# Patient Record
Sex: MALE | Race: WHITE | NOT HISPANIC OR LATINO | URBAN - METROPOLITAN AREA
[De-identification: names, ages, dates, MRNs, and addresses within clinical notes are randomized per-mention and may not be internally consistent; named-entity substitution may affect disease eponyms.]

---

## 2017-05-04 ENCOUNTER — OUTPATIENT (OUTPATIENT)
Dept: OUTPATIENT SERVICES | Facility: HOSPITAL | Age: 72
LOS: 1 days | Discharge: HOME | End: 2017-05-04

## 2017-06-28 DIAGNOSIS — K75.9 INFLAMMATORY LIVER DISEASE, UNSPECIFIED: ICD-10-CM

## 2017-06-28 DIAGNOSIS — E03.9 HYPOTHYROIDISM, UNSPECIFIED: ICD-10-CM

## 2017-06-28 DIAGNOSIS — D64.9 ANEMIA, UNSPECIFIED: ICD-10-CM

## 2017-06-28 DIAGNOSIS — E78.5 HYPERLIPIDEMIA, UNSPECIFIED: ICD-10-CM

## 2017-06-28 DIAGNOSIS — I10 ESSENTIAL (PRIMARY) HYPERTENSION: ICD-10-CM

## 2017-06-28 DIAGNOSIS — E11.9 TYPE 2 DIABETES MELLITUS WITHOUT COMPLICATIONS: ICD-10-CM

## 2017-12-01 ENCOUNTER — OUTPATIENT (OUTPATIENT)
Dept: OUTPATIENT SERVICES | Facility: HOSPITAL | Age: 72
LOS: 1 days | Discharge: HOME | End: 2017-12-01

## 2017-12-01 DIAGNOSIS — J44.1 CHRONIC OBSTRUCTIVE PULMONARY DISEASE WITH (ACUTE) EXACERBATION: ICD-10-CM

## 2017-12-01 DIAGNOSIS — I10 ESSENTIAL (PRIMARY) HYPERTENSION: ICD-10-CM

## 2017-12-01 DIAGNOSIS — E78.5 HYPERLIPIDEMIA, UNSPECIFIED: ICD-10-CM

## 2017-12-01 DIAGNOSIS — J18.9 PNEUMONIA, UNSPECIFIED ORGANISM: ICD-10-CM

## 2017-12-01 DIAGNOSIS — E11.9 TYPE 2 DIABETES MELLITUS WITHOUT COMPLICATIONS: ICD-10-CM

## 2017-12-01 DIAGNOSIS — J44.9 CHRONIC OBSTRUCTIVE PULMONARY DISEASE, UNSPECIFIED: ICD-10-CM

## 2017-12-01 DIAGNOSIS — N40.0 BENIGN PROSTATIC HYPERPLASIA WITHOUT LOWER URINARY TRACT SYMPTOMS: ICD-10-CM

## 2017-12-01 DIAGNOSIS — K52.9 NONINFECTIVE GASTROENTERITIS AND COLITIS, UNSPECIFIED: ICD-10-CM

## 2017-12-01 DIAGNOSIS — E55.9 VITAMIN D DEFICIENCY, UNSPECIFIED: ICD-10-CM

## 2017-12-01 DIAGNOSIS — J20.9 ACUTE BRONCHITIS, UNSPECIFIED: ICD-10-CM

## 2017-12-01 DIAGNOSIS — G03.9 MENINGITIS, UNSPECIFIED: ICD-10-CM

## 2017-12-01 DIAGNOSIS — D64.9 ANEMIA, UNSPECIFIED: ICD-10-CM

## 2017-12-01 DIAGNOSIS — K75.9 INFLAMMATORY LIVER DISEASE, UNSPECIFIED: ICD-10-CM

## 2018-01-06 ENCOUNTER — INPATIENT (INPATIENT)
Facility: HOSPITAL | Age: 73
LOS: 4 days | Discharge: HOME | End: 2018-01-11
Attending: INTERNAL MEDICINE | Admitting: INTERNAL MEDICINE

## 2018-01-06 DIAGNOSIS — J18.9 PNEUMONIA, UNSPECIFIED ORGANISM: ICD-10-CM

## 2018-01-06 DIAGNOSIS — K52.9 NONINFECTIVE GASTROENTERITIS AND COLITIS, UNSPECIFIED: ICD-10-CM

## 2018-01-06 DIAGNOSIS — J20.9 ACUTE BRONCHITIS, UNSPECIFIED: ICD-10-CM

## 2018-01-06 DIAGNOSIS — J44.9 CHRONIC OBSTRUCTIVE PULMONARY DISEASE, UNSPECIFIED: ICD-10-CM

## 2018-01-06 DIAGNOSIS — J44.1 CHRONIC OBSTRUCTIVE PULMONARY DISEASE WITH (ACUTE) EXACERBATION: ICD-10-CM

## 2018-01-06 DIAGNOSIS — E11.9 TYPE 2 DIABETES MELLITUS WITHOUT COMPLICATIONS: ICD-10-CM

## 2018-01-16 DIAGNOSIS — Z79.4 LONG TERM (CURRENT) USE OF INSULIN: ICD-10-CM

## 2018-01-16 DIAGNOSIS — A02.1 SALMONELLA SEPSIS: ICD-10-CM

## 2018-01-16 DIAGNOSIS — Z87.891 PERSONAL HISTORY OF NICOTINE DEPENDENCE: ICD-10-CM

## 2018-01-16 DIAGNOSIS — J44.1 CHRONIC OBSTRUCTIVE PULMONARY DISEASE WITH (ACUTE) EXACERBATION: ICD-10-CM

## 2018-01-16 DIAGNOSIS — J96.00 ACUTE RESPIRATORY FAILURE, UNSPECIFIED WHETHER WITH HYPOXIA OR HYPERCAPNIA: ICD-10-CM

## 2018-01-16 DIAGNOSIS — J18.9 PNEUMONIA, UNSPECIFIED ORGANISM: ICD-10-CM

## 2018-01-16 DIAGNOSIS — E11.9 TYPE 2 DIABETES MELLITUS WITHOUT COMPLICATIONS: ICD-10-CM

## 2018-01-16 DIAGNOSIS — Z85.46 PERSONAL HISTORY OF MALIGNANT NEOPLASM OF PROSTATE: ICD-10-CM

## 2018-02-21 ENCOUNTER — OUTPATIENT (OUTPATIENT)
Dept: OUTPATIENT SERVICES | Facility: HOSPITAL | Age: 73
LOS: 1 days | Discharge: HOME | End: 2018-02-21

## 2018-02-21 DIAGNOSIS — J45.901 UNSPECIFIED ASTHMA WITH (ACUTE) EXACERBATION: ICD-10-CM

## 2018-03-12 ENCOUNTER — EMERGENCY (EMERGENCY)
Facility: HOSPITAL | Age: 73
LOS: 0 days | Discharge: HOME | End: 2018-03-13
Attending: EMERGENCY MEDICINE | Admitting: INTERNAL MEDICINE

## 2018-03-12 VITALS
TEMPERATURE: 100 F | DIASTOLIC BLOOD PRESSURE: 68 MMHG | SYSTOLIC BLOOD PRESSURE: 117 MMHG | OXYGEN SATURATION: 94 % | RESPIRATION RATE: 20 BRPM | HEART RATE: 121 BPM

## 2018-03-12 DIAGNOSIS — R00.0 TACHYCARDIA, UNSPECIFIED: ICD-10-CM

## 2018-03-12 DIAGNOSIS — E11.9 TYPE 2 DIABETES MELLITUS WITHOUT COMPLICATIONS: ICD-10-CM

## 2018-03-12 DIAGNOSIS — R19.7 DIARRHEA, UNSPECIFIED: ICD-10-CM

## 2018-03-12 DIAGNOSIS — R10.13 EPIGASTRIC PAIN: ICD-10-CM

## 2018-03-12 DIAGNOSIS — R11.10 VOMITING, UNSPECIFIED: ICD-10-CM

## 2018-03-12 DIAGNOSIS — J45.909 UNSPECIFIED ASTHMA, UNCOMPLICATED: ICD-10-CM

## 2018-03-12 LAB
ALBUMIN SERPL ELPH-MCNC: 2.6 G/DL — LOW (ref 3–5.5)
ALP SERPL-CCNC: 57 U/L — SIGNIFICANT CHANGE UP (ref 30–115)
ALT FLD-CCNC: 16 U/L — SIGNIFICANT CHANGE UP (ref 0–41)
ANION GAP SERPL CALC-SCNC: 9 MMOL/L — SIGNIFICANT CHANGE UP (ref 7–14)
AST SERPL-CCNC: 20 U/L — SIGNIFICANT CHANGE UP (ref 0–41)
BASOPHILS # BLD AUTO: 0.05 K/UL — SIGNIFICANT CHANGE UP (ref 0–0.2)
BASOPHILS NFR BLD AUTO: 0.4 % — SIGNIFICANT CHANGE UP (ref 0–1)
BILIRUB SERPL-MCNC: 1.4 MG/DL — HIGH (ref 0.2–1.2)
BUN SERPL-MCNC: 20 MG/DL — SIGNIFICANT CHANGE UP (ref 10–20)
CALCIUM SERPL-MCNC: 8.5 MG/DL — SIGNIFICANT CHANGE UP (ref 8.5–10.1)
CHLORIDE SERPL-SCNC: 105 MMOL/L — SIGNIFICANT CHANGE UP (ref 98–110)
CO2 SERPL-SCNC: 24 MMOL/L — SIGNIFICANT CHANGE UP (ref 17–32)
CREAT SERPL-MCNC: 1 MG/DL — SIGNIFICANT CHANGE UP (ref 0.7–1.5)
EOSINOPHIL # BLD AUTO: 0.23 K/UL — SIGNIFICANT CHANGE UP (ref 0–0.7)
EOSINOPHIL NFR BLD AUTO: 1.7 % — SIGNIFICANT CHANGE UP (ref 0–8)
GLUCOSE SERPL-MCNC: 170 MG/DL — HIGH (ref 70–110)
HCT VFR BLD CALC: 38.7 % — LOW (ref 42–52)
HGB BLD-MCNC: 12.5 G/DL — LOW (ref 14–18)
IMM GRANULOCYTES NFR BLD AUTO: 1.3 % — HIGH (ref 0.1–0.3)
LACTATE SERPL-SCNC: 1.3 MMOL/L — SIGNIFICANT CHANGE UP (ref 0.5–2.2)
LIDOCAIN IGE QN: 16 U/L — SIGNIFICANT CHANGE UP (ref 7–60)
LYMPHOCYTES # BLD AUTO: 1.77 K/UL — SIGNIFICANT CHANGE UP (ref 1.2–3.4)
LYMPHOCYTES # BLD AUTO: 13.2 % — LOW (ref 20.5–51.1)
MAGNESIUM SERPL-MCNC: 1.8 MG/DL — SIGNIFICANT CHANGE UP (ref 1.8–2.4)
MCHC RBC-ENTMCNC: 27.2 PG — SIGNIFICANT CHANGE UP (ref 27–31)
MCHC RBC-ENTMCNC: 32.3 G/DL — SIGNIFICANT CHANGE UP (ref 32–37)
MCV RBC AUTO: 84.1 FL — SIGNIFICANT CHANGE UP (ref 80–94)
MONOCYTES # BLD AUTO: 0.88 K/UL — HIGH (ref 0.1–0.6)
MONOCYTES NFR BLD AUTO: 6.6 % — SIGNIFICANT CHANGE UP (ref 1.7–9.3)
NEUTROPHILS # BLD AUTO: 10.26 K/UL — HIGH (ref 1.4–6.5)
NEUTROPHILS NFR BLD AUTO: 76.8 % — HIGH (ref 42.2–75.2)
NRBC # BLD: 0 /100 WBCS — SIGNIFICANT CHANGE UP (ref 0–0)
PLATELET # BLD AUTO: 462 K/UL — HIGH (ref 130–400)
POTASSIUM SERPL-MCNC: 4.8 MMOL/L — SIGNIFICANT CHANGE UP (ref 3.5–5)
POTASSIUM SERPL-SCNC: 4.8 MMOL/L — SIGNIFICANT CHANGE UP (ref 3.5–5)
PROT SERPL-MCNC: 5.7 G/DL — LOW (ref 6–8)
RBC # BLD: 4.6 M/UL — LOW (ref 4.7–6.1)
RBC # FLD: 14.2 % — SIGNIFICANT CHANGE UP (ref 11.5–14.5)
SODIUM SERPL-SCNC: 138 MMOL/L — SIGNIFICANT CHANGE UP (ref 135–146)
WBC # BLD: 13.36 K/UL — HIGH (ref 4.8–10.8)
WBC # FLD AUTO: 13.36 K/UL — HIGH (ref 4.8–10.8)

## 2018-03-12 RX ORDER — SODIUM CHLORIDE 9 MG/ML
1000 INJECTION INTRAMUSCULAR; INTRAVENOUS; SUBCUTANEOUS ONCE
Qty: 0 | Refills: 0 | Status: COMPLETED | OUTPATIENT
Start: 2018-03-12 | End: 2018-03-12

## 2018-03-12 RX ORDER — ONDANSETRON 8 MG/1
8 TABLET, FILM COATED ORAL ONCE
Qty: 0 | Refills: 0 | Status: COMPLETED | OUTPATIENT
Start: 2018-03-12 | End: 2018-03-12

## 2018-03-12 RX ADMIN — SODIUM CHLORIDE 2000 MILLILITER(S): 9 INJECTION INTRAMUSCULAR; INTRAVENOUS; SUBCUTANEOUS at 23:24

## 2018-03-12 RX ADMIN — ONDANSETRON 8 MILLIGRAM(S): 8 TABLET, FILM COATED ORAL at 23:24

## 2018-03-12 RX ADMIN — Medication 20 MILLIGRAM(S): at 23:24

## 2018-03-13 VITALS
DIASTOLIC BLOOD PRESSURE: 61 MMHG | TEMPERATURE: 99 F | SYSTOLIC BLOOD PRESSURE: 131 MMHG | RESPIRATION RATE: 18 BRPM | OXYGEN SATURATION: 99 % | HEART RATE: 99 BPM

## 2018-03-13 RX ORDER — FAMOTIDINE 10 MG/ML
20 INJECTION INTRAVENOUS ONCE
Qty: 0 | Refills: 0 | Status: COMPLETED | OUTPATIENT
Start: 2018-03-13 | End: 2018-03-13

## 2018-03-13 RX ADMIN — Medication 30 MILLILITER(S): at 01:04

## 2018-03-13 RX ADMIN — FAMOTIDINE 20 MILLIGRAM(S): 10 INJECTION INTRAVENOUS at 01:04

## 2018-03-13 NOTE — ED PROVIDER NOTE - NS ED ROS FT
Constitutional: no fever, chills, no recent weight loss, decreased appetite today due to nausea  Cardiac: No chest pain, SOB or edema.  Respiratory: No cough or respiratory distress  GI: see HPI  : No dysuria, frequency, urgency or hematuria  MS: no pain to back or extremities, no loss of ROM, no weakness  Neuro: No headache, feels weak.  Skin: No skin rash.  Endocrine: see hPI  Except as documented in the HPI, all other systems are negative.

## 2018-03-13 NOTE — ED PROVIDER NOTE - PROGRESS NOTE DETAILS
Labs unremarkable, patient no longer nauseous, did have another episode of diarrhea in ED, now has burning to mid abdomen. Will give pepcid, maalox and reassess Patient no longer has epigastric pain, is tolerating PO well. Likely viral gastro, discussed BRAT diet, PO fluids and return precautions.

## 2018-03-13 NOTE — ED PROVIDER NOTE - MEDICAL DECISION MAKING DETAILS
Patient here for vomiting, diarrhea, non bloody, over last 24 hours, no sick contacts, travel. Has soft NT, ND abdomen, is tachy to 120. Will check labs, hydrate, give fluids, bentyl, zofran and reassess.

## 2018-03-13 NOTE — ED PROVIDER NOTE - PHYSICAL EXAMINATION
VITAL SIGNS: I have reviewed nursing notes and confirm.  CONSTITUTIONAL: Well-developed; well-nourished; in no acute distress.  SKIN: Skin exam is warm and dry, no acute rash, good turgor  HEAD: Normocephalic; atraumatic.  EYES: PERRL, EOM intact; conjunctiva and sclera clear.  ENT: No nasal discharge; airway clear  NECK: Supple; non tender.  CARD: tachy, reg rhythm.  RESP: No wheezes, rales or rhonchi.  ABD: Normal bowel sounds; soft; non-distended; non-tender  EXT: Normal ROM.   NEURO: Alert, oriented. Grossly unremarkable. No focal deficits.  PSYCH: Cooperative, appropriate.

## 2018-03-13 NOTE — ED ADULT NURSE REASSESSMENT NOTE - NS ED NURSE REASSESS COMMENT FT1
Talked with dr about family concerns and how family wants to be admitted. Stated will talk to family.

## 2018-03-13 NOTE — ED PROVIDER NOTE - OBJECTIVE STATEMENT
73 yo M with history of DM II, well controlled asthma, previous prostate ca sp TURP not requiring chemo or radiation, here for assessment of vomiting and diarrhea since 1am. Patient states he was in [place], ate dinner, felt well, went to bed and woke up with multiple episode of vomiting, non bloody, non bilious. Had multiple other episodes of vomiting until early this afternoon, when he developed cramping lower abdominal pain and non bloody diarrhea.     In ED he reports bloating, gas and diarrhea preceded by cramping pain.     No recent travel or sick contacts. No one else who ate this food is ill

## 2018-03-27 ENCOUNTER — OUTPATIENT (OUTPATIENT)
Dept: OUTPATIENT SERVICES | Facility: HOSPITAL | Age: 73
LOS: 1 days | Discharge: HOME | End: 2018-03-27

## 2018-03-27 DIAGNOSIS — B44.9 ASPERGILLOSIS, UNSPECIFIED: ICD-10-CM

## 2018-04-09 ENCOUNTER — OUTPATIENT (OUTPATIENT)
Dept: OUTPATIENT SERVICES | Facility: HOSPITAL | Age: 73
LOS: 1 days | Discharge: HOME | End: 2018-04-09

## 2018-04-09 DIAGNOSIS — R05 COUGH: ICD-10-CM

## 2018-04-13 ENCOUNTER — OUTPATIENT (OUTPATIENT)
Dept: OUTPATIENT SERVICES | Facility: HOSPITAL | Age: 73
LOS: 1 days | Discharge: HOME | End: 2018-04-13

## 2018-04-13 DIAGNOSIS — E11.9 TYPE 2 DIABETES MELLITUS WITHOUT COMPLICATIONS: ICD-10-CM

## 2018-04-13 DIAGNOSIS — I10 ESSENTIAL (PRIMARY) HYPERTENSION: ICD-10-CM

## 2018-04-13 DIAGNOSIS — E78.5 HYPERLIPIDEMIA, UNSPECIFIED: ICD-10-CM

## 2018-04-13 DIAGNOSIS — D64.9 ANEMIA, UNSPECIFIED: ICD-10-CM

## 2018-04-13 DIAGNOSIS — K75.9 INFLAMMATORY LIVER DISEASE, UNSPECIFIED: ICD-10-CM

## 2018-04-13 DIAGNOSIS — E03.9 HYPOTHYROIDISM, UNSPECIFIED: ICD-10-CM

## 2018-04-22 ENCOUNTER — INPATIENT (INPATIENT)
Facility: HOSPITAL | Age: 73
LOS: 3 days | Discharge: HOME HEALTH | End: 2018-04-26
Attending: INTERNAL MEDICINE | Admitting: INTERNAL MEDICINE

## 2018-04-22 VITALS
SYSTOLIC BLOOD PRESSURE: 134 MMHG | DIASTOLIC BLOOD PRESSURE: 66 MMHG | OXYGEN SATURATION: 95 % | RESPIRATION RATE: 20 BRPM | HEART RATE: 129 BPM

## 2018-04-22 LAB
ALBUMIN SERPL ELPH-MCNC: 3.2 G/DL — LOW (ref 3.5–5.2)
ALP SERPL-CCNC: 73 U/L — SIGNIFICANT CHANGE UP (ref 30–115)
ALT FLD-CCNC: 25 U/L — SIGNIFICANT CHANGE UP (ref 0–41)
ANION GAP SERPL CALC-SCNC: 16 MMOL/L — HIGH (ref 7–14)
AST SERPL-CCNC: 25 U/L — SIGNIFICANT CHANGE UP (ref 0–41)
BASOPHILS # BLD AUTO: 0.03 K/UL — SIGNIFICANT CHANGE UP (ref 0–0.2)
BASOPHILS NFR BLD AUTO: 0.2 % — SIGNIFICANT CHANGE UP (ref 0–1)
BILIRUB SERPL-MCNC: 0.7 MG/DL — SIGNIFICANT CHANGE UP (ref 0.2–1.2)
BUN SERPL-MCNC: 13 MG/DL — SIGNIFICANT CHANGE UP (ref 10–20)
CALCIUM SERPL-MCNC: 8.3 MG/DL — LOW (ref 8.5–10.1)
CHLORIDE SERPL-SCNC: 101 MMOL/L — SIGNIFICANT CHANGE UP (ref 98–110)
CO2 SERPL-SCNC: 22 MMOL/L — SIGNIFICANT CHANGE UP (ref 17–32)
CREAT SERPL-MCNC: 0.9 MG/DL — SIGNIFICANT CHANGE UP (ref 0.7–1.5)
EOSINOPHIL # BLD AUTO: 0.04 K/UL — SIGNIFICANT CHANGE UP (ref 0–0.7)
EOSINOPHIL NFR BLD AUTO: 0.3 % — SIGNIFICANT CHANGE UP (ref 0–8)
GLUCOSE SERPL-MCNC: 131 MG/DL — HIGH (ref 70–99)
HCT VFR BLD CALC: 43.8 % — SIGNIFICANT CHANGE UP (ref 42–52)
HGB BLD-MCNC: 14.2 G/DL — SIGNIFICANT CHANGE UP (ref 14–18)
IMM GRANULOCYTES NFR BLD AUTO: 1.5 % — HIGH (ref 0.1–0.3)
LACTATE BLDV-MCNC: 3 MMOL/L — HIGH (ref 0.5–1.6)
LYMPHOCYTES # BLD AUTO: 14.1 % — LOW (ref 20.5–51.1)
LYMPHOCYTES # BLD AUTO: 2.13 K/UL — SIGNIFICANT CHANGE UP (ref 1.2–3.4)
MCHC RBC-ENTMCNC: 27.6 PG — SIGNIFICANT CHANGE UP (ref 27–31)
MCHC RBC-ENTMCNC: 32.4 G/DL — SIGNIFICANT CHANGE UP (ref 32–37)
MCV RBC AUTO: 85.2 FL — SIGNIFICANT CHANGE UP (ref 80–94)
MONOCYTES # BLD AUTO: 1.01 K/UL — HIGH (ref 0.1–0.6)
MONOCYTES NFR BLD AUTO: 6.7 % — SIGNIFICANT CHANGE UP (ref 1.7–9.3)
NEUTROPHILS # BLD AUTO: 11.64 K/UL — HIGH (ref 1.4–6.5)
NEUTROPHILS NFR BLD AUTO: 77.2 % — HIGH (ref 42.2–75.2)
NRBC # BLD: 0 /100 WBCS — SIGNIFICANT CHANGE UP (ref 0–0)
NT-PROBNP SERPL-SCNC: 185 PG/ML — SIGNIFICANT CHANGE UP (ref 0–300)
PLATELET # BLD AUTO: 223 K/UL — SIGNIFICANT CHANGE UP (ref 130–400)
POTASSIUM SERPL-MCNC: 4.7 MMOL/L — SIGNIFICANT CHANGE UP (ref 3.5–5)
POTASSIUM SERPL-SCNC: 4.7 MMOL/L — SIGNIFICANT CHANGE UP (ref 3.5–5)
PROT SERPL-MCNC: 6.1 G/DL — SIGNIFICANT CHANGE UP (ref 6–8)
RBC # BLD: 5.14 M/UL — SIGNIFICANT CHANGE UP (ref 4.7–6.1)
RBC # FLD: 15.1 % — HIGH (ref 11.5–14.5)
SODIUM SERPL-SCNC: 139 MMOL/L — SIGNIFICANT CHANGE UP (ref 135–146)
TROPONIN T SERPL-MCNC: <0.01 NG/ML — SIGNIFICANT CHANGE UP
WBC # BLD: 15.07 K/UL — HIGH (ref 4.8–10.8)
WBC # FLD AUTO: 15.07 K/UL — HIGH (ref 4.8–10.8)

## 2018-04-22 RX ORDER — INSULIN LISPRO 100/ML
10 VIAL (ML) SUBCUTANEOUS
Qty: 0 | Refills: 0 | Status: DISCONTINUED | OUTPATIENT
Start: 2018-04-22 | End: 2018-04-23

## 2018-04-22 RX ORDER — IPRATROPIUM/ALBUTEROL SULFATE 18-103MCG
3 AEROSOL WITH ADAPTER (GRAM) INHALATION
Qty: 0 | Refills: 0 | Status: COMPLETED | OUTPATIENT
Start: 2018-04-22 | End: 2018-04-22

## 2018-04-22 RX ORDER — DEXTROSE 50 % IN WATER 50 %
25 SYRINGE (ML) INTRAVENOUS ONCE
Qty: 0 | Refills: 0 | Status: DISCONTINUED | OUTPATIENT
Start: 2018-04-22 | End: 2018-04-23

## 2018-04-22 RX ORDER — DEXTROSE 50 % IN WATER 50 %
1 SYRINGE (ML) INTRAVENOUS ONCE
Qty: 0 | Refills: 0 | Status: DISCONTINUED | OUTPATIENT
Start: 2018-04-22 | End: 2018-04-23

## 2018-04-22 RX ORDER — CEFTRIAXONE 500 MG/1
1 INJECTION, POWDER, FOR SOLUTION INTRAMUSCULAR; INTRAVENOUS ONCE
Qty: 0 | Refills: 0 | Status: COMPLETED | OUTPATIENT
Start: 2018-04-22 | End: 2018-04-22

## 2018-04-22 RX ORDER — ACETAMINOPHEN 500 MG
650 TABLET ORAL ONCE
Qty: 0 | Refills: 0 | Status: COMPLETED | OUTPATIENT
Start: 2018-04-22 | End: 2018-04-22

## 2018-04-22 RX ORDER — DEXTROSE 50 % IN WATER 50 %
12.5 SYRINGE (ML) INTRAVENOUS ONCE
Qty: 0 | Refills: 0 | Status: DISCONTINUED | OUTPATIENT
Start: 2018-04-22 | End: 2018-04-23

## 2018-04-22 RX ORDER — SODIUM CHLORIDE 9 MG/ML
1000 INJECTION, SOLUTION INTRAVENOUS
Qty: 0 | Refills: 0 | Status: DISCONTINUED | OUTPATIENT
Start: 2018-04-22 | End: 2018-04-23

## 2018-04-22 RX ORDER — MAGNESIUM SULFATE 500 MG/ML
2 VIAL (ML) INJECTION ONCE
Qty: 0 | Refills: 0 | Status: COMPLETED | OUTPATIENT
Start: 2018-04-22 | End: 2018-04-22

## 2018-04-22 RX ORDER — GLUCAGON INJECTION, SOLUTION 0.5 MG/.1ML
1 INJECTION, SOLUTION SUBCUTANEOUS ONCE
Qty: 0 | Refills: 0 | Status: DISCONTINUED | OUTPATIENT
Start: 2018-04-22 | End: 2018-04-23

## 2018-04-22 RX ORDER — INSULIN GLARGINE 100 [IU]/ML
30 INJECTION, SOLUTION SUBCUTANEOUS AT BEDTIME
Qty: 0 | Refills: 0 | Status: DISCONTINUED | OUTPATIENT
Start: 2018-04-22 | End: 2018-04-23

## 2018-04-22 RX ORDER — INSULIN LISPRO 100/ML
10 VIAL (ML) SUBCUTANEOUS ONCE
Qty: 0 | Refills: 0 | Status: COMPLETED | OUTPATIENT
Start: 2018-04-22 | End: 2018-04-22

## 2018-04-22 RX ORDER — AZITHROMYCIN 500 MG/1
500 TABLET, FILM COATED ORAL ONCE
Qty: 0 | Refills: 0 | Status: COMPLETED | OUTPATIENT
Start: 2018-04-22 | End: 2018-04-22

## 2018-04-22 RX ORDER — SODIUM CHLORIDE 9 MG/ML
1000 INJECTION INTRAMUSCULAR; INTRAVENOUS; SUBCUTANEOUS ONCE
Qty: 0 | Refills: 0 | Status: COMPLETED | OUTPATIENT
Start: 2018-04-22 | End: 2018-04-22

## 2018-04-22 RX ORDER — DEXAMETHASONE 0.5 MG/5ML
10 ELIXIR ORAL ONCE
Qty: 0 | Refills: 0 | Status: COMPLETED | OUTPATIENT
Start: 2018-04-22 | End: 2018-04-22

## 2018-04-22 RX ADMIN — SODIUM CHLORIDE 1000 MILLILITER(S): 9 INJECTION INTRAMUSCULAR; INTRAVENOUS; SUBCUTANEOUS at 19:37

## 2018-04-22 RX ADMIN — Medication 10 UNIT(S): at 21:07

## 2018-04-22 RX ADMIN — AZITHROMYCIN 500 MILLIGRAM(S): 500 TABLET, FILM COATED ORAL at 18:10

## 2018-04-22 RX ADMIN — Medication 50 GRAM(S): at 15:47

## 2018-04-22 RX ADMIN — CEFTRIAXONE 100 GRAM(S): 500 INJECTION, POWDER, FOR SOLUTION INTRAMUSCULAR; INTRAVENOUS at 16:42

## 2018-04-22 RX ADMIN — Medication 3 MILLILITER(S): at 14:25

## 2018-04-22 RX ADMIN — Medication 10 UNIT(S): at 22:46

## 2018-04-22 RX ADMIN — Medication 10 MILLIGRAM(S): at 15:47

## 2018-04-22 RX ADMIN — INSULIN GLARGINE 30 UNIT(S): 100 INJECTION, SOLUTION SUBCUTANEOUS at 21:26

## 2018-04-22 RX ADMIN — Medication 3 MILLILITER(S): at 15:47

## 2018-04-22 RX ADMIN — Medication 3 MILLILITER(S): at 15:30

## 2018-04-22 RX ADMIN — Medication 650 MILLIGRAM(S): at 16:22

## 2018-04-22 NOTE — ED PROVIDER NOTE - OBJECTIVE STATEMENT
weeks of cough, weakness, progressive, unsure about fever, productive cough, using inhaler x3 per day instead of twice not improving, no leg swelling. chest pain only with coughing

## 2018-04-22 NOTE — ED PROVIDER NOTE - RESPIRATORY, MLM
prolonged expiratory phase, wheezing and rhonchi bilaterally speaking full sentences, no accessory muscle use

## 2018-04-22 NOTE — ED PROVIDER NOTE - MEDICAL DECISION MAKING DETAILS
cough/wheezing suspect asthma exacebration vs pneumonia. plan is to give nebs, steroids, mag, cxr and labs to evaluate for pneumonia, given tachycardia and borderline 02 will consider admission.

## 2018-04-22 NOTE — ED ADULT NURSE NOTE - OBJECTIVE STATEMENT
pt c/o worsening SOB and chills x 1 week, yellow/green productive cough x 1 month; as per wife pt had bronchitis last month. pt has history of asthma and has been using nebulizer 2-3 daily as of recent.

## 2018-04-23 ENCOUNTER — TRANSCRIPTION ENCOUNTER (OUTPATIENT)
Age: 73
End: 2018-04-23

## 2018-04-23 DIAGNOSIS — Z90.79 ACQUIRED ABSENCE OF OTHER GENITAL ORGAN(S): Chronic | ICD-10-CM

## 2018-04-23 LAB
ANION GAP SERPL CALC-SCNC: 21 MMOL/L — HIGH (ref 7–14)
BASOPHILS # BLD AUTO: 0.01 K/UL — SIGNIFICANT CHANGE UP (ref 0–0.2)
BASOPHILS NFR BLD AUTO: 0.1 % — SIGNIFICANT CHANGE UP (ref 0–1)
BUN SERPL-MCNC: 24 MG/DL — HIGH (ref 10–20)
CALCIUM SERPL-MCNC: 9.1 MG/DL — SIGNIFICANT CHANGE UP (ref 8.5–10.1)
CHLORIDE SERPL-SCNC: 98 MMOL/L — SIGNIFICANT CHANGE UP (ref 98–110)
CO2 SERPL-SCNC: 17 MMOL/L — SIGNIFICANT CHANGE UP (ref 17–32)
CREAT SERPL-MCNC: 0.9 MG/DL — SIGNIFICANT CHANGE UP (ref 0.7–1.5)
EOSINOPHIL # BLD AUTO: 0 K/UL — SIGNIFICANT CHANGE UP (ref 0–0.7)
EOSINOPHIL NFR BLD AUTO: 0 % — SIGNIFICANT CHANGE UP (ref 0–8)
ESTIMATED AVERAGE GLUCOSE: 237 MG/DL — HIGH (ref 68–114)
FLU A RESULT: NEGATIVE — SIGNIFICANT CHANGE UP
FLU A RESULT: NEGATIVE — SIGNIFICANT CHANGE UP
FLUAV AG NPH QL: NEGATIVE — SIGNIFICANT CHANGE UP
FLUBV AG NPH QL: NEGATIVE — SIGNIFICANT CHANGE UP
GLUCOSE SERPL-MCNC: 57 MG/DL — LOW (ref 70–99)
HBA1C BLD-MCNC: 9.9 % — HIGH (ref 4–5.6)
HCT VFR BLD CALC: 41 % — LOW (ref 42–52)
HGB BLD-MCNC: 13.5 G/DL — LOW (ref 14–18)
IMM GRANULOCYTES NFR BLD AUTO: 0.9 % — HIGH (ref 0.1–0.3)
LYMPHOCYTES # BLD AUTO: 1.37 K/UL — SIGNIFICANT CHANGE UP (ref 1.2–3.4)
LYMPHOCYTES # BLD AUTO: 8.6 % — LOW (ref 20.5–51.1)
MCHC RBC-ENTMCNC: 27.6 PG — SIGNIFICANT CHANGE UP (ref 27–31)
MCHC RBC-ENTMCNC: 32.9 G/DL — SIGNIFICANT CHANGE UP (ref 32–37)
MCV RBC AUTO: 83.7 FL — SIGNIFICANT CHANGE UP (ref 80–94)
MONOCYTES # BLD AUTO: 0.6 K/UL — SIGNIFICANT CHANGE UP (ref 0.1–0.6)
MONOCYTES NFR BLD AUTO: 3.8 % — SIGNIFICANT CHANGE UP (ref 1.7–9.3)
NEUTROPHILS # BLD AUTO: 13.72 K/UL — HIGH (ref 1.4–6.5)
NEUTROPHILS NFR BLD AUTO: 86.6 % — HIGH (ref 42.2–75.2)
PLATELET # BLD AUTO: 234 K/UL — SIGNIFICANT CHANGE UP (ref 130–400)
POTASSIUM SERPL-MCNC: 4.5 MMOL/L — SIGNIFICANT CHANGE UP (ref 3.5–5)
POTASSIUM SERPL-SCNC: 4.5 MMOL/L — SIGNIFICANT CHANGE UP (ref 3.5–5)
RBC # BLD: 4.9 M/UL — SIGNIFICANT CHANGE UP (ref 4.7–6.1)
RBC # FLD: 14.9 % — HIGH (ref 11.5–14.5)
SODIUM SERPL-SCNC: 136 MMOL/L — SIGNIFICANT CHANGE UP (ref 135–146)
WBC # BLD: 15.85 K/UL — HIGH (ref 4.8–10.8)
WBC # FLD AUTO: 15.85 K/UL — HIGH (ref 4.8–10.8)

## 2018-04-23 RX ORDER — AZITHROMYCIN 500 MG/1
500 TABLET, FILM COATED ORAL EVERY 24 HOURS
Qty: 0 | Refills: 0 | Status: DISCONTINUED | OUTPATIENT
Start: 2018-04-23 | End: 2018-04-24

## 2018-04-23 RX ORDER — INSULIN LISPRO 100/ML
20 VIAL (ML) SUBCUTANEOUS ONCE
Qty: 0 | Refills: 0 | Status: COMPLETED | OUTPATIENT
Start: 2018-04-23 | End: 2018-04-23

## 2018-04-23 RX ORDER — INSULIN LISPRO 100/ML
10 VIAL (ML) SUBCUTANEOUS ONCE
Qty: 0 | Refills: 0 | Status: COMPLETED | OUTPATIENT
Start: 2018-04-23 | End: 2018-04-23

## 2018-04-23 RX ORDER — BUDESONIDE AND FORMOTEROL FUMARATE DIHYDRATE 160; 4.5 UG/1; UG/1
2 AEROSOL RESPIRATORY (INHALATION)
Qty: 0 | Refills: 0 | Status: DISCONTINUED | OUTPATIENT
Start: 2018-04-23 | End: 2018-04-26

## 2018-04-23 RX ORDER — LANOLIN ALCOHOL/MO/W.PET/CERES
3 CREAM (GRAM) TOPICAL AT BEDTIME
Qty: 0 | Refills: 0 | Status: DISCONTINUED | OUTPATIENT
Start: 2018-04-23 | End: 2018-04-26

## 2018-04-23 RX ORDER — SODIUM CHLORIDE 9 MG/ML
1000 INJECTION, SOLUTION INTRAVENOUS
Qty: 0 | Refills: 0 | Status: DISCONTINUED | OUTPATIENT
Start: 2018-04-23 | End: 2018-04-26

## 2018-04-23 RX ORDER — IPRATROPIUM/ALBUTEROL SULFATE 18-103MCG
3 AEROSOL WITH ADAPTER (GRAM) INHALATION EVERY 4 HOURS
Qty: 0 | Refills: 0 | Status: DISCONTINUED | OUTPATIENT
Start: 2018-04-23 | End: 2018-04-26

## 2018-04-23 RX ORDER — TIOTROPIUM BROMIDE 18 UG/1
1 CAPSULE ORAL; RESPIRATORY (INHALATION) DAILY
Qty: 0 | Refills: 0 | Status: DISCONTINUED | OUTPATIENT
Start: 2018-04-23 | End: 2018-04-23

## 2018-04-23 RX ORDER — MONTELUKAST 4 MG/1
10 TABLET, CHEWABLE ORAL AT BEDTIME
Qty: 0 | Refills: 0 | Status: DISCONTINUED | OUTPATIENT
Start: 2018-04-23 | End: 2018-04-26

## 2018-04-23 RX ORDER — INSULIN GLARGINE 100 [IU]/ML
30 INJECTION, SOLUTION SUBCUTANEOUS ONCE
Qty: 0 | Refills: 0 | Status: COMPLETED | OUTPATIENT
Start: 2018-04-23 | End: 2018-04-23

## 2018-04-23 RX ORDER — GLUCAGON INJECTION, SOLUTION 0.5 MG/.1ML
1 INJECTION, SOLUTION SUBCUTANEOUS ONCE
Qty: 0 | Refills: 0 | Status: DISCONTINUED | OUTPATIENT
Start: 2018-04-23 | End: 2018-04-26

## 2018-04-23 RX ORDER — DEXTROSE 50 % IN WATER 50 %
25 SYRINGE (ML) INTRAVENOUS ONCE
Qty: 0 | Refills: 0 | Status: DISCONTINUED | OUTPATIENT
Start: 2018-04-23 | End: 2018-04-26

## 2018-04-23 RX ORDER — DOCUSATE SODIUM 100 MG
100 CAPSULE ORAL THREE TIMES A DAY
Qty: 0 | Refills: 0 | Status: DISCONTINUED | OUTPATIENT
Start: 2018-04-23 | End: 2018-04-26

## 2018-04-23 RX ORDER — ENOXAPARIN SODIUM 100 MG/ML
40 INJECTION SUBCUTANEOUS DAILY
Qty: 0 | Refills: 0 | Status: DISCONTINUED | OUTPATIENT
Start: 2018-04-23 | End: 2018-04-26

## 2018-04-23 RX ORDER — INSULIN GLARGINE 100 [IU]/ML
60 INJECTION, SOLUTION SUBCUTANEOUS AT BEDTIME
Qty: 0 | Refills: 0 | Status: DISCONTINUED | OUTPATIENT
Start: 2018-04-24 | End: 2018-04-26

## 2018-04-23 RX ORDER — INSULIN LISPRO 100/ML
30 VIAL (ML) SUBCUTANEOUS
Qty: 0 | Refills: 0 | Status: DISCONTINUED | OUTPATIENT
Start: 2018-04-23 | End: 2018-04-26

## 2018-04-23 RX ORDER — INSULIN GLARGINE 100 [IU]/ML
30 INJECTION, SOLUTION SUBCUTANEOUS AT BEDTIME
Qty: 0 | Refills: 0 | Status: COMPLETED | OUTPATIENT
Start: 2018-04-23 | End: 2018-04-23

## 2018-04-23 RX ORDER — PANTOPRAZOLE SODIUM 20 MG/1
40 TABLET, DELAYED RELEASE ORAL
Qty: 0 | Refills: 0 | Status: DISCONTINUED | OUTPATIENT
Start: 2018-04-23 | End: 2018-04-26

## 2018-04-23 RX ORDER — SENNA PLUS 8.6 MG/1
1 TABLET ORAL AT BEDTIME
Qty: 0 | Refills: 0 | Status: DISCONTINUED | OUTPATIENT
Start: 2018-04-23 | End: 2018-04-26

## 2018-04-23 RX ORDER — INSULIN LISPRO 100/ML
20 VIAL (ML) SUBCUTANEOUS
Qty: 0 | Refills: 0 | Status: DISCONTINUED | OUTPATIENT
Start: 2018-04-23 | End: 2018-04-23

## 2018-04-23 RX ORDER — GABAPENTIN 400 MG/1
100 CAPSULE ORAL
Qty: 0 | Refills: 0 | Status: DISCONTINUED | OUTPATIENT
Start: 2018-04-23 | End: 2018-04-26

## 2018-04-23 RX ORDER — CEFTRIAXONE 500 MG/1
1 INJECTION, POWDER, FOR SOLUTION INTRAMUSCULAR; INTRAVENOUS EVERY 24 HOURS
Qty: 0 | Refills: 0 | Status: DISCONTINUED | OUTPATIENT
Start: 2018-04-23 | End: 2018-04-26

## 2018-04-23 RX ORDER — INSULIN GLARGINE 100 [IU]/ML
60 INJECTION, SOLUTION SUBCUTANEOUS AT BEDTIME
Qty: 0 | Refills: 0 | Status: DISCONTINUED | OUTPATIENT
Start: 2018-04-23 | End: 2018-04-23

## 2018-04-23 RX ORDER — DEXTROSE 50 % IN WATER 50 %
1 SYRINGE (ML) INTRAVENOUS ONCE
Qty: 0 | Refills: 0 | Status: DISCONTINUED | OUTPATIENT
Start: 2018-04-23 | End: 2018-04-26

## 2018-04-23 RX ORDER — INSULIN GLARGINE 100 [IU]/ML
30 INJECTION, SOLUTION SUBCUTANEOUS AT BEDTIME
Qty: 0 | Refills: 0 | Status: DISCONTINUED | OUTPATIENT
Start: 2018-04-23 | End: 2018-04-23

## 2018-04-23 RX ADMIN — Medication 20 UNIT(S): at 02:39

## 2018-04-23 RX ADMIN — MONTELUKAST 10 MILLIGRAM(S): 4 TABLET, CHEWABLE ORAL at 23:23

## 2018-04-23 RX ADMIN — Medication 20 UNIT(S): at 00:13

## 2018-04-23 RX ADMIN — INSULIN GLARGINE 30 UNIT(S): 100 INJECTION, SOLUTION SUBCUTANEOUS at 18:15

## 2018-04-23 RX ADMIN — Medication 60 MILLIGRAM(S): at 21:55

## 2018-04-23 RX ADMIN — TIOTROPIUM BROMIDE 1 CAPSULE(S): 18 CAPSULE ORAL; RESPIRATORY (INHALATION) at 08:32

## 2018-04-23 RX ADMIN — INSULIN GLARGINE 30 UNIT(S): 100 INJECTION, SOLUTION SUBCUTANEOUS at 21:53

## 2018-04-23 RX ADMIN — PANTOPRAZOLE SODIUM 40 MILLIGRAM(S): 20 TABLET, DELAYED RELEASE ORAL at 12:22

## 2018-04-23 RX ADMIN — Medication 3 MILLIGRAM(S): at 23:19

## 2018-04-23 RX ADMIN — Medication 30 UNIT(S): at 17:43

## 2018-04-23 RX ADMIN — Medication 125 MILLIGRAM(S): at 05:10

## 2018-04-23 RX ADMIN — Medication 10 UNIT(S): at 01:20

## 2018-04-23 RX ADMIN — BUDESONIDE AND FORMOTEROL FUMARATE DIHYDRATE 2 PUFF(S): 160; 4.5 AEROSOL RESPIRATORY (INHALATION) at 21:54

## 2018-04-23 RX ADMIN — CEFTRIAXONE 100 GRAM(S): 500 INJECTION, POWDER, FOR SOLUTION INTRAMUSCULAR; INTRAVENOUS at 05:09

## 2018-04-23 RX ADMIN — Medication 100 MILLIGRAM(S): at 02:48

## 2018-04-23 RX ADMIN — BUDESONIDE AND FORMOTEROL FUMARATE DIHYDRATE 2 PUFF(S): 160; 4.5 AEROSOL RESPIRATORY (INHALATION) at 07:57

## 2018-04-23 RX ADMIN — Medication 60 MILLIGRAM(S): at 17:24

## 2018-04-23 RX ADMIN — Medication 20 UNIT(S): at 12:20

## 2018-04-23 RX ADMIN — Medication 100 MILLIGRAM(S): at 21:55

## 2018-04-23 RX ADMIN — AZITHROMYCIN 255 MILLIGRAM(S): 500 TABLET, FILM COATED ORAL at 06:28

## 2018-04-23 RX ADMIN — SENNA PLUS 1 TABLET(S): 8.6 TABLET ORAL at 21:56

## 2018-04-23 RX ADMIN — GABAPENTIN 100 MILLIGRAM(S): 400 CAPSULE ORAL at 05:10

## 2018-04-23 RX ADMIN — Medication 3 MILLILITER(S): at 03:38

## 2018-04-23 RX ADMIN — BUDESONIDE AND FORMOTEROL FUMARATE DIHYDRATE 2 PUFF(S): 160; 4.5 AEROSOL RESPIRATORY (INHALATION) at 03:36

## 2018-04-23 RX ADMIN — ENOXAPARIN SODIUM 40 MILLIGRAM(S): 100 INJECTION SUBCUTANEOUS at 12:22

## 2018-04-23 RX ADMIN — Medication 3 MILLILITER(S): at 19:34

## 2018-04-23 RX ADMIN — Medication 3 MILLILITER(S): at 15:44

## 2018-04-23 RX ADMIN — GABAPENTIN 100 MILLIGRAM(S): 400 CAPSULE ORAL at 17:27

## 2018-04-23 RX ADMIN — Medication 3 MILLILITER(S): at 12:49

## 2018-04-23 RX ADMIN — Medication 20 UNIT(S): at 15:26

## 2018-04-23 RX ADMIN — Medication 3 MILLILITER(S): at 08:34

## 2018-04-23 NOTE — PROVIDER CONTACT NOTE (OTHER) - SITUATION
Md. Cao x [phone] made aware of pts fs -[phone]. AS per md . administer [id number]  units of lispro. Md. Cao states he is not concerned of continuous high FS as the patients is on IV steroids.

## 2018-04-23 NOTE — DISCHARGE NOTE ADULT - MEDICATION SUMMARY - MEDICATIONS TO TAKE
I will START or STAY ON the medications listed below when I get home from the hospital:    predniSONE 20 mg oral tablet  -- 3 tab(s) by mouth once a day for initial 3 days  -- Indication: For Asthma Exacerbation    predniSONE 50 mg oral tablet  -- 1 tab(s) by mouth once a day   -- It is very important that you take or use this exactly as directed.  Do not skip doses or discontinue unless directed by your doctor.  Obtain medical advice before taking any non-prescription drugs as some may affect the action of this medication.  Take with food or milk.    -- Indication: For Asthma Exacerbation    predniSONE 20 mg oral tablet  -- 2 tab(s) by mouth once a day   -- It is very important that you take or use this exactly as directed.  Do not skip doses or discontinue unless directed by your doctor.  Obtain medical advice before taking any non-prescription drugs as some may affect the action of this medication.  Take with food or milk.    -- Indication: For Asthma Exacerbation    predniSONE 10 mg oral tablet  -- 1 tab(s) by mouth once a day   -- It is very important that you take or use this exactly as directed.  Do not skip doses or discontinue unless directed by your doctor.  Obtain medical advice before taking any non-prescription drugs as some may affect the action of this medication.  Take with food or milk.    -- Indication: For Asthma Exacerbation    predniSONE 20 mg oral tablet  -- 1 tab(s) by mouth once a day   -- It is very important that you take or use this exactly as directed.  Do not skip doses or discontinue unless directed by your doctor.  Obtain medical advice before taking any non-prescription drugs as some may affect the action of this medication.  Take with food or milk.    -- Indication: For Asthma Exacerbation    gabapentin 100 mg oral capsule  -- 1 cap(s) by mouth 2 times a day  -- Indication: For Diabetic Neuropathy    HumaLOG 100 units/mL injectable solution  -- 20  injectable 2 times a day  -- Indication: For Diabetes    Lantus 100 units/mL subcutaneous solution  -- 30  subcutaneous once a day (at bedtime)  -- Indication: For Diabetes    Advair Diskus 500 mcg-50 mcg inhalation powder  -- 1 puff(s) inhaled 2 times a day  -- Indication: For Asthma Exacerbation    ProAir HFA 90 mcg/inh inhalation aerosol  -- 2 puff(s) inhaled 4 times a day, As Needed  -- Indication: For Asthma Exacerbation    ipratropium-albuterol 0.5 mg-2.5 mg/3 mLinhalation solution  -- 3 milliliter(s) inhaled every 4 hours  -- Indication: For COPD (chronic obstructive pulmonary disease)    Breo Ellipta 200 mcg-25 mcg/inh inhalation powder  -- 1 puff(s) inhaled once a day   -- Check with your doctor before becoming pregnant.  Expires___________________  For inhalation only.  It is very important that you take or use this exactly as directed.  Do not skip doses or discontinue unless directed by your doctor.  Obtain medical advice before taking any non-prescription drugs as some may affect the action of this medication.  Rinse mouth thoroughly after use.    -- Indication: For Asthma Exacerbation    guaiFENesin 100 mg/5 mL oral liquid  -- 10 milliliter(s) by mouth every 6 hours, As needed, Cough  -- Indication: For Asthma Exacerbation    Singulair 10 mg oral tablet  -- 1 tab(s) by mouth once a day  -- Indication: For Asthma Exacerbation    melatonin 3 mg oral tablet  -- 1 tab(s) by mouth once a day (at bedtime)  -- Indication: For Insomnia

## 2018-04-23 NOTE — PROVIDER CONTACT NOTE (OTHER) - ASSESSMENT
Pt. received nebulizer treatment and awaiting order for cough medicine. RN will continue to monitor and assess.

## 2018-04-23 NOTE — DISCHARGE NOTE ADULT - PATIENT PORTAL LINK FT
You can access the [facility] Patient Portal, offered by [facility], by registering with the following website: [email or web address]

## 2018-04-23 NOTE — H&P ADULT - ATTENDING COMMENTS
Patient was evaluated and examined by bedside, c/o cough, dyspnea, chest tightness, no fever, tolerating diet well.    All labs, radiology studies, VS was reviewed  I agree with medical plan outlined by Medical resident as stated above.  GENERAL: NAD, AAOX3, patient is laying comfortably in bed  HEENT: AT, NC, PERRLA, SUPPLE, NO JVD, NO CB  LUNG: decreased breath sounds  B/L with severe wheezing b/l  CVS: normal S1, S2, RRR, NO M/G/R  ABDOMEN: soft, bowel sounds present, normoactive in all 4 quadrants, non-tender, non-distended  EXT: no E/C/C, positive PP b/l extremities  NEURO: no acute focal neurological deficits  SKIN: no rash, no ecchymosis    - COPD exacerbation with severe bronchospasm- bronchitis, po abx, IV Steroids, Nebs, Cough suppr. tx. Oxygen supplementation  -Steroid ind. hyperglycemia with h/o DM -increased dose of insulin to optimize glycemic control  -GI and DVT proph.

## 2018-04-23 NOTE — H&P ADULT - ASSESSMENT
71 y/o M with PMHX COPD, asthma, DMII, prostate cancer s/p TURP 1999, presenting with increased coughing and wheezing x1 week, around the time when the weather got cold.    1. SOB, Coughing, Wheezing likely 2/2 Asthma Exacerbation  -daily peak flow measure (usual is 350), no significant opacity appreciated on CXR but pt c/o fevers and had leukocytosis so will cover for CAP  -check flu swab, isolation as needed  -pulm c/s: dr Oates, c/w symbicort and spiriva in hospital, nebs q4+PRN, start solumedrol 60mg q12  -of note, pt does not state he uses spiriva at home, saying he only uses advair and proair, please confirm with pharmacy in am  -repeat LA in am, could be 2/2 nebs, will repeat cbc in am   -check sputum culture and gram stain  2. DMII: c/w home insulin regimen, tight glycemic control in light of steroids  -low carb consistent diet, check A1c  3. DVT PPx: lovenox    PLEASE CONFIRM MEDS FROM PHARMACY:  [facility] [employer] [facility]. 71 y/o M with PMHX COPD, asthma, DMII, prostate cancer s/p TURP 1999, presenting with increased coughing and wheezing x1 week, around the time when the weather got cold.    1. SOB, Coughing, Wheezing likely 2/2 Asthma Exacerbation  -daily peak flow measure (usual is 350), no significant opacity appreciated on CXR but pt c/o fevers and had leukocytosis so will cover for CAP  -check flu swab, isolation as needed  -pulm c/s: dr Oates, c/w symbicort and spiriva in hospital, nebs q4+PRN, start solumedrol 60mg q12  -of note, pt does not state he uses spiriva at home, saying he only uses advair and proair, please confirm with pharmacy in am  -repeat LA in am, could be 2/2 nebs, will repeat cbc in am   -check sputum culture and gram stain  2. DMII: c/w home insulin regimen, tight glycemic control in light of steroids  -low carb consistent diet, check A1c  3. DVT PPx: lovenox  4. Numbness and Tingling of LLE likely 2/2 diabetic neuropathy: start trail of gabapentin    PLEASE CONFIRM MEDS FROM PHARMACY:  [employer] [facility].

## 2018-04-23 NOTE — CONSULT NOTE ADULT - SUBJECTIVE AND OBJECTIVE BOX
DEL ALVARADO  MRN-[id number]    HISTORY OF PRESENT ILLNESS:    71 y/o M with PMHX Asthma, DMII, prostate cancer s/p TURP 1999, presenting with increased coughing and wheezing x1 week, around the time when the weather got cold. Pt states this feels like his usual asthma exacerbation, associated with subjective fevers, and chills, increased phelgm production, sometimes light yellowish in color. Pt has been suing his nebs 3x/day at home, but states that it has not been helping him, that within half an hour of getting the nebs treatement, he feels the wheezing again. Denies any breathing problems prior to 1 week, denies any sick contacts or foreign travel. Pt has difficult to contraol asthma  Igg ab previoul;y elevated for aspergillus than normal, normal Ige level- suspected ABPA    PMH/PSH:  PAST MEDICAL & SURGICAL HISTORY:  COPD (chronic obstructive pulmonary disease)  Asthma  Prostate cancer  Diabetes  S/P TURP    ALLERGIES:  Allergies    No Known Allergies    Intolerances      SOCIAL HABITS:  negative x 3    FAMILY HISTORY:   n/c      REVIEW OF SYSTEM:  Elements of review of systems are negative or non-applicable except as noted above in HPI section.       HOME MEDICATIONS:  Advair Diskus 500 mcg-50 mcg inhalation powder  HumaLOG 100 units/mL injectable solution  Lantus 100 units/mL subcutaneous solution  ProAir HFA 90 mcg/inh inhalation aerosol  Singulair 10 mg oral tablet    MEDICATIONS:  MEDICATIONS  (STANDING):  ALBUTerol/ipratropium for Nebulization 3 milliLiter(s) Nebulizer every 4 hours  azithromycin  IVPB 500 milliGRAM(s) IV Intermittent every 24 hours  benzonatate 100 milliGRAM(s) Oral three times a day  buDESOnide 160 MICROgram(s)/formoterol 4.5 MICROgram(s) Inhaler 2 Puff(s) Inhalation two times a day  cefTRIAXone   IVPB 1 Gram(s) IV Intermittent every 24 hours  dextrose 5%. 1000 milliLiter(s) (50 mL/Hr) IV Continuous <Continuous>  dextrose 50% Injectable 25 Gram(s) IV Push once  docusate sodium 100 milliGRAM(s) Oral three times a day  enoxaparin Injectable 40 milliGRAM(s) SubCutaneous daily  gabapentin 100 milliGRAM(s) Oral two times a day  guaiFENesin/dextromethorphan  Syrup 10 milliLiter(s) Oral four times a day  insulin lispro Injectable (HumaLOG) 30 Unit(s) SubCutaneous three times a day before meals  methylPREDNISolone sodium succinate Injectable 60 milliGRAM(s) IV Push every 8 hours  montelukast 10 milliGRAM(s) Oral at bedtime  pantoprazole    Tablet 40 milliGRAM(s) Oral before breakfast  senna 1 Tablet(s) Oral at bedtime    MEDICATIONS  (PRN):  dextrose Gel 1 Dose(s) Oral once PRN Blood Glucose LESS THAN 70 milliGRAM(s)/deciliter  glucagon  Injectable 1 milliGRAM(s) IntraMuscular once PRN Glucose LESS THAN 70 milligrams/deciliter        VITALS:   Vital Signs Last 24 Hrs  T(C): 36.4 (23 Apr 2018 21:26), Max: 36.4 (23 Apr 2018 21:26)  T(F): 97.5 (23 Apr 2018 21:26), Max: 97.5 (23 Apr 2018 21:26)  HR: 105 (23 Apr 2018 21:26) (95 - 105)  BP: 136/68 (23 Apr 2018 21:26) (131/74 - 141/64)  BP(mean): --  RR: 18 (23 Apr 2018 21:26) (18 - 20)  SpO2: 91% (23 Apr 2018 13:00) (91% - 91%)        PHYSICAL EXAM:    GENERAL: NAD, well-developed  HEAD:  Atraumatic, Normocephalic  NECK: Supple, No JVD  CHEST/LUNG:  wheeze b/l  HEART: Regular rate and rhythm; No murmurs, rubs, or gallops  ABDOMEN: Soft, Nontender, Nondistended; Bowel sounds present  EXTREMITIES:  Good peripheral Pulses, No clubbing, cyanosis, or edema      LABS:                        13.5   15.85 )-----------( 234      ( 23 Apr 2018 08:14 )             41.0     04-23    136  |  98  |  24<H>  ----------------------------<  57<L>  4.5   |  17  |  0.9    Ca    9.1      23 Apr 2018 08:14    TPro  6.1  /  Alb  3.2<L>  /  TBili  0.7  /  DBili  x   /  AST  25  /  ALT  25  /  AlkPhos  73  04-22    LIVER FUNCTIONS - ( 22 Apr 2018 15:17 )  Alb: 3.2 g/dL / Pro: 6.1 g/dL / ALK PHOS: 73 U/L / ALT: 25 U/L / AST: 25 U/L / GGT: x           CARDIAC MARKERS ( 22 Apr 2018 15:17 )  x     / <0.01 ng/mL / x     / x     / x                    ABG & VENT SETTINGS (when applicable)        DIAGNOSTIC STUDIES:    < from: CT Chest No Cont (04.09.18 @ 08:10) >  IMPRESSION:    Bilateral lower lobe predominant tubular bronchiectasis with tree-in-bud   opacities, compatible with nonspecific distal airway disease.    No honeycombing or traction bronchiectasis.         Mucoid debris, right mainstem bronchus    < end of copied text >  < from: Xray Chest 1 View- PORTABLE-Urgent (04.22.18 @ 16:04) >  Impression:      No radiographic evidence of acute cardiopulmonary disease.    Unchanged bibasilar discoid atelectasis    < end of copied text >

## 2018-04-23 NOTE — DISCHARGE NOTE ADULT - CARE PROVIDER_API CALL
Annemarie Oates), Internal Medicine  [address] [place] [address]  Phone: [phone]  Fax: [phone]

## 2018-04-23 NOTE — PROVIDER CONTACT NOTE (OTHER) - BACKGROUND
Pt. at bedside with high glucose but asymptomatic however is complaining of tightness in chest due to asthma and cough. Md. Cao at bedside to assess pt.

## 2018-04-23 NOTE — H&P ADULT - NSHPPHYSICALEXAM_GEN_ALL_CORE
General: elderly gentleman reclining flat in bed with one pillow  Cardiac: RRR, S1S2  Lungs: some congestion in the upper airways, no crackles appreciated  Abd: NTND, +BS  LE: no swelling  Neuro: AAOx3, no focal deficits

## 2018-04-23 NOTE — H&P ADULT - NSHPREVIEWOFSYSTEMS_GEN_ALL_CORE
ROS: subjective fevers, chills  Cardiac: denies chest pain  Resp: increased coughing, wheezing x1 week, not resolved by nebs tx  Abd: no nausea, vomiting, melena, hematemesis  LE: no swelling  Neuro: tingling and numbness in his L foot going on "for a long time"

## 2018-04-23 NOTE — CONSULT NOTE ADULT - ASSESSMENT
Arf improved  Acute asthma exacerbation- suspected ABPA  Atelectasis    cont solumedrol 60 mg iv q 12hours  nebs q hour prn  cont symbicort in house (resume advair and spiriva upon d/c)  obtain aspergillus IGg and IGM  obtain serum IgE  cbc with differntial  no evidence of pna- d/c abx  dvt px  d/w house staff

## 2018-04-23 NOTE — H&P ADULT - HISTORY OF PRESENT ILLNESS
73 y/o M with PMHX COPD, asthma, DMII, prostate cancer s/p TURP 1999, presenting with increased coughing and wheezing x1 week, around the time when the weather got cold. Pt states this feels like his usual asthma exacerbation, associated with subjective fevers, and chills, increased phelgm production, sometimes light yellowish in color. Pt has been suing his nebs 3x/day at home, but states that it has not been helping him, that within half an hour of getting the nebs treatement, he feels the wheezing again. Denies any breathing problems prior to 1 week, denies any sick contacts or foreign travel. Pt had recent admission in Jan 2018 for asthma exacerbation. Up to date on vaccinations.

## 2018-04-23 NOTE — DISCHARGE NOTE ADULT - NSTOBACCOHOTLINE_GEN_A_NCS
[facility] Smokers Quitline ([phone]) [facility] Smokers Quitline ([phone])

## 2018-04-23 NOTE — DISCHARGE NOTE ADULT - HOSPITAL COURSE
Patient came to us for an Asthma Exacerbation. He is going home on his home meds, steroid taper and added Breo to his medications  The episode has resolved and he is stable enough to go home

## 2018-04-23 NOTE — H&P ADULT - NSHPLABSRESULTS_GEN_ALL_CORE
CARDIAC MARKERS ( 22 Apr 2018 15:17 )  x     / <0.01 ng/mL / x     / x     / x                              14.2   15.07 )-----------( 223      ( 22 Apr 2018 15:17 )             43.8     04-22  139  |  101  |  13  ----------------------------<  131<H>  4.7   |  22  |  0.9  Ca    8.3<L>      22 Apr 2018 15:17  TPro  6.1  /  Alb  3.2<L>  /  TBili  0.7  /  DBili  x   /  AST  25  /  ALT  25  /  AlkPhos  73  04-22    CAPILLARY BLOOD GLUCOSE  479 (23 Apr 2018 00:06)  487 (22 Apr 2018 20:34)    LIVER FUNCTIONS - ( 22 Apr 2018 15:17 )  Alb: 3.2 g/dL / Pro: 6.1 g/dL / ALK PHOS: 73 U/L / ALT: 25 U/L / AST: 25 U/L / GGT: x

## 2018-04-23 NOTE — DISCHARGE NOTE ADULT - CARE PLAN
Principal Discharge DX:	Asthma  Goal:	resolution of exacerbation  Assessment and plan of treatment:	Symptomatic treatment and follow up with Dr. Oates in 1 week

## 2018-04-24 LAB
ALBUMIN SERPL ELPH-MCNC: 3.1 G/DL — LOW (ref 3.5–5.2)
ALP SERPL-CCNC: 70 U/L — SIGNIFICANT CHANGE UP (ref 30–115)
ALT FLD-CCNC: 21 U/L — SIGNIFICANT CHANGE UP (ref 0–41)
ANION GAP SERPL CALC-SCNC: 14 MMOL/L — SIGNIFICANT CHANGE UP (ref 7–14)
AST SERPL-CCNC: 11 U/L — SIGNIFICANT CHANGE UP (ref 0–41)
BASOPHILS # BLD AUTO: 0.03 K/UL — SIGNIFICANT CHANGE UP (ref 0–0.2)
BASOPHILS NFR BLD AUTO: 0.1 % — SIGNIFICANT CHANGE UP (ref 0–1)
BILIRUB SERPL-MCNC: 0.2 MG/DL — SIGNIFICANT CHANGE UP (ref 0.2–1.2)
BUN SERPL-MCNC: 26 MG/DL — HIGH (ref 10–20)
CALCIUM SERPL-MCNC: 8.4 MG/DL — LOW (ref 8.5–10.1)
CHLORIDE SERPL-SCNC: 98 MMOL/L — SIGNIFICANT CHANGE UP (ref 98–110)
CO2 SERPL-SCNC: 21 MMOL/L — SIGNIFICANT CHANGE UP (ref 17–32)
CREAT SERPL-MCNC: 0.9 MG/DL — SIGNIFICANT CHANGE UP (ref 0.7–1.5)
EOSINOPHIL # BLD AUTO: 0 K/UL — SIGNIFICANT CHANGE UP (ref 0–0.7)
EOSINOPHIL NFR BLD AUTO: 0 % — SIGNIFICANT CHANGE UP (ref 0–8)
GLUCOSE SERPL-MCNC: 296 MG/DL — HIGH (ref 70–99)
GRAM STN FLD: SIGNIFICANT CHANGE UP
HCT VFR BLD CALC: 36.5 % — LOW (ref 42–52)
HGB BLD-MCNC: 12 G/DL — LOW (ref 14–18)
IGE SERPL-ACNC: 49 IU/ML — SIGNIFICANT CHANGE UP (ref 0–100)
IMM GRANULOCYTES NFR BLD AUTO: 1.3 % — HIGH (ref 0.1–0.3)
LYMPHOCYTES # BLD AUTO: 0.93 K/UL — LOW (ref 1.2–3.4)
LYMPHOCYTES # BLD AUTO: 4.2 % — LOW (ref 20.5–51.1)
MAGNESIUM SERPL-MCNC: 1.9 MG/DL — SIGNIFICANT CHANGE UP (ref 1.8–2.4)
MCHC RBC-ENTMCNC: 27.8 PG — SIGNIFICANT CHANGE UP (ref 27–31)
MCHC RBC-ENTMCNC: 32.9 G/DL — SIGNIFICANT CHANGE UP (ref 32–37)
MCV RBC AUTO: 84.7 FL — SIGNIFICANT CHANGE UP (ref 80–94)
MONOCYTES # BLD AUTO: 0.47 K/UL — SIGNIFICANT CHANGE UP (ref 0.1–0.6)
MONOCYTES NFR BLD AUTO: 2.1 % — SIGNIFICANT CHANGE UP (ref 1.7–9.3)
NEUTROPHILS # BLD AUTO: 20.43 K/UL — HIGH (ref 1.4–6.5)
NEUTROPHILS NFR BLD AUTO: 92.3 % — HIGH (ref 42.2–75.2)
PLATELET # BLD AUTO: 287 K/UL — SIGNIFICANT CHANGE UP (ref 130–400)
POTASSIUM SERPL-MCNC: 4.8 MMOL/L — SIGNIFICANT CHANGE UP (ref 3.5–5)
POTASSIUM SERPL-SCNC: 4.8 MMOL/L — SIGNIFICANT CHANGE UP (ref 3.5–5)
PROT SERPL-MCNC: 5.5 G/DL — LOW (ref 6–8)
RBC # BLD: 4.31 M/UL — LOW (ref 4.7–6.1)
RBC # FLD: 14.7 % — HIGH (ref 11.5–14.5)
SODIUM SERPL-SCNC: 133 MMOL/L — LOW (ref 135–146)
SPECIMEN SOURCE: SIGNIFICANT CHANGE UP
WBC # BLD: 22.14 K/UL — HIGH (ref 4.8–10.8)
WBC # FLD AUTO: 22.14 K/UL — HIGH (ref 4.8–10.8)

## 2018-04-24 RX ORDER — BENZOCAINE AND MENTHOL 5; 1 G/100ML; G/100ML
1 LIQUID ORAL
Qty: 0 | Refills: 0 | Status: DISCONTINUED | OUTPATIENT
Start: 2018-04-24 | End: 2018-04-26

## 2018-04-24 RX ORDER — SODIUM CHLORIDE 9 MG/ML
1 INJECTION INTRAMUSCULAR; INTRAVENOUS; SUBCUTANEOUS ONCE
Qty: 0 | Refills: 0 | Status: DISCONTINUED | OUTPATIENT
Start: 2018-04-24 | End: 2018-04-26

## 2018-04-24 RX ORDER — INSULIN LISPRO 100/ML
15 VIAL (ML) SUBCUTANEOUS ONCE
Qty: 0 | Refills: 0 | Status: COMPLETED | OUTPATIENT
Start: 2018-04-24 | End: 2018-04-24

## 2018-04-24 RX ADMIN — Medication 3 MILLILITER(S): at 08:19

## 2018-04-24 RX ADMIN — Medication 100 MILLIGRAM(S): at 21:33

## 2018-04-24 RX ADMIN — GABAPENTIN 100 MILLIGRAM(S): 400 CAPSULE ORAL at 05:12

## 2018-04-24 RX ADMIN — BENZOCAINE AND MENTHOL 1 LOZENGE: 5; 1 LIQUID ORAL at 21:34

## 2018-04-24 RX ADMIN — CEFTRIAXONE 100 GRAM(S): 500 INJECTION, POWDER, FOR SOLUTION INTRAMUSCULAR; INTRAVENOUS at 05:14

## 2018-04-24 RX ADMIN — Medication 100 MILLIGRAM(S): at 05:13

## 2018-04-24 RX ADMIN — Medication 3 MILLILITER(S): at 15:40

## 2018-04-24 RX ADMIN — Medication 3 MILLILITER(S): at 13:11

## 2018-04-24 RX ADMIN — Medication 3 MILLILITER(S): at 20:09

## 2018-04-24 RX ADMIN — INSULIN GLARGINE 60 UNIT(S): 100 INJECTION, SOLUTION SUBCUTANEOUS at 21:33

## 2018-04-24 RX ADMIN — Medication 20 UNIT(S): at 18:33

## 2018-04-24 RX ADMIN — BUDESONIDE AND FORMOTEROL FUMARATE DIHYDRATE 2 PUFF(S): 160; 4.5 AEROSOL RESPIRATORY (INHALATION) at 21:32

## 2018-04-24 RX ADMIN — SENNA PLUS 1 TABLET(S): 8.6 TABLET ORAL at 21:33

## 2018-04-24 RX ADMIN — Medication 3 MILLIGRAM(S): at 21:33

## 2018-04-24 RX ADMIN — AZITHROMYCIN 255 MILLIGRAM(S): 500 TABLET, FILM COATED ORAL at 06:25

## 2018-04-24 RX ADMIN — Medication 60 MILLIGRAM(S): at 05:13

## 2018-04-24 RX ADMIN — PANTOPRAZOLE SODIUM 40 MILLIGRAM(S): 20 TABLET, DELAYED RELEASE ORAL at 05:12

## 2018-04-24 RX ADMIN — MONTELUKAST 10 MILLIGRAM(S): 4 TABLET, CHEWABLE ORAL at 21:33

## 2018-04-24 RX ADMIN — Medication 30 UNIT(S): at 12:20

## 2018-04-24 RX ADMIN — Medication 15 UNIT(S): at 21:23

## 2018-04-24 RX ADMIN — Medication 100 MILLIGRAM(S): at 14:42

## 2018-04-24 RX ADMIN — BUDESONIDE AND FORMOTEROL FUMARATE DIHYDRATE 2 PUFF(S): 160; 4.5 AEROSOL RESPIRATORY (INHALATION) at 09:32

## 2018-04-24 RX ADMIN — Medication 30 UNIT(S): at 07:58

## 2018-04-24 RX ADMIN — ENOXAPARIN SODIUM 40 MILLIGRAM(S): 100 INJECTION SUBCUTANEOUS at 12:21

## 2018-04-24 RX ADMIN — GABAPENTIN 100 MILLIGRAM(S): 400 CAPSULE ORAL at 18:31

## 2018-04-24 RX ADMIN — Medication 60 MILLIGRAM(S): at 18:32

## 2018-04-24 NOTE — PROGRESS NOTE ADULT - ASSESSMENT
Arf improved  Acute asthma exacerbation- suspected ABPA  Atelectasis    cont solumedrol 60 mg iv q 12hours  nebs q hour prn  cont symbicort and spiriva in house (resume advair and spiriva upon d/c)  aspergillus IGg and IGM p  serum IgE  on abx- no evidence of pna  dvt px

## 2018-04-24 NOTE — PROGRESS NOTE ADULT - SUBJECTIVE AND OBJECTIVE BOX
Patient was seen and examined. Spoke with RN. Chart reviewed.    No events overnight.  Vital Signs Last 24 Hrs  T(F): 96.6 (24 Apr 2018 04:52), Max: 97.5 (23 Apr 2018 21:26)  HR: 94 (24 Apr 2018 04:52) (94 - 105)  BP: 121/66 (24 Apr 2018 04:52) (121/66 - 141/64)  SpO2: 91% (23 Apr 2018 13:00) (91% - 91%)  MEDICATIONS  (STANDING):  ALBUTerol/ipratropium for Nebulization 3 milliLiter(s) Nebulizer every 4 hours  benzonatate 100 milliGRAM(s) Oral three times a day  buDESOnide 160 MICROgram(s)/formoterol 4.5 MICROgram(s) Inhaler 2 Puff(s) Inhalation two times a day  cefTRIAXone   IVPB 1 Gram(s) IV Intermittent every 24 hours  dextrose 5%. 1000 milliLiter(s) (50 mL/Hr) IV Continuous <Continuous>  dextrose 50% Injectable 25 Gram(s) IV Push once  docusate sodium 100 milliGRAM(s) Oral three times a day  enoxaparin Injectable 40 milliGRAM(s) SubCutaneous daily  gabapentin 100 milliGRAM(s) Oral two times a day  guaiFENesin/dextromethorphan  Syrup 10 milliLiter(s) Oral four times a day  insulin glargine Injectable (LANTUS) 60 Unit(s) SubCutaneous at bedtime  insulin lispro Injectable (HumaLOG) 30 Unit(s) SubCutaneous three times a day before meals  levoFLOXacin IVPB      melatonin 3 milliGRAM(s) Oral at bedtime  methylPREDNISolone sodium succinate Injectable 60 milliGRAM(s) IV Push every 12 hours  montelukast 10 milliGRAM(s) Oral at bedtime  pantoprazole    Tablet 40 milliGRAM(s) Oral before breakfast  senna 1 Tablet(s) Oral at bedtime    MEDICATIONS  (PRN):  dextrose Gel 1 Dose(s) Oral once PRN Blood Glucose LESS THAN 70 milliGRAM(s)/deciliter  glucagon  Injectable 1 milliGRAM(s) IntraMuscular once PRN Glucose LESS THAN 70 milligrams/deciliter    Labs:                        12.0   22.14 )-----------( 287      ( 24 Apr 2018 07:05 )             36.5                         13.5   15.85 )-----------( 234      ( 23 Apr 2018 08:14 )             41.0     24 Apr 2018 07:05    133    |  98     |  26     ----------------------------<  296    4.8     |  21     |  0.9    23 Apr 2018 08:14    136    |  98     |  24     ----------------------------<  57     4.5     |  17     |  0.9      Ca    8.4        24 Apr 2018 07:05  Ca    9.1        23 Apr 2018 08:14  Mg     1.9       24 Apr 2018 07:05    TPro  5.5    /  Alb  3.1    /  TBili  0.2    /  DBili  x      /  AST  11     /  ALT  21     /  AlkPhos  70     24 Apr 2018 07:05  TPro  6.1    /  Alb  3.2    /  TBili  0.7    /  DBili  x      /  AST  25     /  ALT  25     /  AlkPhos  73     22 Apr 2018 15:17          Culture - Sputum (collected 23 Apr 2018 10:49)  Source: .Sputum Sputum  Gram Stain (24 Apr 2018 07:34):    Numerous polymorphonuclear leukocytes per low power field    Few Squamous epithelial cells per low power field    Rare Gram Positive Cocci in Pairs and Chains per oil power field    Rare Gram Positive Cocci in Clusters per oil power field    Culture - Blood (collected 23 Apr 2018 07:11)  Source: .Blood Blood  Preliminary Report (24 Apr 2018 12:02):    No growth to date.    Culture - Blood (collected 22 Apr 2018 15:17)  Source: .Blood Blood  Preliminary Report (23 Apr 2018 23:01):    No growth to date.        Radiology:    General: comfortable, NAD  Neurology: A&Ox3, nonfocal  Head:  Normocephalic, atraumatic  ENT:  Mucosa moist, no ulcerations  Neck:  Supple, no JVD,   Skin: no breakdowns (as per RN)  Resp:decreased breath sounds  CV: RRR, S1S2,   GI: Soft, NT, bowel sounds  MS: No edema, + peripheral pulses, FROM all 4 extremity

## 2018-04-24 NOTE — PROGRESS NOTE ADULT - SUBJECTIVE AND OBJECTIVE BOX
SUBJECTIVE:    Patient is a 72y old Male who presents with a chief complaint of asthma exacerbation (23 Apr 2018 16:16)    Currently admitted to medicine with the primary diagnosis of Pneumonia     Today is hospital day 2d. This morning he is resting comfortably in bed and reports no new issues or overnight events.     PAST MEDICAL & SURGICAL HISTORY  COPD (chronic obstructive pulmonary disease)  Asthma  Prostate cancer  Diabetes  S/P TURP    SOCIAL HISTORY:  Negative for smoking/alcohol/drug use.     ALLERGIES:  No Known Allergies    MEDICATIONS:  STANDING MEDICATIONS  ALBUTerol/ipratropium for Nebulization 3 milliLiter(s) Nebulizer every 4 hours  benzonatate 100 milliGRAM(s) Oral three times a day  buDESOnide 160 MICROgram(s)/formoterol 4.5 MICROgram(s) Inhaler 2 Puff(s) Inhalation two times a day  cefTRIAXone   IVPB 1 Gram(s) IV Intermittent every 24 hours  dextrose 5%. 1000 milliLiter(s) IV Continuous <Continuous>  dextrose 50% Injectable 25 Gram(s) IV Push once  docusate sodium 100 milliGRAM(s) Oral three times a day  enoxaparin Injectable 40 milliGRAM(s) SubCutaneous daily  gabapentin 100 milliGRAM(s) Oral two times a day  guaiFENesin/dextromethorphan  Syrup 10 milliLiter(s) Oral four times a day  insulin glargine Injectable (LANTUS) 60 Unit(s) SubCutaneous at bedtime  insulin lispro Injectable (HumaLOG) 30 Unit(s) SubCutaneous three times a day before meals  levoFLOXacin IVPB      melatonin 3 milliGRAM(s) Oral at bedtime  methylPREDNISolone sodium succinate Injectable 60 milliGRAM(s) IV Push every 12 hours  montelukast 10 milliGRAM(s) Oral at bedtime  pantoprazole    Tablet 40 milliGRAM(s) Oral before breakfast  senna 1 Tablet(s) Oral at bedtime    PRN MEDICATIONS  dextrose Gel 1 Dose(s) Oral once PRN  glucagon  Injectable 1 milliGRAM(s) IntraMuscular once PRN    VITALS:   T(F): 96.6  HR: 94  BP: 121/66  RR: 18  SpO2: 91%    LABS:                        12.0   22.14 )-----------( 287      ( 24 Apr 2018 07:05 )             36.5     04-24    133<L>  |  98  |  26<H>  ----------------------------<  296<H>  4.8   |  21  |  0.9    Ca    8.4<L>      24 Apr 2018 07:05  Mg     1.9     04-24    TPro  5.5<L>  /  Alb  3.1<L>  /  TBili  0.2  /  DBili  x   /  AST  11  /  ALT  21  /  AlkPhos  70  04-24              Culture - Sputum (collected 23 Apr 2018 10:49)  Source: .Sputum Sputum  Gram Stain (24 Apr 2018 07:34):    Numerous polymorphonuclear leukocytes per low power field    Few Squamous epithelial cells per low power field    Rare Gram Positive Cocci in Pairs and Chains per oil power field    Rare Gram Positive Cocci in Clusters per oil power field    Culture - Blood (collected 22 Apr 2018 15:17)  Source: .Blood Blood  Preliminary Report (23 Apr 2018 23:01):    No growth to date.      CARDIAC MARKERS ( 22 Apr 2018 15:17 )  x     / <0.01 ng/mL / x     / x     / x          RADIOLOGY:    Xray Chest 1 View- PORTABLE-Urgent (04.22.18 @ 16:04) >  No radiographic evidence of acute cardiopulmonary disease.    Unchanged bibasilar discoid atelectasis.      CT Chest No Cont (04.09.18 @ 08:10) >  Bilateral lower lobe predominant tubular bronchiectasis with tree-in-bud   opacities, compatible with nonspecific distal airway disease.  No honeycombing or traction bronchiectasis. Mucoid debris, right mainstem bronchus (series 5/102). No significant interval change, right-sided tracheocele.      PHYSICAL EXAM:  GEN: No acute distress  LUNGS: Clear to auscultation bilaterally   HEART: S1/S2 present. RRR.   ABD: Soft, non-tender, non-distended. Bowel sounds present  EXT: NC/NC/NE/2+PP/CARTER  NEURO: AAOX3 SUBJECTIVE:    Patient is a 72y old Male who presents with a chief complaint of asthma exacerbation (23 Apr 2018 16:16)    Currently admitted to medicine with the primary diagnosis of Pneumonia     Today is hospital day 2d. This morning he is resting comfortably in bed and reports no new issues or overnight events.     PAST MEDICAL & SURGICAL HISTORY  COPD (chronic obstructive pulmonary disease)  Asthma  Prostate cancer  Diabetes  S/P TURP    SOCIAL HISTORY:  Negative for smoking/alcohol/drug use.     ALLERGIES:  No Known Allergies    MEDICATIONS:  STANDING MEDICATIONS  ALBUTerol/ipratropium for Nebulization 3 milliLiter(s) Nebulizer every 4 hours  benzonatate 100 milliGRAM(s) Oral three times a day  buDESOnide 160 MICROgram(s)/formoterol 4.5 MICROgram(s) Inhaler 2 Puff(s) Inhalation two times a day  cefTRIAXone   IVPB 1 Gram(s) IV Intermittent every 24 hours  dextrose 5%. 1000 milliLiter(s) IV Continuous <Continuous>  dextrose 50% Injectable 25 Gram(s) IV Push once  docusate sodium 100 milliGRAM(s) Oral three times a day  enoxaparin Injectable 40 milliGRAM(s) SubCutaneous daily  gabapentin 100 milliGRAM(s) Oral two times a day  guaiFENesin/dextromethorphan  Syrup 10 milliLiter(s) Oral four times a day  insulin glargine Injectable (LANTUS) 60 Unit(s) SubCutaneous at bedtime  insulin lispro Injectable (HumaLOG) 30 Unit(s) SubCutaneous three times a day before meals  levoFLOXacin IVPB      melatonin 3 milliGRAM(s) Oral at bedtime  methylPREDNISolone sodium succinate Injectable 60 milliGRAM(s) IV Push every 12 hours  montelukast 10 milliGRAM(s) Oral at bedtime  pantoprazole    Tablet 40 milliGRAM(s) Oral before breakfast  senna 1 Tablet(s) Oral at bedtime    PRN MEDICATIONS  dextrose Gel 1 Dose(s) Oral once PRN  glucagon  Injectable 1 milliGRAM(s) IntraMuscular once PRN    VITALS:   T(F): 96.6  HR: 94  BP: 121/66  RR: 18  SpO2: 91%    LABS:                        12.0   22.14 )-----------( 287      ( 24 Apr 2018 07:05 )             36.5     04-24    133<L>  |  98  |  26<H>  ----------------------------<  296<H>  4.8   |  21  |  0.9    Ca    8.4<L>      24 Apr 2018 07:05  Mg     1.9     04-24    TPro  5.5<L>  /  Alb  3.1<L>  /  TBili  0.2  /  DBili  x   /  AST  11  /  ALT  21  /  AlkPhos  70  04-24              Culture - Sputum (collected 23 Apr 2018 10:49)  Source: .Sputum Sputum  Gram Stain (24 Apr 2018 07:34):    Numerous polymorphonuclear leukocytes per low power field    Few Squamous epithelial cells per low power field    Rare Gram Positive Cocci in Pairs and Chains per oil power field    Rare Gram Positive Cocci in Clusters per oil power field    Culture - Blood (collected 22 Apr 2018 15:17)  Source: .Blood Blood  Preliminary Report (23 Apr 2018 23:01):    No growth to date.      CARDIAC MARKERS ( 22 Apr 2018 15:17 )  x     / <0.01 ng/mL / x     / x     / x          RADIOLOGY:    Xray Chest 1 View- PORTABLE-Urgent (04.22.18 @ 16:04) >  No radiographic evidence of acute cardiopulmonary disease.    Unchanged bibasilar discoid atelectasis.      CT Chest No Cont (04.09.18 @ 08:10) >  Bilateral lower lobe predominant tubular bronchiectasis with tree-in-bud   opacities, compatible with nonspecific distal airway disease.  No honeycombing or traction bronchiectasis. Mucoid debris, right mainstem bronchus (series 5/102). No significant interval change, right-sided tracheocele.      PHYSICAL EXAM:  GEN: No acute distress  LUNGS: wheeze present bilaterally  HEART: S1/S2 present. RRR.   ABD: Soft, non-tender, non-distended. Bowel sounds present, wears pampers  EXT: NC/NC/NE/2+PP/CARTER  NEURO: AAOX3

## 2018-04-24 NOTE — PROGRESS NOTE ADULT - SUBJECTIVE AND OBJECTIVE BOX
DEL ALVARADO  72y, Male  Allergy: No Known Allergies    PMH/PSH:  PAST MEDICAL & SURGICAL HISTORY:  COPD (chronic obstructive pulmonary disease)  Asthma  Prostate cancer  Diabetes  S/P TURP          LAST 24-Hr EVENTS:  cough, sob wheeze  ambulating well    VITALS:  T(F): 97 (04-24-18 @ 13:01), Max: 97.5 (04-23-18 @ 21:26)  HR: 108 (04-24-18 @ 13:01)  BP: 113/63 (04-24-18 @ 13:01) (113/63 - 136/68)  RR: 18 (04-24-18 @ 13:01)  SpO2: --    PHYSICAL EXAM:    GENERAL: NAD, well-developed  NECK: Supple, No JVD  CHEST/LUNG: wheeze  HEART: Regular rate and rhythm; No murmurs.  ABDOMEN: Soft, Nontender, Nondistended; Bowel sounds present  EXTREMITIES:  No clubbing, edema absent        TESTS & MEASUREMENTS:                          12.0   22.14 )-----------( 287      ( 24 Apr 2018 07:05 )             36.5       04-24    133<L>  |  98  |  26<H>  ----------------------------<  296<H>  4.8   |  21  |  0.9    Ca    8.4<L>      24 Apr 2018 07:05  Mg     1.9     04-24    TPro  5.5<L>  /  Alb  3.1<L>  /  TBili  0.2  /  DBili  x   /  AST  11  /  ALT  21  /  AlkPhos  70  04-24    LIVER FUNCTIONS - ( 24 Apr 2018 07:05 )  Alb: 3.1 g/dL / Pro: 5.5 g/dL / ALK PHOS: 70 U/L / ALT: 21 U/L / AST: 11 U/L / GGT: x                 Culture - Sputum (collected 04-23-18 @ 10:49)  Source: .Sputum Sputum  Gram Stain (04-24-18 @ 07:34):    Numerous polymorphonuclear leukocytes per low power field    Few Squamous epithelial cells per low power field    Rare Gram Positive Cocci in Pairs and Chains per oil power field    Rare Gram Positive Cocci in Clusters per oil power field  Preliminary Report (04-24-18 @ 21:10):    Normal Respiratory Kimberley present    Culture - Blood (collected 04-23-18 @ 07:11)  Source: .Blood Blood  Preliminary Report (04-24-18 @ 12:02):    No growth to date.    Culture - Blood (collected 04-22-18 @ 15:17)  Source: .Blood Blood  Preliminary Report (04-23-18 @ 23:01):    No growth to date.          ABG & VENT SETTINGS: (when applicable)        RADIOLOGY & ADDITIONAL TESTS:        MEDICATIONS:  MEDICATIONS  (STANDING):  ALBUTerol/ipratropium for Nebulization 3 milliLiter(s) Nebulizer every 4 hours  benzocaine 15 mG/menthol 3.6 mG Lozenge 1 Lozenge Oral two times a day  benzonatate 100 milliGRAM(s) Oral three times a day  buDESOnide 160 MICROgram(s)/formoterol 4.5 MICROgram(s) Inhaler 2 Puff(s) Inhalation two times a day  cefTRIAXone   IVPB 1 Gram(s) IV Intermittent every 24 hours  dextrose 5%. 1000 milliLiter(s) (50 mL/Hr) IV Continuous <Continuous>  dextrose 50% Injectable 25 Gram(s) IV Push once  docusate sodium 100 milliGRAM(s) Oral three times a day  enoxaparin Injectable 40 milliGRAM(s) SubCutaneous daily  gabapentin 100 milliGRAM(s) Oral two times a day  insulin glargine Injectable (LANTUS) 60 Unit(s) SubCutaneous at bedtime  insulin lispro Injectable (HumaLOG) 30 Unit(s) SubCutaneous three times a day before meals  levoFLOXacin IVPB      melatonin 3 milliGRAM(s) Oral at bedtime  methylPREDNISolone sodium succinate Injectable 60 milliGRAM(s) IV Push every 12 hours  montelukast 10 milliGRAM(s) Oral at bedtime  pantoprazole    Tablet 40 milliGRAM(s) Oral before breakfast  senna 1 Tablet(s) Oral at bedtime  sodium chloride 3%  Inhalation 1 milliLiter(s) Inhalation once    MEDICATIONS  (PRN):  dextrose Gel 1 Dose(s) Oral once PRN Blood Glucose LESS THAN 70 milliGRAM(s)/deciliter  glucagon  Injectable 1 milliGRAM(s) IntraMuscular once PRN Glucose LESS THAN 70 milligrams/deciliter  guaiFENesin    Syrup 200 milliGRAM(s) Oral every 6 hours PRN Cough

## 2018-04-24 NOTE — PROGRESS NOTE ADULT - ASSESSMENT
72 year old with pertinent medical history of asthma presents with increased coughing and shortness of breath for the last one week.  He states this feels like his usual asthma exacerbation, associated with subjective fevers, and chills, increased phlegm production (light yellowish in color) He has been using his nebs 3x/day at home, but states that it has not been helping him  He denies any breathing problems prior to 1 week and denies any sick contacts or foreign    1. Asthma Exacerbation  daily peak flow measure (usual is 350)  no significant opacity appreciated on CXR  flu swab is negative and blood culture is negative till date  On symbicort and spiriva and nebs q4+PRN,  On solumedrol 60mg q12  On levofloxacin 750 mg daily  A.fumigatus antibodies results are pending  sputum culture and gram stain positive for gram positive cocci in clusters    2. DMII  on tight and increased glycemic control in light of steroids  -low carb consistent diet    3. Numbness and Tingling of LLE likely   On gabapentin, likely diabetic neuropathy    4. Urinary Incontinence  Wears Pampers  Chronic, has it for 10 years after prostate surgery    72 year old with pertinent medical history of asthma presents with increased coughing and shortness of breath for the last one week.  He states this feels like his usual asthma exacerbation, associated with subjective fevers, and chills, increased phlegm production (light yellowish in color) He has been using his nebs 3x/day at home, but states that it has not been helping him  He denies any breathing problems prior to 1 week and denies any sick contacts or foreign    1. Asthma Exacerbation  daily peak flow measure (usual is 350)  no significant opacity appreciated on CXR  flu swab is negative and blood culture is negative till date  On symbicort and spiriva and nebs q4+PRN,  On solumedrol 60mg q12  On levofloxacin 750 mg daily  A.fumigatus antibodies results are pending  sputum culture and gram stain positive for gram positive cocci in clusters    2. DMII  on tight and increased glycemic control in light of steroids  -low carb consistent diet    3. Numbness and Tingling of LLE likely   On gabapentin, likely diabetic neuropathy    4. Urinary Incontinence  Wears Pampers  Chronic, has it for 10 years after prostate surgery

## 2018-04-24 NOTE — PROGRESS NOTE ADULT - ASSESSMENT
72 year old with pertinent medical history of asthma presents with increased coughing and shortness of breath for the last one week.  He states this feels like his usual asthma exacerbation, associated with subjective fevers, and chills, increased phlegm production (light yellowish in color) He has been using his nebs 3x/day at home, but states that it has not been helping him  He denies any breathing problems prior to 1 week and denies any sick contacts or foreign    1. Asthma Exacerbation  daily peak flow measure (usual is 350)  no significant opacity appreciated on CXR  flu swab is negative and blood culture is negative till date  On symbicort and spiriva and nebs q4+PRN,  On solumedrol 60mg q12  On levofloxacin 750 mg daily  A.fumigatus antibodies results are pending  sputum culture and gram stain positive for gram positive cocci in clusters    2. DMII  on tight and increased glycemic control in light of steroids  -low carb consistent diet    3. Numbness and Tingling of LLE likely   On gabapentin, likely diabetic neuropathy 72 year old with pertinent medical history of asthma presents with increased coughing and shortness of breath for the last one week.  He states this feels like his usual asthma exacerbation, associated with subjective fevers, and chills, increased phlegm production (light yellowish in color) He has been using his nebs 3x/day at home, but states that it has not been helping him  He denies any breathing problems prior to 1 week and denies any sick contacts or foreign    1. Asthma Exacerbation  daily peak flow measure (usual is 350)  no significant opacity appreciated on CXR  flu swab is negative and blood culture is negative till date  On symbicort and spiriva and nebs q4+PRN,  On solumedrol 60mg q12  On levofloxacin 750 mg daily  A.fumigatus antibodies results are pending  sputum culture and gram stain positive for gram positive cocci in clusters    2. DMII  on tight and increased glycemic control in light of steroids  -low carb consistent diet    3. Numbness and Tingling of LLE likely   On gabapentin, likely diabetic neuropathy    4. Urinary Incontinence  Wears Pampers  Chronic, has it for 10 years after prostate surgery

## 2018-04-25 LAB
ALBUMIN SERPL ELPH-MCNC: 3.4 G/DL — LOW (ref 3.5–5.2)
ALP SERPL-CCNC: 67 U/L — SIGNIFICANT CHANGE UP (ref 30–115)
ALT FLD-CCNC: 25 U/L — SIGNIFICANT CHANGE UP (ref 0–41)
ANION GAP SERPL CALC-SCNC: 19 MMOL/L — HIGH (ref 7–14)
AST SERPL-CCNC: 12 U/L — SIGNIFICANT CHANGE UP (ref 0–41)
BASOPHILS # BLD AUTO: 0.03 K/UL — SIGNIFICANT CHANGE UP (ref 0–0.2)
BASOPHILS NFR BLD AUTO: 0.2 % — SIGNIFICANT CHANGE UP (ref 0–1)
BILIRUB SERPL-MCNC: 0.3 MG/DL — SIGNIFICANT CHANGE UP (ref 0.2–1.2)
BUN SERPL-MCNC: 27 MG/DL — HIGH (ref 10–20)
CALCIUM SERPL-MCNC: 8.6 MG/DL — SIGNIFICANT CHANGE UP (ref 8.5–10.1)
CHLORIDE SERPL-SCNC: 94 MMOL/L — LOW (ref 98–110)
CO2 SERPL-SCNC: 20 MMOL/L — SIGNIFICANT CHANGE UP (ref 17–32)
CREAT SERPL-MCNC: 0.9 MG/DL — SIGNIFICANT CHANGE UP (ref 0.7–1.5)
CULTURE RESULTS: SIGNIFICANT CHANGE UP
EOSINOPHIL # BLD AUTO: 0 K/UL — SIGNIFICANT CHANGE UP (ref 0–0.7)
EOSINOPHIL NFR BLD AUTO: 0 % — SIGNIFICANT CHANGE UP (ref 0–8)
GLUCOSE SERPL-MCNC: 320 MG/DL — HIGH (ref 70–99)
HCT VFR BLD CALC: 39.1 % — LOW (ref 42–52)
HGB BLD-MCNC: 12.7 G/DL — LOW (ref 14–18)
IMM GRANULOCYTES NFR BLD AUTO: 1 % — HIGH (ref 0.1–0.3)
LYMPHOCYTES # BLD AUTO: 0.77 K/UL — LOW (ref 1.2–3.4)
LYMPHOCYTES # BLD AUTO: 3.9 % — LOW (ref 20.5–51.1)
MCHC RBC-ENTMCNC: 27.6 PG — SIGNIFICANT CHANGE UP (ref 27–31)
MCHC RBC-ENTMCNC: 32.5 G/DL — SIGNIFICANT CHANGE UP (ref 32–37)
MCV RBC AUTO: 85 FL — SIGNIFICANT CHANGE UP (ref 80–94)
MONOCYTES # BLD AUTO: 0.36 K/UL — SIGNIFICANT CHANGE UP (ref 0.1–0.6)
MONOCYTES NFR BLD AUTO: 1.8 % — SIGNIFICANT CHANGE UP (ref 1.7–9.3)
NEUTROPHILS # BLD AUTO: 18.41 K/UL — HIGH (ref 1.4–6.5)
NEUTROPHILS NFR BLD AUTO: 93.1 % — HIGH (ref 42.2–75.2)
PLATELET # BLD AUTO: 288 K/UL — SIGNIFICANT CHANGE UP (ref 130–400)
POTASSIUM SERPL-MCNC: 4.5 MMOL/L — SIGNIFICANT CHANGE UP (ref 3.5–5)
POTASSIUM SERPL-SCNC: 4.5 MMOL/L — SIGNIFICANT CHANGE UP (ref 3.5–5)
PROT SERPL-MCNC: 6 G/DL — SIGNIFICANT CHANGE UP (ref 6–8)
RBC # BLD: 4.6 M/UL — LOW (ref 4.7–6.1)
RBC # FLD: 14.7 % — HIGH (ref 11.5–14.5)
SODIUM SERPL-SCNC: 133 MMOL/L — LOW (ref 135–146)
SPECIMEN SOURCE: SIGNIFICANT CHANGE UP
WBC # BLD: 19.76 K/UL — HIGH (ref 4.8–10.8)
WBC # FLD AUTO: 19.76 K/UL — HIGH (ref 4.8–10.8)

## 2018-04-25 RX ADMIN — GABAPENTIN 100 MILLIGRAM(S): 400 CAPSULE ORAL at 05:28

## 2018-04-25 RX ADMIN — Medication 60 MILLIGRAM(S): at 05:28

## 2018-04-25 RX ADMIN — GABAPENTIN 100 MILLIGRAM(S): 400 CAPSULE ORAL at 17:02

## 2018-04-25 RX ADMIN — Medication 100 MILLIGRAM(S): at 05:27

## 2018-04-25 RX ADMIN — Medication 100 MILLIGRAM(S): at 21:53

## 2018-04-25 RX ADMIN — PANTOPRAZOLE SODIUM 40 MILLIGRAM(S): 20 TABLET, DELAYED RELEASE ORAL at 06:26

## 2018-04-25 RX ADMIN — Medication 3 MILLIGRAM(S): at 21:53

## 2018-04-25 RX ADMIN — BUDESONIDE AND FORMOTEROL FUMARATE DIHYDRATE 2 PUFF(S): 160; 4.5 AEROSOL RESPIRATORY (INHALATION) at 10:38

## 2018-04-25 RX ADMIN — CEFTRIAXONE 100 GRAM(S): 500 INJECTION, POWDER, FOR SOLUTION INTRAMUSCULAR; INTRAVENOUS at 05:26

## 2018-04-25 RX ADMIN — INSULIN GLARGINE 60 UNIT(S): 100 INJECTION, SOLUTION SUBCUTANEOUS at 21:54

## 2018-04-25 RX ADMIN — Medication 200 MILLIGRAM(S): at 05:29

## 2018-04-25 RX ADMIN — Medication 30 UNIT(S): at 11:07

## 2018-04-25 RX ADMIN — Medication 3 MILLILITER(S): at 07:58

## 2018-04-25 RX ADMIN — BENZOCAINE AND MENTHOL 1 LOZENGE: 5; 1 LIQUID ORAL at 17:03

## 2018-04-25 RX ADMIN — BUDESONIDE AND FORMOTEROL FUMARATE DIHYDRATE 2 PUFF(S): 160; 4.5 AEROSOL RESPIRATORY (INHALATION) at 19:55

## 2018-04-25 RX ADMIN — Medication 3 MILLILITER(S): at 20:29

## 2018-04-25 RX ADMIN — Medication 60 MILLIGRAM(S): at 17:02

## 2018-04-25 RX ADMIN — MONTELUKAST 10 MILLIGRAM(S): 4 TABLET, CHEWABLE ORAL at 21:53

## 2018-04-25 RX ADMIN — ENOXAPARIN SODIUM 40 MILLIGRAM(S): 100 INJECTION SUBCUTANEOUS at 12:33

## 2018-04-25 RX ADMIN — SENNA PLUS 1 TABLET(S): 8.6 TABLET ORAL at 21:53

## 2018-04-25 RX ADMIN — Medication 3 MILLILITER(S): at 12:28

## 2018-04-25 RX ADMIN — BENZOCAINE AND MENTHOL 1 LOZENGE: 5; 1 LIQUID ORAL at 05:31

## 2018-04-25 RX ADMIN — Medication 3 MILLILITER(S): at 15:39

## 2018-04-25 RX ADMIN — Medication 30 UNIT(S): at 16:34

## 2018-04-25 RX ADMIN — Medication 100 MILLIGRAM(S): at 14:09

## 2018-04-25 NOTE — PROGRESS NOTE ADULT - SUBJECTIVE AND OBJECTIVE BOX
SUBJECTIVE:    Patient is a 72y old Male who presents with a chief complaint of asthma exacerbation (23 Apr 2018 16:16)    Currently admitted to medicine with the primary diagnosis of Pneumonia     Today is hospital day 3d. This morning he is resting comfortably in bed and reports no new issues or overnight events.     PAST MEDICAL & SURGICAL HISTORY  COPD (chronic obstructive pulmonary disease)  Asthma  Prostate cancer  Diabetes  S/P TURP    SOCIAL HISTORY:  Negative for smoking/alcohol/drug use.     ALLERGIES:  No Known Allergies    MEDICATIONS:  STANDING MEDICATIONS  ALBUTerol/ipratropium for Nebulization 3 milliLiter(s) Nebulizer every 4 hours  benzocaine 15 mG/menthol 3.6 mG Lozenge 1 Lozenge Oral two times a day  benzonatate 100 milliGRAM(s) Oral three times a day  buDESOnide 160 MICROgram(s)/formoterol 4.5 MICROgram(s) Inhaler 2 Puff(s) Inhalation two times a day  cefTRIAXone   IVPB 1 Gram(s) IV Intermittent every 24 hours  dextrose 5%. 1000 milliLiter(s) IV Continuous <Continuous>  dextrose 50% Injectable 25 Gram(s) IV Push once  docusate sodium 100 milliGRAM(s) Oral three times a day  enoxaparin Injectable 40 milliGRAM(s) SubCutaneous daily  gabapentin 100 milliGRAM(s) Oral two times a day  insulin glargine Injectable (LANTUS) 60 Unit(s) SubCutaneous at bedtime  insulin lispro Injectable (HumaLOG) 30 Unit(s) SubCutaneous three times a day before meals  levoFLOXacin IVPB 750 milliGRAM(s) IV Intermittent every 24 hours  levoFLOXacin IVPB      melatonin 3 milliGRAM(s) Oral at bedtime  methylPREDNISolone sodium succinate Injectable 60 milliGRAM(s) IV Push every 12 hours  montelukast 10 milliGRAM(s) Oral at bedtime  pantoprazole    Tablet 40 milliGRAM(s) Oral before breakfast  senna 1 Tablet(s) Oral at bedtime  sodium chloride 3%  Inhalation 1 milliLiter(s) Inhalation once    PRN MEDICATIONS  dextrose Gel 1 Dose(s) Oral once PRN  glucagon  Injectable 1 milliGRAM(s) IntraMuscular once PRN  guaiFENesin    Syrup 200 milliGRAM(s) Oral every 6 hours PRN    VITALS:   T(F): 96.8  HR: 101  BP: 112/64  RR: 18  SpO2: 97%    LABS:                        12.7   19.76 )-----------( 288      ( 25 Apr 2018 09:37 )             39.1     04-25    133<L>  |  94<L>  |  27<H>  ----------------------------<  320<H>  4.5   |  20  |  0.9    Ca    8.6      25 Apr 2018 09:37  Mg     1.9     04-24    TPro  6.0  /  Alb  3.4<L>  /  TBili  0.3  /  DBili  x   /  AST  12  /  ALT  25  /  AlkPhos  67  04-25              Culture - Sputum (collected 23 Apr 2018 10:49)  Source: .Sputum Sputum  Gram Stain (24 Apr 2018 07:34):    Numerous polymorphonuclear leukocytes per low power field    Few Squamous epithelial cells per low power field    Rare Gram Positive Cocci in Pairs and Chains per oil power field    Rare Gram Positive Cocci in Clusters per oil power field  Preliminary Report (24 Apr 2018 21:10):    Normal Respiratory Kimberley present    Culture - Blood (collected 23 Apr 2018 07:11)  Source: .Blood Blood  Preliminary Report (24 Apr 2018 12:02):    No growth to date.    Culture - Blood (collected 22 Apr 2018 15:17)  Source: .Blood Blood  Preliminary Report (23 Apr 2018 23:01):    No growth to date.          RADIOLOGY:    PHYSICAL EXAM:  GEN: No acute distress, walking around the floor  LUNGS: wheeze present bilaterally   HEART: S1/S2 present. RRR.   ABD: Soft, non-tender, non-distended. Bowel sounds present  EXT: No apparent edema, cyanosis or clubbing  NEURO: AAOX3

## 2018-04-25 NOTE — PROGRESS NOTE ADULT - ASSESSMENT
72 year old with pertinent medical history of asthma presents with increased coughing and shortness of breath for the last one week.  He states this feels like his usual asthma exacerbation, associated with subjective fevers, and chills, increased phlegm production (light yellowish in color) He has been using his nebs 3x/day at home, but states that it has not been helping him  He denies any breathing problems prior to 1 week and denies any sick contacts or foreign    1. Asthma Exacerbation  daily peak flow measure (usual is 350)  no significant opacity appreciated on CXR  flu swab is negative and blood culture is negative till date  On symbicort and spiriva and nebs q4+PRN,  On solumedrol 60mg q12  On levofloxacin 750 mg daily  A.fumigatus antibodies results are pending  sputum culture and gram stain positive for gram positive cocci in clusters    2. DMII  on tight and increased glycemic control in light of steroids  -low carb consistent diet    3. Numbness and Tingling of LLE likely   On gabapentin, likely diabetic neuropathy    4. Urinary Incontinence  Wears Pampers  Chronic, has it for 10 years after prostate surgery

## 2018-04-25 NOTE — PROGRESS NOTE ADULT - SUBJECTIVE AND OBJECTIVE BOX
Patient was seen and examined. Spoke with RN. Chart reviewed.    No events overnight.  Vital Signs Last 24 Hrs  T(F): 96.8 (25 Apr 2018 04:42), Max: 97 (24 Apr 2018 13:01)  HR: 101 (25 Apr 2018 04:42) (101 - 112)  BP: 112/64 (25 Apr 2018 04:42) (112/64 - 113/73)  SpO2: 97% (24 Apr 2018 20:45) (97% - 97%)  MEDICATIONS  (STANDING):  ALBUTerol/ipratropium for Nebulization 3 milliLiter(s) Nebulizer every 4 hours  benzocaine 15 mG/menthol 3.6 mG Lozenge 1 Lozenge Oral two times a day  benzonatate 100 milliGRAM(s) Oral three times a day  buDESOnide 160 MICROgram(s)/formoterol 4.5 MICROgram(s) Inhaler 2 Puff(s) Inhalation two times a day  cefTRIAXone   IVPB 1 Gram(s) IV Intermittent every 24 hours  dextrose 5%. 1000 milliLiter(s) (50 mL/Hr) IV Continuous <Continuous>  dextrose 50% Injectable 25 Gram(s) IV Push once  docusate sodium 100 milliGRAM(s) Oral three times a day  enoxaparin Injectable 40 milliGRAM(s) SubCutaneous daily  gabapentin 100 milliGRAM(s) Oral two times a day  insulin glargine Injectable (LANTUS) 60 Unit(s) SubCutaneous at bedtime  insulin lispro Injectable (HumaLOG) 30 Unit(s) SubCutaneous three times a day before meals  levoFLOXacin IVPB 750 milliGRAM(s) IV Intermittent every 24 hours  levoFLOXacin IVPB      melatonin 3 milliGRAM(s) Oral at bedtime  methylPREDNISolone sodium succinate Injectable 60 milliGRAM(s) IV Push every 12 hours  montelukast 10 milliGRAM(s) Oral at bedtime  pantoprazole    Tablet 40 milliGRAM(s) Oral before breakfast  senna 1 Tablet(s) Oral at bedtime  sodium chloride 3%  Inhalation 1 milliLiter(s) Inhalation once    MEDICATIONS  (PRN):  dextrose Gel 1 Dose(s) Oral once PRN Blood Glucose LESS THAN 70 milliGRAM(s)/deciliter  glucagon  Injectable 1 milliGRAM(s) IntraMuscular once PRN Glucose LESS THAN 70 milligrams/deciliter  guaiFENesin    Syrup 200 milliGRAM(s) Oral every 6 hours PRN Cough    Labs:                        12.7   19.76 )-----------( 288      ( 25 Apr 2018 09:37 )             39.1                         12.0   22.14 )-----------( 287      ( 24 Apr 2018 07:05 )             36.5     25 Apr 2018 09:37    133    |  94     |  27     ----------------------------<  320    4.5     |  20     |  0.9    24 Apr 2018 07:05    133    |  98     |  26     ----------------------------<  296    4.8     |  21     |  0.9      Ca    8.6        25 Apr 2018 09:37  Ca    8.4        24 Apr 2018 07:05  Mg     1.9       24 Apr 2018 07:05    TPro  6.0    /  Alb  3.4    /  TBili  0.3    /  DBili  x      /  AST  12     /  ALT  25     /  AlkPhos  67     25 Apr 2018 09:37  TPro  5.5    /  Alb  3.1    /  TBili  0.2    /  DBili  x      /  AST  11     /  ALT  21     /  AlkPhos  70     24 Apr 2018 07:05          Culture - Sputum (collected 23 Apr 2018 10:49)  Source: .Sputum Sputum  Gram Stain (24 Apr 2018 07:34):    Numerous polymorphonuclear leukocytes per low power field    Few Squamous epithelial cells per low power field    Rare Gram Positive Cocci in Pairs and Chains per oil power field    Rare Gram Positive Cocci in Clusters per oil power field  Preliminary Report (24 Apr 2018 21:10):    Normal Respiratory Kimberley present    Culture - Blood (collected 23 Apr 2018 07:11)  Source: .Blood Blood  Preliminary Report (24 Apr 2018 12:02):    No growth to date.    Culture - Blood (collected 22 Apr 2018 15:17)  Source: .Blood Blood  Preliminary Report (23 Apr 2018 23:01):    No growth to date.        Radiology:    General: comfortable, NAD  Neurology: A&Ox3, nonfocal  Head:  Normocephalic, atraumatic  ENT:  Mucosa moist, no ulcerations  Neck:  Supple, no JVD,   Skin: no breakdowns (as per RN)  Resp: decreased breath sounds  CV: RRR, S1S2,   GI: Soft, NT, bowel sounds  MS: No edema, + peripheral pulses, FROM all 4 extremity

## 2018-04-25 NOTE — PROGRESS NOTE ADULT - ASSESSMENT
72 year old with pertinent medical history of asthma presents with increased coughing and shortness of breath for the last one week.  He states this feels like his usual asthma exacerbation, associated with subjective fevers, and chills, increased phlegm production (light yellowish in color) He has been using his nebs 3x/day at home, but states that it has not been helping him  He denies any breathing problems prior to 1 week and denies any sick contacts or foreign    1. Asthma Exacerbation  patient improving clinically  no significant opacity appreciated on CXR  flu swab is negative and blood culture and sputum culture are normal   On symbicort and spiriva and nebs q4+PRN and on solumedrol 60mg q12 for asthma  On levofloxacin 750 mg daily  A.fumigatus antibodies results are still pending    2. DMII  on tight and increased glycemic control in light of steroids  on low carb consistent diet  has numbness and Tingling of LLE, on gabapentin for it    4. Urinary Incontinence  Wears Pampers  Chronic, has it for 10 years after prostate surgery

## 2018-04-26 VITALS
HEART RATE: 94 BPM | RESPIRATION RATE: 20 BRPM | TEMPERATURE: 98 F | SYSTOLIC BLOOD PRESSURE: 148 MMHG | DIASTOLIC BLOOD PRESSURE: 70 MMHG

## 2018-04-26 LAB
ALBUMIN SERPL ELPH-MCNC: 3.3 G/DL — LOW (ref 3.5–5.2)
ALP SERPL-CCNC: 66 U/L — SIGNIFICANT CHANGE UP (ref 30–115)
ALT FLD-CCNC: 24 U/L — SIGNIFICANT CHANGE UP (ref 0–41)
ANION GAP SERPL CALC-SCNC: 15 MMOL/L — HIGH (ref 7–14)
AST SERPL-CCNC: 23 U/L — SIGNIFICANT CHANGE UP (ref 0–41)
BASOPHILS # BLD AUTO: 0.02 K/UL — SIGNIFICANT CHANGE UP (ref 0–0.2)
BASOPHILS NFR BLD AUTO: 0.1 % — SIGNIFICANT CHANGE UP (ref 0–1)
BILIRUB SERPL-MCNC: 0.4 MG/DL — SIGNIFICANT CHANGE UP (ref 0.2–1.2)
BUN SERPL-MCNC: 24 MG/DL — HIGH (ref 10–20)
CALCIUM SERPL-MCNC: 9 MG/DL — SIGNIFICANT CHANGE UP (ref 8.5–10.1)
CHLORIDE SERPL-SCNC: 99 MMOL/L — SIGNIFICANT CHANGE UP (ref 98–110)
CO2 SERPL-SCNC: 24 MMOL/L — SIGNIFICANT CHANGE UP (ref 17–32)
CREAT SERPL-MCNC: 0.9 MG/DL — SIGNIFICANT CHANGE UP (ref 0.7–1.5)
EOSINOPHIL # BLD AUTO: 0 K/UL — SIGNIFICANT CHANGE UP (ref 0–0.7)
EOSINOPHIL NFR BLD AUTO: 0 % — SIGNIFICANT CHANGE UP (ref 0–8)
GLUCOSE SERPL-MCNC: 157 MG/DL — HIGH (ref 70–99)
HCT VFR BLD CALC: 41.2 % — LOW (ref 42–52)
HGB BLD-MCNC: 13.2 G/DL — LOW (ref 14–18)
IMM GRANULOCYTES NFR BLD AUTO: 1.8 % — HIGH (ref 0.1–0.3)
LYMPHOCYTES # BLD AUTO: 1.05 K/UL — LOW (ref 1.2–3.4)
LYMPHOCYTES # BLD AUTO: 7 % — LOW (ref 20.5–51.1)
MAGNESIUM SERPL-MCNC: 1.8 MG/DL — SIGNIFICANT CHANGE UP (ref 1.8–2.4)
MCHC RBC-ENTMCNC: 27.4 PG — SIGNIFICANT CHANGE UP (ref 27–31)
MCHC RBC-ENTMCNC: 32 G/DL — SIGNIFICANT CHANGE UP (ref 32–37)
MCV RBC AUTO: 85.7 FL — SIGNIFICANT CHANGE UP (ref 80–94)
MONOCYTES # BLD AUTO: 0.48 K/UL — SIGNIFICANT CHANGE UP (ref 0.1–0.6)
MONOCYTES NFR BLD AUTO: 3.2 % — SIGNIFICANT CHANGE UP (ref 1.7–9.3)
NEUTROPHILS # BLD AUTO: 13.08 K/UL — HIGH (ref 1.4–6.5)
NEUTROPHILS NFR BLD AUTO: 87.9 % — HIGH (ref 42.2–75.2)
PLATELET # BLD AUTO: 284 K/UL — SIGNIFICANT CHANGE UP (ref 130–400)
POTASSIUM SERPL-MCNC: 4.7 MMOL/L — SIGNIFICANT CHANGE UP (ref 3.5–5)
POTASSIUM SERPL-SCNC: 4.7 MMOL/L — SIGNIFICANT CHANGE UP (ref 3.5–5)
PROT SERPL-MCNC: 6.1 G/DL — SIGNIFICANT CHANGE UP (ref 6–8)
RBC # BLD: 4.81 M/UL — SIGNIFICANT CHANGE UP (ref 4.7–6.1)
RBC # FLD: 14.7 % — HIGH (ref 11.5–14.5)
SODIUM SERPL-SCNC: 138 MMOL/L — SIGNIFICANT CHANGE UP (ref 135–146)
WBC # BLD: 14.9 K/UL — HIGH (ref 4.8–10.8)
WBC # FLD AUTO: 14.9 K/UL — HIGH (ref 4.8–10.8)

## 2018-04-26 RX ORDER — GABAPENTIN 400 MG/1
1 CAPSULE ORAL
Qty: 60 | Refills: 0 | OUTPATIENT
Start: 2018-04-26 | End: 2018-05-25

## 2018-04-26 RX ORDER — LANOLIN ALCOHOL/MO/W.PET/CERES
1 CREAM (GRAM) TOPICAL
Qty: 30 | Refills: 0 | OUTPATIENT
Start: 2018-04-26 | End: 2018-05-25

## 2018-04-26 RX ORDER — IPRATROPIUM/ALBUTEROL SULFATE 18-103MCG
3 AEROSOL WITH ADAPTER (GRAM) INHALATION
Qty: 0 | Refills: 0 | COMMUNITY
Start: 2018-04-26

## 2018-04-26 RX ORDER — FLUTICASONE FUROATE AND VILANTEROL TRIFENATATE 100; 25 UG/1; UG/1
1 POWDER RESPIRATORY (INHALATION)
Qty: 2 | Refills: 2
Start: 2018-04-26 | End: 2018-07-24

## 2018-04-26 RX ADMIN — Medication 100 MILLIGRAM(S): at 05:21

## 2018-04-26 RX ADMIN — CEFTRIAXONE 100 GRAM(S): 500 INJECTION, POWDER, FOR SOLUTION INTRAMUSCULAR; INTRAVENOUS at 05:21

## 2018-04-26 RX ADMIN — Medication 3 MILLILITER(S): at 08:44

## 2018-04-26 RX ADMIN — GABAPENTIN 100 MILLIGRAM(S): 400 CAPSULE ORAL at 05:21

## 2018-04-26 RX ADMIN — Medication 60 MILLIGRAM(S): at 05:22

## 2018-04-26 RX ADMIN — BENZOCAINE AND MENTHOL 1 LOZENGE: 5; 1 LIQUID ORAL at 05:31

## 2018-04-26 RX ADMIN — Medication 30 UNIT(S): at 12:05

## 2018-04-26 RX ADMIN — ENOXAPARIN SODIUM 40 MILLIGRAM(S): 100 INJECTION SUBCUTANEOUS at 12:06

## 2018-04-26 RX ADMIN — BUDESONIDE AND FORMOTEROL FUMARATE DIHYDRATE 2 PUFF(S): 160; 4.5 AEROSOL RESPIRATORY (INHALATION) at 11:54

## 2018-04-26 RX ADMIN — Medication 3 MILLILITER(S): at 12:12

## 2018-04-26 RX ADMIN — Medication 100 MILLIGRAM(S): at 11:54

## 2018-04-26 RX ADMIN — PANTOPRAZOLE SODIUM 40 MILLIGRAM(S): 20 TABLET, DELAYED RELEASE ORAL at 06:30

## 2018-04-26 NOTE — PROGRESS NOTE ADULT - SUBJECTIVE AND OBJECTIVE BOX
DEL ALVARADO  72y, Male  Allergy: No Known Allergies    PMH/PSH:  PAST MEDICAL & SURGICAL HISTORY:  COPD (chronic obstructive pulmonary disease)  Asthma  Prostate cancer  Diabetes  S/P TURP    ALLERGIES:  Allergies    No Known Allergies    Intolerances          LAST 24-Hr EVENTS:  feels better overall, cough sob improved      VITALS:  T(F): 97.7 (04-26-18 @ 12:34), Max: 97.7 (04-26-18 @ 12:34)  HR: 94 (04-26-18 @ 12:34)  BP: 148/70 (04-26-18 @ 12:34) (146/75 - 148/70)  RR: 20 (04-26-18 @ 12:34)  SpO2: 98% (04-26-18 @ 07:43)    PHYSICAL EXAM:    GENERAL: NAD, well-developed  NECK: Supple, No JVD  CHEST/LUNG: Cwheeze  HEART: Regular rate and rhythm; No murmurs.  ABDOMEN: Soft, Nontender, Nondistended; Bowel sounds present  EXTREMITIES:  No clubbing, edema absent        TESTS & MEASUREMENTS:                          13.2   14.90 )-----------( 284      ( 26 Apr 2018 08:45 )             41.2       04-26    138  |  99  |  24<H>  ----------------------------<  157<H>  4.7   |  24  |  0.9    Ca    9.0      26 Apr 2018 08:45  Mg     1.8     04-26    TPro  6.1  /  Alb  3.3<L>  /  TBili  0.4  /  DBili  x   /  AST  23  /  ALT  24  /  AlkPhos  66  04-26    LIVER FUNCTIONS - ( 26 Apr 2018 08:45 )  Alb: 3.3 g/dL / Pro: 6.1 g/dL / ALK PHOS: 66 U/L / ALT: 24 U/L / AST: 23 U/L / GGT: x                 Culture - Sputum (collected 04-23-18 @ 10:49)  Source: .Sputum Sputum  Gram Stain (04-24-18 @ 07:34):    Numerous polymorphonuclear leukocytes per low power field    Few Squamous epithelial cells per low power field    Rare Gram Positive Cocci in Pairs and Chains per oil power field    Rare Gram Positive Cocci in Clusters per oil power field  Final Report (04-25-18 @ 19:12):    Normal Respiratory Kimberley present    Culture - Blood (collected 04-23-18 @ 07:11)  Source: .Blood Blood  Preliminary Report (04-24-18 @ 12:02):    No growth to date.    Culture - Blood (collected 04-22-18 @ 15:17)  Source: .Blood Blood  Preliminary Report (04-23-18 @ 23:01):    No growth to date.          ABG & VENT SETTINGS: (when applicable)        RADIOLOGY & ADDITIONAL TESTS:        MEDICATIONS:  MEDICATIONS  (STANDING):  ALBUTerol/ipratropium for Nebulization 3 milliLiter(s) Nebulizer every 4 hours  benzocaine 15 mG/menthol 3.6 mG Lozenge 1 Lozenge Oral two times a day  benzonatate 100 milliGRAM(s) Oral three times a day  buDESOnide 160 MICROgram(s)/formoterol 4.5 MICROgram(s) Inhaler 2 Puff(s) Inhalation two times a day  cefTRIAXone   IVPB 1 Gram(s) IV Intermittent every 24 hours  dextrose 5%. 1000 milliLiter(s) (50 mL/Hr) IV Continuous <Continuous>  dextrose 50% Injectable 25 Gram(s) IV Push once  docusate sodium 100 milliGRAM(s) Oral three times a day  enoxaparin Injectable 40 milliGRAM(s) SubCutaneous daily  gabapentin 100 milliGRAM(s) Oral two times a day  insulin glargine Injectable (LANTUS) 60 Unit(s) SubCutaneous at bedtime  insulin lispro Injectable (HumaLOG) 30 Unit(s) SubCutaneous three times a day before meals  levoFLOXacin IVPB 750 milliGRAM(s) IV Intermittent every 24 hours  levoFLOXacin IVPB      melatonin 3 milliGRAM(s) Oral at bedtime  montelukast 10 milliGRAM(s) Oral at bedtime  pantoprazole    Tablet 40 milliGRAM(s) Oral before breakfast  senna 1 Tablet(s) Oral at bedtime  sodium chloride 3%  Inhalation 1 milliLiter(s) Inhalation once    MEDICATIONS  (PRN):  dextrose Gel 1 Dose(s) Oral once PRN Blood Glucose LESS THAN 70 milliGRAM(s)/deciliter  glucagon  Injectable 1 milliGRAM(s) IntraMuscular once PRN Glucose LESS THAN 70 milligrams/deciliter  guaiFENesin    Syrup 200 milliGRAM(s) Oral every 6 hours PRN Cough

## 2018-04-26 NOTE — PROGRESS NOTE ADULT - ASSESSMENT
Arf improved  Acute asthma exacerbation-  Atelectasis    d/c iv steroids  po prednisone 60 mg with taper  on d/c d/c advair, start breo 200 mcg 1 puff daily plus spiriva  nebs q hour prn  aspergillus IGg and IGM p  serum IgE normal  dvt px'  follow up as out pt 1 week

## 2018-04-26 NOTE — PROGRESS NOTE ADULT - ASSESSMENT
1. Asthma Exacerbation  daily peak flow measure (usual is 350)  no significant opacity appreciated on CXR  flu swab is negative and blood culture is negative till date  On symbicort and spiriva and nebs q4+PRN,  On solumedrol 60mg q12  On levofloxacin 750 mg daily  A.fumigatus antibodies results are pending  sputum culture and gram stain positive for gram positive cocci in clusters  pulm eval  2. DMII  on tight and increased glycemic control in light of steroids  -low carb consistent diet    3. Numbness and Tingling of LLE likely   On gabapentin, likely diabetic neuropathy    4. Urinary Incontinence  Wears Pampers  Chronic, has it for 10 years after prostate surgery

## 2018-04-26 NOTE — PROGRESS NOTE ADULT - SUBJECTIVE AND OBJECTIVE BOX
Patient was seen and examined. Spoke with RN. Chart reviewed.    No events overnight.  Vital Signs Last 24 Hrs  T(F): 96.6 (26 Apr 2018 04:50), Max: 97.9 (25 Apr 2018 22:17)  HR: 85 (26 Apr 2018 04:50) (85 - 114)  BP: 146/75 (26 Apr 2018 04:50) (116/68 - 153/78)  SpO2: 98% (26 Apr 2018 07:43) (95% - 98%)  MEDICATIONS  (STANDING):  ALBUTerol/ipratropium for Nebulization 3 milliLiter(s) Nebulizer every 4 hours  benzocaine 15 mG/menthol 3.6 mG Lozenge 1 Lozenge Oral two times a day  benzonatate 100 milliGRAM(s) Oral three times a day  buDESOnide 160 MICROgram(s)/formoterol 4.5 MICROgram(s) Inhaler 2 Puff(s) Inhalation two times a day  cefTRIAXone   IVPB 1 Gram(s) IV Intermittent every 24 hours  dextrose 5%. 1000 milliLiter(s) (50 mL/Hr) IV Continuous <Continuous>  dextrose 50% Injectable 25 Gram(s) IV Push once  docusate sodium 100 milliGRAM(s) Oral three times a day  enoxaparin Injectable 40 milliGRAM(s) SubCutaneous daily  gabapentin 100 milliGRAM(s) Oral two times a day  insulin glargine Injectable (LANTUS) 60 Unit(s) SubCutaneous at bedtime  insulin lispro Injectable (HumaLOG) 30 Unit(s) SubCutaneous three times a day before meals  levoFLOXacin IVPB 750 milliGRAM(s) IV Intermittent every 24 hours  levoFLOXacin IVPB      melatonin 3 milliGRAM(s) Oral at bedtime  methylPREDNISolone sodium succinate Injectable 60 milliGRAM(s) IV Push every 12 hours  montelukast 10 milliGRAM(s) Oral at bedtime  pantoprazole    Tablet 40 milliGRAM(s) Oral before breakfast  senna 1 Tablet(s) Oral at bedtime  sodium chloride 3%  Inhalation 1 milliLiter(s) Inhalation once    MEDICATIONS  (PRN):  dextrose Gel 1 Dose(s) Oral once PRN Blood Glucose LESS THAN 70 milliGRAM(s)/deciliter  glucagon  Injectable 1 milliGRAM(s) IntraMuscular once PRN Glucose LESS THAN 70 milligrams/deciliter  guaiFENesin    Syrup 200 milliGRAM(s) Oral every 6 hours PRN Cough    Labs:                        13.2   14.90 )-----------( 284      ( 26 Apr 2018 08:45 )             41.2                         12.7   19.76 )-----------( 288      ( 25 Apr 2018 09:37 )             39.1     26 Apr 2018 08:45    138    |  99     |  24     ----------------------------<  157    4.7     |  24     |  0.9    25 Apr 2018 09:37    133    |  94     |  27     ----------------------------<  320    4.5     |  20     |  0.9      Ca    9.0        26 Apr 2018 08:45  Ca    8.6        25 Apr 2018 09:37  Mg     1.8       26 Apr 2018 08:45    TPro  6.1    /  Alb  3.3    /  TBili  0.4    /  DBili  x      /  AST  23     /  ALT  24     /  AlkPhos  66     26 Apr 2018 08:45  TPro  6.0    /  Alb  3.4    /  TBili  0.3    /  DBili  x      /  AST  12     /  ALT  25     /  AlkPhos  67     25 Apr 2018 09:37            Radiology:    General: comfortable, NAD  Neurology: A&Ox3, nonfocal  Head:  Normocephalic, atraumatic  ENT:  Mucosa moist, no ulcerations  Neck:  Supple, no JVD,   Skin: no breakdowns (as per RN)  Resp: CTA B/L  CV: RRR, S1S2, harsh br   GI: Soft, NT, bowel sounds  MS: No edema, + peripheral pulses, FROM all 4 extremity

## 2018-04-27 LAB
CULTURE RESULTS: SIGNIFICANT CHANGE UP
SPECIMEN SOURCE: SIGNIFICANT CHANGE UP

## 2018-04-28 LAB
A FLAVUS AB FLD QL: NEGATIVE — SIGNIFICANT CHANGE UP
A NIGER AB FLD QL: NEGATIVE — SIGNIFICANT CHANGE UP
A NIGER AB FLD QL: NEGATIVE — SIGNIFICANT CHANGE UP
CULTURE RESULTS: SIGNIFICANT CHANGE UP
SPECIMEN SOURCE: SIGNIFICANT CHANGE UP

## 2018-05-01 DIAGNOSIS — E11.40 TYPE 2 DIABETES MELLITUS WITH DIABETIC NEUROPATHY, UNSPECIFIED: ICD-10-CM

## 2018-05-01 DIAGNOSIS — J45.901 UNSPECIFIED ASTHMA WITH (ACUTE) EXACERBATION: ICD-10-CM

## 2018-05-01 DIAGNOSIS — Z79.4 LONG TERM (CURRENT) USE OF INSULIN: ICD-10-CM

## 2018-05-01 DIAGNOSIS — R32 UNSPECIFIED URINARY INCONTINENCE: ICD-10-CM

## 2018-05-01 DIAGNOSIS — J44.9 CHRONIC OBSTRUCTIVE PULMONARY DISEASE, UNSPECIFIED: ICD-10-CM

## 2018-05-01 DIAGNOSIS — D72.829 ELEVATED WHITE BLOOD CELL COUNT, UNSPECIFIED: ICD-10-CM

## 2018-05-01 DIAGNOSIS — J96.21 ACUTE AND CHRONIC RESPIRATORY FAILURE WITH HYPOXIA: ICD-10-CM

## 2018-05-01 DIAGNOSIS — Z85.46 PERSONAL HISTORY OF MALIGNANT NEOPLASM OF PROSTATE: ICD-10-CM

## 2018-11-15 ENCOUNTER — OUTPATIENT (OUTPATIENT)
Dept: OUTPATIENT SERVICES | Facility: HOSPITAL | Age: 73
LOS: 1 days | Discharge: HOME | End: 2018-11-15

## 2018-11-15 DIAGNOSIS — D64.9 ANEMIA, UNSPECIFIED: ICD-10-CM

## 2018-11-15 DIAGNOSIS — Z90.79 ACQUIRED ABSENCE OF OTHER GENITAL ORGAN(S): Chronic | ICD-10-CM

## 2018-11-15 DIAGNOSIS — N39.0 URINARY TRACT INFECTION, SITE NOT SPECIFIED: ICD-10-CM

## 2018-11-15 DIAGNOSIS — E11.9 TYPE 2 DIABETES MELLITUS WITHOUT COMPLICATIONS: ICD-10-CM

## 2018-11-15 DIAGNOSIS — E55.9 VITAMIN D DEFICIENCY, UNSPECIFIED: ICD-10-CM

## 2018-11-15 DIAGNOSIS — E03.9 HYPOTHYROIDISM, UNSPECIFIED: ICD-10-CM

## 2018-11-15 DIAGNOSIS — K75.9 INFLAMMATORY LIVER DISEASE, UNSPECIFIED: ICD-10-CM

## 2018-11-15 DIAGNOSIS — E53.8 DEFICIENCY OF OTHER SPECIFIED B GROUP VITAMINS: ICD-10-CM

## 2018-11-15 DIAGNOSIS — N40.0 BENIGN PROSTATIC HYPERPLASIA WITHOUT LOWER URINARY TRACT SYMPTOMS: ICD-10-CM

## 2019-01-09 ENCOUNTER — OUTPATIENT (OUTPATIENT)
Dept: OUTPATIENT SERVICES | Facility: HOSPITAL | Age: 74
LOS: 1 days | Discharge: HOME | End: 2019-01-09

## 2019-01-09 DIAGNOSIS — Z90.79 ACQUIRED ABSENCE OF OTHER GENITAL ORGAN(S): Chronic | ICD-10-CM

## 2019-01-09 DIAGNOSIS — E78.5 HYPERLIPIDEMIA, UNSPECIFIED: ICD-10-CM

## 2019-01-09 DIAGNOSIS — I10 ESSENTIAL (PRIMARY) HYPERTENSION: ICD-10-CM

## 2019-01-09 DIAGNOSIS — E11.9 TYPE 2 DIABETES MELLITUS WITHOUT COMPLICATIONS: ICD-10-CM

## 2019-01-09 DIAGNOSIS — D64.9 ANEMIA, UNSPECIFIED: ICD-10-CM

## 2019-02-14 ENCOUNTER — OUTPATIENT (OUTPATIENT)
Dept: OUTPATIENT SERVICES | Facility: HOSPITAL | Age: 74
LOS: 1 days | Discharge: HOME | End: 2019-02-14

## 2019-02-14 DIAGNOSIS — Z90.79 ACQUIRED ABSENCE OF OTHER GENITAL ORGAN(S): Chronic | ICD-10-CM

## 2019-02-14 DIAGNOSIS — Z00.00 ENCOUNTER FOR GENERAL ADULT MEDICAL EXAMINATION WITHOUT ABNORMAL FINDINGS: ICD-10-CM

## 2019-03-15 ENCOUNTER — INPATIENT (INPATIENT)
Facility: HOSPITAL | Age: 74
LOS: 2 days | Discharge: HOME | End: 2019-03-18
Attending: INTERNAL MEDICINE | Admitting: INTERNAL MEDICINE

## 2019-03-15 VITALS
TEMPERATURE: 101 F | HEART RATE: 130 BPM | DIASTOLIC BLOOD PRESSURE: 63 MMHG | SYSTOLIC BLOOD PRESSURE: 138 MMHG | OXYGEN SATURATION: 94 % | RESPIRATION RATE: 18 BRPM

## 2019-03-15 DIAGNOSIS — Z90.79 ACQUIRED ABSENCE OF OTHER GENITAL ORGAN(S): Chronic | ICD-10-CM

## 2019-03-15 LAB
ALBUMIN SERPL ELPH-MCNC: 3.4 G/DL — LOW (ref 3.5–5.2)
ALP SERPL-CCNC: 67 U/L — SIGNIFICANT CHANGE UP (ref 30–115)
ALT FLD-CCNC: 27 U/L — SIGNIFICANT CHANGE UP (ref 0–41)
ANION GAP SERPL CALC-SCNC: 15 MMOL/L — HIGH (ref 7–14)
APTT BLD: 33 SEC — SIGNIFICANT CHANGE UP (ref 27–39.2)
AST SERPL-CCNC: 19 U/L — SIGNIFICANT CHANGE UP (ref 0–41)
BASE EXCESS BLDV CALC-SCNC: 2.9 MMOL/L — HIGH (ref -2–2)
BASOPHILS # BLD AUTO: 0.06 K/UL — SIGNIFICANT CHANGE UP (ref 0–0.2)
BASOPHILS NFR BLD AUTO: 0.6 % — SIGNIFICANT CHANGE UP (ref 0–1)
BILIRUB DIRECT SERPL-MCNC: 0.2 MG/DL — SIGNIFICANT CHANGE UP (ref 0–0.2)
BILIRUB INDIRECT FLD-MCNC: 0.6 MG/DL — SIGNIFICANT CHANGE UP (ref 0.2–1.2)
BILIRUB SERPL-MCNC: 0.8 MG/DL — SIGNIFICANT CHANGE UP (ref 0.2–1.2)
BUN SERPL-MCNC: 15 MG/DL — SIGNIFICANT CHANGE UP (ref 10–20)
CA-I SERPL-SCNC: 1.13 MMOL/L — SIGNIFICANT CHANGE UP (ref 1.12–1.3)
CALCIUM SERPL-MCNC: 8.8 MG/DL — SIGNIFICANT CHANGE UP (ref 8.5–10.1)
CHLORIDE SERPL-SCNC: 102 MMOL/L — SIGNIFICANT CHANGE UP (ref 98–110)
CO2 SERPL-SCNC: 20 MMOL/L — SIGNIFICANT CHANGE UP (ref 17–32)
CREAT SERPL-MCNC: 1 MG/DL — SIGNIFICANT CHANGE UP (ref 0.7–1.5)
EOSINOPHIL # BLD AUTO: 0.28 K/UL — SIGNIFICANT CHANGE UP (ref 0–0.7)
EOSINOPHIL NFR BLD AUTO: 2.7 % — SIGNIFICANT CHANGE UP (ref 0–8)
FLU A RESULT: NEGATIVE — SIGNIFICANT CHANGE UP
FLU A RESULT: NEGATIVE — SIGNIFICANT CHANGE UP
FLUAV AG NPH QL: NEGATIVE — SIGNIFICANT CHANGE UP
FLUBV AG NPH QL: NEGATIVE — SIGNIFICANT CHANGE UP
GAS PNL BLDV: 135 MMOL/L — LOW (ref 136–145)
GAS PNL BLDV: SIGNIFICANT CHANGE UP
GLUCOSE BLDC GLUCOMTR-MCNC: 124 MG/DL — HIGH (ref 70–99)
GLUCOSE BLDC GLUCOMTR-MCNC: 50 MG/DL — LOW (ref 70–99)
GLUCOSE SERPL-MCNC: 204 MG/DL — HIGH (ref 70–99)
HCO3 BLDV-SCNC: 26 MMOL/L — SIGNIFICANT CHANGE UP (ref 22–29)
HCT VFR BLD CALC: 42.1 % — SIGNIFICANT CHANGE UP (ref 42–52)
HCT VFR BLDA CALC: 49.7 % — HIGH (ref 34–44)
HGB BLD CALC-MCNC: 16.2 G/DL — SIGNIFICANT CHANGE UP (ref 14–18)
HGB BLD-MCNC: 13.5 G/DL — LOW (ref 14–18)
IMM GRANULOCYTES NFR BLD AUTO: 1.6 % — HIGH (ref 0.1–0.3)
INR BLD: 1.01 RATIO — SIGNIFICANT CHANGE UP (ref 0.65–1.3)
LACTATE BLDV-MCNC: 1.7 MMOL/L — HIGH (ref 0.5–1.6)
LACTATE SERPL-SCNC: 1.5 MMOL/L — SIGNIFICANT CHANGE UP (ref 0.5–2.2)
LDH SERPL L TO P-CCNC: 252 U/L — HIGH (ref 50–242)
LYMPHOCYTES # BLD AUTO: 1.91 K/UL — SIGNIFICANT CHANGE UP (ref 1.2–3.4)
LYMPHOCYTES # BLD AUTO: 18.5 % — LOW (ref 20.5–51.1)
MCHC RBC-ENTMCNC: 27.8 PG — SIGNIFICANT CHANGE UP (ref 27–31)
MCHC RBC-ENTMCNC: 32.1 G/DL — SIGNIFICANT CHANGE UP (ref 32–37)
MCV RBC AUTO: 86.6 FL — SIGNIFICANT CHANGE UP (ref 80–94)
MONOCYTES # BLD AUTO: 0.93 K/UL — HIGH (ref 0.1–0.6)
MONOCYTES NFR BLD AUTO: 9 % — SIGNIFICANT CHANGE UP (ref 1.7–9.3)
NEUTROPHILS # BLD AUTO: 6.99 K/UL — HIGH (ref 1.4–6.5)
NEUTROPHILS NFR BLD AUTO: 67.6 % — SIGNIFICANT CHANGE UP (ref 42.2–75.2)
NRBC # BLD: 0 /100 WBCS — SIGNIFICANT CHANGE UP (ref 0–0)
NT-PROBNP SERPL-SCNC: 168 PG/ML — SIGNIFICANT CHANGE UP (ref 0–300)
PCO2 BLDV: 36 MMHG — LOW (ref 41–51)
PH BLDV: 7.48 — HIGH (ref 7.26–7.43)
PLATELET # BLD AUTO: 247 K/UL — SIGNIFICANT CHANGE UP (ref 130–400)
PO2 BLDV: 49 MMHG — HIGH (ref 20–40)
POTASSIUM BLDV-SCNC: 3.7 MMOL/L — SIGNIFICANT CHANGE UP (ref 3.3–5.6)
POTASSIUM SERPL-MCNC: 4.2 MMOL/L — SIGNIFICANT CHANGE UP (ref 3.5–5)
POTASSIUM SERPL-SCNC: 4.2 MMOL/L — SIGNIFICANT CHANGE UP (ref 3.5–5)
PROT SERPL-MCNC: 6.3 G/DL — SIGNIFICANT CHANGE UP (ref 6–8)
PROTHROM AB SERPL-ACNC: 11.6 SEC — SIGNIFICANT CHANGE UP (ref 9.95–12.87)
RBC # BLD: 4.86 M/UL — SIGNIFICANT CHANGE UP (ref 4.7–6.1)
RBC # FLD: 13 % — SIGNIFICANT CHANGE UP (ref 11.5–14.5)
RSV RESULT: NEGATIVE — SIGNIFICANT CHANGE UP
RSV RNA RESP QL NAA+PROBE: NEGATIVE — SIGNIFICANT CHANGE UP
SAO2 % BLDV: 87 % — SIGNIFICANT CHANGE UP
SODIUM SERPL-SCNC: 137 MMOL/L — SIGNIFICANT CHANGE UP (ref 135–146)
TROPONIN T SERPL-MCNC: 0.01 NG/ML — SIGNIFICANT CHANGE UP
WBC # BLD: 10.34 K/UL — SIGNIFICANT CHANGE UP (ref 4.8–10.8)
WBC # FLD AUTO: 10.34 K/UL — SIGNIFICANT CHANGE UP (ref 4.8–10.8)

## 2019-03-15 RX ORDER — INSULIN GLARGINE 100 [IU]/ML
30 INJECTION, SOLUTION SUBCUTANEOUS AT BEDTIME
Qty: 0 | Refills: 0 | Status: DISCONTINUED | OUTPATIENT
Start: 2019-03-15 | End: 2019-03-18

## 2019-03-15 RX ORDER — ENOXAPARIN SODIUM 100 MG/ML
40 INJECTION SUBCUTANEOUS EVERY 24 HOURS
Qty: 0 | Refills: 0 | Status: DISCONTINUED | OUTPATIENT
Start: 2019-03-15 | End: 2019-03-18

## 2019-03-15 RX ORDER — AZITHROMYCIN 500 MG/1
500 TABLET, FILM COATED ORAL ONCE
Qty: 0 | Refills: 0 | Status: COMPLETED | OUTPATIENT
Start: 2019-03-15 | End: 2019-03-15

## 2019-03-15 RX ORDER — MONTELUKAST 4 MG/1
10 TABLET, CHEWABLE ORAL DAILY
Qty: 0 | Refills: 0 | Status: DISCONTINUED | OUTPATIENT
Start: 2019-03-15 | End: 2019-03-18

## 2019-03-15 RX ORDER — GLUCAGON INJECTION, SOLUTION 0.5 MG/.1ML
1 INJECTION, SOLUTION SUBCUTANEOUS ONCE
Qty: 0 | Refills: 0 | Status: DISCONTINUED | OUTPATIENT
Start: 2019-03-15 | End: 2019-03-18

## 2019-03-15 RX ORDER — CEFTRIAXONE 500 MG/1
1 INJECTION, POWDER, FOR SOLUTION INTRAMUSCULAR; INTRAVENOUS ONCE
Qty: 0 | Refills: 0 | Status: COMPLETED | OUTPATIENT
Start: 2019-03-15 | End: 2019-03-15

## 2019-03-15 RX ORDER — SODIUM CHLORIDE 9 MG/ML
1000 INJECTION, SOLUTION INTRAVENOUS
Qty: 0 | Refills: 0 | Status: DISCONTINUED | OUTPATIENT
Start: 2019-03-15 | End: 2019-03-18

## 2019-03-15 RX ORDER — ACETAMINOPHEN 500 MG
975 TABLET ORAL ONCE
Qty: 0 | Refills: 0 | Status: COMPLETED | OUTPATIENT
Start: 2019-03-15 | End: 2019-03-15

## 2019-03-15 RX ORDER — INSULIN LISPRO 100/ML
20 VIAL (ML) SUBCUTANEOUS
Qty: 0 | Refills: 0 | Status: DISCONTINUED | OUTPATIENT
Start: 2019-03-15 | End: 2019-03-18

## 2019-03-15 RX ORDER — IPRATROPIUM BROMIDE 0.2 MG/ML
500 SOLUTION, NON-ORAL INHALATION ONCE
Qty: 0 | Refills: 0 | Status: COMPLETED | OUTPATIENT
Start: 2019-03-15 | End: 2019-03-15

## 2019-03-15 RX ORDER — CEFTRIAXONE 500 MG/1
1 INJECTION, POWDER, FOR SOLUTION INTRAMUSCULAR; INTRAVENOUS EVERY 24 HOURS
Qty: 0 | Refills: 0 | Status: DISCONTINUED | OUTPATIENT
Start: 2019-03-15 | End: 2019-03-18

## 2019-03-15 RX ORDER — SODIUM CHLORIDE 9 MG/ML
1000 INJECTION, SOLUTION INTRAVENOUS ONCE
Qty: 0 | Refills: 0 | Status: COMPLETED | OUTPATIENT
Start: 2019-03-15 | End: 2019-03-15

## 2019-03-15 RX ORDER — DEXTROSE 50 % IN WATER 50 %
25 SYRINGE (ML) INTRAVENOUS ONCE
Qty: 0 | Refills: 0 | Status: DISCONTINUED | OUTPATIENT
Start: 2019-03-15 | End: 2019-03-18

## 2019-03-15 RX ORDER — MAGNESIUM SULFATE 500 MG/ML
2 VIAL (ML) INJECTION ONCE
Qty: 0 | Refills: 0 | Status: COMPLETED | OUTPATIENT
Start: 2019-03-15 | End: 2019-03-15

## 2019-03-15 RX ORDER — DEXTROSE 50 % IN WATER 50 %
12.5 SYRINGE (ML) INTRAVENOUS ONCE
Qty: 0 | Refills: 0 | Status: DISCONTINUED | OUTPATIENT
Start: 2019-03-15 | End: 2019-03-18

## 2019-03-15 RX ORDER — IPRATROPIUM/ALBUTEROL SULFATE 18-103MCG
3 AEROSOL WITH ADAPTER (GRAM) INHALATION EVERY 6 HOURS
Qty: 0 | Refills: 0 | Status: DISCONTINUED | OUTPATIENT
Start: 2019-03-15 | End: 2019-03-18

## 2019-03-15 RX ORDER — INSULIN LISPRO 100/ML
VIAL (ML) SUBCUTANEOUS
Qty: 0 | Refills: 0 | Status: DISCONTINUED | OUTPATIENT
Start: 2019-03-15 | End: 2019-03-18

## 2019-03-15 RX ORDER — ALBUTEROL 90 UG/1
2.5 AEROSOL, METERED ORAL ONCE
Qty: 0 | Refills: 0 | Status: COMPLETED | OUTPATIENT
Start: 2019-03-15 | End: 2019-03-15

## 2019-03-15 RX ORDER — INSULIN LISPRO 100/ML
15 VIAL (ML) SUBCUTANEOUS ONCE
Qty: 0 | Refills: 0 | Status: COMPLETED | OUTPATIENT
Start: 2019-03-15 | End: 2019-03-15

## 2019-03-15 RX ORDER — CHLORHEXIDINE GLUCONATE 213 G/1000ML
1 SOLUTION TOPICAL EVERY 12 HOURS
Qty: 0 | Refills: 0 | Status: DISCONTINUED | OUTPATIENT
Start: 2019-03-15 | End: 2019-03-18

## 2019-03-15 RX ORDER — DEXTROSE 50 % IN WATER 50 %
15 SYRINGE (ML) INTRAVENOUS ONCE
Qty: 0 | Refills: 0 | Status: DISCONTINUED | OUTPATIENT
Start: 2019-03-15 | End: 2019-03-18

## 2019-03-15 RX ORDER — FLUTICASONE PROPIONATE AND SALMETEROL 50; 250 UG/1; UG/1
1 POWDER ORAL; RESPIRATORY (INHALATION)
Qty: 0 | Refills: 0 | COMMUNITY

## 2019-03-15 RX ORDER — BUDESONIDE AND FORMOTEROL FUMARATE DIHYDRATE 160; 4.5 UG/1; UG/1
2 AEROSOL RESPIRATORY (INHALATION)
Qty: 0 | Refills: 0 | Status: DISCONTINUED | OUTPATIENT
Start: 2019-03-15 | End: 2019-03-18

## 2019-03-15 RX ADMIN — Medication 500 MICROGRAM(S): at 19:23

## 2019-03-15 RX ADMIN — Medication 500 MICROGRAM(S): at 18:40

## 2019-03-15 RX ADMIN — CEFTRIAXONE 100 GRAM(S): 500 INJECTION, POWDER, FOR SOLUTION INTRAMUSCULAR; INTRAVENOUS at 18:45

## 2019-03-15 RX ADMIN — Medication 125 MILLIGRAM(S): at 18:40

## 2019-03-15 RX ADMIN — ALBUTEROL 2.5 MILLIGRAM(S): 90 AEROSOL, METERED ORAL at 19:23

## 2019-03-15 RX ADMIN — SODIUM CHLORIDE 50 MILLILITER(S): 9 INJECTION, SOLUTION INTRAVENOUS at 23:15

## 2019-03-15 RX ADMIN — AZITHROMYCIN 255 MILLIGRAM(S): 500 TABLET, FILM COATED ORAL at 19:15

## 2019-03-15 RX ADMIN — Medication 50 GRAM(S): at 18:40

## 2019-03-15 RX ADMIN — SODIUM CHLORIDE 500 MILLILITER(S): 9 INJECTION, SOLUTION INTRAVENOUS at 18:22

## 2019-03-15 RX ADMIN — Medication 975 MILLIGRAM(S): at 18:22

## 2019-03-15 RX ADMIN — Medication 975 MILLIGRAM(S): at 18:58

## 2019-03-15 NOTE — H&P ADULT - NSHPPHYSICALEXAM_GEN_ALL_CORE
T(C): 37.9 (03-15-19 @ 19:05), Max: 38.5 (03-15-19 @ 17:13)  HR: 108 (03-15-19 @ 19:05) (108 - 130)  BP: 128/81 (03-15-19 @ 19:05) (128/81 - 141/72)  RR: 18 (03-15-19 @ 19:05) (18 - 20)  SpO2: 97% (03-15-19 @ 19:05) (94% - 97%)    PHYSICAL EXAM:  GENERAL: Diaphoretic, weak   HEAD:  Atraumatic, Normocephalic  NECK: Supple, No JVD  CHEST/LUNG: Clear to auscultation bilaterally; bilateral wheeze   HEART: Regular rate and rhythm; No murmurs, rubs, or gallops  ABDOMEN: Soft, Nontender, Nondistended; Bowel sounds present  EXTREMITIES:  2+ Peripheral Pulses, No clubbing, cyanosis, or edema  PSYCH: AAOx3  NEUROLOGY: non-focal

## 2019-03-15 NOTE — ED ADULT NURSE NOTE - OBJECTIVE STATEMENT
Pt presents to ER as reliable historian with wife at bedside complaining of increased shortness of breath and subjective fever starting 2 weeks ago worse today. Pt states he feels warm, weak, has productive intermittent cough with white and green sputum. pt denies any chest pain or generalized pain. pt able to speak in full sentences, pt states he has trouble sleeping, no edema noted. pt denies using oxygen at home.

## 2019-03-15 NOTE — ED PROVIDER NOTE - PROGRESS NOTE DETAILS
patient is seen and evaluated by me, sepsis protocol activated, will hydrate gently due to the possibility of CHF, and also is not hypotensive. Discussed with Dr. Pope and Dr. Martin, recommendations given by Dr. Oates were communicated with MAR in detail and patient is admitted in stable condition.

## 2019-03-15 NOTE — ED ADULT NURSE NOTE - ED STAT RN HANDOFF DETAILS
endorsed to 3b rn, Pt alert and orientedx3, afebrile at bedside. pt tolerated iv abx, in no respiratory distress, o2 sat 95-96% on room air, Safety maintained and hourly rounding performed. Will continue with plan of care. endorsed to 3b rn kessiya, Pt alert and orientedx3, afebrile at bedside. pt tolerated iv abx, in no respiratory distress, o2 sat 95-96% on room air, Safety maintained and hourly rounding performed. Will continue with plan of care.

## 2019-03-15 NOTE — ED PROVIDER NOTE - OBJECTIVE STATEMENT
patient states that two weeks ago started having cough/congestion with clear sputum, followed by yellow sputum, went to PMD, was given abx and prednisone, he completed the medications, but his symptoms continued, started having sob and fever, which continued and got worse, so decided to come to ED. patient denies travel, denies abdominal pain, no n/v/chest pain, no rash. Symptoms are worse by coughing and walking, and better at rest.

## 2019-03-15 NOTE — ED PROVIDER NOTE - CLINICAL SUMMARY MEDICAL DECISION MAKING FREE TEXT BOX
patient remained stable in ED, improved well, labs/EKG/CXR findings noted, and discussed with patient and his family in ED. Abx given, discussed with MAR, patient is admitted to medicine in stable condition.

## 2019-03-15 NOTE — ED PROVIDER NOTE - CARE PLAN
Principal Discharge DX:	Fever  Secondary Diagnosis:	COPD exacerbation  Secondary Diagnosis:	Dyspnea  Secondary Diagnosis:	Pneumonia

## 2019-03-15 NOTE — H&P ADULT - HISTORY OF PRESENT ILLNESS
74 y/o M with PMHX COPD, asthma, DMII, prostate cancer s/p TURP 1999 comes to ED for the fever and SOB for one week. The fever is associated with the wheezing and productive cough. Patient brings out greenish white sputum. He went to see his pulmonologist that gave her prednisone and amoxicillin but the symptoms did not improve so he decided to come to the hospital.    Pt denies chest pain, leg swelling, nausea, vomiting, abdominal pain, diarrhea, abdominal pain or calf tenderness. In the ED patients  /63  RR 18 Pulse ox 94 on room air. Pt received 1 ltr of LR, Ceftriaxone, azithromycin, albuterol and ipratropium bromide nebulizers and methyl prednisone. 72 y/o M with PMHX COPD, asthma, DMII, prostate cancer s/p TURP 1999 comes to ED for the fever and SOB for one week. The fever is associated with the wheezing and productive cough. Patient brings out greenish white sputum. He went to see his pulmonologist who gave him prednisone and amoxicillin but the symptoms did not improve so he decided to come to the hospital.    Pt denies chest pain, leg swelling, nausea, vomiting, abdominal pain, diarrhea, abdominal pain or calf tenderness. In the ED patients  /63  RR 18 Pulse ox 94 on room air. Pt received 1 ltr of LR, Ceftriaxone, azithromycin, albuterol and ipratropium bromide nebulizers and methyl prednisone.

## 2019-03-15 NOTE — H&P ADULT - ASSESSMENT
72 yo M with PMH of DM, Asthma, COPD comes to ED for the cough, SOB and subjective fever.    #SIRS likely URI   - Fever and Tachycardia and SOB  - CXR normal   - FLu negative   - Give Ceftriaxone    # Tamara 74 yo M with PMH of DM, Asthma, COPD comes to ED for the cough, SOB and subjective fever.    #SIRS likely URI   - Fever and Tachycardia and SOB  - CXR normal   - Follow up Bcx Urine CX   - FLu negative   - Give Ceftriaxone and Levaquin as per Dr. Oates     # COPD exacerbation   - IV steroids  - Symbicort and Duonebs   - CT chest non contrast to rule out underlying pathology  - Flu swab was negative   - Pulmonary consult     # DM2  - Insulin basal bolus

## 2019-03-15 NOTE — H&P ADULT - NSHPLABSRESULTS_GEN_ALL_CORE
13.5   10.34 )-----------( 247      ( 15 Mar 2019 18:10 )             42.1       03-15    137  |  102  |  15  ----------------------------<  204<H>  4.2   |  20  |  1.0    Ca    8.8      15 Mar 2019 18:10    TPro  6.3  /  Alb  3.4<L>  /  TBili  0.8  /  DBili  0.2  /  AST  19  /  ALT  27  /  AlkPhos  67  03-15        X ray looks clear

## 2019-03-15 NOTE — H&P ADULT - NSICDXPASTMEDICALHX_GEN_ALL_CORE_FT
PAST MEDICAL HISTORY:  Asthma     COPD (chronic obstructive pulmonary disease)     Diabetes     Prostate cancer

## 2019-03-16 LAB
ALBUMIN SERPL ELPH-MCNC: 2.9 G/DL — LOW (ref 3.5–5.2)
ALP SERPL-CCNC: 62 U/L — SIGNIFICANT CHANGE UP (ref 30–115)
ALT FLD-CCNC: 23 U/L — SIGNIFICANT CHANGE UP (ref 0–41)
ANION GAP SERPL CALC-SCNC: 12 MMOL/L — SIGNIFICANT CHANGE UP (ref 7–14)
AST SERPL-CCNC: 16 U/L — SIGNIFICANT CHANGE UP (ref 0–41)
BASOPHILS # BLD AUTO: 0.02 K/UL — SIGNIFICANT CHANGE UP (ref 0–0.2)
BASOPHILS NFR BLD AUTO: 0.2 % — SIGNIFICANT CHANGE UP (ref 0–1)
BILIRUB SERPL-MCNC: 0.3 MG/DL — SIGNIFICANT CHANGE UP (ref 0.2–1.2)
BUN SERPL-MCNC: 15 MG/DL — SIGNIFICANT CHANGE UP (ref 10–20)
CALCIUM SERPL-MCNC: 8.2 MG/DL — LOW (ref 8.5–10.1)
CHLORIDE SERPL-SCNC: 103 MMOL/L — SIGNIFICANT CHANGE UP (ref 98–110)
CO2 SERPL-SCNC: 22 MMOL/L — SIGNIFICANT CHANGE UP (ref 17–32)
CREAT SERPL-MCNC: 0.9 MG/DL — SIGNIFICANT CHANGE UP (ref 0.7–1.5)
EOSINOPHIL # BLD AUTO: 0.03 K/UL — SIGNIFICANT CHANGE UP (ref 0–0.7)
EOSINOPHIL NFR BLD AUTO: 0.3 % — SIGNIFICANT CHANGE UP (ref 0–8)
ESTIMATED AVERAGE GLUCOSE: 197 MG/DL — HIGH (ref 68–114)
GLUCOSE BLDC GLUCOMTR-MCNC: 149 MG/DL — HIGH (ref 70–99)
GLUCOSE BLDC GLUCOMTR-MCNC: 237 MG/DL — HIGH (ref 70–99)
GLUCOSE BLDC GLUCOMTR-MCNC: 237 MG/DL — HIGH (ref 70–99)
GLUCOSE BLDC GLUCOMTR-MCNC: 253 MG/DL — HIGH (ref 70–99)
GLUCOSE BLDC GLUCOMTR-MCNC: 282 MG/DL — HIGH (ref 70–99)
GLUCOSE BLDC GLUCOMTR-MCNC: 305 MG/DL — HIGH (ref 70–99)
GLUCOSE SERPL-MCNC: 309 MG/DL — HIGH (ref 70–99)
HBA1C BLD-MCNC: 8.5 % — HIGH (ref 4–5.6)
HCT VFR BLD CALC: 38.6 % — LOW (ref 42–52)
HGB BLD-MCNC: 12.3 G/DL — LOW (ref 14–18)
IMM GRANULOCYTES NFR BLD AUTO: 1.7 % — HIGH (ref 0.1–0.3)
LYMPHOCYTES # BLD AUTO: 0.79 K/UL — LOW (ref 1.2–3.4)
LYMPHOCYTES # BLD AUTO: 8.6 % — LOW (ref 20.5–51.1)
MCHC RBC-ENTMCNC: 27.8 PG — SIGNIFICANT CHANGE UP (ref 27–31)
MCHC RBC-ENTMCNC: 31.9 G/DL — LOW (ref 32–37)
MCV RBC AUTO: 87.1 FL — SIGNIFICANT CHANGE UP (ref 80–94)
MONOCYTES # BLD AUTO: 0.15 K/UL — SIGNIFICANT CHANGE UP (ref 0.1–0.6)
MONOCYTES NFR BLD AUTO: 1.6 % — LOW (ref 1.7–9.3)
NEUTROPHILS # BLD AUTO: 8.07 K/UL — HIGH (ref 1.4–6.5)
NEUTROPHILS NFR BLD AUTO: 87.6 % — HIGH (ref 42.2–75.2)
NRBC # BLD: 0 /100 WBCS — SIGNIFICANT CHANGE UP (ref 0–0)
PLATELET # BLD AUTO: 214 K/UL — SIGNIFICANT CHANGE UP (ref 130–400)
POTASSIUM SERPL-MCNC: 4.6 MMOL/L — SIGNIFICANT CHANGE UP (ref 3.5–5)
POTASSIUM SERPL-SCNC: 4.6 MMOL/L — SIGNIFICANT CHANGE UP (ref 3.5–5)
PROT SERPL-MCNC: 5.5 G/DL — LOW (ref 6–8)
RBC # BLD: 4.43 M/UL — LOW (ref 4.7–6.1)
RBC # FLD: 12.8 % — SIGNIFICANT CHANGE UP (ref 11.5–14.5)
SODIUM SERPL-SCNC: 137 MMOL/L — SIGNIFICANT CHANGE UP (ref 135–146)
WBC # BLD: 9.22 K/UL — SIGNIFICANT CHANGE UP (ref 4.8–10.8)
WBC # FLD AUTO: 9.22 K/UL — SIGNIFICANT CHANGE UP (ref 4.8–10.8)

## 2019-03-16 RX ORDER — PANTOPRAZOLE SODIUM 20 MG/1
40 TABLET, DELAYED RELEASE ORAL
Qty: 0 | Refills: 0 | Status: DISCONTINUED | OUTPATIENT
Start: 2019-03-16 | End: 2019-03-18

## 2019-03-16 RX ORDER — INSULIN LISPRO 100/ML
4 VIAL (ML) SUBCUTANEOUS ONCE
Qty: 0 | Refills: 0 | Status: COMPLETED | OUTPATIENT
Start: 2019-03-16 | End: 2019-03-16

## 2019-03-16 RX ORDER — INSULIN GLARGINE 100 [IU]/ML
15 INJECTION, SOLUTION SUBCUTANEOUS ONCE
Qty: 0 | Refills: 0 | Status: COMPLETED | OUTPATIENT
Start: 2019-03-16 | End: 2019-03-16

## 2019-03-16 RX ADMIN — Medication 3 MILLILITER(S): at 14:18

## 2019-03-16 RX ADMIN — MONTELUKAST 10 MILLIGRAM(S): 4 TABLET, CHEWABLE ORAL at 12:06

## 2019-03-16 RX ADMIN — Medication 3 MILLILITER(S): at 19:36

## 2019-03-16 RX ADMIN — CHLORHEXIDINE GLUCONATE 1 APPLICATION(S): 213 SOLUTION TOPICAL at 17:06

## 2019-03-16 RX ADMIN — Medication 20 UNIT(S): at 12:07

## 2019-03-16 RX ADMIN — INSULIN GLARGINE 15 UNIT(S): 100 INJECTION, SOLUTION SUBCUTANEOUS at 01:07

## 2019-03-16 RX ADMIN — Medication 125 MILLIGRAM(S): at 05:06

## 2019-03-16 RX ADMIN — ENOXAPARIN SODIUM 40 MILLIGRAM(S): 100 INJECTION SUBCUTANEOUS at 05:06

## 2019-03-16 RX ADMIN — Medication 4 UNIT(S): at 05:38

## 2019-03-16 RX ADMIN — INSULIN GLARGINE 30 UNIT(S): 100 INJECTION, SOLUTION SUBCUTANEOUS at 21:25

## 2019-03-16 RX ADMIN — BUDESONIDE AND FORMOTEROL FUMARATE DIHYDRATE 2 PUFF(S): 160; 4.5 AEROSOL RESPIRATORY (INHALATION) at 21:26

## 2019-03-16 RX ADMIN — Medication 3 MILLILITER(S): at 08:47

## 2019-03-16 RX ADMIN — CEFTRIAXONE 100 GRAM(S): 500 INJECTION, POWDER, FOR SOLUTION INTRAMUSCULAR; INTRAVENOUS at 05:05

## 2019-03-16 RX ADMIN — BUDESONIDE AND FORMOTEROL FUMARATE DIHYDRATE 2 PUFF(S): 160; 4.5 AEROSOL RESPIRATORY (INHALATION) at 08:48

## 2019-03-16 RX ADMIN — Medication 20 UNIT(S): at 08:28

## 2019-03-16 RX ADMIN — Medication 20 UNIT(S): at 17:06

## 2019-03-16 RX ADMIN — Medication 2: at 08:28

## 2019-03-16 RX ADMIN — Medication 2: at 12:06

## 2019-03-16 RX ADMIN — SODIUM CHLORIDE 50 MILLILITER(S): 9 INJECTION, SOLUTION INTRAVENOUS at 17:07

## 2019-03-16 NOTE — PROGRESS NOTE ADULT - SUBJECTIVE AND OBJECTIVE BOX
SUBJECTIVE:    Patient is a 73y old Male who presents with a chief complaint of fever and SOB (16 Mar 2019 12:15)    Currently admitted to medicine with the primary diagnosis of Fever     Today is hospital day 1d. This morning he is resting comfortably in bed and reports no new issues or overnight events.     PAST MEDICAL & SURGICAL HISTORY  COPD (chronic obstructive pulmonary disease)  Asthma  Prostate cancer  Diabetes  S/P TURP    SOCIAL HISTORY:  Negative for smoking/alcohol/drug use.     ALLERGIES:  No Known Allergies    MEDICATIONS:  STANDING MEDICATIONS  ALBUTerol/ipratropium for Nebulization 3 milliLiter(s) Nebulizer every 6 hours  buDESOnide 160 MICROgram(s)/formoterol 4.5 MICROgram(s) Inhaler 2 Puff(s) Inhalation two times a day  cefTRIAXone   IVPB 1 Gram(s) IV Intermittent every 24 hours  chlorhexidine 4% Liquid 1 Application(s) Topical every 12 hours  dextrose 5%. 1000 milliLiter(s) IV Continuous <Continuous>  dextrose 50% Injectable 12.5 Gram(s) IV Push once  dextrose 50% Injectable 25 Gram(s) IV Push once  dextrose 50% Injectable 25 Gram(s) IV Push once  enoxaparin Injectable 40 milliGRAM(s) SubCutaneous every 24 hours  insulin glargine Injectable (LANTUS) 30 Unit(s) SubCutaneous at bedtime  insulin lispro (HumaLOG) corrective regimen sliding scale   SubCutaneous three times a day before meals  insulin lispro Injectable (HumaLOG) 20 Unit(s) SubCutaneous three times a day before meals  lactated ringers. 1000 milliLiter(s) IV Continuous <Continuous>  levoFLOXacin IVPB 750 milliGRAM(s) IV Intermittent every 24 hours  montelukast 10 milliGRAM(s) Oral daily    PRN MEDICATIONS  dextrose 40% Gel 15 Gram(s) Oral once PRN  glucagon  Injectable 1 milliGRAM(s) IntraMuscular once PRN    VITALS:   T(F): 98  HR: 91  BP: 160/80  RR: 18  SpO2: 96%    LABS:                        12.3   9.22  )-----------( 214      ( 16 Mar 2019 00:41 )             38.6     03-16    137  |  103  |  15  ----------------------------<  309<H>  4.6   |  22  |  0.9    Ca    8.2<L>      16 Mar 2019 00:41    TPro  5.5<L>  /  Alb  2.9<L>  /  TBili  0.3  /  DBili  x   /  AST  16  /  ALT  23  /  AlkPhos  62  03-16    PT/INR - ( 15 Mar 2019 18:10 )   PT: 11.60 sec;   INR: 1.01 ratio         PTT - ( 15 Mar 2019 18:10 )  PTT:33.0 sec      Lactate, Blood: 1.5 mmol/L (03-15-19 @ 18:10)  Troponin T, Serum: 0.01 ng/mL (03-15-19 @ 18:10)      CARDIAC MARKERS ( 15 Mar 2019 18:10 )  x     / 0.01 ng/mL / x     / x     / x          RADIOLOGY:    PHYSICAL EXAM:  GEN: No acute distress  LUNGS: Clear to auscultation bilaterally   HEART: Regular  ABD: Soft, non-tender, non-distended.  EXT: NC/NC/NE/2+PP/CARTER/Skin Intact.   NEURO: AAOX3    Intravenous access:   NG tube:   Beltran Catheter:

## 2019-03-16 NOTE — CONSULT NOTE ADULT - ASSESSMENT
dyspnea  bronchiectasis/asthma exacerbation unresponsive to outpatient management  SIRS/URI/LRTI    agree with present management  bronchodilators  empiric antibiotics, monitor temperature  please resume/continue methylprednisolone 60mg q 12 hours  monitor peak flow  out of bed  gi/dvt prophylaxis  to obtain ct chest possible occult pneumonia, last done april 2018 at [facility]      thank you for the courtesy of this consultation, will follow

## 2019-03-16 NOTE — CONSULT NOTE ADULT - SUBJECTIVE AND OBJECTIVE BOX
DEL ALVARADO  MRN-[id number]      HISTORY OF PRESENT ILLNESS:  Patient is a 73y old  Male who presents with a chief complaint of fever and SOB (15 Mar 2019 20:31)that have been going on for about a week and was treated as an outpatient with amoxicillin and prednisone without improvement; no sick contacts; compliant with medications; feels better since admission        PMH/PSH:  PAST MEDICAL & SURGICAL HISTORY:  COPD (chronic obstructive pulmonary disease)  Asthma  Prostate cancer  Diabetes  S/P TURP    ALLERGIES:  Allergies    No Known Allergies    Intolerances      SOCIAL HABITS:non smoker  FAMILY HISTORY:   FAMILY HISTORY:  No pertinent family history in first degree relatives      REVIEW OF SYSTEM:  Elements of review of systems are negative or non-applicable except as noted above in HPI section.       HOME MEDICATIONS:  HumaLOG 100 units/mL injectable solution  Lantus 100 units/mL subcutaneous solution  ProAir HFA 90 mcg/inh inhalation aerosol  Singulair 10 mg oral tablet  Symbicort 160 mcg-4.5 mcg/inh inhalation aerosol    MEDICATIONS:  MEDICATIONS  (STANDING):  ALBUTerol/ipratropium for Nebulization 3 milliLiter(s) Nebulizer every 6 hours  buDESOnide 160 MICROgram(s)/formoterol 4.5 MICROgram(s) Inhaler 2 Puff(s) Inhalation two times a day  cefTRIAXone   IVPB 1 Gram(s) IV Intermittent every 24 hours  chlorhexidine 4% Liquid 1 Application(s) Topical every 12 hours  dextrose 5%. 1000 milliLiter(s) (50 mL/Hr) IV Continuous <Continuous>  dextrose 50% Injectable 12.5 Gram(s) IV Push once  dextrose 50% Injectable 25 Gram(s) IV Push once  dextrose 50% Injectable 25 Gram(s) IV Push once  enoxaparin Injectable 40 milliGRAM(s) SubCutaneous every 24 hours  insulin glargine Injectable (LANTUS) 30 Unit(s) SubCutaneous at bedtime  insulin lispro (HumaLOG) corrective regimen sliding scale   SubCutaneous three times a day before meals  insulin lispro Injectable (HumaLOG) 20 Unit(s) SubCutaneous three times a day before meals  lactated ringers. 1000 milliLiter(s) (50 mL/Hr) IV Continuous <Continuous>  levoFLOXacin IVPB 750 milliGRAM(s) IV Intermittent every 24 hours  montelukast 10 milliGRAM(s) Oral daily    MEDICATIONS  (PRN):  dextrose 40% Gel 15 Gram(s) Oral once PRN Blood Glucose LESS THAN 70 milliGRAM(s)/deciliter  glucagon  Injectable 1 milliGRAM(s) IntraMuscular once PRN Glucose LESS THAN 70 milligrams/deciliter        VITALS:   Vital Signs Last 24 Hrs  T(C): 36.7 (16 Mar 2019 05:14), Max: 38.5 (15 Mar 2019 17:13)  T(F): 98 (16 Mar 2019 05:14), Max: 101.3 (15 Mar 2019 17:13)  HR: 91 (16 Mar 2019 08:01) (86 - 130)  BP: 160/80 (16 Mar 2019 05:14) (111/59 - 160/80)  BP(mean): --  RR: 18 (16 Mar 2019 05:14) (18 - 20)  SpO2: 96% (16 Mar 2019 08:01) (94% - 97%)room air  177.8  76.2  24.1      PHYSICAL EXAM:    GENERAL: NAD, well-developed  HEAD:  Atraumatic, Normocephalic  NECK: Supple, No JVD  CHEST/LUNG: Clear to auscultation bilaterally; No wheeze  HEART: Regular rate and rhythm; No murmurs, rubs, or gallops  ABDOMEN: Soft, Nontender, Nondistended; Bowel sounds present  EXTREMITIES:  2+ Peripheral Pulses, No clubbing, cyanosis, or edema        LABS:                        12.3   9.22  )-----------( 214      ( 16 Mar 2019 00:41 )             38.6     03-16    137  |  103  |  15  ----------------------------<  309<H>  4.6   |  22  |  0.9    Ca    8.2<L>      16 Mar 2019 00:41    TPro  5.5<L>  /  Alb  2.9<L>  /  TBili  0.3  /  DBili  x   /  AST  16  /  ALT  23  /  AlkPhos  62  03-16    LIVER FUNCTIONS - ( 16 Mar 2019 00:41 )  Alb: 2.9 g/dL / Pro: 5.5 g/dL / ALK PHOS: 62 U/L / ALT: 23 U/L / AST: 16 U/L / GGT: x           CARDIAC MARKERS ( 15 Mar 2019 18:10 )  x     / 0.01 ng/mL / x     / x     / x          PT/INR - ( 15 Mar 2019 18:10 )   PT: 11.60 sec;   INR: 1.01 ratio         PTT - ( 15 Mar 2019 18:10 )  PTT:33.0 sec          ABG & VENT SETTINGS (when applicable)    n/a    DIAGNOSTIC STUDIES:  < from: Xray Chest 1 View-PORTABLE IMMEDIATE (03.15.19 @ 18:24) >    Impression:      1. Subsegmental atelectasis at the left base.  2. Otherwise, no radiographic evidence of acute cardiopulmonary disease.        < end of copied text >

## 2019-03-17 LAB
ALBUMIN SERPL ELPH-MCNC: 3 G/DL — LOW (ref 3.5–5.2)
ALP SERPL-CCNC: 52 U/L — SIGNIFICANT CHANGE UP (ref 30–115)
ALT FLD-CCNC: 25 U/L — SIGNIFICANT CHANGE UP (ref 0–41)
ANION GAP SERPL CALC-SCNC: 12 MMOL/L — SIGNIFICANT CHANGE UP (ref 7–14)
AST SERPL-CCNC: 16 U/L — SIGNIFICANT CHANGE UP (ref 0–41)
BASOPHILS # BLD AUTO: 0.02 K/UL — SIGNIFICANT CHANGE UP (ref 0–0.2)
BASOPHILS NFR BLD AUTO: 0.1 % — SIGNIFICANT CHANGE UP (ref 0–1)
BILIRUB SERPL-MCNC: 0.2 MG/DL — SIGNIFICANT CHANGE UP (ref 0.2–1.2)
BUN SERPL-MCNC: 22 MG/DL — HIGH (ref 10–20)
CALCIUM SERPL-MCNC: 8.5 MG/DL — SIGNIFICANT CHANGE UP (ref 8.5–10.1)
CHLORIDE SERPL-SCNC: 102 MMOL/L — SIGNIFICANT CHANGE UP (ref 98–110)
CO2 SERPL-SCNC: 24 MMOL/L — SIGNIFICANT CHANGE UP (ref 17–32)
CREAT SERPL-MCNC: 0.9 MG/DL — SIGNIFICANT CHANGE UP (ref 0.7–1.5)
EOSINOPHIL # BLD AUTO: 0 K/UL — SIGNIFICANT CHANGE UP (ref 0–0.7)
EOSINOPHIL NFR BLD AUTO: 0 % — SIGNIFICANT CHANGE UP (ref 0–8)
GLUCOSE BLDC GLUCOMTR-MCNC: 102 MG/DL — HIGH (ref 70–99)
GLUCOSE BLDC GLUCOMTR-MCNC: 139 MG/DL — HIGH (ref 70–99)
GLUCOSE BLDC GLUCOMTR-MCNC: 278 MG/DL — HIGH (ref 70–99)
GLUCOSE BLDC GLUCOMTR-MCNC: 311 MG/DL — HIGH (ref 70–99)
GLUCOSE BLDC GLUCOMTR-MCNC: 56 MG/DL — LOW (ref 70–99)
GLUCOSE SERPL-MCNC: 271 MG/DL — HIGH (ref 70–99)
HCT VFR BLD CALC: 34.6 % — LOW (ref 42–52)
HGB BLD-MCNC: 11.2 G/DL — LOW (ref 14–18)
IMM GRANULOCYTES NFR BLD AUTO: 1 % — HIGH (ref 0.1–0.3)
LYMPHOCYTES # BLD AUTO: 1.54 K/UL — SIGNIFICANT CHANGE UP (ref 1.2–3.4)
LYMPHOCYTES # BLD AUTO: 10.1 % — LOW (ref 20.5–51.1)
MAGNESIUM SERPL-MCNC: 1.9 MG/DL — SIGNIFICANT CHANGE UP (ref 1.8–2.4)
MCHC RBC-ENTMCNC: 27.6 PG — SIGNIFICANT CHANGE UP (ref 27–31)
MCHC RBC-ENTMCNC: 32.4 G/DL — SIGNIFICANT CHANGE UP (ref 32–37)
MCV RBC AUTO: 85.2 FL — SIGNIFICANT CHANGE UP (ref 80–94)
MONOCYTES # BLD AUTO: 1.01 K/UL — HIGH (ref 0.1–0.6)
MONOCYTES NFR BLD AUTO: 6.6 % — SIGNIFICANT CHANGE UP (ref 1.7–9.3)
NEUTROPHILS # BLD AUTO: 12.53 K/UL — HIGH (ref 1.4–6.5)
NEUTROPHILS NFR BLD AUTO: 82.2 % — HIGH (ref 42.2–75.2)
NRBC # BLD: 0 /100 WBCS — SIGNIFICANT CHANGE UP (ref 0–0)
PLATELET # BLD AUTO: 280 K/UL — SIGNIFICANT CHANGE UP (ref 130–400)
POTASSIUM SERPL-MCNC: 4.3 MMOL/L — SIGNIFICANT CHANGE UP (ref 3.5–5)
POTASSIUM SERPL-SCNC: 4.3 MMOL/L — SIGNIFICANT CHANGE UP (ref 3.5–5)
PROT SERPL-MCNC: 5.4 G/DL — LOW (ref 6–8)
RBC # BLD: 4.06 M/UL — LOW (ref 4.7–6.1)
RBC # FLD: 12.6 % — SIGNIFICANT CHANGE UP (ref 11.5–14.5)
SODIUM SERPL-SCNC: 138 MMOL/L — SIGNIFICANT CHANGE UP (ref 135–146)
WBC # BLD: 15.25 K/UL — HIGH (ref 4.8–10.8)
WBC # FLD AUTO: 15.25 K/UL — HIGH (ref 4.8–10.8)

## 2019-03-17 RX ADMIN — BUDESONIDE AND FORMOTEROL FUMARATE DIHYDRATE 2 PUFF(S): 160; 4.5 AEROSOL RESPIRATORY (INHALATION) at 21:30

## 2019-03-17 RX ADMIN — SODIUM CHLORIDE 50 MILLILITER(S): 9 INJECTION, SOLUTION INTRAVENOUS at 17:17

## 2019-03-17 RX ADMIN — CHLORHEXIDINE GLUCONATE 1 APPLICATION(S): 213 SOLUTION TOPICAL at 17:08

## 2019-03-17 RX ADMIN — Medication 20 UNIT(S): at 17:07

## 2019-03-17 RX ADMIN — Medication 3: at 08:23

## 2019-03-17 RX ADMIN — Medication 0: at 17:08

## 2019-03-17 RX ADMIN — BUDESONIDE AND FORMOTEROL FUMARATE DIHYDRATE 2 PUFF(S): 160; 4.5 AEROSOL RESPIRATORY (INHALATION) at 09:26

## 2019-03-17 RX ADMIN — MONTELUKAST 10 MILLIGRAM(S): 4 TABLET, CHEWABLE ORAL at 11:46

## 2019-03-17 RX ADMIN — Medication 4: at 12:51

## 2019-03-17 RX ADMIN — ENOXAPARIN SODIUM 40 MILLIGRAM(S): 100 INJECTION SUBCUTANEOUS at 05:09

## 2019-03-17 RX ADMIN — PANTOPRAZOLE SODIUM 40 MILLIGRAM(S): 20 TABLET, DELAYED RELEASE ORAL at 05:09

## 2019-03-17 RX ADMIN — Medication 20 UNIT(S): at 12:50

## 2019-03-17 RX ADMIN — CEFTRIAXONE 100 GRAM(S): 500 INJECTION, POWDER, FOR SOLUTION INTRAMUSCULAR; INTRAVENOUS at 05:08

## 2019-03-17 RX ADMIN — Medication 60 MILLIGRAM(S): at 05:09

## 2019-03-17 RX ADMIN — Medication 20 UNIT(S): at 08:23

## 2019-03-17 RX ADMIN — SODIUM CHLORIDE 50 MILLILITER(S): 9 INJECTION, SOLUTION INTRAVENOUS at 11:46

## 2019-03-17 RX ADMIN — Medication 3 MILLILITER(S): at 20:52

## 2019-03-17 RX ADMIN — Medication 60 MILLIGRAM(S): at 17:11

## 2019-03-17 NOTE — PROGRESS NOTE ADULT - SUBJECTIVE AND OBJECTIVE BOX
Patient was seen and examined. Spoke with RN. Chart reviewed.  No events overnight.  Vital Signs Last 24 Hrs  T(F): 96.7 (17 Mar 2019 05:00), Max: 97.2 (16 Mar 2019 19:49)  HR: 98 (17 Mar 2019 05:00) (91 - 99)  BP: 131/69 (17 Mar 2019 05:00) (116/59 - 151/70)  SpO2: --  MEDICATIONS  (STANDING):  ALBUTerol/ipratropium for Nebulization 3 milliLiter(s) Nebulizer every 6 hours  buDESOnide 160 MICROgram(s)/formoterol 4.5 MICROgram(s) Inhaler 2 Puff(s) Inhalation two times a day  cefTRIAXone   IVPB 1 Gram(s) IV Intermittent every 24 hours  chlorhexidine 4% Liquid 1 Application(s) Topical every 12 hours  dextrose 5%. 1000 milliLiter(s) (50 mL/Hr) IV Continuous <Continuous>  dextrose 50% Injectable 12.5 Gram(s) IV Push once  dextrose 50% Injectable 25 Gram(s) IV Push once  dextrose 50% Injectable 25 Gram(s) IV Push once  enoxaparin Injectable 40 milliGRAM(s) SubCutaneous every 24 hours  insulin glargine Injectable (LANTUS) 30 Unit(s) SubCutaneous at bedtime  insulin lispro (HumaLOG) corrective regimen sliding scale   SubCutaneous three times a day before meals  insulin lispro Injectable (HumaLOG) 20 Unit(s) SubCutaneous three times a day before meals  lactated ringers. 1000 milliLiter(s) (50 mL/Hr) IV Continuous <Continuous>  levoFLOXacin IVPB 750 milliGRAM(s) IV Intermittent every 24 hours  methylPREDNISolone sodium succinate Injectable 60 milliGRAM(s) IV Push every 12 hours  montelukast 10 milliGRAM(s) Oral daily  pantoprazole    Tablet 40 milliGRAM(s) Oral before breakfast    MEDICATIONS  (PRN):  dextrose 40% Gel 15 Gram(s) Oral once PRN Blood Glucose LESS THAN 70 milliGRAM(s)/deciliter  glucagon  Injectable 1 milliGRAM(s) IntraMuscular once PRN Glucose LESS THAN 70 milligrams/deciliter    Labs:                        11.2   15.25 )-----------( 280      ( 17 Mar 2019 05:35 )             34.6                         12.3   9.22  )-----------( 214      ( 16 Mar 2019 00:41 )             38.6     17 Mar 2019 05:35    138    |  102    |  22     ----------------------------<  271    4.3     |  24     |  0.9    16 Mar 2019 00:41    137    |  103    |  15     ----------------------------<  309    4.6     |  22     |  0.9      Ca    8.5        17 Mar 2019 05:35  Ca    8.2        16 Mar 2019 00:41  Mg     1.9       17 Mar 2019 05:35    TPro  5.4    /  Alb  3.0    /  TBili  0.2    /  DBili  x      /  AST  16     /  ALT  25     /  AlkPhos  52     17 Mar 2019 05:35  TPro  5.5    /  Alb  2.9    /  TBili  0.3    /  DBili  x      /  AST  16     /  ALT  23     /  AlkPhos  62     16 Mar 2019 00:41    PT/INR - ( 15 Mar 2019 18:10 )   PT: 11.60 sec;   INR: 1.01 ratio         PTT - ( 15 Mar 2019 18:10 )  PTT:33.0 sec      Culture - Blood (collected 15 Mar 2019 18:00)  Source: .Blood Blood-Venous  Preliminary Report (17 Mar 2019 01:01):    No growth to date.    Culture - Blood (collected 15 Mar 2019 18:00)  Source: .Blood Blood-Venous  Preliminary Report (17 Mar 2019 01:01):    No growth to date.      General: comfortable, NAD  Neurology: A&Ox3, nonfocal  Head:  Normocephalic, atraumatic  ENT:  Mucosa moist, no ulcerations  Neck:  Supple, no JVD,   Skin: no breakdowns (as per RN)  Resp: CTA B/L  CV: RRR, S1S2,   GI: Soft, NT, bowel sounds  MS: No edema, + peripheral pulses, FROM all 4 extremity      A/P:    74 yo M with PMH of DM, Asthma, COPD comes to ED for the cough, SOB and subjective fever.    # SIRS  - Likely viral PNA vs bronchitis  - CXR negative, CT chest shows mild infiltrates at LLL  - Flu A and B negative; RVP negative  - Pending BCx and UCx  - Continue Rocephin 1g Q24hrs and Levaquin 750mg Q24hrs for a total of 5 days    # COPD exacerbation   - Continue solumedrol 60mg IV 12hrs  - Symbicort and Duonebs   - Pulmonary recs appreciated  -CT chest as per pulm    OOB to chair    DC when cleared by pulmonary  DVT prophylaxis  Decubitus prevention- all measures as per RN protocol  Please call or text me with any questions or updates

## 2019-03-17 NOTE — PROGRESS NOTE ADULT - SUBJECTIVE AND OBJECTIVE BOX
DEL ALVARADO  73y, Male  Allergy: No Known Allergies      LAST 24-Hr EVENTS:  no complaints  VITALS:  T(F): 96.7 (03-17-19 @ 05:00), Max: 97.2 (03-16-19 @ 19:49)  HR: 103 (03-17-19 @ 09:34)  BP: 131/69 (03-17-19 @ 05:00) (116/59 - 151/70)  RR: 18 (03-17-19 @ 05:00)  SpO2: 97% (03-17-19 @ 09:34)room air    PHYSICAL EXAM:    GENERAL: NAD, well-developed  HEAD:  Atraumatic, Normocephalic  NECK: Supple, No JVD  CHEST/LUNG: Clear to auscultation bilaterally; No wheeze  HEART: Regular rate and rhythm; No murmurs, rubs, or gallops  ABDOMEN: Soft, Nontender, Nondistended; Bowel sounds present  EXTREMITIES:  2+ Peripheral Pulses, No clubbing, cyanosis, or edema        TESTS & MEASUREMENTS:                          11.2   15.25 )-----------( 280      ( 17 Mar 2019 05:35 )             34.6     PT/INR - ( 15 Mar 2019 18:10 )   PT: 11.60 sec;   INR: 1.01 ratio         PTT - ( 15 Mar 2019 18:10 )  PTT:33.0 sec  03-17    138  |  102  |  22<H>  ----------------------------<  271<H>  4.3   |  24  |  0.9    Ca    8.5      17 Mar 2019 05:35  Mg     1.9     03-17    TPro  5.4<L>  /  Alb  3.0<L>  /  TBili  0.2  /  DBili  x   /  AST  16  /  ALT  25  /  AlkPhos  52  03-17    LIVER FUNCTIONS - ( 17 Mar 2019 05:35 )  Alb: 3.0 g/dL / Pro: 5.4 g/dL / ALK PHOS: 52 U/L / ALT: 25 U/L / AST: 16 U/L / GGT: x           CARDIAC MARKERS ( 15 Mar 2019 18:10 )  x     / 0.01 ng/mL / x     / x     / x            Culture - Blood (collected 03-15-19 @ 18:00)  Source: .Blood Blood-Venous  Preliminary Report (03-17-19 @ 01:01):    No growth to date.    Culture - Blood (collected 03-15-19 @ 18:00)  Source: .Blood Blood-Venous  Preliminary Report (03-17-19 @ 01:01):    No growth to date.          ABG & VENT SETTINGS: (when applicable)  n/a      RADIOLOGY & ADDITIONAL TESTS:  < from: CT Chest No Cont (03.16.19 @ 13:26) >  IMPRESSION:    Stable bilateral lower lobe bronchiectasis with peribronchial thickening.    Small amount of lower lung field atelectasis.    Essentially stable subcentimeter pulmonary nodules measuring up to 5 mm.   Follow-up noncontrast chest CT is recommended in 12 months.                        MARIAM OSWALD M.D., ATTENDING RADIOLOGIST  This document has been electronically signed. Mar 16 2019 11:10PM          < end of copied text >    MEDICATIONS:  MEDICATIONS  (STANDING):  ALBUTerol/ipratropium for Nebulization 3 milliLiter(s) Nebulizer every 6 hours  buDESOnide 160 MICROgram(s)/formoterol 4.5 MICROgram(s) Inhaler 2 Puff(s) Inhalation two times a day  cefTRIAXone   IVPB 1 Gram(s) IV Intermittent every 24 hours  chlorhexidine 4% Liquid 1 Application(s) Topical every 12 hours  dextrose 5%. 1000 milliLiter(s) (50 mL/Hr) IV Continuous <Continuous>  dextrose 50% Injectable 12.5 Gram(s) IV Push once  dextrose 50% Injectable 25 Gram(s) IV Push once  dextrose 50% Injectable 25 Gram(s) IV Push once  enoxaparin Injectable 40 milliGRAM(s) SubCutaneous every 24 hours  insulin glargine Injectable (LANTUS) 30 Unit(s) SubCutaneous at bedtime  insulin lispro (HumaLOG) corrective regimen sliding scale   SubCutaneous three times a day before meals  insulin lispro Injectable (HumaLOG) 20 Unit(s) SubCutaneous three times a day before meals  lactated ringers. 1000 milliLiter(s) (50 mL/Hr) IV Continuous <Continuous>  levoFLOXacin IVPB 750 milliGRAM(s) IV Intermittent every 24 hours  methylPREDNISolone sodium succinate Injectable 60 milliGRAM(s) IV Push every 12 hours  montelukast 10 milliGRAM(s) Oral daily  pantoprazole    Tablet 40 milliGRAM(s) Oral before breakfast    MEDICATIONS  (PRN):  dextrose 40% Gel 15 Gram(s) Oral once PRN Blood Glucose LESS THAN 70 milliGRAM(s)/deciliter  glucagon  Injectable 1 milliGRAM(s) IntraMuscular once PRN Glucose LESS THAN 70 milligrams/deciliter

## 2019-03-18 ENCOUNTER — TRANSCRIPTION ENCOUNTER (OUTPATIENT)
Age: 74
End: 2019-03-18

## 2019-03-18 VITALS
HEART RATE: 104 BPM | DIASTOLIC BLOOD PRESSURE: 62 MMHG | TEMPERATURE: 97 F | RESPIRATION RATE: 18 BRPM | SYSTOLIC BLOOD PRESSURE: 121 MMHG

## 2019-03-18 LAB
GLUCOSE BLDC GLUCOMTR-MCNC: 289 MG/DL — HIGH (ref 70–99)
GLUCOSE BLDC GLUCOMTR-MCNC: 300 MG/DL — HIGH (ref 70–99)
GLUCOSE BLDC GLUCOMTR-MCNC: 396 MG/DL — HIGH (ref 70–99)

## 2019-03-18 RX ORDER — INSULIN LISPRO 100/ML
5 VIAL (ML) SUBCUTANEOUS ONCE
Qty: 0 | Refills: 0 | Status: COMPLETED | OUTPATIENT
Start: 2019-03-18 | End: 2019-03-18

## 2019-03-18 RX ORDER — INSULIN LISPRO 100/ML
20 VIAL (ML) SUBCUTANEOUS
Qty: 1 | Refills: 0
Start: 2019-03-18 | End: 2019-04-16

## 2019-03-18 RX ORDER — CEFUROXIME AXETIL 250 MG
1 TABLET ORAL
Qty: 14 | Refills: 0 | OUTPATIENT
Start: 2019-03-18 | End: 2019-03-24

## 2019-03-18 RX ORDER — INSULIN LISPRO 100/ML
20 VIAL (ML) SUBCUTANEOUS
Qty: 0 | Refills: 0 | COMMUNITY

## 2019-03-18 RX ADMIN — Medication 5: at 12:22

## 2019-03-18 RX ADMIN — Medication 3 MILLILITER(S): at 08:28

## 2019-03-18 RX ADMIN — SODIUM CHLORIDE 50 MILLILITER(S): 9 INJECTION, SOLUTION INTRAVENOUS at 08:13

## 2019-03-18 RX ADMIN — Medication 20 UNIT(S): at 12:22

## 2019-03-18 RX ADMIN — CEFTRIAXONE 100 GRAM(S): 500 INJECTION, POWDER, FOR SOLUTION INTRAMUSCULAR; INTRAVENOUS at 05:08

## 2019-03-18 RX ADMIN — BUDESONIDE AND FORMOTEROL FUMARATE DIHYDRATE 2 PUFF(S): 160; 4.5 AEROSOL RESPIRATORY (INHALATION) at 07:43

## 2019-03-18 RX ADMIN — ENOXAPARIN SODIUM 40 MILLIGRAM(S): 100 INJECTION SUBCUTANEOUS at 05:07

## 2019-03-18 RX ADMIN — Medication 20 UNIT(S): at 08:13

## 2019-03-18 RX ADMIN — Medication 3 MILLILITER(S): at 13:17

## 2019-03-18 RX ADMIN — MONTELUKAST 10 MILLIGRAM(S): 4 TABLET, CHEWABLE ORAL at 12:22

## 2019-03-18 RX ADMIN — PANTOPRAZOLE SODIUM 40 MILLIGRAM(S): 20 TABLET, DELAYED RELEASE ORAL at 05:06

## 2019-03-18 RX ADMIN — Medication 60 MILLIGRAM(S): at 05:07

## 2019-03-18 RX ADMIN — Medication 3: at 08:12

## 2019-03-18 NOTE — PROGRESS NOTE ADULT - SUBJECTIVE AND OBJECTIVE BOX
Patient was seen and examined. Spoke with RN. Chart reviewed.  dc home, appt with dr vasquez wednesday,    No events overnight.  Vital Signs Last 24 Hrs  T(F): 96.5 (18 Mar 2019 05:00), Max: 98.7 (17 Mar 2019 15:12)  HR: 99 (18 Mar 2019 05:00) (71 - 99)  BP: 158/73 (18 Mar 2019 05:00) (137/67 - 158/73)  SpO2: 97% (18 Mar 2019 07:45) (97% - 97%)  MEDICATIONS  (STANDING):  ALBUTerol/ipratropium for Nebulization 3 milliLiter(s) Nebulizer every 6 hours  buDESOnide 160 MICROgram(s)/formoterol 4.5 MICROgram(s) Inhaler 2 Puff(s) Inhalation two times a day  cefTRIAXone   IVPB 1 Gram(s) IV Intermittent every 24 hours  chlorhexidine 4% Liquid 1 Application(s) Topical every 12 hours  dextrose 5%. 1000 milliLiter(s) (50 mL/Hr) IV Continuous <Continuous>  dextrose 50% Injectable 12.5 Gram(s) IV Push once  dextrose 50% Injectable 25 Gram(s) IV Push once  dextrose 50% Injectable 25 Gram(s) IV Push once  enoxaparin Injectable 40 milliGRAM(s) SubCutaneous every 24 hours  insulin glargine Injectable (LANTUS) 30 Unit(s) SubCutaneous at bedtime  insulin lispro (HumaLOG) corrective regimen sliding scale   SubCutaneous three times a day before meals  insulin lispro Injectable (HumaLOG) 5 Unit(s) SubCutaneous once  insulin lispro Injectable (HumaLOG) 20 Unit(s) SubCutaneous three times a day before meals  levoFLOXacin IVPB 750 milliGRAM(s) IV Intermittent every 24 hours  montelukast 10 milliGRAM(s) Oral daily  pantoprazole    Tablet 40 milliGRAM(s) Oral before breakfast    MEDICATIONS  (PRN):  dextrose 40% Gel 15 Gram(s) Oral once PRN Blood Glucose LESS THAN 70 milliGRAM(s)/deciliter  glucagon  Injectable 1 milliGRAM(s) IntraMuscular once PRN Glucose LESS THAN 70 milligrams/deciliter    Labs:                        11.2   15.25 )-----------( 280      ( 17 Mar 2019 05:35 )             34.6     17 Mar 2019 05:35    138    |  102    |  22     ----------------------------<  271    4.3     |  24     |  0.9      Ca    8.5        17 Mar 2019 05:35  Mg     1.9       17 Mar 2019 05:35    TPro  5.4    /  Alb  3.0    /  TBili  0.2    /  DBili  x      /  AST  16     /  ALT  25     /  AlkPhos  52     17 Mar 2019 05:35          Culture - Blood (collected 15 Mar 2019 18:00)  Source: .Blood Blood-Venous  Preliminary Report (17 Mar 2019 01:01):    No growth to date.    Culture - Blood (collected 15 Mar 2019 18:00)  Source: .Blood Blood-Venous  Preliminary Report (17 Mar 2019 01:01):    No growth to date.        Radiology:    General: comfortable, NAD  Neurology: A&Ox3, nonfocal  Head:  Normocephalic, atraumatic  ENT:  Mucosa moist, no ulcerations  Neck:  Supple, no JVD,   Skin: no breakdowns (as per RN)  Resp: CTA B/L  CV: RRR, S1S2,   GI: Soft, NT, bowel sounds  MS: No edema, + peripheral pulses, FROM all 4 extremity

## 2019-03-18 NOTE — DISCHARGE NOTE PROVIDER - HOSPITAL COURSE
72 y/o M PMHX COPD, asthma, DMII, prostate cancer s/p TURP 1999 comes to ED for the fever and SOB for one week. The fever is associated with the wheezing and productive cough. Patient brings out greenish white sputum. He went to see his pulmonologist who gave him prednisone and amoxicillin but the symptoms did not improve so he decided to come to the hospital.    Pt denies chest pain, leg swelling, nausea, vomiting, abdominal pain, diarrhea, abdominal pain or calf tenderness. In the ED patients  /63  RR 18 Pulse ox 94 on room air. Pt received 1 ltr of LR, Ceftriaxone, azithromycin, albuterol and ipratropium bromide nebulizers and methyl prednisone.         # SIRS    - Likely viral PNA vs bronchitis    - CXR negative    - CT chest- Stable b/l lower lobe bronchiectasis with peribronchial thickening. Small amount of lower lung field atelectasis. Essentially stable subcentimeter pulmonary nodules measuring up to 5 mm. Follow-up noncontrast chest CT is recommended in 12 months.    - Flu A and B; RVP negative    - BCx NGTD    - Pulm- clear from pulm viewpt for d/c. cefuroxime 500 mg q12h for 7 days. s/p Rocephin 1g Q24hrs and Levaquin 750mg Q24hrs        # COPD exacerbation     - prednisone 40 qd for 5 days. s/p solumedrol 60mg IV 12hrs    - c/w home kirill, proair, symbicort, montelukast    - pt informed to cont stop smoking    - Pulmonary recs appreciated        #Pulm nodules 5 mm    CT in 12 mo        # DM2    - Insulin basal bolus    -d/c with hospital regimen- home lispro 20 bid, tid in hospital. outpt f/u within 1 week to adjust insulin as needed    - Monitor FS closely as patient is receiving steroid        Pt stable and will be d/c home

## 2019-03-18 NOTE — DISCHARGE NOTE NURSING/CASE MANAGEMENT/SOCIAL WORK - NSDCDPATPORTLINK_GEN_ALL_CORE
You can access the [facility] Patient Portal, offered by [facility], by registering with the following website: [email or web address]

## 2019-03-18 NOTE — PROGRESS NOTE ADULT - SUBJECTIVE AND OBJECTIVE BOX
THIS IS A DRAFT    Patient seen and examined earlier today.    Case discussed with primary team.    Complete documentation to follow.    For any question, please contact me:  Dr. Annemarie Oates  Office: [phone] DEL ALVARADO  73y, Male  Allergy: No Known Allergies      LAST 24-Hr EVENTS:  cough sob improved  feels much better    VITALS:  T(F): 96.7 (03-18-19 @ 14:41), Max: 96.7 (03-18-19 @ 14:41)  HR: 104 (03-18-19 @ 14:41)  BP: 121/62 (03-18-19 @ 14:41) (121/62 - 158/73)  RR: 18 (03-18-19 @ 14:41)  SpO2: 97% (03-18-19 @ 07:45)    PHYSICAL EXAM:    GENERAL: NAD  NECK: Supple, No JVD  CHEST/LUNG: CTA b/l  HEART: Regular rate and rhythm  ABDOMEN: Soft, Nontender, Nondistended  EXTREMITIES:  No clubbing, edema absent        TESTS & MEASUREMENTS:                          11.2   15.25 )-----------( 280      ( 17 Mar 2019 05:35 )             34.6       03-17    138  |  102  |  22<H>  ----------------------------<  271<H>  4.3   |  24  |  0.9    Ca    8.5      17 Mar 2019 05:35  Mg     1.9     03-17    TPro  5.4<L>  /  Alb  3.0<L>  /  TBili  0.2  /  DBili  x   /  AST  16  /  ALT  25  /  AlkPhos  52  03-17    LIVER FUNCTIONS - ( 17 Mar 2019 05:35 )  Alb: 3.0 g/dL / Pro: 5.4 g/dL / ALK PHOS: 52 U/L / ALT: 25 U/L / AST: 16 U/L / GGT: x                 Culture - Blood (collected 03-15-19 @ 18:00)  Source: .Blood Blood-Venous  Preliminary Report (03-17-19 @ 01:01):    No growth to date.    Culture - Blood (collected 03-15-19 @ 18:00)  Source: .Blood Blood-Venous  Preliminary Report (03-17-19 @ 01:01):    No growth to date.            RADIOLOGY & ADDITIONAL TESTS:  < from: CT Chest No Cont (03.16.19 @ 13:26) >  IMPRESSION:    Stable bilateral lower lobe bronchiectasis with peribronchial thickening.    Small amount of lower lung field atelectasis.    Essentially stable subcentimeter pulmonary nodules measuring up to 5 mm.   Follow-up noncontrast chest CT is recommended in 12 months.    < end of copied text >        MEDICATIONS:  MEDICATIONS  (STANDING):  ALBUTerol/ipratropium for Nebulization 3 milliLiter(s) Nebulizer every 6 hours  buDESOnide 160 MICROgram(s)/formoterol 4.5 MICROgram(s) Inhaler 2 Puff(s) Inhalation two times a day  cefTRIAXone   IVPB 1 Gram(s) IV Intermittent every 24 hours  chlorhexidine 4% Liquid 1 Application(s) Topical every 12 hours  dextrose 5%. 1000 milliLiter(s) (50 mL/Hr) IV Continuous <Continuous>  dextrose 50% Injectable 12.5 Gram(s) IV Push once  dextrose 50% Injectable 25 Gram(s) IV Push once  dextrose 50% Injectable 25 Gram(s) IV Push once  enoxaparin Injectable 40 milliGRAM(s) SubCutaneous every 24 hours  insulin glargine Injectable (LANTUS) 30 Unit(s) SubCutaneous at bedtime  insulin lispro (HumaLOG) corrective regimen sliding scale   SubCutaneous three times a day before meals  insulin lispro Injectable (HumaLOG) 20 Unit(s) SubCutaneous three times a day before meals  levoFLOXacin IVPB 750 milliGRAM(s) IV Intermittent every 24 hours  montelukast 10 milliGRAM(s) Oral daily  pantoprazole    Tablet 40 milliGRAM(s) Oral before breakfast    MEDICATIONS  (PRN):  dextrose 40% Gel 15 Gram(s) Oral once PRN Blood Glucose LESS THAN 70 milliGRAM(s)/deciliter  glucagon  Injectable 1 milliGRAM(s) IntraMuscular once PRN Glucose LESS THAN 70 milligrams/deciliter

## 2019-03-18 NOTE — DISCHARGE NOTE PROVIDER - CARE PROVIDER_API CALL
Annemarie Oates)  Internal Medicine  [address] [place] [address]  Phone: [phone]  Fax: [phone]  Follow Up Time:

## 2019-03-18 NOTE — DISCHARGE NOTE PROVIDER - NSDCCPCAREPLAN_GEN_ALL_CORE_FT
PRINCIPAL DISCHARGE DIAGNOSIS  Diagnosis: COPD with acute exacerbation  Assessment and Plan of Treatment: You had COPD exacerbation from    Continue with COPD medications including breo, symbicort, proair, montelukast, and prednisone for 5 days.  Take antibiotic cefuroxime for 7 days.  Follow up with primary care doctor and pulmonologist Dr. Oates within 1 week.  Return to emergency room if worsening symptoms.      SECONDARY DISCHARGE DIAGNOSES  Diagnosis: Diabetes  Assessment and Plan of Treatment: Since you are on steroids (prednisone), your blood sugar will be high. Please monitor your blood surgar. Take lispro 20 U 3x/day before meal.  Follow up with primary care doctor within 1 week to adjust insulin.    Diagnosis: Pulmonary nodule  Assessment and Plan of Treatment: CT scan showed pulmonary nodules up to 5 mm.   Repeat CT chest in 12 months.  Follow up with pulmonologist.

## 2019-03-18 NOTE — PROGRESS NOTE ADULT - ASSESSMENT
DEL ALVARADO 73y Male  MRN#: [id number]   CODE STATUS: Full code      SUBJECTIVE  Patient is a 73y old Male who presents with a chief complaint of fever and SOB  Currently admitted to medicine with the primary diagnosis of SIRS 2/2 upper respiratory infection  Today is hospital day 1d, and this morning he is resting comfortably and reports no overnight events.       OBJECTIVE  PAST MEDICAL & SURGICAL HISTORY  COPD (chronic obstructive pulmonary disease)  Asthma  Prostate cancer  Diabetes  S/P TURP    ALLERGIES:  No Known Allergies      HOME MEDICATIONS:  HOME MEDICATIONS:  HumaLOG 100 units/mL injectable solution: 20  injectable 2 times a day (15 Mar 2019 20:49)  Lantus 100 units/mL subcutaneous solution: 30  subcutaneous once a day (at bedtime) (15 Mar 2019 20:49)  ProAir HFA 90 mcg/inh inhalation aerosol: 2 puff(s) inhaled 4 times a day, As Needed (15 Mar 2019 20:49)  Singulair 10 mg oral tablet: 1 tab(s) orally once a day (15 Mar 2019 20:49)  Symbicort 160 mcg-4.5 mcg/inh inhalation aerosol: 2 puff(s) inhaled 2 times a day (15 Mar 2019 20:49)      MEDICATIONS:  STANDING MEDICATIONS  ALBUTerol/ipratropium for Nebulization 3 milliLiter(s) Nebulizer every 6 hours  buDESOnide 160 MICROgram(s)/formoterol 4.5 MICROgram(s) Inhaler 2 Puff(s) Inhalation two times a day  cefTRIAXone   IVPB 1 Gram(s) IV Intermittent every 24 hours  chlorhexidine 4% Liquid 1 Application(s) Topical every 12 hours  dextrose 5%. 1000 milliLiter(s) IV Continuous <Continuous>  dextrose 50% Injectable 12.5 Gram(s) IV Push once  dextrose 50% Injectable 25 Gram(s) IV Push once  dextrose 50% Injectable 25 Gram(s) IV Push once  enoxaparin Injectable 40 milliGRAM(s) SubCutaneous every 24 hours  insulin glargine Injectable (LANTUS) 30 Unit(s) SubCutaneous at bedtime  insulin lispro (HumaLOG) corrective regimen sliding scale   SubCutaneous three times a day before meals  insulin lispro Injectable (HumaLOG) 20 Unit(s) SubCutaneous three times a day before meals  lactated ringers. 1000 milliLiter(s) IV Continuous <Continuous>  levoFLOXacin IVPB 750 milliGRAM(s) IV Intermittent every 24 hours  montelukast 10 milliGRAM(s) Oral daily    PRN MEDICATIONS  dextrose 40% Gel 15 Gram(s) Oral once PRN  glucagon  Injectable 1 milliGRAM(s) IntraMuscular once PRN      VITAL SIGNS: Last 24 Hours  T(C): 36.2 (16 Mar 2019 19:49), Max: 36.9 (15 Mar 2019 21:10)  T(F): 97.2 (16 Mar 2019 19:49), Max: 98.4 (15 Mar 2019 21:10)  HR: 91 (16 Mar 2019 19:49) (86 - 99)  BP: 116/59 (16 Mar 2019 19:49) (111/59 - 160/80)  BP(mean): --  RR: 18 (16 Mar 2019 19:49) (18 - 18)  SpO2: 96% (16 Mar 2019 08:01) (95% - 96%)    LABS:                        12.3   9.22  )-----------( 214      ( 16 Mar 2019 00:41 )             38.6     03-16    137  |  103  |  15  ----------------------------<  309<H>  4.6   |  22  |  0.9    Ca    8.2<L>      16 Mar 2019 00:41    TPro  5.5<L>  /  Alb  2.9<L>  /  TBili  0.3  /  DBili  x   /  AST  16  /  ALT  23  /  AlkPhos  62  03-16    PT/INR - ( 15 Mar 2019 18:10 )   PT: 11.60 sec;   INR: 1.01 ratio         PTT - ( 15 Mar 2019 18:10 )  PTT:33.0 sec    CARDIAC MARKERS ( 15 Mar 2019 18:10 )  x     / 0.01 ng/mL / x     / x     / x          RADIOLOGY:      PHYSICAL EXAM:    GENERAL: NAD, well-developed, AAOx3  HEENT:  Atraumatic, Normocephalic. EOMI, PERRLA, conjunctiva and sclera clear, No JVD  PULMONARY: Clear to auscultation bilaterally; No wheeze  CARDIOVASCULAR: Regular rate and rhythm; No murmurs, rubs, or gallops  GASTROINTESTINAL: Soft, Nontender, Nondistended; Bowel sounds present  MUSCULOSKELETAL:  2+ Peripheral Pulses, No clubbing, cyanosis, or edema  NEUROLOGY: non-focal  SKIN: No rashes or lesions      ADMISSION SUMMARY  Patient is a 73y old Male who presents with a chief complaint of fever and SOB  Currently admitted to medicine with the primary diagnosis of SIRS 2/2 upper respiratory infection      ASSESSMENT & PLAN  74 yo M with PMH of DM, Asthma, COPD comes to ED for the cough, SOB and subjective fever.    # SIRS  - Likely viral PNA vs bronchitis  - CXR negative, CT chest shows mild infiltrates at LLL  - Flu A and B negative; RVP negative  - Pending BCx and UCx  - Continue Rocephin 1g Q24hrs and Levaquin 750mg Q24hrs for a total of 5 days    # COPD exacerbation   - Continue solumedrol 60mg IV 12hrs  - Symbicort and Duonebs   - Pulmonary recs appreicated    # DM2  - Insulin basal bolus  - Monitor FS closely as patient is receiving steroid      DVT ppx: Lovenox  GI ppx: PPI  Diet: Regular diet  Activity: out of bed to chair  Lines: Peripheral IVs  Code status: Full code  Dispo: acute
dyspnea  bronchiectasis/asthma exacerbation unresponsive to outpatient management  SIRS/URI/LRTI  plan----------------------------------------------------  continue  present management  bronchodilators  empiric antibiotics, monitor temperature  please resume/continue methylprednisolone 60mg q 12 hours  monitor peak flow  out of bed  gi/dvt prophylaxis  to obtain ct chest possible occult pneumonia, last done april 2018 at [facility]    pulmonary input appreciated
dyspnea, improved  exacerbation of asthma/bronchiectasis, improved  SIRS/URI/LRTI, improved        can discharge home today f has outpatient follow up with dr vasquez on 3/21  bronchodilators  prednisone 40mg daily for 5 days  cefuroxime axetil 500mg q 12 hours for 7 days  increased wbc likely due to steroids
dyspnea, improved  exacerbation of asthma/bronchiectasis, improved  SIRS/URI/LRTI, improved        can discharge home today from pulmonary viewpoint, has outpatient follow up with dr avsquez on 3/21  bronchodilators  prednisone 40mg daily for 5 days  cefuroxime axetil 500mg q 12 hours for 7 days  increased wbc likely due to steroids
Asthma exacerbation  Bronchiectasis  Mutliple pulmonary nodules        prednisone taper to 10 mg daily  resume home inhalers on discharge  cefuroxime 500mg q 12 hours for 7 days total  f/u repeat ct chest in 6 months  d/w family at bedside

## 2019-03-18 NOTE — CHART NOTE - NSCHARTNOTEFT_GEN_A_CORE
<<<RESIDENT DISCHARGE NOTE>>>     DEL ALVARADO  [id number]    VITAL SIGNS:  T(F): 96.5 (03-18-19 @ 05:00), Max: 98.7 (03-17-19 @ 15:12)  HR: 99 (03-18-19 @ 05:00)  BP: 158/73 (03-18-19 @ 05:00)  SpO2: 97% (03-18-19 @ 07:45)      PHYSICAL EXAMINATION:  GENERAL: NAD, well-developed  HEAD:  Atraumatic, Normocephalic  NECK: Supple, No JVD  CHEST/LUNG: Clear to auscultation bilaterally; No wheeze  HEART: Regular rate and rhythm; No murmurs, rubs, or gallops  ABDOMEN: Soft, Nontender, Nondistended; Bowel sounds present  EXTREMITIES:  2+ Peripheral Pulses, No clubbing, cyanosis, or edema    TEST RESULTS:                        11.2   15.25 )-----------( 280      ( 17 Mar 2019 05:35 )             34.6       03-17    138  |  102  |  22<H>  ----------------------------<  271<H>  4.3   |  24  |  0.9    Ca    8.5      17 Mar 2019 05:35  Mg     1.9     03-17    TPro  5.4<L>  /  Alb  3.0<L>  /  TBili  0.2  /  DBili  x   /  AST  16  /  ALT  25  /  AlkPhos  52  03-17      FINAL DISCHARGE INTERVIEW:  Resident(s) Present: (Name:__Anne-Marie Kaur___________), RN Present: (Name:  __Sanjana_________)    DISCHARGE MEDICATION RECONCILIATION  reviewed with Attending (Name:___Dr. Martin________) clear for d/c by pulm and Dr. Martin    DISPOSITION:   [x  ] Home,    [  ] Home with Visiting Nursing Services,   [    ]  SNF/ NH,    [   ] Acute Rehab (4A),   [   ] Other (Specify:_________)

## 2019-03-21 LAB
CULTURE RESULTS: SIGNIFICANT CHANGE UP
CULTURE RESULTS: SIGNIFICANT CHANGE UP
SPECIMEN SOURCE: SIGNIFICANT CHANGE UP
SPECIMEN SOURCE: SIGNIFICANT CHANGE UP

## 2019-04-01 DIAGNOSIS — R06.02 SHORTNESS OF BREATH: ICD-10-CM

## 2019-04-01 DIAGNOSIS — J06.9 ACUTE UPPER RESPIRATORY INFECTION, UNSPECIFIED: ICD-10-CM

## 2019-04-01 DIAGNOSIS — J45.901 UNSPECIFIED ASTHMA WITH (ACUTE) EXACERBATION: ICD-10-CM

## 2019-04-01 DIAGNOSIS — Z87.891 PERSONAL HISTORY OF NICOTINE DEPENDENCE: ICD-10-CM

## 2019-04-01 DIAGNOSIS — R91.1 SOLITARY PULMONARY NODULE: ICD-10-CM

## 2019-04-01 DIAGNOSIS — Z90.79 ACQUIRED ABSENCE OF OTHER GENITAL ORGAN(S): ICD-10-CM

## 2019-04-01 DIAGNOSIS — E11.9 TYPE 2 DIABETES MELLITUS WITHOUT COMPLICATIONS: ICD-10-CM

## 2019-04-01 DIAGNOSIS — J44.1 CHRONIC OBSTRUCTIVE PULMONARY DISEASE WITH (ACUTE) EXACERBATION: ICD-10-CM

## 2019-04-01 DIAGNOSIS — J44.0 CHRONIC OBSTRUCTIVE PULMONARY DISEASE WITH (ACUTE) LOWER RESPIRATORY INFECTION: ICD-10-CM

## 2019-04-01 DIAGNOSIS — Z85.46 PERSONAL HISTORY OF MALIGNANT NEOPLASM OF PROSTATE: ICD-10-CM

## 2019-04-01 DIAGNOSIS — R65.10 SYSTEMIC INFLAMMATORY RESPONSE SYNDROME (SIRS) OF NON-INFECTIOUS ORIGIN WITHOUT ACUTE ORGAN DYSFUNCTION: ICD-10-CM

## 2019-04-18 NOTE — ASU PATIENT PROFILE, ADULT - TEACHING/LEARNING RELIGIOUS CONSIDERATIONS
Fall Prevention   WHAT YOU NEED TO KNOW:   Fall prevention includes ways to make your home and other areas safer  It also includes ways you can move more carefully to prevent a fall  Health conditions that cause changes in your blood pressure, vision, or muscle strength and coordination may increase your risk for falls  Medicines may also increase your risk for falls if they make you dizzy, weak, or sleepy  DISCHARGE INSTRUCTIONS:   Call 911 or have someone else call if:   · You have fallen and are unconscious  · You have fallen and cannot move part of your body  Contact your healthcare provider if:   · You have fallen and have pain or a headache  · You have questions or concerns about your condition or care  Fall prevention tips:   · Stand or sit up slowly  This may help you keep your balance and prevent falls  · Use assistive devices as directed  Your healthcare provider may suggest that you use a cane or walker to help you keep your balance  You may need to have grab bars put in your bathroom near the toilet or in the shower  · Wear shoes that fit well and have soles that   Wear shoes both inside and outside  Use slippers with good   Do not wear shoes with high heels  · Wear a personal alarm  This is a device that allows you to call 911 if you fall and need help  Ask your healthcare provider for more information  · Stay active  Exercise can help strengthen your muscles and improve your balance  Your healthcare provider may recommend water aerobics or walking  He or she may also recommend physical therapy to improve your coordination  Never start an exercise program without talking to your healthcare provider first      · Manage your medical conditions  Keep all appointments with your healthcare providers  Visit your eye doctor as directed  Home safety tips:   · Add items to prevent falls in the bathroom    Put nonslip strips on your bath or shower floor to prevent you from slipping  Use a bath mat if you do not have carpet in the bathroom  This will prevent you from falling when you step out of the bath or shower  Use a shower seat so you do not need to stand while you shower  Sit on the toilet or a chair in your bathroom to dry yourself and put on clothing  This will prevent you from losing your balance from drying or dressing yourself while you are standing  · Keep paths clear  Remove books, shoes, and other objects from walkways and stairs  Place cords for telephones and lamps out of the way so that you do not need to walk over them  Tape them down if you cannot move them  Remove small rugs  If you cannot remove a rug, secure it with double-sided tape  This will prevent you from tripping  · Install bright lights in your home  Use night lights to help light paths to the bathroom or kitchen  Always turn on the light before you start walking  · Keep items you use often on shelves within reach  Do not use a step stool to help you reach an item  · Paint or place reflective tape on the edges of your stairs  This will help you see the stairs better  Follow up with your healthcare provider as directed:  Write down your questions so you remember to ask them during your visits  © 2017 [employer] Information is for End User's use only and may not be sold, redistributed or otherwise used for commercial purposes  All illustrations and images included in CareNotes® are the copyrighted property of [employer]  or [address]  The above information is an  only  It is not intended as medical advice for individual conditions or treatments  Talk to your doctor, nurse or pharmacist before following any medical regimen to see if it is safe and effective for you  none

## 2019-04-19 ENCOUNTER — OUTPATIENT (OUTPATIENT)
Dept: OUTPATIENT SERVICES | Facility: HOSPITAL | Age: 74
LOS: 1 days | Discharge: HOME | End: 2019-04-19

## 2019-04-19 VITALS — DIASTOLIC BLOOD PRESSURE: 68 MMHG | RESPIRATION RATE: 16 BRPM | SYSTOLIC BLOOD PRESSURE: 138 MMHG

## 2019-04-19 VITALS
HEIGHT: 70 IN | SYSTOLIC BLOOD PRESSURE: 151 MMHG | DIASTOLIC BLOOD PRESSURE: 74 MMHG | OXYGEN SATURATION: 98 % | HEART RATE: 97 BPM | WEIGHT: 160.06 LBS | RESPIRATION RATE: 16 BRPM | TEMPERATURE: 97 F

## 2019-04-19 DIAGNOSIS — Z90.79 ACQUIRED ABSENCE OF OTHER GENITAL ORGAN(S): Chronic | ICD-10-CM

## 2019-04-19 DIAGNOSIS — Z98.890 OTHER SPECIFIED POSTPROCEDURAL STATES: Chronic | ICD-10-CM

## 2019-04-19 LAB — GLUCOSE BLDC GLUCOMTR-MCNC: 213 MG/DL — HIGH (ref 70–99)

## 2019-04-19 RX ORDER — ALBUTEROL 90 UG/1
2 AEROSOL, METERED ORAL
Qty: 0 | Refills: 0 | COMMUNITY

## 2019-04-19 RX ORDER — BUDESONIDE AND FORMOTEROL FUMARATE DIHYDRATE 160; 4.5 UG/1; UG/1
2 AEROSOL RESPIRATORY (INHALATION)
Qty: 0 | Refills: 0 | COMMUNITY

## 2019-04-19 RX ORDER — MONTELUKAST 4 MG/1
1 TABLET, CHEWABLE ORAL
Qty: 0 | Refills: 0 | COMMUNITY

## 2019-04-25 DIAGNOSIS — E11.9 TYPE 2 DIABETES MELLITUS WITHOUT COMPLICATIONS: ICD-10-CM

## 2019-04-25 DIAGNOSIS — H25.89 OTHER AGE-RELATED CATARACT: ICD-10-CM

## 2019-04-25 DIAGNOSIS — Z85.46 PERSONAL HISTORY OF MALIGNANT NEOPLASM OF PROSTATE: ICD-10-CM

## 2019-04-25 DIAGNOSIS — J44.9 CHRONIC OBSTRUCTIVE PULMONARY DISEASE, UNSPECIFIED: ICD-10-CM

## 2019-04-30 ENCOUNTER — OUTPATIENT (OUTPATIENT)
Dept: OUTPATIENT SERVICES | Facility: HOSPITAL | Age: 74
LOS: 1 days | Discharge: HOME | End: 2019-04-30

## 2019-04-30 DIAGNOSIS — D64.9 ANEMIA, UNSPECIFIED: ICD-10-CM

## 2019-04-30 DIAGNOSIS — N39.0 URINARY TRACT INFECTION, SITE NOT SPECIFIED: ICD-10-CM

## 2019-04-30 DIAGNOSIS — I10 ESSENTIAL (PRIMARY) HYPERTENSION: ICD-10-CM

## 2019-04-30 DIAGNOSIS — E03.9 HYPOTHYROIDISM, UNSPECIFIED: ICD-10-CM

## 2019-04-30 DIAGNOSIS — Z98.890 OTHER SPECIFIED POSTPROCEDURAL STATES: Chronic | ICD-10-CM

## 2019-04-30 DIAGNOSIS — Z90.79 ACQUIRED ABSENCE OF OTHER GENITAL ORGAN(S): Chronic | ICD-10-CM

## 2019-04-30 DIAGNOSIS — M41.9 SCOLIOSIS, UNSPECIFIED: ICD-10-CM

## 2019-04-30 DIAGNOSIS — E11.9 TYPE 2 DIABETES MELLITUS WITHOUT COMPLICATIONS: ICD-10-CM

## 2019-04-30 DIAGNOSIS — E78.5 HYPERLIPIDEMIA, UNSPECIFIED: ICD-10-CM

## 2019-10-03 NOTE — ED ADULT NURSE NOTE - DOES PATIENT HAVE ADVANCE DIRECTIVE
Patient Education   Personalized Prevention Plan  You are due for the preventive services outlined below.  Your care team is available to assist you in scheduling these services.  If you have already completed any of these items, please share that information with your care team to update in your medical record.  Health Maintenance Due   Topic Date Due     Zoster (Shingles) Vaccine (2 of 3) 09/18/2007     Osteoporosis Screening  08/22/2014     PHQ-2  01/01/2019     Flu Vaccine (1) 09/01/2019     FALL RISK ASSESSMENT  10/01/2019     Annual Wellness Visit  10/01/2019        
No

## 2019-11-15 ENCOUNTER — OUTPATIENT (OUTPATIENT)
Dept: OUTPATIENT SERVICES | Facility: HOSPITAL | Age: 74
LOS: 1 days | Discharge: HOME | End: 2019-11-15

## 2019-11-15 DIAGNOSIS — Z98.890 OTHER SPECIFIED POSTPROCEDURAL STATES: Chronic | ICD-10-CM

## 2019-11-15 DIAGNOSIS — Z90.79 ACQUIRED ABSENCE OF OTHER GENITAL ORGAN(S): Chronic | ICD-10-CM

## 2019-11-15 DIAGNOSIS — E78.5 HYPERLIPIDEMIA, UNSPECIFIED: ICD-10-CM

## 2019-11-15 DIAGNOSIS — E11.9 TYPE 2 DIABETES MELLITUS WITHOUT COMPLICATIONS: ICD-10-CM

## 2019-11-22 ENCOUNTER — OUTPATIENT (OUTPATIENT)
Dept: OUTPATIENT SERVICES | Facility: HOSPITAL | Age: 74
LOS: 1 days | Discharge: HOME | End: 2019-11-22

## 2019-11-22 VITALS — HEART RATE: 60 BPM | RESPIRATION RATE: 18 BRPM | DIASTOLIC BLOOD PRESSURE: 70 MMHG | SYSTOLIC BLOOD PRESSURE: 120 MMHG

## 2019-11-22 VITALS — HEIGHT: 67 IN | TEMPERATURE: 97 F | WEIGHT: 160.06 LBS | OXYGEN SATURATION: 97 %

## 2019-11-22 DIAGNOSIS — Z90.79 ACQUIRED ABSENCE OF OTHER GENITAL ORGAN(S): Chronic | ICD-10-CM

## 2019-11-22 DIAGNOSIS — Z98.890 OTHER SPECIFIED POSTPROCEDURAL STATES: Chronic | ICD-10-CM

## 2019-11-22 LAB — GLUCOSE BLDC GLUCOMTR-MCNC: 216 MG/DL — HIGH (ref 70–99)

## 2019-11-28 DIAGNOSIS — H26.9 UNSPECIFIED CATARACT: ICD-10-CM

## 2019-11-28 DIAGNOSIS — Z85.46 PERSONAL HISTORY OF MALIGNANT NEOPLASM OF PROSTATE: ICD-10-CM

## 2019-11-28 DIAGNOSIS — H25.11 AGE-RELATED NUCLEAR CATARACT, RIGHT EYE: ICD-10-CM

## 2019-11-28 DIAGNOSIS — Z90.79 ACQUIRED ABSENCE OF OTHER GENITAL ORGAN(S): ICD-10-CM

## 2019-11-28 DIAGNOSIS — Z79.4 LONG TERM (CURRENT) USE OF INSULIN: ICD-10-CM

## 2019-11-28 DIAGNOSIS — N40.0 BENIGN PROSTATIC HYPERPLASIA WITHOUT LOWER URINARY TRACT SYMPTOMS: ICD-10-CM

## 2019-11-28 DIAGNOSIS — E11.36 TYPE 2 DIABETES MELLITUS WITH DIABETIC CATARACT: ICD-10-CM

## 2019-11-28 DIAGNOSIS — K21.9 GASTRO-ESOPHAGEAL REFLUX DISEASE WITHOUT ESOPHAGITIS: ICD-10-CM

## 2019-11-28 DIAGNOSIS — J45.909 UNSPECIFIED ASTHMA, UNCOMPLICATED: ICD-10-CM

## 2019-12-24 ENCOUNTER — OUTPATIENT (OUTPATIENT)
Dept: OUTPATIENT SERVICES | Facility: HOSPITAL | Age: 74
LOS: 1 days | Discharge: HOME | End: 2019-12-24

## 2019-12-24 DIAGNOSIS — Z90.79 ACQUIRED ABSENCE OF OTHER GENITAL ORGAN(S): Chronic | ICD-10-CM

## 2019-12-24 DIAGNOSIS — Z98.890 OTHER SPECIFIED POSTPROCEDURAL STATES: Chronic | ICD-10-CM

## 2019-12-24 DIAGNOSIS — E11.9 TYPE 2 DIABETES MELLITUS WITHOUT COMPLICATIONS: ICD-10-CM

## 2020-01-01 ENCOUNTER — OUTPATIENT (OUTPATIENT)
Dept: OUTPATIENT SERVICES | Facility: HOSPITAL | Age: 75
LOS: 1 days | End: 2020-01-01

## 2020-01-01 DIAGNOSIS — Z90.79 ACQUIRED ABSENCE OF OTHER GENITAL ORGAN(S): Chronic | ICD-10-CM

## 2020-01-01 DIAGNOSIS — Z98.890 OTHER SPECIFIED POSTPROCEDURAL STATES: Chronic | ICD-10-CM

## 2020-01-12 ENCOUNTER — INPATIENT (INPATIENT)
Facility: HOSPITAL | Age: 75
LOS: 0 days | Discharge: HOME | End: 2020-01-13
Attending: INTERNAL MEDICINE | Admitting: INTERNAL MEDICINE
Payer: MEDICARE

## 2020-01-12 VITALS
WEIGHT: 169.98 LBS | HEART RATE: 121 BPM | SYSTOLIC BLOOD PRESSURE: 137 MMHG | DIASTOLIC BLOOD PRESSURE: 62 MMHG | OXYGEN SATURATION: 95 % | TEMPERATURE: 101 F | RESPIRATION RATE: 20 BRPM

## 2020-01-12 DIAGNOSIS — Z90.79 ACQUIRED ABSENCE OF OTHER GENITAL ORGAN(S): Chronic | ICD-10-CM

## 2020-01-12 DIAGNOSIS — Z98.890 OTHER SPECIFIED POSTPROCEDURAL STATES: Chronic | ICD-10-CM

## 2020-01-12 PROCEDURE — 99285 EMERGENCY DEPT VISIT HI MDM: CPT

## 2020-01-12 PROCEDURE — 93010 ELECTROCARDIOGRAM REPORT: CPT

## 2020-01-12 RX ORDER — MAGNESIUM SULFATE 500 MG/ML
2 VIAL (ML) INJECTION ONCE
Refills: 0 | Status: COMPLETED | OUTPATIENT
Start: 2020-01-12 | End: 2020-01-12

## 2020-01-12 RX ORDER — AZITHROMYCIN 500 MG/1
500 TABLET, FILM COATED ORAL ONCE
Refills: 0 | Status: COMPLETED | OUTPATIENT
Start: 2020-01-12 | End: 2020-01-12

## 2020-01-12 RX ORDER — ACETAMINOPHEN 500 MG
650 TABLET ORAL ONCE
Refills: 0 | Status: DISCONTINUED | OUTPATIENT
Start: 2020-01-12 | End: 2020-01-13

## 2020-01-12 RX ORDER — IPRATROPIUM/ALBUTEROL SULFATE 18-103MCG
3 AEROSOL WITH ADAPTER (GRAM) INHALATION
Refills: 0 | Status: COMPLETED | OUTPATIENT
Start: 2020-01-12 | End: 2020-01-13

## 2020-01-12 RX ORDER — CEFTRIAXONE 500 MG/1
1000 INJECTION, POWDER, FOR SOLUTION INTRAMUSCULAR; INTRAVENOUS ONCE
Refills: 0 | Status: COMPLETED | OUTPATIENT
Start: 2020-01-12 | End: 2020-01-12

## 2020-01-12 RX ORDER — SODIUM CHLORIDE 9 MG/ML
1000 INJECTION INTRAMUSCULAR; INTRAVENOUS; SUBCUTANEOUS ONCE
Refills: 0 | Status: COMPLETED | OUTPATIENT
Start: 2020-01-12 | End: 2020-01-12

## 2020-01-12 RX ORDER — SODIUM CHLORIDE 9 MG/ML
1000 INJECTION, SOLUTION INTRAVENOUS ONCE
Refills: 0 | Status: COMPLETED | OUTPATIENT
Start: 2020-01-12 | End: 2020-01-12

## 2020-01-12 NOTE — ED ADULT TRIAGE NOTE - CHIEF COMPLAINT QUOTE
2 weeks of cough, fever, body aches, chest congestion. 2 weeks of cough, fever, body aches, chest congestion. Tylenol given at triage

## 2020-01-13 ENCOUNTER — TRANSCRIPTION ENCOUNTER (OUTPATIENT)
Age: 75
End: 2020-01-13

## 2020-01-13 VITALS
HEART RATE: 96 BPM | SYSTOLIC BLOOD PRESSURE: 134 MMHG | TEMPERATURE: 98 F | RESPIRATION RATE: 20 BRPM | DIASTOLIC BLOOD PRESSURE: 71 MMHG

## 2020-01-13 LAB
ALBUMIN SERPL ELPH-MCNC: 3.8 G/DL — SIGNIFICANT CHANGE UP (ref 3.5–5.2)
ALP SERPL-CCNC: 50 U/L — SIGNIFICANT CHANGE UP (ref 30–115)
ALT FLD-CCNC: 18 U/L — SIGNIFICANT CHANGE UP (ref 0–41)
ANION GAP SERPL CALC-SCNC: 14 MMOL/L — SIGNIFICANT CHANGE UP (ref 7–14)
APPEARANCE UR: CLEAR — SIGNIFICANT CHANGE UP
APTT BLD: 32.5 SEC — SIGNIFICANT CHANGE UP (ref 27–39.2)
AST SERPL-CCNC: 21 U/L — SIGNIFICANT CHANGE UP (ref 0–41)
BASE EXCESS BLDV CALC-SCNC: -2.4 MMOL/L — LOW (ref -2–2)
BASOPHILS # BLD AUTO: 0.03 K/UL — SIGNIFICANT CHANGE UP (ref 0–0.2)
BASOPHILS NFR BLD AUTO: 0.4 % — SIGNIFICANT CHANGE UP (ref 0–1)
BILIRUB DIRECT SERPL-MCNC: 0.2 MG/DL — SIGNIFICANT CHANGE UP (ref 0–0.2)
BILIRUB INDIRECT FLD-MCNC: 0.5 MG/DL — SIGNIFICANT CHANGE UP (ref 0.2–1.2)
BILIRUB SERPL-MCNC: 0.7 MG/DL — SIGNIFICANT CHANGE UP (ref 0.2–1.2)
BILIRUB UR-MCNC: NEGATIVE — SIGNIFICANT CHANGE UP
BUN SERPL-MCNC: 12 MG/DL — SIGNIFICANT CHANGE UP (ref 10–20)
CA-I SERPL-SCNC: 1.07 MMOL/L — LOW (ref 1.12–1.3)
CALCIUM SERPL-MCNC: 8.5 MG/DL — SIGNIFICANT CHANGE UP (ref 8.5–10.1)
CHLORIDE SERPL-SCNC: 104 MMOL/L — SIGNIFICANT CHANGE UP (ref 98–110)
CO2 SERPL-SCNC: 20 MMOL/L — SIGNIFICANT CHANGE UP (ref 17–32)
COLOR SPEC: YELLOW — SIGNIFICANT CHANGE UP
CREAT SERPL-MCNC: 1.1 MG/DL — SIGNIFICANT CHANGE UP (ref 0.7–1.5)
DIFF PNL FLD: NEGATIVE — SIGNIFICANT CHANGE UP
EOSINOPHIL # BLD AUTO: 0.01 K/UL — SIGNIFICANT CHANGE UP (ref 0–0.7)
EOSINOPHIL NFR BLD AUTO: 0.1 % — SIGNIFICANT CHANGE UP (ref 0–8)
ESTIMATED AVERAGE GLUCOSE: 235 MG/DL — HIGH (ref 68–114)
FLU A RESULT: NEGATIVE — SIGNIFICANT CHANGE UP
FLU A RESULT: NEGATIVE — SIGNIFICANT CHANGE UP
FLUAV AG NPH QL: NEGATIVE — SIGNIFICANT CHANGE UP
FLUBV AG NPH QL: POSITIVE
GAS PNL BLDV: 137 MMOL/L — SIGNIFICANT CHANGE UP (ref 136–145)
GAS PNL BLDV: SIGNIFICANT CHANGE UP
GLUCOSE BLDC GLUCOMTR-MCNC: 235 MG/DL — HIGH (ref 70–99)
GLUCOSE BLDC GLUCOMTR-MCNC: 308 MG/DL — HIGH (ref 70–99)
GLUCOSE BLDC GLUCOMTR-MCNC: 402 MG/DL — HIGH (ref 70–99)
GLUCOSE SERPL-MCNC: 151 MG/DL — HIGH (ref 70–99)
GLUCOSE UR QL: ABNORMAL
HBA1C BLD-MCNC: 9.8 % — HIGH (ref 4–5.6)
HCO3 BLDV-SCNC: 22 MMOL/L — SIGNIFICANT CHANGE UP (ref 22–29)
HCT VFR BLD CALC: 43.4 % — SIGNIFICANT CHANGE UP (ref 42–52)
HCT VFR BLDA CALC: 30.3 % — LOW (ref 34–44)
HGB BLD CALC-MCNC: 9.9 G/DL — LOW (ref 14–18)
HGB BLD-MCNC: 13.9 G/DL — LOW (ref 14–18)
IMM GRANULOCYTES NFR BLD AUTO: 0.8 % — HIGH (ref 0.1–0.3)
INR BLD: 1 RATIO — SIGNIFICANT CHANGE UP (ref 0.65–1.3)
KETONES UR-MCNC: ABNORMAL
LACTATE BLDV-MCNC: 1.3 MMOL/L — SIGNIFICANT CHANGE UP (ref 0.5–1.6)
LACTATE SERPL-SCNC: 1.1 MMOL/L — SIGNIFICANT CHANGE UP (ref 0.7–2)
LACTATE SERPL-SCNC: 2.1 MMOL/L — HIGH (ref 0.7–2)
LEUKOCYTE ESTERASE UR-ACNC: NEGATIVE — SIGNIFICANT CHANGE UP
LYMPHOCYTES # BLD AUTO: 1.25 K/UL — SIGNIFICANT CHANGE UP (ref 1.2–3.4)
LYMPHOCYTES # BLD AUTO: 14.7 % — LOW (ref 20.5–51.1)
MCHC RBC-ENTMCNC: 27.2 PG — SIGNIFICANT CHANGE UP (ref 27–31)
MCHC RBC-ENTMCNC: 32 G/DL — SIGNIFICANT CHANGE UP (ref 32–37)
MCV RBC AUTO: 84.9 FL — SIGNIFICANT CHANGE UP (ref 80–94)
MONOCYTES # BLD AUTO: 0.97 K/UL — HIGH (ref 0.1–0.6)
MONOCYTES NFR BLD AUTO: 11.4 % — HIGH (ref 1.7–9.3)
NEUTROPHILS # BLD AUTO: 6.2 K/UL — SIGNIFICANT CHANGE UP (ref 1.4–6.5)
NEUTROPHILS NFR BLD AUTO: 72.6 % — SIGNIFICANT CHANGE UP (ref 42.2–75.2)
NITRITE UR-MCNC: NEGATIVE — SIGNIFICANT CHANGE UP
NRBC # BLD: 0 /100 WBCS — SIGNIFICANT CHANGE UP (ref 0–0)
NT-PROBNP SERPL-SCNC: 189 PG/ML — SIGNIFICANT CHANGE UP (ref 0–300)
PCO2 BLDV: 36 MMHG — LOW (ref 41–51)
PH BLDV: 7.39 — SIGNIFICANT CHANGE UP (ref 7.26–7.43)
PH UR: 5.5 — SIGNIFICANT CHANGE UP (ref 5–8)
PLATELET # BLD AUTO: 225 K/UL — SIGNIFICANT CHANGE UP (ref 130–400)
PO2 BLDV: 48 MMHG — HIGH (ref 20–40)
POTASSIUM BLDV-SCNC: 3.7 MMOL/L — SIGNIFICANT CHANGE UP (ref 3.3–5.6)
POTASSIUM SERPL-MCNC: 4.4 MMOL/L — SIGNIFICANT CHANGE UP (ref 3.5–5)
POTASSIUM SERPL-SCNC: 4.4 MMOL/L — SIGNIFICANT CHANGE UP (ref 3.5–5)
PROT SERPL-MCNC: 6.3 G/DL — SIGNIFICANT CHANGE UP (ref 6–8)
PROT UR-MCNC: SIGNIFICANT CHANGE UP
PROTHROM AB SERPL-ACNC: 11.5 SEC — SIGNIFICANT CHANGE UP (ref 9.95–12.87)
RBC # BLD: 5.11 M/UL — SIGNIFICANT CHANGE UP (ref 4.7–6.1)
RBC # FLD: 15.6 % — HIGH (ref 11.5–14.5)
RSV RESULT: NEGATIVE — SIGNIFICANT CHANGE UP
RSV RNA RESP QL NAA+PROBE: NEGATIVE — SIGNIFICANT CHANGE UP
SAO2 % BLDV: 84 % — SIGNIFICANT CHANGE UP
SODIUM SERPL-SCNC: 138 MMOL/L — SIGNIFICANT CHANGE UP (ref 135–146)
SP GR SPEC: 1.03 — HIGH (ref 1.01–1.02)
TROPONIN T SERPL-MCNC: 0.01 NG/ML — SIGNIFICANT CHANGE UP
TROPONIN T SERPL-MCNC: 0.01 NG/ML — SIGNIFICANT CHANGE UP
TROPONIN T SERPL-MCNC: 0.04 NG/ML — CRITICAL HIGH
UROBILINOGEN FLD QL: SIGNIFICANT CHANGE UP
WBC # BLD: 8.53 K/UL — SIGNIFICANT CHANGE UP (ref 4.8–10.8)
WBC # FLD AUTO: 8.53 K/UL — SIGNIFICANT CHANGE UP (ref 4.8–10.8)

## 2020-01-13 PROCEDURE — 71045 X-RAY EXAM CHEST 1 VIEW: CPT | Mod: 26

## 2020-01-13 RX ORDER — INSULIN LISPRO 100/ML
VIAL (ML) SUBCUTANEOUS
Refills: 0 | Status: DISCONTINUED | OUTPATIENT
Start: 2020-01-13 | End: 2020-01-13

## 2020-01-13 RX ORDER — PANTOPRAZOLE SODIUM 20 MG/1
40 TABLET, DELAYED RELEASE ORAL
Refills: 0 | Status: DISCONTINUED | OUTPATIENT
Start: 2020-01-13 | End: 2020-01-13

## 2020-01-13 RX ORDER — CHLORHEXIDINE GLUCONATE 213 G/1000ML
1 SOLUTION TOPICAL
Refills: 0 | Status: DISCONTINUED | OUTPATIENT
Start: 2020-01-13 | End: 2020-01-13

## 2020-01-13 RX ORDER — BUDESONIDE AND FORMOTEROL FUMARATE DIHYDRATE 160; 4.5 UG/1; UG/1
2 AEROSOL RESPIRATORY (INHALATION)
Refills: 0 | Status: DISCONTINUED | OUTPATIENT
Start: 2020-01-13 | End: 2020-01-13

## 2020-01-13 RX ORDER — IPRATROPIUM/ALBUTEROL SULFATE 18-103MCG
3 AEROSOL WITH ADAPTER (GRAM) INHALATION EVERY 6 HOURS
Refills: 0 | Status: DISCONTINUED | OUTPATIENT
Start: 2020-01-13 | End: 2020-01-13

## 2020-01-13 RX ORDER — DEXTROSE 50 % IN WATER 50 %
25 SYRINGE (ML) INTRAVENOUS ONCE
Refills: 0 | Status: DISCONTINUED | OUTPATIENT
Start: 2020-01-13 | End: 2020-01-13

## 2020-01-13 RX ORDER — INSULIN GLARGINE 100 [IU]/ML
30 INJECTION, SOLUTION SUBCUTANEOUS AT BEDTIME
Refills: 0 | Status: DISCONTINUED | OUTPATIENT
Start: 2020-01-13 | End: 2020-01-13

## 2020-01-13 RX ORDER — DEXTROSE 50 % IN WATER 50 %
12.5 SYRINGE (ML) INTRAVENOUS ONCE
Refills: 0 | Status: DISCONTINUED | OUTPATIENT
Start: 2020-01-13 | End: 2020-01-13

## 2020-01-13 RX ORDER — INSULIN LISPRO 100/ML
5 VIAL (ML) SUBCUTANEOUS
Refills: 0 | Status: DISCONTINUED | OUTPATIENT
Start: 2020-01-13 | End: 2020-01-13

## 2020-01-13 RX ORDER — INSULIN LISPRO 100/ML
20 VIAL (ML) SUBCUTANEOUS
Refills: 0 | Status: DISCONTINUED | OUTPATIENT
Start: 2020-01-13 | End: 2020-01-13

## 2020-01-13 RX ORDER — ENOXAPARIN SODIUM 100 MG/ML
40 INJECTION SUBCUTANEOUS DAILY
Refills: 0 | Status: DISCONTINUED | OUTPATIENT
Start: 2020-01-13 | End: 2020-01-13

## 2020-01-13 RX ORDER — SODIUM CHLORIDE 9 MG/ML
1000 INJECTION, SOLUTION INTRAVENOUS
Refills: 0 | Status: DISCONTINUED | OUTPATIENT
Start: 2020-01-13 | End: 2020-01-13

## 2020-01-13 RX ORDER — GLUCAGON INJECTION, SOLUTION 0.5 MG/.1ML
1 INJECTION, SOLUTION SUBCUTANEOUS ONCE
Refills: 0 | Status: DISCONTINUED | OUTPATIENT
Start: 2020-01-13 | End: 2020-01-13

## 2020-01-13 RX ORDER — DEXTROSE 50 % IN WATER 50 %
15 SYRINGE (ML) INTRAVENOUS ONCE
Refills: 0 | Status: DISCONTINUED | OUTPATIENT
Start: 2020-01-13 | End: 2020-01-13

## 2020-01-13 RX ORDER — HUMAN INSULIN 100 [IU]/ML
15 INJECTION, SUSPENSION SUBCUTANEOUS AT BEDTIME
Refills: 0 | Status: DISCONTINUED | OUTPATIENT
Start: 2020-01-13 | End: 2020-01-13

## 2020-01-13 RX ORDER — ACETAMINOPHEN 500 MG
650 TABLET ORAL EVERY 6 HOURS
Refills: 0 | Status: DISCONTINUED | OUTPATIENT
Start: 2020-01-13 | End: 2020-01-13

## 2020-01-13 RX ORDER — MONTELUKAST 4 MG/1
10 TABLET, CHEWABLE ORAL DAILY
Refills: 0 | Status: DISCONTINUED | OUTPATIENT
Start: 2020-01-13 | End: 2020-01-13

## 2020-01-13 RX ORDER — INSULIN LISPRO 100/ML
10 VIAL (ML) SUBCUTANEOUS ONCE
Refills: 0 | Status: COMPLETED | OUTPATIENT
Start: 2020-01-13 | End: 2020-01-13

## 2020-01-13 RX ADMIN — Medication 125 MILLIGRAM(S): at 00:23

## 2020-01-13 RX ADMIN — SODIUM CHLORIDE 1000 MILLILITER(S): 9 INJECTION INTRAMUSCULAR; INTRAVENOUS; SUBCUTANEOUS at 23:26

## 2020-01-13 RX ADMIN — BUDESONIDE AND FORMOTEROL FUMARATE DIHYDRATE 2 PUFF(S): 160; 4.5 AEROSOL RESPIRATORY (INHALATION) at 07:29

## 2020-01-13 RX ADMIN — Medication 5 UNIT(S): at 12:37

## 2020-01-13 RX ADMIN — Medication 40 MILLIGRAM(S): at 13:31

## 2020-01-13 RX ADMIN — Medication 6: at 12:36

## 2020-01-13 RX ADMIN — Medication 3 MILLILITER(S): at 00:23

## 2020-01-13 RX ADMIN — SODIUM CHLORIDE 1000 MILLILITER(S): 9 INJECTION, SOLUTION INTRAVENOUS at 00:26

## 2020-01-13 RX ADMIN — Medication 10 UNIT(S): at 13:31

## 2020-01-13 RX ADMIN — SODIUM CHLORIDE 50 MILLILITER(S): 9 INJECTION, SOLUTION INTRAVENOUS at 04:33

## 2020-01-13 RX ADMIN — Medication 4: at 09:11

## 2020-01-13 RX ADMIN — PANTOPRAZOLE SODIUM 40 MILLIGRAM(S): 20 TABLET, DELAYED RELEASE ORAL at 07:28

## 2020-01-13 RX ADMIN — Medication 40 MILLIGRAM(S): at 05:59

## 2020-01-13 RX ADMIN — MONTELUKAST 10 MILLIGRAM(S): 4 TABLET, CHEWABLE ORAL at 12:20

## 2020-01-13 RX ADMIN — Medication 50 GRAM(S): at 00:27

## 2020-01-13 RX ADMIN — ENOXAPARIN SODIUM 40 MILLIGRAM(S): 100 INJECTION SUBCUTANEOUS at 12:21

## 2020-01-13 RX ADMIN — Medication 75 MILLIGRAM(S): at 04:34

## 2020-01-13 RX ADMIN — CEFTRIAXONE 100 MILLIGRAM(S): 500 INJECTION, POWDER, FOR SOLUTION INTRAMUSCULAR; INTRAVENOUS at 00:23

## 2020-01-13 RX ADMIN — Medication 5 UNIT(S): at 09:12

## 2020-01-13 RX ADMIN — AZITHROMYCIN 255 MILLIGRAM(S): 500 TABLET, FILM COATED ORAL at 01:16

## 2020-01-13 NOTE — H&P ADULT - ATTENDING COMMENTS
74 years old male known to have COPD, asthma, DMII on insulin, prostate cancer s/p TURP 1999, constipation presented to the ED for shortness of breath- found to be flu B positive    Pt seen and examined in ER with RN- pt feeling much better    good O2 sat on RA    afebrile    spoke with RN    tamiflu ofr total 5 days    IV hydration- change to PO hydration    PO prednisone 40mg for 5 days    OOB    if remains afebrile and O2 sat over 92,  DC home later today

## 2020-01-13 NOTE — ED PROVIDER NOTE - OBJECTIVE STATEMENT
Patient is BIB family c/o has not been feeling well for one week, had some nasal congestion and cough, but today suddenly started having fever, chills, sob, wheezing, generalized weakness, all the symptoms continued and got worse, so was brought to ED for evaluation. Denies n/v/abd pain, no rash, no swelling; no travel. X-ray and EKG WNL, patient declined analgesics, PMD or clinic follow up recommended for reassessment. Patient is aware of signs/symptoms to return to the emergency department.

## 2020-01-13 NOTE — H&P ADULT - ASSESSMENT
74 years old male known to have COPD, asthma, DMII on insulin, prostate cancer s/p TURP 1999, constipation presented to the ED for shortness of breath, cough x 1 day.    In the ED, RVP was positive for Flu B, Pt was initially tachy to 121 sinus tachy, s/p 2 L ivf, s/p rocephin and azithro in the ED. Started Tamiflu. Tmax 100.8, 74 years old male known to have COPD, asthma, DMII on insulin, prostate cancer s/p TURP 1999, constipation presented to the ED for shortness of breath, cough x 1 day.    In the ED, RVP was positive for Flu B, Pt was initially tachy to 121 sinus tachy, s/p 2 L ivf, s/p rocephin and azithro in the ED. Started Tamiflu. Tmax 100.8      Assessment and Plan:    # Acute hypoxic resp failure likely secondary to Flu B  # h/o COPD not on home O2 exacerbation  # Asthma  SIRS positive on admission >> Tachy 121, Resp rate 20/min, Tmax 38.2  s/p rocephin and azithro in ED  s/p 2 L ivf  Cxr does not show focal consolidation (fu official read)  Pt takes prednisone 10 mg daily at home        Plan:  Contact isolation  Tamiflu x 5 days  wean off O2 in the morning  cw ivf   Tylenol prn for fever  guaifenesin for cough  fu cxr official read  cw levofloxacin  fu sputum and blood Cx / lactate  would start solumedrol 40 bid for 12 hours, switch to taper on discharge  duoneb q6 prn  symbicort        # Troponin elevation  admit to tele  fu repeat trops, if stable can dc tele in 24 hours  could be secondary to demand ischemia      # DM type 2  FS AC + HS  Carb consistent diet  Insulin protocol  fu HbA1c      DVT ppx: Lovenox  GI ppx: PPI  diet: carb consistent  Activity as tolerated

## 2020-01-13 NOTE — ED PROVIDER NOTE - CLINICAL SUMMARY MEDICAL DECISION MAKING FREE TEXT BOX
Patient remained hemodynamically stable in ED, labs/EKG/CXR findings noted, medications given, is admitted to Medicine in stable condition for further evaluation and care.

## 2020-01-13 NOTE — H&P ADULT - NSHPSOCIALHISTORY_GEN_ALL_CORE
Lives with wife.  ex smoker, quit 40 years ago, could not provide further details.  Denies consuming Etoh or illicit drugs

## 2020-01-13 NOTE — ED ADULT NURSE NOTE - OBJECTIVE STATEMENT
Pt is c/o chest pains after coughing for 2weeks and SOB. Pt has HX Asthma, was put on medication for his cough, but now, he feels weakness at his joint, difficulties breathing ,fever and coughing.  Pains with cough that radiates to his back.

## 2020-01-13 NOTE — DISCHARGE NOTE NURSING/CASE MANAGEMENT/SOCIAL WORK - PATIENT PORTAL LINK FT
You can access the FollowMyHealth Patient Portal offered by [facility] by registering at the following website: [email or web address]. By joining [employer]’s FollowMyHealth portal, you will also be able to view your health information using other applications (apps) compatible with our system.

## 2020-01-13 NOTE — PROGRESS NOTE ADULT - SUBJECTIVE AND OBJECTIVE BOX
Patient was seen and examined in ER- feeling better. Spoke with RN. Chart reviewed.  No new events overnight; afebrile and normotensive since midnight  Vital Signs Last 24 Hrs  T(F): 98.4 (2020 08:17), Max: 100.8 (2020 22:33)  HR: 102 (2020 08:17) (97 - 121)  BP: 134/64 (2020 08:17) (123/64 - 137/62)  SpO2: 97% (2020 08:17) (95% - 98%)  MEDICATIONS  (STANDING):  budesonide 160 MICROgram(s)/formoterol 4.5 MICROgram(s) Inhaler 2 Puff(s) Inhalation two times a day  chlorhexidine 4% Liquid 1 Application(s) Topical <User Schedule>  dextrose 5%. 1000 milliLiter(s) (50 mL/Hr) IV Continuous <Continuous>  dextrose 50% Injectable 12.5 Gram(s) IV Push once  dextrose 50% Injectable 25 Gram(s) IV Push once  dextrose 50% Injectable 25 Gram(s) IV Push once  enoxaparin Injectable 40 milliGRAM(s) SubCutaneous daily  guaiFENesin   Syrup  (Sugar-Free) 200 milliGRAM(s) Oral every 4 hours  insulin lispro (HumaLOG) corrective regimen sliding scale   SubCutaneous three times a day before meals  insulin lispro Injectable (HumaLOG) 5 Unit(s) SubCutaneous three times a day before meals  insulin NPH human recombinant 15 Unit(s) SubCutaneous at bedtime  lactated ringers. 1000 milliLiter(s) (50 mL/Hr) IV Continuous <Continuous>  levoFLOXacin IVPB      methylPREDNISolone sodium succinate Injectable 40 milliGRAM(s) IV Push two times a day  montelukast 10 milliGRAM(s) Oral daily  oseltamivir 75 milliGRAM(s) Oral two times a day  pantoprazole    Tablet 40 milliGRAM(s) Oral before breakfast    MEDICATIONS  (PRN):  acetaminophen   Tablet .. 650 milliGRAM(s) Oral once PRN Temp greater or equal to 38C (100.4F)  acetaminophen   Tablet .. 650 milliGRAM(s) Oral every 6 hours PRN Temp greater or equal to 38C (100.4F)  albuterol/ipratropium for Nebulization 3 milliLiter(s) Nebulizer every 6 hours PRN Shortness of Breath and/or Wheezing  dextrose 40% Gel 15 Gram(s) Oral once PRN Blood Glucose LESS THAN 70 milliGRAM(s)/deciliter  glucagon  Injectable 1 milliGRAM(s) IntraMuscular once PRN Glucose LESS THAN 70 milligrams/deciliter    Labs:                        13.9   8.53  )-----------( 225      ( 2020 00:02 )             43.4     2020 00:02    138    |  104    |  12     ----------------------------<  151    4.4     |  20     |  1.1      Ca    8.5        2020 00:02    TPro  6.3    /  Alb  3.8    /  TBili  0.7    /  DBili  0.2    /  AST  21     /  ALT  18     /  AlkPhos  50     2020 00:02    PT/INR - ( 2020 00:02 )   PT: 11.50 sec;   INR: 1.00 ratio         PTT - ( 2020 00:02 )  PTT:32.5 sec  Urinalysis Basic - ( 2020 05:10 )    Color: Yellow / Appearance: Clear / S.028 / pH: x  Gluc: x / Ketone: Large  / Bili: Negative / Urobili: <2 mg/dL   Blood: x / Protein: Trace / Nitrite: Negative   Leuk Esterase: Negative / RBC: x / WBC x   Sq Epi: x / Non Sq Epi: x / Bacteria: x        General: comfortable, NAD  Neurology:  nonfocal  Head:  Normocephalic, atraumatic  ENT:  Mucosa moist, no ulcerations  Neck:  Supple, no JVD,   Resp: CTA B/L  CV: RRR, S1S2,   GI: Soft, NT, bowel sounds  MS: No edema, + peripheral pulses,      A/P:  74 years old male known to have COPD, asthma, DMII on insulin, prostate cancer s/p TURP , constipation presented to the ED for shortness of breath- found to be flu B positive    Pt seen and examined in ER with RN- pt feeling much better    good O2 sat on RA    afebrile    spoke with RN    tamiflu for total 5 days    IV hydration- change to PO hydration    PO prednisone 40mg for 5 days    OOB    if remains afebrile and O2 sat over 92,  DC home later today  DVT prophylaxis  Decubitus prevention- all measures as per RN protocol  Please call or text me with any questions or updates

## 2020-01-13 NOTE — CONSULT NOTE ADULT - ASSESSMENT
Asthma exacerbation  Influenza  Allergic rhinitis  Bronchiectasis  Pulmonary nodule        cont prednisoen 40 mg daily for 5 days then 20 mg for 5 days then 10 mg daily and continue  resume home inhalers on discharge (symbicort and incruse)  d/c iv abx  albuterol/atrovent nebs q 4 hours prn  tamiflu x 5 days  dvt/gi px  ct chest as outpt  d/c planning with out pt follow up within 1 week

## 2020-01-13 NOTE — ED PROVIDER NOTE - CARE PLAN
Principal Discharge DX:	Fever  Secondary Diagnosis:	Influenza  Secondary Diagnosis:	Asthma exacerbation  Secondary Diagnosis:	Dehydration  Secondary Diagnosis:	Troponin level elevated

## 2020-01-13 NOTE — ED PROVIDER NOTE - ADMIT DISPOSITION PRESENT ON ADMISSION SEPSIS
[facility] EMERGENCY DEPT 
[address] [id number] 
[phone] Work/School Note Date: 1/22/2017 To Whom It May concern: 
 
Yi Daniel was seen and treated today in the emergency room by the following provider(s): 
Attending Provider: Nina Carmona MD.   
 
Yi Daniel may return to work on 1/24/2017. Sincerely, Nina Carmona MD 
 
 
 No

## 2020-01-13 NOTE — DISCHARGE NOTE PROVIDER - HOSPITAL COURSE
Mr. Gallo is a 74 years old male known to have COPD, asthma, DMII on insulin, prostate cancer s/p TURP 1999, constipation presented to the ED for shortness of breath, cough x 1 day.        As per pt's wife, he has difficulty breathing at baseline, even at rest, gets short of breath just by walking 1 block, not supplemental O2 dependant. Uses 2 pillows at night to sleep, Wife states he wakes up at night multiple times due to SOB. But since yesterday morning shortness of breath got worsened, even at rest. Pt used his inhalers and nebulizers but it did not help. Cough is productive with greenish yellow phlegm. Also endorsed fever but did not check temperature at home and chest tightness due to frequent bouts of cough.     Pt and wife denies any sick contact, recent travel, abdominal pain, nausea, vomiting, change in appetite or weight.    Pt uses cane to ambulate.        In the ED, RVP was positive for Flu B, Pt was initially tachy to 121 sinus tachy, s/p 2 L ivf, s/p rocephin and azithro in the ED. Started Tamiflu. Tmax 100.8, Mr. Gallo is a 74 years old male known to have COPD, asthma, DMII on insulin, prostate cancer s/p TURP 1999, constipation presented to the ED for shortness of breath, cough x 1 day.        As per pt's wife, he has difficulty breathing at baseline, even at rest, gets short of breath just by walking 1 block, not supplemental O2 dependant. Uses 2 pillows at night to sleep, Wife states he wakes up at night multiple times due to SOB. But since yesterday morning shortness of breath got worsened, even at rest. Pt used his inhalers and nebulizers but it did not help. Cough is productive with greenish yellow phlegm. Also endorsed fever but did not check temperature at home and chest tightness due to frequent bouts of cough.     Pt and wife denies any sick contact, recent travel, abdominal pain, nausea, vomiting, change in appetite or weight.    In the ED, RVP was positive for Flu B, Pt was initially tachy to 121 sinus tachy,. Started Tamiflu.     Patient is staurating well on room air, no dyspnea on exertion, he was seen by pulmonary Dr. Oates and patient is stable for discharge. He is to continue tamiflu for 5 days, prednisone for 10 days. Mr. Gallo is a 74 years old male known to have COPD, asthma, DMII on insulin, prostate cancer s/p TURP 1999, constipation presented to the ED for shortness of breath, cough x 1 day.        As per pt's wife, he has difficulty breathing at baseline, even at rest, gets short of breath just by walking 1 block, not supplemental O2 dependant. Uses 2 pillows at night to sleep, Wife states he wakes up at night multiple times due to SOB. But since yesterday morning shortness of breath got worsened, even at rest. Pt used his inhalers and nebulizers but it did not help. Cough is productive with greenish yellow phlegm. Also endorsed fever but did not check temperature at home and chest tightness due to frequent bouts of cough.     Pt and wife denies any sick contact, recent travel, abdominal pain, nausea, vomiting, change in appetite or weight.    In the ED, RVP was positive for Flu B, Pt was initially tachy to 121 sinus tachy,. Started Tamiflu.     Patient is staurating well on room air, no dyspnea on exertion, he was seen by pulmonary Dr. Oates and patient is stable for discharge. He is to continue tamiflu for 5 days, prednisone for 10 days, and follow with Dr. Oates in 1 week.

## 2020-01-13 NOTE — H&P ADULT - HISTORY OF PRESENT ILLNESS
74 years old male known to have COPD, asthma, DMII, prostate cancer s/p TURP 1999 presented to the ED for 74 years old male known to have COPD, asthma, DMII on insulin, prostate cancer s/p TURP 1999, constipation presented to the ED for shortness of breath, cough x 1 day.    As per pt's wife, he has difficulty breathing at baseline, even at rest, gets short of breath just by walking 1 block, not supplemental O2 dependant. Uses 2 pillows at night to sleep, Wife states he wakes up at night multiple times due to SOB. But since yesterday morning shortness of breath got worsened, even at rest. Pt used his inhalers and nebulizers but it did not help. Cough is productive with greenish yellow phlegm. Also endorsed fever but did not check temperature at home and chest tightness due to frequent bouts of cough.   Pt and wife denies any sick contact, recent travel, abdominal pain, nausea, vomiting, change in appetite or weight.  Pt uses cane to ambulate.    In the ED, RVP was positive for Flu B, Pt was initially tachy to 121 sinus tachy, s/p 2 L ivf, s/p rocephin and azithro in the ED. Started Tamiflu. Tmax 100.8,

## 2020-01-13 NOTE — CONSULT NOTE ADULT - SUBJECTIVE AND OBJECTIVE BOX
DEL ALVARADO  MRN-[id number]    HISTORY OF PRESENT ILLNESS:    74 years old male known to have asthma, DMII on insulin, prostate cancer s/p TURP , constipation presented to the ED for shortness of breath, cough wheezing    PAtient is short of breath just by walking 1 block, not supplemental O2 dependant. Uses 2 pillows at night to sleep, Wife states he wakes up at night multiple times due to SOB. But since yesterday morning shortness of breath got worsened, even at rest. Pt used his inhalers and nebulizers but it did not help. Cough is productive with greenish yellow phlegm. Also endorsed fever but did not check temperature at home and chest tightness due to frequent bouts of cough.   Pt and wife denies any sick contact, recent travel, abdominal pain, nausea, vomiting, change in appetite or weight.  Pt uses cane to ambulate.    In the ED, RVP was positive for Flu B, Pt was initially tachy to 121 sinus tachy, s/p 2 L ivf, s/p rocephin and azithro in the ED. Started Tamiflu. Tmax 100.8, (2020 02:16)      PMH/PSH:  PAST MEDICAL & SURGICAL HISTORY:  COPD (chronic obstructive pulmonary disease)  Asthma  Prostate cancer  Diabetes  History of surgery: back surgery  H/O radical prostatectomy  S/P TURP    ALLERGIES:  Allergies    No Known Allergies    Intolerances      SOCIAL HABITS:  negative x 3    FAMILY HISTORY:   n/c      REVIEW OF SYSTEM:  Elements of review of systems are negative or non-applicable except as noted above in HPI section.       HOME MEDICATIONS:  albuterol 90 mcg/inh inhalation aerosol  Incruse Ellipta 62.5 mcg/inh inhalation powder  Lantus 100 units/mL subcutaneous solution  montelukast 10 mg oral tablet  Mucinex  predniSONE 10 mg oral tablet    MEDICATIONS:  MEDICATIONS  (STANDING):  budesonide 160 MICROgram(s)/formoterol 4.5 MICROgram(s) Inhaler 2 Puff(s) Inhalation two times a day  chlorhexidine 4% Liquid 1 Application(s) Topical <User Schedule>  dextrose 5%. 1000 milliLiter(s) (50 mL/Hr) IV Continuous <Continuous>  dextrose 50% Injectable 12.5 Gram(s) IV Push once  dextrose 50% Injectable 25 Gram(s) IV Push once  dextrose 50% Injectable 25 Gram(s) IV Push once  enoxaparin Injectable 40 milliGRAM(s) SubCutaneous daily  insulin glargine Injectable (LANTUS) 30 Unit(s) SubCutaneous at bedtime  insulin lispro (HumaLOG) corrective regimen sliding scale   SubCutaneous three times a day before meals  insulin lispro Injectable (HumaLOG) 20 Unit(s) SubCutaneous three times a day before meals  levoFLOXacin IVPB      montelukast 10 milliGRAM(s) Oral daily  oseltamivir 75 milliGRAM(s) Oral two times a day  pantoprazole    Tablet 40 milliGRAM(s) Oral before breakfast  predniSONE   Tablet 40 milliGRAM(s) Oral daily    MEDICATIONS  (PRN):  acetaminophen   Tablet .. 650 milliGRAM(s) Oral once PRN Temp greater or equal to 38C (100.4F)  acetaminophen   Tablet .. 650 milliGRAM(s) Oral every 6 hours PRN Temp greater or equal to 38C (100.4F)  albuterol/ipratropium for Nebulization 3 milliLiter(s) Nebulizer every 6 hours PRN Shortness of Breath and/or Wheezing  dextrose 40% Gel 15 Gram(s) Oral once PRN Blood Glucose LESS THAN 70 milliGRAM(s)/deciliter  glucagon  Injectable 1 milliGRAM(s) IntraMuscular once PRN Glucose LESS THAN 70 milligrams/deciliter        VITALS:   Vital Signs Last 24 Hrs  T(C): 37.1 (2020 13:02), Max: 38.2 (2020 22:33)  T(F): 98.8 (2020 13:02), Max: 100.8 (2020 22:33)  HR: 106 (2020 13:02) (97 - 121)  BP: 133/73 (2020 13:02) (123/64 - 137/62)  BP(mean): --  RR: 20 (2020 13:02) (19 - 20)  SpO2: 95% (2020 13:02) (95% - 98%)    77.1      PHYSICAL EXAM:    GENERAL: NAD  HEAD:  Atraumatic, Normocephalic  NECK: Supple, No JVD  CHEST/LUNG: diffuse wheeze  HEART: Regular rate and rhythm; No murmurs  ABDOMEN: Soft, Nontender, Nondistended  EXTREMITIES:  Good peripheral Pulses, No clubbing, cyanosis, or edema      LABS:                        13.9   8.53  )-----------( 225      ( 2020 00:02 )             43.4     -13    138  |  104  |  12  ----------------------------<  151<H>  4.4   |  20  |  1.1    Ca    8.5      2020 00:02    TPro  6.3  /  Alb  3.8  /  TBili  0.7  /  DBili  0.2  /  AST  21  /  ALT  18  /  AlkPhos  50  -13    LIVER FUNCTIONS - ( 2020 00:02 )  Alb: 3.8 g/dL / Pro: 6.3 g/dL / ALK PHOS: 50 U/L / ALT: 18 U/L / AST: 21 U/L / GGT: x           CARDIAC MARKERS ( 2020 11:10 )  x     / 0.01 ng/mL / x     / x     / x      CARDIAC MARKERS ( 2020 05:22 )  x     / 0.01 ng/mL / x     / x     / x      CARDIAC MARKERS ( 2020 00:02 )  x     / 0.04 ng/mL / x     / x     / x          PT/INR - ( 2020 00:02 )   PT: 11.50 sec;   INR: 1.00 ratio         PTT - ( 2020 00:02 )  PTT:32.5 sec    Urinalysis Basic - ( 2020 05:10 )    Color: Yellow / Appearance: Clear / S.028 / pH: x  Gluc: x / Ketone: Large  / Bili: Negative / Urobili: <2 mg/dL   Blood: x / Protein: Trace / Nitrite: Negative   Leuk Esterase: Negative / RBC: x / WBC x   Sq Epi: x / Non Sq Epi: x / Bacteria: x          ABG & VENT SETTINGS (when applicable)        DIAGNOSTIC STUDIES:

## 2020-01-13 NOTE — DISCHARGE NOTE PROVIDER - NSDCCPCAREPLAN_GEN_ALL_CORE_FT
PRINCIPAL DISCHARGE DIAGNOSIS  Diagnosis: Influenza B  Assessment and Plan of Treatment: Your cough and shortness of breath are due to Influenza B infection which is a viral infection that needs to be treated with Tamiflu 1 tab two times a day for 5 days. Please follow with Dr. Oates after finishing the course of anitvirals.      SECONDARY DISCHARGE DIAGNOSES  Diagnosis: Asthma exacerbation  Assessment and Plan of Treatment: Your shortness of breath is worse secondary to influenxa infection. Please continue your home inhalers. Please continue prednisone as prescribed.

## 2020-01-13 NOTE — DISCHARGE NOTE PROVIDER - NSDCMRMEDTOKEN_GEN_ALL_CORE_FT
albuterol 90 mcg/inh inhalation aerosol: 2 puff(s) inhaled 4 times a day  Breo Ellipta 200 mcg-25 mcg/inh inhalation powder: 1 puff(s) inhaled once a day   Incruse Ellipta 62.5 mcg/inh inhalation powder: 1 inhaler(s) inhaled once a day  insulin lispro (concentrated) 200 units/mL subcutaneous solution: 20 unit(s) subcutaneous 3 times a day (before meals)   Lantus 100 units/mL subcutaneous solution: 30  subcutaneous once a day (at bedtime)  montelukast 10 mg oral tablet: 1 tab(s) orally once a day  Mucinex: 1200 cap(s) orally once a day  predniSONE 10 mg oral tablet: 1 tab(s) orally once a day albuterol 90 mcg/inh inhalation aerosol: 2 puff(s) inhaled 4 times a day  Breo Ellipta 200 mcg-25 mcg/inh inhalation powder: 1 puff(s) inhaled once a day   Incruse Ellipta 62.5 mcg/inh inhalation powder: 1 inhaler(s) inhaled once a day  insulin lispro (concentrated) 200 units/mL subcutaneous solution: 20 unit(s) subcutaneous 3 times a day (before meals)   Lantus 100 units/mL subcutaneous solution: 30  subcutaneous once a day (at bedtime)  montelukast 10 mg oral tablet: 1 tab(s) orally once a day  Mucinex: 1200 cap(s) orally once a day  oseltamivir 75 mg oral capsule: 1 cap(s) orally 2 times a day  predniSONE 10 mg oral tablet: 1 tab(s) orally once a day

## 2020-01-13 NOTE — DISCHARGE NOTE PROVIDER - CARE PROVIDER_API CALL
Annemarie Oates)  Internal Medicine  [address] [place] [address]  Phone: [phone]  Fax: [phone]  Follow Up Time: 1 week

## 2020-01-14 LAB
CULTURE RESULTS: SIGNIFICANT CHANGE UP
SPECIMEN SOURCE: SIGNIFICANT CHANGE UP

## 2020-01-16 DIAGNOSIS — J45.901 UNSPECIFIED ASTHMA WITH (ACUTE) EXACERBATION: ICD-10-CM

## 2020-01-16 DIAGNOSIS — J10.1 INFLUENZA DUE TO OTHER IDENTIFIED INFLUENZA VIRUS WITH OTHER RESPIRATORY MANIFESTATIONS: ICD-10-CM

## 2020-01-16 DIAGNOSIS — J96.01 ACUTE RESPIRATORY FAILURE WITH HYPOXIA: ICD-10-CM

## 2020-01-16 DIAGNOSIS — Z85.46 PERSONAL HISTORY OF MALIGNANT NEOPLASM OF PROSTATE: ICD-10-CM

## 2020-01-16 DIAGNOSIS — J47.9 BRONCHIECTASIS, UNCOMPLICATED: ICD-10-CM

## 2020-01-16 DIAGNOSIS — R91.1 SOLITARY PULMONARY NODULE: ICD-10-CM

## 2020-01-16 DIAGNOSIS — Z79.4 LONG TERM (CURRENT) USE OF INSULIN: ICD-10-CM

## 2020-01-16 DIAGNOSIS — E11.9 TYPE 2 DIABETES MELLITUS WITHOUT COMPLICATIONS: ICD-10-CM

## 2020-01-16 DIAGNOSIS — E86.0 DEHYDRATION: ICD-10-CM

## 2020-01-16 DIAGNOSIS — J30.9 ALLERGIC RHINITIS, UNSPECIFIED: ICD-10-CM

## 2020-01-18 LAB
CULTURE RESULTS: SIGNIFICANT CHANGE UP
SPECIMEN SOURCE: SIGNIFICANT CHANGE UP

## 2020-01-21 ENCOUNTER — INPATIENT (INPATIENT)
Facility: HOSPITAL | Age: 75
LOS: 5 days | Discharge: HOME | End: 2020-01-27
Attending: INTERNAL MEDICINE | Admitting: INTERNAL MEDICINE
Payer: MEDICARE

## 2020-01-21 VITALS
DIASTOLIC BLOOD PRESSURE: 81 MMHG | OXYGEN SATURATION: 100 % | RESPIRATION RATE: 20 BRPM | HEART RATE: 105 BPM | TEMPERATURE: 98 F | SYSTOLIC BLOOD PRESSURE: 141 MMHG

## 2020-01-21 DIAGNOSIS — E87.5 HYPERKALEMIA: ICD-10-CM

## 2020-01-21 DIAGNOSIS — J44.9 CHRONIC OBSTRUCTIVE PULMONARY DISEASE, UNSPECIFIED: ICD-10-CM

## 2020-01-21 DIAGNOSIS — A41.89 OTHER SPECIFIED SEPSIS: ICD-10-CM

## 2020-01-21 DIAGNOSIS — Z90.79 ACQUIRED ABSENCE OF OTHER GENITAL ORGAN(S): Chronic | ICD-10-CM

## 2020-01-21 DIAGNOSIS — Z90.79 ACQUIRED ABSENCE OF OTHER GENITAL ORGAN(S): ICD-10-CM

## 2020-01-21 DIAGNOSIS — E11.9 TYPE 2 DIABETES MELLITUS WITHOUT COMPLICATIONS: ICD-10-CM

## 2020-01-21 DIAGNOSIS — Z85.46 PERSONAL HISTORY OF MALIGNANT NEOPLASM OF PROSTATE: ICD-10-CM

## 2020-01-21 DIAGNOSIS — Z87.891 PERSONAL HISTORY OF NICOTINE DEPENDENCE: ICD-10-CM

## 2020-01-21 DIAGNOSIS — J45.901 UNSPECIFIED ASTHMA WITH (ACUTE) EXACERBATION: ICD-10-CM

## 2020-01-21 DIAGNOSIS — K59.00 CONSTIPATION, UNSPECIFIED: ICD-10-CM

## 2020-01-21 DIAGNOSIS — J10.1 INFLUENZA DUE TO OTHER IDENTIFIED INFLUENZA VIRUS WITH OTHER RESPIRATORY MANIFESTATIONS: ICD-10-CM

## 2020-01-21 DIAGNOSIS — J47.1 BRONCHIECTASIS WITH (ACUTE) EXACERBATION: ICD-10-CM

## 2020-01-21 DIAGNOSIS — R91.1 SOLITARY PULMONARY NODULE: ICD-10-CM

## 2020-01-21 DIAGNOSIS — Z98.890 OTHER SPECIFIED POSTPROCEDURAL STATES: Chronic | ICD-10-CM

## 2020-01-21 DIAGNOSIS — Z79.4 LONG TERM (CURRENT) USE OF INSULIN: ICD-10-CM

## 2020-01-21 LAB
ALBUMIN SERPL ELPH-MCNC: 3.6 G/DL — SIGNIFICANT CHANGE UP (ref 3.5–5.2)
ALBUMIN SERPL ELPH-MCNC: 3.7 G/DL — SIGNIFICANT CHANGE UP (ref 3.5–5.2)
ALP SERPL-CCNC: 55 U/L — SIGNIFICANT CHANGE UP (ref 30–115)
ALP SERPL-CCNC: 56 U/L — SIGNIFICANT CHANGE UP (ref 30–115)
ALT FLD-CCNC: 15 U/L — SIGNIFICANT CHANGE UP (ref 0–41)
ALT FLD-CCNC: 17 U/L — SIGNIFICANT CHANGE UP (ref 0–41)
ANION GAP SERPL CALC-SCNC: 15 MMOL/L — HIGH (ref 7–14)
ANION GAP SERPL CALC-SCNC: 19 MMOL/L — HIGH (ref 7–14)
ANION GAP SERPL CALC-SCNC: 21 MMOL/L — HIGH (ref 7–14)
APTT BLD: 24.9 SEC — LOW (ref 27–39.2)
AST SERPL-CCNC: 14 U/L — SIGNIFICANT CHANGE UP (ref 0–41)
AST SERPL-CCNC: 24 U/L — SIGNIFICANT CHANGE UP (ref 0–41)
B-OH-BUTYR SERPL-SCNC: <0.2 MMOL/L — SIGNIFICANT CHANGE UP
BASE EXCESS BLDV CALC-SCNC: 0.7 MMOL/L — SIGNIFICANT CHANGE UP (ref -2–2)
BASOPHILS # BLD AUTO: 0 K/UL — SIGNIFICANT CHANGE UP (ref 0–0.2)
BASOPHILS NFR BLD AUTO: 0 % — SIGNIFICANT CHANGE UP (ref 0–1)
BILIRUB SERPL-MCNC: 0.5 MG/DL — SIGNIFICANT CHANGE UP (ref 0.2–1.2)
BILIRUB SERPL-MCNC: 1.1 MG/DL — SIGNIFICANT CHANGE UP (ref 0.2–1.2)
BUN SERPL-MCNC: 15 MG/DL — SIGNIFICANT CHANGE UP (ref 10–20)
BUN SERPL-MCNC: 18 MG/DL — SIGNIFICANT CHANGE UP (ref 10–20)
BUN SERPL-MCNC: 19 MG/DL — SIGNIFICANT CHANGE UP (ref 10–20)
CA-I SERPL-SCNC: 1.17 MMOL/L — SIGNIFICANT CHANGE UP (ref 1.12–1.3)
CALCIUM SERPL-MCNC: 8.8 MG/DL — SIGNIFICANT CHANGE UP (ref 8.5–10.1)
CALCIUM SERPL-MCNC: 9 MG/DL — SIGNIFICANT CHANGE UP (ref 8.5–10.1)
CALCIUM SERPL-MCNC: 9.3 MG/DL — SIGNIFICANT CHANGE UP (ref 8.5–10.1)
CHLORIDE SERPL-SCNC: 101 MMOL/L — SIGNIFICANT CHANGE UP (ref 98–110)
CHLORIDE SERPL-SCNC: 103 MMOL/L — SIGNIFICANT CHANGE UP (ref 98–110)
CHLORIDE SERPL-SCNC: 95 MMOL/L — LOW (ref 98–110)
CO2 SERPL-SCNC: 15 MMOL/L — LOW (ref 17–32)
CO2 SERPL-SCNC: 16 MMOL/L — LOW (ref 17–32)
CO2 SERPL-SCNC: 19 MMOL/L — SIGNIFICANT CHANGE UP (ref 17–32)
CREAT SERPL-MCNC: 0.9 MG/DL — SIGNIFICANT CHANGE UP (ref 0.7–1.5)
CREAT SERPL-MCNC: 1.1 MG/DL — SIGNIFICANT CHANGE UP (ref 0.7–1.5)
CREAT SERPL-MCNC: 1.2 MG/DL — SIGNIFICANT CHANGE UP (ref 0.7–1.5)
EOSINOPHIL # BLD AUTO: 0.24 K/UL — SIGNIFICANT CHANGE UP (ref 0–0.7)
EOSINOPHIL NFR BLD AUTO: 1.7 % — SIGNIFICANT CHANGE UP (ref 0–8)
FLU A RESULT: NEGATIVE — SIGNIFICANT CHANGE UP
FLU A RESULT: NEGATIVE — SIGNIFICANT CHANGE UP
FLUAV AG NPH QL: NEGATIVE — SIGNIFICANT CHANGE UP
FLUBV AG NPH QL: POSITIVE
GAS PNL BLDV: 136 MMOL/L — SIGNIFICANT CHANGE UP (ref 136–145)
GAS PNL BLDV: SIGNIFICANT CHANGE UP
GAS PNL BLDV: SIGNIFICANT CHANGE UP
GIANT PLATELETS BLD QL SMEAR: PRESENT — SIGNIFICANT CHANGE UP
GLUCOSE BLDC GLUCOMTR-MCNC: 281 MG/DL — HIGH (ref 70–99)
GLUCOSE BLDC GLUCOMTR-MCNC: 319 MG/DL — HIGH (ref 70–99)
GLUCOSE BLDC GLUCOMTR-MCNC: 404 MG/DL — HIGH (ref 70–99)
GLUCOSE BLDC GLUCOMTR-MCNC: 419 MG/DL — HIGH (ref 70–99)
GLUCOSE BLDC GLUCOMTR-MCNC: 424 MG/DL — HIGH (ref 70–99)
GLUCOSE BLDC GLUCOMTR-MCNC: 434 MG/DL — HIGH (ref 70–99)
GLUCOSE BLDC GLUCOMTR-MCNC: 441 MG/DL — HIGH (ref 70–99)
GLUCOSE BLDC GLUCOMTR-MCNC: 510 MG/DL — CRITICAL HIGH (ref 70–99)
GLUCOSE BLDC GLUCOMTR-MCNC: 515 MG/DL — CRITICAL HIGH (ref 70–99)
GLUCOSE BLDC GLUCOMTR-MCNC: 525 MG/DL — CRITICAL HIGH (ref 70–99)
GLUCOSE BLDC GLUCOMTR-MCNC: 555 MG/DL — CRITICAL HIGH (ref 70–99)
GLUCOSE SERPL-MCNC: 320 MG/DL — HIGH (ref 70–99)
GLUCOSE SERPL-MCNC: 377 MG/DL — HIGH (ref 70–99)
GLUCOSE SERPL-MCNC: 646 MG/DL — CRITICAL HIGH (ref 70–99)
HCO3 BLDV-SCNC: 25 MMOL/L — SIGNIFICANT CHANGE UP (ref 22–29)
HCT VFR BLD CALC: 47.8 % — SIGNIFICANT CHANGE UP (ref 42–52)
HCT VFR BLDA CALC: 48.2 % — HIGH (ref 34–44)
HGB BLD CALC-MCNC: 15.7 G/DL — SIGNIFICANT CHANGE UP (ref 14–18)
HGB BLD-MCNC: 15.8 G/DL — SIGNIFICANT CHANGE UP (ref 14–18)
INR BLD: 0.94 RATIO — SIGNIFICANT CHANGE UP (ref 0.65–1.3)
LACTATE BLDV-MCNC: 2.8 MMOL/L — HIGH (ref 0.5–1.6)
LACTATE SERPL-SCNC: 6.1 MMOL/L — CRITICAL HIGH (ref 0.7–2)
LACTATE SERPL-SCNC: 8.3 MMOL/L — CRITICAL HIGH (ref 0.7–2)
LYMPHOCYTES # BLD AUTO: 27 % — SIGNIFICANT CHANGE UP (ref 20.5–51.1)
LYMPHOCYTES # BLD AUTO: 3.75 K/UL — HIGH (ref 1.2–3.4)
MAGNESIUM SERPL-MCNC: 1.8 MG/DL — SIGNIFICANT CHANGE UP (ref 1.8–2.4)
MANUAL SMEAR VERIFICATION: SIGNIFICANT CHANGE UP
MCHC RBC-ENTMCNC: 27.7 PG — SIGNIFICANT CHANGE UP (ref 27–31)
MCHC RBC-ENTMCNC: 33.1 G/DL — SIGNIFICANT CHANGE UP (ref 32–37)
MCV RBC AUTO: 83.9 FL — SIGNIFICANT CHANGE UP (ref 80–94)
MONOCYTES # BLD AUTO: 1.08 K/UL — HIGH (ref 0.1–0.6)
MONOCYTES NFR BLD AUTO: 7.8 % — SIGNIFICANT CHANGE UP (ref 1.7–9.3)
NEUTROPHILS # BLD AUTO: 6.89 K/UL — HIGH (ref 1.4–6.5)
NEUTROPHILS NFR BLD AUTO: 49.6 % — SIGNIFICANT CHANGE UP (ref 42.2–75.2)
NT-PROBNP SERPL-SCNC: 203 PG/ML — SIGNIFICANT CHANGE UP (ref 0–300)
PCO2 BLDV: 36 MMHG — LOW (ref 41–51)
PH BLDV: 7.44 — HIGH (ref 7.26–7.43)
PLAT MORPH BLD: NORMAL — SIGNIFICANT CHANGE UP
PLATELET # BLD AUTO: 309 K/UL — SIGNIFICANT CHANGE UP (ref 130–400)
PO2 BLDV: 49 MMHG — HIGH (ref 20–40)
POTASSIUM BLDV-SCNC: 4.6 MMOL/L — SIGNIFICANT CHANGE UP (ref 3.3–5.6)
POTASSIUM SERPL-MCNC: 4.9 MMOL/L — SIGNIFICANT CHANGE UP (ref 3.5–5)
POTASSIUM SERPL-MCNC: 5.5 MMOL/L — HIGH (ref 3.5–5)
POTASSIUM SERPL-MCNC: 5.6 MMOL/L — HIGH (ref 3.5–5)
POTASSIUM SERPL-SCNC: 4.9 MMOL/L — SIGNIFICANT CHANGE UP (ref 3.5–5)
POTASSIUM SERPL-SCNC: 5.5 MMOL/L — HIGH (ref 3.5–5)
POTASSIUM SERPL-SCNC: 5.6 MMOL/L — HIGH (ref 3.5–5)
PROMYELOCYTES # FLD: 3.5 % — HIGH (ref 0–0)
PROT SERPL-MCNC: 6.3 G/DL — SIGNIFICANT CHANGE UP (ref 6–8)
PROT SERPL-MCNC: 6.6 G/DL — SIGNIFICANT CHANGE UP (ref 6–8)
PROTHROM AB SERPL-ACNC: 10.8 SEC — SIGNIFICANT CHANGE UP (ref 9.95–12.87)
RBC # BLD: 5.7 M/UL — SIGNIFICANT CHANGE UP (ref 4.7–6.1)
RBC # FLD: 15 % — HIGH (ref 11.5–14.5)
RBC BLD AUTO: NORMAL — SIGNIFICANT CHANGE UP
RSV RESULT: NEGATIVE — SIGNIFICANT CHANGE UP
RSV RNA RESP QL NAA+PROBE: NEGATIVE — SIGNIFICANT CHANGE UP
SAO2 % BLDV: 86 % — SIGNIFICANT CHANGE UP
SMUDGE CELLS # BLD: PRESENT — SIGNIFICANT CHANGE UP
SODIUM SERPL-SCNC: 130 MMOL/L — LOW (ref 135–146)
SODIUM SERPL-SCNC: 135 MMOL/L — SIGNIFICANT CHANGE UP (ref 135–146)
SODIUM SERPL-SCNC: 139 MMOL/L — SIGNIFICANT CHANGE UP (ref 135–146)
TOXIC GRANULES BLD QL SMEAR: PRESENT — SIGNIFICANT CHANGE UP
TROPONIN T SERPL-MCNC: 0.01 NG/ML — SIGNIFICANT CHANGE UP
VARIANT LYMPHS # BLD: 10.4 % — HIGH (ref 0–5)
WBC # BLD: 13.9 K/UL — HIGH (ref 4.8–10.8)
WBC # FLD AUTO: 13.9 K/UL — HIGH (ref 4.8–10.8)

## 2020-01-21 PROCEDURE — 99285 EMERGENCY DEPT VISIT HI MDM: CPT

## 2020-01-21 PROCEDURE — 93010 ELECTROCARDIOGRAM REPORT: CPT

## 2020-01-21 PROCEDURE — 71045 X-RAY EXAM CHEST 1 VIEW: CPT | Mod: 26

## 2020-01-21 RX ORDER — IPRATROPIUM/ALBUTEROL SULFATE 18-103MCG
3 AEROSOL WITH ADAPTER (GRAM) INHALATION ONCE
Refills: 0 | Status: COMPLETED | OUTPATIENT
Start: 2020-01-21 | End: 2020-01-21

## 2020-01-21 RX ORDER — INSULIN LISPRO 100/ML
5 VIAL (ML) SUBCUTANEOUS
Refills: 0 | Status: DISCONTINUED | OUTPATIENT
Start: 2020-01-21 | End: 2020-01-21

## 2020-01-21 RX ORDER — PANTOPRAZOLE SODIUM 20 MG/1
40 TABLET, DELAYED RELEASE ORAL
Refills: 0 | Status: DISCONTINUED | OUTPATIENT
Start: 2020-01-21 | End: 2020-01-27

## 2020-01-21 RX ORDER — INSULIN LISPRO 100/ML
6 VIAL (ML) SUBCUTANEOUS ONCE
Refills: 0 | Status: COMPLETED | OUTPATIENT
Start: 2020-01-21 | End: 2020-01-21

## 2020-01-21 RX ORDER — IPRATROPIUM/ALBUTEROL SULFATE 18-103MCG
3 AEROSOL WITH ADAPTER (GRAM) INHALATION EVERY 4 HOURS
Refills: 0 | Status: DISCONTINUED | OUTPATIENT
Start: 2020-01-21 | End: 2020-01-27

## 2020-01-21 RX ORDER — DEXTROSE 50 % IN WATER 50 %
25 SYRINGE (ML) INTRAVENOUS ONCE
Refills: 0 | Status: DISCONTINUED | OUTPATIENT
Start: 2020-01-21 | End: 2020-01-27

## 2020-01-21 RX ORDER — ALBUTEROL 90 UG/1
2 AEROSOL, METERED ORAL EVERY 6 HOURS
Refills: 0 | Status: DISCONTINUED | OUTPATIENT
Start: 2020-01-21 | End: 2020-01-21

## 2020-01-21 RX ORDER — SODIUM CHLORIDE 9 MG/ML
1000 INJECTION INTRAMUSCULAR; INTRAVENOUS; SUBCUTANEOUS
Refills: 0 | Status: DISCONTINUED | OUTPATIENT
Start: 2020-01-21 | End: 2020-01-24

## 2020-01-21 RX ORDER — MAGNESIUM SULFATE 500 MG/ML
2 VIAL (ML) INJECTION ONCE
Refills: 0 | Status: COMPLETED | OUTPATIENT
Start: 2020-01-21 | End: 2020-01-21

## 2020-01-21 RX ORDER — INSULIN LISPRO 100/ML
10 VIAL (ML) SUBCUTANEOUS
Refills: 0 | Status: DISCONTINUED | OUTPATIENT
Start: 2020-01-21 | End: 2020-01-22

## 2020-01-21 RX ORDER — INSULIN GLARGINE 100 [IU]/ML
30 INJECTION, SOLUTION SUBCUTANEOUS AT BEDTIME
Refills: 0 | Status: DISCONTINUED | OUTPATIENT
Start: 2020-01-21 | End: 2020-01-22

## 2020-01-21 RX ORDER — DEXTROSE 50 % IN WATER 50 %
15 SYRINGE (ML) INTRAVENOUS ONCE
Refills: 0 | Status: DISCONTINUED | OUTPATIENT
Start: 2020-01-21 | End: 2020-01-27

## 2020-01-21 RX ORDER — SODIUM CHLORIDE 9 MG/ML
1000 INJECTION, SOLUTION INTRAVENOUS
Refills: 0 | Status: DISCONTINUED | OUTPATIENT
Start: 2020-01-21 | End: 2020-01-27

## 2020-01-21 RX ORDER — ALBUTEROL 90 UG/1
2 AEROSOL, METERED ORAL EVERY 6 HOURS
Refills: 0 | Status: DISCONTINUED | OUTPATIENT
Start: 2020-01-21 | End: 2020-01-22

## 2020-01-21 RX ORDER — SODIUM CHLORIDE 9 MG/ML
1000 INJECTION INTRAMUSCULAR; INTRAVENOUS; SUBCUTANEOUS ONCE
Refills: 0 | Status: COMPLETED | OUTPATIENT
Start: 2020-01-21 | End: 2020-01-21

## 2020-01-21 RX ORDER — INSULIN LISPRO 100/ML
VIAL (ML) SUBCUTANEOUS
Refills: 0 | Status: DISCONTINUED | OUTPATIENT
Start: 2020-01-21 | End: 2020-01-27

## 2020-01-21 RX ORDER — MONTELUKAST 4 MG/1
10 TABLET, CHEWABLE ORAL DAILY
Refills: 0 | Status: DISCONTINUED | OUTPATIENT
Start: 2020-01-21 | End: 2020-01-27

## 2020-01-21 RX ORDER — BUDESONIDE AND FORMOTEROL FUMARATE DIHYDRATE 160; 4.5 UG/1; UG/1
2 AEROSOL RESPIRATORY (INHALATION)
Refills: 0 | Status: DISCONTINUED | OUTPATIENT
Start: 2020-01-21 | End: 2020-01-24

## 2020-01-21 RX ORDER — ENOXAPARIN SODIUM 100 MG/ML
40 INJECTION SUBCUTANEOUS AT BEDTIME
Refills: 0 | Status: DISCONTINUED | OUTPATIENT
Start: 2020-01-21 | End: 2020-01-27

## 2020-01-21 RX ORDER — DEXTROSE 50 % IN WATER 50 %
12.5 SYRINGE (ML) INTRAVENOUS ONCE
Refills: 0 | Status: DISCONTINUED | OUTPATIENT
Start: 2020-01-21 | End: 2020-01-27

## 2020-01-21 RX ORDER — INSULIN GLARGINE 100 [IU]/ML
10 INJECTION, SOLUTION SUBCUTANEOUS ONCE
Refills: 0 | Status: COMPLETED | OUTPATIENT
Start: 2020-01-21 | End: 2020-01-21

## 2020-01-21 RX ORDER — INSULIN GLARGINE 100 [IU]/ML
15 INJECTION, SOLUTION SUBCUTANEOUS AT BEDTIME
Refills: 0 | Status: DISCONTINUED | OUTPATIENT
Start: 2020-01-21 | End: 2020-01-21

## 2020-01-21 RX ORDER — INSULIN HUMAN 100 [IU]/ML
10 INJECTION, SOLUTION SUBCUTANEOUS ONCE
Refills: 0 | Status: COMPLETED | OUTPATIENT
Start: 2020-01-21 | End: 2020-01-21

## 2020-01-21 RX ORDER — GLUCAGON INJECTION, SOLUTION 0.5 MG/.1ML
1 INJECTION, SOLUTION SUBCUTANEOUS ONCE
Refills: 0 | Status: DISCONTINUED | OUTPATIENT
Start: 2020-01-21 | End: 2020-01-27

## 2020-01-21 RX ORDER — INSULIN LISPRO 100/ML
10 VIAL (ML) SUBCUTANEOUS ONCE
Refills: 0 | Status: COMPLETED | OUTPATIENT
Start: 2020-01-21 | End: 2020-01-21

## 2020-01-21 RX ADMIN — BUDESONIDE AND FORMOTEROL FUMARATE DIHYDRATE 2 PUFF(S): 160; 4.5 AEROSOL RESPIRATORY (INHALATION) at 22:15

## 2020-01-21 RX ADMIN — Medication 5 UNIT(S): at 17:30

## 2020-01-21 RX ADMIN — INSULIN HUMAN 10 UNIT(S): 100 INJECTION, SOLUTION SUBCUTANEOUS at 20:03

## 2020-01-21 RX ADMIN — ENOXAPARIN SODIUM 40 MILLIGRAM(S): 100 INJECTION SUBCUTANEOUS at 22:17

## 2020-01-21 RX ADMIN — SODIUM CHLORIDE 50 MILLILITER(S): 9 INJECTION INTRAMUSCULAR; INTRAVENOUS; SUBCUTANEOUS at 18:33

## 2020-01-21 RX ADMIN — Medication 10 UNIT(S): at 16:40

## 2020-01-21 RX ADMIN — Medication 3 MILLILITER(S): at 11:00

## 2020-01-21 RX ADMIN — INSULIN GLARGINE 10 UNIT(S): 100 INJECTION, SOLUTION SUBCUTANEOUS at 17:32

## 2020-01-21 RX ADMIN — Medication 40 MILLIGRAM(S): at 22:17

## 2020-01-21 RX ADMIN — INSULIN GLARGINE 30 UNIT(S): 100 INJECTION, SOLUTION SUBCUTANEOUS at 22:17

## 2020-01-21 RX ADMIN — Medication 125 MILLIGRAM(S): at 10:59

## 2020-01-21 RX ADMIN — SODIUM CHLORIDE 50 MILLILITER(S): 9 INJECTION INTRAMUSCULAR; INTRAVENOUS; SUBCUTANEOUS at 16:03

## 2020-01-21 RX ADMIN — Medication 10 UNIT(S): at 22:17

## 2020-01-21 RX ADMIN — Medication 6: at 17:34

## 2020-01-21 RX ADMIN — ALBUTEROL 2 PUFF(S): 90 AEROSOL, METERED ORAL at 22:15

## 2020-01-21 RX ADMIN — Medication 50 GRAM(S): at 10:59

## 2020-01-21 RX ADMIN — Medication 6 UNIT(S): at 15:17

## 2020-01-21 RX ADMIN — Medication 10 UNIT(S): at 17:33

## 2020-01-21 NOTE — ED PROVIDER NOTE - CLINICAL SUMMARY MEDICAL DECISION MAKING FREE TEXT BOX
73yo M history of COPD DM2 prostate CA sp TURP presenting with shortness of breath for the past week.  Per pt and EMR, recent admit. Since discharge, pt has felt progressively more shortness of breath, +COYLE. No fevers/chills, non productive cough. No home O2. No chest pain, LE pain/swelling. labs ekg imaging reviewed. pt feeling minimally improved. lungs with improved air movement, diffuse wheezing. will admit for copd exac

## 2020-01-21 NOTE — ED PROVIDER NOTE - OBJECTIVE STATEMENT
73 y/o male with PMH COPD, asthma, DMII on insulin, prostate cancer s/p TURP 1999 who presents to ED for SOB.     Pulm: Dr. Annemarie Oates 73 y/o male with PMH COPD, asthma, DMII on insulin, prostate cancer s/p TURP 1999 who presents to ED for SOB. Pt states he was recently admitted to [facility] for increased SOB, since d/c home one week ago has had increased SOB and wheeze. PT used albuterol at home with no relief. No fever or chills. No n/v, diarrhea, abdominal pain.     Pulm: Dr. Annemarie Oates 75 y/o male with PMH COPD, asthma, DMII on insulin, prostate cancer s/p TURP 1999 who presents to ED for SOB. Pt states he was recently admitted to [facility] for increased SOB, since d/c home one week ago has had increased SOB and wheeze. PT used albuterol at home with no relief. No fever or chills. No n/v, diarrhea, abdominal pain. sx gradual onset moderate no other exac/relieving sx    Pulm: Dr. Annemarie Oates 73 y/o male with PMH COPD, asthma, DMII on insulin, prostate cancer s/p TURP 1999 who presents to ED for SOB. Pt states he was recently admitted to [facility] for increased SOB, since d/c home one week ago has had increased SOB and wheeze. PT used albuterol at home with no relief. No fever or chills. No n/v, diarrhea, abdominal pain. Sx gradual onset moderate no other exac/relieving sx    Pulm: Dr. Annemarie Oates

## 2020-01-21 NOTE — H&P ADULT - ATTENDING COMMENTS
Patient seen and evaluated, agree with the resident as above except as stated in my note from 1/22/20

## 2020-01-21 NOTE — H&P ADULT - NSHPLABSRESULTS_GEN_ALL_CORE
Lab Results:  CBC  CBC Full  -  ( 21 Jan 2020 10:33 )  WBC Count : 13.90 K/uL  RBC Count : 5.70 M/uL  Hemoglobin : 15.8 g/dL  Hematocrit : 47.8 %  Platelet Count - Automated : 309 K/uL  Mean Cell Volume : 83.9 fL  Mean Cell Hemoglobin : 27.7 pg  Mean Cell Hemoglobin Concentration : 33.1 g/dL  Auto Neutrophil # : 6.89 K/uL  Auto Lymphocyte # : 3.75 K/uL  Auto Monocyte # : 1.08 K/uL  Auto Eosinophil # : 0.24 K/uL  Auto Basophil # : 0.00 K/uL  Auto Neutrophil % : 49.6 %  Auto Lymphocyte % : 27.0 %  Auto Monocyte % : 7.8 %  Auto Eosinophil % : 1.7 %  Auto Basophil % : 0.0 %    .		Differential:	[] Automated		[] Manual  Chemistry                        15.8   13.90 )-----------( 309      ( 21 Jan 2020 10:33 )             47.8     01-21    135  |  101  |  15  ----------------------------<  377<H>  5.5<H>   |  19  |  0.9    Ca    9.0      21 Jan 2020 10:33  Mg     1.8     01-21    TPro  6.6  /  Alb  3.6  /  TBili  1.1  /  DBili  x   /  AST  24  /  ALT  17  /  AlkPhos  56  01-21    LIVER FUNCTIONS - ( 21 Jan 2020 10:33 )  Alb: 3.6 g/dL / Pro: 6.6 g/dL / ALK PHOS: 56 U/L / ALT: 17 U/L / AST: 24 U/L / GGT: x           PT/INR - ( 21 Jan 2020 10:33 )   PT: 10.80 sec;   INR: 0.94 ratio         PTT - ( 21 Jan 2020 10:33 )  PTT:24.9 sec        CARDIAC MARKERS ( 21 Jan 2020 10:33 )  x     / 0.01 ng/mL / x     / x     / x          RADIOLOGY RESULTS: < from: Xray Chest 1 View-PORTABLE IMMEDIATE (01.21.20 @ 10:42) Low lung volumes. No focal consolidation. Lab Results:  CBC  CBC Full  -  ( 21 Jan 2020 10:33 )  WBC Count : 13.90 K/uL  RBC Count : 5.70 M/uL  Hemoglobin : 15.8 g/dL  Hematocrit : 47.8 %  Platelet Count - Automated : 309 K/uL  Mean Cell Volume : 83.9 fL  Mean Cell Hemoglobin : 27.7 pg  Mean Cell Hemoglobin Concentration : 33.1 g/dL  Auto Neutrophil # : 6.89 K/uL  Auto Lymphocyte # : 3.75 K/uL  Auto Monocyte # : 1.08 K/uL  Auto Eosinophil # : 0.24 K/uL  Auto Basophil # : 0.00 K/uL  Auto Neutrophil % : 49.6 %  Auto Lymphocyte % : 27.0 %  Auto Monocyte % : 7.8 %  Auto Eosinophil % : 1.7 %  Auto Basophil % : 0.0 %    .		Differential:	[] Automated		[] Manual  Chemistry                        15.8   13.90 )-----------( 309      ( 21 Jan 2020 10:33 )             47.8     01-21    135  |  101  |  15  ----------------------------<  377<H>  5.5<H>   |  19  |  0.9    Ca    9.0      21 Jan 2020 10:33  Mg     1.8     01-21    TPro  6.6  /  Alb  3.6  /  TBili  1.1  /  DBili  x   /  AST  24  /  ALT  17  /  AlkPhos  56  01-21    LIVER FUNCTIONS - ( 21 Jan 2020 10:33 )  Alb: 3.6 g/dL / Pro: 6.6 g/dL / ALK PHOS: 56 U/L / ALT: 17 U/L / AST: 24 U/L / GGT: x           PT/INR - ( 21 Jan 2020 10:33 )   PT: 10.80 sec;   INR: 0.94 ratio         PTT - ( 21 Jan 2020 10:33 )  PTT:24.9 sec      CARDIAC MARKERS ( 21 Jan 2020 10:33 )  x     / 0.01 ng/mL / x     / x     / x          RADIOLOGY RESULTS: < from: Xray Chest 1 View-PORTABLE IMMEDIATE (01.21.20 @ 10:42) Low lung volumes. No focal consolidation.

## 2020-01-21 NOTE — ED ADULT NURSE REASSESSMENT NOTE - NS ED NURSE REASSESS COMMENT FT1
md. x [phone] made aware about pts fs/vs. awaiting additional orders. rn will continue to monitor. md. x [phone] made aware about pts fs/vs. pt in no acute distress and remains alert and oriented. awaiting additional orders. rn will continue to monitor.

## 2020-01-21 NOTE — ED PROVIDER NOTE - ATTENDING CONTRIBUTION TO CARE
73yo M history of COPD DM2 prostate CA sp TURP presenting with shortness of breath for the past week.  Per pt and EMR, recent admit. Since discharge, pt has felt progressively more shortness of breath, +COYLE. No fevers/chills, non productive cough. No home O2. No chest pain, LE pain/swelling.   Constitutional:   Non toxic.   Eyes: PERRLA. Extraocular movements intact, no entrapment. Conjunctiva normal.   ENT: No nasal discharge. Moist mucus membranes.  Neck: Supple, non tender, full range of motion.  CV: regular tachycardic no murmurs, rubs, or gallops. +S1S2.   Pulm:  diffuse wheezing bilaterally.   Abd: soft NT ND +BS.   Ext: Warm and well perfused x4, moving all extremities, no edema.   Psy: Cooperative, appropriate.   Skin: Warm, dry, no rash  Neuro: CN2-12 grossly intact no sensory or motor deficits throughout, no drift

## 2020-01-21 NOTE — ED ADULT NURSE REASSESSMENT NOTE - NS ED NURSE REASSESS COMMENT FT1
md. x [phone] made aware  of pts fs. additional units of insulin being ordered as per md. rn will cotninue to monitor.

## 2020-01-21 NOTE — ED ADULT NURSE NOTE - NSIMPLEMENTINTERV_GEN_ALL_ED
Implemented All Fall with Harm Risk Interventions:  [place] to call system. Call bell, personal items and telephone within reach. Instruct patient to call for assistance. Room bathroom lighting operational. Non-slip footwear when patient is off stretcher. Physically safe environment: no spills, clutter or unnecessary equipment. Stretcher in lowest position, wheels locked, appropriate side rails in place. Provide visual cue, wrist band, yellow gown, etc. Monitor gait and stability. Monitor for mental status changes and reorient to person, place, and time. Review medications for side effects contributing to fall risk. Reinforce activity limits and safety measures with patient and family. Provide visual clues: red socks.

## 2020-01-21 NOTE — ED PROVIDER NOTE - PHYSICAL EXAMINATION
CONSTITUTIONAL: Well-developed; well-nourished; in no acute distress.   SKIN: warm, dry  HEAD: Normocephalic; atraumatic.  EYES: no conjunctival injection. PERRL.   ENT: No nasal discharge; airway clear.  NECK: Supple; non tender.  CARD: S1, S2 normal; no murmurs, gallops, or rubs. Regular rate and rhythm.   RESP: Moderate diffuse wheeze.   ABD: soft ntnd  EXT: Normal ROM.  No clubbing, cyanosis or edema.   LYMPH: No acute cervical adenopathy.  NEURO: Alert, oriented, grossly unremarkable  PSYCH: Cooperative, appropriate.

## 2020-01-21 NOTE — H&P ADULT - NSHPPHYSICALEXAM_GEN_ALL_CORE
GENERAL: NAD, well-groomed, well-developed  HEAD:  pallor male resting in bed, NAD  EYES: EOMI, PERRLA, conjunctiva and sclera clear  NERVOUS SYSTEM:  Alert & Oriented X3  CHEST/LUNG: diffuse biphasic wheezes   HEART: tachycardia, S1, S2, no murmurs, rubs, or gallops  ABDOMEN: Soft, obese, nontender, nondistended; bowel sounds present  EXTREMITIES:  2+ Peripheral Pulses, No clubbing, cyanosis, or edema  LYMPH: No lymphadenopathy noted  SKIN: No rashes or lesions GENERAL: NAD, pallor male resting in bed, NAD  EYES: EOMI, PERRLA, conjunctiva and sclera clear  NERVOUS SYSTEM:  Alert & Oriented X3  CHEST/LUNG: diffuse biphasic wheezes, no accessory muscle use, speaks in full sentences   HEART: tachycardia, S1, S2, no murmurs, rubs, or gallops  ABDOMEN: Soft, obese, nontender, nondistended; bowel sounds present  EXTREMITIES:  2+ Peripheral Pulses, No clubbing, cyanosis, or edema  LYMPH: No lymphadenopathy noted  SKIN: No rashes or lesions

## 2020-01-21 NOTE — ED ADULT NURSE NOTE - OBJECTIVE STATEMENT
pt BIBA for sob/cough /weakness since diagnosis of flu last week. pt recently admitted for flu for a few days but as per wife pt has been weak/sob since then. pt has pmh of asthma /DM but denies any home 02. denies any fever/chills at home. denies any n/v/d.

## 2020-01-21 NOTE — H&P ADULT - ASSESSMENT
74 years old male, PMH COPD, asthma, DMII on insulin, prostate cancer s/p TURP 1999, constipation presented to the       In the ED, RVP was positive for Flu B, Pt was initially tachy to 121 sinus tachy, s/p 2 L ivf, s/p rocephin and azithro in the ED. Started Tamiflu. Tmax 100.8    # Acute hypoxic resp failure likely secondary to Flu B  # h/o COPD not on home O2 exacerbation  # Asthma  SIRS positive on admission >> Tachy 121, Resp rate 20/min, Tmax 38.2  s/p rocephin and azithro in ED  s/p 2 L ivf  Cxr does not show focal consolidation (fu official read)  Pt takes prednisone 10 mg daily at home        Plan:  Contact isolation  Tamiflu x 5 days  wean off O2 in the morning  cw ivf   Tylenol prn for fever  guaifenesin for cough  fu cxr official read  cw levofloxacin  fu sputum and blood Cx / lactate  would start solumedrol 40 bid for 12 hours, switch to taper on discharge  duoneb q6 prn  symbicort        # Troponin elevation  admit to tele  fu repeat trops, if stable can dc tele in 24 hours  could be secondary to demand ischemia      # DM type 2  FS AC + HS  Carb consistent diet  Insulin protocol  fu HbA1c      DVT ppx: Lovenox  GI ppx: PPI  diet: carb consistent  Activity as tolerated 74 years old male, PMH COPD not supplemental O2 dependent, asthma, DMII on insulin, prostate cancer s/p TURP 1999, recent flu B s.p tamiflu and prednisone, presents to ED with worsening shortness of breath, feeling "lousy and weak," and unimproved productive cough x3 days, found to be afebrile, sinus tachycardia, no radiographic signs of pneumonia, diffuse wheezes, clinically stable, admitted for further management.     #COPD not on home O2   # asthma vs COPD exacerbation likely secondary to flu B   - SIRS positive on admission (, WBC >12K (although could be reactive from steroids), RR 20/min)   - s/p MgSO4, solumedrol and duonebs in the ED   - 1/21 CXR does not show focal consolidation   - start IVF, 50c/hr   - f/u lactate 1600  - f/u sputum and blood Cx   - would start solumedrol 40 bid for 12 hours, switch to taper on discharge  -duonebs q6 prn  - symbicort   - pulm consult     #hyperkalemia, sample hemolyzed  - f/u 1600 BMP    # DM type 2  -FS AC + HS  -Carb consistent diet  -no wt in chart, 15/5/5/5 per last admission       DVT ppx: Lovenox  GI ppx: PPI  diet: carb consistent  Activity as tolerated  Code: Full  Dispo: acute, pulm c/s 74 years old male, PMH COPD not supplemental O2 dependent, asthma, DMII on insulin, prostate cancer s/p TURP 1999, recent flu B s.p tamiflu and prednisone, presents to ED with worsening shortness of breath, feeling "lousy and weak," and unimproved productive cough x3 days, found to be afebrile, sinus tachycardia, no radiographic signs of pneumonia, diffuse wheezes, clinically stable, admitted for further management.     #sepsis on admission, unknown source   #COPD not on home O2   # asthma vs COPD exacerbation likely secondary to flu B  -unlikey COPD exacerbation, will hold off on abx as pt does not meet all 3 cardinal symptoms- increased dyspnea, but no change in sputum volume or purulence , also pt is not requiring invasive ventilation  - sepsis on admission (, lactate 2.8, WBC >12K (although could be reactive from steroids))  - s/p MgSO4, solumedrol and duonebs in the ED   - 1/21 CXR does not show focal consolidation   - start IVF, 50c/hr   - f/u lactate 1600  - f/u sputum and blood cx   - would start solumedrol 40 q8   -duonebs q6, duonebs q4 PRN   -symbicort 2 puffs BID   - peak expiratory flow BID   - pulm consult     #hyperkalemia, sample hemolyzed  - f/u 1600 BMP    # DM type 2  -FS AC + HS  -Carb consistent diet  -no wt in chart, 15/5/5/5 per last admission       DVT ppx: Lovenox  GI ppx: PPI  diet: carb consistent  Activity as tolerated  Code: Full  Dispo: acute, pulm c/s

## 2020-01-21 NOTE — ED ADULT TRIAGE NOTE - CHIEF COMPLAINT QUOTE
Patient BIBA for shortness of breath, asthma exacerbation, given 2 nebulizers by EMS with improvement

## 2020-01-21 NOTE — ED ADULT NURSE REASSESSMENT NOTE - NS ED NURSE REASSESS COMMENT FT1
RN expressing concern to md about md. ordering 10 units iv push regular .spoke to md [phone]. as per md. hold insulin iv push until he comes see pt at bedside. rn will pass along in report and continue to monitor. RN expressing concern to md wang x [phone] about md. order of iv push insulin.  pt already received multiple doses of lispro/lantu. md. states he will come to bedside prior to medication pt for assessment. rn will pass along in report and continue to monitor.

## 2020-01-21 NOTE — ED PROVIDER NOTE - NS ED ROS FT
Review of Systems:  •	CONSTITUTIONAL - No fever, No diaphoresis, No weight change  •	SKIN - No rash  •	HEMATOLOGIC - No abnormal bleeding or bruising  •	EYES - No eye pain, No blurred vision  •	ENT - No change in hearing, No sore throat, No neck pain, No rhinorrhea, No ear pain  •	RESPIRATORY - + shortness of breath, + cough  •	CARDIAC -No chest pain, No palpitations  •	GI - No abdominal pain, No nausea, No vomiting, No diarrhea, No constipation, No bright red blood per rectum or melena. No flank pain  •                 - No dysuria, frequency, hematuria.   •	ENDO - No polydypsia, No polyuria, No heat/cold intolerance  •	MUSCULOSKELETAL - No joint paint, No swelling, No back pain  •	NEUROLOGIC - No numbness, No focal weakness, No headache, No dizziness  All other systems negative, unless specified in HPI

## 2020-01-21 NOTE — ED ADULT NURSE REASSESSMENT NOTE - NS ED NURSE REASSESS COMMENT FT1
md [phone] contacted several time regarding pts fs. pt and family getting very frustrated that the amount of insulin being ordered. rn continuously taking sugars and contacting doctor. as per md. the pt can eat dinner. pt given all insulin orderd. pt alert and in no acute distress. rn will continue to monitor.

## 2020-01-21 NOTE — H&P ADULT - HISTORY OF PRESENT ILLNESS
74 years old male, PMH COPD, asthma, DMII on insulin, prostate cancer s/p TURP 1999, constipation presented to the ED 74 years old male, PMH COPD not supplemental O2 dependent, asthma, DMII on insulin, prostate cancer s/p TURP 1999, presents to ED with worsening shortness of breath, feeling "lousy and weak," and unimproved productive cough x3 days.  Pt was recently admitted and discharged on 1/13 for acute hypoxic resp failure likely secondary to Flu B/asthma exacerbation. Pt and wife at bedside reports completion of 5 days of Tamiflu, prednisone taper (completed as of last Thursday), and follow up with Dr. Oates in pulm clinic. Pt reports worsening of shortness of breath that began this past Saturday (1/18), unchanged cough with yellow-green sputum, as well as generalized weakness and decreased PO intake.  Pt denies URI symptoms, sick contacts, diarrhea, dysuria, nausea/vomiting/fevers/chills, abd pain, myalgia.  Pt has been using his inhalers and nebulizers without relief. Pt reports shortness of breath after only a few steps,  sleeps with 2 pillows, ambulates with a cane.         In the ED, RVP was positive for Flu B, Pt was initially tachy to 121 sinus tachy, s/p 2 L ivf, s/p rocephin and azithro in the ED. Started Tamiflu. Tmax 100.8, (13 Jan 2020 02:16) 74 years old male, PMH COPD not supplemental O2 dependent, asthma, DMII on insulin, prostate cancer s/p TURP 1999, presents to ED with worsening shortness of breath, feeling "lousy and weak," and unimproved productive cough x3 days.  Pt was recently admitted and discharged on 1/13 for acute hypoxic resp failure likely secondary to Flu B/asthma exacerbation. Pt and wife at bedside reports completion of 5 days of Tamiflu, prednisone taper (completed as of last Thursday), and had followed up with Dr. Oates in pulm clinic. Pt reports worsening of shortness of breath that began this past Saturday (1/18), unchanged cough with yellow-green sputum, as well as generalized weakness and decreased PO intake.  Pt denies URI symptoms, sick contacts, diarrhea, dysuria, nausea/vomiting/fevers/chills, abd pain, myalgia.  Pt has been using his inhalers and nebulizers without relief. Pt reports shortness of breath after only a few steps,  sleeps with 2 pillows, ambulates with a cane.     In the ED, Temp 99, P105 141/81 RR 20, 100%RA, s/p duoneb, mgSO4, and 125 solumedrol

## 2020-01-22 LAB
ALBUMIN SERPL ELPH-MCNC: 3.5 G/DL — SIGNIFICANT CHANGE UP (ref 3.5–5.2)
ALP SERPL-CCNC: 52 U/L — SIGNIFICANT CHANGE UP (ref 30–115)
ALT FLD-CCNC: 16 U/L — SIGNIFICANT CHANGE UP (ref 0–41)
ANION GAP SERPL CALC-SCNC: 15 MMOL/L — HIGH (ref 7–14)
ANION GAP SERPL CALC-SCNC: 17 MMOL/L — HIGH (ref 7–14)
AST SERPL-CCNC: 12 U/L — SIGNIFICANT CHANGE UP (ref 0–41)
B-OH-BUTYR SERPL-SCNC: <0.2 MMOL/L — SIGNIFICANT CHANGE UP
BASE EXCESS BLDA CALC-SCNC: -2.5 MMOL/L — LOW (ref -2–2)
BASOPHILS # BLD AUTO: 0.07 K/UL — SIGNIFICANT CHANGE UP (ref 0–0.2)
BASOPHILS NFR BLD AUTO: 0.3 % — SIGNIFICANT CHANGE UP (ref 0–1)
BILIRUB SERPL-MCNC: 0.6 MG/DL — SIGNIFICANT CHANGE UP (ref 0.2–1.2)
BUN SERPL-MCNC: 19 MG/DL — SIGNIFICANT CHANGE UP (ref 10–20)
BUN SERPL-MCNC: 19 MG/DL — SIGNIFICANT CHANGE UP (ref 10–20)
CALCIUM SERPL-MCNC: 8.7 MG/DL — SIGNIFICANT CHANGE UP (ref 8.5–10.1)
CALCIUM SERPL-MCNC: 8.9 MG/DL — SIGNIFICANT CHANGE UP (ref 8.5–10.1)
CHLORIDE SERPL-SCNC: 104 MMOL/L — SIGNIFICANT CHANGE UP (ref 98–110)
CHLORIDE SERPL-SCNC: 105 MMOL/L — SIGNIFICANT CHANGE UP (ref 98–110)
CO2 SERPL-SCNC: 16 MMOL/L — LOW (ref 17–32)
CO2 SERPL-SCNC: 19 MMOL/L — SIGNIFICANT CHANGE UP (ref 17–32)
CREAT SERPL-MCNC: 0.9 MG/DL — SIGNIFICANT CHANGE UP (ref 0.7–1.5)
CREAT SERPL-MCNC: 1.1 MG/DL — SIGNIFICANT CHANGE UP (ref 0.7–1.5)
EOSINOPHIL # BLD AUTO: 0.01 K/UL — SIGNIFICANT CHANGE UP (ref 0–0.7)
EOSINOPHIL NFR BLD AUTO: 0 % — SIGNIFICANT CHANGE UP (ref 0–8)
GLUCOSE BLDC GLUCOMTR-MCNC: 186 MG/DL — HIGH (ref 70–99)
GLUCOSE BLDC GLUCOMTR-MCNC: 215 MG/DL — HIGH (ref 70–99)
GLUCOSE BLDC GLUCOMTR-MCNC: 221 MG/DL — HIGH (ref 70–99)
GLUCOSE BLDC GLUCOMTR-MCNC: 236 MG/DL — HIGH (ref 70–99)
GLUCOSE BLDC GLUCOMTR-MCNC: 247 MG/DL — HIGH (ref 70–99)
GLUCOSE BLDC GLUCOMTR-MCNC: 263 MG/DL — HIGH (ref 70–99)
GLUCOSE SERPL-MCNC: 207 MG/DL — HIGH (ref 70–99)
GLUCOSE SERPL-MCNC: 235 MG/DL — HIGH (ref 70–99)
HCO3 BLDA-SCNC: 21 MMOL/L — LOW (ref 23–27)
HCT VFR BLD CALC: 39.7 % — LOW (ref 42–52)
HGB BLD-MCNC: 13 G/DL — LOW (ref 14–18)
IMM GRANULOCYTES NFR BLD AUTO: 3.7 % — HIGH (ref 0.1–0.3)
LACTATE SERPL-SCNC: 5.4 MMOL/L — CRITICAL HIGH (ref 0.7–2)
LYMPHOCYTES # BLD AUTO: 1.81 K/UL — SIGNIFICANT CHANGE UP (ref 1.2–3.4)
LYMPHOCYTES # BLD AUTO: 8.1 % — LOW (ref 20.5–51.1)
MAGNESIUM SERPL-MCNC: 2.1 MG/DL — SIGNIFICANT CHANGE UP (ref 1.8–2.4)
MCHC RBC-ENTMCNC: 26.9 PG — LOW (ref 27–31)
MCHC RBC-ENTMCNC: 32.7 G/DL — SIGNIFICANT CHANGE UP (ref 32–37)
MCV RBC AUTO: 82 FL — SIGNIFICANT CHANGE UP (ref 80–94)
MONOCYTES # BLD AUTO: 0.6 K/UL — SIGNIFICANT CHANGE UP (ref 0.1–0.6)
MONOCYTES NFR BLD AUTO: 2.7 % — SIGNIFICANT CHANGE UP (ref 1.7–9.3)
NEUTROPHILS # BLD AUTO: 18.95 K/UL — HIGH (ref 1.4–6.5)
NEUTROPHILS NFR BLD AUTO: 85.2 % — HIGH (ref 42.2–75.2)
NRBC # BLD: 0 /100 WBCS — SIGNIFICANT CHANGE UP (ref 0–0)
PCO2 BLDA: 31 MMHG — LOW (ref 38–42)
PH BLDA: 7.44 — HIGH (ref 7.38–7.42)
PLATELET # BLD AUTO: 379 K/UL — SIGNIFICANT CHANGE UP (ref 130–400)
PO2 BLDA: 77 MMHG — LOW (ref 78–95)
POTASSIUM SERPL-MCNC: 4.4 MMOL/L — SIGNIFICANT CHANGE UP (ref 3.5–5)
POTASSIUM SERPL-MCNC: 4.5 MMOL/L — SIGNIFICANT CHANGE UP (ref 3.5–5)
POTASSIUM SERPL-SCNC: 4.4 MMOL/L — SIGNIFICANT CHANGE UP (ref 3.5–5)
POTASSIUM SERPL-SCNC: 4.5 MMOL/L — SIGNIFICANT CHANGE UP (ref 3.5–5)
PROT SERPL-MCNC: 6.1 G/DL — SIGNIFICANT CHANGE UP (ref 6–8)
RBC # BLD: 4.84 M/UL — SIGNIFICANT CHANGE UP (ref 4.7–6.1)
RBC # FLD: 14.6 % — HIGH (ref 11.5–14.5)
SAO2 % BLDA: 97 % — SIGNIFICANT CHANGE UP (ref 94–98)
SODIUM SERPL-SCNC: 135 MMOL/L — SIGNIFICANT CHANGE UP (ref 135–146)
SODIUM SERPL-SCNC: 141 MMOL/L — SIGNIFICANT CHANGE UP (ref 135–146)
WBC # BLD: 22.27 K/UL — HIGH (ref 4.8–10.8)
WBC # FLD AUTO: 22.27 K/UL — HIGH (ref 4.8–10.8)

## 2020-01-22 PROCEDURE — 71045 X-RAY EXAM CHEST 1 VIEW: CPT | Mod: 26

## 2020-01-22 PROCEDURE — 70360 X-RAY EXAM OF NECK: CPT | Mod: 26

## 2020-01-22 RX ORDER — INSULIN GLARGINE 100 [IU]/ML
36 INJECTION, SOLUTION SUBCUTANEOUS AT BEDTIME
Refills: 0 | Status: DISCONTINUED | OUTPATIENT
Start: 2020-01-22 | End: 2020-01-27

## 2020-01-22 RX ORDER — INSULIN LISPRO 100/ML
12 VIAL (ML) SUBCUTANEOUS
Refills: 0 | Status: DISCONTINUED | OUTPATIENT
Start: 2020-01-22 | End: 2020-01-27

## 2020-01-22 RX ORDER — IPRATROPIUM/ALBUTEROL SULFATE 18-103MCG
3 AEROSOL WITH ADAPTER (GRAM) INHALATION EVERY 4 HOURS
Refills: 0 | Status: DISCONTINUED | OUTPATIENT
Start: 2020-01-22 | End: 2020-01-27

## 2020-01-22 RX ORDER — MORPHINE SULFATE 50 MG/1
2 CAPSULE, EXTENDED RELEASE ORAL ONCE
Refills: 0 | Status: DISCONTINUED | OUTPATIENT
Start: 2020-01-22 | End: 2020-01-22

## 2020-01-22 RX ORDER — IPRATROPIUM/ALBUTEROL SULFATE 18-103MCG
3 AEROSOL WITH ADAPTER (GRAM) INHALATION EVERY 6 HOURS
Refills: 0 | Status: DISCONTINUED | OUTPATIENT
Start: 2020-01-22 | End: 2020-01-22

## 2020-01-22 RX ORDER — MAGNESIUM SULFATE 500 MG/ML
2 VIAL (ML) INJECTION ONCE
Refills: 0 | Status: COMPLETED | OUTPATIENT
Start: 2020-01-22 | End: 2020-01-22

## 2020-01-22 RX ADMIN — Medication 2: at 12:16

## 2020-01-22 RX ADMIN — Medication 2: at 08:31

## 2020-01-22 RX ADMIN — PANTOPRAZOLE SODIUM 40 MILLIGRAM(S): 20 TABLET, DELAYED RELEASE ORAL at 08:32

## 2020-01-22 RX ADMIN — SODIUM CHLORIDE 75 MILLILITER(S): 9 INJECTION INTRAMUSCULAR; INTRAVENOUS; SUBCUTANEOUS at 17:26

## 2020-01-22 RX ADMIN — Medication 80 MILLIGRAM(S): at 22:42

## 2020-01-22 RX ADMIN — SODIUM CHLORIDE 2000 MILLILITER(S): 9 INJECTION INTRAMUSCULAR; INTRAVENOUS; SUBCUTANEOUS at 00:05

## 2020-01-22 RX ADMIN — ENOXAPARIN SODIUM 40 MILLIGRAM(S): 100 INJECTION SUBCUTANEOUS at 22:42

## 2020-01-22 RX ADMIN — Medication 40 MILLIGRAM(S): at 06:03

## 2020-01-22 RX ADMIN — Medication 10 UNIT(S): at 08:30

## 2020-01-22 RX ADMIN — Medication 12 UNIT(S): at 17:27

## 2020-01-22 RX ADMIN — Medication 60 MILLIGRAM(S): at 11:03

## 2020-01-22 RX ADMIN — SODIUM CHLORIDE 75 MILLILITER(S): 9 INJECTION INTRAMUSCULAR; INTRAVENOUS; SUBCUTANEOUS at 01:10

## 2020-01-22 RX ADMIN — Medication 3 MILLILITER(S): at 08:34

## 2020-01-22 RX ADMIN — Medication 3: at 17:27

## 2020-01-22 RX ADMIN — Medication 3 MILLILITER(S): at 10:50

## 2020-01-22 RX ADMIN — SODIUM CHLORIDE 75 MILLILITER(S): 9 INJECTION INTRAMUSCULAR; INTRAVENOUS; SUBCUTANEOUS at 08:33

## 2020-01-22 RX ADMIN — BUDESONIDE AND FORMOTEROL FUMARATE DIHYDRATE 2 PUFF(S): 160; 4.5 AEROSOL RESPIRATORY (INHALATION) at 22:44

## 2020-01-22 RX ADMIN — Medication 10 UNIT(S): at 12:16

## 2020-01-22 RX ADMIN — Medication 3 MILLILITER(S): at 20:00

## 2020-01-22 RX ADMIN — Medication 50 GRAM(S): at 11:02

## 2020-01-22 RX ADMIN — Medication 3 MILLILITER(S): at 17:26

## 2020-01-22 RX ADMIN — Medication 3 MILLILITER(S): at 23:56

## 2020-01-22 RX ADMIN — ALBUTEROL 2 PUFF(S): 90 AEROSOL, METERED ORAL at 08:33

## 2020-01-22 RX ADMIN — MONTELUKAST 10 MILLIGRAM(S): 4 TABLET, CHEWABLE ORAL at 12:18

## 2020-01-22 RX ADMIN — BUDESONIDE AND FORMOTEROL FUMARATE DIHYDRATE 2 PUFF(S): 160; 4.5 AEROSOL RESPIRATORY (INHALATION) at 08:34

## 2020-01-22 RX ADMIN — INSULIN GLARGINE 36 UNIT(S): 100 INJECTION, SOLUTION SUBCUTANEOUS at 22:42

## 2020-01-22 RX ADMIN — Medication 600 MILLIGRAM(S): at 17:27

## 2020-01-22 RX ADMIN — MORPHINE SULFATE 2 MILLIGRAM(S): 50 CAPSULE, EXTENDED RELEASE ORAL at 11:02

## 2020-01-22 NOTE — CONSULT NOTE ADULT - ASSESSMENT
Acute respiratory failure  Asthma exacerbation  Allergic rhinitis  Bronchiectasis  Pulmonary nodule        increase solumedrol 80 mg iv q 8 hours  albuterol/atrovent nebs q 4 hours around the clock  increase symbicort 160 mg 2 puffs bid  repeat cxr pa/lateral  singulair 10 mg daily  bipap 10/5 prn  dvt/gi px  cc time spent 40 min  if any any decline in respiratory status , upgrade patient to the icu

## 2020-01-22 NOTE — PROGRESS NOTE ADULT - SUBJECTIVE AND OBJECTIVE BOX
Patient was seen and examined. Spoke with RN. Chart reviewed.  No events overnight.  Vital Signs Last 24 Hrs  T(F): 98.1 (22 Jan 2020 10:45), Max: 98.2 (21 Jan 2020 15:45)  HR: 111 (22 Jan 2020 10:45) (90 - 116)  BP: 152/97 (22 Jan 2020 10:45) (120/58 - 152/97)  SpO2: 99% (22 Jan 2020 13:54) (96% - 100%)  MEDICATIONS  (STANDING):  albuterol/ipratropium for Nebulization 3 milliLiter(s) Nebulizer every 4 hours  budesonide  80 MICROgram(s)/formoterol 4.5 MICROgram(s) Inhaler 2 Puff(s) Inhalation two times a day  dextrose 5%. 1000 milliLiter(s) (50 mL/Hr) IV Continuous <Continuous>  dextrose 50% Injectable 12.5 Gram(s) IV Push once  dextrose 50% Injectable 25 Gram(s) IV Push once  dextrose 50% Injectable 25 Gram(s) IV Push once  enoxaparin Injectable 40 milliGRAM(s) SubCutaneous at bedtime  guaiFENesin  milliGRAM(s) Oral every 12 hours  insulin glargine Injectable (LANTUS) 36 Unit(s) SubCutaneous at bedtime  insulin lispro (HumaLOG) corrective regimen sliding scale   SubCutaneous three times a day before meals  insulin lispro Injectable (HumaLOG) 12 Unit(s) SubCutaneous three times a day before meals  methylPREDNISolone sodium succinate Injectable 80 milliGRAM(s) IV Push every 8 hours  montelukast 10 milliGRAM(s) Oral daily  pantoprazole    Tablet 40 milliGRAM(s) Oral before breakfast  sodium chloride 0.9%. 1000 milliLiter(s) (75 mL/Hr) IV Continuous <Continuous>    MEDICATIONS  (PRN):  albuterol/ipratropium for Nebulization 3 milliLiter(s) Nebulizer every 4 hours PRN Shortness of Breath and/or Wheezing  dextrose 40% Gel 15 Gram(s) Oral once PRN Blood Glucose LESS THAN 70 milliGRAM(s)/deciliter  glucagon  Injectable 1 milliGRAM(s) IntraMuscular once PRN Glucose LESS THAN 70 milligrams/deciliter    Labs:                        13.0   22.27 )-----------( 379      ( 22 Jan 2020 05:50 )             39.7                         15.8   13.90 )-----------( 309      ( 21 Jan 2020 10:33 )             47.8     22 Jan 2020 05:50    135    |  104    |  19     ----------------------------<  235    4.4     |  16     |  0.9    22 Jan 2020 00:28    141    |  105    |  19     ----------------------------<  207    4.5     |  19     |  1.1      Ca    8.7        22 Jan 2020 05:50  Ca    8.9        22 Jan 2020 00:28  Mg     2.1       22 Jan 2020 05:50  Mg     1.8       21 Jan 2020 10:33    TPro  6.1    /  Alb  3.5    /  TBili  0.6    /  DBili  x      /  AST  12     /  ALT  16     /  AlkPhos  52     22 Jan 2020 00:28  TPro  6.3    /  Alb  3.7    /  TBili  0.5    /  DBili  x      /  AST  14     /  ALT  15     /  AlkPhos  55     21 Jan 2020 21:22    PT/INR - ( 21 Jan 2020 10:33 )   PT: 10.80 sec;   INR: 0.94 ratio         PTT - ( 21 Jan 2020 10:33 )  PTT:24.9 sec      General: comfortable, ill appearing   Neurology: A&Ox3, nonfocal  Head:  Normocephalic, atraumatic  ENT:  Mucosa moist, no ulcerations  Neck:  Supple, no JVD,   Skin: no breakdowns (as per RN)  Resp:  wheezes   CV: RRR, S1S2,   GI: Soft, NT, bowel sounds  MS: No edema, + peripheral pulses, FROM all 4 extremity      A/P:  74 years old male, PMH COPD not supplemental O2 dependent, asthma, DMII on insulin, prostate cancer s/p TURP 1999, recent flu B s.p tamiflu and prednisone, presents to ED with worsening shortness of breath, feeling "lousy and weak," and unimproved productive cough x3 days, found to be afebrile, sinus tachycardia, no radiographic signs of pneumonia, diffuse wheezes, clinically stable, admitted for further management.     solumedrol 80 mg iv q 8 hours  albuterol and atrovent nebs q 4 hours around the clock for 24 hours then prn  bipap prn   repeat lactic acid   closely monitor resp status   ICU eval if any worsening   pulm f/u   obtain resp viral panel   DVT prophylaxis  Decubitus prevention- all measures as per RN protocol  Please call or text me with any questions or updates Patient was seen and examined. Spoke with RN. Chart reviewed.  No events overnight.  Vital Signs Last 24 Hrs  T(F): 98.1 (22 Jan 2020 10:45), Max: 98.2 (21 Jan 2020 15:45)  HR: 111 (22 Jan 2020 10:45) (90 - 116)  BP: 152/97 (22 Jan 2020 10:45) (120/58 - 152/97)  SpO2: 99% (22 Jan 2020 13:54) (96% - 100%)  MEDICATIONS  (STANDING):  albuterol/ipratropium for Nebulization 3 milliLiter(s) Nebulizer every 4 hours  budesonide  80 MICROgram(s)/formoterol 4.5 MICROgram(s) Inhaler 2 Puff(s) Inhalation two times a day  dextrose 5%. 1000 milliLiter(s) (50 mL/Hr) IV Continuous <Continuous>  dextrose 50% Injectable 12.5 Gram(s) IV Push once  dextrose 50% Injectable 25 Gram(s) IV Push once  dextrose 50% Injectable 25 Gram(s) IV Push once  enoxaparin Injectable 40 milliGRAM(s) SubCutaneous at bedtime  guaiFENesin  milliGRAM(s) Oral every 12 hours  insulin glargine Injectable (LANTUS) 36 Unit(s) SubCutaneous at bedtime  insulin lispro (HumaLOG) corrective regimen sliding scale   SubCutaneous three times a day before meals  insulin lispro Injectable (HumaLOG) 12 Unit(s) SubCutaneous three times a day before meals  methylPREDNISolone sodium succinate Injectable 80 milliGRAM(s) IV Push every 8 hours  montelukast 10 milliGRAM(s) Oral daily  pantoprazole    Tablet 40 milliGRAM(s) Oral before breakfast  sodium chloride 0.9%. 1000 milliLiter(s) (75 mL/Hr) IV Continuous <Continuous>    MEDICATIONS  (PRN):  albuterol/ipratropium for Nebulization 3 milliLiter(s) Nebulizer every 4 hours PRN Shortness of Breath and/or Wheezing  dextrose 40% Gel 15 Gram(s) Oral once PRN Blood Glucose LESS THAN 70 milliGRAM(s)/deciliter  glucagon  Injectable 1 milliGRAM(s) IntraMuscular once PRN Glucose LESS THAN 70 milligrams/deciliter    Labs:                        13.0   22.27 )-----------( 379      ( 22 Jan 2020 05:50 )             39.7                         15.8   13.90 )-----------( 309      ( 21 Jan 2020 10:33 )             47.8     22 Jan 2020 05:50    135    |  104    |  19     ----------------------------<  235    4.4     |  16     |  0.9    22 Jan 2020 00:28    141    |  105    |  19     ----------------------------<  207    4.5     |  19     |  1.1      Ca    8.7        22 Jan 2020 05:50  Ca    8.9        22 Jan 2020 00:28  Mg     2.1       22 Jan 2020 05:50  Mg     1.8       21 Jan 2020 10:33    TPro  6.1    /  Alb  3.5    /  TBili  0.6    /  DBili  x      /  AST  12     /  ALT  16     /  AlkPhos  52     22 Jan 2020 00:28  TPro  6.3    /  Alb  3.7    /  TBili  0.5    /  DBili  x      /  AST  14     /  ALT  15     /  AlkPhos  55     21 Jan 2020 21:22    PT/INR - ( 21 Jan 2020 10:33 )   PT: 10.80 sec;   INR: 0.94 ratio         PTT - ( 21 Jan 2020 10:33 )  PTT:24.9 sec      General: comfortable, ill appearing   Neurology: A&Ox3, nonfocal  Head:  Normocephalic, atraumatic  ENT:  Mucosa moist, no ulcerations  Neck:  Supple, no JVD,   Skin: no breakdowns (as per RN)  Resp:  wheezes   CV: RRR, S1S2,   GI: Soft, NT, bowel sounds  MS: No edema, + peripheral pulses, FROM all 4 extremity      A/P:  74 years old male, PMH COPD not supplemental O2 dependent, asthma, DMII on insulin, prostate cancer s/p TURP 1999, recent flu B s.p tamiflu and prednisone, presents to ED with worsening shortness of breath, feeling "lousy and weak," and unimproved productive cough x3 days, found to be afebrile, sinus tachycardia, no radiographic signs of pneumonia, diffuse wheezes, clinically stable, admitted for further management.     solumedrol 80 mg iv q 8 hours  albuterol and atrovent nebs q 4 hours around the clock for 24 hours then prn  bipap prn   repeat lactic acid   closely monitor resp status   ICU eval if any worsening   pulm f/u   obtain resp viral panel   glycemic control   DVT prophylaxis  Decubitus prevention- all measures as per RN protocol  Please call or text me with any questions or updates

## 2020-01-22 NOTE — CONSULT NOTE ADULT - SUBJECTIVE AND OBJECTIVE BOX
THIS IS A DRAFT    Patient seen and examined earlier today.    Case discussed with house staff assigned.    Complete documentation to follow.    For any question, please contact me:  Dr. Annemarie Oates  Office: [phone]      increase solumedrol 80 mg iv q 8 hours  albuterol and atrovent nebs q 4 hours around the clock for 24 hours then prn  bipap 10/5 prn, if needed then please continue  if respiratory status decompensates upgrade to icu DEL ALVARADO  [id number]    HISTORY OF PRESENT ILLNESS:    74 years old male, PMH of Asthma, DMII on insulin, prostate cancer s/p TURP 1999, presents to ED with worsening shortness of breath, feeling "lousy and weak," and unimproved productive cough x3 days.  Pt was recently admitted and discharged on 1/13 for acute hypoxic resp failure likely secondary to Flu B/asthma exacerbation. Pt and wife at bedside reports completion of 5 days of Tamiflu, prednisone taper (completed as of last Thursday), Pt reports worsening of shortness of breath that began this past Saturday (1/18), unchanged cough with yellow-green sputum, as well as generalized weakness and decreased PO intake.  Pt denies URI symptoms, sick contacts, diarrhea, dysuria, nausea/vomiting/fevers/chills, abd pain, myalgia.  Pt has been using his inhalers and nebulizers without relief. Pt reports shortness of breath after only a few steps,  sleeps with 2 pillows, ambulates with a cane. Patient was instructed to continue prednisone 10 mg daily after completion of tapering dose but stopped on his own.    In the ED, Temp 99, P105 141/81 RR 20, 100%RA, s/p duoneb, mgSO4, and 125 solumedrol (21 Jan 2020 13:36)      PMH/PSH:  PAST MEDICAL & SURGICAL HISTORY:  Asthma  Prostate cancer  Diabetes  History of surgery: back surgery  H/O radical prostatectomy  S/P TURP    ALLERGIES:  Allergies    No Known Allergies    Intolerances      SOCIAL HABITS:  negative x3    FAMILY HISTORY:   n/c      REVIEW OF SYSTEM:  Elements of review of systems are negative or non-applicable except as noted above in HPI section.       HOME MEDICATIONS:  albuterol 90 mcg/inh inhalation aerosol  Incruse Ellipta 62.5 mcg/inh inhalation powder  Lantus 100 units/mL subcutaneous solution  montelukast 10 mg oral tablet  symbicort    MEDICATIONS:  MEDICATIONS  (STANDING):  albuterol/ipratropium for Nebulization 3 milliLiter(s) Nebulizer every 4 hours  budesonide  80 MICROgram(s)/formoterol 4.5 MICROgram(s) Inhaler 2 Puff(s) Inhalation two times a day  dextrose 5%. 1000 milliLiter(s) (50 mL/Hr) IV Continuous <Continuous>  dextrose 50% Injectable 12.5 Gram(s) IV Push once  dextrose 50% Injectable 25 Gram(s) IV Push once  dextrose 50% Injectable 25 Gram(s) IV Push once  enoxaparin Injectable 40 milliGRAM(s) SubCutaneous at bedtime  guaiFENesin  milliGRAM(s) Oral every 12 hours  insulin glargine Injectable (LANTUS) 36 Unit(s) SubCutaneous at bedtime  insulin lispro (HumaLOG) corrective regimen sliding scale   SubCutaneous three times a day before meals  insulin lispro Injectable (HumaLOG) 12 Unit(s) SubCutaneous three times a day before meals  methylPREDNISolone sodium succinate Injectable 80 milliGRAM(s) IV Push every 8 hours  montelukast 10 milliGRAM(s) Oral daily  pantoprazole    Tablet 40 milliGRAM(s) Oral before breakfast  sodium chloride 0.9%. 1000 milliLiter(s) (75 mL/Hr) IV Continuous <Continuous>    MEDICATIONS  (PRN):  albuterol/ipratropium for Nebulization 3 milliLiter(s) Nebulizer every 4 hours PRN Shortness of Breath and/or Wheezing  dextrose 40% Gel 15 Gram(s) Oral once PRN Blood Glucose LESS THAN 70 milliGRAM(s)/deciliter  glucagon  Injectable 1 milliGRAM(s) IntraMuscular once PRN Glucose LESS THAN 70 milligrams/deciliter        VITALS:   Vital Signs Last 24 Hrs  T(C): 36.6 (22 Jan 2020 20:00), Max: 36.7 (21 Jan 2020 23:59)  T(F): 97.9 (22 Jan 2020 20:00), Max: 98.1 (21 Jan 2020 23:59)  HR: 98 (22 Jan 2020 20:00) (90 - 111)  BP: 142/76 (22 Jan 2020 20:00) (120/58 - 152/97)  BP(mean): --  RR: 20 (22 Jan 2020 20:00) (18 - 24)  SpO2: 100% (22 Jan 2020 20:00) (97% - 100%)        PHYSICAL EXAM:    GENERAL: NAD  HEAD:  Atraumatic, Normocephalic  NECK: Supple, No JVD  CHEST/LUNG: diffuse wheeze   HEART: Regular rate and rhythm; No murmurs  ABDOMEN: Soft, Nontender, Nondistended  EXTREMITIES:  Good peripheral Pulses, No clubbing, cyanosis, or edema      LABS:                        13.0   22.27 )-----------( 379      ( 22 Jan 2020 05:50 )             39.7     01-22    135  |  104  |  19  ----------------------------<  235<H>  4.4   |  16<L>  |  0.9    Ca    8.7      22 Jan 2020 05:50  Mg     2.1     01-22    TPro  6.1  /  Alb  3.5  /  TBili  0.6  /  DBili  x   /  AST  12  /  ALT  16  /  AlkPhos  52  01-22    LIVER FUNCTIONS - ( 22 Jan 2020 00:28 )  Alb: 3.5 g/dL / Pro: 6.1 g/dL / ALK PHOS: 52 U/L / ALT: 16 U/L / AST: 12 U/L / GGT: x           CARDIAC MARKERS ( 21 Jan 2020 10:33 )  x     / 0.01 ng/mL / x     / x     / x          PT/INR - ( 21 Jan 2020 10:33 )   PT: 10.80 sec;   INR: 0.94 ratio         PTT - ( 21 Jan 2020 10:33 )  PTT:24.9 sec          ABG & VENT SETTINGS (when applicable)  ABG - ( 22 Jan 2020 18:24 )  pH, Arterial: 7.44  pH, Blood: x     /  pCO2: 31    /  pO2: 77    / HCO3: 21    / Base Excess: -2.5  /  SaO2: 97                    DIAGNOSTIC STUDIES:      < from: Xray Chest 1 View-PORTABLE IMMEDIATE (01.22.20 @ 11:54) >    Left greater than right atelectasis in opacification.    < end of copied text >    < from: Xray Chest 1 View-PORTABLE IMMEDIATE (01.21.20 @ 10:42) >  IMPRESSION:     Low lung volumes. No focal consolidation.    < end of copied text >

## 2020-01-22 NOTE — PROGRESS NOTE ADULT - SUBJECTIVE AND OBJECTIVE BOX
DEL ALVARADO 74y Male  MRN#: [id number]     SUBJECTIVE  Patient is a 74y old Male who presents with a chief complaint of worsening shortness of breath, feeling "lousy and weak" (22 Jan 2020 11:25)  Currently admitted to medicine with the primary diagnosis of COPD (chronic obstructive pulmonary disease)  Today is hospital day 1d, and this morning he reports no overnight events.     OBJECTIVE  PAST MEDICAL & SURGICAL HISTORY  COPD (chronic obstructive pulmonary disease)  Asthma  Prostate cancer  Diabetes  History of surgery: back surgery  H/O radical prostatectomy  S/P TURP    ALLERGIES:  No Known Allergies    MEDICATIONS:  STANDING MEDICATIONS  albuterol/ipratropium for Nebulization 3 milliLiter(s) Nebulizer every 4 hours  budesonide  80 MICROgram(s)/formoterol 4.5 MICROgram(s) Inhaler 2 Puff(s) Inhalation two times a day  dextrose 5%. 1000 milliLiter(s) IV Continuous <Continuous>  dextrose 50% Injectable 12.5 Gram(s) IV Push once  dextrose 50% Injectable 25 Gram(s) IV Push once  dextrose 50% Injectable 25 Gram(s) IV Push once  enoxaparin Injectable 40 milliGRAM(s) SubCutaneous at bedtime  guaiFENesin  milliGRAM(s) Oral every 12 hours  insulin glargine Injectable (LANTUS) 30 Unit(s) SubCutaneous at bedtime  insulin lispro (HumaLOG) corrective regimen sliding scale   SubCutaneous three times a day before meals  insulin lispro Injectable (HumaLOG) 10 Unit(s) SubCutaneous Before meals and at bedtime  methylPREDNISolone sodium succinate Injectable 80 milliGRAM(s) IV Push every 8 hours  montelukast 10 milliGRAM(s) Oral daily  pantoprazole    Tablet 40 milliGRAM(s) Oral before breakfast  sodium chloride 0.9%. 1000 milliLiter(s) IV Continuous <Continuous>    PRN MEDICATIONS  albuterol/ipratropium for Nebulization 3 milliLiter(s) Nebulizer every 4 hours PRN  dextrose 40% Gel 15 Gram(s) Oral once PRN  glucagon  Injectable 1 milliGRAM(s) IntraMuscular once PRN      VITAL SIGNS: Last 24 Hours  T(C): 36.7 (22 Jan 2020 10:45), Max: 36.8 (21 Jan 2020 15:45)  T(F): 98.1 (22 Jan 2020 10:45), Max: 98.2 (21 Jan 2020 15:45)  HR: 111 (22 Jan 2020 10:45) (90 - 116)  BP: 152/97 (22 Jan 2020 10:45) (120/58 - 152/97)  BP(mean): --  RR: 24 (22 Jan 2020 10:45) (18 - 24)  SpO2: 99% (22 Jan 2020 10:57) (96% - 100%)    LABS:                        13.0   22.27 )-----------( 379      ( 22 Jan 2020 05:50 )             39.7     01-22    135  |  104  |  19  ----------------------------<  235<H>  4.4   |  16<L>  |  0.9    Ca    8.7      22 Jan 2020 05:50  Mg     2.1     01-22    TPro  6.1  /  Alb  3.5  /  TBili  0.6  /  DBili  x   /  AST  12  /  ALT  16  /  AlkPhos  52  01-22    PT/INR - ( 21 Jan 2020 10:33 )   PT: 10.80 sec;   INR: 0.94 ratio         PTT - ( 21 Jan 2020 10:33 )  PTT:24.9 sec      Lactate, Blood: 5.4 mmol/L <HH> (01-22-20 @ 00:28)  Lactate, Blood: 8.3 mmol/L <HH> (01-21-20 @ 21:22)  Lactate, Blood: 6.1 mmol/L <HH> (01-21-20 @ 16:23)      CARDIAC MARKERS ( 21 Jan 2020 10:33 )  x     / 0.01 ng/mL / x     / x     / x          RADIOLOGY:    < from: Xray Chest 1 View-PORTABLE IMMEDIATE (01.21.20 @ 10:42) >    IMPRESSION:     Low lung volumes. No focal consolidation.      < end of copied text >      PHYSICAL EXAM:    GENERAL: NAD, well-developed, AAOx3  HEENT:  Atraumatic, Normocephalic. EOMI, PERRLA, conjunctiva and sclera clear, No JVD  PULMONARY: Clear to auscultation bilaterally; No wheeze  CARDIOVASCULAR: Regular rate and rhythm; No murmurs, rubs, or gallops  GASTROINTESTINAL: Soft, Nontender, Nondistended; Bowel sounds present  MUSCULOSKELETAL:  2+ Peripheral Pulses, No clubbing, cyanosis, or edema  NEUROLOGY: non-focal  SKIN: No rashes or lesions      ADMISSION SUMMARY  Patient is a 74y old Male who presents with a chief complaint of worsening shortness of breath, feeling "lousy and weak"   Currently admitted to medicine with the primary diagnosis of COPD (chronic obstructive pulmonary disease)    ASSESSMENT & PLAN  74 y/oM PMH Asthma, DMII on insulin, prostate cancer s/p TURP 1999, recent flu B s.p tamiflu and prednisone admitted with Asthma exacerbation.    # Asthma exacerbation 2/2 Influenza B  - Septic on admission , lactate 8.3, WBC >12K  - 1/21 CXR does not show focal consolidation   - Patient was tachypneic this am and severely wheezing. He received 60mg solumedrol STAT and duonebs X4, MgSO4 with no improvement in breathing. He was started on BiPAP but patient did not tolerate it well due to severe bouts of coughing, moved to critical care in ED  -Patient improved with morphine, duonebs, steroids. VBG showed pH of 7.39, lactate 3.8.   -Pulmonology following.   -Increased duonebs to q4h RTCand solumedrol 80mg q8h. BiPAP 10/5 4hrs on and 4hrs off as needed  -Check peak expiratory flow BID  -Aspergillus, IgE and IgG antibodies ordered  -Will monitor closely. If decompensates further, can be upgraded to ICU as per     # DM type 2  -FS AC + HS  -Carb consistent diet  -On insulin glargine and lispro    DVT ppx: Lovenox  GI ppx: PPI  diet: carb consistent  Activity as tolerated  Code: Full  Dispo: acute

## 2020-01-22 NOTE — ED ADULT NURSE REASSESSMENT NOTE - NS ED NURSE REASSESS COMMENT FT1
At 1005 patient noted to have a harsh sounding barking cough and difficulty breathing. MD made aware and arrived to assesses patient. 1 Duoneb, 60 mg solumedrol, and 2 grams of magnesium ordered and given, patient also started on BiPAP.   Patient unable to tolerate Bipap to frequent barking cough. After no improvement in symptoms patient upgrades to critical care for further intervention. respiratory called to switch BiPap to High flow Nasal and Report given to RN

## 2020-01-23 LAB
ALBUMIN SERPL ELPH-MCNC: 3.7 G/DL — SIGNIFICANT CHANGE UP (ref 3.5–5.2)
ALP SERPL-CCNC: 51 U/L — SIGNIFICANT CHANGE UP (ref 30–115)
ALT FLD-CCNC: 15 U/L — SIGNIFICANT CHANGE UP (ref 0–41)
ANION GAP SERPL CALC-SCNC: 15 MMOL/L — HIGH (ref 7–14)
APPEARANCE UR: CLEAR — SIGNIFICANT CHANGE UP
AST SERPL-CCNC: 14 U/L — SIGNIFICANT CHANGE UP (ref 0–41)
BILIRUB SERPL-MCNC: 0.6 MG/DL — SIGNIFICANT CHANGE UP (ref 0.2–1.2)
BILIRUB UR-MCNC: NEGATIVE — SIGNIFICANT CHANGE UP
BUN SERPL-MCNC: 24 MG/DL — HIGH (ref 10–20)
CALCIUM SERPL-MCNC: 8.8 MG/DL — SIGNIFICANT CHANGE UP (ref 8.5–10.1)
CHLORIDE SERPL-SCNC: 105 MMOL/L — SIGNIFICANT CHANGE UP (ref 98–110)
CO2 SERPL-SCNC: 17 MMOL/L — SIGNIFICANT CHANGE UP (ref 17–32)
COLOR SPEC: SIGNIFICANT CHANGE UP
CREAT SERPL-MCNC: 1 MG/DL — SIGNIFICANT CHANGE UP (ref 0.7–1.5)
DIFF PNL FLD: NEGATIVE — SIGNIFICANT CHANGE UP
GLUCOSE BLDC GLUCOMTR-MCNC: 250 MG/DL — HIGH (ref 70–99)
GLUCOSE BLDC GLUCOMTR-MCNC: 252 MG/DL — HIGH (ref 70–99)
GLUCOSE BLDC GLUCOMTR-MCNC: 257 MG/DL — HIGH (ref 70–99)
GLUCOSE BLDC GLUCOMTR-MCNC: 375 MG/DL — HIGH (ref 70–99)
GLUCOSE SERPL-MCNC: 262 MG/DL — HIGH (ref 70–99)
GLUCOSE UR QL: ABNORMAL
HCT VFR BLD CALC: 39 % — LOW (ref 42–52)
HGB BLD-MCNC: 13 G/DL — LOW (ref 14–18)
KETONES UR-MCNC: NEGATIVE — SIGNIFICANT CHANGE UP
LACTATE SERPL-SCNC: 3.6 MMOL/L — HIGH (ref 0.7–2)
LEUKOCYTE ESTERASE UR-ACNC: NEGATIVE — SIGNIFICANT CHANGE UP
MAGNESIUM SERPL-MCNC: 2.1 MG/DL — SIGNIFICANT CHANGE UP (ref 1.8–2.4)
MCHC RBC-ENTMCNC: 27.9 PG — SIGNIFICANT CHANGE UP (ref 27–31)
MCHC RBC-ENTMCNC: 33.3 G/DL — SIGNIFICANT CHANGE UP (ref 32–37)
MCV RBC AUTO: 83.7 FL — SIGNIFICANT CHANGE UP (ref 80–94)
NITRITE UR-MCNC: NEGATIVE — SIGNIFICANT CHANGE UP
NRBC # BLD: 0 /100 WBCS — SIGNIFICANT CHANGE UP (ref 0–0)
PH UR: 5.5 — SIGNIFICANT CHANGE UP (ref 5–8)
PLATELET # BLD AUTO: 390 K/UL — SIGNIFICANT CHANGE UP (ref 130–400)
POTASSIUM SERPL-MCNC: 4.6 MMOL/L — SIGNIFICANT CHANGE UP (ref 3.5–5)
POTASSIUM SERPL-SCNC: 4.6 MMOL/L — SIGNIFICANT CHANGE UP (ref 3.5–5)
PROT SERPL-MCNC: 6 G/DL — SIGNIFICANT CHANGE UP (ref 6–8)
PROT UR-MCNC: NEGATIVE — SIGNIFICANT CHANGE UP
RBC # BLD: 4.66 M/UL — LOW (ref 4.7–6.1)
RBC # FLD: 15.1 % — HIGH (ref 11.5–14.5)
SODIUM SERPL-SCNC: 137 MMOL/L — SIGNIFICANT CHANGE UP (ref 135–146)
SP GR SPEC: 1.02 — SIGNIFICANT CHANGE UP (ref 1.01–1.02)
UROBILINOGEN FLD QL: SIGNIFICANT CHANGE UP
WBC # BLD: 26.78 K/UL — HIGH (ref 4.8–10.8)
WBC # FLD AUTO: 26.78 K/UL — HIGH (ref 4.8–10.8)

## 2020-01-23 RX ADMIN — Medication 80 MILLIGRAM(S): at 05:22

## 2020-01-23 RX ADMIN — Medication 600 MILLIGRAM(S): at 18:07

## 2020-01-23 RX ADMIN — Medication 3 MILLILITER(S): at 05:10

## 2020-01-23 RX ADMIN — PANTOPRAZOLE SODIUM 40 MILLIGRAM(S): 20 TABLET, DELAYED RELEASE ORAL at 07:01

## 2020-01-23 RX ADMIN — Medication 80 MILLIGRAM(S): at 14:03

## 2020-01-23 RX ADMIN — Medication 3 MILLILITER(S): at 08:32

## 2020-01-23 RX ADMIN — INSULIN GLARGINE 36 UNIT(S): 100 INJECTION, SOLUTION SUBCUTANEOUS at 21:42

## 2020-01-23 RX ADMIN — MONTELUKAST 10 MILLIGRAM(S): 4 TABLET, CHEWABLE ORAL at 11:22

## 2020-01-23 RX ADMIN — BUDESONIDE AND FORMOTEROL FUMARATE DIHYDRATE 2 PUFF(S): 160; 4.5 AEROSOL RESPIRATORY (INHALATION) at 08:43

## 2020-01-23 RX ADMIN — ENOXAPARIN SODIUM 40 MILLIGRAM(S): 100 INJECTION SUBCUTANEOUS at 21:27

## 2020-01-23 RX ADMIN — SODIUM CHLORIDE 75 MILLILITER(S): 9 INJECTION INTRAMUSCULAR; INTRAVENOUS; SUBCUTANEOUS at 02:15

## 2020-01-23 RX ADMIN — Medication 12 UNIT(S): at 11:21

## 2020-01-23 RX ADMIN — Medication 3 MILLILITER(S): at 19:53

## 2020-01-23 RX ADMIN — Medication 80 MILLIGRAM(S): at 21:27

## 2020-01-23 RX ADMIN — BUDESONIDE AND FORMOTEROL FUMARATE DIHYDRATE 2 PUFF(S): 160; 4.5 AEROSOL RESPIRATORY (INHALATION) at 20:00

## 2020-01-23 RX ADMIN — Medication 600 MILLIGRAM(S): at 05:21

## 2020-01-23 RX ADMIN — Medication 3: at 16:47

## 2020-01-23 RX ADMIN — Medication 12 UNIT(S): at 16:47

## 2020-01-23 RX ADMIN — Medication 12 UNIT(S): at 06:37

## 2020-01-23 RX ADMIN — Medication 3 MILLILITER(S): at 23:33

## 2020-01-23 RX ADMIN — Medication 3 MILLILITER(S): at 22:21

## 2020-01-23 RX ADMIN — Medication 3: at 06:38

## 2020-01-23 RX ADMIN — Medication 5: at 11:21

## 2020-01-23 NOTE — PROGRESS NOTE ADULT - SUBJECTIVE AND OBJECTIVE BOX
LENGTH OF HOSPITAL STAY: 2d    CHIEF COMPLAINT:   Patient is a 74y old  Male who presents with a chief complaint of worsening shortness of breath, feeling "lousy and weak" (22 Jan 2020 13:55)      Overnight events:    No acute events overnight    ALLERGIES:  No Known Allergies    MEDICATIONS:  STANDING MEDICATIONS  albuterol/ipratropium for Nebulization 3 milliLiter(s) Nebulizer every 4 hours  budesonide  80 MICROgram(s)/formoterol 4.5 MICROgram(s) Inhaler 2 Puff(s) Inhalation two times a day  dextrose 5%. 1000 milliLiter(s) IV Continuous <Continuous>  dextrose 50% Injectable 12.5 Gram(s) IV Push once  dextrose 50% Injectable 25 Gram(s) IV Push once  dextrose 50% Injectable 25 Gram(s) IV Push once  enoxaparin Injectable 40 milliGRAM(s) SubCutaneous at bedtime  guaiFENesin  milliGRAM(s) Oral every 12 hours  insulin glargine Injectable (LANTUS) 36 Unit(s) SubCutaneous at bedtime  insulin lispro (HumaLOG) corrective regimen sliding scale   SubCutaneous three times a day before meals  insulin lispro Injectable (HumaLOG) 12 Unit(s) SubCutaneous three times a day before meals  methylPREDNISolone sodium succinate Injectable 80 milliGRAM(s) IV Push every 8 hours  montelukast 10 milliGRAM(s) Oral daily  pantoprazole    Tablet 40 milliGRAM(s) Oral before breakfast  sodium chloride 0.9%. 1000 milliLiter(s) IV Continuous <Continuous>    PRN MEDICATIONS  albuterol/ipratropium for Nebulization 3 milliLiter(s) Nebulizer every 4 hours PRN  dextrose 40% Gel 15 Gram(s) Oral once PRN  glucagon  Injectable 1 milliGRAM(s) IntraMuscular once PRN    VITALS:   T(F): 97.7  HR: 94  BP: 135/65  RR: 18  SpO2: 97%    LABS:                        13.0   26.78 )-----------( 390      ( 23 Jan 2020 05:25 )             39.0     01-23    137  |  105  |  24<H>  ----------------------------<  262<H>  4.6   |  17  |  1.0    Ca    8.8      23 Jan 2020 05:25  Mg     2.1     01-23    TPro  6.0  /  Alb  3.7  /  TBili  0.6  /  DBili  x   /  AST  14  /  ALT  15  /  AlkPhos  51  01-23    PT/INR - ( 21 Jan 2020 10:33 )   PT: 10.80 sec;   INR: 0.94 ratio         PTT - ( 21 Jan 2020 10:33 )  PTT:24.9 sec    ABG - ( 22 Jan 2020 18:24 )  pH, Arterial: 7.44  pH, Blood: x     /  pCO2: 31    /  pO2: 77    / HCO3: 21    / Base Excess: -2.5  /  SaO2: 97                Lactate, Blood: 3.6 mmol/L <H> (01-23-20 @ 05:25)      Culture - Blood (collected 21 Jan 2020 16:23)  Source: .Blood None  Preliminary Report (23 Jan 2020 01:01):    No growth to date.      CARDIAC MARKERS ( 21 Jan 2020 10:33 )  x     / 0.01 ng/mL / x     / x     / x            Cultures:    Culture - Blood (collected 21 Jan 2020 16:23)  Source: .Blood None  Preliminary Report (23 Jan 2020 01:01):    No growth to date.        RADIOLOGY:    PHYSICAL EXAM:  GEN: No acute distress  HEENT:   LUNGS: Clear to auscultation bilaterally   HEART: S1/S2 present. RRR.   ABD: Soft, non-tender, non-distended. Bowel sounds present  EXT:  NEURO: AAOX3

## 2020-01-23 NOTE — PROGRESS NOTE ADULT - ASSESSMENT
74 y/oM PMH Asthma, DMII on insulin, prostate cancer s/p TURP 1999, recent flu B s.p tamiflu and prednisone admitted with Asthma exacerbation.    # Asthma exacerbation 2/2 Influenza B  - Septic on admission , lactate 8.3, WBC >12K  - 1/21 CXR does not show focal consolidation   - Patient was tachypneic this am and severely wheezing. He received 60mg solumedrol STAT and duonebs X4, MgSO4 with no improvement in breathing. He was started on BiPAP but patient did not tolerate it well due to severe bouts of coughing, moved to critical care in ED  -Patient improved with morphine, duonebs, steroids. VBG showed pH of 7.39, lactate 3.8.   -Increased duonebs to q4h RTCand solumedrol 80mg q8h. BiPAP 10/5 4hrs on and 4hrs off as needed  -c/w methylPREDNISolone sodium succinate Injectable 80 milliGRAM(s) IV Push every 8 hours  -c/w montelukast 10 milliGRAM(s) Oral daily  -Check peak expiratory flow BID  -Aspergillus, As galactomannan, IgE and IgG, IgG4 antibodies ordered  -Will monitor closely. If decompensates further, can be upgraded to ICU as per     # DM type 2  -FS AC + HS  -Carb consistent diet  -On insulin glargine and lispro    DVT ppx: Lovenox  GI ppx: PPI  diet: carb consistent  Activity as tolerated  Code: Full  Dispo: acute

## 2020-01-23 NOTE — PROGRESS NOTE ADULT - SUBJECTIVE AND OBJECTIVE BOX
DEL ALVARADO  74y, Male  Allergy: No Known Allergies      LAST 24-Hr EVENTS:  feeling overall better  used bipap at night for a few hours    VITALS:  T(F): 97.8 (01-23-20 @ 08:02), Max: 97.9 (01-22-20 @ 20:00)  HR: 104 (01-23-20 @ 08:02)  BP: 149/72 (01-23-20 @ 08:02) (135/65 - 149/72)  RR: 20 (01-23-20 @ 08:02)  SpO2: 97% (01-22-20 @ 22:30)    PHYSICAL EXAM:    GENERAL: NAD  NECK: Supple, No JVD  CHEST/LUNG: wheeze  HEART: Regular rate and rhythm  ABDOMEN: Soft, Nontender, Nondistended  EXTREMITIES:  No clubbing, edema absent        TESTS & MEASUREMENTS:    IN: 750 mL / OUT: 0 mL / NET: 750 mL    IN: 675 mL / OUT: 0 mL / NET: 675 mL                            13.0   26.78 )-----------( 390      ( 23 Jan 2020 05:25 )             39.0       01-23    137  |  105  |  24<H>  ----------------------------<  262<H>  4.6   |  17  |  1.0    Ca    8.8      23 Jan 2020 05:25  Mg     2.1     01-23    TPro  6.0  /  Alb  3.7  /  TBili  0.6  /  DBili  x   /  AST  14  /  ALT  15  /  AlkPhos  51  01-23    LIVER FUNCTIONS - ( 23 Jan 2020 05:25 )  Alb: 3.7 g/dL / Pro: 6.0 g/dL / ALK PHOS: 51 U/L / ALT: 15 U/L / AST: 14 U/L / GGT: x                 Culture - Blood (collected 01-21-20 @ 16:23)  Source: .Blood None  Preliminary Report (01-23-20 @ 01:01):    No growth to date.          ABG & VENT SETTINGS: (when applicable)  ABG - ( 22 Jan 2020 18:24 )  pH, Arterial: 7.44  pH, Blood: x     /  pCO2: 31    /  pO2: 77    / HCO3: 21    / Base Excess: -2.5  /  SaO2: 97          MEDICATIONS:  MEDICATIONS  (STANDING):  albuterol/ipratropium for Nebulization 3 milliLiter(s) Nebulizer every 4 hours  budesonide  80 MICROgram(s)/formoterol 4.5 MICROgram(s) Inhaler 2 Puff(s) Inhalation two times a day  dextrose 5%. 1000 milliLiter(s) (50 mL/Hr) IV Continuous <Continuous>  dextrose 50% Injectable 12.5 Gram(s) IV Push once  dextrose 50% Injectable 25 Gram(s) IV Push once  dextrose 50% Injectable 25 Gram(s) IV Push once  enoxaparin Injectable 40 milliGRAM(s) SubCutaneous at bedtime  guaiFENesin  milliGRAM(s) Oral every 12 hours  insulin glargine Injectable (LANTUS) 36 Unit(s) SubCutaneous at bedtime  insulin lispro (HumaLOG) corrective regimen sliding scale   SubCutaneous three times a day before meals  insulin lispro Injectable (HumaLOG) 12 Unit(s) SubCutaneous three times a day before meals  methylPREDNISolone sodium succinate Injectable 80 milliGRAM(s) IV Push every 8 hours  montelukast 10 milliGRAM(s) Oral daily  pantoprazole    Tablet 40 milliGRAM(s) Oral before breakfast  sodium chloride 0.9%. 1000 milliLiter(s) (75 mL/Hr) IV Continuous <Continuous>    MEDICATIONS  (PRN):  albuterol/ipratropium for Nebulization 3 milliLiter(s) Nebulizer every 4 hours PRN Shortness of Breath and/or Wheezing  dextrose 40% Gel 15 Gram(s) Oral once PRN Blood Glucose LESS THAN 70 milliGRAM(s)/deciliter  glucagon  Injectable 1 milliGRAM(s) IntraMuscular once PRN Glucose LESS THAN 70 milligrams/deciliter

## 2020-01-23 NOTE — PROGRESS NOTE ADULT - ASSESSMENT
Acute respiratory failure- hyposic  Asthma exacerbation  Allergic rhinitis  Bronchiectasis  Pulmonary nodule        cont solumedrol 80 mg iv q 8 hours  albuterol/atrovent nebs q 4 hours prn  increase symbicort 160 mg 2 puffs bid  repeat cxr pa/lateral  singulair 10 mg daily  bipap 10/5 prn  dvt/gi px

## 2020-01-23 NOTE — PROGRESS NOTE ADULT - SUBJECTIVE AND OBJECTIVE BOX
Patient was seen and examined. Spoke with RN. Chart reviewed.  No events overnight.  Vital Signs Last 24 Hrs  T(F): 97.8 (23 Jan 2020 08:02), Max: 97.9 (22 Jan 2020 20:00)  HR: 104 (23 Jan 2020 08:02) (94 - 105)  BP: 149/72 (23 Jan 2020 08:02) (135/65 - 149/72)  SpO2: 97% (22 Jan 2020 22:30) (97% - 100%)  MEDICATIONS  (STANDING):  albuterol/ipratropium for Nebulization 3 milliLiter(s) Nebulizer every 4 hours  budesonide  80 MICROgram(s)/formoterol 4.5 MICROgram(s) Inhaler 2 Puff(s) Inhalation two times a day  dextrose 5%. 1000 milliLiter(s) (50 mL/Hr) IV Continuous <Continuous>  dextrose 50% Injectable 12.5 Gram(s) IV Push once  dextrose 50% Injectable 25 Gram(s) IV Push once  dextrose 50% Injectable 25 Gram(s) IV Push once  enoxaparin Injectable 40 milliGRAM(s) SubCutaneous at bedtime  guaiFENesin  milliGRAM(s) Oral every 12 hours  insulin glargine Injectable (LANTUS) 36 Unit(s) SubCutaneous at bedtime  insulin lispro (HumaLOG) corrective regimen sliding scale   SubCutaneous three times a day before meals  insulin lispro Injectable (HumaLOG) 12 Unit(s) SubCutaneous three times a day before meals  methylPREDNISolone sodium succinate Injectable 80 milliGRAM(s) IV Push every 8 hours  montelukast 10 milliGRAM(s) Oral daily  pantoprazole    Tablet 40 milliGRAM(s) Oral before breakfast  sodium chloride 0.9%. 1000 milliLiter(s) (75 mL/Hr) IV Continuous <Continuous>    MEDICATIONS  (PRN):  albuterol/ipratropium for Nebulization 3 milliLiter(s) Nebulizer every 4 hours PRN Shortness of Breath and/or Wheezing  dextrose 40% Gel 15 Gram(s) Oral once PRN Blood Glucose LESS THAN 70 milliGRAM(s)/deciliter  glucagon  Injectable 1 milliGRAM(s) IntraMuscular once PRN Glucose LESS THAN 70 milligrams/deciliter    Labs:                        13.0   26.78 )-----------( 390      ( 23 Jan 2020 05:25 )             39.0                         13.0   22.27 )-----------( 379      ( 22 Jan 2020 05:50 )             39.7     23 Jan 2020 05:25    137    |  105    |  24     ----------------------------<  262    4.6     |  17     |  1.0    22 Jan 2020 05:50    135    |  104    |  19     ----------------------------<  235    4.4     |  16     |  0.9      Ca    8.8        23 Jan 2020 05:25  Ca    8.7        22 Jan 2020 05:50  Mg     2.1       23 Jan 2020 05:25  Mg     2.1       22 Jan 2020 05:50    TPro  6.0    /  Alb  3.7    /  TBili  0.6    /  DBili  x      /  AST  14     /  ALT  15     /  AlkPhos  51     23 Jan 2020 05:25  TPro  6.1    /  Alb  3.5    /  TBili  0.6    /  DBili  x      /  AST  12     /  ALT  16     /  AlkPhos  52     22 Jan 2020 00:28          Culture - Blood (collected 21 Jan 2020 16:23)  Source: .Blood None  Preliminary Report (23 Jan 2020 01:01):    No growth to date.      General: comfortable, ill appearing   Neurology: A&Ox3, nonfocal  Head:  Normocephalic, atraumatic  ENT:  Mucosa moist, no ulcerations  Neck:  Supple, no JVD,   Skin: no breakdowns (as per RN)  Resp:  wheezes   CV: RRR, S1S2,   GI: Soft, NT, bowel sounds  MS: No edema, + peripheral pulses, FROM all 4 extremity      A/P:  74 years old male, PMH COPD not supplemental O2 dependent, asthma, DMII on insulin, prostate cancer s/p TURP 1999, recent flu B s.p tamiflu and prednisone, presents to ED with worsening shortness of breath, feeling "lousy and weak," and unimproved productive cough x3 days, found to be afebrile, sinus tachycardia, no radiographic signs of pneumonia, diffuse wheezes, clinically stable, admitted for further management.     reports improvement   solumedrol 80 mg iv q 8 hours  albuterol and atrovent nebs prn  bipap prn   closely monitor resp status   ICU eval if any worsening   pulm f/u   glycemic control   DVT prophylaxis  Decubitus prevention- all measures as per RN protocol  Please call or text me with any questions or updates

## 2020-01-24 LAB
ALBUMIN SERPL ELPH-MCNC: 3.5 G/DL — SIGNIFICANT CHANGE UP (ref 3.5–5.2)
ALP SERPL-CCNC: 52 U/L — SIGNIFICANT CHANGE UP (ref 30–115)
ALT FLD-CCNC: 16 U/L — SIGNIFICANT CHANGE UP (ref 0–41)
ANION GAP SERPL CALC-SCNC: 14 MMOL/L — SIGNIFICANT CHANGE UP (ref 7–14)
AST SERPL-CCNC: 12 U/L — SIGNIFICANT CHANGE UP (ref 0–41)
BASOPHILS # BLD AUTO: 0.04 K/UL — SIGNIFICANT CHANGE UP (ref 0–0.2)
BASOPHILS NFR BLD AUTO: 0.2 % — SIGNIFICANT CHANGE UP (ref 0–1)
BILIRUB SERPL-MCNC: 0.5 MG/DL — SIGNIFICANT CHANGE UP (ref 0.2–1.2)
BUN SERPL-MCNC: 20 MG/DL — SIGNIFICANT CHANGE UP (ref 10–20)
CALCIUM SERPL-MCNC: 8.8 MG/DL — SIGNIFICANT CHANGE UP (ref 8.5–10.1)
CHLORIDE SERPL-SCNC: 105 MMOL/L — SIGNIFICANT CHANGE UP (ref 98–110)
CO2 SERPL-SCNC: 18 MMOL/L — SIGNIFICANT CHANGE UP (ref 17–32)
CREAT SERPL-MCNC: 0.9 MG/DL — SIGNIFICANT CHANGE UP (ref 0.7–1.5)
EOSINOPHIL # BLD AUTO: 0 K/UL — SIGNIFICANT CHANGE UP (ref 0–0.7)
EOSINOPHIL NFR BLD AUTO: 0 % — SIGNIFICANT CHANGE UP (ref 0–8)
GALACTOMANNAN AG SERPL-ACNC: <0.5 INDEX — SIGNIFICANT CHANGE UP
GLUCOSE BLDC GLUCOMTR-MCNC: 137 MG/DL — HIGH (ref 70–99)
GLUCOSE BLDC GLUCOMTR-MCNC: 264 MG/DL — HIGH (ref 70–99)
GLUCOSE BLDC GLUCOMTR-MCNC: 302 MG/DL — HIGH (ref 70–99)
GLUCOSE BLDC GLUCOMTR-MCNC: 74 MG/DL — SIGNIFICANT CHANGE UP (ref 70–99)
GLUCOSE SERPL-MCNC: 313 MG/DL — HIGH (ref 70–99)
HCT VFR BLD CALC: 37.3 % — LOW (ref 42–52)
HGB BLD-MCNC: 12.3 G/DL — LOW (ref 14–18)
IGE SERPL-ACNC: 60 KU/L — SIGNIFICANT CHANGE UP
IGG SERPL-MCNC: 943 MG/DL — SIGNIFICANT CHANGE UP (ref 700–1600)
IGG1 SER-MCNC: 528 MG/DL — SIGNIFICANT CHANGE UP (ref 248–810)
IGG2 SER-MCNC: 205 MG/DL — SIGNIFICANT CHANGE UP (ref 130–555)
IGG3 SER-MCNC: 14 MG/DL — LOW (ref 15–102)
IGG4 SER-MCNC: 63.8 MG/DL — SIGNIFICANT CHANGE UP (ref 2.4–121)
IGG4 SER-MCNC: 68 MG/DL — SIGNIFICANT CHANGE UP (ref 2–96)
IMM GRANULOCYTES NFR BLD AUTO: 3.3 % — HIGH (ref 0.1–0.3)
LYMPHOCYTES # BLD AUTO: 0.92 K/UL — LOW (ref 1.2–3.4)
LYMPHOCYTES # BLD AUTO: 4.1 % — LOW (ref 20.5–51.1)
MAGNESIUM SERPL-MCNC: 1.9 MG/DL — SIGNIFICANT CHANGE UP (ref 1.8–2.4)
MCHC RBC-ENTMCNC: 27.6 PG — SIGNIFICANT CHANGE UP (ref 27–31)
MCHC RBC-ENTMCNC: 33 G/DL — SIGNIFICANT CHANGE UP (ref 32–37)
MCV RBC AUTO: 83.8 FL — SIGNIFICANT CHANGE UP (ref 80–94)
MONOCYTES # BLD AUTO: 0.66 K/UL — HIGH (ref 0.1–0.6)
MONOCYTES NFR BLD AUTO: 2.9 % — SIGNIFICANT CHANGE UP (ref 1.7–9.3)
NEUTROPHILS # BLD AUTO: 20.09 K/UL — HIGH (ref 1.4–6.5)
NEUTROPHILS NFR BLD AUTO: 89.5 % — HIGH (ref 42.2–75.2)
NRBC # BLD: 0 /100 WBCS — SIGNIFICANT CHANGE UP (ref 0–0)
PHOSPHATE SERPL-MCNC: 3.8 MG/DL — SIGNIFICANT CHANGE UP (ref 2.1–4.9)
PLATELET # BLD AUTO: 356 K/UL — SIGNIFICANT CHANGE UP (ref 130–400)
POTASSIUM SERPL-MCNC: 4.7 MMOL/L — SIGNIFICANT CHANGE UP (ref 3.5–5)
POTASSIUM SERPL-SCNC: 4.7 MMOL/L — SIGNIFICANT CHANGE UP (ref 3.5–5)
PROT SERPL-MCNC: 5.8 G/DL — LOW (ref 6–8)
RBC # BLD: 4.45 M/UL — LOW (ref 4.7–6.1)
RBC # FLD: 15.1 % — HIGH (ref 11.5–14.5)
SODIUM SERPL-SCNC: 137 MMOL/L — SIGNIFICANT CHANGE UP (ref 135–146)
WBC # BLD: 22.46 K/UL — HIGH (ref 4.8–10.8)
WBC # FLD AUTO: 22.46 K/UL — HIGH (ref 4.8–10.8)

## 2020-01-24 PROCEDURE — 71046 X-RAY EXAM CHEST 2 VIEWS: CPT | Mod: 26

## 2020-01-24 RX ORDER — BUDESONIDE AND FORMOTEROL FUMARATE DIHYDRATE 160; 4.5 UG/1; UG/1
2 AEROSOL RESPIRATORY (INHALATION)
Refills: 0 | Status: DISCONTINUED | OUTPATIENT
Start: 2020-01-24 | End: 2020-01-27

## 2020-01-24 RX ADMIN — INSULIN GLARGINE 36 UNIT(S): 100 INJECTION, SOLUTION SUBCUTANEOUS at 21:32

## 2020-01-24 RX ADMIN — Medication 3 MILLILITER(S): at 20:00

## 2020-01-24 RX ADMIN — Medication 12 UNIT(S): at 11:58

## 2020-01-24 RX ADMIN — Medication 3 MILLILITER(S): at 12:45

## 2020-01-24 RX ADMIN — Medication 3: at 06:04

## 2020-01-24 RX ADMIN — Medication 80 MILLIGRAM(S): at 05:45

## 2020-01-24 RX ADMIN — BUDESONIDE AND FORMOTEROL FUMARATE DIHYDRATE 2 PUFF(S): 160; 4.5 AEROSOL RESPIRATORY (INHALATION) at 21:37

## 2020-01-24 RX ADMIN — ENOXAPARIN SODIUM 40 MILLIGRAM(S): 100 INJECTION SUBCUTANEOUS at 21:32

## 2020-01-24 RX ADMIN — Medication 600 MILLIGRAM(S): at 05:44

## 2020-01-24 RX ADMIN — Medication 60 MILLIGRAM(S): at 17:34

## 2020-01-24 RX ADMIN — SODIUM CHLORIDE 75 MILLILITER(S): 9 INJECTION INTRAMUSCULAR; INTRAVENOUS; SUBCUTANEOUS at 05:49

## 2020-01-24 RX ADMIN — MONTELUKAST 10 MILLIGRAM(S): 4 TABLET, CHEWABLE ORAL at 11:58

## 2020-01-24 RX ADMIN — Medication 600 MILLIGRAM(S): at 17:34

## 2020-01-24 RX ADMIN — Medication 4: at 11:57

## 2020-01-24 RX ADMIN — Medication 80 MILLIGRAM(S): at 15:16

## 2020-01-24 RX ADMIN — Medication 3 MILLILITER(S): at 15:45

## 2020-01-24 RX ADMIN — Medication 3 MILLILITER(S): at 07:44

## 2020-01-24 RX ADMIN — Medication 12 UNIT(S): at 06:05

## 2020-01-24 RX ADMIN — PANTOPRAZOLE SODIUM 40 MILLIGRAM(S): 20 TABLET, DELAYED RELEASE ORAL at 06:04

## 2020-01-24 RX ADMIN — Medication 12 UNIT(S): at 16:36

## 2020-01-24 NOTE — PROGRESS NOTE ADULT - ASSESSMENT
acute hypoxic respiratory failure  asthma exacerbation  bronchiectasis with exacerbation      try to monitor off bipap  low flow oxygen, monitor SpO2  frequent nebulized bronchodilators  decrease solumedrol to 60mg q 12 hours  monitoring off antibiotics, elevated wbc likely due to steroids  discontinue iv fluids  gi/dvt prophylaxis  monitor peak flow  prognosis guarded

## 2020-01-24 NOTE — PROGRESS NOTE ADULT - SUBJECTIVE AND OBJECTIVE BOX
Patient was seen and examined. Spoke with RN. Chart reviewed.  No events overnight.  Vital Signs Last 24 Hrs  T(F): 98 (2020 08:21), Max: 98 (2020 08:21)  HR: 109 (2020 08:21) (105 - 109)  BP: 165/76 (2020 08:21) (154/68 - 165/76)  SpO2: --  MEDICATIONS  (STANDING):  albuterol/ipratropium for Nebulization 3 milliLiter(s) Nebulizer every 4 hours  budesonide 160 MICROgram(s)/formoterol 4.5 MICROgram(s) Inhaler 2 Puff(s) Inhalation two times a day  dextrose 5%. 1000 milliLiter(s) (50 mL/Hr) IV Continuous <Continuous>  dextrose 50% Injectable 12.5 Gram(s) IV Push once  dextrose 50% Injectable 25 Gram(s) IV Push once  dextrose 50% Injectable 25 Gram(s) IV Push once  enoxaparin Injectable 40 milliGRAM(s) SubCutaneous at bedtime  guaiFENesin  milliGRAM(s) Oral every 12 hours  insulin glargine Injectable (LANTUS) 36 Unit(s) SubCutaneous at bedtime  insulin lispro (HumaLOG) corrective regimen sliding scale   SubCutaneous three times a day before meals  insulin lispro Injectable (HumaLOG) 12 Unit(s) SubCutaneous three times a day before meals  methylPREDNISolone sodium succinate Injectable 80 milliGRAM(s) IV Push every 8 hours  montelukast 10 milliGRAM(s) Oral daily  pantoprazole    Tablet 40 milliGRAM(s) Oral before breakfast  sodium chloride 0.9%. 1000 milliLiter(s) (75 mL/Hr) IV Continuous <Continuous>    MEDICATIONS  (PRN):  albuterol/ipratropium for Nebulization 3 milliLiter(s) Nebulizer every 4 hours PRN Shortness of Breath and/or Wheezing  dextrose 40% Gel 15 Gram(s) Oral once PRN Blood Glucose LESS THAN 70 milliGRAM(s)/deciliter  glucagon  Injectable 1 milliGRAM(s) IntraMuscular once PRN Glucose LESS THAN 70 milligrams/deciliter    Labs:                        12.3   22.46 )-----------( 356      ( 2020 05:25 )             37.3                         13.0   26.78 )-----------( 390      ( 2020 05:25 )             39.0     2020 05:25    137    |  105    |  20     ----------------------------<  313    4.7     |  18     |  0.9    2020 05:25    137    |  105    |  24     ----------------------------<  262    4.6     |  17     |  1.0      Ca    8.8        2020 05:25  Ca    8.8        2020 05:25  Phos  3.8       2020 05:25  Mg     1.9       2020 05:25  Mg     2.1       2020 05:25    TPro  5.8    /  Alb  3.5    /  TBili  0.5    /  DBili  x      /  AST  12     /  ALT  16     /  AlkPhos  52     2020 05:25  TPro  6.0    /  Alb  3.7    /  TBili  0.6    /  DBili  x      /  AST  14     /  ALT  15     /  AlkPhos  51     2020 05:25      Urinalysis Basic - ( 2020 23:40 )    Color: Light Yellow / Appearance: Clear / S.020 / pH: x  Gluc: x / Ketone: Negative  / Bili: Negative / Urobili: <2 mg/dL   Blood: x / Protein: Negative / Nitrite: Negative   Leuk Esterase: Negative / RBC: x / WBC x   Sq Epi: x / Non Sq Epi: x / Bacteria: x        Culture - Blood (collected 2020 16:23)  Source: .Blood None  Preliminary Report (2020 01:01):    No growth to date.      General: comfortable, ill appearing   Neurology: A&Ox3, nonfocal  Head:  Normocephalic, atraumatic  ENT:  Mucosa moist, no ulcerations  Neck:  Supple, no JVD,   Skin: no breakdowns (as per RN)  Resp:  wheezes   CV: RRR, S1S2,   GI: Soft, NT, bowel sounds  MS: No edema, + peripheral pulses, FROM all 4 extremity      A/P:  74 years old male, PMH COPD not supplemental O2 dependent, asthma, DMII on insulin, prostate cancer s/p TURP , recent flu B s.p tamiflu and prednisone, presents to ED with worsening shortness of breath, feeling "lousy and weak," and unimproved productive cough x3 days, found to be afebrile, sinus tachycardia, no radiographic signs of pneumonia, diffuse wheezes, clinically stable, admitted for further management.     solumedrol 80 mg iv q 8 hours  albuterol and atrovent nebs prn  bipap prn   supp O2 prn   closely monitor resp status   ICU eval if any worsening   pulm f/u   d/c planning when cleared by pulm, hopefully 24 -48 hours   glycemic control   DVT prophylaxis  Decubitus prevention- all measures as per RN protocol  Please call or text me with any questions or updates

## 2020-01-24 NOTE — PROGRESS NOTE ADULT - ASSESSMENT
74 y/oM PMH Asthma, DMII on insulin, prostate cancer s/p TURP 1999, recent flu B s.p tamiflu and prednisone admitted with Asthma exacerbation.    # Asthma exacerbation 2/2 Influenza B  - Septic on admission , lactate 8.3, WBC >12K  - 1/21 CXR does not show focal consolidation - will get an other Xray pa/lateral  -Patient improved with morphine, duonebs, symbocort and steroids. VBG showed pH of 7.39, lactate 3.8.   -Increased duonebs to q4h RTCand solumedrol 80mg q8h. BiPAP 10/5 4hrs on and 4hrs off as needed  -c/w methylPREDNISolone sodium succinate Injectable 80 milliGRAM(s) IV Push every 8 hours  - increase symbicort 160 mg 2 puffs bid  -c/w montelukast 10 milliGRAM(s) Oral daily  -Check peak expiratory flow BID  -Aspergillus, As galactomannan, IgE and IgG, IgG4 antibodies ordered  -Will monitor closely. If decompensates further, can be upgraded to ICU as per     # DM type 2  -FS AC + HS  -Carb consistent diet  -On insulin glargine and lispro    Pending: Aspergillus, As galactomannan, IgE and IgG, IgG4 antibodies, Blood cultures, urine cultures, Xray    DVT ppx: Lovenox  GI ppx: PPI  diet: carb consistent  Activity as tolerated  Code: Full  Dispo: acute

## 2020-01-24 NOTE — PROGRESS NOTE ADULT - SUBJECTIVE AND OBJECTIVE BOX
DEL ALVARADO  74y, Male  Allergy: No Known Allergies      LAST 24-Hr EVENTS:  feels better  VITALS:  T(F): 98 (20 @ 08:21), Max: 98 (20 @ 08:21)  HR: 109 (20 @ 08:21)  BP: 165/76 (20 @ 08:21) (154/68 - 165/76)  RR: 20 (20 @ 08:21)  SpO2: --    PHYSICAL EXAM:    GENERAL: NAD, well-developed  HEAD:  Atraumatic, Normocephalic  NECK: Supple, No JVD  CHEST/LUNG: Rhonchi and wheeze bilaterally, fair airflow  HEART: Regular rate and rhythm; No murmurs, rubs, or gallops  ABDOMEN: Soft, Nontender, Nondistended; Bowel sounds present  EXTREMITIES:  2+ Peripheral Pulses, No clubbing, cyanosis; mild dependent edema        TESTS & MEASUREMENTS:    IN: 750 mL / OUT: 0 mL / NET: 750 mL    IN: 1650 mL / OUT: 0 mL / NET: 1650 mL                            12.3   22.46 )-----------( 356      ( 2020 05:25 )             37.3           137  |  105  |  20  ----------------------------<  313<H>  4.7   |  18  |  0.9    Ca    8.8      2020 05:25  Phos  3.8       Mg     1.9         TPro  5.8<L>  /  Alb  3.5  /  TBili  0.5  /  DBili  x   /  AST  12  /  ALT  16  /  AlkPhos  52      LIVER FUNCTIONS - ( 2020 05:25 )  Alb: 3.5 g/dL / Pro: 5.8 g/dL / ALK PHOS: 52 U/L / ALT: 16 U/L / AST: 12 U/L / GGT: x                 Culture - Blood (collected 20 @ 16:23)  Source: .Blood None  Preliminary Report (20 @ 01:01):    No growth to date.      Urinalysis Basic - ( 2020 23:40 )    Color: Light Yellow / Appearance: Clear / S.020 / pH: x  Gluc: x / Ketone: Negative  / Bili: Negative / Urobili: <2 mg/dL   Blood: x / Protein: Negative / Nitrite: Negative   Leuk Esterase: Negative / RBC: x / WBC x   Sq Epi: x / Non Sq Epi: x / Bacteria: x        ABG & VENT SETTINGS: (when applicable)  ABG - ( 2020 18:24 )  pH, Arterial: 7.44  pH, Blood: x     /  pCO2: 31    /  pO2: 77    / HCO3: 21    / Base Excess: -2.5  /  SaO2: 97                    RADIOLOGY & ADDITIONAL TESTS:  < from: Xray Chest 2 Views PA/Lat (20 @ 12:00) >  Impression:      No focal consolidation, pleural effusion or pneumothorax. No significant change since prior exam    < end of copied text >    MEDICATIONS:  MEDICATIONS  (STANDING):  albuterol/ipratropium for Nebulization 3 milliLiter(s) Nebulizer every 4 hours  budesonide 160 MICROgram(s)/formoterol 4.5 MICROgram(s) Inhaler 2 Puff(s) Inhalation two times a day  dextrose 5%. 1000 milliLiter(s) (50 mL/Hr) IV Continuous <Continuous>  dextrose 50% Injectable 12.5 Gram(s) IV Push once  dextrose 50% Injectable 25 Gram(s) IV Push once  dextrose 50% Injectable 25 Gram(s) IV Push once  enoxaparin Injectable 40 milliGRAM(s) SubCutaneous at bedtime  guaiFENesin  milliGRAM(s) Oral every 12 hours  insulin glargine Injectable (LANTUS) 36 Unit(s) SubCutaneous at bedtime  insulin lispro (HumaLOG) corrective regimen sliding scale   SubCutaneous three times a day before meals  insulin lispro Injectable (HumaLOG) 12 Unit(s) SubCutaneous three times a day before meals  methylPREDNISolone sodium succinate Injectable 80 milliGRAM(s) IV Push every 8 hours  montelukast 10 milliGRAM(s) Oral daily  pantoprazole    Tablet 40 milliGRAM(s) Oral before breakfast  sodium chloride 0.9%. 1000 milliLiter(s) (75 mL/Hr) IV Continuous <Continuous>    MEDICATIONS  (PRN):  albuterol/ipratropium for Nebulization 3 milliLiter(s) Nebulizer every 4 hours PRN Shortness of Breath and/or Wheezing  dextrose 40% Gel 15 Gram(s) Oral once PRN Blood Glucose LESS THAN 70 milliGRAM(s)/deciliter  glucagon  Injectable 1 milliGRAM(s) IntraMuscular once PRN Glucose LESS THAN 70 milligrams/deciliter

## 2020-01-24 NOTE — PROGRESS NOTE ADULT - SUBJECTIVE AND OBJECTIVE BOX
LENGTH OF HOSPITAL STAY: 3d    CHIEF COMPLAINT:   Patient is a 74y old  Male who presents with a chief complaint of worsening shortness of breath, feeling "lousy and weak" (2020 11:06)      Overnight events:    No acute events overnight    ALLERGIES:  No Known Allergies    MEDICATIONS:  STANDING MEDICATIONS  albuterol/ipratropium for Nebulization 3 milliLiter(s) Nebulizer every 4 hours  budesonide 160 MICROgram(s)/formoterol 4.5 MICROgram(s) Inhaler 2 Puff(s) Inhalation two times a day  dextrose 5%. 1000 milliLiter(s) IV Continuous <Continuous>  dextrose 50% Injectable 12.5 Gram(s) IV Push once  dextrose 50% Injectable 25 Gram(s) IV Push once  dextrose 50% Injectable 25 Gram(s) IV Push once  enoxaparin Injectable 40 milliGRAM(s) SubCutaneous at bedtime  guaiFENesin  milliGRAM(s) Oral every 12 hours  insulin glargine Injectable (LANTUS) 36 Unit(s) SubCutaneous at bedtime  insulin lispro (HumaLOG) corrective regimen sliding scale   SubCutaneous three times a day before meals  insulin lispro Injectable (HumaLOG) 12 Unit(s) SubCutaneous three times a day before meals  methylPREDNISolone sodium succinate Injectable 80 milliGRAM(s) IV Push every 8 hours  montelukast 10 milliGRAM(s) Oral daily  pantoprazole    Tablet 40 milliGRAM(s) Oral before breakfast  sodium chloride 0.9%. 1000 milliLiter(s) IV Continuous <Continuous>    PRN MEDICATIONS  albuterol/ipratropium for Nebulization 3 milliLiter(s) Nebulizer every 4 hours PRN  dextrose 40% Gel 15 Gram(s) Oral once PRN  glucagon  Injectable 1 milliGRAM(s) IntraMuscular once PRN    VITALS:   T(F): 98  HR: 109  BP: 165/76  RR: 20  SpO2: --    LABS:                        12.3   22.46 )-----------( 356      ( 2020 05:25 )             37.3     01-24    137  |  105  |  20  ----------------------------<  313<H>  4.7   |  18  |  0.9    Ca    8.8      2020 05:25  Phos  3.8     01-24  Mg     1.9     01-24    TPro  5.8<L>  /  Alb  3.5  /  TBili  0.5  /  DBili  x   /  AST  12  /  ALT  16  /  AlkPhos  52        Urinalysis Basic - ( 2020 23:40 )    Color: Light Yellow / Appearance: Clear / S.020 / pH: x  Gluc: x / Ketone: Negative  / Bili: Negative / Urobili: <2 mg/dL   Blood: x / Protein: Negative / Nitrite: Negative   Leuk Esterase: Negative / RBC: x / WBC x   Sq Epi: x / Non Sq Epi: x / Bacteria: x      ABG - ( 2020 18:24 )  pH, Arterial: 7.44  pH, Blood: x     /  pCO2: 31    /  pO2: 77    / HCO3: 21    / Base Excess: -2.5  /  SaO2: 97                    Culture - Blood (collected 2020 16:23)  Source: .Blood None  Preliminary Report (2020 01:01):    No growth to date.            Cultures:    Culture - Blood (collected 2020 16:23)  Source: .Blood None  Preliminary Report (2020 01:01):    No growth to date.        RADIOLOGY:    PHYSICAL EXAM:  GEN: dyspnic on ambulation  HEENT: KIRILL  LUNGS: expiratory wheezing on R side  HEART: S1/S2 present. RRR.   ABD: Soft, non-tender, non-distended. Bowel sounds present  EXT: non edematous, non erythematous  NEURO: AAOX3

## 2020-01-25 LAB
ALBUMIN SERPL ELPH-MCNC: 3.4 G/DL — LOW (ref 3.5–5.2)
ALP SERPL-CCNC: 50 U/L — SIGNIFICANT CHANGE UP (ref 30–115)
ALT FLD-CCNC: 17 U/L — SIGNIFICANT CHANGE UP (ref 0–41)
ANION GAP SERPL CALC-SCNC: 15 MMOL/L — HIGH (ref 7–14)
AST SERPL-CCNC: 14 U/L — SIGNIFICANT CHANGE UP (ref 0–41)
BASOPHILS # BLD AUTO: 0.04 K/UL — SIGNIFICANT CHANGE UP (ref 0–0.2)
BASOPHILS NFR BLD AUTO: 0.2 % — SIGNIFICANT CHANGE UP (ref 0–1)
BILIRUB SERPL-MCNC: 0.8 MG/DL — SIGNIFICANT CHANGE UP (ref 0.2–1.2)
BUN SERPL-MCNC: 22 MG/DL — HIGH (ref 10–20)
CALCIUM SERPL-MCNC: 8.9 MG/DL — SIGNIFICANT CHANGE UP (ref 8.5–10.1)
CHLORIDE SERPL-SCNC: 103 MMOL/L — SIGNIFICANT CHANGE UP (ref 98–110)
CO2 SERPL-SCNC: 22 MMOL/L — SIGNIFICANT CHANGE UP (ref 17–32)
CREAT SERPL-MCNC: 0.9 MG/DL — SIGNIFICANT CHANGE UP (ref 0.7–1.5)
EOSINOPHIL # BLD AUTO: 0 K/UL — SIGNIFICANT CHANGE UP (ref 0–0.7)
EOSINOPHIL NFR BLD AUTO: 0 % — SIGNIFICANT CHANGE UP (ref 0–8)
GLUCOSE BLDC GLUCOMTR-MCNC: 119 MG/DL — HIGH (ref 70–99)
GLUCOSE BLDC GLUCOMTR-MCNC: 218 MG/DL — HIGH (ref 70–99)
GLUCOSE BLDC GLUCOMTR-MCNC: 232 MG/DL — HIGH (ref 70–99)
GLUCOSE SERPL-MCNC: 138 MG/DL — HIGH (ref 70–99)
HCT VFR BLD CALC: 38.8 % — LOW (ref 42–52)
HGB BLD-MCNC: 12.7 G/DL — LOW (ref 14–18)
IMM GRANULOCYTES NFR BLD AUTO: 2 % — HIGH (ref 0.1–0.3)
LYMPHOCYTES # BLD AUTO: 1.76 K/UL — SIGNIFICANT CHANGE UP (ref 1.2–3.4)
LYMPHOCYTES # BLD AUTO: 9.6 % — LOW (ref 20.5–51.1)
MAGNESIUM SERPL-MCNC: 2 MG/DL — SIGNIFICANT CHANGE UP (ref 1.8–2.4)
MCHC RBC-ENTMCNC: 27.5 PG — SIGNIFICANT CHANGE UP (ref 27–31)
MCHC RBC-ENTMCNC: 32.7 G/DL — SIGNIFICANT CHANGE UP (ref 32–37)
MCV RBC AUTO: 84.2 FL — SIGNIFICANT CHANGE UP (ref 80–94)
MONOCYTES # BLD AUTO: 0.94 K/UL — HIGH (ref 0.1–0.6)
MONOCYTES NFR BLD AUTO: 5.1 % — SIGNIFICANT CHANGE UP (ref 1.7–9.3)
NEUTROPHILS # BLD AUTO: 15.18 K/UL — HIGH (ref 1.4–6.5)
NEUTROPHILS NFR BLD AUTO: 83.1 % — HIGH (ref 42.2–75.2)
NRBC # BLD: 0 /100 WBCS — SIGNIFICANT CHANGE UP (ref 0–0)
PHOSPHATE SERPL-MCNC: 3.7 MG/DL — SIGNIFICANT CHANGE UP (ref 2.1–4.9)
PLATELET # BLD AUTO: 319 K/UL — SIGNIFICANT CHANGE UP (ref 130–400)
POTASSIUM SERPL-MCNC: 4.6 MMOL/L — SIGNIFICANT CHANGE UP (ref 3.5–5)
POTASSIUM SERPL-SCNC: 4.6 MMOL/L — SIGNIFICANT CHANGE UP (ref 3.5–5)
PROT SERPL-MCNC: 5.7 G/DL — LOW (ref 6–8)
RBC # BLD: 4.61 M/UL — LOW (ref 4.7–6.1)
RBC # FLD: 15.3 % — HIGH (ref 11.5–14.5)
SODIUM SERPL-SCNC: 140 MMOL/L — SIGNIFICANT CHANGE UP (ref 135–146)
WBC # BLD: 18.28 K/UL — HIGH (ref 4.8–10.8)
WBC # FLD AUTO: 18.28 K/UL — HIGH (ref 4.8–10.8)

## 2020-01-25 RX ORDER — POLYETHYLENE GLYCOL 3350 17 G/17G
17 POWDER, FOR SOLUTION ORAL DAILY
Refills: 0 | Status: DISCONTINUED | OUTPATIENT
Start: 2020-01-25 | End: 2020-01-27

## 2020-01-25 RX ADMIN — Medication 5: at 13:05

## 2020-01-25 RX ADMIN — Medication 600 MILLIGRAM(S): at 05:08

## 2020-01-25 RX ADMIN — Medication 3 MILLILITER(S): at 09:45

## 2020-01-25 RX ADMIN — ENOXAPARIN SODIUM 40 MILLIGRAM(S): 100 INJECTION SUBCUTANEOUS at 21:16

## 2020-01-25 RX ADMIN — MONTELUKAST 10 MILLIGRAM(S): 4 TABLET, CHEWABLE ORAL at 13:06

## 2020-01-25 RX ADMIN — Medication 2: at 18:28

## 2020-01-25 RX ADMIN — PANTOPRAZOLE SODIUM 40 MILLIGRAM(S): 20 TABLET, DELAYED RELEASE ORAL at 05:08

## 2020-01-25 RX ADMIN — Medication 12 UNIT(S): at 13:04

## 2020-01-25 RX ADMIN — Medication 3 MILLILITER(S): at 21:46

## 2020-01-25 RX ADMIN — Medication 600 MILLIGRAM(S): at 18:25

## 2020-01-25 RX ADMIN — Medication 12 UNIT(S): at 18:28

## 2020-01-25 RX ADMIN — Medication 3 MILLILITER(S): at 21:47

## 2020-01-25 RX ADMIN — BUDESONIDE AND FORMOTEROL FUMARATE DIHYDRATE 2 PUFF(S): 160; 4.5 AEROSOL RESPIRATORY (INHALATION) at 20:30

## 2020-01-25 RX ADMIN — INSULIN GLARGINE 36 UNIT(S): 100 INJECTION, SOLUTION SUBCUTANEOUS at 21:26

## 2020-01-25 RX ADMIN — Medication 12 UNIT(S): at 07:55

## 2020-01-25 RX ADMIN — Medication 3 MILLILITER(S): at 11:40

## 2020-01-25 RX ADMIN — Medication 60 MILLIGRAM(S): at 05:08

## 2020-01-25 RX ADMIN — POLYETHYLENE GLYCOL 3350 17 GRAM(S): 17 POWDER, FOR SOLUTION ORAL at 21:44

## 2020-01-25 RX ADMIN — Medication 60 MILLIGRAM(S): at 18:25

## 2020-01-25 RX ADMIN — Medication 3 MILLILITER(S): at 05:19

## 2020-01-25 NOTE — PROGRESS NOTE ADULT - ASSESSMENT
74 y/oM PMH Asthma, DMII on insulin, prostate cancer s/p TURP 1999, recent flu B s.p tamiflu and prednisone admitted with Asthma exacerbation.    # Asthma exacerbation 2/2 Influenza B  - Septic on admission , lactate 8.3, WBC >12K  - 1/21 CXR does not show focal consolidation - will get an other Xray pa/lateral  -Patient improved with morphine, duonebs, symbocort and steroids. VBG showed pH of 7.39, lactate 3.8.   -Increased duonebs to q4h RTCand solumedrol 80mg q8h. BiPAP 10/5 4hrs on and 4hrs off as needed  -c/w methylPREDNISolone sodium succinate Injectable 60 milliGRAM(s) IV Push every 12 hours  - increase symbicort 160 mg 2 puffs bid  -c/w montelukast 10 milliGRAM(s) Oral daily  -Check peak expiratory flow BID  -Aspergillus negative, As galactomannan levels WNL, IgE and IgG, IgG4 antibodies are WNL  -Will monitor closely. If decompensates further, can be upgraded to ICU as per   - Blood cultures are negative, Urine cultures are negative    # DM type 2  -FS AC + HS  -Carb consistent diet  -On insulin glargine and lispro    DVT ppx: Lovenox  GI ppx: PPI  diet: carb consistent  Activity as tolerated  Code: Full  Dispo: acute

## 2020-01-25 NOTE — PROGRESS NOTE ADULT - SUBJECTIVE AND OBJECTIVE BOX
Patient was seen and examined. Spoke with RN. Chart reviewed.  No events overnight.  Vital Signs Last 24 Hrs  T(F): 97.4 (2020 08:02), Max: 97.4 (2020 08:02)  HR: 95 (2020 08:02) (91 - 95)  BP: 129/66 (2020 08:02) (129/66 - 154/74)  SpO2: --  MEDICATIONS  (STANDING):  albuterol/ipratropium for Nebulization 3 milliLiter(s) Nebulizer every 4 hours  budesonide 160 MICROgram(s)/formoterol 4.5 MICROgram(s) Inhaler 2 Puff(s) Inhalation two times a day  dextrose 5%. 1000 milliLiter(s) (50 mL/Hr) IV Continuous <Continuous>  dextrose 50% Injectable 12.5 Gram(s) IV Push once  dextrose 50% Injectable 25 Gram(s) IV Push once  dextrose 50% Injectable 25 Gram(s) IV Push once  enoxaparin Injectable 40 milliGRAM(s) SubCutaneous at bedtime  guaiFENesin  milliGRAM(s) Oral every 12 hours  insulin glargine Injectable (LANTUS) 36 Unit(s) SubCutaneous at bedtime  insulin lispro (HumaLOG) corrective regimen sliding scale   SubCutaneous three times a day before meals  insulin lispro Injectable (HumaLOG) 12 Unit(s) SubCutaneous three times a day before meals  methylPREDNISolone sodium succinate Injectable 60 milliGRAM(s) IV Push two times a day  montelukast 10 milliGRAM(s) Oral daily  pantoprazole    Tablet 40 milliGRAM(s) Oral before breakfast    MEDICATIONS  (PRN):  albuterol/ipratropium for Nebulization 3 milliLiter(s) Nebulizer every 4 hours PRN Shortness of Breath and/or Wheezing  dextrose 40% Gel 15 Gram(s) Oral once PRN Blood Glucose LESS THAN 70 milliGRAM(s)/deciliter  glucagon  Injectable 1 milliGRAM(s) IntraMuscular once PRN Glucose LESS THAN 70 milligrams/deciliter    Labs:                        12.7   18.28 )-----------( 319      ( 2020 06:40 )             38.8                         12.3   22.46 )-----------( 356      ( 2020 05:25 )             37.3     2020 05:25    137    |  105    |  20     ----------------------------<  313    4.7     |  18     |  0.9      Ca    8.8        2020 05:25  Phos  3.8       2020 05:25  Mg     1.9       2020 05:25    TPro  5.8    /  Alb  3.5    /  TBili  0.5    /  DBili  x      /  AST  12     /  ALT  16     /  AlkPhos  52     2020 05:25      Urinalysis Basic - ( 2020 23:40 )    Color: Light Yellow / Appearance: Clear / S.020 / pH: x  Gluc: x / Ketone: Negative  / Bili: Negative / Urobili: <2 mg/dL   Blood: x / Protein: Negative / Nitrite: Negative   Leuk Esterase: Negative / RBC: x / WBC x   Sq Epi: x / Non Sq Epi: x / Bacteria: x        Culture - Urine (collected 2020 23:40)  Source: .Urine Clean Catch (Midstream)  Preliminary Report (2020 05:59):    10,000 - 49,000 CFU/mL Gram Negative Rods      General: comfortable, ill appearing   Neurology: A&Ox3, nonfocal  Head:  Normocephalic, atraumatic  ENT:  Mucosa moist, no ulcerations  Neck:  Supple, no JVD,   Skin: no breakdowns (as per RN)  Resp:  Rhonchi and wheeze bilaterally  CV: RRR, S1S2,   GI: Soft, NT, bowel sounds  MS: No edema, + peripheral pulses, FROM all 4 extremity      A/P:  74 years old male, PMH COPD not supplemental O2 dependent, asthma, DMII on insulin, prostate cancer s/p TURP , recent flu B s.p tamiflu and prednisone, presents to ED with worsening shortness of breath, feeling "lousy and weak," and unimproved productive cough x3 days, found to be afebrile, sinus tachycardia, no radiographic signs of pneumonia, diffuse wheezes, clinically stable, admitted for further management.     solumedrol 60 mg iv q 12 hours  bronchodilators nebs prn  supp O2 prn   closely monitor resp status   pulm f/u   glycemic control   wean off BiPAP if tolerates   d/c planning when cleared by pulm, hopefully 24  hours   DVT prophylaxis  Decubitus prevention- all measures as per RN protocol  Please call or text me with any questions or updates

## 2020-01-25 NOTE — PROGRESS NOTE ADULT - SUBJECTIVE AND OBJECTIVE BOX
DEL ALVARADO  74y, Male  Allergy: No Known Allergies      LAST 24-Hr EVENTS:  breathing better, still with cough  VITALS:  T(F): 97.4 (20 @ 08:02), Max: 97.4 (20 @ 08:02)  HR: 95 (20 @ 08:02)  BP: 129/66 (20 @ 08:02) (129/66 - 154/74)  RR: 20 (20 @ 08:02)  SpO2: --    PHYSICAL EXAM:    GENERAL: NAD, well-developed  HEAD:  Atraumatic, Normocephalic  NECK: Supple, No JVD  CHEST/LUNG: Occasional wheeze, improved airflow, positive rhonchi  HEART: Regular rate and rhythm; No murmurs, rubs, or gallops  ABDOMEN: Soft, Nontender, Nondistended; Bowel sounds present  EXTREMITIES:  2+ Peripheral Pulses, No clubbing, cyanosis, or edema        TESTS & MEASUREMENTS:    IN: 1650 mL / OUT: 0 mL / NET: 1650 mL                            12.7   18.28 )-----------( 319      ( 2020 06:40 )             38.8           140  |  103  |  22<H>  ----------------------------<  138<H>  4.6   |  22  |  0.9    Ca    8.9      2020 06:40  Phos  3.7       Mg     2.0         TPro  5.7<L>  /  Alb  3.4<L>  /  TBili  0.8  /  DBili  x   /  AST  14  /  ALT  17  /  AlkPhos  50      LIVER FUNCTIONS - ( 2020 06:40 )  Alb: 3.4 g/dL / Pro: 5.7 g/dL / ALK PHOS: 50 U/L / ALT: 17 U/L / AST: 14 U/L / GGT: x                 Culture - Urine (collected 20 @ 23:40)  Source: .Urine Clean Catch (Midstream)  Preliminary Report (20 @ 05:59):    10,000 - 49,000 CFU/mL Gram Negative Rods    Culture - Blood (collected 20 @ 16:23)  Source: .Blood None  Preliminary Report (20 @ 01:01):    No growth to date.      Urinalysis Basic - ( 2020 23:40 )    Color: Light Yellow / Appearance: Clear / S.020 / pH: x  Gluc: x / Ketone: Negative  / Bili: Negative / Urobili: <2 mg/dL   Blood: x / Protein: Negative / Nitrite: Negative   Leuk Esterase: Negative / RBC: x / WBC x   Sq Epi: x / Non Sq Epi: x / Bacteria: x        ABG & VENT SETTINGS: (when applicable)    n/a    RADIOLOGY & ADDITIONAL TESTS:  < from: Xray Chest 2 Views PA/Lat (20 @ 12:00) >    < from: Xray Chest 2 Views PA/Lat (20 @ 12:00) >  Impression:      No focal consolidation, pleural effusion or pneumothorax. No significant change since prior exam    < end of copied text >    < end of copied text >    MEDICATIONS:  MEDICATIONS  (STANDING):  albuterol/ipratropium for Nebulization 3 milliLiter(s) Nebulizer every 4 hours  budesonide 160 MICROgram(s)/formoterol 4.5 MICROgram(s) Inhaler 2 Puff(s) Inhalation two times a day  dextrose 5%. 1000 milliLiter(s) (50 mL/Hr) IV Continuous <Continuous>  dextrose 50% Injectable 12.5 Gram(s) IV Push once  dextrose 50% Injectable 25 Gram(s) IV Push once  dextrose 50% Injectable 25 Gram(s) IV Push once  enoxaparin Injectable 40 milliGRAM(s) SubCutaneous at bedtime  guaiFENesin  milliGRAM(s) Oral every 12 hours  insulin glargine Injectable (LANTUS) 36 Unit(s) SubCutaneous at bedtime  insulin lispro (HumaLOG) corrective regimen sliding scale   SubCutaneous three times a day before meals  insulin lispro Injectable (HumaLOG) 12 Unit(s) SubCutaneous three times a day before meals  methylPREDNISolone sodium succinate Injectable 60 milliGRAM(s) IV Push two times a day  montelukast 10 milliGRAM(s) Oral daily  pantoprazole    Tablet 40 milliGRAM(s) Oral before breakfast    MEDICATIONS  (PRN):  albuterol/ipratropium for Nebulization 3 milliLiter(s) Nebulizer every 4 hours PRN Shortness of Breath and/or Wheezing  dextrose 40% Gel 15 Gram(s) Oral once PRN Blood Glucose LESS THAN 70 milliGRAM(s)/deciliter  glucagon  Injectable 1 milliGRAM(s) IntraMuscular once PRN Glucose LESS THAN 70 milligrams/deciliter

## 2020-01-25 NOTE — PROGRESS NOTE ADULT - ASSESSMENT
acute hypoxic respiratory failure, improved  asthma exacerbation, improving  bronchiectasis with exacerbation      encouraged patient not to use bipap  monitor SpO2, check on room air resting and post ambulation  continue bronchodilators  continue solumedrol 60mg q 12 hours for today  remains off antibiotic, wbc improved today  incentive spirometry  out of bed/ambulate

## 2020-01-25 NOTE — PROGRESS NOTE ADULT - SUBJECTIVE AND OBJECTIVE BOX
LENGTH OF HOSPITAL STAY: 4d    CHIEF COMPLAINT:   Patient is a 74y old  Male who presents with a chief complaint of worsening shortness of breath, feeling "lousy and weak" (2020 10:57)      Overnight events:    No acute events overnight    ALLERGIES:  No Known Allergies    MEDICATIONS:  STANDING MEDICATIONS  albuterol/ipratropium for Nebulization 3 milliLiter(s) Nebulizer every 4 hours  budesonide 160 MICROgram(s)/formoterol 4.5 MICROgram(s) Inhaler 2 Puff(s) Inhalation two times a day  dextrose 5%. 1000 milliLiter(s) IV Continuous <Continuous>  dextrose 50% Injectable 12.5 Gram(s) IV Push once  dextrose 50% Injectable 25 Gram(s) IV Push once  dextrose 50% Injectable 25 Gram(s) IV Push once  enoxaparin Injectable 40 milliGRAM(s) SubCutaneous at bedtime  guaiFENesin  milliGRAM(s) Oral every 12 hours  insulin glargine Injectable (LANTUS) 36 Unit(s) SubCutaneous at bedtime  insulin lispro (HumaLOG) corrective regimen sliding scale   SubCutaneous three times a day before meals  insulin lispro Injectable (HumaLOG) 12 Unit(s) SubCutaneous three times a day before meals  methylPREDNISolone sodium succinate Injectable 60 milliGRAM(s) IV Push two times a day  montelukast 10 milliGRAM(s) Oral daily  pantoprazole    Tablet 40 milliGRAM(s) Oral before breakfast    PRN MEDICATIONS  albuterol/ipratropium for Nebulization 3 milliLiter(s) Nebulizer every 4 hours PRN  dextrose 40% Gel 15 Gram(s) Oral once PRN  glucagon  Injectable 1 milliGRAM(s) IntraMuscular once PRN    VITALS:   T(F): 97.4  HR: 95  BP: 129/66  RR: 20  SpO2: --    LABS:                        12.7   18.28 )-----------( 319      ( 2020 06:40 )             38.8     01-    140  |  103  |  22<H>  ----------------------------<  138<H>  4.6   |  22  |  0.9    Ca    8.9      2020 06:40  Phos  3.7     01-25  Mg     2.0     -25    TPro  5.7<L>  /  Alb  3.4<L>  /  TBili  0.8  /  DBili  x   /  AST  14  /  ALT  17  /  AlkPhos  50  01-25      Urinalysis Basic - ( 2020 23:40 )    Color: Light Yellow / Appearance: Clear / S.020 / pH: x  Gluc: x / Ketone: Negative  / Bili: Negative / Urobili: <2 mg/dL   Blood: x / Protein: Negative / Nitrite: Negative   Leuk Esterase: Negative / RBC: x / WBC x   Sq Epi: x / Non Sq Epi: x / Bacteria: x            Culture - Urine (collected 2020 23:40)  Source: .Urine Clean Catch (Midstream)  Preliminary Report (2020 05:59):    10,000 - 49,000 CFU/mL Gram Negative Rods            Cultures:    Culture - Urine (collected 2020 23:40)  Source: .Urine Clean Catch (Midstream)  Preliminary Report (2020 05:59):    10,000 - 49,000 CFU/mL Gram Negative Rods        RADIOLOGY:    PHYSICAL EXAM:  GEN: dyspnic on ambulation  HEENT: KIRILL  LUNGS: expiratory wheezing on R side  HEART: S1/S2 present. RRR.   ABD: Soft, non-tender, non-distended. Bowel sounds present  EXT: non edematous, non erythematous  NEURO: AAOX3

## 2020-01-26 LAB
-  AMIKACIN: SIGNIFICANT CHANGE UP
-  AMPICILLIN/SULBACTAM: SIGNIFICANT CHANGE UP
-  AMPICILLIN: SIGNIFICANT CHANGE UP
-  AZTREONAM: SIGNIFICANT CHANGE UP
-  CEFAZOLIN: SIGNIFICANT CHANGE UP
-  CEFEPIME: SIGNIFICANT CHANGE UP
-  CEFOXITIN: SIGNIFICANT CHANGE UP
-  CEFTRIAXONE: SIGNIFICANT CHANGE UP
-  CIPROFLOXACIN: SIGNIFICANT CHANGE UP
-  ERTAPENEM: SIGNIFICANT CHANGE UP
-  GENTAMICIN: SIGNIFICANT CHANGE UP
-  IMIPENEM: SIGNIFICANT CHANGE UP
-  LEVOFLOXACIN: SIGNIFICANT CHANGE UP
-  MEROPENEM: SIGNIFICANT CHANGE UP
-  NITROFURANTOIN: SIGNIFICANT CHANGE UP
-  PIPERACILLIN/TAZOBACTAM: SIGNIFICANT CHANGE UP
-  TIGECYCLINE: SIGNIFICANT CHANGE UP
-  TOBRAMYCIN: SIGNIFICANT CHANGE UP
-  TRIMETHOPRIM/SULFAMETHOXAZOLE: SIGNIFICANT CHANGE UP
ALBUMIN SERPL ELPH-MCNC: 3.2 G/DL — LOW (ref 3.5–5.2)
ALP SERPL-CCNC: 47 U/L — SIGNIFICANT CHANGE UP (ref 30–115)
ALT FLD-CCNC: 16 U/L — SIGNIFICANT CHANGE UP (ref 0–41)
ANION GAP SERPL CALC-SCNC: 13 MMOL/L — SIGNIFICANT CHANGE UP (ref 7–14)
AST SERPL-CCNC: 10 U/L — SIGNIFICANT CHANGE UP (ref 0–41)
BASOPHILS # BLD AUTO: 0.03 K/UL — SIGNIFICANT CHANGE UP (ref 0–0.2)
BASOPHILS NFR BLD AUTO: 0.2 % — SIGNIFICANT CHANGE UP (ref 0–1)
BILIRUB SERPL-MCNC: 0.7 MG/DL — SIGNIFICANT CHANGE UP (ref 0.2–1.2)
BUN SERPL-MCNC: 25 MG/DL — HIGH (ref 10–20)
CALCIUM SERPL-MCNC: 8.8 MG/DL — SIGNIFICANT CHANGE UP (ref 8.5–10.1)
CHLORIDE SERPL-SCNC: 101 MMOL/L — SIGNIFICANT CHANGE UP (ref 98–110)
CO2 SERPL-SCNC: 23 MMOL/L — SIGNIFICANT CHANGE UP (ref 17–32)
CREAT SERPL-MCNC: 1 MG/DL — SIGNIFICANT CHANGE UP (ref 0.7–1.5)
CULTURE RESULTS: SIGNIFICANT CHANGE UP
EOSINOPHIL # BLD AUTO: 0 K/UL — SIGNIFICANT CHANGE UP (ref 0–0.7)
EOSINOPHIL NFR BLD AUTO: 0 % — SIGNIFICANT CHANGE UP (ref 0–8)
GLUCOSE BLDC GLUCOMTR-MCNC: 128 MG/DL — HIGH (ref 70–99)
GLUCOSE BLDC GLUCOMTR-MCNC: 153 MG/DL — HIGH (ref 70–99)
GLUCOSE BLDC GLUCOMTR-MCNC: 199 MG/DL — HIGH (ref 70–99)
GLUCOSE BLDC GLUCOMTR-MCNC: 239 MG/DL — HIGH (ref 70–99)
GLUCOSE SERPL-MCNC: 225 MG/DL — HIGH (ref 70–99)
HCT VFR BLD CALC: 37.1 % — LOW (ref 42–52)
HGB BLD-MCNC: 12.4 G/DL — LOW (ref 14–18)
IMM GRANULOCYTES NFR BLD AUTO: 2.5 % — HIGH (ref 0.1–0.3)
LYMPHOCYTES # BLD AUTO: 1.09 K/UL — LOW (ref 1.2–3.4)
LYMPHOCYTES # BLD AUTO: 8.2 % — LOW (ref 20.5–51.1)
MAGNESIUM SERPL-MCNC: 1.9 MG/DL — SIGNIFICANT CHANGE UP (ref 1.8–2.4)
MCHC RBC-ENTMCNC: 27.7 PG — SIGNIFICANT CHANGE UP (ref 27–31)
MCHC RBC-ENTMCNC: 33.4 G/DL — SIGNIFICANT CHANGE UP (ref 32–37)
MCV RBC AUTO: 82.8 FL — SIGNIFICANT CHANGE UP (ref 80–94)
METHOD TYPE: SIGNIFICANT CHANGE UP
MONOCYTES # BLD AUTO: 0.61 K/UL — HIGH (ref 0.1–0.6)
MONOCYTES NFR BLD AUTO: 4.6 % — SIGNIFICANT CHANGE UP (ref 1.7–9.3)
NEUTROPHILS # BLD AUTO: 11.18 K/UL — HIGH (ref 1.4–6.5)
NEUTROPHILS NFR BLD AUTO: 84.5 % — HIGH (ref 42.2–75.2)
NRBC # BLD: 0 /100 WBCS — SIGNIFICANT CHANGE UP (ref 0–0)
ORGANISM # SPEC MICROSCOPIC CNT: SIGNIFICANT CHANGE UP
ORGANISM # SPEC MICROSCOPIC CNT: SIGNIFICANT CHANGE UP
PHOSPHATE SERPL-MCNC: 3.8 MG/DL — SIGNIFICANT CHANGE UP (ref 2.1–4.9)
PLATELET # BLD AUTO: 287 K/UL — SIGNIFICANT CHANGE UP (ref 130–400)
POTASSIUM SERPL-MCNC: 4.7 MMOL/L — SIGNIFICANT CHANGE UP (ref 3.5–5)
POTASSIUM SERPL-SCNC: 4.7 MMOL/L — SIGNIFICANT CHANGE UP (ref 3.5–5)
PROT SERPL-MCNC: 5.4 G/DL — LOW (ref 6–8)
RBC # BLD: 4.48 M/UL — LOW (ref 4.7–6.1)
RBC # FLD: 14.7 % — HIGH (ref 11.5–14.5)
SODIUM SERPL-SCNC: 137 MMOL/L — SIGNIFICANT CHANGE UP (ref 135–146)
SPECIMEN SOURCE: SIGNIFICANT CHANGE UP
WBC # BLD: 13.24 K/UL — HIGH (ref 4.8–10.8)
WBC # FLD AUTO: 13.24 K/UL — HIGH (ref 4.8–10.8)

## 2020-01-26 RX ORDER — SENNA PLUS 8.6 MG/1
2 TABLET ORAL AT BEDTIME
Refills: 0 | Status: DISCONTINUED | OUTPATIENT
Start: 2020-01-26 | End: 2020-01-27

## 2020-01-26 RX ADMIN — Medication 1: at 17:28

## 2020-01-26 RX ADMIN — Medication 60 MILLIGRAM(S): at 05:54

## 2020-01-26 RX ADMIN — Medication 3 MILLILITER(S): at 19:50

## 2020-01-26 RX ADMIN — ENOXAPARIN SODIUM 40 MILLIGRAM(S): 100 INJECTION SUBCUTANEOUS at 22:19

## 2020-01-26 RX ADMIN — Medication 12 UNIT(S): at 11:42

## 2020-01-26 RX ADMIN — Medication 12 UNIT(S): at 17:28

## 2020-01-26 RX ADMIN — SENNA PLUS 2 TABLET(S): 8.6 TABLET ORAL at 22:20

## 2020-01-26 RX ADMIN — Medication 60 MILLIGRAM(S): at 17:29

## 2020-01-26 RX ADMIN — BUDESONIDE AND FORMOTEROL FUMARATE DIHYDRATE 2 PUFF(S): 160; 4.5 AEROSOL RESPIRATORY (INHALATION) at 09:45

## 2020-01-26 RX ADMIN — INSULIN GLARGINE 36 UNIT(S): 100 INJECTION, SOLUTION SUBCUTANEOUS at 22:20

## 2020-01-26 RX ADMIN — Medication 600 MILLIGRAM(S): at 17:27

## 2020-01-26 RX ADMIN — MONTELUKAST 10 MILLIGRAM(S): 4 TABLET, CHEWABLE ORAL at 11:42

## 2020-01-26 RX ADMIN — Medication 1: at 06:40

## 2020-01-26 RX ADMIN — Medication 600 MILLIGRAM(S): at 05:54

## 2020-01-26 RX ADMIN — Medication 3 MILLILITER(S): at 12:29

## 2020-01-26 RX ADMIN — BUDESONIDE AND FORMOTEROL FUMARATE DIHYDRATE 2 PUFF(S): 160; 4.5 AEROSOL RESPIRATORY (INHALATION) at 20:26

## 2020-01-26 RX ADMIN — Medication 2: at 11:42

## 2020-01-26 RX ADMIN — POLYETHYLENE GLYCOL 3350 17 GRAM(S): 17 POWDER, FOR SOLUTION ORAL at 11:41

## 2020-01-26 RX ADMIN — Medication 12 UNIT(S): at 06:40

## 2020-01-26 RX ADMIN — PANTOPRAZOLE SODIUM 40 MILLIGRAM(S): 20 TABLET, DELAYED RELEASE ORAL at 05:54

## 2020-01-26 NOTE — PROGRESS NOTE ADULT - SUBJECTIVE AND OBJECTIVE BOX
Patient was seen and examined. Spoke with RN. Chart reviewed.  Sitting in chair, comfortable. Complains of constipation, otherwise doing well.  No events overnight.    Vital Signs Last 24 Hrs  T(F): 96.2 (26 Jan 2020 07:49), Max: 96.2 (26 Jan 2020 07:49)  HR: 96 (26 Jan 2020 07:49) (91 - 96)  BP: 160/79 (26 Jan 2020 07:49) (143/81 - 160/79)  SpO2: --    MEDICATIONS  (STANDING):  albuterol/ipratropium for Nebulization 3 milliLiter(s) Nebulizer every 4 hours  budesonide 160 MICROgram(s)/formoterol 4.5 MICROgram(s) Inhaler 2 Puff(s) Inhalation two times a day  dextrose 5%. 1000 milliLiter(s) (50 mL/Hr) IV Continuous <Continuous>  dextrose 50% Injectable 12.5 Gram(s) IV Push once  dextrose 50% Injectable 25 Gram(s) IV Push once  dextrose 50% Injectable 25 Gram(s) IV Push once  enoxaparin Injectable 40 milliGRAM(s) SubCutaneous at bedtime  guaiFENesin  milliGRAM(s) Oral every 12 hours  insulin glargine Injectable (LANTUS) 36 Unit(s) SubCutaneous at bedtime  insulin lispro (HumaLOG) corrective regimen sliding scale   SubCutaneous three times a day before meals  insulin lispro Injectable (HumaLOG) 12 Unit(s) SubCutaneous three times a day before meals  methylPREDNISolone sodium succinate Injectable 60 milliGRAM(s) IV Push two times a day  montelukast 10 milliGRAM(s) Oral daily  pantoprazole    Tablet 40 milliGRAM(s) Oral before breakfast  polyethylene glycol 3350 17 Gram(s) Oral daily  senna 2 Tablet(s) Oral at bedtime    MEDICATIONS  (PRN):  albuterol/ipratropium for Nebulization 3 milliLiter(s) Nebulizer every 4 hours PRN Shortness of Breath and/or Wheezing  dextrose 40% Gel 15 Gram(s) Oral once PRN Blood Glucose LESS THAN 70 milliGRAM(s)/deciliter  glucagon  Injectable 1 milliGRAM(s) IntraMuscular once PRN Glucose LESS THAN 70 milligrams/deciliter    Labs:                        12.4   13.24 )-----------( 287      ( 26 Jan 2020 05:26 )             37.1                         12.7   18.28 )-----------( 319      ( 25 Jan 2020 06:40 )             38.8     26 Jan 2020 05:26    137    |  101    |  25     ----------------------------<  225    4.7     |  23     |  1.0    25 Jan 2020 06:40    140    |  103    |  22     ----------------------------<  138    4.6     |  22     |  0.9      Ca    8.8        26 Jan 2020 05:26  Ca    8.9        25 Jan 2020 06:40  Phos  3.8       26 Jan 2020 05:26  Phos  3.7       25 Jan 2020 06:40  Mg     1.9       26 Jan 2020 05:26  Mg     2.0       25 Jan 2020 06:40    TPro  5.4    /  Alb  3.2    /  TBili  0.7    /  DBili  x      /  AST  10     /  ALT  16     /  AlkPhos  47     26 Jan 2020 05:26  TPro  5.7    /  Alb  3.4    /  TBili  0.8    /  DBili  x      /  AST  14     /  ALT  17     /  AlkPhos  50     25 Jan 2020 06:40          Culture - Blood (collected 24 Jan 2020 05:25)  Source: .Blood None  Preliminary Report (25 Jan 2020 14:01):    No growth to date.    Culture - Urine (collected 23 Jan 2020 23:40)  Source: .Urine Clean Catch (Midstream)  Final Report (26 Jan 2020 11:22):    10,000 - 49,000 CFU/mL Escherichia coli ESBL  Organism: Escherichia coli ESBL (26 Jan 2020 11:22)  Organism: Escherichia coli ESBL (26 Jan 2020 11:22)      General: comfortable, NAD  Neurology: A&Ox3, nonfocal  Head:  Normocephalic, atraumatic  ENT:  Mucosa moist, no ulcerations  Neck:  Supple, no JVD,   Skin: no breakdowns (as per RN)  Resp: + Rhonchi b/l  CV: RRR, S1S2, no MRG  GI: Soft, NT, bowel sounds present  MS: No edema, + peripheral pulses, FROM all 4 extremity      A/P:  74 years old male, PMH COPD not supplemental O2 dependent, asthma, DMII on insulin, prostate cancer s/p TURP 1999, recent flu B s.p tamiflu and prednisone, presents to ED with worsening shortness of breath, feeling "lousy and weak," and unimproved productive cough x3 days, found to be afebrile, sinus tachycardia, no radiographic signs of pneumonia, diffuse wheezes, clinically stable, admitted for further management.     solumedrol 60 mg iv q 12 hours  bronchodilators nebs prn  supp O2 prn   closely monitor resp status: improved  pulm f/u   glycemic control   wean off BiPAP if tolerates   d/c planning when cleared by pulm  Add bowel regimen for constipation  Monitor for BM  DVT prophylaxis  Decubitus prevention- all measures as per RN protocol  Please call or text me with any questions or updates  Discussed with housestaff

## 2020-01-26 NOTE — PROGRESS NOTE ADULT - ASSESSMENT
acute hypoxic respiratory failure  asthma exacerbation  bronchiectasis with exacerbation  influenza B      monitor off bipap  oxygen as needed, monitor SpO2  bronchodilators  continue solumedrol 60mg q 12 hours, assess for oral prednisone tomorrow  please address constipation, laxatives as needed  '

## 2020-01-26 NOTE — PROGRESS NOTE ADULT - SUBJECTIVE AND OBJECTIVE BOX
DEL ALVARADO  74y, Male  Allergy: No Known Allergies      LAST 24-Hr EVENTS:  complains of malaise, dyspnea and constipation; less wheeze and chest tightness  VITALS:  T(F): 96.2 (01-26-20 @ 07:49), Max: 97.9 (01-25-20 @ 16:02)  HR: 96 (01-26-20 @ 07:49)  BP: 160/79 (01-26-20 @ 07:49) (143/81 - 160/79)  RR: 20 (01-26-20 @ 07:49)  SpO2: --    PHYSICAL EXAM:    GENERAL: NAD, well-developed  HEAD:  Atraumatic, Normocephalic  NECK: Supple, No JVD  CHEST/LUNG: Clear to auscultation bilaterally; No wheeze  HEART: Regular rate and rhythm; No murmurs, rubs, or gallops  ABDOMEN: Soft, Nontender, Nondistended; Bowel sounds present  EXTREMITIES:  2+ Peripheral Pulses, No clubbing, cyanosis, or edema        TESTS & MEASUREMENTS:                          12.4   13.24 )-----------( 287      ( 26 Jan 2020 05:26 )             37.1       01-26    137  |  101  |  25<H>  ----------------------------<  225<H>  4.7   |  23  |  1.0    Ca    8.8      26 Jan 2020 05:26  Phos  3.8     01-26  Mg     1.9     01-26    TPro  5.4<L>  /  Alb  3.2<L>  /  TBili  0.7  /  DBili  x   /  AST  10  /  ALT  16  /  AlkPhos  47  01-26    LIVER FUNCTIONS - ( 26 Jan 2020 05:26 )  Alb: 3.2 g/dL / Pro: 5.4 g/dL / ALK PHOS: 47 U/L / ALT: 16 U/L / AST: 10 U/L / GGT: x                 Culture - Blood (collected 01-24-20 @ 05:25)  Source: .Blood None  Preliminary Report (01-25-20 @ 14:01):    No growth to date.    Culture - Urine (collected 01-23-20 @ 23:40)  Source: .Urine Clean Catch (Midstream)  Preliminary Report (01-25-20 @ 05:59):    10,000 - 49,000 CFU/mL Gram Negative Rods    Culture - Blood (collected 01-21-20 @ 16:23)  Source: .Blood None  Preliminary Report (01-23-20 @ 01:01):    No growth to date.          ABG & VENT SETTINGS: (when applicable)    n/a    RADIOLOGY & ADDITIONAL TESTS:    MEDICATIONS:  MEDICATIONS  (STANDING):  albuterol/ipratropium for Nebulization 3 milliLiter(s) Nebulizer every 4 hours  budesonide 160 MICROgram(s)/formoterol 4.5 MICROgram(s) Inhaler 2 Puff(s) Inhalation two times a day  dextrose 5%. 1000 milliLiter(s) (50 mL/Hr) IV Continuous <Continuous>  dextrose 50% Injectable 12.5 Gram(s) IV Push once  dextrose 50% Injectable 25 Gram(s) IV Push once  dextrose 50% Injectable 25 Gram(s) IV Push once  enoxaparin Injectable 40 milliGRAM(s) SubCutaneous at bedtime  guaiFENesin  milliGRAM(s) Oral every 12 hours  insulin glargine Injectable (LANTUS) 36 Unit(s) SubCutaneous at bedtime  insulin lispro (HumaLOG) corrective regimen sliding scale   SubCutaneous three times a day before meals  insulin lispro Injectable (HumaLOG) 12 Unit(s) SubCutaneous three times a day before meals  methylPREDNISolone sodium succinate Injectable 60 milliGRAM(s) IV Push two times a day  montelukast 10 milliGRAM(s) Oral daily  pantoprazole    Tablet 40 milliGRAM(s) Oral before breakfast  polyethylene glycol 3350 17 Gram(s) Oral daily    MEDICATIONS  (PRN):  albuterol/ipratropium for Nebulization 3 milliLiter(s) Nebulizer every 4 hours PRN Shortness of Breath and/or Wheezing  dextrose 40% Gel 15 Gram(s) Oral once PRN Blood Glucose LESS THAN 70 milliGRAM(s)/deciliter  glucagon  Injectable 1 milliGRAM(s) IntraMuscular once PRN Glucose LESS THAN 70 milligrams/deciliter

## 2020-01-26 NOTE — PROGRESS NOTE ADULT - ASSESSMENT
74 y/oM PMH Asthma, DMII on insulin, prostate cancer s/p TURP 1999, recent flu B s.p tamiflu and prednisone admitted with Asthma exacerbation.    # Asthma exacerbation 2/2 Influenza B  - Septic on admission , lactate 8.3, WBC >12K  - CXR 1/21: no focal consolidation  - CXR 1/24: unchanged from prior  - C/w Duonebs to q4h RTC,  Symbicort 160 mg 2 puffs bid, montelukast 10 milliGRAM(s) Oral daily, Mucinex, BiPAP 10/5 4hrs on and 4hrs off as needed  - C/w Solumedrol 60mg BID  - Check peak expiratory flow BID  - Aspergillus negative, As galactomannan levels WNL, IgE and IgG, IgG4 antibodies are WNL  - Will monitor closely. If decompensates further, can be upgraded to ICU as per   - Blood cultures are negative, Urine cultures are negative    # DM type 2  - FS AC + HS  - Carb consistent diet  - Glargine and lispro     DVT ppx: Lovenox  GI ppx: PPI  diet: carb consistent  Activity as tolerated  Code: Full  Dispo: acute 74 y/oM PMH Asthma, DMII on insulin, prostate cancer s/p TURP 1999, recent flu B s.p tamiflu and prednisone admitted with Asthma exacerbation.    # Asthma exacerbation 2/2 Influenza B  - Septic on admission , lactate 8.3, WBC >12K  - CXR 1/21: no focal consolidation  - CXR 1/24: unchanged from prior  - C/w Duonebs to q4h RTC,  Symbicort 160 mg 2 puffs bid, montelukast 10 milliGRAM(s) Oral daily, Mucinex, BiPAP 10/5 4hrs on and 4hrs off as needed  - C/w Solumedrol 60mg BID  - Check peak expiratory flow BID  - Aspergillus negative, As galactomannan levels WNL, IgE and IgG, IgG4 antibodies are WNL  - Will monitor closely. If decompensates further, can be upgraded to ICU as per   - Blood cultures are negative, Urine cultures are negative    # DM type 2  - FS AC + HS  - Carb consistent diet  - Lantus/Lispro 36/12/12/12    DVT ppx: Lovenox  GI ppx: PPI  diet: carb consistent  Activity as tolerated  Code: Full  Dispo: acute 74 y/oM PMH Asthma, DMII on insulin, prostate cancer s/p TURP 1999, recent flu B s.p tamiflu and prednisone admitted with Asthma exacerbation.    # Asthma exacerbation 2/2 Influenza B  - Septic on admission , lactate 8.3, WBC >12K  - CXR 1/21: no focal consolidation  - CXR 1/24: unchanged from prior  - C/w Duonebs to q4h RTC, Symbicort 160 mg 2 puffs bid, montelukast 10 milliGRAM(s) Oral daily, Mucinex, BiPAP 10/5 4hrs on and 4hrs off as needed  - C/w Solumedrol 60mg BID  - Check peak expiratory flow BID  - Aspergillus negative, As galactomannan levels WNL, IgE and IgG, IgG4 antibodies are WNL  - Blood cultures are negative, Urine cultures are negative  - Will monitor closely. If decompensates, can be upgraded to ICU as per     # DM type 2  - FS AC + HS  - Carb consistent diet  - Lantus/Lispro 36/12/12/12    DVT ppx: Lovenox  GI ppx: PPI  diet: carb consistent  Activity as tolerated  Code: Full  Dispo: acute 74 y/oM PMH Asthma, DMII on insulin, prostate cancer s/p TURP 1999, recent flu B s.p tamiflu and prednisone admitted with Asthma exacerbation.    # Sepsis POA and asthma exacerbation due to Influenza B.  , lactate 8.3, WBC >12K  - Resolved   - WBC 13.24 <- 18.28, afebrile   - CXR 1/21: no focal consolidation  - CXR 1/24: unchanged from prior  - C/w Duonebs to q4h RTC, Symbicort 160 mg 2 puffs bid, montelukast 10 milliGRAM(s) Oral daily, Mucinex, BiPAP 10/5 4hrs on and 4hrs off as needed  - C/w Solumedrol 60mg BID  - Check peak expiratory flow BID  - Aspergillus negative, As galactomannan levels WNL, IgE and IgG, IgG4 antibodies are WNL  - Blood cultures are negative, Urine cultures are negative    # DM type 2  - FS AC + HS  - Carb consistent diet  - Lantus/Lispro 36/12/12/12    #Constipation  - C/w Miralax  - Will add senna     DVT ppx: Lovenox  GI ppx: PPI  diet: carb consistent  Activity as tolerated  Code: Full  Dispo: acute

## 2020-01-26 NOTE — PROGRESS NOTE ADULT - SUBJECTIVE AND OBJECTIVE BOX
Hospital Day:  5d    Subjective:    Patient is a 74y old  Male who presents with a chief complaint of worsening shortness of breath, feeling "lousy and weak" (2020 13:03)      Past Medical Hx:   COPD (chronic obstructive pulmonary disease)  Asthma  Prostate cancer  Diabetes    Past Sx:  History of surgery  H/O radical prostatectomy  S/P TURP    Allergies:  No Known Allergies    Current Meds:   Standng Meds:  albuterol/ipratropium for Nebulization 3 milliLiter(s) Nebulizer every 4 hours  budesonide 160 MICROgram(s)/formoterol 4.5 MICROgram(s) Inhaler 2 Puff(s) Inhalation two times a day  dextrose 5%. 1000 milliLiter(s) (50 mL/Hr) IV Continuous <Continuous>  dextrose 50% Injectable 12.5 Gram(s) IV Push once  dextrose 50% Injectable 25 Gram(s) IV Push once  dextrose 50% Injectable 25 Gram(s) IV Push once  enoxaparin Injectable 40 milliGRAM(s) SubCutaneous at bedtime  guaiFENesin  milliGRAM(s) Oral every 12 hours  insulin glargine Injectable (LANTUS) 36 Unit(s) SubCutaneous at bedtime  insulin lispro (HumaLOG) corrective regimen sliding scale   SubCutaneous three times a day before meals  insulin lispro Injectable (HumaLOG) 12 Unit(s) SubCutaneous three times a day before meals  methylPREDNISolone sodium succinate Injectable 60 milliGRAM(s) IV Push two times a day  montelukast 10 milliGRAM(s) Oral daily  pantoprazole    Tablet 40 milliGRAM(s) Oral before breakfast  polyethylene glycol 3350 17 Gram(s) Oral daily    PRN Meds:  albuterol/ipratropium for Nebulization 3 milliLiter(s) Nebulizer every 4 hours PRN Shortness of Breath and/or Wheezing  dextrose 40% Gel 15 Gram(s) Oral once PRN Blood Glucose LESS THAN 70 milliGRAM(s)/deciliter  glucagon  Injectable 1 milliGRAM(s) IntraMuscular once PRN Glucose LESS THAN 70 milligrams/deciliter    HOME MEDICATIONS:  albuterol 90 mcg/inh inhalation aerosol: 2 puff(s) inhaled 4 times a day  Incruse Ellipta 62.5 mcg/inh inhalation powder: 1 inhaler(s) inhaled once a day  Lantus 100 units/mL subcutaneous solution: 30  subcutaneous once a day (at bedtime)  montelukast 10 mg oral tablet: 1 tab(s) orally once a day      Vital Signs:   T(F): 96.2 (20 @ 07:49), Max: 97.9 (20 @ 16:02)  HR: 96 (20 @ 07:49) (91 - 96)  BP: 160/79 (20 @ 07:49) (143/81 - 160/79)  RR: 20 (20 @ 07:49) (20 - 20)  SpO2: --        Physical Exam:   GENERAL: NAD  HEENT: NCAT  CHEST/LUNG: CTAB  HEART: Regular rate and rhythm; s1 s2 appreciated, No murmurs, rubs, or gallops  ABDOMEN: Soft, Nontender, Nondistended; Bowel sounds present  EXTREMITIES: No LE edema b/l  NERVOUS SYSTEM:  Alert & Oriented X3        Labs:                         12.4   13.24 )-----------( 287      ( 2020 05:26 )             37.1     Neutophil% 84.5, Lymphocyte% 8.2, Monocyte% 4.6, Bands% 2.5 20 @ 05:26    2020 05:26    137    |  101    |  25     ----------------------------<  225    4.7     |  23     |  1.0      Ca    8.8        2020 05:26  Phos  3.8       2020 05:26  Mg     1.9       2020 05:26    TPro  5.4    /  Alb  3.2    /  TBili  0.7    /  DBili  x      /  AST  10     /  ALT  16     /  AlkPhos  47     2020 05:26          Hemoglobin A1C, Whole Blood: 9.8 % (20 @ 05:22)    Serum Pro-Brain Natriuretic Peptide: 203 pg/mL (20 @ 10:33)          Urinalysis Basic - ( 2020 23:40 )    Color: Light Yellow / Appearance: Clear / S.020 / pH: x  Gluc: x / Ketone: Negative  / Bili: Negative / Urobili: <2 mg/dL   Blood: x / Protein: Negative / Nitrite: Negative   Leuk Esterase: Negative / RBC: x / WBC x   Sq Epi: x / Non Sq Epi: x / Bacteria: x          Culture - Blood (collected 20 @ 05:25)  Source: .Blood None  Preliminary Report (20 @ 14:01):    No growth to date.    Culture - Blood (collected 20 @ 16:23)  Source: .Blood None  Preliminary Report (20 @ 01:01):    No growth to date. Hospital Day:  5d    Subjective:    Patient is a 74y old  Male who presents with a chief complaint of worsening shortness of breath, feeling "lousy and weak". No acute events overnight and in no distress this am.     Admitted for influenza B      Past Medical Hx:   COPD (chronic obstructive pulmonary disease)  Asthma  Prostate cancer  Diabetes    Past Sx:  History of surgery  H/O radical prostatectomy  S/P TURP    Allergies:  No Known Allergies    Current Meds:   Standng Meds:  albuterol/ipratropium for Nebulization 3 milliLiter(s) Nebulizer every 4 hours  budesonide 160 MICROgram(s)/formoterol 4.5 MICROgram(s) Inhaler 2 Puff(s) Inhalation two times a day  dextrose 5%. 1000 milliLiter(s) (50 mL/Hr) IV Continuous <Continuous>  dextrose 50% Injectable 12.5 Gram(s) IV Push once  dextrose 50% Injectable 25 Gram(s) IV Push once  dextrose 50% Injectable 25 Gram(s) IV Push once  enoxaparin Injectable 40 milliGRAM(s) SubCutaneous at bedtime  guaiFENesin  milliGRAM(s) Oral every 12 hours  insulin glargine Injectable (LANTUS) 36 Unit(s) SubCutaneous at bedtime  insulin lispro (HumaLOG) corrective regimen sliding scale   SubCutaneous three times a day before meals  insulin lispro Injectable (HumaLOG) 12 Unit(s) SubCutaneous three times a day before meals  methylPREDNISolone sodium succinate Injectable 60 milliGRAM(s) IV Push two times a day  montelukast 10 milliGRAM(s) Oral daily  pantoprazole    Tablet 40 milliGRAM(s) Oral before breakfast  polyethylene glycol 3350 17 Gram(s) Oral daily    PRN Meds:  albuterol/ipratropium for Nebulization 3 milliLiter(s) Nebulizer every 4 hours PRN Shortness of Breath and/or Wheezing  dextrose 40% Gel 15 Gram(s) Oral once PRN Blood Glucose LESS THAN 70 milliGRAM(s)/deciliter  glucagon  Injectable 1 milliGRAM(s) IntraMuscular once PRN Glucose LESS THAN 70 milligrams/deciliter    HOME MEDICATIONS:  albuterol 90 mcg/inh inhalation aerosol: 2 puff(s) inhaled 4 times a day  Incruse Ellipta 62.5 mcg/inh inhalation powder: 1 inhaler(s) inhaled once a day  Lantus 100 units/mL subcutaneous solution: 30  subcutaneous once a day (at bedtime)  montelukast 10 mg oral tablet: 1 tab(s) orally once a day      Vital Signs:   T(F): 96.2 (20 @ 07:49), Max: 97.9 (20 @ 16:02)  HR: 96 (20 @ 07:49) (91 - 96)  BP: 160/79 (20 @ 07:49) (143/81 - 160/79)  RR: 20 (20 @ 07:49) (20 - 20)  SpO2: --        Physical Exam:   GENERAL: NAD  HEENT: NCAT  CHEST/LUNG: CTAB  HEART: Regular rate and rhythm; s1 s2 appreciated, No murmurs, rubs, or gallops  ABDOMEN: Soft, Nontender, Nondistended; Bowel sounds present  EXTREMITIES: No LE edema b/l  NERVOUS SYSTEM:  Alert & Oriented X3        Labs:                         12.4   13.24 )-----------( 287      ( 2020 05:26 )             37.1     Neutophil% 84.5, Lymphocyte% 8.2, Monocyte% 4.6, Bands% 2.5 20 @ 05:26    2020 05:26    137    |  101    |  25     ----------------------------<  225    4.7     |  23     |  1.0      Ca    8.8        2020 05:26  Phos  3.8       2020 05:26  Mg     1.9       2020 05:26    TPro  5.4    /  Alb  3.2    /  TBili  0.7    /  DBili  x      /  AST  10     /  ALT  16     /  AlkPhos  47     2020 05:26          Hemoglobin A1C, Whole Blood: 9.8 % (20 @ 05:22)    Serum Pro-Brain Natriuretic Peptide: 203 pg/mL (20 @ 10:33)          Urinalysis Basic - ( 2020 23:40 )    Color: Light Yellow / Appearance: Clear / S.020 / pH: x  Gluc: x / Ketone: Negative  / Bili: Negative / Urobili: <2 mg/dL   Blood: x / Protein: Negative / Nitrite: Negative   Leuk Esterase: Negative / RBC: x / WBC x   Sq Epi: x / Non Sq Epi: x / Bacteria: x          Culture - Blood (collected 20 @ 05:25)  Source: .Blood None  Preliminary Report (20 @ 14:01):    No growth to date.    Culture - Blood (collected 20 @ 16:23)  Source: .Blood None  Preliminary Report (20 @ 01:01):    No growth to date. Hospital Day:  5d    Subjective:    Patient is a 74y old  Male who presents with a chief complaint of worsening shortness of breath, feeling "lousy and weak". No acute events overnight and in no distress this am. Breathing on NC 2L. Family at bedside. Reports marked improvement in breathing since admission C/o constipation x4 days    Admitted for influenza B      Past Medical Hx:   COPD (chronic obstructive pulmonary disease)  Asthma  Prostate cancer  Diabetes    Past Sx:  History of surgery  H/O radical prostatectomy  S/P TURP    Allergies:  No Known Allergies    Current Meds:   Standng Meds:  albuterol/ipratropium for Nebulization 3 milliLiter(s) Nebulizer every 4 hours  budesonide 160 MICROgram(s)/formoterol 4.5 MICROgram(s) Inhaler 2 Puff(s) Inhalation two times a day  dextrose 5%. 1000 milliLiter(s) (50 mL/Hr) IV Continuous <Continuous>  dextrose 50% Injectable 12.5 Gram(s) IV Push once  dextrose 50% Injectable 25 Gram(s) IV Push once  dextrose 50% Injectable 25 Gram(s) IV Push once  enoxaparin Injectable 40 milliGRAM(s) SubCutaneous at bedtime  guaiFENesin  milliGRAM(s) Oral every 12 hours  insulin glargine Injectable (LANTUS) 36 Unit(s) SubCutaneous at bedtime  insulin lispro (HumaLOG) corrective regimen sliding scale   SubCutaneous three times a day before meals  insulin lispro Injectable (HumaLOG) 12 Unit(s) SubCutaneous three times a day before meals  methylPREDNISolone sodium succinate Injectable 60 milliGRAM(s) IV Push two times a day  montelukast 10 milliGRAM(s) Oral daily  pantoprazole    Tablet 40 milliGRAM(s) Oral before breakfast  polyethylene glycol 3350 17 Gram(s) Oral daily    PRN Meds:  albuterol/ipratropium for Nebulization 3 milliLiter(s) Nebulizer every 4 hours PRN Shortness of Breath and/or Wheezing  dextrose 40% Gel 15 Gram(s) Oral once PRN Blood Glucose LESS THAN 70 milliGRAM(s)/deciliter  glucagon  Injectable 1 milliGRAM(s) IntraMuscular once PRN Glucose LESS THAN 70 milligrams/deciliter    HOME MEDICATIONS:  albuterol 90 mcg/inh inhalation aerosol: 2 puff(s) inhaled 4 times a day  Incruse Ellipta 62.5 mcg/inh inhalation powder: 1 inhaler(s) inhaled once a day  Lantus 100 units/mL subcutaneous solution: 30  subcutaneous once a day (at bedtime)  montelukast 10 mg oral tablet: 1 tab(s) orally once a day      Vital Signs:   T(F): 96.2 (20 @ 07:49), Max: 97.9 (20 @ 16:02)  HR: 96 (20 @ 07:49) (91 - 96)  BP: 160/79 (20 @ 07:49) (143/81 - 160/79)  RR: 20 (20 @ 07:49) (20 - 20)  SpO2: --        Physical Exam:   GENERAL: NAD  HEENT: NCAT  CHEST/LUNG: CTAB, no wheezing, no course breath sounds   HEART: Regular rate and rhythm; s1 s2 appreciated, No murmurs, rubs, or gallops  ABDOMEN: Soft, Nontender, Nondistended; Bowel sounds present  EXTREMITIES: No LE edema b/l  NERVOUS SYSTEM:  Alert & Oriented X3        Labs:                         12.4   13.24 )-----------( 287      ( 2020 05:26 )             37.1     Neutophil% 84.5, Lymphocyte% 8.2, Monocyte% 4.6, Bands% 2.5 20 @ 05:26    2020 05:26    137    |  101    |  25     ----------------------------<  225    4.7     |  23     |  1.0      Ca    8.8        2020 05:26  Phos  3.8       2020 05:26  Mg     1.9       2020 05:26    TPro  5.4    /  Alb  3.2    /  TBili  0.7    /  DBili  x      /  AST  10     /  ALT  16     /  AlkPhos  47     2020 05:26          Hemoglobin A1C, Whole Blood: 9.8 % (20 @ 05:22)    Serum Pro-Brain Natriuretic Peptide: 203 pg/mL (20 @ 10:33)          Urinalysis Basic - ( 2020 23:40 )    Color: Light Yellow / Appearance: Clear / S.020 / pH: x  Gluc: x / Ketone: Negative  / Bili: Negative / Urobili: <2 mg/dL   Blood: x / Protein: Negative / Nitrite: Negative   Leuk Esterase: Negative / RBC: x / WBC x   Sq Epi: x / Non Sq Epi: x / Bacteria: x          Culture - Blood (collected 20 @ 05:25)  Source: .Blood None  Preliminary Report (20 @ 14:01):    No growth to date.    Culture - Blood (collected 20 @ 16:23)  Source: .Blood None  Preliminary Report (20 @ 01:01):    No growth to date.

## 2020-01-27 ENCOUNTER — TRANSCRIPTION ENCOUNTER (OUTPATIENT)
Age: 75
End: 2020-01-27

## 2020-01-27 VITALS
DIASTOLIC BLOOD PRESSURE: 76 MMHG | TEMPERATURE: 98 F | RESPIRATION RATE: 18 BRPM | HEART RATE: 94 BPM | SYSTOLIC BLOOD PRESSURE: 148 MMHG

## 2020-01-27 LAB
A FLAVUS AB FLD QL: NEGATIVE — SIGNIFICANT CHANGE UP
A NIGER AB FLD QL: NEGATIVE — SIGNIFICANT CHANGE UP
A NIGER AB FLD QL: NEGATIVE — SIGNIFICANT CHANGE UP
ALBUMIN SERPL ELPH-MCNC: 3.2 G/DL — LOW (ref 3.5–5.2)
ALP SERPL-CCNC: 46 U/L — SIGNIFICANT CHANGE UP (ref 30–115)
ALT FLD-CCNC: 17 U/L — SIGNIFICANT CHANGE UP (ref 0–41)
ANION GAP SERPL CALC-SCNC: 11 MMOL/L — SIGNIFICANT CHANGE UP (ref 7–14)
AST SERPL-CCNC: 15 U/L — SIGNIFICANT CHANGE UP (ref 0–41)
BASOPHILS # BLD AUTO: 0.04 K/UL — SIGNIFICANT CHANGE UP (ref 0–0.2)
BASOPHILS NFR BLD AUTO: 0.2 % — SIGNIFICANT CHANGE UP (ref 0–1)
BILIRUB SERPL-MCNC: 0.8 MG/DL — SIGNIFICANT CHANGE UP (ref 0.2–1.2)
BUN SERPL-MCNC: 25 MG/DL — HIGH (ref 10–20)
CALCIUM SERPL-MCNC: 8.8 MG/DL — SIGNIFICANT CHANGE UP (ref 8.5–10.1)
CHLORIDE SERPL-SCNC: 102 MMOL/L — SIGNIFICANT CHANGE UP (ref 98–110)
CO2 SERPL-SCNC: 24 MMOL/L — SIGNIFICANT CHANGE UP (ref 17–32)
CREAT SERPL-MCNC: 0.9 MG/DL — SIGNIFICANT CHANGE UP (ref 0.7–1.5)
CULTURE RESULTS: SIGNIFICANT CHANGE UP
EOSINOPHIL # BLD AUTO: 0 K/UL — SIGNIFICANT CHANGE UP (ref 0–0.7)
EOSINOPHIL NFR BLD AUTO: 0 % — SIGNIFICANT CHANGE UP (ref 0–8)
GLUCOSE BLDC GLUCOMTR-MCNC: 127 MG/DL — HIGH (ref 70–99)
GLUCOSE BLDC GLUCOMTR-MCNC: 269 MG/DL — HIGH (ref 70–99)
GLUCOSE BLDC GLUCOMTR-MCNC: 282 MG/DL — HIGH (ref 70–99)
GLUCOSE BLDC GLUCOMTR-MCNC: 542 MG/DL — CRITICAL HIGH (ref 70–99)
GLUCOSE BLDC GLUCOMTR-MCNC: 57 MG/DL — LOW (ref 70–99)
GLUCOSE SERPL-MCNC: 61 MG/DL — LOW (ref 70–99)
HCT VFR BLD CALC: 38.4 % — LOW (ref 42–52)
HGB BLD-MCNC: 12.9 G/DL — LOW (ref 14–18)
IMM GRANULOCYTES NFR BLD AUTO: 1.4 % — HIGH (ref 0.1–0.3)
LYMPHOCYTES # BLD AUTO: 1.95 K/UL — SIGNIFICANT CHANGE UP (ref 1.2–3.4)
LYMPHOCYTES # BLD AUTO: 9.8 % — LOW (ref 20.5–51.1)
MAGNESIUM SERPL-MCNC: 1.9 MG/DL — SIGNIFICANT CHANGE UP (ref 1.8–2.4)
MCHC RBC-ENTMCNC: 27.7 PG — SIGNIFICANT CHANGE UP (ref 27–31)
MCHC RBC-ENTMCNC: 33.6 G/DL — SIGNIFICANT CHANGE UP (ref 32–37)
MCV RBC AUTO: 82.4 FL — SIGNIFICANT CHANGE UP (ref 80–94)
MONOCYTES # BLD AUTO: 1.46 K/UL — HIGH (ref 0.1–0.6)
MONOCYTES NFR BLD AUTO: 7.3 % — SIGNIFICANT CHANGE UP (ref 1.7–9.3)
NEUTROPHILS # BLD AUTO: 16.22 K/UL — HIGH (ref 1.4–6.5)
NEUTROPHILS NFR BLD AUTO: 81.3 % — HIGH (ref 42.2–75.2)
NRBC # BLD: 0 /100 WBCS — SIGNIFICANT CHANGE UP (ref 0–0)
PHOSPHATE SERPL-MCNC: 4.2 MG/DL — SIGNIFICANT CHANGE UP (ref 2.1–4.9)
PLATELET # BLD AUTO: 292 K/UL — SIGNIFICANT CHANGE UP (ref 130–400)
POTASSIUM SERPL-MCNC: 4.5 MMOL/L — SIGNIFICANT CHANGE UP (ref 3.5–5)
POTASSIUM SERPL-SCNC: 4.5 MMOL/L — SIGNIFICANT CHANGE UP (ref 3.5–5)
PROT SERPL-MCNC: 5.5 G/DL — LOW (ref 6–8)
RBC # BLD: 4.66 M/UL — LOW (ref 4.7–6.1)
RBC # FLD: 14.7 % — HIGH (ref 11.5–14.5)
SODIUM SERPL-SCNC: 137 MMOL/L — SIGNIFICANT CHANGE UP (ref 135–146)
SPECIMEN SOURCE: SIGNIFICANT CHANGE UP
WBC # BLD: 19.95 K/UL — HIGH (ref 4.8–10.8)
WBC # FLD AUTO: 19.95 K/UL — HIGH (ref 4.8–10.8)

## 2020-01-27 RX ORDER — BUDESONIDE AND FORMOTEROL FUMARATE DIHYDRATE 160; 4.5 UG/1; UG/1
2 AEROSOL RESPIRATORY (INHALATION)
Qty: 8.5 | Refills: 0
Start: 2020-01-27

## 2020-01-27 RX ORDER — DEXTROSE 50 % IN WATER 50 %
15 SYRINGE (ML) INTRAVENOUS ONCE
Refills: 0 | Status: COMPLETED | OUTPATIENT
Start: 2020-01-27 | End: 2020-01-27

## 2020-01-27 RX ADMIN — Medication 600 MILLIGRAM(S): at 06:27

## 2020-01-27 RX ADMIN — Medication 12 UNIT(S): at 16:22

## 2020-01-27 RX ADMIN — Medication 15 GRAM(S): at 06:58

## 2020-01-27 RX ADMIN — Medication 3: at 16:22

## 2020-01-27 RX ADMIN — Medication 3: at 11:19

## 2020-01-27 RX ADMIN — Medication 12 UNIT(S): at 11:20

## 2020-01-27 RX ADMIN — Medication 600 MILLIGRAM(S): at 17:02

## 2020-01-27 RX ADMIN — Medication 3 MILLILITER(S): at 08:42

## 2020-01-27 RX ADMIN — Medication 3 MILLILITER(S): at 17:01

## 2020-01-27 RX ADMIN — POLYETHYLENE GLYCOL 3350 17 GRAM(S): 17 POWDER, FOR SOLUTION ORAL at 11:56

## 2020-01-27 RX ADMIN — PANTOPRAZOLE SODIUM 40 MILLIGRAM(S): 20 TABLET, DELAYED RELEASE ORAL at 06:26

## 2020-01-27 RX ADMIN — Medication 60 MILLIGRAM(S): at 17:02

## 2020-01-27 RX ADMIN — Medication 60 MILLIGRAM(S): at 06:28

## 2020-01-27 RX ADMIN — MONTELUKAST 10 MILLIGRAM(S): 4 TABLET, CHEWABLE ORAL at 11:56

## 2020-01-27 NOTE — DISCHARGE NOTE PROVIDER - NSDCCPCAREPLAN_GEN_ALL_CORE_FT
PRINCIPAL DISCHARGE DIAGNOSIS  Diagnosis: COPD (chronic obstructive pulmonary disease)  Assessment and Plan of Treatment: Chronic Obstructive Pulmonary Disease  Chronic obstructive pulmonary disease (COPD) is a lung condition in which airflow from the lungs is limited. Causes include smoking, secondhand smoke exposure, genetics, or recurrent infections. Take all medicines (inhaled or pills) as directed by your health care provider. Avoid exposure to irritants such as smoke, chemicals, and fumes that aggravate your breathing.  If you are a smoker, the most important thing that you can do is stop smoking. Continuing to smoke will cause further lung damage and breathing trouble. Ask your health care provider for help with quitting smoking.  SEEK IMMEDIATE MEDICAL CARE IF YOU HAVE ANY OF THE FOLLOWING SYMPTOMS: shortness of breath at rest or when talking, bluish discoloration of lips, skin, fever, worsening cough, unexplained chest pain, or lightheadedness/dizziness.   Please take your medications exactly as prescribed:  Prednisone 40 mg for 4 days, then 20 mg for4 days, then 1 mg daily.  Please follow up with Dr Oates within one week of dscharge.

## 2020-01-27 NOTE — PROGRESS NOTE ADULT - REASON FOR ADMISSION
worsening shortness of breath, feeling "lousy and weak"

## 2020-01-27 NOTE — DISCHARGE NOTE PROVIDER - CARE PROVIDER_API CALL
Annemarie Oates)  Internal Medicine  [address] [place] [address]  Phone: [phone]  Fax: [phone]  Follow Up Time:

## 2020-01-27 NOTE — DISCHARGE NOTE PROVIDER - NSDCMRMEDTOKEN_GEN_ALL_CORE_FT
albuterol 90 mcg/inh inhalation aerosol: 2 puff(s) inhaled 4 times a day  Breo Ellipta 200 mcg-25 mcg/inh inhalation powder: 1 puff(s) inhaled once a day   Incruse Ellipta 62.5 mcg/inh inhalation powder: 1 inhaler(s) inhaled once a day  insulin lispro (concentrated) 200 units/mL subcutaneous solution: 20 unit(s) subcutaneous 3 times a day (before meals)   Lantus 100 units/mL subcutaneous solution: 30  subcutaneous once a day (at bedtime)  montelukast 10 mg oral tablet: 1 tab(s) orally once a day

## 2020-01-27 NOTE — DISCHARGE NOTE PROVIDER - HOSPITAL COURSE
74 years old male, PMH COPD not supplemental O2 dependent, asthma, DMII on insulin, prostate cancer s/p TURP 1999, presents to ED with worsening shortness of breath, feeling "lousy and weak," and unimproved productive cough x3 days.  Pt was recently admitted and discharged on 1/13 for acute hypoxic resp failure likely secondary to Flu B/asthma exacerbation. Pt and wife at bedside reports completion of 5 days of Tamiflu, prednisone taper (completed as of last Thursday), and had followed up with Dr. Oates in pulm clinic. Pt reports worsening of shortness of breath that began this past Saturday (1/18), unchanged cough with yellow-green sputum, as well as generalized weakness and decreased PO intake.  Pt denies URI symptoms, sick contacts, diarrhea, dysuria, nausea/vomiting/fevers/chills, abd pain, myalgia.  Pt has been using his inhalers and nebulizers without relief. Pt reports shortness of breath after only a few steps,  sleeps with 2 pillows, ambulates with a cane.     Patien was diagnosed with asthma exacerbation due to flu ans was admitted to medicine.    Pulm was consulted who started him on IV steroids. After 6 days his SOB improved and he was     weaned off oxygen and stopped wheezing, so he was discharged lu homeon steroid taper.

## 2020-01-27 NOTE — PROGRESS NOTE ADULT - SUBJECTIVE AND OBJECTIVE BOX
Hospital Day:  6d    Subjective:    Patient is a 74y old Male who presents with a chief complaint of worsening shortness of breath, feeling "lousy and weak" (2020 11:14)  This morning patient is lying in bed comfortably. He reports no new complaints and is breathing comfortably on room air.    Past Medical Hx:   COPD (chronic obstructive pulmonary disease)  Asthma  Prostate cancer  Diabetes    Past Sx:  History of surgery  H/O radical prostatectomy  S/P TURP    Allergies:  No Known Allergies    Current Meds:   Standng Meds:  albuterol/ipratropium for Nebulization 3 milliLiter(s) Nebulizer every 4 hours  budesonide 160 MICROgram(s)/formoterol 4.5 MICROgram(s) Inhaler 2 Puff(s) Inhalation two times a day  dextrose 5%. 1000 milliLiter(s) (50 mL/Hr) IV Continuous <Continuous>  dextrose 50% Injectable 12.5 Gram(s) IV Push once  dextrose 50% Injectable 25 Gram(s) IV Push once  dextrose 50% Injectable 25 Gram(s) IV Push once  enoxaparin Injectable 40 milliGRAM(s) SubCutaneous at bedtime  guaiFENesin  milliGRAM(s) Oral every 12 hours  insulin glargine Injectable (LANTUS) 36 Unit(s) SubCutaneous at bedtime  insulin lispro (HumaLOG) corrective regimen sliding scale   SubCutaneous three times a day before meals  insulin lispro Injectable (HumaLOG) 12 Unit(s) SubCutaneous three times a day before meals  methylPREDNISolone sodium succinate Injectable 60 milliGRAM(s) IV Push two times a day  montelukast 10 milliGRAM(s) Oral daily  pantoprazole    Tablet 40 milliGRAM(s) Oral before breakfast  polyethylene glycol 3350 17 Gram(s) Oral daily  senna 2 Tablet(s) Oral at bedtime    PRN Meds:  albuterol/ipratropium for Nebulization 3 milliLiter(s) Nebulizer every 4 hours PRN Shortness of Breath and/or Wheezing  dextrose 40% Gel 15 Gram(s) Oral once PRN Blood Glucose LESS THAN 70 milliGRAM(s)/deciliter  glucagon  Injectable 1 milliGRAM(s) IntraMuscular once PRN Glucose LESS THAN 70 milligrams/deciliter    HOME MEDICATIONS:  albuterol 90 mcg/inh inhalation aerosol: 2 puff(s) inhaled 4 times a day  Incruse Ellipta 62.5 mcg/inh inhalation powder: 1 inhaler(s) inhaled once a day  Lantus 100 units/mL subcutaneous solution: 30  subcutaneous once a day (at bedtime)  montelukast 10 mg oral tablet: 1 tab(s) orally once a day      Vital Signs:   T(F): 97.9 (20 @ 08:35), Max: 98.6 (20 @ 16:19)  HR: 96 (20 @ 08:35) (94 - 105)  BP: 146/69 (20 @ 08:35) (138/81 - 146/69)  RR: 20 (20 @ 08:35) (20 - 20)  SpO2: --        Physical Exam:   GENERAL: NAD  HEENT: NCAT  CHEST/LUNG: mild basilar ronchi  HEART: Regular rate and rhythm; s1 s2 appreciated, No murmurs, rubs, or gallops  ABDOMEN: Soft, Nontender, Nondistended; Bowel sounds present  EXTREMITIES: No LE edema b/l  NERVOUS SYSTEM:  Alert & Oriented X3        Labs:                         12.9   19.95 )-----------( 292      ( 2020 06:26 )             38.4     Neutophil% 81.3, Lymphocyte% 9.8, Monocyte% 7.3, Bands% 1.4 20 @ 06:26    2020 06:26    137    |  102    |  25     ----------------------------<  61     4.5     |  24     |  0.9      Ca    8.8        2020 06:26  Phos  4.2       2020 06:26  Mg     1.9       2020 06:26    TPro  5.5    /  Alb  3.2    /  TBili  0.8    /  DBili  x      /  AST  15     /  ALT  17     /  AlkPhos  46     2020 06:26          Hemoglobin A1C, Whole Blood: 9.8 % (20 @ 05:22)    Serum Pro-Brain Natriuretic Peptide: 203 pg/mL (20 @ 10:33)          Urinalysis Basic - ( 2020 23:40 )    Color: Light Yellow / Appearance: Clear / S.020 / pH: x  Gluc: x / Ketone: Negative  / Bili: Negative / Urobili: <2 mg/dL   Blood: x / Protein: Negative / Nitrite: Negative   Leuk Esterase: Negative / RBC: x / WBC x   Sq Epi: x / Non Sq Epi: x / Bacteria: x          Culture - Blood (collected 20 @ 05:25)  Source: .Blood None  Preliminary Report (20 @ 14:01):    No growth to date.    Culture - Blood (collected 20 @ 16:23)  Source: .Blood None  Final Report (20 @ 01:01):    No growth at 5 days.        Assessment and Plan:     74 y/oM PMH Asthma, DMII on insulin, prostate cancer s/p TURP , recent flu B s.p tamiflu and prednisone admitted with Asthma exacerbation.    # Sepsis POA and asthma exacerbation due to Influenza B:   Resolved   - WBC increased today to 19.95, likely steroid induced  - C/w Duonebs to q4h RTC, Symbicort 160 mg 2 puffs bid, montelukast 10 milliGRAM(s) Oral daily, Mucinex, BiPAP 10/5 4hrs on and 4hrs off as needed  - C/w Solumedrol 60mg BID  - Check peak expiratory flow BID  - Afebrile, CXRs show no focal consolidation, aspergillus negative, As galactomannan levels WNL, IgE and IgG, IgG4 antibodies are WNL, blood cultures are negative, Urine cultures are negative  - Pulm following    # DM type 2  - FS AC + HS  - Carb consistent diet  - Lantus/Lispro //    #Constipation  - C/w Miralax  - Will add senna     DVT ppx: Lovenox  GI ppx: PPI  diet: carb consistent  Activity as tolerated  Code: Full  Dispo: acute

## 2020-01-27 NOTE — PROGRESS NOTE ADULT - SUBJECTIVE AND OBJECTIVE BOX
DEL ALVARADO  74y, Male  Allergy: No Known Allergies      LAST 24-Hr EVENTS:    VITALS:  T(F): 97.9 (01-27-20 @ 08:35), Max: 98.6 (01-26-20 @ 16:19)  HR: 96 (01-27-20 @ 08:35)  BP: 146/69 (01-27-20 @ 08:35) (138/81 - 146/69)  RR: 20 (01-27-20 @ 08:35)  SpO2: --    PHYSICAL EXAM:    GENERAL: NAD  NECK: Supple, No JVD  CHEST/LUNG: wheeze  HEART: Regular rate and rhythm  ABDOMEN: Soft, Nontender, Nondistended  EXTREMITIES:  No clubbing, edema absent        TESTS & MEASUREMENTS:                          12.9   19.95 )-----------( 292      ( 27 Jan 2020 06:26 )             38.4       01-27    137  |  102  |  25<H>  ----------------------------<  61<L>  4.5   |  24  |  0.9    Ca    8.8      27 Jan 2020 06:26  Phos  4.2     01-27  Mg     1.9     01-27    TPro  5.5<L>  /  Alb  3.2<L>  /  TBili  0.8  /  DBili  x   /  AST  15  /  ALT  17  /  AlkPhos  46  01-27    LIVER FUNCTIONS - ( 27 Jan 2020 06:26 )  Alb: 3.2 g/dL / Pro: 5.5 g/dL / ALK PHOS: 46 U/L / ALT: 17 U/L / AST: 15 U/L / GGT: x                 Culture - Blood (collected 01-24-20 @ 05:25)  Source: .Blood None  Preliminary Report (01-25-20 @ 14:01):    No growth to date.    Culture - Urine (collected 01-23-20 @ 23:40)  Source: .Urine Clean Catch (Midstream)  Final Report (01-26-20 @ 11:22):    10,000 - 49,000 CFU/mL Escherichia coli ESBL  Organism: Escherichia coli ESBL (01-26-20 @ 11:22)  Organism: Escherichia coli ESBL (01-26-20 @ 11:22)      -  Amikacin: S <=16      -  Ampicillin: R >16 These ampicillin results predict results for amoxicillin      -  Ampicillin/Sulbactam: R <=8/4 Enterobacter, Citrobacter, and Serratia may develop resistance during prolonged therapy (3-4 days)      -  Aztreonam: R 16      -  Cefazolin: R >16 (MIC_CL_COM_ENTERIC_CEFAZU) For uncomplicated UTI with K. pneumoniae, E. coli, or P. mirablis: ISABELLE <=16 is sensitive and ISABELLE >=32 is resistant. This also predicts results for oral agents cefaclor, cefdinir, cefpodoxime, cefprozil, cefuroxime axetil, cephalexin and locarbef for uncomplicated UTI. Note that some isolates may be susceptible to these agents while testing resistant to cefazolin.      -  Cefepime: R >16      -  Cefoxitin: S <=8      -  Ceftriaxone: R >32 Enterobacter, Citrobacter, and Serratia may develop resistance during prolonged therapy      -  Ciprofloxacin: R >2      -  Ertapenem: S <=1      -  Gentamicin: S <=4      -  Imipenem: S <=1      -  Levofloxacin: R >4      -  Meropenem: S <=1      -  Nitrofurantoin: S <=32 Should not be used to treat pyelonephritis      -  Piperacillin/Tazobactam: R <=16      -  Tigecycline: S <=2      -  Tobramycin: S <=4      -  Trimethoprim/Sulfamethoxazole: R >2/38      Method Type: ISABELLE    Culture - Blood (collected 01-21-20 @ 16:23)  Source: .Blood None  Final Report (01-27-20 @ 01:01):    No growth at 5 days.          MEDICATIONS:  MEDICATIONS  (STANDING):  albuterol/ipratropium for Nebulization 3 milliLiter(s) Nebulizer every 4 hours  budesonide 160 MICROgram(s)/formoterol 4.5 MICROgram(s) Inhaler 2 Puff(s) Inhalation two times a day  dextrose 5%. 1000 milliLiter(s) (50 mL/Hr) IV Continuous <Continuous>  dextrose 50% Injectable 12.5 Gram(s) IV Push once  dextrose 50% Injectable 25 Gram(s) IV Push once  dextrose 50% Injectable 25 Gram(s) IV Push once  enoxaparin Injectable 40 milliGRAM(s) SubCutaneous at bedtime  guaiFENesin  milliGRAM(s) Oral every 12 hours  insulin glargine Injectable (LANTUS) 36 Unit(s) SubCutaneous at bedtime  insulin lispro (HumaLOG) corrective regimen sliding scale   SubCutaneous three times a day before meals  insulin lispro Injectable (HumaLOG) 12 Unit(s) SubCutaneous three times a day before meals  methylPREDNISolone sodium succinate Injectable 60 milliGRAM(s) IV Push two times a day  montelukast 10 milliGRAM(s) Oral daily  pantoprazole    Tablet 40 milliGRAM(s) Oral before breakfast  polyethylene glycol 3350 17 Gram(s) Oral daily  senna 2 Tablet(s) Oral at bedtime    MEDICATIONS  (PRN):  albuterol/ipratropium for Nebulization 3 milliLiter(s) Nebulizer every 4 hours PRN Shortness of Breath and/or Wheezing  dextrose 40% Gel 15 Gram(s) Oral once PRN Blood Glucose LESS THAN 70 milliGRAM(s)/deciliter  glucagon  Injectable 1 milliGRAM(s) IntraMuscular once PRN Glucose LESS THAN 70 milligrams/deciliter DEL ALVARADO  74y, Male  Allergy: No Known Allergies      LAST 24-Hr EVENTS:  cough  sob improved    VITALS:  T(F): 97.9 (01-27-20 @ 08:35), Max: 98.6 (01-26-20 @ 16:19)  HR: 96 (01-27-20 @ 08:35)  BP: 146/69 (01-27-20 @ 08:35) (138/81 - 146/69)  RR: 20 (01-27-20 @ 08:35)  SpO2: --    PHYSICAL EXAM:    GENERAL: NAD  NECK: Supple, No JVD  CHEST/LUNG: cta  HEART: Regular rate and rhythm  ABDOMEN: Soft, Nontender, Nondistended  EXTREMITIES:  No clubbing, edema absent        TESTS & MEASUREMENTS:                          12.9   19.95 )-----------( 292      ( 27 Jan 2020 06:26 )             38.4       01-27    137  |  102  |  25<H>  ----------------------------<  61<L>  4.5   |  24  |  0.9    Ca    8.8      27 Jan 2020 06:26  Phos  4.2     01-27  Mg     1.9     01-27    TPro  5.5<L>  /  Alb  3.2<L>  /  TBili  0.8  /  DBili  x   /  AST  15  /  ALT  17  /  AlkPhos  46  01-27    LIVER FUNCTIONS - ( 27 Jan 2020 06:26 )  Alb: 3.2 g/dL / Pro: 5.5 g/dL / ALK PHOS: 46 U/L / ALT: 17 U/L / AST: 15 U/L / GGT: x                 Culture - Blood (collected 01-24-20 @ 05:25)  Source: .Blood None  Preliminary Report (01-25-20 @ 14:01):    No growth to date.    Culture - Urine (collected 01-23-20 @ 23:40)  Source: .Urine Clean Catch (Midstream)  Final Report (01-26-20 @ 11:22):    10,000 - 49,000 CFU/mL Escherichia coli ESBL  Organism: Escherichia coli ESBL (01-26-20 @ 11:22)  Organism: Escherichia coli ESBL (01-26-20 @ 11:22)      -  Amikacin: S <=16      -  Ampicillin: R >16 These ampicillin results predict results for amoxicillin      -  Ampicillin/Sulbactam: R <=8/4 Enterobacter, Citrobacter, and Serratia may develop resistance during prolonged therapy (3-4 days)      -  Aztreonam: R 16      -  Cefazolin: R >16 (MIC_CL_COM_ENTERIC_CEFAZU) For uncomplicated UTI with K. pneumoniae, E. coli, or P. mirablis: ISABELLE <=16 is sensitive and ISABELLE >=32 is resistant. This also predicts results for oral agents cefaclor, cefdinir, cefpodoxime, cefprozil, cefuroxime axetil, cephalexin and locarbef for uncomplicated UTI. Note that some isolates may be susceptible to these agents while testing resistant to cefazolin.      -  Cefepime: R >16      -  Cefoxitin: S <=8      -  Ceftriaxone: R >32 Enterobacter, Citrobacter, and Serratia may develop resistance during prolonged therapy      -  Ciprofloxacin: R >2      -  Ertapenem: S <=1      -  Gentamicin: S <=4      -  Imipenem: S <=1      -  Levofloxacin: R >4      -  Meropenem: S <=1      -  Nitrofurantoin: S <=32 Should not be used to treat pyelonephritis      -  Piperacillin/Tazobactam: R <=16      -  Tigecycline: S <=2      -  Tobramycin: S <=4      -  Trimethoprim/Sulfamethoxazole: R >2/38      Method Type: ISABELLE    Culture - Blood (collected 01-21-20 @ 16:23)  Source: .Blood None  Final Report (01-27-20 @ 01:01):    No growth at 5 days.          MEDICATIONS:  MEDICATIONS  (STANDING):  albuterol/ipratropium for Nebulization 3 milliLiter(s) Nebulizer every 4 hours  budesonide 160 MICROgram(s)/formoterol 4.5 MICROgram(s) Inhaler 2 Puff(s) Inhalation two times a day  dextrose 5%. 1000 milliLiter(s) (50 mL/Hr) IV Continuous <Continuous>  dextrose 50% Injectable 12.5 Gram(s) IV Push once  dextrose 50% Injectable 25 Gram(s) IV Push once  dextrose 50% Injectable 25 Gram(s) IV Push once  enoxaparin Injectable 40 milliGRAM(s) SubCutaneous at bedtime  guaiFENesin  milliGRAM(s) Oral every 12 hours  insulin glargine Injectable (LANTUS) 36 Unit(s) SubCutaneous at bedtime  insulin lispro (HumaLOG) corrective regimen sliding scale   SubCutaneous three times a day before meals  insulin lispro Injectable (HumaLOG) 12 Unit(s) SubCutaneous three times a day before meals  methylPREDNISolone sodium succinate Injectable 60 milliGRAM(s) IV Push two times a day  montelukast 10 milliGRAM(s) Oral daily  pantoprazole    Tablet 40 milliGRAM(s) Oral before breakfast  polyethylene glycol 3350 17 Gram(s) Oral daily  senna 2 Tablet(s) Oral at bedtime    MEDICATIONS  (PRN):  albuterol/ipratropium for Nebulization 3 milliLiter(s) Nebulizer every 4 hours PRN Shortness of Breath and/or Wheezing  dextrose 40% Gel 15 Gram(s) Oral once PRN Blood Glucose LESS THAN 70 milliGRAM(s)/deciliter  glucagon  Injectable 1 milliGRAM(s) IntraMuscular once PRN Glucose LESS THAN 70 milligrams/deciliter

## 2020-01-27 NOTE — PROGRESS NOTE ADULT - SUBJECTIVE AND OBJECTIVE BOX
Patient was seen and examined. Spoke with RN. Chart reviewed.  No events overnight.  Vital Signs Last 24 Hrs  T(F): 97.9 (27 Jan 2020 08:35), Max: 98.6 (26 Jan 2020 16:19)  HR: 96 (27 Jan 2020 08:35) (94 - 105)  BP: 146/69 (27 Jan 2020 08:35) (138/81 - 146/69)  SpO2: --  MEDICATIONS  (STANDING):  albuterol/ipratropium for Nebulization 3 milliLiter(s) Nebulizer every 4 hours  budesonide 160 MICROgram(s)/formoterol 4.5 MICROgram(s) Inhaler 2 Puff(s) Inhalation two times a day  dextrose 5%. 1000 milliLiter(s) (50 mL/Hr) IV Continuous <Continuous>  dextrose 50% Injectable 12.5 Gram(s) IV Push once  dextrose 50% Injectable 25 Gram(s) IV Push once  dextrose 50% Injectable 25 Gram(s) IV Push once  enoxaparin Injectable 40 milliGRAM(s) SubCutaneous at bedtime  guaiFENesin  milliGRAM(s) Oral every 12 hours  insulin glargine Injectable (LANTUS) 36 Unit(s) SubCutaneous at bedtime  insulin lispro (HumaLOG) corrective regimen sliding scale   SubCutaneous three times a day before meals  insulin lispro Injectable (HumaLOG) 12 Unit(s) SubCutaneous three times a day before meals  methylPREDNISolone sodium succinate Injectable 60 milliGRAM(s) IV Push two times a day  montelukast 10 milliGRAM(s) Oral daily  pantoprazole    Tablet 40 milliGRAM(s) Oral before breakfast  polyethylene glycol 3350 17 Gram(s) Oral daily  senna 2 Tablet(s) Oral at bedtime    MEDICATIONS  (PRN):  albuterol/ipratropium for Nebulization 3 milliLiter(s) Nebulizer every 4 hours PRN Shortness of Breath and/or Wheezing  dextrose 40% Gel 15 Gram(s) Oral once PRN Blood Glucose LESS THAN 70 milliGRAM(s)/deciliter  glucagon  Injectable 1 milliGRAM(s) IntraMuscular once PRN Glucose LESS THAN 70 milligrams/deciliter    Labs:                        12.9   19.95 )-----------( 292      ( 27 Jan 2020 06:26 )             38.4                         12.4   13.24 )-----------( 287      ( 26 Jan 2020 05:26 )             37.1     27 Jan 2020 06:26    137    |  102    |  25     ----------------------------<  61     4.5     |  24     |  0.9    26 Jan 2020 05:26    137    |  101    |  25     ----------------------------<  225    4.7     |  23     |  1.0      Ca    8.8        27 Jan 2020 06:26  Ca    8.8        26 Jan 2020 05:26  Phos  4.2       27 Jan 2020 06:26  Phos  3.8       26 Jan 2020 05:26  Mg     1.9       27 Jan 2020 06:26  Mg     1.9       26 Jan 2020 05:26    TPro  5.5    /  Alb  3.2    /  TBili  0.8    /  DBili  x      /  AST  15     /  ALT  17     /  AlkPhos  46     27 Jan 2020 06:26  TPro  5.4    /  Alb  3.2    /  TBili  0.7    /  DBili  x      /  AST  10     /  ALT  16     /  AlkPhos  47     26 Jan 2020 05:26          General: comfortable, NAD  Neurology: A&Ox3, nonfocal  Head:  Normocephalic, atraumatic  ENT:  Mucosa moist, no ulcerations  Neck:  Supple, no JVD,   Skin: no breakdowns (as per RN)  Resp: CTA b/l   CV: RRR, S1S2, no MRG  GI: Soft, NT, bowel sounds present  MS: No edema, + peripheral pulses, FROM all 4 extremity      A/P:  74 years old male, PMH COPD not supplemental O2 dependent, asthma, DMII on insulin, prostate cancer s/p TURP 1999, recent flu B s.p tamiflu and prednisone, presents to ED with worsening shortness of breath, feeling "lousy and weak," and unimproved productive cough x3 days, found to be afebrile, sinus tachycardia, no radiographic signs of pneumonia, diffuse wheezes, clinically stable, admitted for further management.     steroids and bronchodilators as per pulm   supp O2 prn   pulm f/u   glycemic control   wean off BiPAP if tolerates   bowel regiment   d/c planning when cleared by pulm  DVT prophylaxis  Decubitus prevention- all measures as per RN protocol  Please call or text me with any questions or updates

## 2020-01-27 NOTE — PROGRESS NOTE ADULT - ASSESSMENT
Acute respiratory failure- hyposic  Asthma exacerbation  Allergic rhinitis  Bronchiectasis  Pulmonary nodule        d/c iv steroids  p9o prednisone 40 mg x 4 days then 20 mg for 4 days then cont 10 mg daily  albuterol/atrovent nebs q 4 hours prn  symbicort 160 mg 2 puffs bid  singulair 10 mg daily  resume long acting anticholinergic  dvt/gi px  stable for d/c  out pt follow up with in 1 week

## 2020-01-27 NOTE — DISCHARGE NOTE NURSING/CASE MANAGEMENT/SOCIAL WORK - PATIENT PORTAL LINK FT
You can access the FollowMyHealth Patient Portal offered by [facility] by registering at the following website: [email or web address]. By joining [employer] [employer]’s FollowMyHealth portal, you will also be able to view your health information using other applications (apps) compatible with our system.

## 2020-01-28 LAB
GLUCOSE BLDC GLUCOMTR-MCNC: 307 MG/DL — HIGH (ref 70–99)
GLUCOSE BLDC GLUCOMTR-MCNC: 380 MG/DL — HIGH (ref 70–99)

## 2020-01-29 LAB
CULTURE RESULTS: SIGNIFICANT CHANGE UP
SPECIMEN SOURCE: SIGNIFICANT CHANGE UP

## 2020-02-07 DIAGNOSIS — Z71.89 OTHER SPECIFIED COUNSELING: ICD-10-CM

## 2020-11-17 ENCOUNTER — APPOINTMENT (OUTPATIENT)
Dept: SURGERY | Facility: CLINIC | Age: 75
End: 2020-11-17
Payer: MEDICARE

## 2020-11-17 VITALS — HEIGHT: 70 IN | BODY MASS INDEX: 23.62 KG/M2 | WEIGHT: 165 LBS

## 2020-11-17 PROBLEM — Z00.00 ENCOUNTER FOR PREVENTIVE HEALTH EXAMINATION: Status: ACTIVE | Noted: 2020-11-17

## 2020-11-17 PROCEDURE — 99203 OFFICE O/P NEW LOW 30 MIN: CPT

## 2020-11-17 PROCEDURE — 99072 ADDL SUPL MATRL&STAF TM PHE: CPT

## 2020-11-17 NOTE — PHYSICAL EXAM
[Normal Breath Sounds] : Normal breath sounds [No Rash or Lesion] : No rash or lesion [Alert] : alert [Calm] : calm [JVD] : no jugular venous distention  [de-identified] : healthy [de-identified] : normal [de-identified] : mildly protuberant abdomen\par  [de-identified] : normal testicles [de-identified] : left inguinal hernia, reducible

## 2020-11-17 NOTE — CONSULT LETTER
[Dear  ___] : Dear  [unfilled], [Courtesy Letter:] : I had the pleasure of seeing your patient, [unfilled], in my office today. [Please see my note below.] : Please see my note below. [Consult Closing:] : Thank you very much for allowing me to participate in the care of this patient.  If you have any questions, please do not hesitate to contact me. [DrYanet  ___] : Dr. MAYORGA [FreeTextEntry3] : Respectfully,\par \par Mohsen Law M.D., FACS\par

## 2020-11-17 NOTE — ASSESSMENT
[FreeTextEntry1] : Freeman is a pleasant 75-year-old retired gentleman with a past medical history significant for asthma, chronic cough and diabetes along with prostate cancer status post prostatectomy nearly 20 years ago who now presents to the office with pain and swelling in the left groin suspicious for hernia.\par \par Physical examination demonstrates a large tender tennis ball sized bulge in the left groin which is reducible with a moderate degree of difficulty consistent with a large protruding and now symptomatic left inguinal hernia warranting surgical repair. There is no evidence of incarceration or strangulation, and the patient denies any symptoms of obstruction.  Examination of his right groin demonstrates a moderate degree of weakness but no obvious hernia. Both testicles are normal. His umbilical examination demonstrates a tiny asymptomatic easily reducible umbilical hernia which is of no significant clinical concern. His current BMI is 24.\par \par I explained the pros and cons of surgery, as well as all risks, benefits, indications and alternatives of the procedure and the patient understood and agreed. Freeman was scheduled for the repair of his left inguinal hernia with mesh on Monday, December 21, 2020 under LOCAL with IV SEDATION at [facility] with presurgical testing preceding this date. He was encouraged to avoid heavy lifting and strenuous activity in the interim, of course.

## 2020-11-30 ENCOUNTER — OUTPATIENT (OUTPATIENT)
Dept: OUTPATIENT SERVICES | Facility: HOSPITAL | Age: 75
LOS: 1 days | Discharge: HOME | End: 2020-11-30
Payer: MEDICARE

## 2020-11-30 VITALS
OXYGEN SATURATION: 97 % | HEART RATE: 96 BPM | DIASTOLIC BLOOD PRESSURE: 76 MMHG | WEIGHT: 167.55 LBS | SYSTOLIC BLOOD PRESSURE: 142 MMHG | RESPIRATION RATE: 20 BRPM | TEMPERATURE: 99 F | HEIGHT: 66 IN

## 2020-11-30 DIAGNOSIS — Z98.890 OTHER SPECIFIED POSTPROCEDURAL STATES: Chronic | ICD-10-CM

## 2020-11-30 DIAGNOSIS — K40.90 UNILATERAL INGUINAL HERNIA, WITHOUT OBSTRUCTION OR GANGRENE, NOT SPECIFIED AS RECURRENT: ICD-10-CM

## 2020-11-30 DIAGNOSIS — Z01.818 ENCOUNTER FOR OTHER PREPROCEDURAL EXAMINATION: ICD-10-CM

## 2020-11-30 DIAGNOSIS — Z90.79 ACQUIRED ABSENCE OF OTHER GENITAL ORGAN(S): Chronic | ICD-10-CM

## 2020-11-30 LAB
A1C WITH ESTIMATED AVERAGE GLUCOSE RESULT: 11.3 % — HIGH (ref 4–5.6)
ALBUMIN SERPL ELPH-MCNC: 4.1 G/DL — SIGNIFICANT CHANGE UP (ref 3.5–5.2)
ALP SERPL-CCNC: 77 U/L — SIGNIFICANT CHANGE UP (ref 30–115)
ALT FLD-CCNC: 17 U/L — SIGNIFICANT CHANGE UP (ref 0–41)
ANION GAP SERPL CALC-SCNC: 15 MMOL/L — HIGH (ref 7–14)
APPEARANCE UR: CLEAR — SIGNIFICANT CHANGE UP
APTT BLD: 28.9 SEC — SIGNIFICANT CHANGE UP (ref 27–39.2)
AST SERPL-CCNC: 12 U/L — SIGNIFICANT CHANGE UP (ref 0–41)
BASOPHILS # BLD AUTO: 0.09 K/UL — SIGNIFICANT CHANGE UP (ref 0–0.2)
BASOPHILS NFR BLD AUTO: 0.5 % — SIGNIFICANT CHANGE UP (ref 0–1)
BILIRUB SERPL-MCNC: 0.4 MG/DL — SIGNIFICANT CHANGE UP (ref 0.2–1.2)
BILIRUB UR-MCNC: NEGATIVE — SIGNIFICANT CHANGE UP
BUN SERPL-MCNC: 25 MG/DL — HIGH (ref 10–20)
CALCIUM SERPL-MCNC: 9.6 MG/DL — SIGNIFICANT CHANGE UP (ref 8.5–10.1)
CHLORIDE SERPL-SCNC: 100 MMOL/L — SIGNIFICANT CHANGE UP (ref 98–110)
CO2 SERPL-SCNC: 24 MMOL/L — SIGNIFICANT CHANGE UP (ref 17–32)
COLOR SPEC: SIGNIFICANT CHANGE UP
CREAT SERPL-MCNC: 1 MG/DL — SIGNIFICANT CHANGE UP (ref 0.7–1.5)
DIFF PNL FLD: NEGATIVE — SIGNIFICANT CHANGE UP
EOSINOPHIL # BLD AUTO: 0.06 K/UL — SIGNIFICANT CHANGE UP (ref 0–0.7)
EOSINOPHIL NFR BLD AUTO: 0.3 % — SIGNIFICANT CHANGE UP (ref 0–8)
ESTIMATED AVERAGE GLUCOSE: 278 MG/DL — HIGH (ref 68–114)
GLUCOSE SERPL-MCNC: 245 MG/DL — HIGH (ref 70–99)
GLUCOSE UR QL: SIGNIFICANT CHANGE UP
HCT VFR BLD CALC: 49.2 % — SIGNIFICANT CHANGE UP (ref 42–52)
HGB BLD-MCNC: 15.1 G/DL — SIGNIFICANT CHANGE UP (ref 14–18)
IMM GRANULOCYTES NFR BLD AUTO: 1.9 % — HIGH (ref 0.1–0.3)
INR BLD: 0.87 RATIO — SIGNIFICANT CHANGE UP (ref 0.65–1.3)
KETONES UR-MCNC: NEGATIVE — SIGNIFICANT CHANGE UP
LEUKOCYTE ESTERASE UR-ACNC: NEGATIVE — SIGNIFICANT CHANGE UP
LYMPHOCYTES # BLD AUTO: 1.92 K/UL — SIGNIFICANT CHANGE UP (ref 1.2–3.4)
LYMPHOCYTES # BLD AUTO: 10.5 % — LOW (ref 20.5–51.1)
MCHC RBC-ENTMCNC: 26.1 PG — LOW (ref 27–31)
MCHC RBC-ENTMCNC: 30.7 G/DL — LOW (ref 32–37)
MCV RBC AUTO: 85.1 FL — SIGNIFICANT CHANGE UP (ref 80–94)
MONOCYTES # BLD AUTO: 0.44 K/UL — SIGNIFICANT CHANGE UP (ref 0.1–0.6)
MONOCYTES NFR BLD AUTO: 2.4 % — SIGNIFICANT CHANGE UP (ref 1.7–9.3)
NEUTROPHILS # BLD AUTO: 15.38 K/UL — HIGH (ref 1.4–6.5)
NEUTROPHILS NFR BLD AUTO: 84.4 % — HIGH (ref 42.2–75.2)
NITRITE UR-MCNC: NEGATIVE — SIGNIFICANT CHANGE UP
NRBC # BLD: 0 /100 WBCS — SIGNIFICANT CHANGE UP (ref 0–0)
PH UR: 6 — SIGNIFICANT CHANGE UP (ref 5–8)
PLATELET # BLD AUTO: 365 K/UL — SIGNIFICANT CHANGE UP (ref 130–400)
POTASSIUM SERPL-MCNC: 4.6 MMOL/L — SIGNIFICANT CHANGE UP (ref 3.5–5)
POTASSIUM SERPL-SCNC: 4.6 MMOL/L — SIGNIFICANT CHANGE UP (ref 3.5–5)
PROT SERPL-MCNC: 7.5 G/DL — SIGNIFICANT CHANGE UP (ref 6–8)
PROT UR-MCNC: NEGATIVE — SIGNIFICANT CHANGE UP
PROTHROM AB SERPL-ACNC: 10 SEC — SIGNIFICANT CHANGE UP (ref 9.95–12.87)
RBC # BLD: 5.78 M/UL — SIGNIFICANT CHANGE UP (ref 4.7–6.1)
RBC # FLD: 14.5 % — SIGNIFICANT CHANGE UP (ref 11.5–14.5)
SODIUM SERPL-SCNC: 139 MMOL/L — SIGNIFICANT CHANGE UP (ref 135–146)
SP GR SPEC: 1.01 — SIGNIFICANT CHANGE UP (ref 1.01–1.03)
UROBILINOGEN FLD QL: SIGNIFICANT CHANGE UP
WBC # BLD: 18.24 K/UL — HIGH (ref 4.8–10.8)
WBC # FLD AUTO: 18.24 K/UL — HIGH (ref 4.8–10.8)

## 2020-11-30 PROCEDURE — 93010 ELECTROCARDIOGRAM REPORT: CPT

## 2020-11-30 RX ORDER — MONTELUKAST 4 MG/1
1 TABLET, CHEWABLE ORAL
Qty: 0 | Refills: 0 | DISCHARGE

## 2020-11-30 RX ORDER — INSULIN GLARGINE 100 [IU]/ML
30 INJECTION, SOLUTION SUBCUTANEOUS
Qty: 0 | Refills: 0 | DISCHARGE

## 2020-11-30 NOTE — H&P PST ADULT - NSICDXPASTMEDICALHX_GEN_ALL_CORE_FT
PAST MEDICAL HISTORY:  Asthma 20 yr    COPD (chronic obstructive pulmonary disease) 20 yrs no 02 rx    Diabetes 20 yrs    Prostate cancer 1999 s/p sx

## 2020-11-30 NOTE — H&P PST ADULT - HISTORY OF PRESENT ILLNESS
75 yr old man to past for left inguinal hernia repair with mesh Teto Dr Law under LSB on 12/21/20  pt with large left inguinal hernia tennis ball sized reducible per pt 2 mos noted when coughing  c/o dysuria at times opts for sx Pt states feels medically optimized for this sx opt with h/o copd no 02 and asthma 20 yrs daily prednisone 10 mg day 20 yrs denies any recent uri or flare also with dm 10 yr   Pt reports no cardiopulmonary issues denies sob/lr/cp/palpitations. Pt states no recent infections no fever no cough no uti uri. Stated exercise tolerance is 1  flight/ 1 block  no changes. Kvng screen revd.  Pt denies any s/s covid 19 and reports no contact with known positive people. Pt has appointment for repeat covid testing pre op and instructed to continue to self monitor and report any concerns to MD. Pt will continue to practice self isolation and  exposure control measures pre op  Anesthesia Alert  NO--Difficult Airway  NO--History of neck surgery or radiation  NO--Limited ROM of neck  NO--History of Malignant hyperthermia  NO--No personal or family history of Pseudocholinesterase deficiency.  NO--Prior Anesthesia Complication  NO--Latex Allergy  NO--Loose teeth  NO--History of Rheumatoid Arthritis  NO--KVNG  NO--Other_____  ^K40.90, 39782                                        No pertinent family history in first degree relatives    COPD (chronic obstructive pulmonary disease)    Asthma    Prostate cancer    Diabetes    History of surgery    H/O radical prostatectomy    S/P TURP

## 2020-11-30 NOTE — H&P PST ADULT - NSANTHAGERD_ENT_A_CORE
Yes

## 2020-11-30 NOTE — H&P PST ADULT - FUNCTIONAL SCREEN CURRENT LEVEL: DRESSING, MLM
Current Meds  BuPROPion HCl ER (SR) 150 MG Oral Tablet Extended Release 12 Hour; TAKE 1  TABLET DAILY AS DIRECTED;  Therapy: 22May2018 to (Evaluate:[id number]) Recorded  Citalopram Hydrobromide 10 MG Oral Tablet; TAKE 1 TABLET BY MOUTH DAILY;  Therapy: 22May2018 to Recorded  Citalopram Hydrobromide 20 MG Oral Tablet; TAKE 1 TABLET DAILY;  Therapy: 22May2018 to Recorded  Vyvanse 20 MG Oral Capsule; TAKE 1 CAPSULE DAILY;  Therapy: 22May2018 to (Evaluate:[id number]) Recorded      Event Monitor: 5/5/18 26/3/18   Discussion:        Discussion/Summary  Findings:  30 day event monitor performed.  Baseline tracing demonstrates sinus tachycardia at 110 bpm.  13 patient triggered events for symptoms of fatigue, lightheadedness, palpitations, and chest pain all correlated with sinus rhythm and sinus tachycardia. Heart rate range  bpm. No arrhythmia demonstrated during symptomatic episodes.  One additional automatic event for tachycardia demonstrates sinus tachycardia at 159 bpm.    Summary: Unremarkable 30 day event monitor. No arrhythmia demonstrated despite symptoms.  ___________________________________________________    Results of event monitor reviewed with patient and parents at clinic visit on 6/7/18.      Signatures   Electronically signed by : SHAYNE CARDOZO M.D.; Jun 7 2018 10:15AM CST     0 = independent

## 2020-11-30 NOTE — H&P PST ADULT - NSANTHTOTALSCORECAL_ENT_A_CORE
Pt to have liver ultrasound    Pt to have paracentesis every 2 weeks as needed--call [phone] to schedule    Pt to return in 3 months with labs prior    Please call [phone] with questions or concerns    You may receive a survey in the mail regarding today's appointment. We would love to hear from you and appreciate your feedback if you could take the time to fill it out!    
2

## 2020-12-03 ENCOUNTER — OUTPATIENT (OUTPATIENT)
Dept: OUTPATIENT SERVICES | Facility: HOSPITAL | Age: 75
LOS: 1 days | Discharge: HOME | End: 2020-12-03

## 2020-12-03 VITALS
RESPIRATION RATE: 13 BRPM | WEIGHT: 165.35 LBS | DIASTOLIC BLOOD PRESSURE: 86 MMHG | HEIGHT: 67 IN | OXYGEN SATURATION: 96 % | SYSTOLIC BLOOD PRESSURE: 147 MMHG | HEART RATE: 78 BPM | TEMPERATURE: 98 F

## 2020-12-03 DIAGNOSIS — Z01.818 ENCOUNTER FOR OTHER PREPROCEDURAL EXAMINATION: ICD-10-CM

## 2020-12-03 DIAGNOSIS — Z98.890 OTHER SPECIFIED POSTPROCEDURAL STATES: Chronic | ICD-10-CM

## 2020-12-03 DIAGNOSIS — Z90.79 ACQUIRED ABSENCE OF OTHER GENITAL ORGAN(S): Chronic | ICD-10-CM

## 2020-12-03 DIAGNOSIS — D21.6 BENIGN NEOPLASM OF CONNECTIVE AND OTHER SOFT TISSUE OF TRUNK, UNSPECIFIED: ICD-10-CM

## 2020-12-03 LAB
ALBUMIN SERPL ELPH-MCNC: 4.2 G/DL — SIGNIFICANT CHANGE UP (ref 3.5–5.2)
ALP SERPL-CCNC: 78 U/L — SIGNIFICANT CHANGE UP (ref 30–115)
ALT FLD-CCNC: 17 U/L — SIGNIFICANT CHANGE UP (ref 0–41)
ANION GAP SERPL CALC-SCNC: 12 MMOL/L — SIGNIFICANT CHANGE UP (ref 7–14)
AST SERPL-CCNC: 14 U/L — SIGNIFICANT CHANGE UP (ref 0–41)
BASOPHILS # BLD AUTO: 0.12 K/UL — SIGNIFICANT CHANGE UP (ref 0–0.2)
BASOPHILS NFR BLD AUTO: 0.7 % — SIGNIFICANT CHANGE UP (ref 0–1)
BILIRUB SERPL-MCNC: 0.5 MG/DL — SIGNIFICANT CHANGE UP (ref 0.2–1.2)
BUN SERPL-MCNC: 22 MG/DL — HIGH (ref 10–20)
CALCIUM SERPL-MCNC: 9.9 MG/DL — SIGNIFICANT CHANGE UP (ref 8.5–10.1)
CHLORIDE SERPL-SCNC: 100 MMOL/L — SIGNIFICANT CHANGE UP (ref 98–110)
CO2 SERPL-SCNC: 24 MMOL/L — SIGNIFICANT CHANGE UP (ref 17–32)
CREAT SERPL-MCNC: 1 MG/DL — SIGNIFICANT CHANGE UP (ref 0.7–1.5)
EOSINOPHIL # BLD AUTO: 0.07 K/UL — SIGNIFICANT CHANGE UP (ref 0–0.7)
EOSINOPHIL NFR BLD AUTO: 0.4 % — SIGNIFICANT CHANGE UP (ref 0–8)
GLUCOSE SERPL-MCNC: 211 MG/DL — HIGH (ref 70–99)
HCT VFR BLD CALC: 51.4 % — SIGNIFICANT CHANGE UP (ref 42–52)
HGB BLD-MCNC: 15.5 G/DL — SIGNIFICANT CHANGE UP (ref 14–18)
IMM GRANULOCYTES NFR BLD AUTO: 2.1 % — HIGH (ref 0.1–0.3)
LYMPHOCYTES # BLD AUTO: 1.16 K/UL — LOW (ref 1.2–3.4)
LYMPHOCYTES # BLD AUTO: 6.4 % — LOW (ref 20.5–51.1)
MCHC RBC-ENTMCNC: 25.8 PG — LOW (ref 27–31)
MCHC RBC-ENTMCNC: 30.2 G/DL — LOW (ref 32–37)
MCV RBC AUTO: 85.7 FL — SIGNIFICANT CHANGE UP (ref 80–94)
MONOCYTES # BLD AUTO: 0.44 K/UL — SIGNIFICANT CHANGE UP (ref 0.1–0.6)
MONOCYTES NFR BLD AUTO: 2.4 % — SIGNIFICANT CHANGE UP (ref 1.7–9.3)
NEUTROPHILS # BLD AUTO: 15.91 K/UL — HIGH (ref 1.4–6.5)
NEUTROPHILS NFR BLD AUTO: 88 % — HIGH (ref 42.2–75.2)
NRBC # BLD: 0 /100 WBCS — SIGNIFICANT CHANGE UP (ref 0–0)
PLATELET # BLD AUTO: 363 K/UL — SIGNIFICANT CHANGE UP (ref 130–400)
POTASSIUM SERPL-MCNC: 5.3 MMOL/L — HIGH (ref 3.5–5)
POTASSIUM SERPL-SCNC: 5.3 MMOL/L — HIGH (ref 3.5–5)
PROT SERPL-MCNC: 7.6 G/DL — SIGNIFICANT CHANGE UP (ref 6–8)
RBC # BLD: 6 M/UL — SIGNIFICANT CHANGE UP (ref 4.7–6.1)
RBC # FLD: 14.3 % — SIGNIFICANT CHANGE UP (ref 11.5–14.5)
SODIUM SERPL-SCNC: 136 MMOL/L — SIGNIFICANT CHANGE UP (ref 135–146)
WBC # BLD: 18.08 K/UL — HIGH (ref 4.8–10.8)
WBC # FLD AUTO: 18.08 K/UL — HIGH (ref 4.8–10.8)

## 2020-12-03 NOTE — H&P PST ADULT - TEMPERATURE IN FAHRENHEIT (DEGREES F)
- Acyclovir  mg q8 (9mg/kg)  - Micafungin IV 22 mg daily (2mg/kg)  - Levofloxacin 110 mg IV q12 (10 mg/kg)  - Chlorhexidine 15mL swish and spit TID  - s/p Pentamidine 4 mg/kg (9/20)-10/4  - s/p IVIG (9/20) - IgG 886 on 10/2 97.9

## 2020-12-03 NOTE — H&P PST ADULT - HISTORY OF PRESENT ILLNESS
75y Male presents today for presurgical testing for excision mass right shoulder. Patient reports mass on right shoulder for approximately 1 month notably enlarging over time. He states that he had it biopsied and it was recommended that the mass be excised. Patient denies pain or drainage from the mass, but does endorse pruritis around the site.   Patient denies any CP, palpitations, SOB, cough, or dysuria. No recent URI or UTI.  Stated exercise tolerance is FOS 1          RUTH screen reviewed    Patient denies any recent personal exposure to COVID19. Denies any sick contacts. Patient denies any travel within the past 30 days. 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  NO--Prior Anesthesia Complication  NO--Latex Allergy  NO--Loose teeth  NO--History of Rheumatoid Arthritis  NO--RUTH  NO--Other_____    Patient states that this is their complete medical history and list of medication    *Patient was previously on contact isolation for ESBL ecoli in urine 1/2020, reviewed with and cleared by Epidemiology. Droplet isolation from 1/2020 (for Flu) also reviewed with and cleared by epidemiology

## 2020-12-06 ENCOUNTER — OUTPATIENT (OUTPATIENT)
Dept: OUTPATIENT SERVICES | Facility: HOSPITAL | Age: 75
LOS: 1 days | Discharge: HOME | End: 2020-12-06

## 2020-12-06 DIAGNOSIS — Z11.59 ENCOUNTER FOR SCREENING FOR OTHER VIRAL DISEASES: ICD-10-CM

## 2020-12-06 DIAGNOSIS — Z90.79 ACQUIRED ABSENCE OF OTHER GENITAL ORGAN(S): Chronic | ICD-10-CM

## 2020-12-06 DIAGNOSIS — Z98.890 OTHER SPECIFIED POSTPROCEDURAL STATES: Chronic | ICD-10-CM

## 2020-12-06 PROBLEM — E11.9 TYPE 2 DIABETES MELLITUS WITHOUT COMPLICATIONS: Chronic | Status: ACTIVE | Noted: 2018-03-12

## 2020-12-06 PROBLEM — J44.9 CHRONIC OBSTRUCTIVE PULMONARY DISEASE, UNSPECIFIED: Chronic | Status: ACTIVE | Noted: 2018-04-23

## 2020-12-06 PROBLEM — J45.909 UNSPECIFIED ASTHMA, UNCOMPLICATED: Chronic | Status: ACTIVE | Noted: 2018-03-12

## 2020-12-06 PROBLEM — C61 MALIGNANT NEOPLASM OF PROSTATE: Chronic | Status: ACTIVE | Noted: 2018-03-12

## 2020-12-09 ENCOUNTER — RESULT REVIEW (OUTPATIENT)
Age: 75
End: 2020-12-09

## 2020-12-09 ENCOUNTER — OUTPATIENT (OUTPATIENT)
Dept: OUTPATIENT SERVICES | Facility: HOSPITAL | Age: 75
LOS: 1 days | Discharge: HOME | End: 2020-12-09
Payer: MEDICARE

## 2020-12-09 VITALS
TEMPERATURE: 98 F | OXYGEN SATURATION: 99 % | DIASTOLIC BLOOD PRESSURE: 72 MMHG | SYSTOLIC BLOOD PRESSURE: 149 MMHG | HEIGHT: 67 IN | HEART RATE: 96 BPM | WEIGHT: 164.91 LBS

## 2020-12-09 VITALS
OXYGEN SATURATION: 96 % | DIASTOLIC BLOOD PRESSURE: 76 MMHG | HEART RATE: 80 BPM | RESPIRATION RATE: 14 BRPM | TEMPERATURE: 98 F | SYSTOLIC BLOOD PRESSURE: 144 MMHG

## 2020-12-09 DIAGNOSIS — Z98.890 OTHER SPECIFIED POSTPROCEDURAL STATES: Chronic | ICD-10-CM

## 2020-12-09 DIAGNOSIS — Z90.79 ACQUIRED ABSENCE OF OTHER GENITAL ORGAN(S): Chronic | ICD-10-CM

## 2020-12-09 LAB — GLUCOSE BLDC GLUCOMTR-MCNC: 263 MG/DL — HIGH (ref 70–99)

## 2020-12-09 PROCEDURE — 88307 TISSUE EXAM BY PATHOLOGIST: CPT | Mod: 26

## 2020-12-09 RX ORDER — OXYCODONE AND ACETAMINOPHEN 5; 325 MG/1; MG/1
1 TABLET ORAL ONCE
Refills: 0 | Status: DISCONTINUED | OUTPATIENT
Start: 2020-12-09 | End: 2020-12-09

## 2020-12-09 RX ORDER — OXYCODONE AND ACETAMINOPHEN 5; 325 MG/1; MG/1
1 TABLET ORAL
Qty: 16 | Refills: 0
Start: 2020-12-09 | End: 2020-12-12

## 2020-12-09 RX ORDER — ONDANSETRON 8 MG/1
4 TABLET, FILM COATED ORAL ONCE
Refills: 0 | Status: DISCONTINUED | OUTPATIENT
Start: 2020-12-09 | End: 2020-12-09

## 2020-12-09 RX ORDER — HYDROMORPHONE HYDROCHLORIDE 2 MG/ML
0.5 INJECTION INTRAMUSCULAR; INTRAVENOUS; SUBCUTANEOUS
Refills: 0 | Status: DISCONTINUED | OUTPATIENT
Start: 2020-12-09 | End: 2020-12-09

## 2020-12-09 RX ORDER — SODIUM CHLORIDE 9 MG/ML
1000 INJECTION, SOLUTION INTRAVENOUS
Refills: 0 | Status: DISCONTINUED | OUTPATIENT
Start: 2020-12-09 | End: 2020-12-09

## 2020-12-09 RX ADMIN — HYDROMORPHONE HYDROCHLORIDE 0.5 MILLIGRAM(S): 2 INJECTION INTRAMUSCULAR; INTRAVENOUS; SUBCUTANEOUS at 10:50

## 2020-12-09 RX ADMIN — SODIUM CHLORIDE 100 MILLILITER(S): 9 INJECTION, SOLUTION INTRAVENOUS at 10:54

## 2020-12-09 RX ADMIN — HYDROMORPHONE HYDROCHLORIDE 0.5 MILLIGRAM(S): 2 INJECTION INTRAMUSCULAR; INTRAVENOUS; SUBCUTANEOUS at 11:10

## 2020-12-09 NOTE — CHART NOTE - NSCHARTNOTEFT_GEN_A_CORE
PACU ANESTHESIA ADMISSION NOTE      Procedure:   Post op diagnosis:      ____  Intubated  TV:______       Rate: ______      FiO2: ______    __x__  Patent Airway    __x__  Full return of protective reflexes    __x__  Full recovery from anesthesia / back to baseline status    Vitals:  T(C): 36.4 (12-09-20 @ 09:11), Max: 36.4 (12-09-20 @ 08:02)  HR: 96 (12-09-20 @ 09:11) (96 - 96)  BP: 149/72 (12-09-20 @ 09:11) (149/72 - 149/72)  RR: --  SpO2: 99% (12-09-20 @ 09:11) (99% - 99%)    Mental Status:  __x__ Awake   ___x__ Alert   _____ Drowsy   _____ Sedated    Nausea/Vomiting:  __x__ NO  ______Yes,   See Post - Op Orders          Pain Scale (0-10):  __0___    Treatment: ____ None    __x__ See Post - Op/PCA Orders    Post - Operative Fluids:   ____ Oral   __x__ See Post - Op Orders    Plan: Discharge:   ___x_Home       _____Floor     _____Critical Care    _____  Other:_________________    Comments: Patient had smooth intraoperative event, no anesthesia complication.  PACU Vital signs: HR:    95        BP: 145       /    74      RR:  16           O2 Sat:  98     %     Temp 36

## 2020-12-09 NOTE — BRIEF OPERATIVE NOTE - NSICDXBRIEFPROCEDURE_GEN_ALL_CORE_FT
PROCEDURES:  Excision, soft tissue tumor, shoulder area, subfascial, less than 5 cm 09-Dec-2020 10:39:37  Jose Gaitan

## 2020-12-09 NOTE — ASU PATIENT PROFILE, ADULT - PMH
Asthma  19 yr  COPD (chronic obstructive pulmonary disease)  20 yrs no 02 rx  Diabetes  19 yrs  Prostate cancer  1999 s/p sx

## 2020-12-09 NOTE — ASU DISCHARGE PLAN (ADULT/PEDIATRIC) - ASU DC SPECIAL INSTRUCTIONSFT
You are being discharged from the hospital after undergoing a excision of a right shoulder mass  Continue your regular diet.  Wound care: Keep your dressing clean, dry, and intact until seen by MD/PA. You have surgical staples that will be removed by Dr. Yen during your next appointment. Avoid excessive right arm movements, keep the sling on all the time until your next appointment   Drain care: Please monitor and record drain output daily. Bring a written log with you to your postoperative appointment  Pain: Take ibuprofen 600 mg every 8 hours as needed for pain. Take Percocet 1 tab as needed for breakthrough pain every 6 hours. Please be aware, the medication can cause drowsiness, so reserve for night time use or severe pain, avoid drive or make important decision while you are on narcotic pain medications.  You are being discharge with an antibiotic, please take it as prescribed. Your medication have been sent to your pharmacy. Please continue your own home medications as previously prescribed. Contact your primary care provider with any questions.   Activity: Start walking as soon as possible to increase circulation and prevent blood clots. You may take care of your personal needs as desired, however, no lifting or strenuous activity is allowed for 6 weeks following surgery. Please avoid heavy lifting (anything over 6 pounds). Driving is not permitted for at least two weeks.  Follow up: Please plan to follow up  with Dr. Yen on 12/15/20. Contact the office to confirm appointment. The phone number and address are available within discharge paperwork.  Please call the office or return to Emergency Department if you experience fever, shortness of breath, increasing pain, vomiting. Increasing redness, pain or drainage from incision or puncture sites You are being discharged from the hospital after undergoing a excision of a right shoulder mass  Continue your regular diet.  Wound care: Keep your dressing clean, dry, and intact until seen by MD/PA. You have surgical staples that will be removed by Dr. Yen during your next appointment. Avoid excessive right arm movements, keep the sling on all the time until your next appointment   Drain care: Please monitor and record drain output daily. Bring a written log with you to your postoperative appointment  Pain: Take ibuprofen 600 mg every 8 hours as needed for pain. Take Percocet 1 tab as needed for breakthrough pain every 6 hours. Please be aware, the medication can cause drowsiness, so reserve for night time use or severe pain, avoid drive or make important decision while you are on narcotic pain medications.  You are being discharge with an antibiotic, please take it as prescribed (Augmentin 1 tab every 12 hours for 7 days). Your medication have been sent to your pharmacy. Please continue your own home medications as previously prescribed. Contact your primary care provider with any questions.   Activity: Start walking as soon as possible to increase circulation and prevent blood clots. You may take care of your personal needs as desired, however, no lifting or strenuous activity is allowed for 6 weeks following surgery. Please avoid heavy lifting (anything over 6 pounds). Driving is not permitted for at least two weeks.  Follow up: Please plan to follow up  with Dr. Yen on 12/15/20. Contact the office to confirm appointment. The phone number and address are available within discharge paperwork.  Please call the office or return to Emergency Department if you experience fever, shortness of breath, increasing pain, vomiting. Increasing redness, pain or drainage from incision or puncture sites

## 2020-12-09 NOTE — ASU DISCHARGE PLAN (ADULT/PEDIATRIC) - CARE PROVIDER_API CALL
Rubén Yen  SURGERY  [address] [place] [address]  Phone: [phone]  Fax: [phone]  Scheduled Appointment: 12/15/2020

## 2020-12-11 LAB — SURGICAL PATHOLOGY STUDY: SIGNIFICANT CHANGE UP

## 2020-12-18 ENCOUNTER — OUTPATIENT (OUTPATIENT)
Dept: OUTPATIENT SERVICES | Facility: HOSPITAL | Age: 75
LOS: 1 days | Discharge: HOME | End: 2020-12-18

## 2020-12-18 ENCOUNTER — LABORATORY RESULT (OUTPATIENT)
Age: 75
End: 2020-12-18

## 2020-12-18 DIAGNOSIS — Z11.59 ENCOUNTER FOR SCREENING FOR OTHER VIRAL DISEASES: ICD-10-CM

## 2020-12-18 DIAGNOSIS — Z90.79 ACQUIRED ABSENCE OF OTHER GENITAL ORGAN(S): Chronic | ICD-10-CM

## 2020-12-18 DIAGNOSIS — Z98.890 OTHER SPECIFIED POSTPROCEDURAL STATES: Chronic | ICD-10-CM

## 2020-12-21 ENCOUNTER — APPOINTMENT (OUTPATIENT)
Dept: SURGERY | Facility: AMBULATORY SURGERY CENTER | Age: 75
End: 2020-12-21
Payer: MEDICARE

## 2020-12-21 ENCOUNTER — OUTPATIENT (OUTPATIENT)
Dept: OUTPATIENT SERVICES | Facility: HOSPITAL | Age: 75
LOS: 1 days | Discharge: HOME | End: 2020-12-21

## 2020-12-21 VITALS
OXYGEN SATURATION: 97 % | HEART RATE: 73 BPM | SYSTOLIC BLOOD PRESSURE: 146 MMHG | RESPIRATION RATE: 16 BRPM | DIASTOLIC BLOOD PRESSURE: 78 MMHG

## 2020-12-21 VITALS
OXYGEN SATURATION: 98 % | HEIGHT: 67 IN | TEMPERATURE: 98 F | DIASTOLIC BLOOD PRESSURE: 74 MMHG | SYSTOLIC BLOOD PRESSURE: 164 MMHG | HEART RATE: 96 BPM | RESPIRATION RATE: 18 BRPM | WEIGHT: 165.35 LBS

## 2020-12-21 DIAGNOSIS — Z98.890 OTHER SPECIFIED POSTPROCEDURAL STATES: Chronic | ICD-10-CM

## 2020-12-21 DIAGNOSIS — Z90.79 ACQUIRED ABSENCE OF OTHER GENITAL ORGAN(S): Chronic | ICD-10-CM

## 2020-12-21 PROCEDURE — 49507 PRP I/HERN INIT BLOCK >5 YR: CPT | Mod: LT

## 2020-12-21 RX ORDER — HYDROMORPHONE HYDROCHLORIDE 2 MG/ML
0.5 INJECTION INTRAMUSCULAR; INTRAVENOUS; SUBCUTANEOUS
Refills: 0 | Status: DISCONTINUED | OUTPATIENT
Start: 2020-12-21 | End: 2020-12-21

## 2020-12-21 RX ORDER — SODIUM CHLORIDE 9 MG/ML
1000 INJECTION, SOLUTION INTRAVENOUS
Refills: 0 | Status: DISCONTINUED | OUTPATIENT
Start: 2020-12-21 | End: 2021-01-04

## 2020-12-21 RX ORDER — ONDANSETRON 8 MG/1
4 TABLET, FILM COATED ORAL ONCE
Refills: 0 | Status: DISCONTINUED | OUTPATIENT
Start: 2020-12-21 | End: 2021-01-04

## 2020-12-21 RX ORDER — TRAMADOL HYDROCHLORIDE 50 MG/1
1 TABLET ORAL
Qty: 30 | Refills: 0
Start: 2020-12-21 | End: 2020-12-25

## 2020-12-21 RX ORDER — ALBUTEROL 90 UG/1
2 AEROSOL, METERED ORAL
Qty: 0 | Refills: 0 | DISCHARGE

## 2020-12-21 RX ORDER — OXYCODONE AND ACETAMINOPHEN 5; 325 MG/1; MG/1
1 TABLET ORAL EVERY 4 HOURS
Refills: 0 | Status: DISCONTINUED | OUTPATIENT
Start: 2020-12-21 | End: 2020-12-21

## 2020-12-21 RX ADMIN — SODIUM CHLORIDE 100 MILLILITER(S): 9 INJECTION, SOLUTION INTRAVENOUS at 08:37

## 2020-12-21 NOTE — ASU DISCHARGE PLAN (ADULT/PEDIATRIC) - PROVIDER TOKENS
PROVIDER:[TOKEN:[[id number]],SCHEDULEDAPPT:[12/29/2020]] PROVIDER:[TOKEN:[[id number]],SCHEDULEDAPPT:[12/31/2020]]

## 2020-12-21 NOTE — BRIEF OPERATIVE NOTE - NSICDXBRIEFPOSTOP_GEN_ALL_CORE_FT
POST-OP DIAGNOSIS:  Inguinal hernia, left 21-Dec-2020 07:33:44  Mohsen Law   POST-OP DIAGNOSIS:  Inguinal hernia, left 21-Dec-2020 07:33:44 Incarcerated Mohsen Law

## 2020-12-21 NOTE — CHART NOTE - NSCHARTNOTEFT_GEN_A_CORE
PACU ANESTHESIA ADMISSION NOTE      Procedure: Repair of left inguinal hernia with mesh  Incarcerated      Post op diagnosis:  Inguinal hernia, left        ____  Intubated  TV:______       Rate: ______      FiO2: ______    _x___  Patent Airway    _x___  Full return of protective reflexes    _x___  Full recovery from anesthesia / back to baseline status    Vitals  SPO2:-96% on 2l nc  HR:-84  RR:-12  B.P:-149/70  TEMP:-98.2    Mental Status:  _x___ Awake   ___ Alert   ___x__ Drowsy   _____ Sedated    Nausea/Vomiting:  _x___  NO       ______Yes,   See Post - Op Orders         Pain Scale (0-10):  __0___    Treatment: _x___ None    __x__ See Post - Op/PCA Orders    Post - Operative Fluids:   ___ Oral   ____x See Post - Op Orders    Plan: Discharge:   __x__Home       _____Floor     _____Critical Care    _____  Other:_________________    Comments:  Report endorsed to RN in pacu  Vitals stable  No anesthesia issues or complications noted.  Discharge to patient to floor / home when criteria met.

## 2020-12-21 NOTE — BRIEF OPERATIVE NOTE - NSICDXBRIEFPROCEDURE_GEN_ALL_CORE_FT
PROCEDURES:  Repair of left inguinal hernia with mesh 21-Dec-2020 07:33:48  Mohsen Law   PROCEDURES:  Repair of left inguinal hernia with mesh 21-Dec-2020 07:33:48 Incarcerated Mohsen Law

## 2020-12-21 NOTE — ASU DISCHARGE PLAN (ADULT/PEDIATRIC) - CARE PROVIDER_API CALL
Mohsen Law  SURGERY  [address] [place] [address]  Phone: [phone]  Fax: [phone]  Scheduled Appointment: 12/29/2020   Mohsen Law  SURGERY  [address] [place] [address]  Phone: [phone]  Fax: [phone]  Scheduled Appointment: 12/31/2020

## 2020-12-22 DIAGNOSIS — Z85.46 PERSONAL HISTORY OF MALIGNANT NEOPLASM OF PROSTATE: ICD-10-CM

## 2020-12-22 DIAGNOSIS — J30.2 OTHER SEASONAL ALLERGIC RHINITIS: ICD-10-CM

## 2020-12-22 DIAGNOSIS — J44.9 CHRONIC OBSTRUCTIVE PULMONARY DISEASE, UNSPECIFIED: ICD-10-CM

## 2020-12-22 DIAGNOSIS — R91.1 SOLITARY PULMONARY NODULE: ICD-10-CM

## 2020-12-22 DIAGNOSIS — E11.9 TYPE 2 DIABETES MELLITUS WITHOUT COMPLICATIONS: ICD-10-CM

## 2020-12-22 DIAGNOSIS — C76.41 MALIGNANT NEOPLASM OF RIGHT UPPER LIMB: ICD-10-CM

## 2020-12-22 DIAGNOSIS — K21.9 GASTRO-ESOPHAGEAL REFLUX DISEASE WITHOUT ESOPHAGITIS: ICD-10-CM

## 2020-12-22 DIAGNOSIS — Z79.4 LONG TERM (CURRENT) USE OF INSULIN: ICD-10-CM

## 2020-12-22 DIAGNOSIS — R22.31 LOCALIZED SWELLING, MASS AND LUMP, RIGHT UPPER LIMB: ICD-10-CM

## 2020-12-24 DIAGNOSIS — Z79.4 LONG TERM (CURRENT) USE OF INSULIN: ICD-10-CM

## 2020-12-24 DIAGNOSIS — Z90.79 ACQUIRED ABSENCE OF OTHER GENITAL ORGAN(S): ICD-10-CM

## 2020-12-24 DIAGNOSIS — E11.9 TYPE 2 DIABETES MELLITUS WITHOUT COMPLICATIONS: ICD-10-CM

## 2020-12-24 DIAGNOSIS — Z85.46 PERSONAL HISTORY OF MALIGNANT NEOPLASM OF PROSTATE: ICD-10-CM

## 2020-12-24 DIAGNOSIS — K40.30 UNILATERAL INGUINAL HERNIA, WITH OBSTRUCTION, WITHOUT GANGRENE, NOT SPECIFIED AS RECURRENT: ICD-10-CM

## 2020-12-24 DIAGNOSIS — J44.9 CHRONIC OBSTRUCTIVE PULMONARY DISEASE, UNSPECIFIED: ICD-10-CM

## 2020-12-31 ENCOUNTER — APPOINTMENT (OUTPATIENT)
Dept: SURGERY | Facility: CLINIC | Age: 75
End: 2020-12-31

## 2021-01-12 ENCOUNTER — APPOINTMENT (OUTPATIENT)
Dept: SURGERY | Facility: CLINIC | Age: 76
End: 2021-01-12
Payer: COMMERCIAL

## 2021-01-12 DIAGNOSIS — K40.90 UNILATERAL INGUINAL HERNIA, W/OUT OBSTRUCTION OR GANGRENE, NOT SPECIFIED AS RECURRENT: ICD-10-CM

## 2021-01-12 PROCEDURE — 99024 POSTOP FOLLOW-UP VISIT: CPT

## 2021-01-12 NOTE — ASSESSMENT
[FreeTextEntry1] : Freeman underwent the repair of his large incarcerated direct left inguinal hernia with mesh on December 21, 2020 under local with IV sedation without any problems or complications. His wound is clean, dry and intact. There is no evidence of erythema, seroma formation or infection. He is tolerating a diet and having normal bowel movements. He denies any significant postoperative pain or discomfort at this time.\par \par He was counseled and reassured. Freeman was discharged from the office with no specific followup necessary, but he knows to avoid any heavy lifting or strenuous activity for the next several weeks. We also discussed the importance of calorie restriction and healthy eating with regard to weight loss, hernia recurrence and his overall health.

## 2021-01-12 NOTE — CONSULT LETTER
[FreeTextEntry1] : Dear Dr. Gris Baird, \par \par I had the pleasure of seeing your patient, DEL ALVARADO, in my office today. Please see my note below. \par \par Thank you very much for allowing me to participate in the care of this patient. If you have any questions, please do not hesitate to contact me. \par \par \par Respectfully,\par \par Mohsen Law M.D., FACS\par  \par \par \par \par cc: Dr. Annemarie Oates

## 2021-03-04 ENCOUNTER — TRANSCRIPTION ENCOUNTER (OUTPATIENT)
Age: 76
End: 2021-03-04

## 2021-03-16 ENCOUNTER — INPATIENT (INPATIENT)
Facility: HOSPITAL | Age: 76
LOS: 2 days | Discharge: LEFT BEFORE DISCHARGE | End: 2021-03-19
Attending: HOSPITALIST | Admitting: HOSPITALIST
Payer: MEDICARE

## 2021-03-16 VITALS — OXYGEN SATURATION: 94 % | TEMPERATURE: 100 F | RESPIRATION RATE: 20 BRPM | HEART RATE: 110 BPM | HEIGHT: 67 IN

## 2021-03-16 DIAGNOSIS — Z98.890 OTHER SPECIFIED POSTPROCEDURAL STATES: Chronic | ICD-10-CM

## 2021-03-16 DIAGNOSIS — Z90.79 ACQUIRED ABSENCE OF OTHER GENITAL ORGAN(S): Chronic | ICD-10-CM

## 2021-03-16 LAB
ALBUMIN SERPL ELPH-MCNC: 3.1 G/DL — LOW (ref 3.5–5.2)
ALP SERPL-CCNC: 67 U/L — SIGNIFICANT CHANGE UP (ref 30–115)
ALT FLD-CCNC: 12 U/L — SIGNIFICANT CHANGE UP (ref 0–41)
ANION GAP SERPL CALC-SCNC: 13 MMOL/L — SIGNIFICANT CHANGE UP (ref 7–14)
AST SERPL-CCNC: 9 U/L — SIGNIFICANT CHANGE UP (ref 0–41)
BASOPHILS # BLD AUTO: 0.03 K/UL — SIGNIFICANT CHANGE UP (ref 0–0.2)
BASOPHILS NFR BLD AUTO: 0.2 % — SIGNIFICANT CHANGE UP (ref 0–1)
BILIRUB SERPL-MCNC: 0.6 MG/DL — SIGNIFICANT CHANGE UP (ref 0.2–1.2)
BUN SERPL-MCNC: 22 MG/DL — HIGH (ref 10–20)
CALCIUM SERPL-MCNC: 8.7 MG/DL — SIGNIFICANT CHANGE UP (ref 8.5–10.1)
CHLORIDE SERPL-SCNC: 100 MMOL/L — SIGNIFICANT CHANGE UP (ref 98–110)
CO2 SERPL-SCNC: 21 MMOL/L — SIGNIFICANT CHANGE UP (ref 17–32)
CREAT SERPL-MCNC: 0.9 MG/DL — SIGNIFICANT CHANGE UP (ref 0.7–1.5)
EOSINOPHIL # BLD AUTO: 0.22 K/UL — SIGNIFICANT CHANGE UP (ref 0–0.7)
EOSINOPHIL NFR BLD AUTO: 1.7 % — SIGNIFICANT CHANGE UP (ref 0–8)
GLUCOSE SERPL-MCNC: 360 MG/DL — HIGH (ref 70–99)
HCT VFR BLD CALC: 44 % — SIGNIFICANT CHANGE UP (ref 42–52)
HGB BLD-MCNC: 14.2 G/DL — SIGNIFICANT CHANGE UP (ref 14–18)
IMM GRANULOCYTES NFR BLD AUTO: 1.4 % — HIGH (ref 0.1–0.3)
LACTATE SERPL-SCNC: 1.9 MMOL/L — SIGNIFICANT CHANGE UP (ref 0.7–2)
LYMPHOCYTES # BLD AUTO: 17.6 % — LOW (ref 20.5–51.1)
LYMPHOCYTES # BLD AUTO: 2.22 K/UL — SIGNIFICANT CHANGE UP (ref 1.2–3.4)
MAGNESIUM SERPL-MCNC: 1.8 MG/DL — SIGNIFICANT CHANGE UP (ref 1.8–2.4)
MCHC RBC-ENTMCNC: 26.6 PG — LOW (ref 27–31)
MCHC RBC-ENTMCNC: 32.3 G/DL — SIGNIFICANT CHANGE UP (ref 32–37)
MCV RBC AUTO: 82.6 FL — SIGNIFICANT CHANGE UP (ref 80–94)
MONOCYTES # BLD AUTO: 1.28 K/UL — HIGH (ref 0.1–0.6)
MONOCYTES NFR BLD AUTO: 10.2 % — HIGH (ref 1.7–9.3)
NEUTROPHILS # BLD AUTO: 8.65 K/UL — HIGH (ref 1.4–6.5)
NEUTROPHILS NFR BLD AUTO: 68.9 % — SIGNIFICANT CHANGE UP (ref 42.2–75.2)
NRBC # BLD: 0 /100 WBCS — SIGNIFICANT CHANGE UP (ref 0–0)
NT-PROBNP SERPL-SCNC: 406 PG/ML — HIGH (ref 0–300)
PLATELET # BLD AUTO: 450 K/UL — HIGH (ref 130–400)
POTASSIUM SERPL-MCNC: 4.3 MMOL/L — SIGNIFICANT CHANGE UP (ref 3.5–5)
POTASSIUM SERPL-SCNC: 4.3 MMOL/L — SIGNIFICANT CHANGE UP (ref 3.5–5)
PROT SERPL-MCNC: 6.1 G/DL — SIGNIFICANT CHANGE UP (ref 6–8)
RBC # BLD: 5.33 M/UL — SIGNIFICANT CHANGE UP (ref 4.7–6.1)
RBC # FLD: 13.6 % — SIGNIFICANT CHANGE UP (ref 11.5–14.5)
SODIUM SERPL-SCNC: 134 MMOL/L — LOW (ref 135–146)
TROPONIN T SERPL-MCNC: 0.02 NG/ML — HIGH
WBC # BLD: 12.58 K/UL — HIGH (ref 4.8–10.8)
WBC # FLD AUTO: 12.58 K/UL — HIGH (ref 4.8–10.8)

## 2021-03-16 PROCEDURE — 99285 EMERGENCY DEPT VISIT HI MDM: CPT

## 2021-03-16 PROCEDURE — 93010 ELECTROCARDIOGRAM REPORT: CPT

## 2021-03-16 PROCEDURE — 71045 X-RAY EXAM CHEST 1 VIEW: CPT | Mod: 26

## 2021-03-16 RX ORDER — ACETAMINOPHEN 500 MG
650 TABLET ORAL ONCE
Refills: 0 | Status: COMPLETED | OUTPATIENT
Start: 2021-03-16 | End: 2021-03-16

## 2021-03-16 RX ORDER — SODIUM CHLORIDE 9 MG/ML
1000 INJECTION, SOLUTION INTRAVENOUS ONCE
Refills: 0 | Status: COMPLETED | OUTPATIENT
Start: 2021-03-16 | End: 2021-03-16

## 2021-03-16 RX ADMIN — Medication 650 MILLIGRAM(S): at 22:43

## 2021-03-16 RX ADMIN — SODIUM CHLORIDE 1000 MILLILITER(S): 9 INJECTION, SOLUTION INTRAVENOUS at 22:44

## 2021-03-16 NOTE — ED PROVIDER NOTE - OBJECTIVE STATEMENT
76 yo M with PMHx of HTN, DM, HLD, asthma, and COPD not on home O2 presents to the ED c/o SOB and productive cough with green sputum. Pt went to [facility] prior to ED arrival and was sent here for further evaluation. Pt states he first tested positive for COVID about 3 weeks ago, initially just had body aches and weakness. Now over 16 days he states his cough and SOB are worsening. SOB is worse with exertion. He admits to associated chest tightness. He denies other complaints. Pt denies fever, chills, nausea, vomiting, abdominal pain, diarrhea, headache, dizziness, back pain, LOC, trauma, urinary symptoms, calf pain/swelling, recent travel, recent surgery.

## 2021-03-16 NOTE — ED PROVIDER NOTE - NS ED ROS FT
Review of Systems  Constitutional:  No fever, chills. (+) generalized weakness  Eyes:  No visual changes, eye pain, or discharge.  ENMT:  No hearing changes, pain, or discharge. No nasal congestion, discharge, or bleeding. No throat pain, swelling, or difficulty swallowing.  Cardiac:  No palpitations, syncope, or edema. (+) chest tightness  Respiratory:  (+) SOB, cough. No hemoptysis.  GI:  No nausea, vomiting, diarrhea, or abdominal pain.   :  No dysuria, hematuria, frequency, or burning.   MS:  No back pain.  Skin:  No skin rash, pruritis, jaundice, or lesions.  Neuro:  No headache, dizziness, loss of sensation, or focal weakness.  No change in mental status.

## 2021-03-16 NOTE — ED PROVIDER NOTE - ATTENDING CONTRIBUTION TO CARE
76 yo male with PMH of HTN, HLD, DM, asthma/COPD presents to the ER for productive cough and SOB. Pt found to be covid-19 + 3 weeks prior. Initially just had body aches/fatigue/weakness/dry cough. Now feels cough worse with production of green phlegm, and SOB. SOB worse with exertion. Still with weakness/fatigue/body aches.  Feels chest tightness with SOB. No chills/Leg swelliing or edema/N/V/D/abdomen pain/dizziness/back pain/LOC/trauma/travel/recent surgery. Lowest sats in the ER 94%, and not hypoxic with ambulation. Agree with PA exam. Pt with rhonchi bilaterally. Labs, ekg, xray reviewed--+slight elevated trop and +opacities on xray.  Pt with slight tachycardia (110), but has low grade fever. Fluids given gingerly and not 30 cc/kg given likely this is from covid and overhydration not recommended. Given findings on labs/xray, recommend admission for IV abx, dexamethasone, albuterol, and likely anticoagulation (to be started by inpatient team).

## 2021-03-16 NOTE — ED PROVIDER NOTE - PROGRESS NOTE DETAILS
Signed out case to MAR, requesting D-dimer/inflammatory markers--medicine will follow-up on the results.

## 2021-03-16 NOTE — ED PROVIDER NOTE - CARE PLAN
Principal Discharge DX:	COVID-19  Secondary Diagnosis:	SOB (shortness of breath)  Secondary Diagnosis:	Elevated troponin

## 2021-03-16 NOTE — ED PROVIDER NOTE - PHYSICAL EXAMINATION
VITAL SIGNS: I have reviewed nursing notes and confirm.  CONSTITUTIONAL: Elderly male laying on stretcher; in no acute distress.  SKIN: Skin exam is warm and dry, no acute rash.  HEAD: Normocephalic; atraumatic.  EYES: PERRL, EOM intact; conjunctiva and sclera clear.  ENT: No nasal discharge; airway clear.   CARD: S1, S2 normal; no murmurs, gallops, or rubs. Tachycardic, regular.  RESP: (+) B/L rhonchi and wheezing. No respiratory distress.  ABD: Normal bowel sounds; soft; non-distended; non-tender; No rebound or guarding. No CVA tenderness.  EXT: Normal ROM. No clubbing, cyanosis or edema. No calf TTP or swelling.   NEURO: Alert, oriented. Grossly unremarkable. No focal deficits.

## 2021-03-16 NOTE — ED ADULT TRIAGE NOTE - CHIEF COMPLAINT QUOTE
pt went to [facility] for sob. sent in here for pna. pt c/o weakness and sob. +covid on 2/28

## 2021-03-17 LAB
ALBUMIN SERPL ELPH-MCNC: 3.1 G/DL — LOW (ref 3.5–5.2)
ALP SERPL-CCNC: 70 U/L — SIGNIFICANT CHANGE UP (ref 30–115)
ALT FLD-CCNC: 14 U/L — SIGNIFICANT CHANGE UP (ref 0–41)
ANION GAP SERPL CALC-SCNC: 15 MMOL/L — HIGH (ref 7–14)
AST SERPL-CCNC: 16 U/L — SIGNIFICANT CHANGE UP (ref 0–41)
BASOPHILS # BLD AUTO: 0.04 K/UL — SIGNIFICANT CHANGE UP (ref 0–0.2)
BASOPHILS NFR BLD AUTO: 0.4 % — SIGNIFICANT CHANGE UP (ref 0–1)
BILIRUB SERPL-MCNC: 0.3 MG/DL — SIGNIFICANT CHANGE UP (ref 0.2–1.2)
BLD GP AB SCN SERPL QL: SIGNIFICANT CHANGE UP
BUN SERPL-MCNC: 19 MG/DL — SIGNIFICANT CHANGE UP (ref 10–20)
CALCIUM SERPL-MCNC: 8.9 MG/DL — SIGNIFICANT CHANGE UP (ref 8.5–10.1)
CHLORIDE SERPL-SCNC: 98 MMOL/L — SIGNIFICANT CHANGE UP (ref 98–110)
CO2 SERPL-SCNC: 20 MMOL/L — SIGNIFICANT CHANGE UP (ref 17–32)
CREAT SERPL-MCNC: 1 MG/DL — SIGNIFICANT CHANGE UP (ref 0.7–1.5)
CRP SERPL-MCNC: 25 MG/L — HIGH
D DIMER BLD IA.RAPID-MCNC: 388 NG/ML DDU — HIGH (ref 0–230)
EOSINOPHIL # BLD AUTO: 0 K/UL — SIGNIFICANT CHANGE UP (ref 0–0.7)
EOSINOPHIL NFR BLD AUTO: 0 % — SIGNIFICANT CHANGE UP (ref 0–8)
FERRITIN SERPL-MCNC: 160 NG/ML — SIGNIFICANT CHANGE UP (ref 30–400)
GLUCOSE BLDC GLUCOMTR-MCNC: 244 MG/DL — HIGH (ref 70–99)
GLUCOSE BLDC GLUCOMTR-MCNC: 285 MG/DL — HIGH (ref 70–99)
GLUCOSE BLDC GLUCOMTR-MCNC: 313 MG/DL — HIGH (ref 70–99)
GLUCOSE BLDC GLUCOMTR-MCNC: 315 MG/DL — HIGH (ref 70–99)
GLUCOSE BLDC GLUCOMTR-MCNC: 317 MG/DL — HIGH (ref 70–99)
GLUCOSE SERPL-MCNC: 362 MG/DL — HIGH (ref 70–99)
HCT VFR BLD CALC: 42 % — SIGNIFICANT CHANGE UP (ref 42–52)
HGB BLD-MCNC: 13.3 G/DL — LOW (ref 14–18)
IMM GRANULOCYTES NFR BLD AUTO: 1.3 % — HIGH (ref 0.1–0.3)
LACTATE SERPL-SCNC: 6.2 MMOL/L — CRITICAL HIGH (ref 0.7–2)
LYMPHOCYTES # BLD AUTO: 0.96 K/UL — LOW (ref 1.2–3.4)
LYMPHOCYTES # BLD AUTO: 8.7 % — LOW (ref 20.5–51.1)
MAGNESIUM SERPL-MCNC: 2 MG/DL — SIGNIFICANT CHANGE UP (ref 1.8–2.4)
MCHC RBC-ENTMCNC: 26.5 PG — LOW (ref 27–31)
MCHC RBC-ENTMCNC: 31.7 G/DL — LOW (ref 32–37)
MCV RBC AUTO: 83.7 FL — SIGNIFICANT CHANGE UP (ref 80–94)
MONOCYTES # BLD AUTO: 0.26 K/UL — SIGNIFICANT CHANGE UP (ref 0.1–0.6)
MONOCYTES NFR BLD AUTO: 2.4 % — SIGNIFICANT CHANGE UP (ref 1.7–9.3)
NEUTROPHILS # BLD AUTO: 9.63 K/UL — HIGH (ref 1.4–6.5)
NEUTROPHILS NFR BLD AUTO: 87.2 % — HIGH (ref 42.2–75.2)
NRBC # BLD: 0 /100 WBCS — SIGNIFICANT CHANGE UP (ref 0–0)
PHOSPHATE SERPL-MCNC: 3.1 MG/DL — SIGNIFICANT CHANGE UP (ref 2.1–4.9)
PLATELET # BLD AUTO: 448 K/UL — HIGH (ref 130–400)
POTASSIUM SERPL-MCNC: 5.4 MMOL/L — HIGH (ref 3.5–5)
POTASSIUM SERPL-SCNC: 5.4 MMOL/L — HIGH (ref 3.5–5)
PROCALCITONIN SERPL-MCNC: 0.04 NG/ML — SIGNIFICANT CHANGE UP (ref 0.02–0.1)
PROT SERPL-MCNC: 6.1 G/DL — SIGNIFICANT CHANGE UP (ref 6–8)
RAPID RVP RESULT: DETECTED
RBC # BLD: 5.02 M/UL — SIGNIFICANT CHANGE UP (ref 4.7–6.1)
RBC # FLD: 13.6 % — SIGNIFICANT CHANGE UP (ref 11.5–14.5)
SARS-COV-2 RNA SPEC QL NAA+PROBE: DETECTED
SODIUM SERPL-SCNC: 133 MMOL/L — LOW (ref 135–146)
TROPONIN T SERPL-MCNC: <0.01 NG/ML — SIGNIFICANT CHANGE UP
WBC # BLD: 11.03 K/UL — HIGH (ref 4.8–10.8)
WBC # FLD AUTO: 11.03 K/UL — HIGH (ref 4.8–10.8)

## 2021-03-17 PROCEDURE — 93970 EXTREMITY STUDY: CPT | Mod: 26

## 2021-03-17 PROCEDURE — 99223 1ST HOSP IP/OBS HIGH 75: CPT

## 2021-03-17 RX ORDER — INSULIN LISPRO 100/ML
VIAL (ML) SUBCUTANEOUS
Refills: 0 | Status: DISCONTINUED | OUTPATIENT
Start: 2021-03-17 | End: 2021-03-19

## 2021-03-17 RX ORDER — INSULIN GLARGINE 100 [IU]/ML
10 INJECTION, SOLUTION SUBCUTANEOUS AT BEDTIME
Refills: 0 | Status: DISCONTINUED | OUTPATIENT
Start: 2021-03-17 | End: 2021-03-17

## 2021-03-17 RX ORDER — SODIUM CHLORIDE 9 MG/ML
1000 INJECTION, SOLUTION INTRAVENOUS
Refills: 0 | Status: DISCONTINUED | OUTPATIENT
Start: 2021-03-17 | End: 2021-03-18

## 2021-03-17 RX ORDER — AZITHROMYCIN 500 MG/1
500 TABLET, FILM COATED ORAL DAILY
Refills: 0 | Status: DISCONTINUED | OUTPATIENT
Start: 2021-03-17 | End: 2021-03-17

## 2021-03-17 RX ORDER — INSULIN DETEMIR 100/ML (3)
30 INSULIN PEN (ML) SUBCUTANEOUS
Qty: 0 | Refills: 0 | DISCHARGE

## 2021-03-17 RX ORDER — CEFTRIAXONE 500 MG/1
1000 INJECTION, POWDER, FOR SOLUTION INTRAMUSCULAR; INTRAVENOUS EVERY 24 HOURS
Refills: 0 | Status: DISCONTINUED | OUTPATIENT
Start: 2021-03-17 | End: 2021-03-17

## 2021-03-17 RX ORDER — INSULIN LISPRO 100/ML
3 VIAL (ML) SUBCUTANEOUS
Refills: 0 | Status: DISCONTINUED | OUTPATIENT
Start: 2021-03-17 | End: 2021-03-17

## 2021-03-17 RX ORDER — SODIUM CHLORIDE 9 MG/ML
1000 INJECTION, SOLUTION INTRAVENOUS
Refills: 0 | Status: DISCONTINUED | OUTPATIENT
Start: 2021-03-17 | End: 2021-03-19

## 2021-03-17 RX ORDER — DEXTROSE 50 % IN WATER 50 %
25 SYRINGE (ML) INTRAVENOUS ONCE
Refills: 0 | Status: DISCONTINUED | OUTPATIENT
Start: 2021-03-17 | End: 2021-03-19

## 2021-03-17 RX ORDER — IPRATROPIUM/ALBUTEROL SULFATE 18-103MCG
3 AEROSOL WITH ADAPTER (GRAM) INHALATION EVERY 6 HOURS
Refills: 0 | Status: DISCONTINUED | OUTPATIENT
Start: 2021-03-17 | End: 2021-03-19

## 2021-03-17 RX ORDER — INSULIN GLARGINE 100 [IU]/ML
40 INJECTION, SOLUTION SUBCUTANEOUS EVERY MORNING
Refills: 0 | Status: DISCONTINUED | OUTPATIENT
Start: 2021-03-17 | End: 2021-03-18

## 2021-03-17 RX ORDER — MONTELUKAST 4 MG/1
10 TABLET, CHEWABLE ORAL DAILY
Refills: 0 | Status: DISCONTINUED | OUTPATIENT
Start: 2021-03-17 | End: 2021-03-19

## 2021-03-17 RX ORDER — DEXAMETHASONE 0.5 MG/5ML
6 ELIXIR ORAL ONCE
Refills: 0 | Status: COMPLETED | OUTPATIENT
Start: 2021-03-17 | End: 2021-03-17

## 2021-03-17 RX ORDER — INSULIN GLARGINE 100 [IU]/ML
40 INJECTION, SOLUTION SUBCUTANEOUS AT BEDTIME
Refills: 0 | Status: DISCONTINUED | OUTPATIENT
Start: 2021-03-17 | End: 2021-03-17

## 2021-03-17 RX ORDER — CHLORHEXIDINE GLUCONATE 213 G/1000ML
1 SOLUTION TOPICAL
Refills: 0 | Status: DISCONTINUED | OUTPATIENT
Start: 2021-03-17 | End: 2021-03-19

## 2021-03-17 RX ORDER — DEXTROSE 50 % IN WATER 50 %
15 SYRINGE (ML) INTRAVENOUS ONCE
Refills: 0 | Status: DISCONTINUED | OUTPATIENT
Start: 2021-03-17 | End: 2021-03-19

## 2021-03-17 RX ORDER — BUDESONIDE AND FORMOTEROL FUMARATE DIHYDRATE 160; 4.5 UG/1; UG/1
2 AEROSOL RESPIRATORY (INHALATION)
Refills: 0 | Status: DISCONTINUED | OUTPATIENT
Start: 2021-03-17 | End: 2021-03-19

## 2021-03-17 RX ORDER — ENOXAPARIN SODIUM 100 MG/ML
40 INJECTION SUBCUTANEOUS AT BEDTIME
Refills: 0 | Status: DISCONTINUED | OUTPATIENT
Start: 2021-03-17 | End: 2021-03-19

## 2021-03-17 RX ORDER — INSULIN LISPRO 100/ML
25 VIAL (ML) SUBCUTANEOUS
Refills: 0 | Status: DISCONTINUED | OUTPATIENT
Start: 2021-03-17 | End: 2021-03-17

## 2021-03-17 RX ORDER — INSULIN LISPRO 100/ML
15 VIAL (ML) SUBCUTANEOUS
Refills: 0 | Status: DISCONTINUED | OUTPATIENT
Start: 2021-03-17 | End: 2021-03-18

## 2021-03-17 RX ORDER — DEXTROSE 50 % IN WATER 50 %
12.5 SYRINGE (ML) INTRAVENOUS ONCE
Refills: 0 | Status: DISCONTINUED | OUTPATIENT
Start: 2021-03-17 | End: 2021-03-19

## 2021-03-17 RX ORDER — DEXAMETHASONE 0.5 MG/5ML
6 ELIXIR ORAL DAILY
Refills: 0 | Status: DISCONTINUED | OUTPATIENT
Start: 2021-03-17 | End: 2021-03-17

## 2021-03-17 RX ORDER — PANTOPRAZOLE SODIUM 20 MG/1
40 TABLET, DELAYED RELEASE ORAL
Refills: 0 | Status: DISCONTINUED | OUTPATIENT
Start: 2021-03-17 | End: 2021-03-19

## 2021-03-17 RX ORDER — AZITHROMYCIN 500 MG/1
500 TABLET, FILM COATED ORAL ONCE
Refills: 0 | Status: COMPLETED | OUTPATIENT
Start: 2021-03-17 | End: 2021-03-17

## 2021-03-17 RX ORDER — ATORVASTATIN CALCIUM 80 MG/1
10 TABLET, FILM COATED ORAL DAILY
Refills: 0 | Status: DISCONTINUED | OUTPATIENT
Start: 2021-03-17 | End: 2021-03-19

## 2021-03-17 RX ORDER — GLUCAGON INJECTION, SOLUTION 0.5 MG/.1ML
1 INJECTION, SOLUTION SUBCUTANEOUS ONCE
Refills: 0 | Status: DISCONTINUED | OUTPATIENT
Start: 2021-03-17 | End: 2021-03-19

## 2021-03-17 RX ORDER — CEFTRIAXONE 500 MG/1
1000 INJECTION, POWDER, FOR SOLUTION INTRAMUSCULAR; INTRAVENOUS ONCE
Refills: 0 | Status: COMPLETED | OUTPATIENT
Start: 2021-03-17 | End: 2021-03-17

## 2021-03-17 RX ADMIN — Medication 650 MILLIGRAM(S): at 01:46

## 2021-03-17 RX ADMIN — ATORVASTATIN CALCIUM 10 MILLIGRAM(S): 80 TABLET, FILM COATED ORAL at 13:08

## 2021-03-17 RX ADMIN — Medication 15 UNIT(S): at 12:35

## 2021-03-17 RX ADMIN — Medication 15 UNIT(S): at 17:14

## 2021-03-17 RX ADMIN — AZITHROMYCIN 255 MILLIGRAM(S): 500 TABLET, FILM COATED ORAL at 03:51

## 2021-03-17 RX ADMIN — INSULIN GLARGINE 40 UNIT(S): 100 INJECTION, SOLUTION SUBCUTANEOUS at 09:00

## 2021-03-17 RX ADMIN — Medication 30 MILLILITER(S): at 07:00

## 2021-03-17 RX ADMIN — Medication 6: at 17:14

## 2021-03-17 RX ADMIN — Medication 20 MILLIGRAM(S): at 07:00

## 2021-03-17 RX ADMIN — Medication 30 MILLILITER(S): at 17:13

## 2021-03-17 RX ADMIN — ENOXAPARIN SODIUM 40 MILLIGRAM(S): 100 INJECTION SUBCUTANEOUS at 21:37

## 2021-03-17 RX ADMIN — Medication 25 UNIT(S): at 08:17

## 2021-03-17 RX ADMIN — CEFTRIAXONE 100 MILLIGRAM(S): 500 INJECTION, POWDER, FOR SOLUTION INTRAMUSCULAR; INTRAVENOUS at 03:08

## 2021-03-17 RX ADMIN — SODIUM CHLORIDE 1000 MILLILITER(S): 9 INJECTION, SOLUTION INTRAVENOUS at 01:46

## 2021-03-17 RX ADMIN — Medication 6 MILLIGRAM(S): at 03:08

## 2021-03-17 RX ADMIN — Medication 40 MILLIGRAM(S): at 10:03

## 2021-03-17 RX ADMIN — Medication 8: at 12:35

## 2021-03-17 RX ADMIN — PANTOPRAZOLE SODIUM 40 MILLIGRAM(S): 20 TABLET, DELAYED RELEASE ORAL at 07:03

## 2021-03-17 RX ADMIN — MONTELUKAST 10 MILLIGRAM(S): 4 TABLET, CHEWABLE ORAL at 13:08

## 2021-03-17 RX ADMIN — Medication 10: at 08:16

## 2021-03-17 NOTE — H&P ADULT - NSHPLABSRESULTS_GEN_ALL_CORE
cbc                        14.2   12.58 )-----------( 450      ( 16 Mar 2021 21:25 )             44.0     03-16    134<L>  |  100  |  22<H>  ----------------------------<  360<H>  4.3   |  21  |  0.9    Ca    8.7      16 Mar 2021 21:25  Mg     1.8     03-16    TPro  6.1  /  Alb  3.1<L>  /  TBili  0.6  /  DBili  x   /  AST  9   /  ALT  12  /  AlkPhos  67  03-16

## 2021-03-17 NOTE — H&P ADULT - HISTORY OF PRESENT ILLNESS
75 year old Male has medical history of COPD not on home oxygen, Asthma, DM, HLD presented with shortness of breath and coughing with greenish colored sputum. He was sent from  urgent care for concern of pneumonia. Patient was tested positive for covid infection about 2-3 weeks ago. Sepsis criteria met on admission T: 100.3, WBC: 12.58, HR: 110  WBC: 12.58. Patient is saturating well on room air. Patient otherwise denies Nausea, vomiting, fever, chills, headaches,  weight loss, chest pain, abdominal pain, muscle weakness, lower extremity swelling, urinary or bowel habit changes. Patient is admitted for covid infection.

## 2021-03-17 NOTE — ED ADULT NURSE NOTE - BOWEL SOUNDS LUQ
present No significant past surgical history Acute duodenal ulcer with perforation    H/O gastrostomy, has currently    H/O tracheostomy    Perforated duodenal ulcer

## 2021-03-17 NOTE — H&P ADULT - NSHPPHYSICALEXAM_GEN_ALL_CORE
GENERAL: NAD, well-developed, AAOx3  HEENT:  Atraumatic, Normocephalic.   PULMONARY: Clear to auscultation bilaterally; No wheeze  CARDIOVASCULAR: Regular rate and rhythm; No murmurs, rubs, or gallops  GASTROINTESTINAL: Soft, Nontender, Nondistended; Bowel sounds present  MUSCULOSKELETAL:  2+ Peripheral Pulses, No clubbing, cyanosis, or edema  NEUROLOGY: non-focal  SKIN: No rashes or lesions

## 2021-03-17 NOTE — H&P ADULT - ATTENDING COMMENTS
Patient seen and examined independently. Agree with resident note/ history / physical exam and plan of care with following exceptions/additions/updates. Case discussed with patient/pt decision maker, house-staff and nursing.     pt is feeling very sob. slightly tachy cardic.   T(F): 98.1 (03-17-21 @ 09:03), Max: 100.3 (03-16-21 @ 21:36)  HR: 97 (03-17-21 @ 09:03) (97 - 110)  BP: 144/67 (03-17-21 @ 09:03) (144/67 - 178/78)  RR: 18 (03-17-21 @ 09:03) (18 - 20)  SpO2: 98% (03-17-21 @ 09:03) (94% - 98%)  Physical exam:   constitutional mild resp distress. , AAOX3, Respiratory  lungs bilat crackles , CVS heart tachycardic, GI: abdomen Soft NT, ND, BS+, skin: intact  neuro exam non focal.     D-Dimer Assay, Quantitative: 388 ng/mL DDU [0 - 230] (03-17-21 @ 03:10)    < from: Xray Chest 1 View- PORTABLE-Urgent (Xray Chest 1 View- PORTABLE-Urgent .) (03.16.21 @ 21:50) >    Bilateral predominantly interstitialopacities.    < end of copied text >    a/p  # covid pna, steroids, o2 as needed, no need for abx, ID notes appreciated, dw dr silvestre, will hold abx for now.   # COPD/Asthma, nebs, steroids. not hypoxic at this time   # Prostate cancer, outpt fu   # Diabetes, insulin per protocol     full code

## 2021-03-17 NOTE — ED ADULT NURSE NOTE - OBJECTIVE STATEMENT
BIBEMS for sob, sent in from [facility] for PNA. Pt reports cough, sob, and weakness. Denies fever, nausea, vomiting, and diarrhea. Pt speaking in full sentences, requested O2 with NC for comfort.

## 2021-03-17 NOTE — PHYSICAL THERAPY INITIAL EVALUATION ADULT - GENERAL OBSERVATIONS, REHAB EVAL
Pt is currently getting transported to Vascular will follow up once pt is available .
Pt encountered in the bed, + tele, on O2 @ 2l/min via NC; however, as per RN Katarina Pt was on RA ealier & saturating 96-98% & asked PT to assess on RA during the session. Pt desat to 82% after amb; however, it went upto 94-95% within ~5 secs on RA. Cari. well to tx  Pt left in b/s chair post tx all needs with in reach.

## 2021-03-17 NOTE — H&P ADULT - ASSESSMENT
75 year old Male has medical history of COPD not on home oxygen, Asthma, DM, HLD presented with shortness of breath and coughing with greenish colored sputum. He was sent from  urgent care for concern of pneumonia. Patient was tested positive for covid infection about 2-3 weeks ago. Sepsis criteria met on admission T: 100.3, WBC: 12.58, HR: 110  WBC: 12.58. Patient is saturating well on room air. Patient otherwise denies Nausea, vomiting, fever, chills, headaches,  weight loss, chest pain, abdominal pain, muscle weakness, lower extremity swelling, urinary or bowel habit changes. Patient is admitted for covid infection.      #COVID-19 PNA, Sepsis present on admission, SOB,   # Hx of COPD/ASTHMA  - Maintain on airborne and contact isolation. on room air  - covid positive about 16 days ago  - Fu inflammatory markers: ESR,Procalcitonin, D-Dimer: 388, Ferritin,, Duplex pending  - Lovenox 40 mg SQ   - RDV/Steroids of little benefit, patient stable on room air, and late presentation  -  C/w inhalers, montelukast and prednisone 20mg (home dose)  - Incentive spirometry and Proning 16h/day, as tolerated  - FU ID recommendation.    # Gas pain - PRN maalox    # HLD  - atorvastatin Oral Tab/Cap - Peds 10 milliGRAM(s) Oral daily    #) MISC  Activity: IAT  DVT ppx: lovenox   GI ppx: protonix  Diet: DASH/TLC  Code: Full  Dispo: medical optimization  CHG wash

## 2021-03-17 NOTE — ED ADULT NURSE NOTE - CHIEF COMPLAINT QUOTE
pt went to [facility] for sob. sent in here for pna. pt c/o weakness and sob. +covid on 2/28

## 2021-03-17 NOTE — ED ADULT NURSE NOTE - NSIMPLEMENTINTERV_GEN_ALL_ED
Implemented All Fall Risk Interventions:  Erick to call system. Call bell, personal items and telephone within reach. Instruct patient to call for assistance. Room bathroom lighting operational. Non-slip footwear when patient is off stretcher. Physically safe environment: no spills, clutter or unnecessary equipment. Stretcher in lowest position, wheels locked, appropriate side rails in place. Provide visual cue, wrist band, yellow gown, etc. Monitor gait and stability. Monitor for mental status changes and reorient to person, place, and time. Review medications for side effects contributing to fall risk. Reinforce activity limits and safety measures with patient and family.

## 2021-03-17 NOTE — PHYSICAL THERAPY INITIAL EVALUATION ADULT - PATIENT/FAMILY AGREES WITH PLAN
yes Advancement-Rotation Flap Text: The defect edges were debeveled with a #15 scalpel blade.  Given the location of the defect, shape of the defect and the proximity to free margins an advancement-rotation flap was deemed most appropriate.  Using a sterile surgical marker, an appropriate flap was drawn incorporating the defect and placing the expected incisions within the relaxed skin tension lines where possible. The area thus outlined was incised deep to adipose tissue with a #15 scalpel blade.  The skin margins were undermined to an appropriate distance in all directions utilizing iris scissors.

## 2021-03-18 VITALS
OXYGEN SATURATION: 97 % | RESPIRATION RATE: 18 BRPM | TEMPERATURE: 96 F | HEART RATE: 93 BPM | SYSTOLIC BLOOD PRESSURE: 191 MMHG | DIASTOLIC BLOOD PRESSURE: 88 MMHG

## 2021-03-18 LAB
A1C WITH ESTIMATED AVERAGE GLUCOSE RESULT: 9.8 % — HIGH (ref 4–5.6)
ALBUMIN SERPL ELPH-MCNC: 2.8 G/DL — LOW (ref 3.5–5.2)
ALP SERPL-CCNC: 63 U/L — SIGNIFICANT CHANGE UP (ref 30–115)
ALT FLD-CCNC: 12 U/L — SIGNIFICANT CHANGE UP (ref 0–41)
ANION GAP SERPL CALC-SCNC: 12 MMOL/L — SIGNIFICANT CHANGE UP (ref 7–14)
ANION GAP SERPL CALC-SCNC: 8 MMOL/L — SIGNIFICANT CHANGE UP (ref 7–14)
AST SERPL-CCNC: 10 U/L — SIGNIFICANT CHANGE UP (ref 0–41)
BASOPHILS # BLD AUTO: 0.02 K/UL — SIGNIFICANT CHANGE UP (ref 0–0.2)
BASOPHILS NFR BLD AUTO: 0.1 % — SIGNIFICANT CHANGE UP (ref 0–1)
BILIRUB SERPL-MCNC: 0.4 MG/DL — SIGNIFICANT CHANGE UP (ref 0.2–1.2)
BUN SERPL-MCNC: 20 MG/DL — SIGNIFICANT CHANGE UP (ref 10–20)
BUN SERPL-MCNC: 22 MG/DL — HIGH (ref 10–20)
CALCIUM SERPL-MCNC: 8.7 MG/DL — SIGNIFICANT CHANGE UP (ref 8.5–10.1)
CALCIUM SERPL-MCNC: 9.1 MG/DL — SIGNIFICANT CHANGE UP (ref 8.5–10.1)
CHLORIDE SERPL-SCNC: 101 MMOL/L — SIGNIFICANT CHANGE UP (ref 98–110)
CHLORIDE SERPL-SCNC: 104 MMOL/L — SIGNIFICANT CHANGE UP (ref 98–110)
CO2 SERPL-SCNC: 21 MMOL/L — SIGNIFICANT CHANGE UP (ref 17–32)
CO2 SERPL-SCNC: 25 MMOL/L — SIGNIFICANT CHANGE UP (ref 17–32)
COVID-19 SPIKE DOMAIN AB INTERP: POSITIVE
COVID-19 SPIKE DOMAIN ANTIBODY RESULT: 184 U/ML — HIGH
CREAT SERPL-MCNC: 0.8 MG/DL — SIGNIFICANT CHANGE UP (ref 0.7–1.5)
CREAT SERPL-MCNC: 0.9 MG/DL — SIGNIFICANT CHANGE UP (ref 0.7–1.5)
EOSINOPHIL # BLD AUTO: 0.04 K/UL — SIGNIFICANT CHANGE UP (ref 0–0.7)
EOSINOPHIL NFR BLD AUTO: 0.3 % — SIGNIFICANT CHANGE UP (ref 0–8)
ESTIMATED AVERAGE GLUCOSE: 235 MG/DL — HIGH (ref 68–114)
GLUCOSE BLDC GLUCOMTR-MCNC: 187 MG/DL — HIGH (ref 70–99)
GLUCOSE BLDC GLUCOMTR-MCNC: 246 MG/DL — HIGH (ref 70–99)
GLUCOSE BLDC GLUCOMTR-MCNC: 273 MG/DL — HIGH (ref 70–99)
GLUCOSE BLDC GLUCOMTR-MCNC: 294 MG/DL — HIGH (ref 70–99)
GLUCOSE BLDC GLUCOMTR-MCNC: 371 MG/DL — HIGH (ref 70–99)
GLUCOSE BLDC GLUCOMTR-MCNC: 399 MG/DL — HIGH (ref 70–99)
GLUCOSE BLDC GLUCOMTR-MCNC: 409 MG/DL — HIGH (ref 70–99)
GLUCOSE BLDC GLUCOMTR-MCNC: 490 MG/DL — CRITICAL HIGH (ref 70–99)
GLUCOSE SERPL-MCNC: 283 MG/DL — HIGH (ref 70–99)
GLUCOSE SERPL-MCNC: 344 MG/DL — HIGH (ref 70–99)
HCT VFR BLD CALC: 38.9 % — LOW (ref 42–52)
HGB BLD-MCNC: 12.6 G/DL — LOW (ref 14–18)
IMM GRANULOCYTES NFR BLD AUTO: 1.1 % — HIGH (ref 0.1–0.3)
LACTATE SERPL-SCNC: 3 MMOL/L — HIGH (ref 0.7–2)
LYMPHOCYTES # BLD AUTO: 1.43 K/UL — SIGNIFICANT CHANGE UP (ref 1.2–3.4)
LYMPHOCYTES # BLD AUTO: 9.2 % — LOW (ref 20.5–51.1)
MCHC RBC-ENTMCNC: 27.2 PG — SIGNIFICANT CHANGE UP (ref 27–31)
MCHC RBC-ENTMCNC: 32.4 G/DL — SIGNIFICANT CHANGE UP (ref 32–37)
MCV RBC AUTO: 83.8 FL — SIGNIFICANT CHANGE UP (ref 80–94)
MONOCYTES # BLD AUTO: 0.73 K/UL — HIGH (ref 0.1–0.6)
MONOCYTES NFR BLD AUTO: 4.7 % — SIGNIFICANT CHANGE UP (ref 1.7–9.3)
NEUTROPHILS # BLD AUTO: 13.07 K/UL — HIGH (ref 1.4–6.5)
NEUTROPHILS NFR BLD AUTO: 84.6 % — HIGH (ref 42.2–75.2)
NRBC # BLD: 0 /100 WBCS — SIGNIFICANT CHANGE UP (ref 0–0)
PLATELET # BLD AUTO: 394 K/UL — SIGNIFICANT CHANGE UP (ref 130–400)
POTASSIUM SERPL-MCNC: 5.5 MMOL/L — HIGH (ref 3.5–5)
POTASSIUM SERPL-MCNC: 5.5 MMOL/L — HIGH (ref 3.5–5)
POTASSIUM SERPL-SCNC: 5.5 MMOL/L — HIGH (ref 3.5–5)
POTASSIUM SERPL-SCNC: 5.5 MMOL/L — HIGH (ref 3.5–5)
PROT SERPL-MCNC: 5.6 G/DL — LOW (ref 6–8)
RBC # BLD: 4.64 M/UL — LOW (ref 4.7–6.1)
RBC # FLD: 13.4 % — SIGNIFICANT CHANGE UP (ref 11.5–14.5)
SARS-COV-2 IGG+IGM SERPL QL IA: 184 U/ML — HIGH
SARS-COV-2 IGG+IGM SERPL QL IA: POSITIVE
SODIUM SERPL-SCNC: 134 MMOL/L — LOW (ref 135–146)
SODIUM SERPL-SCNC: 137 MMOL/L — SIGNIFICANT CHANGE UP (ref 135–146)
WBC # BLD: 15.46 K/UL — HIGH (ref 4.8–10.8)
WBC # FLD AUTO: 15.46 K/UL — HIGH (ref 4.8–10.8)

## 2021-03-18 PROCEDURE — 71045 X-RAY EXAM CHEST 1 VIEW: CPT | Mod: 26

## 2021-03-18 PROCEDURE — 99233 SBSQ HOSP IP/OBS HIGH 50: CPT

## 2021-03-18 RX ORDER — DEXTROSE 50 % IN WATER 50 %
50 SYRINGE (ML) INTRAVENOUS ONCE
Refills: 0 | Status: COMPLETED | OUTPATIENT
Start: 2021-03-18 | End: 2021-03-18

## 2021-03-18 RX ORDER — INSULIN LISPRO 100/ML
22 VIAL (ML) SUBCUTANEOUS
Refills: 0 | Status: DISCONTINUED | OUTPATIENT
Start: 2021-03-18 | End: 2021-03-19

## 2021-03-18 RX ORDER — SODIUM ZIRCONIUM CYCLOSILICATE 10 G/10G
10 POWDER, FOR SUSPENSION ORAL
Refills: 0 | Status: DISCONTINUED | OUTPATIENT
Start: 2021-03-18 | End: 2021-03-19

## 2021-03-18 RX ORDER — INSULIN HUMAN 100 [IU]/ML
10 INJECTION, SOLUTION SUBCUTANEOUS ONCE
Refills: 0 | Status: COMPLETED | OUTPATIENT
Start: 2021-03-18 | End: 2021-03-18

## 2021-03-18 RX ORDER — AZITHROMYCIN 500 MG/1
500 TABLET, FILM COATED ORAL DAILY
Refills: 0 | Status: DISCONTINUED | OUTPATIENT
Start: 2021-03-18 | End: 2021-03-18

## 2021-03-18 RX ORDER — INSULIN GLARGINE 100 [IU]/ML
45 INJECTION, SOLUTION SUBCUTANEOUS EVERY MORNING
Refills: 0 | Status: DISCONTINUED | OUTPATIENT
Start: 2021-03-18 | End: 2021-03-18

## 2021-03-18 RX ORDER — INSULIN GLARGINE 100 [IU]/ML
50 INJECTION, SOLUTION SUBCUTANEOUS EVERY MORNING
Refills: 0 | Status: DISCONTINUED | OUTPATIENT
Start: 2021-03-18 | End: 2021-03-19

## 2021-03-18 RX ORDER — INSULIN GLARGINE 100 [IU]/ML
15 INJECTION, SOLUTION SUBCUTANEOUS ONCE
Refills: 0 | Status: COMPLETED | OUTPATIENT
Start: 2021-03-18 | End: 2021-03-18

## 2021-03-18 RX ORDER — INSULIN LISPRO 100/ML
18 VIAL (ML) SUBCUTANEOUS
Refills: 0 | Status: DISCONTINUED | OUTPATIENT
Start: 2021-03-18 | End: 2021-03-18

## 2021-03-18 RX ADMIN — MONTELUKAST 10 MILLIGRAM(S): 4 TABLET, CHEWABLE ORAL at 11:52

## 2021-03-18 RX ADMIN — INSULIN GLARGINE 15 UNIT(S): 100 INJECTION, SOLUTION SUBCUTANEOUS at 16:50

## 2021-03-18 RX ADMIN — ENOXAPARIN SODIUM 40 MILLIGRAM(S): 100 INJECTION SUBCUTANEOUS at 22:36

## 2021-03-18 RX ADMIN — Medication 40 MILLIGRAM(S): at 06:06

## 2021-03-18 RX ADMIN — ATORVASTATIN CALCIUM 10 MILLIGRAM(S): 80 TABLET, FILM COATED ORAL at 11:52

## 2021-03-18 RX ADMIN — INSULIN GLARGINE 40 UNIT(S): 100 INJECTION, SOLUTION SUBCUTANEOUS at 08:15

## 2021-03-18 RX ADMIN — Medication 30 MILLILITER(S): at 17:23

## 2021-03-18 RX ADMIN — INSULIN HUMAN 10 UNIT(S): 100 INJECTION, SOLUTION SUBCUTANEOUS at 10:36

## 2021-03-18 RX ADMIN — Medication 22 UNIT(S): at 17:23

## 2021-03-18 RX ADMIN — Medication 15 UNIT(S): at 08:15

## 2021-03-18 RX ADMIN — SODIUM ZIRCONIUM CYCLOSILICATE 10 GRAM(S): 10 POWDER, FOR SUSPENSION ORAL at 22:37

## 2021-03-18 RX ADMIN — Medication 6: at 08:15

## 2021-03-18 RX ADMIN — Medication 30 MILLILITER(S): at 06:05

## 2021-03-18 RX ADMIN — Medication 4: at 17:23

## 2021-03-18 RX ADMIN — SODIUM CHLORIDE 75 MILLILITER(S): 9 INJECTION, SOLUTION INTRAVENOUS at 06:56

## 2021-03-18 RX ADMIN — PANTOPRAZOLE SODIUM 40 MILLIGRAM(S): 20 TABLET, DELAYED RELEASE ORAL at 06:06

## 2021-03-18 RX ADMIN — CHLORHEXIDINE GLUCONATE 1 APPLICATION(S): 213 SOLUTION TOPICAL at 06:06

## 2021-03-18 RX ADMIN — Medication 12: at 11:51

## 2021-03-18 RX ADMIN — Medication 18 UNIT(S): at 11:51

## 2021-03-18 NOTE — DISCHARGE NOTE PROVIDER - NSDCMRMEDTOKEN_GEN_ALL_CORE_FT
atorvastatin 10 mg oral tablet: 1 tab(s) orally once a day  budesonide-formoterol 160 mcg-4.5 mcg/inh inhalation aerosol: 2 puff(s) inhaled 2 times a day   Incruse Ellipta 62.5 mcg/inh inhalation powder: 1 inhaler(s) inhaled once a day  Levemir 100 units/mL subcutaneous solution: 40 unit(s) subcutaneous once a day (at bedtime)  NovoLOG Mix 70/30 subcutaneous suspension: 25 unit(s) subcutaneous once a day  predniSONE 20 mg oral tablet: 1 tab(s) orally once a day x3 days, then 10 mg x3 days, then 5 mg x3 days  Singulair 10 mg oral tablet: 1 tab(s) orally once a day  Ventolin HFA 90 mcg/inh inhalation aerosol: 2 puff(s) inhaled every 6 hours

## 2021-03-18 NOTE — DISCHARGE NOTE PROVIDER - CARE PROVIDER_API CALL
Annemarie Oates)  Internal Medicine  [address] [place] [address]  Phone: [phone]  Fax: [phone]  Follow Up Time: 1 week

## 2021-03-18 NOTE — CONSULT NOTE ADULT - ASSESSMENT
IMPRESSION:  74 yo male with COPD  2 weeks s/p COVID-19  Admitted for acute bronchitis, exacerbation of COPD  CXR changes may reflect recent COVID-19 infection  Normal oxygenation recirded  Clinically improving    PLAN:  Complete prednisone 40 mg daily for 5 days  Continue Symbicort Spiriva and albuterol  DC planning  Outpatient follow up with Dr Oates    Please call with any questions  [phone]
75 year old Male has medical history of COPD not on home oxygen, Asthma, DM, HLD presented with shortness of breath and coughing with greenish colored sputum. He was sent from  urgent care for concern of pneumonia. Patient was tested positive for covid infection about 2-3 weeks ago. Sepsis criteria met on admission T: 100.3, WBC: 12.58, HR: 110  WBC: 12.58. Patient is saturating well on room air. Patient otherwise denies Nausea, vomiting, fever, chills, headaches,  weight loss, chest pain, abdominal pain, muscle weakness, lower extremity swelling, urinary or bowel habit changes. Patient is admitted for covid infection.    IMPRESSION;  Viral bronchitis  No bacterial PNA  COVID 19 with moderate illness. SpO2 >94% and not requiring supplemental O2.  Steroids not beneficial.  Pt is in the late inflammatory response phase ot the illness based on the onset of symptoms.  CXR no consolidation    RECOMMENDATIONS;  NO NEED FOR  INFLAMMATORY MARKERS.   No steroids  Azithromycin 500 mg po q24h for 5 days  No further w/u from ID standpoint  recall prn please

## 2021-03-18 NOTE — PROGRESS NOTE ADULT - ASSESSMENT
75 year old Male has medical history of COPD not on home oxygen, Asthma, DM, HLD presented with shortness of breath and coughing with greenish colored sputum. He was sent from  urgent care for concern of pneumonia. Patient was tested positive for covid infection about 2-3 weeks ago. Sepsis criteria met on admission T: 100.3, WBC: 12.58, HR: 110  WBC: 12.58. Patient is saturating well on room air. Patient otherwise denies Nausea, vomiting, fever, chills, headaches,  weight loss, chest pain, abdominal pain, muscle weakness, lower extremity swelling, urinary or bowel habit changes. Patient is admitted for covid infection.      #COVID-19 PNA, Sepsis present on admission, SOB, - improved- asked RN to document ambulatory pulse ox  # Hx of COPD/ASTHMA  - Maintain on airborne and contact isolation. on room air  - covid positive about 16 days ago- out of window for rdv per ID  - Fu inflammatory markers: ESR 25 ,Procalcitonin 0.04, D-Dimer: 388, Ferritin 160  -Duplex negative  - Lovenox 40 mg SQ   -started on 40 prednisone, spoke to ID ok to monitor off ABX  -  C/w inhalers, montelukast and prednisone 20mg (home dose)  - Incentive spirometry and Proning 16h/day, as tolerated  Lactate 6>3 after fluids  PT/OT    # Gas pain - PRN maalox    # HLD  - atorvastatin Oral Tab/Cap - Peds 10 milliGRAM(s) Oral daily    #) MISC  Activity: IAT  DVT ppx: lovenox   GI ppx: protonix  Diet: DASH/TLC  Code: Full  Dispo: medical optimization  CHG wash 75 year old Male has medical history of COPD not on home oxygen, Asthma, DM, HLD presented with shortness of breath and coughing with greenish colored sputum. He was sent from urgent care for concern of pneumonia. Patient was tested positive for covid infection about 2-3 weeks ago. Sepsis criteria met on admission T: 100.3, WBC: 12.58, HR: 110  WBC: 12.58. Patient is saturating well on room air. Patient otherwise denies Nausea, vomiting, fever, chills, headaches,  weight loss, chest pain, abdominal pain, muscle weakness, lower extremity swelling, urinary or bowel habit changes. Patient is admitted for covid infection.      #COVID-19 PNA, Sepsis present on admission, SOB, - improved- asked RN to document ambulatory pulse ox  # Hx of COPD/ASTHMA  - Maintain on airborne and contact isolation. on room air  - covid positive about 16 days ago- out of window for rdv per ID  - Fu inflammatory markers: ESR 25 ,Procalcitonin 0.04, D-Dimer: 388, Ferritin 160  -Duplex negative  - Lovenox 40 mg SQ   -started on 40 prednisone, spoke to ID ok to monitor off ABX  -  C/w inhalers, montelukast and prednisone 20mg (home dose)  - Incentive spirometry and Proning 16h/day, as tolerated  Lactate 6>3 after fluids  PT/OT    # Gas pain - PRN maalox    # HLD  - atorvastatin Oral Tab/Cap - Peds 10 milliGRAM(s) Oral daily    #) MISC  Activity: IAT  DVT ppx: lovenox   GI ppx: protonix  Diet: DASH/TLC  Code: Full  Dispo: medical optimization  CHG wash

## 2021-03-18 NOTE — DISCHARGE NOTE PROVIDER - NSDCCPCAREPLAN_GEN_ALL_CORE_FT
PRINCIPAL DISCHARGE DIAGNOSIS  Diagnosis: COVID-19  Assessment and Plan of Treatment: you have covid. please continue prednisone. follow up with PCP      SECONDARY DISCHARGE DIAGNOSES  Diagnosis: Elevated troponin  Assessment and Plan of Treatment:     Diagnosis: SOB (shortness of breath)  Assessment and Plan of Treatment:

## 2021-03-18 NOTE — CHART NOTE - NSCHARTNOTEFT_GEN_A_CORE
Pt. seen on am rounds. Feels well with no complaints, wheezing heard on exam will start 40mg PO prednisone.  Seen by ID > PO izzy for 5 days no further work up, (late presentation).  Pt. satting 98% on room air dessats to 84% on ambulation.
Spoke to and updated satya Griffin all questions answered.
Spoke to and updated wife on plan all questions answered.

## 2021-03-18 NOTE — DISCHARGE NOTE PROVIDER - HOSPITAL COURSE
75 year old Male has medical history of COPD not on home oxygen, Asthma, DM, HLD presented with shortness of breath and coughing with greenish colored sputum. He was sent from urgent care for concern of pneumonia. Patient was tested positive for covid infection about 2-3 weeks ago. Sepsis criteria met on admission T: 100.3, WBC: 12.58, HR: 110  WBC: 12.58. Patient is saturating well on room air. Patient otherwise denies Nausea, vomiting, fever, chills, headaches,  weight loss, chest pain, abdominal pain, muscle weakness, lower extremity swelling, urinary or bowel habit changes. Patient is admitted for covid infection.  Called by RN, as patient wishes to leave AMA. Patient seen at bedside to further discuss plan of care. Discussed risks of leaving against medical advice, which includes worsening of current medical condition, up to and including death. Patient understands risks and still wishes to leave. AMA paperwork signed and placed in chart. Dr. mccarthy made aware.     Will continue to follow, RN to call if any changes.

## 2021-03-18 NOTE — PROGRESS NOTE ADULT - ATTENDING COMMENTS
Patient seen and examined. Patient feels better. On exam, continues to have bilateral rhonchi/wheezing.   Patient desaturated on ambulation. Will need home oxygen.   Patient also having hyperglycemia, insulin adjusted.   Anticipate dc in AM once oxygen delivered.     #Progress Note Handoff  Pending (specify):  home oxygen   Family discussion: yes   Disposition: Home

## 2021-03-18 NOTE — CONSULT NOTE ADULT - SUBJECTIVE AND OBJECTIVE BOX
Patient is a 75y old  Male who presents with a chief complaint of Covid -19 PNA (17 Mar 2021 09:17)      HPI:  75 year old Male has medical history of COPD not on home oxygen, Asthma, DM, HLD presented with shortness of breath and coughing with greenish colored sputum. He was sent from  urgent care for concern of pneumonia. Patient was tested positive for covid infection about 2-3 weeks ago. Sepsis criteria met on admission T: 100.3, WBC: 12.58, HR: 110  WBC: 12.58. Patient is saturating well on room air. Patient otherwise denies Nausea, vomiting, fever, chills, headaches,  weight loss, chest pain, abdominal pain, muscle weakness, lower extremity swelling, urinary or bowel habit changes. Patient is admitted for covid infection.  (17 Mar 2021 05:23)      PAST MEDICAL & SURGICAL HISTORY:  COPD  Asthma  Prostate [id number] s/p sx  Diabetes  History of back surgery  S/P [id number]    Smoking History:  Non smoker    Review of Systems:  Cough, sputum dyspnea  No fever chills  No pain  No orthopnea  No GI or  complaints    Allergies  No Known Allergies      MEDICATIONS  (STANDING):  albuterol/ipratropium for Nebulization 3 milliLiter(s) Nebulizer every 6 hours  aluminum hydroxide/magnesium hydroxide/simethicone Suspension 30 milliLiter(s) Oral every 12 hours  atorvastatin Oral Tab/Cap - Peds 10 milliGRAM(s) Oral daily  SYMBICORT budesonide 160 MICROgram(s)/formoterol 4.5 MICROgram(s) Inhaler - Peds 2 Puff(s) Inhalation two times a day  enoxaparin Injectable 40 milliGRAM(s) SubCutaneous at bedtime  glucagon  Injectable 1 milliGRAM(s) IntraMuscular once  insulin glargine Injectable (LANTUS) 40 Unit(s) SubCutaneous every morning  insulin lispro (ADMELOG) corrective regimen sliding scale   SubCutaneous three times a day before meals  insulin lispro Injectable (ADMELOG) 15 Unit(s) SubCutaneous three times a day before meals  lactated ringers. 1000 milliLiter(s) (75 mL/Hr) IV Continuous <Continuous>  montelukast 10 milliGRAM(s) Oral daily  pantoprazole    Tablet 40 milliGRAM(s) Oral before breakfast  predniSONE   Tablet 40 milliGRAM(s) Oral daily    PHYSICAL EXAM  Vital Signs Last 24 Hrs  T(C): 35.6 (18 Mar 2021 05:10), Max: 36.8 (17 Mar 2021 14:08)  T(F): 96 (18 Mar 2021 05:10), Max: 98.3 (17 Mar 2021 14:08)  HR: 85 (18 Mar 2021 05:10) (85 - 101)  BP: 126/62 (18 Mar 2021 05:10) (125/58 - 144/67)  BP(mean): --  RR: 18 (18 Mar 2021 05:10) (18 - 18)  SpO2: 97% (18 Mar 2021 00:43) (97% - 98%) RA    EXAM:  Awake and alert NAD  Neck supple, no LN  S1 and S2 no murmur  Lungs are clear without wheeze, rhonchi or rales  Abdomen is soft and non tender  There is no edema  Neurologically non focal    LABS:                        13.3   11.03 )-----------( 448      ( 17 Mar 2021 12:07 )             42.0                                               03-17    133<L>  |  98  |  19  ----------------------------<  362<H>  5.4<H>   |  20  |  1.0    Ca    8.9      17 Mar 2021 12:07  Phos  3.1     03-17  Mg     2.0     03-17    TPro  6.1  /  Alb  3.1<L>  /  TBili  0.3  /  DBili  x   /  AST  16  /  ALT  14  /  AlkPhos  70  03-17           CARDIAC MARKERS ( 17 Mar 2021 12:07 )x     / <0.01 ng/mL / x     / x     / x      CARDIAC MARKERS ( 16 Mar 2021 21:25 )x     / 0.02 ng/mL / x     / x     / x        Culture - Blood (collected 16 Mar 2021 21:25)  Source: .Blood Blood  Preliminary Report (18 Mar 2021 03:02):    No growth to date.    Culture - Blood (collected 16 Mar 2021 21:25)  Source: .Blood Blood  Preliminary Report (18 Mar 2021 03:02):    No growth to date.                                  RADIOGRAPHS:  < from: Xray Chest 1 View- PORTABLE-Urgent (Xray Chest 1 View- PORTABLE-Urgent .) (03.16.21 @ 21:50) >  Bilateral predominantly interstitialopacities.      < from: [facility] Duplex Lower Ext Vein Scan, Bilat (03.17.21 @ 10:02) >  No evidenceof deep venous thrombosis or superficial thrombophlebitis in the bilateral lower extremities.    
  DEL ALVARADO  75y, Male  Allergy: No Known Allergies      All historical available data reviewed.    HPI:  75 year old Male has medical history of COPD not on home oxygen, Asthma, DM, HLD presented with shortness of breath and coughing with greenish colored sputum. He was sent from  urgent care for concern of pneumonia. Patient was tested positive for covid infection about 2-3 weeks ago. Sepsis criteria met on admission T: 100.3, WBC: 12.58, HR: 110  WBC: 12.58. Patient is saturating well on room air. Patient otherwise denies Nausea, vomiting, fever, chills, headaches,  weight loss, chest pain, abdominal pain, muscle weakness, lower extremity swelling, urinary or bowel habit changes. Patient is admitted for covid infection.       (17 Mar 2021 05:23)    FAMILY HISTORY:    PAST MEDICAL & SURGICAL HISTORY:  COPD (chronic obstructive pulmonary disease)  20 yrs no 02 rx    Asthma  20 yr    Prostate cancer  1999 s/p sx    Diabetes  20 yrs    History of surgery  back surgery    S/P TURP  1999          VITALS:  T(F): 98.1, Max: 100.3 (03-16-21 @ 21:36)  HR: 97  BP: 144/67  RR: 18Vital Signs Last 24 Hrs  T(C): 36.7 (17 Mar 2021 09:03), Max: 37.9 (16 Mar 2021 21:36)  T(F): 98.1 (17 Mar 2021 09:03), Max: 100.3 (16 Mar 2021 21:36)  HR: 97 (17 Mar 2021 09:03) (97 - 110)  BP: 144/67 (17 Mar 2021 09:03) (144/67 - 178/78)  BP(mean): --  RR: 18 (17 Mar 2021 09:03) (18 - 20)  SpO2: 98% (17 Mar 2021 09:03) (94% - 98%)    TESTS & MEASUREMENTS:                        14.2   12.58 )-----------( 450      ( 16 Mar 2021 21:25 )             44.0     03-16    134<L>  |  100  |  22<H>  ----------------------------<  360<H>  4.3   |  21  |  0.9    Ca    8.7      16 Mar 2021 21:25  Mg     1.8     03-16    TPro  6.1  /  Alb  3.1<L>  /  TBili  0.6  /  DBili  x   /  AST  9   /  ALT  12  /  AlkPhos  67  03-16    LIVER FUNCTIONS - ( 16 Mar 2021 21:25 )  Alb: 3.1 g/dL / Pro: 6.1 g/dL / ALK PHOS: 67 U/L / ALT: 12 U/L / AST: 9 U/L / GGT: x                   RADIOLOGY & ADDITIONAL TESTS:  Personal review of radiological diagnostics performed  Echo and EKG results noted when applicable.     MEDICATIONS:  albuterol/ipratropium for Nebulization 3 milliLiter(s) Nebulizer every 6 hours  aluminum hydroxide/magnesium hydroxide/simethicone Suspension 30 milliLiter(s) Oral every 12 hours  atorvastatin Oral Tab/Cap - Peds 10 milliGRAM(s) Oral daily  budesonide 160 MICROgram(s)/formoterol 4.5 MICROgram(s) Inhaler - Peds 2 Puff(s) Inhalation two times a day  cefTRIAXone   IVPB 1000 milliGRAM(s) IV Intermittent every 24 hours  chlorhexidine 4% Liquid 1 Application(s) Topical <User Schedule>  dextrose 40% Gel 15 Gram(s) Oral once  dextrose 5%. 1000 milliLiter(s) IV Continuous <Continuous>  dextrose 5%. 1000 milliLiter(s) IV Continuous <Continuous>  dextrose 50% Injectable 25 Gram(s) IV Push once  dextrose 50% Injectable 12.5 Gram(s) IV Push once  dextrose 50% Injectable 25 Gram(s) IV Push once  enoxaparin Injectable 40 milliGRAM(s) SubCutaneous at bedtime  glucagon  Injectable 1 milliGRAM(s) IntraMuscular once  insulin glargine Injectable (LANTUS) 40 Unit(s) SubCutaneous every morning  insulin lispro (ADMELOG) corrective regimen sliding scale   SubCutaneous three times a day before meals  insulin lispro Injectable (ADMELOG) 15 Unit(s) SubCutaneous three times a day before meals  montelukast 10 milliGRAM(s) Oral daily  pantoprazole    Tablet 40 milliGRAM(s) Oral before breakfast  predniSONE   Tablet 40 milliGRAM(s) Oral daily      ANTIBIOTICS:  cefTRIAXone   IVPB 1000 milliGRAM(s) IV Intermittent every 24 hours

## 2021-03-20 ENCOUNTER — TRANSCRIPTION ENCOUNTER (OUTPATIENT)
Age: 76
End: 2021-03-20

## 2021-03-22 ENCOUNTER — TRANSCRIPTION ENCOUNTER (OUTPATIENT)
Age: 76
End: 2021-03-22

## 2021-03-23 ENCOUNTER — TRANSCRIPTION ENCOUNTER (OUTPATIENT)
Age: 76
End: 2021-03-23

## 2021-03-25 DIAGNOSIS — R53.1 WEAKNESS: ICD-10-CM

## 2021-03-25 DIAGNOSIS — Z85.46 PERSONAL HISTORY OF MALIGNANT NEOPLASM OF PROSTATE: ICD-10-CM

## 2021-03-25 DIAGNOSIS — E78.5 HYPERLIPIDEMIA, UNSPECIFIED: ICD-10-CM

## 2021-03-25 DIAGNOSIS — Z79.4 LONG TERM (CURRENT) USE OF INSULIN: ICD-10-CM

## 2021-03-25 DIAGNOSIS — U07.1 COVID-19: ICD-10-CM

## 2021-03-25 DIAGNOSIS — A41.89 OTHER SPECIFIED SEPSIS: ICD-10-CM

## 2021-03-25 DIAGNOSIS — J45.909 UNSPECIFIED ASTHMA, UNCOMPLICATED: ICD-10-CM

## 2021-03-25 DIAGNOSIS — J20.8 ACUTE BRONCHITIS DUE TO OTHER SPECIFIED ORGANISMS: ICD-10-CM

## 2021-03-25 DIAGNOSIS — Z79.52 LONG TERM (CURRENT) USE OF SYSTEMIC STEROIDS: ICD-10-CM

## 2021-03-25 DIAGNOSIS — J12.82 PNEUMONIA DUE TO CORONAVIRUS DISEASE 2019: ICD-10-CM

## 2021-03-25 DIAGNOSIS — J44.1 CHRONIC OBSTRUCTIVE PULMONARY DISEASE WITH (ACUTE) EXACERBATION: ICD-10-CM

## 2021-03-25 DIAGNOSIS — J44.0 CHRONIC OBSTRUCTIVE PULMONARY DISEASE WITH (ACUTE) LOWER RESPIRATORY INFECTION: ICD-10-CM

## 2021-03-25 DIAGNOSIS — R14.1 GAS PAIN: ICD-10-CM

## 2021-03-25 DIAGNOSIS — E11.9 TYPE 2 DIABETES MELLITUS WITHOUT COMPLICATIONS: ICD-10-CM

## 2021-03-25 DIAGNOSIS — R00.0 TACHYCARDIA, UNSPECIFIED: ICD-10-CM

## 2021-03-25 DIAGNOSIS — I10 ESSENTIAL (PRIMARY) HYPERTENSION: ICD-10-CM

## 2021-04-14 NOTE — H&P ADULT - NSHPPOAPRESSUREULCER_GEN_ALL_CORE
Assessment/Plan:    Problem List Items Addressed This Visit     Hypertension    Relevant Medications    amLODIPine (NORVASC) 10 mg tablet      Other Visit Diagnoses     Encounter to establish care    -  Primary    Screening for deficiency anemia        Relevant Orders    CBC and differential    Screening for diabetes mellitus (DM)        Relevant Orders    Comprehensive metabolic panel    Screening for hyperlipidemia        Relevant Orders    Lipid panel    Screening for thyroid disorder        Relevant Orders    TSH, 3rd generation with Free T4 reflex    Screening for HIV (human immunodeficiency virus)        Encounter for well woman exam with routine gynecological exam        History of abnormal cervical Pap smear        Relevant Orders    Ambulatory referral to Gynecology    History of cervical cancer        Relevant Orders    Ambulatory referral to Gynecology    Acute asthma exacerbation        Relevant Medications    fluticasone-salmeterol (ADVAIR, [address]) 250-50 mcg/dose inhaler    albuterol (PROVENTIL HFA,VENTOLIN HFA) 90 mcg/act inhaler    Moderate persistent asthma with exacerbation        Relevant Medications    fluticasone-salmeterol (ADVAIR, WIXELA) 250-50 mcg/dose inhaler    albuterol (PROVENTIL HFA,VENTOLIN HFA) 90 mcg/act inhaler    Other Relevant Orders    [facility] Panel, Adult    Environmental and seasonal allergies        Relevant Orders    [facility] Panel, Adult    Bilateral impacted cerumen        Relevant Medications    carbamide peroxide (DEBROX) 6 5 % otic solution           Diagnoses and all orders for this visit:    Encounter to establish care    Screening for deficiency anemia  -     CBC and differential; Future    Screening for diabetes mellitus (DM)  -     Comprehensive metabolic panel; Future    Screening for hyperlipidemia  -     Lipid panel; Future    Screening for thyroid disorder  -     TSH, 3rd generation with Free T4 reflex;  Future    Screening for HIV (human immunodeficiency virus)    Encounter for well woman exam with routine gynecological exam    History of abnormal cervical Pap smear  -     Ambulatory referral to Gynecology; Future    History of cervical cancer  -     Ambulatory referral to Gynecology; Future    Essential hypertension  Comments:  Discussed risks/benefits Tx Bp  Will check BP at home and bring recordings at next visit  If BP is elevated at home she will start norvasc  Orders:  -     amLODIPine (NORVASC) 10 mg tablet; Take 1 tablet (10 mg total) by mouth daily    Acute asthma exacerbation    Moderate persistent asthma with exacerbation  -     fluticasone-salmeterol (ADVAIR, WIXELA) 250-50 mcg/dose inhaler; Inhale 1 puff 2 (two) times a day Rinse mouth after use  -     albuterol (PROVENTIL HFA,VENTOLIN HFA) 90 mcg/act inhaler; Inhale 2 puffs every 4 (four) hours as needed for wheezing  -     [facility] Allergy Panel, Adult; Future    Environmental and seasonal allergies  -     [facility] Allergy Panel, Adult; Future    Bilateral impacted cerumen  -     carbamide peroxide (DEBROX) 6 5 % otic solution; Administer 5 drops into both ears 2 (two) times a day    Other orders  -     Cancel: HIV 1/2 Antigen/Antibody (4th Generation) w Reflex [facility]; Future  -     Cancel: Ambulatory referral to Gynecology; Future        No problem-specific Assessment & Plan notes found for this encounter  Subjective:      Patient ID: Nathan Mcdonough is a 39 y o  female  Presents today to establish care at this office and routine visit  Previous PCP Rosio Hernández DO  Pt does have elevated BP  She does not take Anti-HTN medications  Advised she should start and will send in CCB  She is reluctant in starting  She states she will check at home and if BP remains elevated she will consider taking CCB  Hypertension  The problem is uncontrolled  Associated symptoms include shortness of breath   Pertinent negatives include no anxiety, blurred vision, chest pain, headaches, neck pain, orthopnea, palpitations, peripheral edema or PND  There are no associated agents to hypertension  Asthma  She complains of chest tightness, shortness of breath and wheezing  This is a chronic problem  Pertinent negatives include no chest pain, headaches or PND  Her symptoms are aggravated by pollen, exposure to fumes, exposure to smoke and change in weather  Her symptoms are alleviated by beta-agonist and steroid inhaler  She reports significant improvement on treatment  There are no known risk factors for lung disease  Her past medical history is significant for asthma  The following portions of the patient's history were reviewed and updated as appropriate:   She has a past medical history of Arthritis, Asthma, Avascular necrosis ([facility] [address] ), Cervical cancer ([facility] [address] ), and Hypertension  ,  does not have any pertinent problems on file  ,   has a past surgical history that includes Hip Arthroplasty; Joint replacement (Left);  section; and Bone graft  ,  family history includes Asthma in her father and mother  ,   reports that she has been smoking cigarettes  She has been smoking about 0 25 packs per day  She has never used smokeless tobacco  She reports current alcohol use  She reports current drug use  Drug: Marijuana  ,  is allergic to acetaminophen-codeine; azithromycin; bee venom; codeine; cyclobenzaprine; hydrocodone; hydrocodone-acetaminophen; iodinated diagnostic agents; iodine - food allergy; tramadol; and latex     Current Outpatient Medications   Medication Sig Dispense Refill    albuterol (PROVENTIL HFA,VENTOLIN HFA) 90 mcg/act inhaler Inhale 2 puffs every 4 (four) hours as needed for wheezing 1 Inhaler 5    fluticasone-salmeterol (ADVAIR, WIXELA) 250-50 mcg/dose inhaler Inhale 1 puff 2 (two) times a day Rinse mouth after use   1 Inhaler 5    albuterol (2 5 mg/3 mL) 0 083 % nebulizer solution Take 1 vial (2 5 mg total) by nebulization every 6 (six) hours as needed for wheezing or shortness of breath for up to 30 doses (Patient not taking: Reported on 4/14/2021) 75 mL 0    amLODIPine (NORVASC) 10 mg tablet Take 1 tablet (10 mg total) by mouth daily 90 tablet 0    carbamide peroxide (DEBROX) 6 5 % otic solution Administer 5 drops into both ears 2 (two) times a day 15 mL 1     No current facility-administered medications for this visit  Tobacco Cessation Counseling: Tobacco cessation counseling was provided  The patient is sincerely urged to quit consumption of tobacco  She is ready to quit tobacco          Review of Systems   Eyes: Negative for blurred vision  Respiratory: Positive for shortness of breath and wheezing  Cardiovascular: Negative for chest pain, palpitations, orthopnea and PND  Musculoskeletal: Negative for neck pain  Neurological: Negative for headaches  All other systems reviewed and are negative  Objective:  Vitals:    04/14/21 0842   BP: (!) 170/120   BP Location: Left arm   Patient Position: Sitting   Cuff Size: Standard   Pulse: 76   Temp: (!) 96 7 °F (35 9 °C)   TempSrc: Tympanic   SpO2: 94%   Weight: 48 9 kg (107 lb 12 8 oz)   Height: 5' 3 5" (1 613 m)     Body mass index is 18 8 kg/m²  Physical Exam  Vitals signs and nursing note reviewed  Constitutional:       Appearance: Normal appearance  She is well-developed  HENT:      Head: Normocephalic and atraumatic  Right Ear: Tympanic membrane, ear canal and external ear normal       Left Ear: Tympanic membrane, ear canal and external ear normal       Nose: Nose normal       Mouth/Throat:      Mouth: Mucous membranes are moist       Pharynx: Uvula midline  Eyes:      General: Lids are normal       Conjunctiva/sclera: Conjunctivae normal       Pupils: Pupils are equal, round, and reactive to light  Neck:      Musculoskeletal: Full passive range of motion without pain, normal range of motion and neck supple  Thyroid: No thyroid mass or thyromegaly  Vascular: No JVD  Trachea: Trachea and phonation normal    Cardiovascular:      Rate and Rhythm: Normal rate and regular rhythm  Pulses: Normal pulses  Heart sounds: Normal heart sounds, S1 normal and S2 normal  No murmur  No friction rub  No gallop  Pulmonary:      Effort: Pulmonary effort is normal       Breath sounds: Wheezing present  Abdominal:      General: Bowel sounds are normal       Palpations: Abdomen is soft  Tenderness: There is no abdominal tenderness  Genitourinary:     Comments: Deferred   Musculoskeletal: Normal range of motion  Right lower leg: No edema  Left lower leg: No edema  Skin:     General: Skin is warm and dry  Capillary Refill: Capillary refill takes less than 2 seconds  Neurological:      General: No focal deficit present  Mental Status: She is alert and oriented to person, place, and time  Cranial Nerves: Cranial nerves are intact  No cranial nerve deficit  Sensory: Sensation is intact  Motor: Motor function is intact  Coordination: Coordination is intact  Gait: Gait is intact  Deep Tendon Reflexes: Reflexes are normal and symmetric  Psychiatric:         Attention and Perception: Attention and perception normal          Mood and Affect: Mood and affect normal          Speech: Speech normal          Behavior: Behavior normal  Behavior is cooperative  Thought Content:  Thought content normal          Cognition and Memory: Cognition normal          Judgment: Judgment normal  no

## 2021-05-05 NOTE — ED ADULT NURSE NOTE - NS PRO PASSIVE SMOKE EXP
Unknown Glycopyrrolate Counseling:  I discussed with the patient the risks of glycopyrrolate including but not limited to skin rash, drowsiness, dry mouth, difficulty urinating, and blurred vision.

## 2021-05-27 ENCOUNTER — INPATIENT (INPATIENT)
Facility: HOSPITAL | Age: 76
LOS: 4 days | Discharge: HOME | End: 2021-06-01
Attending: INTERNAL MEDICINE | Admitting: INTERNAL MEDICINE
Payer: MEDICARE

## 2021-05-27 VITALS
HEIGHT: 67 IN | OXYGEN SATURATION: 94 % | HEART RATE: 108 BPM | TEMPERATURE: 100 F | DIASTOLIC BLOOD PRESSURE: 67 MMHG | RESPIRATION RATE: 20 BRPM | SYSTOLIC BLOOD PRESSURE: 153 MMHG

## 2021-05-27 DIAGNOSIS — J44.9 CHRONIC OBSTRUCTIVE PULMONARY DISEASE, UNSPECIFIED: ICD-10-CM

## 2021-05-27 DIAGNOSIS — E78.5 HYPERLIPIDEMIA, UNSPECIFIED: ICD-10-CM

## 2021-05-27 DIAGNOSIS — R07.9 CHEST PAIN, UNSPECIFIED: ICD-10-CM

## 2021-05-27 DIAGNOSIS — Z90.79 ACQUIRED ABSENCE OF OTHER GENITAL ORGAN(S): Chronic | ICD-10-CM

## 2021-05-27 DIAGNOSIS — C61 MALIGNANT NEOPLASM OF PROSTATE: ICD-10-CM

## 2021-05-27 DIAGNOSIS — R06.00 DYSPNEA, UNSPECIFIED: ICD-10-CM

## 2021-05-27 DIAGNOSIS — E11.9 TYPE 2 DIABETES MELLITUS WITHOUT COMPLICATIONS: ICD-10-CM

## 2021-05-27 DIAGNOSIS — J45.909 UNSPECIFIED ASTHMA, UNCOMPLICATED: ICD-10-CM

## 2021-05-27 DIAGNOSIS — E61.2 MAGNESIUM DEFICIENCY: ICD-10-CM

## 2021-05-27 DIAGNOSIS — Z98.890 OTHER SPECIFIED POSTPROCEDURAL STATES: Chronic | ICD-10-CM

## 2021-05-27 DIAGNOSIS — J45.901 UNSPECIFIED ASTHMA WITH (ACUTE) EXACERBATION: ICD-10-CM

## 2021-05-27 LAB
ALBUMIN SERPL ELPH-MCNC: 3.5 G/DL — SIGNIFICANT CHANGE UP (ref 3.5–5.2)
ALP SERPL-CCNC: 67 U/L — SIGNIFICANT CHANGE UP (ref 30–115)
ALT FLD-CCNC: 15 U/L — SIGNIFICANT CHANGE UP (ref 0–41)
ANION GAP SERPL CALC-SCNC: 12 MMOL/L — SIGNIFICANT CHANGE UP (ref 7–14)
AST SERPL-CCNC: 15 U/L — SIGNIFICANT CHANGE UP (ref 0–41)
BASOPHILS # BLD AUTO: 0.08 K/UL — SIGNIFICANT CHANGE UP (ref 0–0.2)
BASOPHILS NFR BLD AUTO: 0.3 % — SIGNIFICANT CHANGE UP (ref 0–1)
BILIRUB SERPL-MCNC: 1.1 MG/DL — SIGNIFICANT CHANGE UP (ref 0.2–1.2)
BUN SERPL-MCNC: 13 MG/DL — SIGNIFICANT CHANGE UP (ref 10–20)
CALCIUM SERPL-MCNC: 9.2 MG/DL — SIGNIFICANT CHANGE UP (ref 8.5–10.1)
CHLORIDE SERPL-SCNC: 100 MMOL/L — SIGNIFICANT CHANGE UP (ref 98–110)
CO2 SERPL-SCNC: 22 MMOL/L — SIGNIFICANT CHANGE UP (ref 17–32)
CREAT SERPL-MCNC: 0.8 MG/DL — SIGNIFICANT CHANGE UP (ref 0.7–1.5)
EOSINOPHIL # BLD AUTO: 0.05 K/UL — SIGNIFICANT CHANGE UP (ref 0–0.7)
EOSINOPHIL NFR BLD AUTO: 0.2 % — SIGNIFICANT CHANGE UP (ref 0–8)
FLUAV AG NPH QL: NEGATIVE COUNTS — SIGNIFICANT CHANGE UP
FLUBV AG NPH QL: NEGATIVE COUNTS — SIGNIFICANT CHANGE UP
GLUCOSE BLDC GLUCOMTR-MCNC: 421 MG/DL — HIGH (ref 70–99)
GLUCOSE SERPL-MCNC: 278 MG/DL — HIGH (ref 70–99)
HCT VFR BLD CALC: 38.8 % — LOW (ref 42–52)
HGB BLD-MCNC: 12.5 G/DL — LOW (ref 14–18)
IMM GRANULOCYTES NFR BLD AUTO: 1.1 % — HIGH (ref 0.1–0.3)
LYMPHOCYTES # BLD AUTO: 14.6 % — LOW (ref 20.5–51.1)
LYMPHOCYTES # BLD AUTO: 3.6 K/UL — HIGH (ref 1.2–3.4)
MANUAL SMEAR VERIFICATION: SIGNIFICANT CHANGE UP
MCHC RBC-ENTMCNC: 27.5 PG — SIGNIFICANT CHANGE UP (ref 27–31)
MCHC RBC-ENTMCNC: 32.2 G/DL — SIGNIFICANT CHANGE UP (ref 32–37)
MCV RBC AUTO: 85.3 FL — SIGNIFICANT CHANGE UP (ref 80–94)
MONOCYTES # BLD AUTO: 1.67 K/UL — HIGH (ref 0.1–0.6)
MONOCYTES NFR BLD AUTO: 6.8 % — SIGNIFICANT CHANGE UP (ref 1.7–9.3)
NEUTROPHILS # BLD AUTO: 19.01 K/UL — HIGH (ref 1.4–6.5)
NEUTROPHILS NFR BLD AUTO: 77 % — HIGH (ref 42.2–75.2)
NEUTS BAND # BLD: 5 % — SIGNIFICANT CHANGE UP (ref 0–6)
NRBC # BLD: 0 /100 WBCS — SIGNIFICANT CHANGE UP (ref 0–0)
NRBC # BLD: 0 /100 — SIGNIFICANT CHANGE UP (ref 0–0)
NT-PROBNP SERPL-SCNC: 881 PG/ML — HIGH (ref 0–300)
PLAT MORPH BLD: NORMAL — SIGNIFICANT CHANGE UP
PLATELET # BLD AUTO: 251 K/UL — SIGNIFICANT CHANGE UP (ref 130–400)
POTASSIUM SERPL-MCNC: 5 MMOL/L — SIGNIFICANT CHANGE UP (ref 3.5–5)
POTASSIUM SERPL-SCNC: 5 MMOL/L — SIGNIFICANT CHANGE UP (ref 3.5–5)
PROT SERPL-MCNC: 5.8 G/DL — LOW (ref 6–8)
RBC # BLD: 4.55 M/UL — LOW (ref 4.7–6.1)
RBC # FLD: 13.9 % — SIGNIFICANT CHANGE UP (ref 11.5–14.5)
RBC BLD AUTO: NORMAL — SIGNIFICANT CHANGE UP
RSV RNA NPH QL NAA+NON-PROBE: NEGATIVE COUNTS — SIGNIFICANT CHANGE UP
SARS-COV-2 RNA SPEC QL NAA+PROBE: NEGATIVE COUNTS — SIGNIFICANT CHANGE UP
SODIUM SERPL-SCNC: 134 MMOL/L — LOW (ref 135–146)
TROPONIN T SERPL-MCNC: 0.04 NG/ML — CRITICAL HIGH
WBC # BLD: 24.69 K/UL — HIGH (ref 4.8–10.8)
WBC # FLD AUTO: 24.69 K/UL — HIGH (ref 4.8–10.8)

## 2021-05-27 PROCEDURE — 99223 1ST HOSP IP/OBS HIGH 75: CPT

## 2021-05-27 PROCEDURE — 99285 EMERGENCY DEPT VISIT HI MDM: CPT

## 2021-05-27 PROCEDURE — 71045 X-RAY EXAM CHEST 1 VIEW: CPT | Mod: 26

## 2021-05-27 PROCEDURE — 93010 ELECTROCARDIOGRAM REPORT: CPT | Mod: NC

## 2021-05-27 RX ORDER — SODIUM CHLORIDE 9 MG/ML
1000 INJECTION, SOLUTION INTRAVENOUS
Refills: 0 | Status: DISCONTINUED | OUTPATIENT
Start: 2021-05-27 | End: 2021-06-01

## 2021-05-27 RX ORDER — DEXTROSE 50 % IN WATER 50 %
12.5 SYRINGE (ML) INTRAVENOUS ONCE
Refills: 0 | Status: DISCONTINUED | OUTPATIENT
Start: 2021-05-27 | End: 2021-06-01

## 2021-05-27 RX ORDER — GLUCAGON INJECTION, SOLUTION 0.5 MG/.1ML
1 INJECTION, SOLUTION SUBCUTANEOUS ONCE
Refills: 0 | Status: DISCONTINUED | OUTPATIENT
Start: 2021-05-27 | End: 2021-06-01

## 2021-05-27 RX ORDER — MONTELUKAST 4 MG/1
10 TABLET, CHEWABLE ORAL DAILY
Refills: 0 | Status: DISCONTINUED | OUTPATIENT
Start: 2021-05-27 | End: 2021-06-01

## 2021-05-27 RX ORDER — ALBUTEROL 90 UG/1
2 AEROSOL, METERED ORAL EVERY 6 HOURS
Refills: 0 | Status: DISCONTINUED | OUTPATIENT
Start: 2021-05-27 | End: 2021-05-28

## 2021-05-27 RX ORDER — ATORVASTATIN CALCIUM 80 MG/1
10 TABLET, FILM COATED ORAL AT BEDTIME
Refills: 0 | Status: DISCONTINUED | OUTPATIENT
Start: 2021-05-27 | End: 2021-06-01

## 2021-05-27 RX ORDER — CHLORHEXIDINE GLUCONATE 213 G/1000ML
1 SOLUTION TOPICAL
Refills: 0 | Status: DISCONTINUED | OUTPATIENT
Start: 2021-05-27 | End: 2021-06-01

## 2021-05-27 RX ORDER — INSULIN HUMAN 100 [IU]/ML
15 INJECTION, SOLUTION SUBCUTANEOUS ONCE
Refills: 0 | Status: COMPLETED | OUTPATIENT
Start: 2021-05-27 | End: 2021-05-27

## 2021-05-27 RX ORDER — HEPARIN SODIUM 5000 [USP'U]/ML
5000 INJECTION INTRAVENOUS; SUBCUTANEOUS
Refills: 0 | Status: DISCONTINUED | OUTPATIENT
Start: 2021-05-28 | End: 2021-06-01

## 2021-05-27 RX ORDER — INSULIN LISPRO 100/ML
22 VIAL (ML) SUBCUTANEOUS
Refills: 0 | Status: DISCONTINUED | OUTPATIENT
Start: 2021-05-28 | End: 2021-06-01

## 2021-05-27 RX ORDER — INSULIN GLARGINE 100 [IU]/ML
20 INJECTION, SOLUTION SUBCUTANEOUS ONCE
Refills: 0 | Status: DISCONTINUED | OUTPATIENT
Start: 2021-05-27 | End: 2021-05-27

## 2021-05-27 RX ORDER — INSULIN GLARGINE 100 [IU]/ML
40 INJECTION, SOLUTION SUBCUTANEOUS AT BEDTIME
Refills: 0 | Status: DISCONTINUED | OUTPATIENT
Start: 2021-05-28 | End: 2021-06-01

## 2021-05-27 RX ORDER — DEXTROSE 50 % IN WATER 50 %
15 SYRINGE (ML) INTRAVENOUS ONCE
Refills: 0 | Status: DISCONTINUED | OUTPATIENT
Start: 2021-05-27 | End: 2021-06-01

## 2021-05-27 RX ORDER — INSULIN GLARGINE 100 [IU]/ML
40 INJECTION, SOLUTION SUBCUTANEOUS ONCE
Refills: 0 | Status: COMPLETED | OUTPATIENT
Start: 2021-05-27 | End: 2021-05-28

## 2021-05-27 RX ORDER — BUDESONIDE AND FORMOTEROL FUMARATE DIHYDRATE 160; 4.5 UG/1; UG/1
2 AEROSOL RESPIRATORY (INHALATION)
Refills: 0 | Status: DISCONTINUED | OUTPATIENT
Start: 2021-05-27 | End: 2021-06-01

## 2021-05-27 RX ORDER — IPRATROPIUM/ALBUTEROL SULFATE 18-103MCG
3 AEROSOL WITH ADAPTER (GRAM) INHALATION
Refills: 0 | Status: COMPLETED | OUTPATIENT
Start: 2021-05-27 | End: 2021-05-27

## 2021-05-27 RX ORDER — ASPIRIN/CALCIUM CARB/MAGNESIUM 324 MG
324 TABLET ORAL ONCE
Refills: 0 | Status: COMPLETED | OUTPATIENT
Start: 2021-05-27 | End: 2021-05-27

## 2021-05-27 RX ORDER — DEXTROSE 50 % IN WATER 50 %
25 SYRINGE (ML) INTRAVENOUS ONCE
Refills: 0 | Status: DISCONTINUED | OUTPATIENT
Start: 2021-05-27 | End: 2021-06-01

## 2021-05-27 RX ORDER — INSULIN GLARGINE 100 [IU]/ML
40 INJECTION, SOLUTION SUBCUTANEOUS AT BEDTIME
Refills: 0 | Status: DISCONTINUED | OUTPATIENT
Start: 2021-05-27 | End: 2021-05-27

## 2021-05-27 RX ADMIN — Medication 3 MILLILITER(S): at 20:42

## 2021-05-27 RX ADMIN — Medication 324 MILLIGRAM(S): at 20:07

## 2021-05-27 RX ADMIN — INSULIN HUMAN 15 UNIT(S): 100 INJECTION, SOLUTION SUBCUTANEOUS at 23:53

## 2021-05-27 RX ADMIN — Medication 3 MILLILITER(S): at 20:06

## 2021-05-27 RX ADMIN — Medication 3 MILLILITER(S): at 20:53

## 2021-05-27 NOTE — H&P ADULT - NSHPPHYSICALEXAM_GEN_ALL_CORE
GENERAL:  77y/o Male NAD, resting comfortably.  HEAD:  Atraumatic, Normocephalic  EYES: EOMI, PERRLA, conjunctiva and sclera clear  NECK: Supple, No JVD, no cervical lymphadenopathy, non-tender  CHEST/LUNG: good air entry + wheeze and rhonchi  HEART: Regular rate and rhythm; S1&S2  ABDOMEN: Soft, Nontender, Nondistended x 4 quadrants; Bowel sounds present  EXTREMITIES:   Peripheral Pulses Present, No clubbing, no cyanosis, or no edema, no calf tenderness  PSYCH: AAOx3, cooperative, appropriate  NEUROLOGY: WNL  SKIN: WNL

## 2021-05-27 NOTE — H&P ADULT - NSHPREVIEWOFSYSTEMS_GEN_ALL_CORE
REVIEW OF SYSTEMS:    CONSTITUTIONAL: No weakness, fevers or chills  EYES/ENT: No visual changes;  No vertigo or throat pain   NECK: No pain or stiffness  RESPIRATORY: +wheezing,  +  shortness of breath  CARDIOVASCULAR: + chest pain   GASTROINTESTINAL: No abdominal or epigastric pain. No nausea, vomiting, or hematemesis; No diarrhea or constipation. No melena or hematochezia.  GENITOURINARY: No dysuria, frequency or hematuria  NEUROLOGICAL: No numbness or weakness  SKIN: No itching, rashes

## 2021-05-27 NOTE — ED PROVIDER NOTE - PHYSICAL EXAMINATION
CONSTITUTIONAL: Well-developed; well-nourished; in no acute distress.   SKIN: warm, dry  HEAD: Normocephalic; atraumatic.  EYES: PERRL, EOMI, normal sclera and conjunctiva   ENT: No nasal discharge; airway clear.  NECK: Supple; non tender.  CARD: S1, S2 normal; no murmurs, gallops, or rubs. Regular rate and rhythm.   RESP: + bilateral lung fields wheezes, no rales or rhonchi.  ABD: soft ntnd  EXT: Normal ROM.  No clubbing, cyanosis or edema.   LYMPH: No acute cervical adenopathy.  NEURO: Alert, oriented, grossly unremarkable  PSYCH: Cooperative, appropriate.

## 2021-05-27 NOTE — H&P ADULT - ASSESSMENT
· HPI Objective Statement:  76Male c/o sob and chest pain since yesterday morning. The onset was sudden .There were no precipitating factors. The pain score is 9 /10 on and off  described as pressure pain. Pt has  a pmhx of HTN, DM2, asthma and GERD presenting with shortness of breath. Pt states that yesterday he began having SOB and reflux-like retrosternal burning pain on exertion with associated cough and chills, no fever at home. Pt is fully vaccinated against covid and tested positive plus had antibodies in march, no sick contacts or recent travel. No leg swelling or calf pain, no pleuritic pain, no syncope.

## 2021-05-27 NOTE — ED PROVIDER NOTE - ATTENDING CONTRIBUTION TO CARE
76yM HTN DM asthma GERD p/w SOB and CP.  Hx provided by pt and wife - c/o exertional dyspnea since yesterday and exertional GERD-like CP.  No fever at home.  No syncope.  No sick contacts or recent travel.  No asymmetric leg swelling.

## 2021-05-27 NOTE — H&P ADULT - HISTORY OF PRESENT ILLNESS
76y Male complaining of shortness of breath.  · HPI Objective Statement:  76Male c/o sob and chest pain since yesterday morning. The onset was sudden .There were no precipitating factors. The pain score is 9 /10 on and off  described as pressure pain. Pt has  a pmhx of HTN, DM2, asthma and GERD presenting with shortness of breath. Pt states that yesterday he began having SOB and reflux-like retrosternal burning pain on exertion with associated cough and chills, no fever at home. Pt is fully vaccinated against covid and tested positive plus had antibodies in march, no sick contacts or recent travel. No leg swelling or calf pain, no pleuritic pain, no syncope.  med list obtained from patient. Pt denies taking prednisone at this time but takes it when copd /asthma flairs up.Pt states that when on prednisone novolog is increased from 20q12 to 25 q12.

## 2021-05-27 NOTE — ED PROVIDER NOTE - OBJECTIVE STATEMENT
Pt is a 76M with a pmhx of HTN, DM2, asthma and GERD presenting with shortness of breath. Pt states that yesterday he began having SOB and reflux-like retrosternal burning pain on exertion with associated cough and chills, no fever at home. Pt is fully vaccinated against covid and tested positive plus had antibodies in march, no sick contacts or recent travel. No leg swelling or calf pain, no pleuritic pain, no syncope.

## 2021-05-27 NOTE — ED PROVIDER NOTE - CLINICAL SUMMARY MEDICAL DECISION MAKING FREE TEXT BOX
76yM HTN DM asthma/COPD GERD p/w exertional dyspnea and exertional GERD-like CP.  EKG w/o ischemia or arrhythmia.  CXR w/ chronic interstitial markings c/w COPD.  Labs w/ mildly elevated troponin 0.04.  Pt treated with duoneb for possible asthma/COPD component and w/ aspirin for concern for ACS.  COVID neg.  Will adm to tele for serial troponin and close monitoring.

## 2021-05-27 NOTE — H&P ADULT - NSICDXPASTMEDICALHX_GEN_ALL_CORE_FT
PAST MEDICAL HISTORY:  Asthma 20 yr    COPD (chronic obstructive pulmonary disease) 20 yrs no 02 rx    Diabetes 20 yrs    Prostate cancer 1999 s/p sx     PAST MEDICAL HISTORY:  Asthma 20 yr    COPD (chronic obstructive pulmonary disease) 20 yrs no 02 rx    Diabetes 20 yrs    Dyslipidemia     Prostate cancer 1999 s/p sx

## 2021-05-27 NOTE — ED PROVIDER NOTE - NS ED ROS FT
Eyes:  No visual changes, eye pain or discharge.  ENMT:  No hearing changes, pain, no sore throat or runny nose, no difficulty swallowing  Cardiac:  + chest pain and SOB. No edema.   Respiratory:  + cough, no respiratory distress. No hemoptysis.  GI:  + nausea, no vomiting, diarrhea or abdominal pain.  :  No dysuria, frequency or burning.  MS:  No myalgia, muscle weakness, joint pain or back pain.  Neuro:  No headache or weakness.  No LOC.  Skin:  No skin rash.   Endocrine: No history of thyroid disease

## 2021-05-27 NOTE — H&P ADULT - NSHPLABSRESULTS_GEN_ALL_CORE
12.5   24.69 )-----------( 251      ( 27 May 2021 19:30 )             38.8       05-27    134<L>  |  100  |  13  ----------------------------<  278<H>  5.0   |  22  |  0.8    Ca    9.2      27 May 2021 19:30    TPro  5.8<L>  /  Alb  3.5  /  TBili  1.1  /  DBili  x   /  AST  15  /  ALT  15  /  AlkPhos  67  05-27                      Lactate Trend      CARDIAC MARKERS ( 27 May 2021 19:30 )  x     / 0.04 ng/mL / x     / x     / x            CAPILLARY BLOOD GLUCOSE      POCT Blood Glucose.: 256 mg/dL (27 May 2021 19:58)

## 2021-05-27 NOTE — ED ADULT TRIAGE NOTE - CHIEF COMPLAINT QUOTE
c/o sob since yesterday- has hx of asthma and wife says pt had a fever this morning. Pt had 2nd covid shot on Friday

## 2021-05-27 NOTE — H&P ADULT - ATTENDING COMMENTS
Patient seen independently from medical PA with wife present at bedside Patient seen independently from medical PA with wife present at bedside. Besides presenting symptoms of chest pain and dyspnea, have concern for a steadily rising WBC count noted over several weeks. No reports of fever and currently not on steroids at home. Treatment plan as outlined above. Repeat labs (including Troponin, CBC) in am

## 2021-05-28 LAB
A1C WITH ESTIMATED AVERAGE GLUCOSE RESULT: 9.5 % — HIGH (ref 4–5.6)
ALBUMIN SERPL ELPH-MCNC: 3.5 G/DL — SIGNIFICANT CHANGE UP (ref 3.5–5.2)
ALP SERPL-CCNC: 72 U/L — SIGNIFICANT CHANGE UP (ref 30–115)
ALT FLD-CCNC: 14 U/L — SIGNIFICANT CHANGE UP (ref 0–41)
ANION GAP SERPL CALC-SCNC: 10 MMOL/L — SIGNIFICANT CHANGE UP (ref 7–14)
AST SERPL-CCNC: 11 U/L — SIGNIFICANT CHANGE UP (ref 0–41)
BILIRUB SERPL-MCNC: 0.9 MG/DL — SIGNIFICANT CHANGE UP (ref 0.2–1.2)
BUN SERPL-MCNC: 11 MG/DL — SIGNIFICANT CHANGE UP (ref 10–20)
CALCIUM SERPL-MCNC: 9.3 MG/DL — SIGNIFICANT CHANGE UP (ref 8.5–10.1)
CHLORIDE SERPL-SCNC: 99 MMOL/L — SIGNIFICANT CHANGE UP (ref 98–110)
CK MB CFR SERPL CALC: 5.9 NG/ML — SIGNIFICANT CHANGE UP (ref 0.6–6.3)
CK MB CFR SERPL CALC: 6 NG/ML — SIGNIFICANT CHANGE UP (ref 0.6–6.3)
CK SERPL-CCNC: 70 U/L — SIGNIFICANT CHANGE UP (ref 0–225)
CK SERPL-CCNC: 83 U/L — SIGNIFICANT CHANGE UP (ref 0–225)
CO2 SERPL-SCNC: 27 MMOL/L — SIGNIFICANT CHANGE UP (ref 17–32)
COVID-19 SPIKE DOMAIN AB INTERP: POSITIVE
COVID-19 SPIKE DOMAIN ANTIBODY RESULT: >250 U/ML — HIGH
CREAT SERPL-MCNC: 0.9 MG/DL — SIGNIFICANT CHANGE UP (ref 0.7–1.5)
ESTIMATED AVERAGE GLUCOSE: 226 MG/DL — HIGH (ref 68–114)
GLUCOSE BLDC GLUCOMTR-MCNC: 216 MG/DL — HIGH (ref 70–99)
GLUCOSE BLDC GLUCOMTR-MCNC: 236 MG/DL — HIGH (ref 70–99)
GLUCOSE BLDC GLUCOMTR-MCNC: 241 MG/DL — HIGH (ref 70–99)
GLUCOSE BLDC GLUCOMTR-MCNC: 249 MG/DL — HIGH (ref 70–99)
GLUCOSE BLDC GLUCOMTR-MCNC: 273 MG/DL — HIGH (ref 70–99)
GLUCOSE BLDC GLUCOMTR-MCNC: 420 MG/DL — HIGH (ref 70–99)
GLUCOSE SERPL-MCNC: 224 MG/DL — HIGH (ref 70–99)
HCT VFR BLD CALC: 40 % — LOW (ref 42–52)
HGB BLD-MCNC: 12.7 G/DL — LOW (ref 14–18)
MAGNESIUM SERPL-MCNC: 1.6 MG/DL — LOW (ref 1.8–2.4)
MCHC RBC-ENTMCNC: 27.2 PG — SIGNIFICANT CHANGE UP (ref 27–31)
MCHC RBC-ENTMCNC: 31.8 G/DL — LOW (ref 32–37)
MCV RBC AUTO: 85.7 FL — SIGNIFICANT CHANGE UP (ref 80–94)
NRBC # BLD: 0 /100 WBCS — SIGNIFICANT CHANGE UP (ref 0–0)
PLATELET # BLD AUTO: 269 K/UL — SIGNIFICANT CHANGE UP (ref 130–400)
POTASSIUM SERPL-MCNC: 4.3 MMOL/L — SIGNIFICANT CHANGE UP (ref 3.5–5)
POTASSIUM SERPL-SCNC: 4.3 MMOL/L — SIGNIFICANT CHANGE UP (ref 3.5–5)
PROT SERPL-MCNC: 5.9 G/DL — LOW (ref 6–8)
RBC # BLD: 4.67 M/UL — LOW (ref 4.7–6.1)
RBC # FLD: 13.8 % — SIGNIFICANT CHANGE UP (ref 11.5–14.5)
SARS-COV-2 IGG+IGM SERPL QL IA: >250 U/ML — HIGH
SARS-COV-2 IGG+IGM SERPL QL IA: POSITIVE
SODIUM SERPL-SCNC: 136 MMOL/L — SIGNIFICANT CHANGE UP (ref 135–146)
TROPONIN T SERPL-MCNC: 0.02 NG/ML — HIGH
TROPONIN T SERPL-MCNC: 0.05 NG/ML — CRITICAL HIGH
WBC # BLD: 16.72 K/UL — HIGH (ref 4.8–10.8)
WBC # FLD AUTO: 16.72 K/UL — HIGH (ref 4.8–10.8)

## 2021-05-28 PROCEDURE — 99222 1ST HOSP IP/OBS MODERATE 55: CPT

## 2021-05-28 PROCEDURE — 99232 SBSQ HOSP IP/OBS MODERATE 35: CPT

## 2021-05-28 PROCEDURE — 93010 ELECTROCARDIOGRAM REPORT: CPT | Mod: NC

## 2021-05-28 RX ORDER — INSULIN LISPRO 100/ML
VIAL (ML) SUBCUTANEOUS
Refills: 0 | Status: DISCONTINUED | OUTPATIENT
Start: 2021-05-28 | End: 2021-06-01

## 2021-05-28 RX ORDER — IPRATROPIUM/ALBUTEROL SULFATE 18-103MCG
3 AEROSOL WITH ADAPTER (GRAM) INHALATION EVERY 6 HOURS
Refills: 0 | Status: DISCONTINUED | OUTPATIENT
Start: 2021-05-28 | End: 2021-06-01

## 2021-05-28 RX ORDER — INSULIN LISPRO 100/ML
22 VIAL (ML) SUBCUTANEOUS
Refills: 0 | Status: DISCONTINUED | OUTPATIENT
Start: 2021-05-28 | End: 2021-06-01

## 2021-05-28 RX ORDER — ALBUTEROL 90 UG/1
2.5 AEROSOL, METERED ORAL EVERY 4 HOURS
Refills: 0 | Status: DISCONTINUED | OUTPATIENT
Start: 2021-05-28 | End: 2021-06-01

## 2021-05-28 RX ORDER — CEFEPIME 1 G/1
INJECTION, POWDER, FOR SOLUTION INTRAMUSCULAR; INTRAVENOUS
Refills: 0 | Status: DISCONTINUED | OUTPATIENT
Start: 2021-05-28 | End: 2021-06-01

## 2021-05-28 RX ORDER — CEFEPIME 1 G/1
500 INJECTION, POWDER, FOR SOLUTION INTRAMUSCULAR; INTRAVENOUS EVERY 12 HOURS
Refills: 0 | Status: DISCONTINUED | OUTPATIENT
Start: 2021-05-28 | End: 2021-06-01

## 2021-05-28 RX ORDER — CEFEPIME 1 G/1
500 INJECTION, POWDER, FOR SOLUTION INTRAMUSCULAR; INTRAVENOUS ONCE
Refills: 0 | Status: COMPLETED | OUTPATIENT
Start: 2021-05-28 | End: 2021-05-28

## 2021-05-28 RX ORDER — MAGNESIUM SULFATE 500 MG/ML
1 VIAL (ML) INJECTION ONCE
Refills: 0 | Status: COMPLETED | OUTPATIENT
Start: 2021-05-28 | End: 2021-05-28

## 2021-05-28 RX ORDER — IPRATROPIUM/ALBUTEROL SULFATE 18-103MCG
3 AEROSOL WITH ADAPTER (GRAM) INHALATION ONCE
Refills: 0 | Status: COMPLETED | OUTPATIENT
Start: 2021-05-28 | End: 2021-05-28

## 2021-05-28 RX ADMIN — Medication 3 MILLILITER(S): at 23:13

## 2021-05-28 RX ADMIN — BUDESONIDE AND FORMOTEROL FUMARATE DIHYDRATE 2 PUFF(S): 160; 4.5 AEROSOL RESPIRATORY (INHALATION) at 08:02

## 2021-05-28 RX ADMIN — Medication 40 MILLIGRAM(S): at 13:55

## 2021-05-28 RX ADMIN — HEPARIN SODIUM 5000 UNIT(S): 5000 INJECTION INTRAVENOUS; SUBCUTANEOUS at 17:50

## 2021-05-28 RX ADMIN — ATORVASTATIN CALCIUM 10 MILLIGRAM(S): 80 TABLET, FILM COATED ORAL at 21:17

## 2021-05-28 RX ADMIN — Medication 22 UNIT(S): at 11:58

## 2021-05-28 RX ADMIN — Medication 40 MILLIGRAM(S): at 05:28

## 2021-05-28 RX ADMIN — Medication 40 MILLIGRAM(S): at 21:16

## 2021-05-28 RX ADMIN — CEFEPIME 100 MILLIGRAM(S): 1 INJECTION, POWDER, FOR SOLUTION INTRAMUSCULAR; INTRAVENOUS at 17:49

## 2021-05-28 RX ADMIN — Medication 100 GRAM(S): at 14:38

## 2021-05-28 RX ADMIN — ALBUTEROL 2.5 MILLIGRAM(S): 90 AEROSOL, METERED ORAL at 21:16

## 2021-05-28 RX ADMIN — INSULIN GLARGINE 40 UNIT(S): 100 INJECTION, SOLUTION SUBCUTANEOUS at 01:16

## 2021-05-28 RX ADMIN — Medication 4: at 16:58

## 2021-05-28 RX ADMIN — Medication 3 MILLILITER(S): at 13:51

## 2021-05-28 RX ADMIN — INSULIN GLARGINE 40 UNIT(S): 100 INJECTION, SOLUTION SUBCUTANEOUS at 21:17

## 2021-05-28 RX ADMIN — MONTELUKAST 10 MILLIGRAM(S): 4 TABLET, CHEWABLE ORAL at 11:10

## 2021-05-28 RX ADMIN — HEPARIN SODIUM 5000 UNIT(S): 5000 INJECTION INTRAVENOUS; SUBCUTANEOUS at 05:29

## 2021-05-28 RX ADMIN — Medication 22 UNIT(S): at 16:58

## 2021-05-28 RX ADMIN — Medication 22 UNIT(S): at 08:02

## 2021-05-28 RX ADMIN — CEFEPIME 100 MILLIGRAM(S): 1 INJECTION, POWDER, FOR SOLUTION INTRAMUSCULAR; INTRAVENOUS at 13:54

## 2021-05-28 RX ADMIN — BUDESONIDE AND FORMOTEROL FUMARATE DIHYDRATE 2 PUFF(S): 160; 4.5 AEROSOL RESPIRATORY (INHALATION) at 22:52

## 2021-05-28 NOTE — CHART NOTE - NSCHARTNOTEFT_GEN_A_CORE
Patient complaining of shortness of breath despite IV steroids, Duoneb and antibiotic treatments. Pulse ox is 96% on RA. Will give trial f supplemental oxygen but if not improving consider increasing current treatment doses and repeat CXR Patient complaining of shortness of breath despite IV steroids, Duoneb and antibiotic treatments. Pulse ox is 96% on RA. Will give trial f supplemental oxygen but if not improving consider increasing current treatment doses (steroid, Duoneb), NIV (per pulmonary note) and repeat CXR Patient complaining of shortness of breath despite IV steroids, Duoneb and antibiotic treatments. Seen at bedside, mild distress with audible harsh wheeze noted Pulse ox is 96% on RA. Will give extra Duoneb along with supplemental oxygen but if not improving consider increasing current treatment doses (steroid, Duoneb), NIV (per pulmonary note) and repeat CXR. Patient complaining of shortness of breath despite IV steroids, Duoneb and antibiotic treatments. Seen at bedside, some distress with audible harsh wheeze noted Pulse ox is 96% on RA. Will give extra Duoneb along with supplemental oxygen but if not improving consider increasing current treatment doses (steroid, Duoneb), NIV (per pulmonary note) and repeat CXR.

## 2021-05-28 NOTE — CONSULT NOTE ADULT - SUBJECTIVE AND OBJECTIVE BOX
HPI:   76y Male complaining of shortness of breath.  · HPI Objective Statement:  76Male c/o sob and chest pain since yesterday morning. The onset was sudden .There were no precipitating factors. The pain score is 9 /10 on and off  described as pressure pain.   Pt has  a pmhx of HTN, DM2, asthma and GERD presenting with shortness of breath.   Pt states that yesterday he began having SOB and reflux-like retrosternal burning pain on exertion with associated cough and chills, no fever at home.   Pt is fully vaccinated against covid .He tested positive plus had antibodies in march. He had an abnormal CXR at that time indicating a COVID pneumonia.  No sick contacts or recent travel. No leg swelling or calf pain, no pleuritic pain, no syncope.  med list obtained from patient.   Pt denies taking prednisone at this time but takes it when copd /asthma flairs up.  Pt states that when on prednisone novolog is increased from 20q12 to 25 q12. (27 May 2021 22:09)    I reviewed the radiology tests and hospital record prior to me visiting the patient.    CC/ HPI Patient is a 76y old  Male who presents with a chief complaint of     CHEST PAIN;EXERTIONAL DYSPNEA    ^SENT BY URGI / COUGH HEADACHE SOB    No pertinent family history in first degree relatives      Diabetes    Prostate cancer    Asthma      Dyslipidemia    Chest pain    Chest pain    Exertional dyspnea    Asthma    Diabetes    Prostate cancer    Dyslipidemia    S/P TURP    H/O radical prostatectomy    History of surgery    SENT BY URGI / COUGH HEADACHE SOB        Exertional dyspnea            FAMILY HISTORY:  No pertinent family history in first degree relatives        No Known Allergies        Marital Status:  (x   )    (   ) Single    (   )    (  )   Occupation:   Lives with: (  ) alone  (  ) children   (x  ) spouse   (  ) parents  (  ) other  Recent Travel:     Substance Use (street drugs): ( x ) never used  (  ) other:  Tobacco Usage:  ( x  ) never smoked   (   ) former smoker   (   ) current smoker  (     ) pack year  Alcohol Usage:        Home prescriptions  atorvastatin 10 mg oral tablet: 1 tab(s) orally once a day  budesonide-formoterol 160 mcg-4.5 mcg/inh inhalation aerosol: 2 puff(s) inhaled 2 times a day   Incruse Ellipta 62.5 mcg/inh inhalation powder: 1 inhaler(s) inhaled once a day  Levemir 100 units/mL subcutaneous solution: 40 unit(s) subcutaneous once a day (at bedtime)  NovoLOG Mix 70/30 subcutaneous suspension: 25 unit(s) subcutaneous once a day  predniSONE 20 mg oral tablet: 1 tab(s) orally once a day x3 days, then 10 mg x3 days, then 5 mg x3 days  Singulair 10 mg oral tablet: 1 tab(s) orally once a day  Ventolin HFA 90 mcg/inh inhalation aerosol: 2 puff(s) inhaled every 6 hours      MEDICATIONS  (STANDING):  albuterol/ipratropium for Nebulization 3 milliLiter(s) Nebulizer every 6 hours  atorvastatin 10 milliGRAM(s) Oral at bedtime  budesonide 160 MICROgram(s)/formoterol 4.5 MICROgram(s) Inhaler 2 Puff(s) Inhalation two times a day  chlorhexidine 4% Liquid 1 Application(s) Topical <User Schedule>  dextrose 40% Gel 15 Gram(s) Oral once  dextrose 5%. 1000 milliLiter(s) (50 mL/Hr) IV Continuous <Continuous>  dextrose 5%. 1000 milliLiter(s) (100 mL/Hr) IV Continuous <Continuous>  dextrose 50% Injectable 25 Gram(s) IV Push once  dextrose 50% Injectable 12.5 Gram(s) IV Push once  dextrose 50% Injectable 25 Gram(s) IV Push once  glucagon  Injectable 1 milliGRAM(s) IntraMuscular once  heparin   Injectable 5000 Unit(s) SubCutaneous two times a day  insulin glargine Injectable (LANTUS) 40 Unit(s) SubCutaneous at bedtime  insulin lispro Injectable (ADMELOG) 22 Unit(s) SubCutaneous before breakfast  insulin lispro Injectable (ADMELOG) 22 Unit(s) SubCutaneous before dinner  methylPREDNISolone sodium succinate Injectable 40 milliGRAM(s) IV Push every 8 hours  montelukast 10 milliGRAM(s) Oral daily    MEDICATIONS  (PRN):          T(C): 36.7 (05-28-21 @ 06:05), Max: 37.6 (05-27-21 @ 19:03)  HR: 111 (05-28-21 @ 06:05) (101 - 111)  BP: 140/78 (05-28-21 @ 06:05) (137/85 - 171/77)  RR: 20 (05-28-21 @ 06:05) (20 - 20)  SpO2: 96% (05-28-21 @ 08:04) (94% - 97%)  ICU Vital Signs Last 24 Hrs  T(C): 36.7 (28 May 2021 06:05), Max: 37.6 (27 May 2021 19:03)  T(F): 98.1 (28 May 2021 06:05), Max: 99.7 (27 May 2021 19:03)  HR: 111 (28 May 2021 06:05) (101 - 111)  BP: 140/78 (28 May 2021 06:05) (137/85 - 171/77)  RR: 20 (28 May 2021 06:05) (20 - 20)  SpO2: 96% (28 May 2021 08:04) (94% - 97%)      I&O's Summary      I&O's Detail      Drug Dosing Weight  Height (cm): 170.2 (28 May 2021 01:04)  Weight (kg): 80.7 (28 May 2021 01:04)  BMI (kg/m2): 27.9 (28 May 2021 01:04)  BSA (m2): 1.92 (28 May 2021 01:04)    LABS:                          12.7   16.72 )-----------( 269      ( 28 May 2021 06:13 )             40.0       05-28    136  |  99  |  11  ----------------------------<  224<H>  4.3   |  27  |  0.9    Ca    9.3      28 May 2021 06:13  Mg     1.6     05-28    TPro  5.9<L>  /  Alb  3.5  /  TBili  0.9  /  DBili  x   /  AST  11  /  ALT  14  /  AlkPhos  72  05-28      LIVER FUNCTIONS - ( 28 May 2021 06:13 )  Alb: 3.5 g/dL / Pro: 5.9 g/dL / ALK PHOS: 72 U/L / ALT: 14 U/L / AST: 11 U/L / GGT: x             CARDIAC MARKERS ( 28 May 2021 06:13 )  x     / 0.05 ng/mL / 83 U/L / x     / 6.0 ng/mL  CARDIAC MARKERS ( 27 May 2021 19:30 )  x     / 0.04 ng/mL / x     / x     / x                Serum Pro-Brain Natriuretic Peptide: 881 pg/mL (05-27-21 @ 19:30)              SARS-CoV-2: Detected (16 Mar 2021 22:54)        RADIOLOGY:  I have personally reviewed all chest and other pertinent radiology films.      REVIEW OF SYSTEMS:     · CONSTITUTIONAL:   no fever   no chills.      · EYES:   no discharge,   no irritation,    no visual changes.    · ENMT:   Ears: no ear pain and no hearing problems.  Nose: no nasal congestion and no nasal drainage.      · CARDIOVASCULAR:   +chest pain,   no swelling  no palpitations      · RESPIRATORY:  +SOB,  no wheezing ,  +respiratory difficulty  +sputum production    · GASTROINTESTINAL:   no abdominal pain,    no nausea   no vomiting.    · GENITOURINARY:  no dysuria,   no frequency,       · MUSCULOSKELETAL:   no back pain,   no neck pain,   no weakness.    · SKIN:   no pruritis,   no rashes.    · NEURO:   no loss of consciousness,   no headache,   no weakness.    · PSYCHIATRIC:   no known mental health issues  no anxiety  no depression        ALLERGIC/IMMUNOLOGIC:   No active allergic or immunologic issues    all other systems are negative        PHYSICAL EXAM:     · CONSTITUTIONAL:   not septic appearing,   well nourished,   NAD    · ENMT:   Airway patent,   Nasal mucosa clear.  Mouth with normal mucosa.   No thrush    · EYES:   Clear bilaterally,   pupils equal,   round and reactive to light.    · CARDIAC:   Normal rate,   regular rhythm.    Heart sounds S1, S2.   no thrills or bruits on palpitation  normal  cardiac impulse  No murmurs, no rubs or gallops on auscultation  no edema        CAROTID:   normal systolic impulse  no bruits    · RESPIRATORY:   diffuse harsh wheeze  wet cough  normal chest expansion  tachypneic,  no retractions or use of accessory muscles  palpation of chest is normal with no fremitus  percussion of chest demonstrates no hyperresonance or dullness    · GASTROINTESTINAL:  Abdomen soft,   non-tender,   no guarding,   + BS  liver spleen not palpable      · MUSCULOSKELETAL:   range of motion is not limited,  no clubbing, cyanosis  no petechiae    · NEUROLOGICAL:   Alert and oriented   no obvious focal deficits in cranial nerve areas  no motor or sensory deficits.      · SKIN:   Skin normal color for race,   warm,   dry and intact.       · PSYCHIATRIC:   Alert and oriented to person,   place, time/situation.   normal mood and affect.   no apparent risk to self or others.    · HEME LYMPH:   no splenomegaly.  No cervical  lymphadenopathy.  no inguinal lymphadenopathy         HPI:   76y Male complaining of shortness of breath.  · HPI Objective Statement:  76Male c/o sob and chest pain since yesterday morning. The onset was sudden .There were no precipitating factors. The pain score is 9 /10 on and off  described as pressure pain.   Pt has  a pmhx of HTN, DM2, asthma and GERD presenting with shortness of breath.   Pt states that yesterday he began having SOB and reflux-like retrosternal burning pain on exertion with associated cough and chills, no fever at home.   Pt is fully vaccinated against covid .He tested positive plus had antibodies in march. He had an abnormal CXR at that time indicating a COVID pneumonia.  No sick contacts or recent travel. No leg swelling or calf pain, no pleuritic pain, no syncope.  med list obtained from patient.   Pt denies taking prednisone at this time but takes it when copd /asthma flairs up.  Pt states that when on prednisone novolog is increased from 20q12 to 25 q12. (27 May 2021 22:09)    I reviewed the radiology tests and hospital record prior to me visiting the patient.    CC/ HPI Patient is a 76y old  Male who presents with a chief complaint of     CHEST PAIN;EXERTIONAL DYSPNEA    ^SENT BY URGI / COUGH HEADACHE SOB    No pertinent family history in first degree relatives      Diabetes    Prostate cancer    Asthma      Dyslipidemia    Chest pain    Chest pain    Exertional dyspnea    Asthma    Diabetes    Prostate cancer    Dyslipidemia    S/P TURP    H/O radical prostatectomy    History of surgery    SENT BY URGI / COUGH HEADACHE SOB        Exertional dyspnea            FAMILY HISTORY:  No pertinent family history in first degree relatives        No Known Allergies        Marital Status:  (x   )    (   ) Single    (   )    (  )   Occupation:   Lives with: (  ) alone  (  ) children   (x  ) spouse   (  ) parents  (  ) other  Recent Travel:     Substance Use (street drugs): ( x ) never used  (  ) other:  Tobacco Usage:  ( x  ) never smoked   (   ) former smoker   (   ) current smoker  (     ) pack year  Alcohol Usage:        Home prescriptions  atorvastatin 10 mg oral tablet: 1 tab(s) orally once a day  budesonide-formoterol 160 mcg-4.5 mcg/inh inhalation aerosol: 2 puff(s) inhaled 2 times a day   Incruse Ellipta 62.5 mcg/inh inhalation powder: 1 inhaler(s) inhaled once a day  Levemir 100 units/mL subcutaneous solution: 40 unit(s) subcutaneous once a day (at bedtime)  NovoLOG Mix 70/30 subcutaneous suspension: 25 unit(s) subcutaneous once a day  predniSONE 20 mg oral tablet: 1 tab(s) orally once a day x3 days, then 10 mg x3 days, then 5 mg x3 days  Singulair 10 mg oral tablet: 1 tab(s) orally once a day  Ventolin HFA 90 mcg/inh inhalation aerosol: 2 puff(s) inhaled every 6 hours      MEDICATIONS  (STANDING):  albuterol/ipratropium for Nebulization 3 milliLiter(s) Nebulizer every 6 hours  atorvastatin 10 milliGRAM(s) Oral at bedtime  budesonide 160 MICROgram(s)/formoterol 4.5 MICROgram(s) Inhaler 2 Puff(s) Inhalation two times a day  chlorhexidine 4% Liquid 1 Application(s) Topical <User Schedule>  dextrose 40% Gel 15 Gram(s) Oral once  dextrose 5%. 1000 milliLiter(s) (50 mL/Hr) IV Continuous <Continuous>  dextrose 5%. 1000 milliLiter(s) (100 mL/Hr) IV Continuous <Continuous>  dextrose 50% Injectable 25 Gram(s) IV Push once  dextrose 50% Injectable 12.5 Gram(s) IV Push once  dextrose 50% Injectable 25 Gram(s) IV Push once  glucagon  Injectable 1 milliGRAM(s) IntraMuscular once  heparin   Injectable 5000 Unit(s) SubCutaneous two times a day  insulin glargine Injectable (LANTUS) 40 Unit(s) SubCutaneous at bedtime  insulin lispro Injectable (ADMELOG) 22 Unit(s) SubCutaneous before breakfast  insulin lispro Injectable (ADMELOG) 22 Unit(s) SubCutaneous before dinner  methylPREDNISolone sodium succinate Injectable 40 milliGRAM(s) IV Push every 8 hours  montelukast 10 milliGRAM(s) Oral daily    MEDICATIONS  (PRN):          T(C): 36.7 (05-28-21 @ 06:05), Max: 37.6 (05-27-21 @ 19:03)  HR: 111 (05-28-21 @ 06:05) (101 - 111)  BP: 140/78 (05-28-21 @ 06:05) (137/85 - 171/77)  RR: 20 (05-28-21 @ 06:05) (20 - 20)  SpO2: 96% (05-28-21 @ 08:04) (94% - 97%)  ICU Vital Signs Last 24 Hrs  T(C): 36.7 (28 May 2021 06:05), Max: 37.6 (27 May 2021 19:03)  T(F): 98.1 (28 May 2021 06:05), Max: 99.7 (27 May 2021 19:03)  HR: 111 (28 May 2021 06:05) (101 - 111)  BP: 140/78 (28 May 2021 06:05) (137/85 - 171/77)  RR: 20 (28 May 2021 06:05) (20 - 20)  SpO2: 96% (28 May 2021 08:04) (94% - 97%)      I&O's Summary      I&O's Detail      Drug Dosing Weight  Height (cm): 170.2 (28 May 2021 01:04)  Weight (kg): 80.7 (28 May 2021 01:04)  BMI (kg/m2): 27.9 (28 May 2021 01:04)  BSA (m2): 1.92 (28 May 2021 01:04)    LABS:                          12.7   16.72 )-----------( 269      ( 28 May 2021 06:13 )             40.0       05-28    136  |  99  |  11  ----------------------------<  224<H>  4.3   |  27  |  0.9    Ca    9.3      28 May 2021 06:13  Mg     1.6     05-28    TPro  5.9<L>  /  Alb  3.5  /  TBili  0.9  /  DBili  x   /  AST  11  /  ALT  14  /  AlkPhos  72  05-28      LIVER FUNCTIONS - ( 28 May 2021 06:13 )  Alb: 3.5 g/dL / Pro: 5.9 g/dL / ALK PHOS: 72 U/L / ALT: 14 U/L / AST: 11 U/L / GGT: x             CARDIAC MARKERS ( 28 May 2021 06:13 )  x     / 0.05 ng/mL / 83 U/L / x     / 6.0 ng/mL  CARDIAC MARKERS ( 27 May 2021 19:30 )  x     / 0.04 ng/mL / x     / x     / x          Serum Pro-Brain Natriuretic Peptide: 881 pg/mL (05-27-21 @ 19:30)    SARS-CoV-2: Detected (16 Mar 2021 22:54)        RADIOLOGY:  I have personally reviewed all chest and other pertinent radiology films.      REVIEW OF SYSTEMS:     · CONSTITUTIONAL:   no fever   no chills.      · EYES:   no discharge,   no irritation,    no visual changes.    · ENMT:   Ears: no ear pain and no hearing problems.  Nose: no nasal congestion and no nasal drainage.      · CARDIOVASCULAR:   +chest pain,   no swelling  no palpitations      · RESPIRATORY:  +SOB,  no wheezing ,  +respiratory difficulty  +sputum production    · GASTROINTESTINAL:   no abdominal pain,    no nausea   no vomiting.    · GENITOURINARY:  no dysuria,   no frequency,       · MUSCULOSKELETAL:   no back pain,   no neck pain,   no weakness.    · SKIN:   no pruritis,   no rashes.    · NEURO:   no loss of consciousness,   no headache,   no weakness.    · PSYCHIATRIC:   no known mental health issues  no anxiety  no depression        ALLERGIC/IMMUNOLOGIC:   No active allergic or immunologic issues    all other systems are negative        PHYSICAL EXAM:     · CONSTITUTIONAL:   not septic appearing,   well nourished,   NAD    · ENMT:   Airway patent,   Nasal mucosa clear.  Mouth with normal mucosa.   No thrush    · EYES:   Clear bilaterally,   pupils equal,   round and reactive to light.    · CARDIAC:   Normal rate,   regular rhythm.    Heart sounds S1, S2.   no thrills or bruits on palpitation  normal  cardiac impulse  No murmurs, no rubs or gallops on auscultation  no edema        CAROTID:   normal systolic impulse  no bruits    · RESPIRATORY:   diffuse harsh wheeze  wet cough  normal chest expansion  tachypneic,  no retractions or use of accessory muscles  palpation of chest is normal with no fremitus  percussion of chest demonstrates no hyperresonance or dullness    · GASTROINTESTINAL:  Abdomen soft,   non-tender,   no guarding,   + BS  liver spleen not palpable      · MUSCULOSKELETAL:   range of motion is not limited,  no clubbing, cyanosis  no petechiae    · NEUROLOGICAL:   Alert and oriented   no obvious focal deficits in cranial nerve areas  no motor or sensory deficits.      · SKIN:   Skin normal color for race,   warm,   dry and intact.       · PSYCHIATRIC:   Alert and oriented to person,   place, time/situation.   normal mood and affect.   no apparent risk to self or others.    · HEME LYMPH:   no splenomegaly.  No cervical  lymphadenopathy.  no inguinal lymphadenopathy

## 2021-05-28 NOTE — ED ADULT NURSE NOTE - PMH
Asthma  19 yr  COPD (chronic obstructive pulmonary disease)  20 yrs no 02 rx  Diabetes  20 yrs  Dyslipidemia    Prostate cancer  1999 s/p sx

## 2021-05-28 NOTE — PROGRESS NOTE ADULT - SUBJECTIVE AND OBJECTIVE BOX
DEL ALVARADO  76y, Male  Allergy: No Known Allergies    Hospital Day: 1d    Patient seen and examined earlier today.  Patient with significant wheezing, coughing, dyspnea this morning. Receiving nebulizer therapy. Seen by Pulmonary, plan to start abx.     PMH/PSH:  PAST MEDICAL & SURGICAL HISTORY:  Diabetes  20 yrs    Prostate cancer  1999 s/p sx    Asthma  20 yr    COPD (chronic obstructive pulmonary disease)  20 yrs no 02 rx    Dyslipidemia    S/P TURP  1999    History of surgery  back surgery        LAST 24-Hr EVENTS:    VITALS:  T(F): 98.1 (05-28-21 @ 06:05), Max: 99.7 (05-27-21 @ 19:03)  HR: 111 (05-28-21 @ 06:05)  BP: 140/78 (05-28-21 @ 06:05) (137/85 - 171/77)  RR: 20 (05-28-21 @ 06:05)  SpO2: 96% (05-28-21 @ 08:04)        TESTS & MEASUREMENTS:  Weight (Kg): 80.7 (05-28-21 @ 01:04)  BMI (kg/m2): 27.9 (05-28)                          12.7   16.72 )-----------( 269      ( 28 May 2021 06:13 )             40.0       05-28    136  |  99  |  11  ----------------------------<  224<H>  4.3   |  27  |  0.9    Ca    9.3      28 May 2021 06:13  Mg     1.6     05-28    TPro  5.9<L>  /  Alb  3.5  /  TBili  0.9  /  DBili  x   /  AST  11  /  ALT  14  /  AlkPhos  72  05-28    LIVER FUNCTIONS - ( 28 May 2021 06:13 )  Alb: 3.5 g/dL / Pro: 5.9 g/dL / ALK PHOS: 72 U/L / ALT: 14 U/L / AST: 11 U/L / GGT: x           CARDIAC MARKERS ( 28 May 2021 06:13 )  x     / 0.05 ng/mL / 83 U/L / x     / 6.0 ng/mL  CARDIAC MARKERS ( 27 May 2021 19:30 )  x     / 0.04 ng/mL / x     / x     / x                    Serum Pro-Brain Natriuretic Peptide: 881 pg/mL (05-27-21 @ 19:30)        RADIOLOGY, ECG, & ADDITIONAL TESTS:  12 Lead ECG:   Ventricular Rate 109 BPM    Atrial Rate 109 BPM    P-R Interval 186 ms    QRS Duration 70 ms    Q-T Interval 334 ms    QTC Calculation(Bazett) 449 ms    P Axis 60 degrees    R Axis 19 degrees    T Axis 50 degrees    Diagnosis Line Sinus tachycardia  Otherwise normal ECG    Confirmed by ELINA MARCOS MD ([id number]) on 5/28/2021 11:45:58 AM (05-28-21 @ 08:09)  12 Lead ECG:   Ventricular Rate 103 BPM    Atrial Rate 103 BPM    P-R Interval 184 ms    QRS Duration 78 ms    Q-T Interval 338 ms    QTC Calculation(Bazett) 442 ms    P Axis 57 degrees    R Axis 6 degrees    T Axis 31 degrees    Diagnosis Line Sinus tachycardia with Premature atrial complexes  Possible Left atrial enlargement  Borderline ECG    Confirmed by ELINA MARCOS MD ([id number]) on 5/28/2021 11:45:56 AM (05-27-21 @ 19:45)      RECENT DIAGNOSTIC ORDERS:  Culture - Sputum: Routine  Specimen Source: Sputum (05-28-21 @ 11:51)  Procalcitonin, Serum: 15:00 (05-28-21 @ 11:42)  COVID-19 Ramón Domain Antibody: AM Sched. Collection: 28-May-2021 04:30 (05-28-21 @ 02:11)  Diet, Consistent Carbohydrate w/Evening Snack (05-27-21 @ 23:05)  Troponin T, Serum: 15:00 (05-27-21 @ 22:39)  CKMB Units: 15:00 (05-27-21 @ 22:39)  Creatine Kinase, Serum: 15:00 (05-27-21 @ 22:39)  Wet Read: Routine  WET READ: b/l interstitial markings, not sig changed compared to prior (05-27-21 @ 22:10)  Magnesium, Serum: STAT (05-27-21 @ 19:55)      MEDICATIONS:  MEDICATIONS  (STANDING):  albuterol/ipratropium for Nebulization 3 milliLiter(s) Nebulizer every 6 hours  atorvastatin 10 milliGRAM(s) Oral at bedtime  budesonide 160 MICROgram(s)/formoterol 4.5 MICROgram(s) Inhaler 2 Puff(s) Inhalation two times a day  cefepime   IVPB      cefepime   IVPB 500 milliGRAM(s) IV Intermittent once  cefepime   IVPB 500 milliGRAM(s) IV Intermittent every 12 hours  chlorhexidine 4% Liquid 1 Application(s) Topical <User Schedule>  dextrose 40% Gel 15 Gram(s) Oral once  dextrose 5%. 1000 milliLiter(s) (50 mL/Hr) IV Continuous <Continuous>  dextrose 5%. 1000 milliLiter(s) (100 mL/Hr) IV Continuous <Continuous>  dextrose 50% Injectable 25 Gram(s) IV Push once  dextrose 50% Injectable 12.5 Gram(s) IV Push once  dextrose 50% Injectable 25 Gram(s) IV Push once  glucagon  Injectable 1 milliGRAM(s) IntraMuscular once  heparin   Injectable 5000 Unit(s) SubCutaneous two times a day  insulin glargine Injectable (LANTUS) 40 Unit(s) SubCutaneous at bedtime  insulin lispro (ADMELOG) corrective regimen sliding scale   SubCutaneous three times a day before meals  insulin lispro Injectable (ADMELOG) 22 Unit(s) SubCutaneous before lunch  insulin lispro Injectable (ADMELOG) 22 Unit(s) SubCutaneous before breakfast  insulin lispro Injectable (ADMELOG) 22 Unit(s) SubCutaneous before dinner  methylPREDNISolone sodium succinate Injectable 40 milliGRAM(s) IV Push every 8 hours  montelukast 10 milliGRAM(s) Oral daily    MEDICATIONS  (PRN):  ALBUTerol    0.083% 2.5 milliGRAM(s) Nebulizer every 4 hours PRN Shortness of Breath and/or Wheezing      HOME MEDICATIONS:  atorvastatin 10 mg oral tablet (03-17)  Incruse Ellipta 62.5 mcg/inh inhalation powder (12-21)  Levemir 100 units/mL subcutaneous solution (03-17)  NovoLOG Mix 70/30 subcutaneous suspension (12-21)  predniSONE 20 mg oral tablet (12-21)  Singulair 10 mg oral tablet (12-21)  Ventolin HFA 90 mcg/inh inhalation aerosol (03-17)      PHYSICAL EXAM:  GENERAL:  mild distress 2/2 dyspnea/coughing  HEAD:  Atraumatic, Normocephalic  EYES: EOMI, PERRLA, conjunctiva and sclera clear  NECK: Supple, No JVD, no cervical lymphadenopathy, non-tender  CHEST/LUNG: diffuse ronchi/wheezing in bilateral lung fields   HEART: tachycardic, regular rhythm   ABDOMEN: Soft, Nontender, Nondistended, BS present   EXTREMITIES:   Peripheral Pulses Present, No clubbing, no cyanosis, or no edema, no calf tenderness  PSYCH: AAOx3, cooperative, appropriate  NEUROLOGY: no focal neurological defecits

## 2021-05-28 NOTE — PROGRESS NOTE ADULT - ASSESSMENT
Pt is a 75yo male w/ pmh of DM, GERD, COPD/Asthma  who presents for evaluation of shortness of breath and chest discomfort.     # Asthma/ COPD Exacerbation   #Hx of COVID PNA   #Community Acquired Pneumonia   - pt w/ significant wheezing/ronchi on exam   - increased cough/ sputum production, leukocytosis on admission    Pulmonology consult :   - long steroid taper, continue Solumedrol   - duonebs atc and prn , cont symbicort, singulair   - start cefepime   - check sputum GS/Cx and Procal   - Upon discharge the patient needs an ICS/LABA, a PRN albuterol MDI and an oral pred taper    #Chest Discomfort   #NSTEMI Type II   - trop 0.04--> 0.05   - discomfort likely related to copd exacerbation, trop elevation likely demand   - trend troponin, repeat at 3p   - continue telemetry for now given tachycardia     #Diabetes Mellitus Type II w/ hyperglycemia   - A1c 9.5   - more likely to be poorly controlled given steroids   - Lantus 40, Lispro 22u tid, SSI   - monitor closely    #HLD- continue Lipitor     heparin sq       #Progress Note Handoff:  Pending (specify): improvement in copd exacerbation  Family discussion: d/w patient at bedside   Disposition: Home___/SNF___/Other________/Unknown at this time_x_______      Natalie Bahena DO

## 2021-05-28 NOTE — CONSULT NOTE ADULT - ASSESSMENT
IMPRESSION:  Status asthmaticus  hx COVID pneumonia March residual interstitial infiltrates  cannot r/o concurrent bacterial pneumonia      SUGGEST:  Check O2 sat on NC, record, continue O2 as necessary to maintain sats > 90%  Continue IV solumedrol very slow taper  bronchodilator treatments ATC and PRN  NEEDS course of antibiotics  check sputum gm stain CS  check procalcitonin  NIPPV if needed  OOB to chair  GI and DVT prophylaxis  pulmonary toilet  Monitor clinically, convert to oral prednisone as clinical improvement allows  Upon discharge the patient needs an ICS/LABA, a PRN albuterol MDI and an oral pred taper.    F/U in my office in 4 weeks

## 2021-05-29 LAB
ANION GAP SERPL CALC-SCNC: 14 MMOL/L — SIGNIFICANT CHANGE UP (ref 7–14)
BUN SERPL-MCNC: 22 MG/DL — HIGH (ref 10–20)
CALCIUM SERPL-MCNC: 9.7 MG/DL — SIGNIFICANT CHANGE UP (ref 8.5–10.1)
CHLORIDE SERPL-SCNC: 100 MMOL/L — SIGNIFICANT CHANGE UP (ref 98–110)
CO2 SERPL-SCNC: 24 MMOL/L — SIGNIFICANT CHANGE UP (ref 17–32)
CREAT SERPL-MCNC: 1 MG/DL — SIGNIFICANT CHANGE UP (ref 0.7–1.5)
GLUCOSE BLDC GLUCOMTR-MCNC: 247 MG/DL — HIGH (ref 70–99)
GLUCOSE BLDC GLUCOMTR-MCNC: 308 MG/DL — HIGH (ref 70–99)
GLUCOSE BLDC GLUCOMTR-MCNC: 324 MG/DL — HIGH (ref 70–99)
GLUCOSE BLDC GLUCOMTR-MCNC: 335 MG/DL — HIGH (ref 70–99)
GLUCOSE BLDC GLUCOMTR-MCNC: 99 MG/DL — SIGNIFICANT CHANGE UP (ref 70–99)
GLUCOSE SERPL-MCNC: 327 MG/DL — HIGH (ref 70–99)
HCT VFR BLD CALC: 39.3 % — LOW (ref 42–52)
HGB BLD-MCNC: 12.7 G/DL — LOW (ref 14–18)
MCHC RBC-ENTMCNC: 27.3 PG — SIGNIFICANT CHANGE UP (ref 27–31)
MCHC RBC-ENTMCNC: 32.3 G/DL — SIGNIFICANT CHANGE UP (ref 32–37)
MCV RBC AUTO: 84.5 FL — SIGNIFICANT CHANGE UP (ref 80–94)
NRBC # BLD: 0 /100 WBCS — SIGNIFICANT CHANGE UP (ref 0–0)
PLATELET # BLD AUTO: 330 K/UL — SIGNIFICANT CHANGE UP (ref 130–400)
POTASSIUM SERPL-MCNC: 4.6 MMOL/L — SIGNIFICANT CHANGE UP (ref 3.5–5)
POTASSIUM SERPL-SCNC: 4.6 MMOL/L — SIGNIFICANT CHANGE UP (ref 3.5–5)
PROCALCITONIN SERPL-MCNC: 0.13 NG/ML — HIGH (ref 0.02–0.1)
RBC # BLD: 4.65 M/UL — LOW (ref 4.7–6.1)
RBC # FLD: 13.6 % — SIGNIFICANT CHANGE UP (ref 11.5–14.5)
SODIUM SERPL-SCNC: 138 MMOL/L — SIGNIFICANT CHANGE UP (ref 135–146)
WBC # BLD: 19.47 K/UL — HIGH (ref 4.8–10.8)
WBC # FLD AUTO: 19.47 K/UL — HIGH (ref 4.8–10.8)

## 2021-05-29 PROCEDURE — 71045 X-RAY EXAM CHEST 1 VIEW: CPT | Mod: 26

## 2021-05-29 PROCEDURE — 99233 SBSQ HOSP IP/OBS HIGH 50: CPT

## 2021-05-29 RX ORDER — INSULIN LISPRO 100/ML
10 VIAL (ML) SUBCUTANEOUS ONCE
Refills: 0 | Status: COMPLETED | OUTPATIENT
Start: 2021-05-29 | End: 2021-05-29

## 2021-05-29 RX ORDER — PANTOPRAZOLE SODIUM 20 MG/1
40 TABLET, DELAYED RELEASE ORAL
Refills: 0 | Status: DISCONTINUED | OUTPATIENT
Start: 2021-05-29 | End: 2021-06-01

## 2021-05-29 RX ORDER — MAGNESIUM OXIDE 400 MG ORAL TABLET 241.3 MG
400 TABLET ORAL EVERY 8 HOURS
Refills: 0 | Status: DISCONTINUED | OUTPATIENT
Start: 2021-05-29 | End: 2021-06-01

## 2021-05-29 RX ORDER — INSULIN HUMAN 100 [IU]/ML
5 INJECTION, SOLUTION SUBCUTANEOUS ONCE
Refills: 0 | Status: COMPLETED | OUTPATIENT
Start: 2021-05-29 | End: 2021-05-29

## 2021-05-29 RX ADMIN — MAGNESIUM OXIDE 400 MG ORAL TABLET 400 MILLIGRAM(S): 241.3 TABLET ORAL at 21:14

## 2021-05-29 RX ADMIN — ATORVASTATIN CALCIUM 10 MILLIGRAM(S): 80 TABLET, FILM COATED ORAL at 21:14

## 2021-05-29 RX ADMIN — Medication 40 MILLIGRAM(S): at 14:18

## 2021-05-29 RX ADMIN — Medication 600 MILLIGRAM(S): at 18:35

## 2021-05-29 RX ADMIN — Medication 8: at 12:25

## 2021-05-29 RX ADMIN — INSULIN HUMAN 5 UNIT(S): 100 INJECTION, SOLUTION SUBCUTANEOUS at 02:01

## 2021-05-29 RX ADMIN — CEFEPIME 100 MILLIGRAM(S): 1 INJECTION, POWDER, FOR SOLUTION INTRAMUSCULAR; INTRAVENOUS at 18:35

## 2021-05-29 RX ADMIN — HEPARIN SODIUM 5000 UNIT(S): 5000 INJECTION INTRAVENOUS; SUBCUTANEOUS at 18:35

## 2021-05-29 RX ADMIN — HEPARIN SODIUM 5000 UNIT(S): 5000 INJECTION INTRAVENOUS; SUBCUTANEOUS at 05:51

## 2021-05-29 RX ADMIN — Medication 3 MILLILITER(S): at 19:26

## 2021-05-29 RX ADMIN — Medication 22 UNIT(S): at 07:53

## 2021-05-29 RX ADMIN — BUDESONIDE AND FORMOTEROL FUMARATE DIHYDRATE 2 PUFF(S): 160; 4.5 AEROSOL RESPIRATORY (INHALATION) at 08:03

## 2021-05-29 RX ADMIN — Medication 40 MILLIGRAM(S): at 05:50

## 2021-05-29 RX ADMIN — Medication 3 MILLILITER(S): at 14:00

## 2021-05-29 RX ADMIN — INSULIN GLARGINE 40 UNIT(S): 100 INJECTION, SOLUTION SUBCUTANEOUS at 21:14

## 2021-05-29 RX ADMIN — MAGNESIUM OXIDE 400 MG ORAL TABLET 400 MILLIGRAM(S): 241.3 TABLET ORAL at 14:17

## 2021-05-29 RX ADMIN — Medication 8: at 07:54

## 2021-05-29 RX ADMIN — Medication 3 MILLILITER(S): at 07:23

## 2021-05-29 RX ADMIN — Medication 10 UNIT(S): at 12:24

## 2021-05-29 RX ADMIN — Medication 3 MILLILITER(S): at 01:48

## 2021-05-29 RX ADMIN — MONTELUKAST 10 MILLIGRAM(S): 4 TABLET, CHEWABLE ORAL at 14:17

## 2021-05-29 RX ADMIN — Medication 40 MILLIGRAM(S): at 21:15

## 2021-05-29 RX ADMIN — PANTOPRAZOLE SODIUM 40 MILLIGRAM(S): 20 TABLET, DELAYED RELEASE ORAL at 14:17

## 2021-05-29 RX ADMIN — CEFEPIME 100 MILLIGRAM(S): 1 INJECTION, POWDER, FOR SOLUTION INTRAMUSCULAR; INTRAVENOUS at 05:50

## 2021-05-29 RX ADMIN — Medication 22 UNIT(S): at 12:24

## 2021-05-29 RX ADMIN — BUDESONIDE AND FORMOTEROL FUMARATE DIHYDRATE 2 PUFF(S): 160; 4.5 AEROSOL RESPIRATORY (INHALATION) at 19:58

## 2021-05-29 NOTE — CHART NOTE - NSCHARTNOTEFT_GEN_A_CORE
Patient with elevated fingerstick to 324 preprandial to lunch.     Patient was elevated to 308 this AM preprandial to breakfast - at that time he received 22units lispro standing plus an additional 8units lispro from his SSI, for a total of 30units.    Patient due for 30 units of lispro at this time. Will order an additional 10units of lispro for a total of 40 units preprandial to lunch.     Patient currently on IV solumedrol for COPD exac likely causing him to be uncontrolled with his blood glucose.

## 2021-05-29 NOTE — PROGRESS NOTE ADULT - ASSESSMENT
Pt is a 77yo male w/ pmh of DM, GERD, COPD/Asthma  who presents for evaluation of shortness of breath and chest discomfort.     # Asthma/ COPD Exacerbation   #Hx of COVID PNA   #Suspected Gram negative PNA   -continue with IV steroids, PPI, abx, Stat chest xray noted with bilateral opacities; will place ID consult and check blood cultures  -Pulmonary follow up while inpatient   -overnight hospitalist was notified about the resp status (chart note seen)     #Chest Discomfort as per yesterday's documentation; patient denies any chest pain to me and cardiac enzymes downtrended     #Diabetes Mellitus Type II w/ hyperglycemia / uncontrolled   - A1c 9.5   - sq insulin long acting and short acting + sliding scale     #HLD- continue Lipitor     heparin sq       # Progress Note Handoff  PENDING as follows  consults: ID consult   Test: sputum and blood cultures   Family discussion: discussed with patient who comprehends his medical care and agreeable for current plan of care   Disposition: ACUTE     Attending Physician Dr. Candie Duke # [id number]    Pt is a 77yo male w/ pmh of DM, GERD, COPD/Asthma  who presents for evaluation of shortness of breath and chest discomfort.     # Asthma Exacerbation   #Hx of COVID PNA   #Suspected Gram negative PNA   -continue with IV steroids, PPI, abx, Stat chest xray noted with bilateral opacities; will place ID consult and check blood cultures  -Pulmonary follow up while inpatient   -overnight hospitalist was notified about the resp status (chart note seen)     #Chest Discomfort as per yesterday's documentation; patient denies any chest pain to me and cardiac enzymes downtrended     #Diabetes Mellitus Type II w/ hyperglycemia / uncontrolled   - A1c 9.5   - sq insulin long acting and short acting + sliding scale     #HLD- continue Lipitor     # suspected Magnesium Def  -replete with oral supplements     dvt and gi ppx       # Progress Note Handoff  PENDING as follows  consults: ID consult   Test: sputum and blood cultures   Family discussion: discussed with patient who comprehends his medical care and agreeable for current plan of care; updates given to satya Whitmore over the phone and aware of current medication regimen   Disposition: ACUTE     Attending Physician Dr. Candie Duke # [id number]

## 2021-05-29 NOTE — PROGRESS NOTE ADULT - SUBJECTIVE AND OBJECTIVE BOX
DEL ALVARADO  76y  Male      Patient is a 76y old  Male who presents with a chief complaint of     INTERVAL HPI/OVERNIGHT EVENTS:    REVIEW OF SYSTEMS:  CONSTITUTIONAL: No fever, weight loss, or fatigue  EYES: No eye pain, visual disturbances, or discharge  NECK: No pain or stiffness  RESPIRATORY: No cough, wheezing, chills or hemoptysis; No shortness of breath  CARDIOVASCULAR: No chest pain, palpitations, dizziness, or leg swelling  GASTROINTESTINAL: No abdominal or epigastric pain. No nausea, vomiting, or hematemesis; No diarrhea or constipation. No melena or hematochezia.  GENITOURINARY: No dysuria, frequency, hematuria, or incontinence  NEUROLOGICAL: No headaches, memory loss, loss of strength, numbness, or tremors  MUSCULOSKELETAL: No joint pain or swelling; No muscle, back, or extremity pain    Vital Signs Last 24 Hrs  T(C): 36.4 (29 May 2021 05:11), Max: 36.4 (29 May 2021 05:11)  T(F): 97.6 (29 May 2021 05:11), Max: 97.6 (29 May 2021 05:11)  HR: 98 (29 May 2021 05:11) (98 - 101)  BP: 130/70 (29 May 2021 05:11) (130/70 - 153/84)  RR: 17 (29 May 2021 05:11) (17 - 17)  SpO2: --    PHYSICAL EXAM:  GENERAL: NAD, well-groomed, well-developed  HEAD:  Atraumatic, Normocephalic  EYES: EOMI, PERRLA, conjunctiva and sclera clear  NERVOUS SYSTEM:  Alert & Oriented X 4, Good concentration; Motor Strength 5/5 B/L upper and lower extremities; DTRs 2+ intact and symmetric  CHEST/LUNG: Clear to percussion bilaterally; No rales, rhonchi, wheezing, or rubs  CV/HEART: Regular rate and rhythm; No murmurs, rubs, or gallops  GI/ABDOMEN: Soft, Nontender, Nondistended; Bowel sounds present  EXTREMITIES:  2+ Peripheral Pulses, No clubbing, cyanosis, or edema  SKIN: No rashes or lesions    LAB:                        12.7   19.47 )-----------( 330      ( 29 May 2021 06:00 )             39.3     05-29    138  |  100  |  22<H>  ----------------------------<  327<H>  4.6   |  24  |  1.0    Ca    9.7      29 May 2021 06:00  Mg     1.6     05-28    TPro  5.9<L>  /  Alb  3.5  /  TBili  0.9  /  DBili  x   /  AST  11  /  ALT  14  /  AlkPhos  72  05-28    CARDIAC MARKERS ( 28 May 2021 15:35 )  x     / 0.02 ng/mL / 70 U/L / x     / 5.9 ng/mL  CARDIAC MARKERS ( 28 May 2021 06:13 )  x     / 0.05 ng/mL / 83 U/L / x     / 6.0 ng/mL  CARDIAC MARKERS ( 27 May 2021 19:30 )  x     / 0.04 ng/mL / x     / x     / x        Daily     Daily   CAPILLARY BLOOD GLUCOSE    POCT Blood Glucose.: 324 mg/dL (29 May 2021 11:20)  POCT Blood Glucose.: 308 mg/dL (29 May 2021 07:30)  POCT Blood Glucose.: 335 mg/dL (29 May 2021 01:35)  POCT Blood Glucose.: 249 mg/dL (28 May 2021 21:02)  POCT Blood Glucose.: 236 mg/dL (28 May 2021 16:54)    LIVER FUNCTIONS - ( 28 May 2021 06:13 )  Alb: 3.5 g/dL / Pro: 5.9 g/dL / ALK PHOS: 72 U/L / ALT: 14 U/L / AST: 11 U/L / GGT: x           RADIOLOGY:    Imaging Personally Reviewed:  [ Y ] YES  [ ] NO    HEALTH ISSUES - PROBLEM Dx:  Chest pain  Chest pain    Exertional dyspnea  Exertional dyspnea    COPD (chronic obstructive pulmonary disease)  COPD (chronic obstructive pulmonary disease)    Asthma  Asthma    Diabetes  Diabetes    Prostate cancer  Prostate cancer    Dyslipidemia  Dyslipidemia    MEDS:  ALBUTerol    0.083% 2.5 milliGRAM(s) Nebulizer every 4 hours PRN  albuterol/ipratropium for Nebulization 3 milliLiter(s) Nebulizer every 6 hours  atorvastatin 10 milliGRAM(s) Oral at bedtime  budesonide 160 MICROgram(s)/formoterol 4.5 MICROgram(s) Inhaler 2 Puff(s) Inhalation two times a day  cefepime   IVPB      cefepime   IVPB 500 milliGRAM(s) IV Intermittent every 12 hours  chlorhexidine 4% Liquid 1 Application(s) Topical <User Schedule>  dextrose 40% Gel 15 Gram(s) Oral once  dextrose 5%. 1000 milliLiter(s) IV Continuous <Continuous>  dextrose 5%. 1000 milliLiter(s) IV Continuous <Continuous>  dextrose 50% Injectable 25 Gram(s) IV Push once  dextrose 50% Injectable 12.5 Gram(s) IV Push once  dextrose 50% Injectable 25 Gram(s) IV Push once  glucagon  Injectable 1 milliGRAM(s) IntraMuscular once  guaiFENesin  milliGRAM(s) Oral every 12 hours  heparin   Injectable 5000 Unit(s) SubCutaneous two times a day  insulin glargine Injectable (LANTUS) 40 Unit(s) SubCutaneous at bedtime  insulin lispro (ADMELOG) corrective regimen sliding scale   SubCutaneous three times a day before meals  insulin lispro Injectable (ADMELOG) 22 Unit(s) SubCutaneous before lunch  insulin lispro Injectable (ADMELOG) 22 Unit(s) SubCutaneous before breakfast  insulin lispro Injectable (ADMELOG) 22 Unit(s) SubCutaneous before dinner  methylPREDNISolone sodium succinate Injectable 40 milliGRAM(s) IV Push every 8 hours  montelukast 10 milliGRAM(s) Oral daily         DEL ALVARADO  76y  Male      Patient is a 76y old  Male who presents with a chief complaint of     INTERVAL HPI/OVERNIGHT EVENTS:    pt seen and examined at bedside this morning and spoke to his son Myranda over the phone; updates given  -overnight events of shortness of breath; seen/assessed by the doctor on call for the night  -stat chest xray done this am with stable opacities and resp status stable of current NC O2  -continue with abx, ID consult   -add PPI as on high dose steroids     REVIEW OF SYSTEMS:  -patient with mild dyspnea   -otherwise no complaints     Vital Signs Last 24 Hrs  T(C): 36.4 (29 May 2021 05:11), Max: 36.4 (29 May 2021 05:11)  T(F): 97.6 (29 May 2021 05:11), Max: 97.6 (29 May 2021 05:11)  HR: 98 (29 May 2021 05:11) (98 - 101)  BP: 130/70 (29 May 2021 05:11) (130/70 - 153/84)  RR: 17 (29 May 2021 05:11) (17 - 17)    PHYSICAL EXAM:  GENERAL: speaking in full sentences   HEAD:  Atraumatic, Normocephalic  EYES: EOMI, PERRLA, conjunctiva and sclera clear  NERVOUS SYSTEM:  Alert & Oriented X 3  CHEST/LUNG: rhonchi b/l  CV/HEART: Regular rate and rhythm; No murmurs, rubs, or gallops  GI/ABDOMEN: Soft, Nontender, Nondistended; Bowel sounds present  EXTREMITIES:  2+ Peripheral Pulses, No clubbing, cyanosis, or edema  SKIN: No rashes or lesions    LAB:                        12.7   19.47 )-----------( 330      ( 29 May 2021 06:00 )             39.3     05-29    138  |  100  |  22<H>  ----------------------------<  327<H>  4.6   |  24  |  1.0    Ca    9.7      29 May 2021 06:00  Mg     1.6     05-28    TPro  5.9<L>  /  Alb  3.5  /  TBili  0.9  /  DBili  x   /  AST  11  /  ALT  14  /  AlkPhos  72  05-28    CARDIAC MARKERS ( 28 May 2021 15:35 )  x     / 0.02 ng/mL / 70 U/L / x     / 5.9 ng/mL  CARDIAC MARKERS ( 28 May 2021 06:13 )  x     / 0.05 ng/mL / 83 U/L / x     / 6.0 ng/mL  CARDIAC MARKERS ( 27 May 2021 19:30 )  x     / 0.04 ng/mL / x     / x     / x        Daily     Daily   CAPILLARY BLOOD GLUCOSE    POCT Blood Glucose.: 324 mg/dL (29 May 2021 11:20)  POCT Blood Glucose.: 308 mg/dL (29 May 2021 07:30)  POCT Blood Glucose.: 335 mg/dL (29 May 2021 01:35)  POCT Blood Glucose.: 249 mg/dL (28 May 2021 21:02)  POCT Blood Glucose.: 236 mg/dL (28 May 2021 16:54)    LIVER FUNCTIONS - ( 28 May 2021 06:13 )  Alb: 3.5 g/dL / Pro: 5.9 g/dL / ALK PHOS: 72 U/L / ALT: 14 U/L / AST: 11 U/L / GGT: x           RADIOLOGY:    Imaging Personally Reviewed:  [ Y ] YES  [ ] NO    HEALTH ISSUES - PROBLEM Dx:  Chest pain  Chest pain    Exertional dyspnea  Exertional dyspnea    COPD (chronic obstructive pulmonary disease)  COPD (chronic obstructive pulmonary disease)    Asthma  Asthma    Diabetes  Diabetes    Prostate cancer  Prostate cancer    Dyslipidemia  Dyslipidemia    MEDS:  ALBUTerol    0.083% 2.5 milliGRAM(s) Nebulizer every 4 hours PRN  albuterol/ipratropium for Nebulization 3 milliLiter(s) Nebulizer every 6 hours  atorvastatin 10 milliGRAM(s) Oral at bedtime  budesonide 160 MICROgram(s)/formoterol 4.5 MICROgram(s) Inhaler 2 Puff(s) Inhalation two times a day  cefepime   IVPB      cefepime   IVPB 500 milliGRAM(s) IV Intermittent every 12 hours  chlorhexidine 4% Liquid 1 Application(s) Topical <User Schedule>  dextrose 40% Gel 15 Gram(s) Oral once  dextrose 5%. 1000 milliLiter(s) IV Continuous <Continuous>  dextrose 5%. 1000 milliLiter(s) IV Continuous <Continuous>  dextrose 50% Injectable 25 Gram(s) IV Push once  dextrose 50% Injectable 12.5 Gram(s) IV Push once  dextrose 50% Injectable 25 Gram(s) IV Push once  glucagon  Injectable 1 milliGRAM(s) IntraMuscular once  guaiFENesin  milliGRAM(s) Oral every 12 hours  heparin   Injectable 5000 Unit(s) SubCutaneous two times a day  insulin glargine Injectable (LANTUS) 40 Unit(s) SubCutaneous at bedtime  insulin lispro (ADMELOG) corrective regimen sliding scale   SubCutaneous three times a day before meals  insulin lispro Injectable (ADMELOG) 22 Unit(s) SubCutaneous before lunch  insulin lispro Injectable (ADMELOG) 22 Unit(s) SubCutaneous before breakfast  insulin lispro Injectable (ADMELOG) 22 Unit(s) SubCutaneous before dinner  methylPREDNISolone sodium succinate Injectable 40 milliGRAM(s) IV Push every 8 hours  montelukast 10 milliGRAM(s) Oral daily

## 2021-05-30 LAB
ANION GAP SERPL CALC-SCNC: 13 MMOL/L — SIGNIFICANT CHANGE UP (ref 7–14)
BUN SERPL-MCNC: 33 MG/DL — HIGH (ref 10–20)
CALCIUM SERPL-MCNC: 9.2 MG/DL — SIGNIFICANT CHANGE UP (ref 8.5–10.1)
CHLORIDE SERPL-SCNC: 99 MMOL/L — SIGNIFICANT CHANGE UP (ref 98–110)
CO2 SERPL-SCNC: 23 MMOL/L — SIGNIFICANT CHANGE UP (ref 17–32)
CREAT SERPL-MCNC: 1 MG/DL — SIGNIFICANT CHANGE UP (ref 0.7–1.5)
GLUCOSE BLDC GLUCOMTR-MCNC: 170 MG/DL — HIGH (ref 70–99)
GLUCOSE BLDC GLUCOMTR-MCNC: 276 MG/DL — HIGH (ref 70–99)
GLUCOSE BLDC GLUCOMTR-MCNC: 296 MG/DL — HIGH (ref 70–99)
GLUCOSE BLDC GLUCOMTR-MCNC: 317 MG/DL — HIGH (ref 70–99)
GLUCOSE BLDC GLUCOMTR-MCNC: 405 MG/DL — HIGH (ref 70–99)
GLUCOSE SERPL-MCNC: 359 MG/DL — HIGH (ref 70–99)
GRAM STN FLD: SIGNIFICANT CHANGE UP
HCT VFR BLD CALC: 38.3 % — LOW (ref 42–52)
HGB BLD-MCNC: 12.3 G/DL — LOW (ref 14–18)
MAGNESIUM SERPL-MCNC: 2 MG/DL — SIGNIFICANT CHANGE UP (ref 1.8–2.4)
MCHC RBC-ENTMCNC: 27.3 PG — SIGNIFICANT CHANGE UP (ref 27–31)
MCHC RBC-ENTMCNC: 32.1 G/DL — SIGNIFICANT CHANGE UP (ref 32–37)
MCV RBC AUTO: 85.1 FL — SIGNIFICANT CHANGE UP (ref 80–94)
NRBC # BLD: 0 /100 WBCS — SIGNIFICANT CHANGE UP (ref 0–0)
PLATELET # BLD AUTO: 355 K/UL — SIGNIFICANT CHANGE UP (ref 130–400)
POTASSIUM SERPL-MCNC: 4.6 MMOL/L — SIGNIFICANT CHANGE UP (ref 3.5–5)
POTASSIUM SERPL-SCNC: 4.6 MMOL/L — SIGNIFICANT CHANGE UP (ref 3.5–5)
RBC # BLD: 4.5 M/UL — LOW (ref 4.7–6.1)
RBC # FLD: 13.6 % — SIGNIFICANT CHANGE UP (ref 11.5–14.5)
SODIUM SERPL-SCNC: 135 MMOL/L — SIGNIFICANT CHANGE UP (ref 135–146)
SPECIMEN SOURCE: SIGNIFICANT CHANGE UP
WBC # BLD: 22.39 K/UL — HIGH (ref 4.8–10.8)
WBC # FLD AUTO: 22.39 K/UL — HIGH (ref 4.8–10.8)

## 2021-05-30 PROCEDURE — 99233 SBSQ HOSP IP/OBS HIGH 50: CPT

## 2021-05-30 PROCEDURE — 71045 X-RAY EXAM CHEST 1 VIEW: CPT | Mod: 26

## 2021-05-30 RX ADMIN — Medication 3 MILLILITER(S): at 14:09

## 2021-05-30 RX ADMIN — MAGNESIUM OXIDE 400 MG ORAL TABLET 400 MILLIGRAM(S): 241.3 TABLET ORAL at 21:20

## 2021-05-30 RX ADMIN — CEFEPIME 100 MILLIGRAM(S): 1 INJECTION, POWDER, FOR SOLUTION INTRAMUSCULAR; INTRAVENOUS at 05:48

## 2021-05-30 RX ADMIN — Medication 40 MILLIGRAM(S): at 21:20

## 2021-05-30 RX ADMIN — INSULIN GLARGINE 40 UNIT(S): 100 INJECTION, SOLUTION SUBCUTANEOUS at 21:20

## 2021-05-30 RX ADMIN — BUDESONIDE AND FORMOTEROL FUMARATE DIHYDRATE 2 PUFF(S): 160; 4.5 AEROSOL RESPIRATORY (INHALATION) at 21:20

## 2021-05-30 RX ADMIN — Medication 600 MILLIGRAM(S): at 17:13

## 2021-05-30 RX ADMIN — PANTOPRAZOLE SODIUM 40 MILLIGRAM(S): 20 TABLET, DELAYED RELEASE ORAL at 05:48

## 2021-05-30 RX ADMIN — MAGNESIUM OXIDE 400 MG ORAL TABLET 400 MILLIGRAM(S): 241.3 TABLET ORAL at 05:48

## 2021-05-30 RX ADMIN — Medication 3 MILLILITER(S): at 19:16

## 2021-05-30 RX ADMIN — Medication 40 MILLIGRAM(S): at 14:48

## 2021-05-30 RX ADMIN — Medication 600 MILLIGRAM(S): at 05:48

## 2021-05-30 RX ADMIN — Medication 22 UNIT(S): at 12:03

## 2021-05-30 RX ADMIN — Medication 6: at 17:13

## 2021-05-30 RX ADMIN — MAGNESIUM OXIDE 400 MG ORAL TABLET 400 MILLIGRAM(S): 241.3 TABLET ORAL at 14:48

## 2021-05-30 RX ADMIN — Medication 22 UNIT(S): at 08:40

## 2021-05-30 RX ADMIN — MONTELUKAST 10 MILLIGRAM(S): 4 TABLET, CHEWABLE ORAL at 12:03

## 2021-05-30 RX ADMIN — CEFEPIME 100 MILLIGRAM(S): 1 INJECTION, POWDER, FOR SOLUTION INTRAMUSCULAR; INTRAVENOUS at 17:13

## 2021-05-30 RX ADMIN — Medication 12: at 12:03

## 2021-05-30 RX ADMIN — Medication 3 MILLILITER(S): at 08:44

## 2021-05-30 RX ADMIN — Medication 40 MILLIGRAM(S): at 05:48

## 2021-05-30 RX ADMIN — HEPARIN SODIUM 5000 UNIT(S): 5000 INJECTION INTRAVENOUS; SUBCUTANEOUS at 05:48

## 2021-05-30 RX ADMIN — BUDESONIDE AND FORMOTEROL FUMARATE DIHYDRATE 2 PUFF(S): 160; 4.5 AEROSOL RESPIRATORY (INHALATION) at 08:41

## 2021-05-30 RX ADMIN — HEPARIN SODIUM 5000 UNIT(S): 5000 INJECTION INTRAVENOUS; SUBCUTANEOUS at 17:14

## 2021-05-30 RX ADMIN — Medication 8: at 08:40

## 2021-05-30 RX ADMIN — ATORVASTATIN CALCIUM 10 MILLIGRAM(S): 80 TABLET, FILM COATED ORAL at 21:20

## 2021-05-30 RX ADMIN — Medication 22 UNIT(S): at 17:12

## 2021-05-30 NOTE — CONSULT NOTE ADULT - SUBJECTIVE AND OBJECTIVE BOX
Patient is a 76y old  Male who presents with a chief complaint of       REVIEW OF SYSTEMS: Total of twelve systems have been reviewed with patient and found to be negative unless mentioned in HPI      PAST MEDICAL & SURGICAL HISTORY:  Diabetes, 20 yrs  Prostate cancer, 1999 s/p sx  Asthma, 20 yr  COPD (chronic obstructive pulmonary disease)  20 yrs no 02 rx  Dyslipidemia  S/P TURP, 1999  History of surgery  back surgery        SOCIAL HISTORY  Alcohol: Does not drink  Tobacco: Does not smoke  Illicit substance use: None      FAMILY HISTORY: Non contributory to the present illness        ALLERGIES: No Known Allergies        Vital Signs Last 24 Hrs  T(C): 35.8 (30 May 2021 13:40), Max: 36.3 (30 May 2021 05:35)  T(F): 96.4 (30 May 2021 13:40), Max: 97.4 (30 May 2021 05:35)  HR: 100 (30 May 2021 13:40) (91 - 104)  BP: 126/60 (30 May 2021 13:40) (126/60 - 147/72)  BP(mean): --  RR: 16 (30 May 2021 13:40) (16 - 18)  SpO2: 97% (30 May 2021 09:53) (95% - 97%)      PHYSICAL EXAM:  GENERAL: Not in distress   CHEST/LUNG:  Aire ntry bilaterally  HEART: s1 and s2 present  ABDOMEN:  Nontender and  Nondistended  EXTREMITIES: No pedal  edema  CNS: Awake and Alert      LABS:                        12.3   22.39 )-----------( 355      ( 30 May 2021 06:21 )             38.3       05-30    135  |  99  |  33<H>  ----------------------------<  359<H>  4.6   |  23  |  1.0    Ca    9.2      30 May 2021 06:21  Mg     2.0     05-30          CAPILLARY BLOOD GLUCOSE  POCT Blood Glucose.: 405 mg/dL (30 May 2021 11:18)  POCT Blood Glucose.: 317 mg/dL (30 May 2021 07:22)  POCT Blood Glucose.: 296 mg/dL (30 May 2021 04:51)  POCT Blood Glucose.: 247 mg/dL (29 May 2021 20:53)  POCT Blood Glucose.: 99 mg/dL (29 May 2021 16:46)        MEDICATIONS  (STANDING):  albuterol/ipratropium for Nebulization 3 milliLiter(s) Nebulizer every 6 hours  atorvastatin 10 milliGRAM(s) Oral at bedtime  budesonide 160 MICROgram(s)/formoterol 4.5 MICROgram(s) Inhaler 2 Puff(s) Inhalation two times a day  cefepime   IVPB      cefepime   IVPB 500 milliGRAM(s) IV Intermittent every 12 hours  chlorhexidine 4% Liquid 1 Application(s) Topical <User Schedule>  dextrose 40% Gel 15 Gram(s) Oral once  dextrose 5%. 1000 milliLiter(s) (50 mL/Hr) IV Continuous <Continuous>  dextrose 5%. 1000 milliLiter(s) (100 mL/Hr) IV Continuous <Continuous>  dextrose 50% Injectable 25 Gram(s) IV Push once  dextrose 50% Injectable 12.5 Gram(s) IV Push once  dextrose 50% Injectable 25 Gram(s) IV Push once  glucagon  Injectable 1 milliGRAM(s) IntraMuscular once  guaiFENesin  milliGRAM(s) Oral every 12 hours  heparin   Injectable 5000 Unit(s) SubCutaneous two times a day  insulin glargine Injectable (LANTUS) 40 Unit(s) SubCutaneous at bedtime  insulin lispro (ADMELOG) corrective regimen sliding scale   SubCutaneous three times a day before meals  insulin lispro Injectable (ADMELOG) 22 Unit(s) SubCutaneous before lunch  insulin lispro Injectable (ADMELOG) 22 Unit(s) SubCutaneous before breakfast  insulin lispro Injectable (ADMELOG) 22 Unit(s) SubCutaneous before dinner  magnesium oxide 400 milliGRAM(s) Oral every 8 hours  methylPREDNISolone sodium succinate Injectable 40 milliGRAM(s) IV Push every 8 hours  montelukast 10 milliGRAM(s) Oral daily  pantoprazole    Tablet 40 milliGRAM(s) Oral before breakfast    MEDICATIONS  (PRN):  ALBUTerol    0.083% 2.5 milliGRAM(s) Nebulizer every 4 hours PRN Shortness of Breath and/or Wheezing          RADIOLOGY & ADDITIONAL TESTS:             Patient is a 76y old  Male with multiple co-morbidities including COPD not on Home oxygen, now presents to the ER for evaluation of shorness of breath and reflux-like retrosternal burning pain on exertion with associated cough and chills, no fever at home. Pt is fully vaccinated against COVID and tested positive plus had antibodies in March, no sick contacts or recent travel. On admission, he found to have tachycardia and Leukocytosis. The CXR shows Bibasilar interstitial opacities. He has started on Cefepime and the Id consult requested to assist with further evaluation and antibiotic management.       REVIEW OF SYSTEMS: Total of twelve systems have been reviewed with patient and found to be negative unless mentioned in HPI      PAST MEDICAL & SURGICAL HISTORY:  Diabetes, 20 yrs  Prostate cancer, 1999 s/p sx  Asthma, 20 yr  COPD (chronic obstructive pulmonary disease), 20 yrs no 02 rx  Dyslipidemia  S/P TURP, 1999  History of surgery  back surgery        SOCIAL HISTORY  Alcohol: Does not drink  Tobacco: Does not smoke  Illicit substance use: None      FAMILY HISTORY: Non contributory to the present illness      ALLERGIES: No Known Allergies      Vital Signs Last 24 Hrs  T(C): 35.8 (30 May 2021 13:40), Max: 36.3 (30 May 2021 05:35)  T(F): 96.4 (30 May 2021 13:40), Max: 97.4 (30 May 2021 05:35)  HR: 100 (30 May 2021 13:40) (91 - 104)  BP: 126/60 (30 May 2021 13:40) (126/60 - 147/72)  BP(mean): --  RR: 16 (30 May 2021 13:40) (16 - 18)  SpO2: 97% (30 May 2021 09:53) (95% - 97%)      PHYSICAL EXAM:  GENERAL: Not in distress   CHEST/LUNG: Not using accessory muscles  HEART: s1 and s2 present  ABDOMEN:  Nontender and  Nondistended  EXTREMITIES: No pedal  edema  CNS: Awake and Alert      LABS:                        12.3   22.39 )-----------( 355      ( 30 May 2021 06:21 )             38.3       05-30    135  |  99  |  33<H>  ----------------------------<  359<H>  4.6   |  23  |  1.0    Ca    9.2      30 May 2021 06:21  Mg     2.0     05-30      CAPILLARY BLOOD GLUCOSE  POCT Blood Glucose.: 405 mg/dL (30 May 2021 11:18)  POCT Blood Glucose.: 317 mg/dL (30 May 2021 07:22)  POCT Blood Glucose.: 296 mg/dL (30 May 2021 04:51)  POCT Blood Glucose.: 247 mg/dL (29 May 2021 20:53)  POCT Blood Glucose.: 99 mg/dL (29 May 2021 16:46)        MEDICATIONS  (STANDING):  albuterol/ipratropium for Nebulization 3 milliLiter(s) Nebulizer every 6 hours  atorvastatin 10 milliGRAM(s) Oral at bedtime  budesonide 160 MICROgram(s)/formoterol 4.5 MICROgram(s) Inhaler 2 Puff(s) Inhalation two times a day  cefepime   IVPB      cefepime   IVPB 500 milliGRAM(s) IV Intermittent every 12 hours  chlorhexidine 4% Liquid 1 Application(s) Topical <User Schedule>  glucagon  Injectable 1 milliGRAM(s) IntraMuscular once  guaiFENesin  milliGRAM(s) Oral every 12 hours  heparin   Injectable 5000 Unit(s) SubCutaneous two times a day  insulin glargine Injectable (LANTUS) 40 Unit(s) SubCutaneous at bedtime  insulin lispro (ADMELOG) corrective regimen sliding scale   SubCutaneous three times a day before meals  insulin lispro Injectable (ADMELOG) 22 Unit(s) SubCutaneous before lunch  insulin lispro Injectable (ADMELOG) 22 Unit(s) SubCutaneous before breakfast  insulin lispro Injectable (ADMELOG) 22 Unit(s) SubCutaneous before dinner  magnesium oxide 400 milliGRAM(s) Oral every 8 hours  methylPREDNISolone sodium succinate Injectable 40 milliGRAM(s) IV Push every 8 hours  montelukast 10 milliGRAM(s) Oral daily  pantoprazole    Tablet 40 milliGRAM(s) Oral before breakfast    MEDICATIONS  (PRN):  ALBUTerol    0.083% 2.5 milliGRAM(s) Nebulizer every 4 hours PRN Shortness of Breath and/or Wheezing        RADIOLOGY & ADDITIONAL TESTS:    < from: Xray Chest 1 View- PORTABLE-Routine (Xray Chest 1 View- PORTABLE-Routine in AM.) (05.30.21 @ 07:15) >  Lung parenchyma/Pleura: Stable to decreased interstitial opacities. No pneumothorax.    < from: Xray Chest 1 View- PORTABLE-Urgent (05.27.21 @ 20:08) >    In comparison with the prior chest x-ray dated March 18, 2021:    Bilateral lung opacities, unchanged.      < from: Xray Chest 1 View-PORTABLE IMMEDIATE (Xray Chest 1 View-PORTABLE IMMEDIATE .) (05.29.21 @ 09:36) >  Bibasilar interstitial opacities      MICROBIOLOGY DATA:    COVID-19 Ramón Domain Antibody (05.28.21 @ 06:13)   COVID-19 Ramón Domain Antibody Result: >250.00:    Flu With COVID-19 By [facility] (05.27.21 @ 19:56)   SARS-CoV-2 Result: Negative Counts

## 2021-05-30 NOTE — PROGRESS NOTE ADULT - SUBJECTIVE AND OBJECTIVE BOX
DEL ALVARADO  76y  Male      Patient is a 76y old  Male who presents with a chief complaint of     INTERVAL HPI/OVERNIGHT EVENTS:  5/29/21:  pt seen and examined at bedside this morning and spoke to his son Myranda over the phone; updates given  -overnight events of shortness of breath; seen/assessed by the doctor on call for the night  -stat chest xray done this am with stable opacities and resp status stable of current NC O2  -continue with abx, ID consult   -add PPI as on high dose steroids     5/30/21:  pt seen and examined at bedside this morning   -continue with current IV steroids, abx   -no discharge this weekend  -no acute events notified to me     REVIEW OF SYSTEMS:  -patient with mild dyspnea   -otherwise no complaints     Vital Signs Last 24 Hrs  T(C): 36.3 (30 May 2021 05:35), Max: 36.3 (30 May 2021 05:35)  T(F): 97.4 (30 May 2021 05:35), Max: 97.4 (30 May 2021 05:35)  HR: 104 (30 May 2021 05:35) (91 - 104)  BP: 147/72 (30 May 2021 05:35) (128/61 - 147/72)  RR: 18 (30 May 2021 05:35) (16 - 18)  SpO2: 96% (30 May 2021 08:45) (95% - 97%)    PHYSICAL EXAM:  GENERAL: speaking in full sentences   HEAD:  Atraumatic, Normocephalic  EYES: EOMI, PERRLA, conjunctiva and sclera clear  NERVOUS SYSTEM:  Alert & Oriented X 3  CHEST/LUNG: rhonchi b/l  CV/HEART: Regular rate and rhythm; No murmurs, rubs, or gallops  GI/ABDOMEN: Soft, Nontender, Nondistended; Bowel sounds present  EXTREMITIES:  2+ Peripheral Pulses, No clubbing, cyanosis, or edema  SKIN: No rashes or lesions    LAB:                          12.3   22.39 )-----------( 355      ( 30 May 2021 06:21 )             38.3     05-30    135  |  99  |  33<H>  ----------------------------<  359<H>  4.6   |  23  |  1.0    Ca    9.2      30 May 2021 06:21  Mg     2.0     05-30    Daily     Daily   CAPILLARY BLOOD GLUCOSE    POCT Blood Glucose.: 324 mg/dL (29 May 2021 11:20)  POCT Blood Glucose.: 308 mg/dL (29 May 2021 07:30)  POCT Blood Glucose.: 335 mg/dL (29 May 2021 01:35)  POCT Blood Glucose.: 249 mg/dL (28 May 2021 21:02)  POCT Blood Glucose.: 236 mg/dL (28 May 2021 16:54)    LIVER FUNCTIONS - ( 28 May 2021 06:13 )  Alb: 3.5 g/dL / Pro: 5.9 g/dL / ALK PHOS: 72 U/L / ALT: 14 U/L / AST: 11 U/L / GGT: x           RADIOLOGY:    Imaging Personally Reviewed:  [ Y ] YES  [ ] NO    HEALTH ISSUES - PROBLEM Dx:  Chest pain  Chest pain    Exertional dyspnea  Exertional dyspnea    COPD (chronic obstructive pulmonary disease)  COPD (chronic obstructive pulmonary disease)    Asthma  Asthma    Diabetes  Diabetes    Prostate cancer  Prostate cancer    Dyslipidemia  Dyslipidemia    MEDICATIONS  (STANDING):  albuterol/ipratropium for Nebulization 3 milliLiter(s) Nebulizer every 6 hours  atorvastatin 10 milliGRAM(s) Oral at bedtime  budesonide 160 MICROgram(s)/formoterol 4.5 MICROgram(s) Inhaler 2 Puff(s) Inhalation two times a day  cefepime   IVPB      cefepime   IVPB 500 milliGRAM(s) IV Intermittent every 12 hours  chlorhexidine 4% Liquid 1 Application(s) Topical <User Schedule>  dextrose 40% Gel 15 Gram(s) Oral once  dextrose 5%. 1000 milliLiter(s) (50 mL/Hr) IV Continuous <Continuous>  dextrose 5%. 1000 milliLiter(s) (100 mL/Hr) IV Continuous <Continuous>  dextrose 50% Injectable 25 Gram(s) IV Push once  dextrose 50% Injectable 12.5 Gram(s) IV Push once  dextrose 50% Injectable 25 Gram(s) IV Push once  glucagon  Injectable 1 milliGRAM(s) IntraMuscular once  guaiFENesin  milliGRAM(s) Oral every 12 hours  heparin   Injectable 5000 Unit(s) SubCutaneous two times a day  insulin glargine Injectable (LANTUS) 40 Unit(s) SubCutaneous at bedtime  insulin lispro (ADMELOG) corrective regimen sliding scale   SubCutaneous three times a day before meals  insulin lispro Injectable (ADMELOG) 22 Unit(s) SubCutaneous before lunch  insulin lispro Injectable (ADMELOG) 22 Unit(s) SubCutaneous before breakfast  insulin lispro Injectable (ADMELOG) 22 Unit(s) SubCutaneous before dinner  magnesium oxide 400 milliGRAM(s) Oral every 8 hours  methylPREDNISolone sodium succinate Injectable 40 milliGRAM(s) IV Push every 8 hours  montelukast 10 milliGRAM(s) Oral daily  pantoprazole    Tablet 40 milliGRAM(s) Oral before breakfast    MEDICATIONS  (PRN):  ALBUTerol    0.083% 2.5 milliGRAM(s) Nebulizer every 4 hours PRN Shortness of Breath and/or Wheezing

## 2021-05-30 NOTE — PROGRESS NOTE ADULT - ASSESSMENT
Pt is a 77yo male w/ pmh of DM, GERD, COPD/Asthma  who presents for evaluation of shortness of breath and chest discomfort.     # Asthma Exacerbation   #Hx of COVID PNA   #Suspected Gram negative PNA   -continue with IV steroids, PPI, abx, ID consult pending   -Pulmonary follow up  -overnight hospitalist was notified about the resp status (chart note seen)     #Diabetes Mellitus Type II w/ hyperglycemia / uncontrolled   - A1c 9.5   - sq insulin long acting and short acting + sliding scale     #HLD- continue Lipitor     # suspected Magnesium Def  -replete with oral supplements     dvt and gi ppx       # Progress Note Handoff  PENDING as follows  consults: ID consult pending   Test: sputum and blood cultures pending   Family discussion: discussed with patient who comprehends his medical care and agreeable for current plan of care; updates given to satya Whitmore yesterday over the phone and aware of current medication regimen   Disposition: ACUTE     Attending Physician Dr. Candie Duke # [id number]

## 2021-05-30 NOTE — CONSULT NOTE ADULT - ASSESSMENT
# Sepsis  ( Fever + Tachycardia)   # Pneumonia    - Full consult to follow Patient is a 76y old  Male with multiple co-morbidities including COPD not on Home oxygen, now presents to the ER for evaluation of shorness of breath and reflux-like retrosternal burning pain on exertion with associated cough and chills, no fever at home. Pt is fully vaccinated against COVID and tested positive plus had antibodies in March, no sick contacts or recent travel. On admission, he found to have tachycardia and Leukocytosis. The CXR shows Bibasilar interstitial opacities. He has started on Cefepime and the Id consult requested to assist with further evaluation and antibiotic management.     # Sepsis  ( Leukocytosis  + Tachycardia)   # Pneumonia  # COVID PCR is negative and positive titers- s/p Fully  vaccinated    would recommend:    1. Follow up sputum and blood culture  2. Anti-tussive agents  3. Please obtain MRSA PCR and Legionella urine Ag  4. Supplemental oxygenation and bronchodilator as needed  5. Please taper off steroid as tolerated    d/w Patient and Nursing staff    will follow the patient with you and make further recommendation based on the clinical course and Lab results  Thank you for the opportunity to participate in Mr. ALVARADO's care      Attending Attestation:    Spent more than 65 minutes on total encounter, more than 50 % of the visit was spent counseling and/or coordinating care by the Attending physician.

## 2021-05-31 LAB
GLUCOSE BLDC GLUCOMTR-MCNC: 136 MG/DL — HIGH (ref 70–99)
GLUCOSE BLDC GLUCOMTR-MCNC: 307 MG/DL — HIGH (ref 70–99)
GLUCOSE BLDC GLUCOMTR-MCNC: 337 MG/DL — HIGH (ref 70–99)
GLUCOSE BLDC GLUCOMTR-MCNC: 371 MG/DL — HIGH (ref 70–99)
GLUCOSE BLDC GLUCOMTR-MCNC: 412 MG/DL — HIGH (ref 70–99)
MRSA PCR RESULT.: NEGATIVE — SIGNIFICANT CHANGE UP

## 2021-05-31 PROCEDURE — 99233 SBSQ HOSP IP/OBS HIGH 50: CPT

## 2021-05-31 RX ADMIN — ATORVASTATIN CALCIUM 10 MILLIGRAM(S): 80 TABLET, FILM COATED ORAL at 22:22

## 2021-05-31 RX ADMIN — Medication 600 MILLIGRAM(S): at 05:53

## 2021-05-31 RX ADMIN — Medication 22 UNIT(S): at 09:15

## 2021-05-31 RX ADMIN — INSULIN GLARGINE 40 UNIT(S): 100 INJECTION, SOLUTION SUBCUTANEOUS at 22:22

## 2021-05-31 RX ADMIN — Medication 10: at 12:10

## 2021-05-31 RX ADMIN — Medication 3 MILLILITER(S): at 19:20

## 2021-05-31 RX ADMIN — BUDESONIDE AND FORMOTEROL FUMARATE DIHYDRATE 2 PUFF(S): 160; 4.5 AEROSOL RESPIRATORY (INHALATION) at 22:22

## 2021-05-31 RX ADMIN — MONTELUKAST 10 MILLIGRAM(S): 4 TABLET, CHEWABLE ORAL at 12:12

## 2021-05-31 RX ADMIN — Medication 600 MILLIGRAM(S): at 17:33

## 2021-05-31 RX ADMIN — Medication 3 MILLILITER(S): at 07:38

## 2021-05-31 RX ADMIN — HEPARIN SODIUM 5000 UNIT(S): 5000 INJECTION INTRAVENOUS; SUBCUTANEOUS at 05:53

## 2021-05-31 RX ADMIN — MAGNESIUM OXIDE 400 MG ORAL TABLET 400 MILLIGRAM(S): 241.3 TABLET ORAL at 05:53

## 2021-05-31 RX ADMIN — Medication 22 UNIT(S): at 12:10

## 2021-05-31 RX ADMIN — MAGNESIUM OXIDE 400 MG ORAL TABLET 400 MILLIGRAM(S): 241.3 TABLET ORAL at 14:46

## 2021-05-31 RX ADMIN — Medication 3 MILLILITER(S): at 13:12

## 2021-05-31 RX ADMIN — Medication 40 MILLIGRAM(S): at 22:22

## 2021-05-31 RX ADMIN — PANTOPRAZOLE SODIUM 40 MILLIGRAM(S): 20 TABLET, DELAYED RELEASE ORAL at 05:53

## 2021-05-31 RX ADMIN — BUDESONIDE AND FORMOTEROL FUMARATE DIHYDRATE 2 PUFF(S): 160; 4.5 AEROSOL RESPIRATORY (INHALATION) at 12:45

## 2021-05-31 RX ADMIN — CEFEPIME 100 MILLIGRAM(S): 1 INJECTION, POWDER, FOR SOLUTION INTRAMUSCULAR; INTRAVENOUS at 05:52

## 2021-05-31 RX ADMIN — Medication 12: at 09:15

## 2021-05-31 RX ADMIN — HEPARIN SODIUM 5000 UNIT(S): 5000 INJECTION INTRAVENOUS; SUBCUTANEOUS at 17:33

## 2021-05-31 RX ADMIN — Medication 40 MILLIGRAM(S): at 14:46

## 2021-05-31 RX ADMIN — CEFEPIME 100 MILLIGRAM(S): 1 INJECTION, POWDER, FOR SOLUTION INTRAMUSCULAR; INTRAVENOUS at 17:32

## 2021-05-31 RX ADMIN — MAGNESIUM OXIDE 400 MG ORAL TABLET 400 MILLIGRAM(S): 241.3 TABLET ORAL at 22:22

## 2021-05-31 RX ADMIN — Medication 40 MILLIGRAM(S): at 05:53

## 2021-05-31 NOTE — PROGRESS NOTE ADULT - ASSESSMENT
Pt is a 75yo male w/ pmh of DM, GERD, COPD/Asthma  who presents for evaluation of shortness of breath and chest discomfort.     # Asthma Exacerbation   #Hx of COVID PNA   #Suspected Gram negative PNA   -Taper IV steroids, PPI, abx, ID consult appreciated     #Diabetes Mellitus Type II w/ hyperglycemia / uncontrolled   - A1c 9.5   - sq insulin long acting and short acting + sliding scale     #HLD- continue Lipitor     # suspected Magnesium Def  -replete with oral supplements     dvt and gi ppx       # Progress Note Handoff  PENDING as follows  consults: none   Test: sputum cultures and urine legionella Ag  Family discussion: discussed with patient who comprehends his medical care and agreeable for current plan of care; updates given to satya Whitmore this admission and aware of current medication regimen   Disposition: ACUTE     Attending Physician Dr. Candie Duke # [id number]

## 2021-05-31 NOTE — PROGRESS NOTE ADULT - SUBJECTIVE AND OBJECTIVE BOX
DEL ALVARADO  76y  Male      Patient is a 76y old  Male who presents with a chief complaint of     INTERVAL HPI/OVERNIGHT EVENTS:  5/29/21:  pt seen and examined at bedside this morning and spoke to his son Myranda over the phone; updates given  -overnight events of shortness of breath; seen/assessed by the doctor on call for the night  -stat chest xray done this am with stable opacities and resp status stable of current NC O2  -continue with abx, ID consult   -add PPI as on high dose steroids     5/30/21:  pt seen and examined at bedside this morning   -continue with current IV steroids, abx   -no discharge this weekend  -no acute events notified to me     5/31/21:  pt seen and examined at bedside   -taper steroids   -ID follow up appreciated (labs ordered)  -encourage oob to chair as tolerated   -check labs in am (monitor WBC)    REVIEW OF SYSTEMS:  -patient with mild dyspnea   -otherwise no complaints     Vital Signs Last 24 Hrs  T(C): 36 (31 May 2021 05:51), Max: 36.3 (30 May 2021 21:04)  T(F): 96.8 (31 May 2021 05:51), Max: 97.4 (30 May 2021 21:04)  HR: 93 (31 May 2021 05:51) (93 - 101)  BP: 156/74 (31 May 2021 05:51) (137/67 - 156/74)  RR: 18 (31 May 2021 05:51) (16 - 18)    PHYSICAL EXAM:  GENERAL: speaking in full sentences   HEAD:  Atraumatic, Normocephalic  EYES: EOMI, PERRLA, conjunctiva and sclera clear  NERVOUS SYSTEM:  Alert & Oriented X 3  CHEST/LUNG: rhonchi b/l  CV/HEART: Regular rate and rhythm; No murmurs, rubs, or gallops  GI/ABDOMEN: Soft, Nontender, Nondistended; Bowel sounds present  EXTREMITIES:  2+ Peripheral Pulses, No clubbing, cyanosis, or edema  SKIN: No rashes or lesions    LAB:                          12.3   22.39 )-----------( 355      ( 30 May 2021 06:21 )             38.3     05-30    135  |  99  |  33<H>  ----------------------------<  359<H>  4.6   |  23  |  1.0    Ca    9.2      30 May 2021 06:21  Mg     2.0     05-30    Daily     Daily   CAPILLARY BLOOD GLUCOSE    POCT Blood Glucose.: 324 mg/dL (29 May 2021 11:20)  POCT Blood Glucose.: 308 mg/dL (29 May 2021 07:30)  POCT Blood Glucose.: 335 mg/dL (29 May 2021 01:35)  POCT Blood Glucose.: 249 mg/dL (28 May 2021 21:02)  POCT Blood Glucose.: 236 mg/dL (28 May 2021 16:54)    LIVER FUNCTIONS - ( 28 May 2021 06:13 )  Alb: 3.5 g/dL / Pro: 5.9 g/dL / ALK PHOS: 72 U/L / ALT: 14 U/L / AST: 11 U/L / GGT: x           RADIOLOGY:    Imaging Personally Reviewed:  [ Y ] YES  [ ] NO    HEALTH ISSUES - PROBLEM Dx:  Chest pain  Chest pain    Exertional dyspnea  Exertional dyspnea    COPD (chronic obstructive pulmonary disease)  COPD (chronic obstructive pulmonary disease)    Asthma  Asthma    Diabetes  Diabetes    Prostate cancer  Prostate cancer    Dyslipidemia  Dyslipidemia    MEDICATIONS  (STANDING):  albuterol/ipratropium for Nebulization 3 milliLiter(s) Nebulizer every 6 hours  atorvastatin 10 milliGRAM(s) Oral at bedtime  budesonide 160 MICROgram(s)/formoterol 4.5 MICROgram(s) Inhaler 2 Puff(s) Inhalation two times a day  cefepime   IVPB      cefepime   IVPB 500 milliGRAM(s) IV Intermittent every 12 hours  chlorhexidine 4% Liquid 1 Application(s) Topical <User Schedule>  dextrose 40% Gel 15 Gram(s) Oral once  dextrose 5%. 1000 milliLiter(s) (50 mL/Hr) IV Continuous <Continuous>  dextrose 5%. 1000 milliLiter(s) (100 mL/Hr) IV Continuous <Continuous>  dextrose 50% Injectable 25 Gram(s) IV Push once  dextrose 50% Injectable 12.5 Gram(s) IV Push once  dextrose 50% Injectable 25 Gram(s) IV Push once  glucagon  Injectable 1 milliGRAM(s) IntraMuscular once  guaiFENesin  milliGRAM(s) Oral every 12 hours  heparin   Injectable 5000 Unit(s) SubCutaneous two times a day  insulin glargine Injectable (LANTUS) 40 Unit(s) SubCutaneous at bedtime  insulin lispro (ADMELOG) corrective regimen sliding scale   SubCutaneous three times a day before meals  insulin lispro Injectable (ADMELOG) 22 Unit(s) SubCutaneous before lunch  insulin lispro Injectable (ADMELOG) 22 Unit(s) SubCutaneous before breakfast  insulin lispro Injectable (ADMELOG) 22 Unit(s) SubCutaneous before dinner  magnesium oxide 400 milliGRAM(s) Oral every 8 hours  methylPREDNISolone sodium succinate Injectable 40 milliGRAM(s) IV Push every 8 hours  montelukast 10 milliGRAM(s) Oral daily  pantoprazole    Tablet 40 milliGRAM(s) Oral before breakfast    MEDICATIONS  (PRN):  ALBUTerol    0.083% 2.5 milliGRAM(s) Nebulizer every 4 hours PRN Shortness of Breath and/or Wheezing

## 2021-06-01 ENCOUNTER — TRANSCRIPTION ENCOUNTER (OUTPATIENT)
Age: 76
End: 2021-06-01

## 2021-06-01 VITALS — DIASTOLIC BLOOD PRESSURE: 73 MMHG | SYSTOLIC BLOOD PRESSURE: 166 MMHG | TEMPERATURE: 96 F | HEART RATE: 102 BPM

## 2021-06-01 LAB
CULTURE RESULTS: SIGNIFICANT CHANGE UP
GLUCOSE BLDC GLUCOMTR-MCNC: 193 MG/DL — HIGH (ref 70–99)
GLUCOSE BLDC GLUCOMTR-MCNC: 309 MG/DL — HIGH (ref 70–99)
HCT VFR BLD CALC: 42.5 % — SIGNIFICANT CHANGE UP (ref 42–52)
HGB BLD-MCNC: 13.3 G/DL — LOW (ref 14–18)
LEGIONELLA AG UR QL: NEGATIVE — SIGNIFICANT CHANGE UP
MCHC RBC-ENTMCNC: 27 PG — SIGNIFICANT CHANGE UP (ref 27–31)
MCHC RBC-ENTMCNC: 31.3 G/DL — LOW (ref 32–37)
MCV RBC AUTO: 86.4 FL — SIGNIFICANT CHANGE UP (ref 80–94)
NRBC # BLD: 0 /100 WBCS — SIGNIFICANT CHANGE UP (ref 0–0)
PLATELET # BLD AUTO: 349 K/UL — SIGNIFICANT CHANGE UP (ref 130–400)
RBC # BLD: 4.92 M/UL — SIGNIFICANT CHANGE UP (ref 4.7–6.1)
RBC # FLD: 13.5 % — SIGNIFICANT CHANGE UP (ref 11.5–14.5)
SPECIMEN SOURCE: SIGNIFICANT CHANGE UP
WBC # BLD: 17.74 K/UL — HIGH (ref 4.8–10.8)
WBC # FLD AUTO: 17.74 K/UL — HIGH (ref 4.8–10.8)

## 2021-06-01 PROCEDURE — 99239 HOSP IP/OBS DSCHRG MGMT >30: CPT

## 2021-06-01 RX ORDER — ALBUTEROL 90 UG/1
2 AEROSOL, METERED ORAL
Qty: 240 | Refills: 0
Start: 2021-06-01 | End: 2021-06-30

## 2021-06-01 RX ORDER — ALBUTEROL 90 UG/1
2 AEROSOL, METERED ORAL
Qty: 0 | Refills: 0 | DISCHARGE

## 2021-06-01 RX ORDER — PANTOPRAZOLE SODIUM 20 MG/1
1 TABLET, DELAYED RELEASE ORAL
Qty: 10 | Refills: 0
Start: 2021-06-01 | End: 2021-06-10

## 2021-06-01 RX ORDER — IPRATROPIUM/ALBUTEROL SULFATE 18-103MCG
3 AEROSOL WITH ADAPTER (GRAM) INHALATION
Qty: 360 | Refills: 0
Start: 2021-06-01 | End: 2021-06-30

## 2021-06-01 RX ORDER — MAGNESIUM OXIDE 400 MG ORAL TABLET 241.3 MG
1 TABLET ORAL
Qty: 5 | Refills: 0
Start: 2021-06-01 | End: 2021-06-05

## 2021-06-01 RX ADMIN — CEFEPIME 100 MILLIGRAM(S): 1 INJECTION, POWDER, FOR SOLUTION INTRAMUSCULAR; INTRAVENOUS at 05:47

## 2021-06-01 RX ADMIN — MAGNESIUM OXIDE 400 MG ORAL TABLET 400 MILLIGRAM(S): 241.3 TABLET ORAL at 05:47

## 2021-06-01 RX ADMIN — Medication 40 MILLIGRAM(S): at 05:47

## 2021-06-01 RX ADMIN — Medication 8: at 11:51

## 2021-06-01 RX ADMIN — Medication 3 MILLILITER(S): at 07:32

## 2021-06-01 RX ADMIN — BUDESONIDE AND FORMOTEROL FUMARATE DIHYDRATE 2 PUFF(S): 160; 4.5 AEROSOL RESPIRATORY (INHALATION) at 11:50

## 2021-06-01 RX ADMIN — MONTELUKAST 10 MILLIGRAM(S): 4 TABLET, CHEWABLE ORAL at 11:50

## 2021-06-01 RX ADMIN — Medication 600 MILLIGRAM(S): at 05:47

## 2021-06-01 RX ADMIN — Medication 22 UNIT(S): at 11:51

## 2021-06-01 RX ADMIN — HEPARIN SODIUM 5000 UNIT(S): 5000 INJECTION INTRAVENOUS; SUBCUTANEOUS at 05:47

## 2021-06-01 RX ADMIN — Medication 22 UNIT(S): at 08:23

## 2021-06-01 RX ADMIN — Medication 3 MILLILITER(S): at 14:04

## 2021-06-01 RX ADMIN — PANTOPRAZOLE SODIUM 40 MILLIGRAM(S): 20 TABLET, DELAYED RELEASE ORAL at 05:48

## 2021-06-01 NOTE — DISCHARGE NOTE PROVIDER - CARE PROVIDER_API CALL
KRYSTYNA COSTELLO  [id number]  [address] [address]  Phone: [phone]  Fax: [phone])-  Follow Up Time: 1 week

## 2021-06-01 NOTE — PHYSICAL THERAPY INITIAL EVALUATION ADULT - ADDITIONAL COMMENTS
Per care coordination note,  has 12 + 4 steps at home with no rail per patient that he uses cane on steps

## 2021-06-01 NOTE — PROGRESS NOTE ADULT - SUBJECTIVE AND OBJECTIVE BOX
DEL ALVARADO  76y  Male      Patient is a 76y old  Male who presents with a chief complaint of     INTERVAL HPI/OVERNIGHT EVENTS:  5/29/21:  pt seen and examined at bedside this morning and spoke to his son Myranda over the phone; updates given  -overnight events of shortness of breath; seen/assessed by the doctor on call for the night  -stat chest xray done this am with stable opacities and resp status stable of current NC O2  -continue with abx, ID consult   -add PPI as on high dose steroids     5/30/21:  pt seen and examined at bedside this morning   -continue with current IV steroids, abx   -no discharge this weekend  -no acute events notified to me     5/31/21:  pt seen and examined at bedside   -taper steroids   -ID follow up appreciated (labs ordered)  -encourage oob to chair as tolerated   -check labs in am (monitor WBC)    6/2/21:  pt seen and examined at bedside this morning  -taper steroids  -PT consult placed; oob to chair with assistance  -patient needs close monitoring with Pulmonary in the community   -d/c planning 24-48 hrs     REVIEW OF SYSTEMS:  -denies any complaints  -feels so much better   -all other ROS negative     Vital Signs Last 24 Hrs  T(C): 35.6 (01 Jun 2021 05:00), Max: 36.3 (31 May 2021 14:31)  T(F): 96 (01 Jun 2021 05:00), Max: 97.4 (31 May 2021 14:31)  HR: 84 (01 Jun 2021 05:00) (84 - 102)  BP: 170/88 (01 Jun 2021 05:00) (132/64 - 170/88) --- recheck BP after meds given   RR: 16 (01 Jun 2021 05:00) (16 - 16)    PHYSICAL EXAM:  GENERAL: speaking in full sentences, NAD and comfortable sitting up in bed   HEAD:  Atraumatic, Normocephalic  EYES: EOMI, PERRLA, conjunctiva and sclera clear  NERVOUS SYSTEM:  Alert & Oriented X 3  CHEST/LUNG: better air entry ; mild exp wheez at the bases   CV/HEART: Regular rate and rhythm; No murmurs, rubs, or gallops  GI/ABDOMEN: Soft, Nontender, Nondistended; Bowel sounds present  EXTREMITIES:  2+ Peripheral Pulses, No clubbing, cyanosis, or edema  SKIN: No rashes or lesions    LAB:  pending am labs, will follow                        12.3   22.39 )-----------( 355      ( 30 May 2021 06:21 )             38.3     05-30    135  |  99  |  33<H>  ----------------------------<  359<H>  4.6   |  23  |  1.0    Ca    9.2      30 May 2021 06:21  Mg     2.0     05-30    Daily     Daily   CAPILLARY BLOOD GLUCOSE    POCT Blood Glucose.: 324 mg/dL (29 May 2021 11:20)  POCT Blood Glucose.: 308 mg/dL (29 May 2021 07:30)  POCT Blood Glucose.: 335 mg/dL (29 May 2021 01:35)  POCT Blood Glucose.: 249 mg/dL (28 May 2021 21:02)  POCT Blood Glucose.: 236 mg/dL (28 May 2021 16:54)    LIVER FUNCTIONS - ( 28 May 2021 06:13 )  Alb: 3.5 g/dL / Pro: 5.9 g/dL / ALK PHOS: 72 U/L / ALT: 14 U/L / AST: 11 U/L / GGT: x           RADIOLOGY:    Imaging Personally Reviewed:  [ Y ] YES  [ ] NO    HEALTH ISSUES - PROBLEM Dx:  Chest pain  Chest pain    Exertional dyspnea  Exertional dyspnea    COPD (chronic obstructive pulmonary disease)  COPD (chronic obstructive pulmonary disease)    Asthma  Asthma    Diabetes  Diabetes    Prostate cancer  Prostate cancer    Dyslipidemia  Dyslipidemia    MEDICATIONS  (STANDING):  albuterol/ipratropium for Nebulization 3 milliLiter(s) Nebulizer every 6 hours  atorvastatin 10 milliGRAM(s) Oral at bedtime  budesonide 160 MICROgram(s)/formoterol 4.5 MICROgram(s) Inhaler 2 Puff(s) Inhalation two times a day  cefepime   IVPB      cefepime   IVPB 500 milliGRAM(s) IV Intermittent every 12 hours  chlorhexidine 4% Liquid 1 Application(s) Topical <User Schedule>  dextrose 40% Gel 15 Gram(s) Oral once  dextrose 5%. 1000 milliLiter(s) (50 mL/Hr) IV Continuous <Continuous>  dextrose 5%. 1000 milliLiter(s) (100 mL/Hr) IV Continuous <Continuous>  dextrose 50% Injectable 25 Gram(s) IV Push once  dextrose 50% Injectable 12.5 Gram(s) IV Push once  dextrose 50% Injectable 25 Gram(s) IV Push once  glucagon  Injectable 1 milliGRAM(s) IntraMuscular once  guaiFENesin  milliGRAM(s) Oral every 12 hours  heparin   Injectable 5000 Unit(s) SubCutaneous two times a day  insulin glargine Injectable (LANTUS) 40 Unit(s) SubCutaneous at bedtime  insulin lispro (ADMELOG) corrective regimen sliding scale   SubCutaneous three times a day before meals  insulin lispro Injectable (ADMELOG) 22 Unit(s) SubCutaneous before lunch  insulin lispro Injectable (ADMELOG) 22 Unit(s) SubCutaneous before breakfast  insulin lispro Injectable (ADMELOG) 22 Unit(s) SubCutaneous before dinner  magnesium oxide 400 milliGRAM(s) Oral every 8 hours  methylPREDNISolone sodium succinate Injectable 40 milliGRAM(s) IV Push every 8 hours  montelukast 10 milliGRAM(s) Oral daily  pantoprazole    Tablet 40 milliGRAM(s) Oral before breakfast    MEDICATIONS  (PRN):  ALBUTerol    0.083% 2.5 milliGRAM(s) Nebulizer every 4 hours PRN Shortness of Breath and/or Wheezing

## 2021-06-01 NOTE — CHART NOTE - NSCHARTNOTEFT_GEN_A_CORE
Patient seen and examined throughout the course of the day.    Results of Labs/Imaging discussed as well as patient's plan.      -pt reports feels better wants to go home   -will d/c on home meds pt on Symbicort and breo ellipta will continue until pt sees his PCP  wbc trending down most likely reactive to steroids   All patient's/family questions answered.  Patient and family encouraged to contact PA with any further issues.

## 2021-06-01 NOTE — PROGRESS NOTE ADULT - ASSESSMENT
Pt is a 75yo male w/ pmh of DM, GERD, COPD/Asthma  who presents for evaluation of shortness of breath and chest discomfort.     # Acute Asthma Exacerbation   #Hx of COVID PNA   #Suspected Gram negative PNA   -Taper IV steroids, PPI, abx, ID follow up (leukocytosis secondary to current steroid use, will follow am WBC)    #Diabetes Mellitus Type II w/ hyperglycemia / uncontrolled   - A1c 9.5   - sq insulin long acting and short acting + sliding scale     #HLD- continue Lipitor     # suspected Magnesium Def  -replete with oral supplements     dvt and gi ppx       # Progress Note Handoff  PENDING as follows  consults: none   Test: sputum cultures and urine legionella Ag Pending and am CBC  Family discussion: discussed with patient who comprehends his medical care and agreeable for current plan of care; updates given to satya Whitmore this admission and aware of current medication regimen; unavailable this am   Disposition: ACUTE today; will have PT assess the patient and d/c planning likely 24-48 hrs     Attending Physician Dr. Candie Duke # [id number]

## 2021-06-01 NOTE — DISCHARGE NOTE PROVIDER - HOSPITAL COURSE
Pt is a 77yo male w/ pmh of DM, GERD, COPD/Asthma  who presents for evaluation of shortness of breath and chest discomfort.   PT with  Acute Asthma Exacerbation , Suspected Gram negative PNA   Pt seen by ID treated with IV abx then transitioned to PO abx, wbc trending down  22> 17 , still high secondary to steroids  Pt seen by pulmonology , treated with IV steroids, and to be discharged on PO ina steroids and PPI   pt to follow up with his pcp and pulmonologist as oupt

## 2021-06-01 NOTE — PROGRESS NOTE ADULT - ASSESSMENT
Patient is a 76y old  Male with multiple co-morbidities including COPD not on Home oxygen, now presents to the ER for evaluation of shorness of breath and reflux-like retrosternal burning pain on exertion with associated cough and chills, no fever at home. Pt is fully vaccinated against COVID and tested positive plus had antibodies in March, no sick contacts or recent travel. On admission, he found to have tachycardia and Leukocytosis. The CXR shows Bibasilar interstitial opacities. He has started on Cefepime and the Id consult requested to assist with further evaluation and antibiotic management.     # Sepsis  ( Leukocytosis  + Tachycardia)     # Pneumonia  - sputum Cx 5/30 NG  - MRSA PCR Result.: Negative: By: Real-Time PCR (Polymerase Reaction Method) (05.31.21 @ 07:20)    # COVID PCR is negative and positive titers- s/p Fully  vaccinated      Recommendations  - continue cefepime while here  - can finish with levofloxacin 750 mg daily PO to complete 7 day course (end date 6/3)  - Supplemental oxygenation and bronchodilator as needed  - trend WBC -- possibly reactive from steroids     Please call or message on Microsoft Teams if with any questions.  Spectra [phone]

## 2021-06-01 NOTE — PHYSICAL THERAPY INITIAL EVALUATION ADULT - GENERAL OBSERVATIONS, REHAB EVAL
08:50-09:15 Chart reviewed. Pt encountered sitting at edge of bed, may be seen by Physical Therapist as confirmed with Nurse. Patient denied pain at rest and ready to try to walk; filomena SALDAÑA

## 2021-06-01 NOTE — PROGRESS NOTE ADULT - SUBJECTIVE AND OBJECTIVE BOX
DEL ALVARADO  76y, Male  Allergy: No Known Allergies      LOS  5d    CHIEF COMPLAINT:     INTERVAL EVENTS/HPI  - No acute events overnight  - T(F): , Max: 97.4 (05-31-21 @ 14:31)  - reports subjective improvement in coughing   - Denies any worsening symptoms- has not had BM in a few days  - Tolerating medication  - WBC Count: 22.39 (05-30-21 @ 06:21)     -      ROS  General: Denies rigors, nightsweats  HEENT: Denies headache, rhinorrhea, sore throat, eye pain  CV: Denies CP, palpitations  PULM: Denies wheezing, hemoptysis  GI: Denies hematemesis, hematochezia, melena  : Denies discharge, hematuria  MSK: Denies arthralgias, myalgias  SKIN: Denies rash, lesions  NEURO: Denies paresthesias, weakness  PSYCH: Denies depression, anxiety    VITALS:  T(F): 96, Max: 97.4 (05-31-21 @ 14:31)  HR: 84  BP: 170/88  RR: 16Vital Signs Last 24 Hrs  T(C): 35.6 (01 Jun 2021 05:00), Max: 36.3 (31 May 2021 14:31)  T(F): 96 (01 Jun 2021 05:00), Max: 97.4 (31 May 2021 14:31)  HR: 84 (01 Jun 2021 05:00) (84 - 102)  BP: 170/88 (01 Jun 2021 05:00) (132/64 - 170/88)  BP(mean): --  RR: 16 (01 Jun 2021 05:00) (16 - 16)  SpO2: --    PHYSICAL EXAM:  Gen: NAD, resting in bed  HEENT: Normocephalic, atraumatic  Neck: supple, no lymphadenopathy  CV: Regular rate & regular rhythm  Lungs: decreased BS at bases, no fremitus  Abdomen: Soft, BS present  Ext: Warm, well perfused  Neuro: non focal, awake  Skin: no rash, no erythema  Lines: no phlebitis    FH: Non-contributory  Social Hx: Non-contributory    TESTS & MEASUREMENTS:                  Culture - Sputum (collected 05-30-21 @ 07:00)  Source: .Sputum Sputum  Gram Stain (05-30-21 @ 22:03):    Few polymorphonuclear leukocytes per low power field    Few Squamous epithelial cells per low power field    Few Gram Positive Cocci in Clusters per oil power field    Few Gram Negative Rods per oil power field    Few Gram Positive Rods per oil power field  Preliminary Report (05-31-21 @ 16:29):    Normal Respiratory Kimberley present    Culture - Blood (collected 05-29-21 @ 15:05)  Source: .Blood None  Preliminary Report (05-31-21 @ 02:01):    No growth to date.            INFECTIOUS DISEASES TESTING  MRSA PCR Result.: Negative (05-31-21 @ 07:20)  Procalcitonin, Serum: 0.13 (05-28-21 @ 15:35)  Procalcitonin, Serum: 0.04 (03-17-21 @ 03:10)  Rapid RVP Result: Detected (03-16-21 @ 22:54)      INFLAMMATORY MARKERS  C-Reactive Protein, Serum: 25 mg/L (03-17-21 @ 03:10)      RADIOLOGY & ADDITIONAL TESTS:  I have personally reviewed the last available Chest xray  CXR      CT      CARDIOLOGY TESTING  12 Lead ECG:   Ventricular Rate 109 BPM    Atrial Rate 109 BPM    P-R Interval 186 ms    QRS Duration 70 ms    Q-T Interval 334 ms    QTC Calculation(Bazett) 449 ms    P Axis 60 degrees    R Axis 19 degrees    T Axis 50 degrees    Diagnosis Line Sinus tachycardia  Otherwise normal ECG    Confirmed by ELINA MARCOS MD ([id number]) on 5/28/2021 11:45:58 AM (05-28-21 @ 08:09)  12 Lead ECG:   Ventricular Rate 103 BPM    Atrial Rate 103 BPM    P-R Interval 184 ms    QRS Duration 78 ms    Q-T Interval 338 ms    QTC Calculation(Bazett) 442 ms    P Axis 57 degrees    R Axis 6 degrees    T Axis 31 degrees    Diagnosis Line Sinus tachycardia with Premature atrial complexes  Possible Left atrial enlargement  Borderline ECG    Confirmed by ELINA MARCOS MD ([id number]) on 5/28/2021 11:45:56 AM (05-27-21 @ 19:45)      MEDICATIONS  albuterol/ipratropium for Nebulization 3 Nebulizer every 6 hours  atorvastatin 10 Oral at bedtime  budesonide 160 MICROgram(s)/formoterol 4.5 MICROgram(s) Inhaler 2 Inhalation two times a day  cefepime   IVPB     cefepime   IVPB 500 IV Intermittent every 12 hours  chlorhexidine 4% Liquid 1 Topical <User Schedule>  dextrose 40% Gel 15 Oral once  dextrose 5%. 1000 IV Continuous <Continuous>  dextrose 5%. 1000 IV Continuous <Continuous>  dextrose 50% Injectable 25 IV Push once  dextrose 50% Injectable 12.5 IV Push once  dextrose 50% Injectable 25 IV Push once  glucagon  Injectable 1 IntraMuscular once  guaiFENesin  Oral every 12 hours  heparin   Injectable 5000 SubCutaneous two times a day  insulin glargine Injectable (LANTUS) 40 SubCutaneous at bedtime  insulin lispro (ADMELOG) corrective regimen sliding scale  SubCutaneous three times a day before meals  insulin lispro Injectable (ADMELOG) 22 SubCutaneous before lunch  insulin lispro Injectable (ADMELOG) 22 SubCutaneous before breakfast  insulin lispro Injectable (ADMELOG) 22 SubCutaneous before dinner  magnesium oxide 400 Oral every 8 hours  methylPREDNISolone sodium succinate Injectable 40 IV Push every 8 hours  montelukast 10 Oral daily  pantoprazole    Tablet 40 Oral before breakfast      WEIGHT  Weight (kg): 80.7 (05-28-21 @ 01:04)      ANTIBIOTICS:  cefepime   IVPB      cefepime   IVPB 500 milliGRAM(s) IV Intermittent every 12 hours      All available historical records have been reviewed

## 2021-06-01 NOTE — PHYSICAL THERAPY INITIAL EVALUATION ADULT - PERTINENT HX OF CURRENT PROBLEM, REHAB EVAL
77 y/o male admitted with diagnosis of Chest pain, Exertional dyspnea as patient had progressive SOB at home

## 2021-06-01 NOTE — PHYSICAL THERAPY INITIAL EVALUATION ADULT - GAIT DEVIATIONS NOTED, PT EVAL
stooped posture, dec heel strike/pushoff ,dec SOPHIA/decreased marlena/decreased step length/decreased weight-shifting ability

## 2021-06-01 NOTE — DISCHARGE NOTE NURSING/CASE MANAGEMENT/SOCIAL WORK - PATIENT PORTAL LINK FT
You can access the FollowMyHealth Patient Portal offered by [facility] by registering at the following website: [email or web address]. By joining [email or web address]’s FollowMyHealth portal, you will also be able to view your health information using other applications (apps) compatible with our system.
F31.9

## 2021-06-01 NOTE — PROGRESS NOTE ADULT - TIME BILLING
I have personally seen and examined this patient.    I have reviewed all pertinent clinical information and reviewed all relevant imaging and diagnostic studies personally.   I counseled the patient about diagnostic testing and treatment plan. All questions were answered.   I discussed recommendations with the primary team.
direct patient care

## 2021-06-01 NOTE — DISCHARGE NOTE PROVIDER - NSDCMRMEDTOKEN_GEN_ALL_CORE_FT
atorvastatin 10 mg oral tablet: 1 tab(s) orally once a day  budesonide-formoterol 160 mcg-4.5 mcg/inh inhalation aerosol: 2 puff(s) inhaled 2 times a day   Incruse Ellipta 62.5 mcg/inh inhalation powder: 1 inhaler(s) inhaled once a day  Levemir 100 units/mL subcutaneous solution: 40 unit(s) subcutaneous once a day (at bedtime)  NovoLOG Mix 70/30 subcutaneous suspension: 25 unit(s) subcutaneous once a day  predniSONE 20 mg oral tablet: 1 tab(s) orally once a day x3 days, then 10 mg x3 days, then 5 mg x3 days  Singulair 10 mg oral tablet: 1 tab(s) orally once a day  Ventolin HFA 90 mcg/inh inhalation aerosol: 2 puff(s) inhaled every 6 hours   atorvastatin 10 mg oral tablet: 1 tab(s) orally once a day  budesonide-formoterol 160 mcg-4.5 mcg/inh inhalation aerosol: 2 puff(s) inhaled 2 times a day   Incruse Ellipta 62.5 mcg/inh inhalation powder: 1 inhaler(s) inhaled once a day  ipratropium-albuterol 0.5 mg-2.5 mg/3 mL inhalation solution: 3 milliliter(s) inhaled every 6 hours, As Needed -for bronchospasm   Levemir 100 units/mL subcutaneous solution: 40 unit(s) subcutaneous once a day (at bedtime)  levoFLOXacin 500 mg oral tablet: 1 tab(s) orally once a day   magnesium oxide 500 mg oral tablet: 1 tab(s) orally once a day   NovoLOG Mix 70/30 subcutaneous suspension: 25 unit(s) subcutaneous once a day  pantoprazole 40 mg oral delayed release tablet: 1 tab(s) orally once a day   predniSONE 20 mg oral tablet: 1 tab(s) orally once a day x3 days, then 10 mg x3 days, then 5 mg x3 days  predniSONE 20 mg oral tablet: take 60 mg  orally once a day x 3 days , then   take 40 mg orally once a day x 3 days then   take 20 mg orally once a day x 3 days   Singulair 10 mg oral tablet: 1 tab(s) orally once a day  Ventolin HFA 90 mcg/inh inhalation aerosol: 2 puff(s) inhaled every 6 hours

## 2021-06-01 NOTE — DISCHARGE NOTE PROVIDER - NSDCFUADDINST_GEN_ALL_CORE_FT
-follow up with your pcp and pulmonologist Dr. Pope in 1x wee   -take medications as prescribed  -follow up with your pcp  and pulmonologist Dr. Pope in 1x week to check your magnesium level and respiration   -take medications as prescribed

## 2021-06-01 NOTE — DISCHARGE NOTE PROVIDER - NSDCCPCAREPLAN_GEN_ALL_CORE_FT
PRINCIPAL DISCHARGE DIAGNOSIS  Diagnosis: Chest pain  Assessment and Plan of Treatment: -most likely pleuritic      SECONDARY DISCHARGE DIAGNOSES  Diagnosis: Exertional dyspnea  Assessment and Plan of Treatment: -due to asthma exacerbation and pneumonia  -treated with IV sterdois and IV antibiotics  -follow up with your pulmonologist as oupt

## 2021-06-04 LAB
CULTURE RESULTS: SIGNIFICANT CHANGE UP
SPECIMEN SOURCE: SIGNIFICANT CHANGE UP

## 2021-06-15 NOTE — CHART NOTE - NSCHARTNOTEFT_GEN_A_CORE
Upon chart review, and in retrospect, patient did have evidence of sepsis on arrival to hospital (tachycardia, leukocytosis, abnormal CXR)

## 2021-06-16 DIAGNOSIS — E11.65 TYPE 2 DIABETES MELLITUS WITH HYPERGLYCEMIA: ICD-10-CM

## 2021-06-16 DIAGNOSIS — Z85.46 PERSONAL HISTORY OF MALIGNANT NEOPLASM OF PROSTATE: ICD-10-CM

## 2021-06-16 DIAGNOSIS — Z86.16 PERSONAL HISTORY OF COVID-19: ICD-10-CM

## 2021-06-16 DIAGNOSIS — E78.5 HYPERLIPIDEMIA, UNSPECIFIED: ICD-10-CM

## 2021-06-16 DIAGNOSIS — Z79.4 LONG TERM (CURRENT) USE OF INSULIN: ICD-10-CM

## 2021-06-16 DIAGNOSIS — J44.1 CHRONIC OBSTRUCTIVE PULMONARY DISEASE WITH (ACUTE) EXACERBATION: ICD-10-CM

## 2021-06-16 DIAGNOSIS — R06.02 SHORTNESS OF BREATH: ICD-10-CM

## 2021-06-16 DIAGNOSIS — J44.0 CHRONIC OBSTRUCTIVE PULMONARY DISEASE WITH (ACUTE) LOWER RESPIRATORY INFECTION: ICD-10-CM

## 2021-06-16 DIAGNOSIS — I10 ESSENTIAL (PRIMARY) HYPERTENSION: ICD-10-CM

## 2021-06-16 DIAGNOSIS — J15.6 PNEUMONIA DUE TO OTHER GRAM-NEGATIVE BACTERIA: ICD-10-CM

## 2021-06-16 DIAGNOSIS — K21.9 GASTRO-ESOPHAGEAL REFLUX DISEASE WITHOUT ESOPHAGITIS: ICD-10-CM

## 2021-06-16 DIAGNOSIS — A41.9 SEPSIS, UNSPECIFIED ORGANISM: ICD-10-CM

## 2021-09-03 NOTE — ED PROVIDER NOTE - CADM POA URETHRAL CATHETER
Pt scheduled follow up for 9/13. She was offered to come in sooner however that didn't work in her schedule. Several attempts to schedule appts were made before she called back today.    She also wanted it noted in her chart that she only wants her spouse Jim to be called for emergencies. She not to be discussed when she is not in the office.   No

## 2021-11-02 NOTE — PATIENT PROFILE ADULT - HOME ACCESSIBILITY CONCERNS
Continued IPP admission for further assessment and treatment.    D/c quetiapine   increase lexapro from 5mg to 10mg.  continue with atarax 50mg po 16h for anxiety and insomnia     Continued IPP admission for further assessment and treatment.  -D/c quetiapine   -increase lexapro from 5mg to 10mg.  -continue with atarax 50mg po 16h for anxiety and insomnia     Continued IPP admission for further assessment and treatment.  -D/c quetiapine   -increase lexapro from 5mg to 10mg.  -continue with atarax 50mg po q6h for anxiety and insomnia     none

## 2021-11-07 ENCOUNTER — INPATIENT (INPATIENT)
Facility: HOSPITAL | Age: 76
LOS: 4 days | Discharge: HOME | End: 2021-11-12
Attending: HOSPITALIST | Admitting: HOSPITALIST
Payer: MEDICARE

## 2021-11-07 VITALS
HEART RATE: 124 BPM | DIASTOLIC BLOOD PRESSURE: 73 MMHG | TEMPERATURE: 99 F | HEIGHT: 67 IN | OXYGEN SATURATION: 98 % | SYSTOLIC BLOOD PRESSURE: 152 MMHG | WEIGHT: 169.98 LBS | RESPIRATION RATE: 22 BRPM

## 2021-11-07 DIAGNOSIS — Z90.79 ACQUIRED ABSENCE OF OTHER GENITAL ORGAN(S): Chronic | ICD-10-CM

## 2021-11-07 DIAGNOSIS — Z98.890 OTHER SPECIFIED POSTPROCEDURAL STATES: Chronic | ICD-10-CM

## 2021-11-07 PROBLEM — E78.5 HYPERLIPIDEMIA, UNSPECIFIED: Chronic | Status: ACTIVE | Noted: 2021-05-27

## 2021-11-07 LAB
ALBUMIN SERPL ELPH-MCNC: 3.2 G/DL — LOW (ref 3.5–5.2)
ALP SERPL-CCNC: 63 U/L — SIGNIFICANT CHANGE UP (ref 30–115)
ALT FLD-CCNC: 17 U/L — SIGNIFICANT CHANGE UP (ref 0–41)
ANION GAP SERPL CALC-SCNC: 15 MMOL/L — HIGH (ref 7–14)
AST SERPL-CCNC: 15 U/L — SIGNIFICANT CHANGE UP (ref 0–41)
BASOPHILS # BLD AUTO: 0.1 K/UL — SIGNIFICANT CHANGE UP (ref 0–0.2)
BASOPHILS NFR BLD AUTO: 0.9 % — SIGNIFICANT CHANGE UP (ref 0–1)
BILIRUB SERPL-MCNC: 0.4 MG/DL — SIGNIFICANT CHANGE UP (ref 0.2–1.2)
BUN SERPL-MCNC: 9 MG/DL — LOW (ref 10–20)
CALCIUM SERPL-MCNC: 8.7 MG/DL — SIGNIFICANT CHANGE UP (ref 8.5–10.1)
CHLORIDE SERPL-SCNC: 103 MMOL/L — SIGNIFICANT CHANGE UP (ref 98–110)
CO2 SERPL-SCNC: 19 MMOL/L — SIGNIFICANT CHANGE UP (ref 17–32)
CREAT SERPL-MCNC: 0.9 MG/DL — SIGNIFICANT CHANGE UP (ref 0.7–1.5)
EOSINOPHIL # BLD AUTO: 0.16 K/UL — SIGNIFICANT CHANGE UP (ref 0–0.7)
EOSINOPHIL NFR BLD AUTO: 1.4 % — SIGNIFICANT CHANGE UP (ref 0–8)
GLUCOSE SERPL-MCNC: 206 MG/DL — HIGH (ref 70–99)
HCT VFR BLD CALC: 39.7 % — LOW (ref 42–52)
HGB BLD-MCNC: 12.6 G/DL — LOW (ref 14–18)
IMM GRANULOCYTES NFR BLD AUTO: 1.8 % — HIGH (ref 0.1–0.3)
LYMPHOCYTES # BLD AUTO: 28.4 % — SIGNIFICANT CHANGE UP (ref 20.5–51.1)
LYMPHOCYTES # BLD AUTO: 3.14 K/UL — SIGNIFICANT CHANGE UP (ref 1.2–3.4)
MAGNESIUM SERPL-MCNC: 1.6 MG/DL — LOW (ref 1.8–2.4)
MCHC RBC-ENTMCNC: 26.6 PG — LOW (ref 27–31)
MCHC RBC-ENTMCNC: 31.7 G/DL — LOW (ref 32–37)
MCV RBC AUTO: 83.8 FL — SIGNIFICANT CHANGE UP (ref 80–94)
MONOCYTES # BLD AUTO: 1.14 K/UL — HIGH (ref 0.1–0.6)
MONOCYTES NFR BLD AUTO: 10.3 % — HIGH (ref 1.7–9.3)
NEUTROPHILS # BLD AUTO: 6.33 K/UL — SIGNIFICANT CHANGE UP (ref 1.4–6.5)
NEUTROPHILS NFR BLD AUTO: 57.2 % — SIGNIFICANT CHANGE UP (ref 42.2–75.2)
NRBC # BLD: 0 /100 WBCS — SIGNIFICANT CHANGE UP (ref 0–0)
NT-PROBNP SERPL-SCNC: 299 PG/ML — SIGNIFICANT CHANGE UP (ref 0–300)
PLATELET # BLD AUTO: 298 K/UL — SIGNIFICANT CHANGE UP (ref 130–400)
POTASSIUM SERPL-MCNC: 4.1 MMOL/L — SIGNIFICANT CHANGE UP (ref 3.5–5)
POTASSIUM SERPL-SCNC: 4.1 MMOL/L — SIGNIFICANT CHANGE UP (ref 3.5–5)
PROT SERPL-MCNC: 5.9 G/DL — LOW (ref 6–8)
RBC # BLD: 4.74 M/UL — SIGNIFICANT CHANGE UP (ref 4.7–6.1)
RBC # FLD: 14.1 % — SIGNIFICANT CHANGE UP (ref 11.5–14.5)
SARS-COV-2 RNA SPEC QL NAA+PROBE: SIGNIFICANT CHANGE UP
SODIUM SERPL-SCNC: 137 MMOL/L — SIGNIFICANT CHANGE UP (ref 135–146)
TROPONIN T SERPL-MCNC: 0.06 NG/ML — CRITICAL HIGH
WBC # BLD: 11.07 K/UL — HIGH (ref 4.8–10.8)
WBC # FLD AUTO: 11.07 K/UL — HIGH (ref 4.8–10.8)

## 2021-11-07 PROCEDURE — 71045 X-RAY EXAM CHEST 1 VIEW: CPT | Mod: 26

## 2021-11-07 PROCEDURE — 93010 ELECTROCARDIOGRAM REPORT: CPT

## 2021-11-07 PROCEDURE — 99285 EMERGENCY DEPT VISIT HI MDM: CPT

## 2021-11-07 PROCEDURE — 71275 CT ANGIOGRAPHY CHEST: CPT | Mod: 26,MA

## 2021-11-07 PROCEDURE — 99223 1ST HOSP IP/OBS HIGH 75: CPT

## 2021-11-07 RX ORDER — AZITHROMYCIN 500 MG/1
500 TABLET, FILM COATED ORAL ONCE
Refills: 0 | Status: COMPLETED | OUTPATIENT
Start: 2021-11-07 | End: 2021-11-07

## 2021-11-07 RX ORDER — MAGNESIUM OXIDE 400 MG ORAL TABLET 241.3 MG
400 TABLET ORAL DAILY
Refills: 0 | Status: DISCONTINUED | OUTPATIENT
Start: 2021-11-07 | End: 2021-11-12

## 2021-11-07 RX ORDER — ATORVASTATIN CALCIUM 80 MG/1
10 TABLET, FILM COATED ORAL AT BEDTIME
Refills: 0 | Status: DISCONTINUED | OUTPATIENT
Start: 2021-11-07 | End: 2021-11-12

## 2021-11-07 RX ORDER — IPRATROPIUM/ALBUTEROL SULFATE 18-103MCG
3 AEROSOL WITH ADAPTER (GRAM) INHALATION
Refills: 0 | Status: COMPLETED | OUTPATIENT
Start: 2021-11-07 | End: 2021-11-07

## 2021-11-07 RX ORDER — PANTOPRAZOLE SODIUM 20 MG/1
40 TABLET, DELAYED RELEASE ORAL
Refills: 0 | Status: DISCONTINUED | OUTPATIENT
Start: 2021-11-07 | End: 2021-11-12

## 2021-11-07 RX ORDER — BUDESONIDE AND FORMOTEROL FUMARATE DIHYDRATE 160; 4.5 UG/1; UG/1
2 AEROSOL RESPIRATORY (INHALATION)
Refills: 0 | Status: DISCONTINUED | OUTPATIENT
Start: 2021-11-07 | End: 2021-11-12

## 2021-11-07 RX ORDER — IPRATROPIUM/ALBUTEROL SULFATE 18-103MCG
1 AEROSOL WITH ADAPTER (GRAM) INHALATION
Refills: 0 | Status: DISCONTINUED | OUTPATIENT
Start: 2021-11-07 | End: 2021-11-09

## 2021-11-07 RX ORDER — ALBUTEROL 90 UG/1
2 AEROSOL, METERED ORAL EVERY 6 HOURS
Refills: 0 | Status: DISCONTINUED | OUTPATIENT
Start: 2021-11-07 | End: 2021-11-12

## 2021-11-07 RX ORDER — CEFTRIAXONE 500 MG/1
1000 INJECTION, POWDER, FOR SOLUTION INTRAMUSCULAR; INTRAVENOUS ONCE
Refills: 0 | Status: COMPLETED | OUTPATIENT
Start: 2021-11-07 | End: 2021-11-07

## 2021-11-07 RX ORDER — VALACYCLOVIR 500 MG/1
1000 TABLET, FILM COATED ORAL THREE TIMES A DAY
Refills: 0 | Status: DISCONTINUED | OUTPATIENT
Start: 2021-11-07 | End: 2021-11-11

## 2021-11-07 RX ORDER — MONTELUKAST 4 MG/1
10 TABLET, CHEWABLE ORAL DAILY
Refills: 0 | Status: DISCONTINUED | OUTPATIENT
Start: 2021-11-07 | End: 2021-11-12

## 2021-11-07 RX ORDER — ALBUTEROL 90 UG/1
1 AEROSOL, METERED ORAL THREE TIMES A DAY
Refills: 0 | Status: DISCONTINUED | OUTPATIENT
Start: 2021-11-07 | End: 2021-11-07

## 2021-11-07 RX ORDER — INSULIN LISPRO 100/ML
20 VIAL (ML) SUBCUTANEOUS ONCE
Refills: 0 | Status: COMPLETED | OUTPATIENT
Start: 2021-11-07 | End: 2021-11-07

## 2021-11-07 RX ORDER — MAGNESIUM SULFATE 500 MG/ML
2 VIAL (ML) INJECTION ONCE
Refills: 0 | Status: COMPLETED | OUTPATIENT
Start: 2021-11-07 | End: 2021-11-07

## 2021-11-07 RX ADMIN — Medication 50 GRAM(S): at 16:05

## 2021-11-07 RX ADMIN — Medication 20 UNIT(S): at 20:54

## 2021-11-07 RX ADMIN — CEFTRIAXONE 100 MILLIGRAM(S): 500 INJECTION, POWDER, FOR SOLUTION INTRAMUSCULAR; INTRAVENOUS at 18:53

## 2021-11-07 RX ADMIN — AZITHROMYCIN 255 MILLIGRAM(S): 500 TABLET, FILM COATED ORAL at 19:53

## 2021-11-07 RX ADMIN — Medication 3 MILLILITER(S): at 16:25

## 2021-11-07 RX ADMIN — Medication 125 MILLIGRAM(S): at 16:09

## 2021-11-07 RX ADMIN — Medication 3 MILLILITER(S): at 16:05

## 2021-11-07 RX ADMIN — Medication 3 MILLILITER(S): at 16:52

## 2021-11-07 NOTE — ED PROVIDER NOTE - ATTENDING CONTRIBUTION TO CARE
75 yo male, pmh of copd no oxygen, htn, hld dm type 1 here for eval of sob. pt reports pt has been having shortness of breath for the past few days, worse today. no fever, chills, chest pain. Pt with recent travel hx. on exam pt mild resp distress, s1s2 tachy R>L wheezing mild tachypnea, soft nt/nd, no calf swelling    impression sob, tachy with wheezing. labs, nebs, ekg, cxray and re-eval

## 2021-11-07 NOTE — H&P ADULT - NSHPSOCIALHISTORY_GEN_ALL_CORE
Marital Status:  (   )    (   ) Single    (   )    (  )   Lives with: (  ) alone  (  ) children   (  ) spouse   (  ) parents  (  ) other  Recent Travel: No recent travel  Occupation:    Substance Use (street drugs): ( x ) never used  (  ) other:  Tobacco Usage:  ( x  ) never smoked   (   ) former smoker   (   ) current smoker  (     ) pack year  Alcohol Usage: None Marital Status:  ( x  )    (   ) Single    (   )    (  )   Lives with: (  ) alone  (  ) children   (x  ) spouse   (  ) parents  (  ) other  Recent Travel: No recent travel  Occupation:    Substance Use (street drugs): ( x ) never used  (  ) other:  Tobacco Usage:  (   ) never smoked   (  x ) former smoker   (   ) current smoker  (     ) pack year  Alcohol Usage: None

## 2021-11-07 NOTE — H&P ADULT - NSHPPHYSICALEXAM_GEN_ALL_CORE
GENERAL: NAD, well-developed, Non-toxic, stated age   HEAD:  Atraumatic, Normocephalic  EYES: EOMI, Sclera White   NECK: Supple, No JVD  CHEST/LUNG: clear b/l breath sounds; No wheezing, rhonchi, or crackles  HEART: Regular rate and rhythm; s1, s2, No murmurs, rubs, or gallops  ABDOMEN: Soft, Nontender, Nondistended; Bowel sounds present, No rebound or guarding noted   EXTREMITIES:  No lower extremity edema or calf tenderness to palpation.  No clubbing or cyanosis  PSYCH: AAOx3, pleasant, cooperative, not anxious  NEUROLOGY: CN intact, 5/5 strength in all extremities, Sensation grossly intact.   SKIN: No rashes or lesions GENERAL: NAD, well-developed, Non-toxic, stated age   HEAD:  Atraumatic, Normocephalic  EYES: EOMI, Sclera White   NECK: Supple, No JVD  CHEST/LUNG: b/l wheeze  HEART: Regular rate and rhythm; s1, s2, No murmurs, rubs, or gallops  ABDOMEN: Soft, Nontender, Nondistended; Bowel sounds present, No rebound or guarding noted   EXTREMITIES:  No lower extremity edema or calf tenderness to palpation.  No clubbing or cyanosis; atropy of b/l hands and legs  PSYCH: AAOx3, pleasant, cooperative, not anxious  NEUROLOGY: CN intact, 5/5 strength in all extremities, Sensation grossly intact.   SKIN: No rashes or lesions

## 2021-11-07 NOTE — ED PROVIDER NOTE - CLINICAL SUMMARY MEDICAL DECISION MAKING FREE TEXT BOX
Labs noted for 11k, trop 0.06.  EKG sinus tach, no st elevation.  CXR negative.  CTA chest no PE, right lower lobe opacity.  Given Rocephin and azithromycin.  Will admit to low risk tele.

## 2021-11-07 NOTE — ED PROVIDER NOTE - OBJECTIVE STATEMENT
77 yo male, pmh of copd no oxygen, htn, hld, iddm, presents to ed for sob, daughter is nurse noted started last several days, worse todays, mild, present at rest, no pain or radiation, healing shingles to left torso. denies fever, chills, cp, abd pain, numbness, tingling. + recent travel.

## 2021-11-07 NOTE — H&P ADULT - ASSESSMENT
76 yr old male with hx of COVID 19, DM, GERD, COPD/Asthma  who presents for evaluation of shortness of breath and chest discomfort.     # Asthma Exacerbation   # hx COVID pneumonia March residual interstitial infiltrates  - cannot r/o concurrent bacterial pneumonia  - hemodynamically stable  - maintain sats > 90%  - DuoNeb  - start IV solumedrol very slow taper  - start Azithromycin   - check procalcitonin  - NIPPV if needed    # Diabetes Mellitus Type II   - monitor FS  - start sliding scale     # HLD  - c/w Lipitor     # DVT ppx    # DASH diet     # Ambulate as tolerated    # Full Code 76 yr old male with hx of DM, GERD, Asthma presents for evaluation of shortness of breath and chest discomfort for past 2 days.    # Asthma Exacerbation   # hx COVID pneumonia March residual interstitial infiltrates  - hemodynamically stable  - maintain sats > 90%  - DuoNeb  - start IV solumedrol 60mg q12  - start Azithromycin   - check procalcitonin  - pulmonary consult - Dr. Annemarie Oates     # CP 2/2 Shingles of Left upper torso  - healing well    - wound care  - no need for isolation     # Constipation   - start lactulose and senna     # Diabetes Mellitus Type II   - monitor FS  - start sliding scale   - check A1C    # HLD  - c/w Lipitor     # DVT ppx  - start Lovenox    # DASH diet, CHO consistent    # Chronic Muscular Decondition  - Ambulate as tolerated  - fall precaution  - PT eval    # Full Code   76 yr old male with hx of DM, GERD, Asthma presents for evaluation of shortness of breath and chest discomfort for past 2 days.    # Asthma Exacerbation   # hx COVID pneumonia March residual interstitial infiltrates  - hemodynamically stable  - maintain sats > 90%  - DuoNeb  - start IV solumedrol 60mg q12  - start Azithromycin   - check procalcitonin  - pulmonary consult - Dr. Annemarie Oates     # CP 2/2 Shingles of Left upper torso  - healing well    - wound care  - no need for isolation     # Hypomagnesemia  - Mg repleted    # Constipation   - start lactulose and senna     # Diabetes Mellitus Type II   - monitor FS  - start sliding scale   - check A1C    # HLD  - c/w Lipitor     # DVT ppx  - start Lovenox    # DASH diet, CHO consistent    # Chronic Muscular Decondition  - Ambulate as tolerated  - fall precaution  - PT eval    # Full Code   76 yr old male with hx of DM, GERD, Asthma presents for evaluation of shortness of breath and chest discomfort for past 2 days.    # Asthma Exacerbation   # hx COVID pneumonia March residual interstitial infiltrates  - hemodynamically stable  - maintain sats > 90%  - DuoNeb, Symbicort   - start IV solumedrol 60mg q12  - start Azithromycin   - c/w montelukast   - check procalcitonin  - pulmonary consult - Dr. Annemarie Oates     # CP 2/2 Shingles of Left upper torso  - healing well    - wound care  - c/w valacyclovir   - no need for isolation     # Hypomagnesemia  - Mg repleted    # Constipation   - start lactulose and senna     # Diabetes Mellitus Type II   - monitor FS  - start sliding scale   - check A1C    # HLD  - c/w Lipitor     # DVT ppx  - start Lovenox    # DASH diet, CHO consistent    # Chronic Muscular Decondition  - Ambulate as tolerated  - fall precaution  - PT eval    # Full Code

## 2021-11-07 NOTE — ED ADULT NURSE NOTE - NSICDXPASTMEDICALHX_GEN_ALL_CORE_FT
PAST MEDICAL HISTORY:  Asthma 20 yr    COPD (chronic obstructive pulmonary disease) 20 yrs no 02 rx    Diabetes 20 yrs    Dyslipidemia     Prostate cancer 1999 s/p sx    Shingles

## 2021-11-07 NOTE — H&P ADULT - HISTORY OF PRESENT ILLNESS
76 yr old male with hx of DM, GERD, COPD/Asthma  who presents for evaluation of shortness of breath and chest discomfort.      76 yr old male with hx of DM, GERD, Asthma  who presents for evaluation of shortness of breath and chest discomfort for past 2 days. Patient states he has been sob on exertion associated with cough for past 2 weeks, but last 2 days he notice sob with minimal activity. He also complains of left lower chest pain since yesterday, sharp, non radiating lasting for few seconds. He also complains of constipation last BM was on Thursday. He also mentions of feeling very weak and difficulty to keep balance. He denies any fever, chills, sob, chest pain, abd pain, no urinary or bowel issues

## 2021-11-07 NOTE — ED PROVIDER NOTE - CARE PLAN
Principal Discharge DX:	Pneumonia  Secondary Diagnosis:	Elevated troponin  Secondary Diagnosis:	Hypomagnesemia   1

## 2021-11-07 NOTE — ED PROVIDER NOTE - PHYSICAL EXAMINATION
Physical Exam    Vital Signs: I have reviewed the initial vital signs.  Constitutional: well-nourished, appears stated age, no acute distress  Eyes: Conjunctiva pink, Sclera clear  Cardiovascular: S1 and S2, regular rate, regular rhythm, well-perfused extremities, radial pulses equal and 2+  Respiratory: mild labored respiratory effort,scattered rhonchi and end exp wheezing    Gastrointestinal: soft, non-tender abdomen, no pulsatile mass, normal bowl sounds  Musculoskeletal: supple neck, no lower extremity edema, no midline tenderness  Integumentary: healing shingles to left torso   Neurologic: awake, alert,  nvi

## 2021-11-07 NOTE — H&P ADULT - NSHPLABSRESULTS_GEN_ALL_CORE
12.6   11.07 )-----------( 298      ( 07 Nov 2021 15:02 )             39.7       11-07    137  |  103  |  9<L>  ----------------------------<  206<H>  4.1   |  19  |  0.9    Ca    8.7      07 Nov 2021 15:02  Mg     1.6     11-07    TPro  5.9<L>  /  Alb  3.2<L>  /  TBili  0.4  /  DBili  x   /  AST  15  /  ALT  17  /  AlkPhos  63  11-07        CT Angio Chest PE Protocol w/ IV Cont (11.07.21):    No filling defects in the main pulmonary artery or segmental branches to suggest pulmonary embolus.    New posterior right lower lobe small peripheral opacity probably representing focal scarring or atelectasis. Follow-up noncontrast chest CT may be obtained in 3 months.

## 2021-11-08 LAB
A1C WITH ESTIMATED AVERAGE GLUCOSE RESULT: 9.4 % — HIGH (ref 4–5.6)
ANION GAP SERPL CALC-SCNC: 14 MMOL/L — SIGNIFICANT CHANGE UP (ref 7–14)
BASOPHILS # BLD AUTO: 0.03 K/UL — SIGNIFICANT CHANGE UP (ref 0–0.2)
BASOPHILS NFR BLD AUTO: 0.3 % — SIGNIFICANT CHANGE UP (ref 0–1)
BUN SERPL-MCNC: 18 MG/DL — SIGNIFICANT CHANGE UP (ref 10–20)
CALCIUM SERPL-MCNC: 8.5 MG/DL — SIGNIFICANT CHANGE UP (ref 8.5–10.1)
CHLORIDE SERPL-SCNC: 101 MMOL/L — SIGNIFICANT CHANGE UP (ref 98–110)
CO2 SERPL-SCNC: 20 MMOL/L — SIGNIFICANT CHANGE UP (ref 17–32)
CREAT SERPL-MCNC: 0.9 MG/DL — SIGNIFICANT CHANGE UP (ref 0.7–1.5)
EOSINOPHIL # BLD AUTO: 0 K/UL — SIGNIFICANT CHANGE UP (ref 0–0.7)
EOSINOPHIL NFR BLD AUTO: 0 % — SIGNIFICANT CHANGE UP (ref 0–8)
ESTIMATED AVERAGE GLUCOSE: 223 MG/DL — HIGH (ref 68–114)
GLUCOSE BLDC GLUCOMTR-MCNC: 284 MG/DL — HIGH (ref 70–99)
GLUCOSE BLDC GLUCOMTR-MCNC: 305 MG/DL — HIGH (ref 70–99)
GLUCOSE BLDC GLUCOMTR-MCNC: 318 MG/DL — HIGH (ref 70–99)
GLUCOSE BLDC GLUCOMTR-MCNC: 319 MG/DL — HIGH (ref 70–99)
GLUCOSE BLDC GLUCOMTR-MCNC: 358 MG/DL — HIGH (ref 70–99)
GLUCOSE BLDC GLUCOMTR-MCNC: 447 MG/DL — HIGH (ref 70–99)
GLUCOSE BLDC GLUCOMTR-MCNC: 484 MG/DL — CRITICAL HIGH (ref 70–99)
GLUCOSE SERPL-MCNC: 325 MG/DL — HIGH (ref 70–99)
HCT VFR BLD CALC: 38.6 % — LOW (ref 42–52)
HGB BLD-MCNC: 12.4 G/DL — LOW (ref 14–18)
IMM GRANULOCYTES NFR BLD AUTO: 0.9 % — HIGH (ref 0.1–0.3)
LYMPHOCYTES # BLD AUTO: 2.26 K/UL — SIGNIFICANT CHANGE UP (ref 1.2–3.4)
LYMPHOCYTES # BLD AUTO: 20.1 % — LOW (ref 20.5–51.1)
MAGNESIUM SERPL-MCNC: 2 MG/DL — SIGNIFICANT CHANGE UP (ref 1.8–2.4)
MCHC RBC-ENTMCNC: 26.5 PG — LOW (ref 27–31)
MCHC RBC-ENTMCNC: 32.1 G/DL — SIGNIFICANT CHANGE UP (ref 32–37)
MCV RBC AUTO: 82.5 FL — SIGNIFICANT CHANGE UP (ref 80–94)
MONOCYTES # BLD AUTO: 0.3 K/UL — SIGNIFICANT CHANGE UP (ref 0.1–0.6)
MONOCYTES NFR BLD AUTO: 2.7 % — SIGNIFICANT CHANGE UP (ref 1.7–9.3)
NEUTROPHILS # BLD AUTO: 8.53 K/UL — HIGH (ref 1.4–6.5)
NEUTROPHILS NFR BLD AUTO: 76 % — HIGH (ref 42.2–75.2)
NRBC # BLD: 0 /100 WBCS — SIGNIFICANT CHANGE UP (ref 0–0)
PLATELET # BLD AUTO: 339 K/UL — SIGNIFICANT CHANGE UP (ref 130–400)
POTASSIUM SERPL-MCNC: 4.6 MMOL/L — SIGNIFICANT CHANGE UP (ref 3.5–5)
POTASSIUM SERPL-SCNC: 4.6 MMOL/L — SIGNIFICANT CHANGE UP (ref 3.5–5)
RBC # BLD: 4.68 M/UL — LOW (ref 4.7–6.1)
RBC # FLD: 14 % — SIGNIFICANT CHANGE UP (ref 11.5–14.5)
SODIUM SERPL-SCNC: 135 MMOL/L — SIGNIFICANT CHANGE UP (ref 135–146)
TROPONIN T SERPL-MCNC: 0.03 NG/ML — CRITICAL HIGH
WBC # BLD: 11.22 K/UL — HIGH (ref 4.8–10.8)
WBC # FLD AUTO: 11.22 K/UL — HIGH (ref 4.8–10.8)

## 2021-11-08 PROCEDURE — 99232 SBSQ HOSP IP/OBS MODERATE 35: CPT

## 2021-11-08 RX ORDER — IPRATROPIUM/ALBUTEROL SULFATE 18-103MCG
3 AEROSOL WITH ADAPTER (GRAM) INHALATION EVERY 6 HOURS
Refills: 0 | Status: DISCONTINUED | OUTPATIENT
Start: 2021-11-08 | End: 2021-11-12

## 2021-11-08 RX ORDER — AZITHROMYCIN 500 MG/1
500 TABLET, FILM COATED ORAL EVERY 24 HOURS
Refills: 0 | Status: DISCONTINUED | OUTPATIENT
Start: 2021-11-09 | End: 2021-11-12

## 2021-11-08 RX ORDER — INSULIN LISPRO 100/ML
6 VIAL (ML) SUBCUTANEOUS
Refills: 0 | Status: DISCONTINUED | OUTPATIENT
Start: 2021-11-08 | End: 2021-11-09

## 2021-11-08 RX ORDER — DEXTROSE 50 % IN WATER 50 %
25 SYRINGE (ML) INTRAVENOUS ONCE
Refills: 0 | Status: DISCONTINUED | OUTPATIENT
Start: 2021-11-08 | End: 2021-11-12

## 2021-11-08 RX ORDER — AZITHROMYCIN 500 MG/1
500 TABLET, FILM COATED ORAL ONCE
Refills: 0 | Status: COMPLETED | OUTPATIENT
Start: 2021-11-08 | End: 2021-11-08

## 2021-11-08 RX ORDER — INSULIN LISPRO 100/ML
8 VIAL (ML) SUBCUTANEOUS ONCE
Refills: 0 | Status: COMPLETED | OUTPATIENT
Start: 2021-11-08 | End: 2021-11-08

## 2021-11-08 RX ORDER — DEXTROSE 50 % IN WATER 50 %
12.5 SYRINGE (ML) INTRAVENOUS ONCE
Refills: 0 | Status: DISCONTINUED | OUTPATIENT
Start: 2021-11-08 | End: 2021-11-12

## 2021-11-08 RX ORDER — SODIUM CHLORIDE 9 MG/ML
1000 INJECTION, SOLUTION INTRAVENOUS
Refills: 0 | Status: DISCONTINUED | OUTPATIENT
Start: 2021-11-08 | End: 2021-11-12

## 2021-11-08 RX ORDER — DEXTROSE 50 % IN WATER 50 %
15 SYRINGE (ML) INTRAVENOUS ONCE
Refills: 0 | Status: DISCONTINUED | OUTPATIENT
Start: 2021-11-08 | End: 2021-11-12

## 2021-11-08 RX ORDER — INFLUENZA VIRUS VACCINE 15; 15; 15; 15 UG/.5ML; UG/.5ML; UG/.5ML; UG/.5ML
0.7 SUSPENSION INTRAMUSCULAR ONCE
Refills: 0 | Status: DISCONTINUED | OUTPATIENT
Start: 2021-11-08 | End: 2021-11-12

## 2021-11-08 RX ORDER — INSULIN GLARGINE 100 [IU]/ML
21 INJECTION, SOLUTION SUBCUTANEOUS AT BEDTIME
Refills: 0 | Status: DISCONTINUED | OUTPATIENT
Start: 2021-11-08 | End: 2021-11-09

## 2021-11-08 RX ORDER — SENNA PLUS 8.6 MG/1
2 TABLET ORAL AT BEDTIME
Refills: 0 | Status: DISCONTINUED | OUTPATIENT
Start: 2021-11-08 | End: 2021-11-12

## 2021-11-08 RX ORDER — AZITHROMYCIN 500 MG/1
TABLET, FILM COATED ORAL
Refills: 0 | Status: DISCONTINUED | OUTPATIENT
Start: 2021-11-08 | End: 2021-11-12

## 2021-11-08 RX ORDER — GLUCAGON INJECTION, SOLUTION 0.5 MG/.1ML
1 INJECTION, SOLUTION SUBCUTANEOUS ONCE
Refills: 0 | Status: DISCONTINUED | OUTPATIENT
Start: 2021-11-08 | End: 2021-11-12

## 2021-11-08 RX ORDER — ENOXAPARIN SODIUM 100 MG/ML
40 INJECTION SUBCUTANEOUS AT BEDTIME
Refills: 0 | Status: DISCONTINUED | OUTPATIENT
Start: 2021-11-08 | End: 2021-11-12

## 2021-11-08 RX ORDER — LACTULOSE 10 G/15ML
10 SOLUTION ORAL ONCE
Refills: 0 | Status: COMPLETED | OUTPATIENT
Start: 2021-11-08 | End: 2021-11-08

## 2021-11-08 RX ORDER — INSULIN LISPRO 100/ML
VIAL (ML) SUBCUTANEOUS
Refills: 0 | Status: DISCONTINUED | OUTPATIENT
Start: 2021-11-08 | End: 2021-11-09

## 2021-11-08 RX ADMIN — Medication 3 MILLILITER(S): at 14:30

## 2021-11-08 RX ADMIN — VALACYCLOVIR 1000 MILLIGRAM(S): 500 TABLET, FILM COATED ORAL at 15:42

## 2021-11-08 RX ADMIN — LACTULOSE 10 GRAM(S): 10 SOLUTION ORAL at 11:36

## 2021-11-08 RX ADMIN — Medication 60 MILLIGRAM(S): at 17:07

## 2021-11-08 RX ADMIN — MAGNESIUM OXIDE 400 MG ORAL TABLET 400 MILLIGRAM(S): 241.3 TABLET ORAL at 11:36

## 2021-11-08 RX ADMIN — Medication 3 MILLILITER(S): at 19:11

## 2021-11-08 RX ADMIN — Medication 4: at 11:47

## 2021-11-08 RX ADMIN — Medication 6 UNIT(S): at 11:46

## 2021-11-08 RX ADMIN — ATORVASTATIN CALCIUM 10 MILLIGRAM(S): 80 TABLET, FILM COATED ORAL at 21:53

## 2021-11-08 RX ADMIN — Medication 6 UNIT(S): at 08:16

## 2021-11-08 RX ADMIN — Medication 3: at 08:16

## 2021-11-08 RX ADMIN — Medication 4: at 15:49

## 2021-11-08 RX ADMIN — Medication 6 UNIT(S): at 15:49

## 2021-11-08 RX ADMIN — VALACYCLOVIR 1000 MILLIGRAM(S): 500 TABLET, FILM COATED ORAL at 06:18

## 2021-11-08 RX ADMIN — Medication 60 MILLIGRAM(S): at 06:17

## 2021-11-08 RX ADMIN — Medication 8 UNIT(S): at 00:48

## 2021-11-08 RX ADMIN — BUDESONIDE AND FORMOTEROL FUMARATE DIHYDRATE 2 PUFF(S): 160; 4.5 AEROSOL RESPIRATORY (INHALATION) at 11:36

## 2021-11-08 RX ADMIN — SENNA PLUS 2 TABLET(S): 8.6 TABLET ORAL at 21:52

## 2021-11-08 RX ADMIN — MONTELUKAST 10 MILLIGRAM(S): 4 TABLET, CHEWABLE ORAL at 11:36

## 2021-11-08 RX ADMIN — ENOXAPARIN SODIUM 40 MILLIGRAM(S): 100 INJECTION SUBCUTANEOUS at 21:54

## 2021-11-08 RX ADMIN — INSULIN GLARGINE 21 UNIT(S): 100 INJECTION, SOLUTION SUBCUTANEOUS at 21:53

## 2021-11-08 RX ADMIN — AZITHROMYCIN 255 MILLIGRAM(S): 500 TABLET, FILM COATED ORAL at 23:57

## 2021-11-08 RX ADMIN — VALACYCLOVIR 1000 MILLIGRAM(S): 500 TABLET, FILM COATED ORAL at 21:59

## 2021-11-08 RX ADMIN — PANTOPRAZOLE SODIUM 40 MILLIGRAM(S): 20 TABLET, DELAYED RELEASE ORAL at 11:36

## 2021-11-08 RX ADMIN — INSULIN GLARGINE 21 UNIT(S): 100 INJECTION, SOLUTION SUBCUTANEOUS at 00:27

## 2021-11-08 NOTE — PHYSICAL THERAPY INITIAL EVALUATION ADULT - GAIT DEVIATIONS NOTED, PT EVAL
stooped posture, dec heel strike/pushoff/decreased marlena/decreased step length/decreased weight-shifting ability

## 2021-11-08 NOTE — PHYSICAL THERAPY INITIAL EVALUATION ADULT - ADDITIONAL COMMENTS
Per patient, he lives with family in a private home that has 14 steps outside with bilateral handrails, then has no further steps inside; has a cane but does not use it all the time

## 2021-11-08 NOTE — PHYSICAL THERAPY INITIAL EVALUATION ADULT - GENERAL OBSERVATIONS, REHAB EVAL
14:05-14:28 Chart reviewed. Pt encountered semireclined in bed, may be seen by Physical Therapist as confirmed with Nurse. Patient denied pain at rest and ready to try to walk now; +tele

## 2021-11-08 NOTE — PHYSICAL THERAPY INITIAL EVALUATION ADULT - PERTINENT HX OF CURRENT PROBLEM, REHAB EVAL
77 y/o male admitted with diagnoses of Pneumonia, Elevated troponin, Hypomagnesemia with note of worsening shortness of breath  at home

## 2021-11-09 LAB
ANION GAP SERPL CALC-SCNC: 16 MMOL/L — HIGH (ref 7–14)
BUN SERPL-MCNC: 25 MG/DL — HIGH (ref 10–20)
CALCIUM SERPL-MCNC: 9 MG/DL — SIGNIFICANT CHANGE UP (ref 8.5–10.1)
CHLORIDE SERPL-SCNC: 99 MMOL/L — SIGNIFICANT CHANGE UP (ref 98–110)
CO2 SERPL-SCNC: 17 MMOL/L — SIGNIFICANT CHANGE UP (ref 17–32)
COVID-19 NUCLEOCAPSID GAM AB INTERP: POSITIVE
COVID-19 NUCLEOCAPSID TOTAL GAM ANTIBODY RESULT: 7.56 INDEX — HIGH
COVID-19 SPIKE DOMAIN AB INTERP: POSITIVE
COVID-19 SPIKE DOMAIN ANTIBODY RESULT: >250 U/ML — HIGH
CREAT SERPL-MCNC: 1 MG/DL — SIGNIFICANT CHANGE UP (ref 0.7–1.5)
GLUCOSE BLDC GLUCOMTR-MCNC: 148 MG/DL — HIGH (ref 70–99)
GLUCOSE BLDC GLUCOMTR-MCNC: 152 MG/DL — HIGH (ref 70–99)
GLUCOSE BLDC GLUCOMTR-MCNC: 280 MG/DL — HIGH (ref 70–99)
GLUCOSE BLDC GLUCOMTR-MCNC: 359 MG/DL — HIGH (ref 70–99)
GLUCOSE BLDC GLUCOMTR-MCNC: 379 MG/DL — HIGH (ref 70–99)
GLUCOSE BLDC GLUCOMTR-MCNC: 449 MG/DL — HIGH (ref 70–99)
GLUCOSE BLDC GLUCOMTR-MCNC: 515 MG/DL — CRITICAL HIGH (ref 70–99)
GLUCOSE SERPL-MCNC: 426 MG/DL — HIGH (ref 70–99)
HCT VFR BLD CALC: 39.4 % — LOW (ref 42–52)
HGB BLD-MCNC: 12.5 G/DL — LOW (ref 14–18)
MCHC RBC-ENTMCNC: 26.7 PG — LOW (ref 27–31)
MCHC RBC-ENTMCNC: 31.7 G/DL — LOW (ref 32–37)
MCV RBC AUTO: 84 FL — SIGNIFICANT CHANGE UP (ref 80–94)
NRBC # BLD: 0 /100 WBCS — SIGNIFICANT CHANGE UP (ref 0–0)
PLATELET # BLD AUTO: 301 K/UL — SIGNIFICANT CHANGE UP (ref 130–400)
POTASSIUM SERPL-MCNC: 5.2 MMOL/L — HIGH (ref 3.5–5)
POTASSIUM SERPL-SCNC: 5.2 MMOL/L — HIGH (ref 3.5–5)
PROCALCITONIN SERPL-MCNC: 0.08 NG/ML — SIGNIFICANT CHANGE UP (ref 0.02–0.1)
RBC # BLD: 4.69 M/UL — LOW (ref 4.7–6.1)
RBC # FLD: 14.2 % — SIGNIFICANT CHANGE UP (ref 11.5–14.5)
SARS-COV-2 IGG+IGM SERPL QL IA: 7.56 INDEX — HIGH
SARS-COV-2 IGG+IGM SERPL QL IA: >250 U/ML — HIGH
SARS-COV-2 IGG+IGM SERPL QL IA: POSITIVE
SARS-COV-2 IGG+IGM SERPL QL IA: POSITIVE
SODIUM SERPL-SCNC: 132 MMOL/L — LOW (ref 135–146)
WBC # BLD: 18.62 K/UL — HIGH (ref 4.8–10.8)
WBC # FLD AUTO: 18.62 K/UL — HIGH (ref 4.8–10.8)

## 2021-11-09 PROCEDURE — 99232 SBSQ HOSP IP/OBS MODERATE 35: CPT

## 2021-11-09 RX ORDER — INSULIN LISPRO 100/ML
16 VIAL (ML) SUBCUTANEOUS
Refills: 0 | Status: DISCONTINUED | OUTPATIENT
Start: 2021-11-09 | End: 2021-11-09

## 2021-11-09 RX ORDER — SODIUM CHLORIDE 9 MG/ML
1000 INJECTION INTRAMUSCULAR; INTRAVENOUS; SUBCUTANEOUS
Refills: 0 | Status: DISCONTINUED | OUTPATIENT
Start: 2021-11-09 | End: 2021-11-11

## 2021-11-09 RX ORDER — INSULIN LISPRO 100/ML
VIAL (ML) SUBCUTANEOUS
Refills: 0 | Status: DISCONTINUED | OUTPATIENT
Start: 2021-11-09 | End: 2021-11-12

## 2021-11-09 RX ORDER — INSULIN GLARGINE 100 [IU]/ML
40 INJECTION, SOLUTION SUBCUTANEOUS AT BEDTIME
Refills: 0 | Status: DISCONTINUED | OUTPATIENT
Start: 2021-11-09 | End: 2021-11-10

## 2021-11-09 RX ORDER — INSULIN LISPRO 100/ML
12 VIAL (ML) SUBCUTANEOUS
Refills: 0 | Status: DISCONTINUED | OUTPATIENT
Start: 2021-11-09 | End: 2021-11-10

## 2021-11-09 RX ORDER — INSULIN LISPRO 100/ML
10 VIAL (ML) SUBCUTANEOUS ONCE
Refills: 0 | Status: COMPLETED | OUTPATIENT
Start: 2021-11-09 | End: 2021-11-09

## 2021-11-09 RX ORDER — GABAPENTIN 400 MG/1
100 CAPSULE ORAL THREE TIMES A DAY
Refills: 0 | Status: DISCONTINUED | OUTPATIENT
Start: 2021-11-09 | End: 2021-11-10

## 2021-11-09 RX ORDER — INSULIN GLARGINE 100 [IU]/ML
30 INJECTION, SOLUTION SUBCUTANEOUS AT BEDTIME
Refills: 0 | Status: DISCONTINUED | OUTPATIENT
Start: 2021-11-09 | End: 2021-11-09

## 2021-11-09 RX ORDER — HYDROCORTISONE 1 %
1 OINTMENT (GRAM) TOPICAL
Refills: 0 | Status: DISCONTINUED | OUTPATIENT
Start: 2021-11-09 | End: 2021-11-12

## 2021-11-09 RX ORDER — INSULIN LISPRO 100/ML
8 VIAL (ML) SUBCUTANEOUS
Refills: 0 | Status: DISCONTINUED | OUTPATIENT
Start: 2021-11-09 | End: 2021-11-09

## 2021-11-09 RX ADMIN — Medication 6: at 12:14

## 2021-11-09 RX ADMIN — VALACYCLOVIR 1000 MILLIGRAM(S): 500 TABLET, FILM COATED ORAL at 21:18

## 2021-11-09 RX ADMIN — BUDESONIDE AND FORMOTEROL FUMARATE DIHYDRATE 2 PUFF(S): 160; 4.5 AEROSOL RESPIRATORY (INHALATION) at 22:43

## 2021-11-09 RX ADMIN — Medication 40 MILLIGRAM(S): at 17:25

## 2021-11-09 RX ADMIN — SENNA PLUS 2 TABLET(S): 8.6 TABLET ORAL at 21:18

## 2021-11-09 RX ADMIN — Medication 6 UNIT(S): at 07:56

## 2021-11-09 RX ADMIN — SODIUM CHLORIDE 70 MILLILITER(S): 9 INJECTION INTRAMUSCULAR; INTRAVENOUS; SUBCUTANEOUS at 19:30

## 2021-11-09 RX ADMIN — Medication 3 MILLILITER(S): at 13:43

## 2021-11-09 RX ADMIN — Medication 8 UNIT(S): at 12:15

## 2021-11-09 RX ADMIN — ATORVASTATIN CALCIUM 10 MILLIGRAM(S): 80 TABLET, FILM COATED ORAL at 21:19

## 2021-11-09 RX ADMIN — Medication 10: at 17:18

## 2021-11-09 RX ADMIN — PANTOPRAZOLE SODIUM 40 MILLIGRAM(S): 20 TABLET, DELAYED RELEASE ORAL at 06:13

## 2021-11-09 RX ADMIN — BUDESONIDE AND FORMOTEROL FUMARATE DIHYDRATE 2 PUFF(S): 160; 4.5 AEROSOL RESPIRATORY (INHALATION) at 09:16

## 2021-11-09 RX ADMIN — Medication 3 MILLILITER(S): at 07:42

## 2021-11-09 RX ADMIN — Medication 12 UNIT(S): at 17:12

## 2021-11-09 RX ADMIN — Medication 1 APPLICATION(S): at 17:28

## 2021-11-09 RX ADMIN — Medication 10 UNIT(S): at 15:38

## 2021-11-09 RX ADMIN — ENOXAPARIN SODIUM 40 MILLIGRAM(S): 100 INJECTION SUBCUTANEOUS at 21:19

## 2021-11-09 RX ADMIN — AZITHROMYCIN 255 MILLIGRAM(S): 500 TABLET, FILM COATED ORAL at 23:41

## 2021-11-09 RX ADMIN — GABAPENTIN 100 MILLIGRAM(S): 400 CAPSULE ORAL at 21:18

## 2021-11-09 RX ADMIN — VALACYCLOVIR 1000 MILLIGRAM(S): 500 TABLET, FILM COATED ORAL at 17:28

## 2021-11-09 RX ADMIN — VALACYCLOVIR 1000 MILLIGRAM(S): 500 TABLET, FILM COATED ORAL at 06:13

## 2021-11-09 RX ADMIN — Medication 60 MILLIGRAM(S): at 06:13

## 2021-11-09 RX ADMIN — MAGNESIUM OXIDE 400 MG ORAL TABLET 400 MILLIGRAM(S): 241.3 TABLET ORAL at 12:20

## 2021-11-09 RX ADMIN — MONTELUKAST 10 MILLIGRAM(S): 4 TABLET, CHEWABLE ORAL at 12:20

## 2021-11-09 RX ADMIN — Medication 5: at 07:56

## 2021-11-09 RX ADMIN — Medication 3 MILLILITER(S): at 19:30

## 2021-11-09 NOTE — CONSULT NOTE ADULT - ASSESSMENT
Impression:  Acute chronic COPD with exacerbation  No Impending respiratory failure  no pneumonia   bronchiectasis b/l bases R>L      SUGGEST:    Check O2 sat on NC, record, continue O2 as necessary to maintain sats > 90%  Continue IV solumedrol  bronchodilator treatments ATC and PRN  complete course of antibiotics  OOB to chair  GI and DVT prophylaxis  pulmonary toilet  Monitor clinically, convert to oral prednisone as clinical improvement allows  Pt on multiple similar medications at home    BREO similar to Budesonide/formotorol  Damoni has inhaled steroids and LAMA  Incruse and Ipratroprium nebs similar Per      Upon D/C the patient will need ICS/LABA, LAMA, (Pt should be on one ICS/LABA/LAMA without taking multiple meds) PRN albuterol, finish abx, slow taper of steroids ie. start Prednisone 40 mg and taper 10 mg Q4d.

## 2021-11-09 NOTE — CHART NOTE - NSCHARTNOTEFT_GEN_A_CORE
elevated FS glucose despite sliding scale coverage + standing insulin  ordered 10 more units admelog x1

## 2021-11-09 NOTE — CONSULT NOTE ADULT - ASSESSMENT
IMPRESSION: Rehab of 76  y.o m  rehab  for  debility  sp fall     PRECAUTIONS: [x  ] Cardiac  [  xx] Respiratory  [  ] Seizures [  ] Contact Isolation  [  ] Droplet Isolation  [ FALL ] Other    Weight Bearing Status:     RECOMMENDATION:    Out of Bed to Chair     DVT/Decubiti Prophylaxis    REHAB PLAN:     [ xx  ] Bedside P/T 3-5 times a week   [   ]   Bedside O/T  2-3 times a week             [   ] No Rehab Therapy Indicated                   [   ]  Speech Therapy   Conditioning/ROM                                    ADL  Bed Mobility                                               Conditioning/ROM  Transfers                                                     Bed Mobility  Sitting /Standing Balance                         Transfers                                        Gait Training                                               Sitting/Standing Balance  Stair Training [   ]Applicable                    Home equipment Eval                                                                        Splinting  [   ] Only      GOALS:   ADL   [ x  ]   Independent                    Transfers  [x   ] Independent                          Ambulation  [ x  ] Independent     [x   ] With device                            [ x  ]  CG                                                         [   ]  CG                                                                  [   ] CG                            [    ] Min A                                                   [   ] Min A                                                              [   ] Min  A          DISCHARGE PLAN:   [   ]  Good candidate for Intensive Rehabilitation/Hospital based-[facility]A [facility]                                             Will tolerate 3hrs Intensive Rehab Daily                                       [    ]  Short Term Rehab in Skilled Nursing Facility                                       [  xx  ]  Home with Outpatient or  services need  rw  for  ambulation                                           [    ]  Possible Candidate for Intensive Hospital based Rehab

## 2021-11-10 LAB
ANION GAP SERPL CALC-SCNC: 13 MMOL/L — SIGNIFICANT CHANGE UP (ref 7–14)
BUN SERPL-MCNC: 23 MG/DL — HIGH (ref 10–20)
CALCIUM SERPL-MCNC: 8.6 MG/DL — SIGNIFICANT CHANGE UP (ref 8.5–10.1)
CHLORIDE SERPL-SCNC: 105 MMOL/L — SIGNIFICANT CHANGE UP (ref 98–110)
CO2 SERPL-SCNC: 20 MMOL/L — SIGNIFICANT CHANGE UP (ref 17–32)
CREAT SERPL-MCNC: 1 MG/DL — SIGNIFICANT CHANGE UP (ref 0.7–1.5)
GLUCOSE BLDC GLUCOMTR-MCNC: 197 MG/DL — HIGH (ref 70–99)
GLUCOSE BLDC GLUCOMTR-MCNC: 265 MG/DL — HIGH (ref 70–99)
GLUCOSE BLDC GLUCOMTR-MCNC: 324 MG/DL — HIGH (ref 70–99)
GLUCOSE BLDC GLUCOMTR-MCNC: 364 MG/DL — HIGH (ref 70–99)
GLUCOSE SERPL-MCNC: 307 MG/DL — HIGH (ref 70–99)
HCT VFR BLD CALC: 36.9 % — LOW (ref 42–52)
HGB BLD-MCNC: 11.9 G/DL — LOW (ref 14–18)
MCHC RBC-ENTMCNC: 27.1 PG — SIGNIFICANT CHANGE UP (ref 27–31)
MCHC RBC-ENTMCNC: 32.2 G/DL — SIGNIFICANT CHANGE UP (ref 32–37)
MCV RBC AUTO: 84.1 FL — SIGNIFICANT CHANGE UP (ref 80–94)
NRBC # BLD: 0 /100 WBCS — SIGNIFICANT CHANGE UP (ref 0–0)
PLATELET # BLD AUTO: 328 K/UL — SIGNIFICANT CHANGE UP (ref 130–400)
POTASSIUM SERPL-MCNC: 4.6 MMOL/L — SIGNIFICANT CHANGE UP (ref 3.5–5)
POTASSIUM SERPL-SCNC: 4.6 MMOL/L — SIGNIFICANT CHANGE UP (ref 3.5–5)
RBC # BLD: 4.39 M/UL — LOW (ref 4.7–6.1)
RBC # FLD: 14.3 % — SIGNIFICANT CHANGE UP (ref 11.5–14.5)
SODIUM SERPL-SCNC: 138 MMOL/L — SIGNIFICANT CHANGE UP (ref 135–146)
WBC # BLD: 12.64 K/UL — HIGH (ref 4.8–10.8)
WBC # FLD AUTO: 12.64 K/UL — HIGH (ref 4.8–10.8)

## 2021-11-10 PROCEDURE — 99221 1ST HOSP IP/OBS SF/LOW 40: CPT

## 2021-11-10 PROCEDURE — 99233 SBSQ HOSP IP/OBS HIGH 50: CPT

## 2021-11-10 RX ORDER — INSULIN GLARGINE 100 [IU]/ML
40 INJECTION, SOLUTION SUBCUTANEOUS EVERY MORNING
Refills: 0 | Status: DISCONTINUED | OUTPATIENT
Start: 2021-11-10 | End: 2021-11-11

## 2021-11-10 RX ORDER — INSULIN LISPRO 100/ML
16 VIAL (ML) SUBCUTANEOUS
Refills: 0 | Status: DISCONTINUED | OUTPATIENT
Start: 2021-11-10 | End: 2021-11-11

## 2021-11-10 RX ORDER — GABAPENTIN 400 MG/1
300 CAPSULE ORAL THREE TIMES A DAY
Refills: 0 | Status: DISCONTINUED | OUTPATIENT
Start: 2021-11-10 | End: 2021-11-12

## 2021-11-10 RX ADMIN — ENOXAPARIN SODIUM 40 MILLIGRAM(S): 100 INJECTION SUBCUTANEOUS at 22:25

## 2021-11-10 RX ADMIN — Medication 40 MILLIGRAM(S): at 17:12

## 2021-11-10 RX ADMIN — Medication 3 MILLILITER(S): at 07:19

## 2021-11-10 RX ADMIN — Medication 10: at 11:24

## 2021-11-10 RX ADMIN — MONTELUKAST 10 MILLIGRAM(S): 4 TABLET, CHEWABLE ORAL at 12:18

## 2021-11-10 RX ADMIN — Medication 12 UNIT(S): at 08:10

## 2021-11-10 RX ADMIN — Medication 1 APPLICATION(S): at 22:27

## 2021-11-10 RX ADMIN — BUDESONIDE AND FORMOTEROL FUMARATE DIHYDRATE 2 PUFF(S): 160; 4.5 AEROSOL RESPIRATORY (INHALATION) at 08:14

## 2021-11-10 RX ADMIN — SENNA PLUS 2 TABLET(S): 8.6 TABLET ORAL at 22:24

## 2021-11-10 RX ADMIN — Medication 12 UNIT(S): at 11:24

## 2021-11-10 RX ADMIN — Medication 16 UNIT(S): at 16:52

## 2021-11-10 RX ADMIN — Medication 40 MILLIGRAM(S): at 05:20

## 2021-11-10 RX ADMIN — VALACYCLOVIR 1000 MILLIGRAM(S): 500 TABLET, FILM COATED ORAL at 05:19

## 2021-11-10 RX ADMIN — Medication 1 APPLICATION(S): at 17:12

## 2021-11-10 RX ADMIN — PANTOPRAZOLE SODIUM 40 MILLIGRAM(S): 20 TABLET, DELAYED RELEASE ORAL at 06:26

## 2021-11-10 RX ADMIN — SODIUM CHLORIDE 70 MILLILITER(S): 9 INJECTION INTRAMUSCULAR; INTRAVENOUS; SUBCUTANEOUS at 22:48

## 2021-11-10 RX ADMIN — INSULIN GLARGINE 40 UNIT(S): 100 INJECTION, SOLUTION SUBCUTANEOUS at 13:51

## 2021-11-10 RX ADMIN — MAGNESIUM OXIDE 400 MG ORAL TABLET 400 MILLIGRAM(S): 241.3 TABLET ORAL at 12:18

## 2021-11-10 RX ADMIN — Medication 1 APPLICATION(S): at 05:19

## 2021-11-10 RX ADMIN — GABAPENTIN 100 MILLIGRAM(S): 400 CAPSULE ORAL at 05:19

## 2021-11-10 RX ADMIN — ATORVASTATIN CALCIUM 10 MILLIGRAM(S): 80 TABLET, FILM COATED ORAL at 22:49

## 2021-11-10 RX ADMIN — Medication 3 MILLILITER(S): at 13:34

## 2021-11-10 RX ADMIN — VALACYCLOVIR 1000 MILLIGRAM(S): 500 TABLET, FILM COATED ORAL at 13:52

## 2021-11-10 RX ADMIN — AZITHROMYCIN 255 MILLIGRAM(S): 500 TABLET, FILM COATED ORAL at 23:54

## 2021-11-10 RX ADMIN — VALACYCLOVIR 1000 MILLIGRAM(S): 500 TABLET, FILM COATED ORAL at 22:26

## 2021-11-10 RX ADMIN — GABAPENTIN 300 MILLIGRAM(S): 400 CAPSULE ORAL at 22:25

## 2021-11-10 RX ADMIN — GABAPENTIN 300 MILLIGRAM(S): 400 CAPSULE ORAL at 13:52

## 2021-11-10 RX ADMIN — Medication 6: at 08:10

## 2021-11-10 RX ADMIN — Medication 10: at 16:52

## 2021-11-10 RX ADMIN — Medication 3 MILLILITER(S): at 05:35

## 2021-11-10 RX ADMIN — BUDESONIDE AND FORMOTEROL FUMARATE DIHYDRATE 2 PUFF(S): 160; 4.5 AEROSOL RESPIRATORY (INHALATION) at 22:48

## 2021-11-10 RX ADMIN — Medication 3 MILLILITER(S): at 20:19

## 2021-11-10 NOTE — CONSULT NOTE ADULT - ASSESSMENT
#poorly controlled type 2 DM / steroid induced hyperglycemia   - HBA1c 9.4% at home on Levemir 40 units at bedtime and novolog 20 units in am and 12-15 in pm , on chronic prednisone 5-10 mg daily   - now on solumedrol 40 mg IV Q 12 hrs   - he refused Lantus 40 units last night   - please give Lantus 40 units now and then every 24 hours   - increase Admelog to 16 units TIDAC , hold if NPO   - agree with changing sliding scale to 2:50 > 150 if remain uncontrolled can be change to 3:50 > 150     -discussed with patient wife  #poorly controlled type 2 DM / steroid induced hyperglycemia   - HBA1c 9.4% at home on Levemir 40 units at bedtime and novolog 20 units in am and 12-15 in pm , on chronic prednisone 5-10 mg daily   - now on solumedrol 40 mg IV Q 12 hrs   - he refused Lantus 40 units last night   - please give Lantus 40 units now and then every 24 hours   - increase Admelog to 16 units TIDAC , hold if NPO   - agree with changing sliding scale to 2:50 > 150 if remain uncontrolled can be change to 3:50 > 150     -discussed with patient wife and team

## 2021-11-10 NOTE — CONSULT NOTE ADULT - SUBJECTIVE AND OBJECTIVE BOX
HPI:  76 yr old male with hx of DM, GERD, Asthma  who presents for evaluation of shortness of breath and chest discomfort for past 2 days. Patient states he has been sob on exertion associated with cough for past 2 weeks, but last 2 days he notice sob with minimal activity. He also complains of left lower chest pain since yesterday, sharp, non radiating lasting for few seconds. He also complains of constipation last BM was on Thursday. He also mentions of feeling very weak and difficulty to keep balance. He denies any fever, chills, sob, chest pain, abd pain, no urinary or bowel issues      (07 Nov 2021 21:24)    Patient known to have type 2 DM for years , worsened by chronic steroid use for lung disease, reporting numbers in 200 at home . take levemir 40 units at bedtime and Novolog 20 units in am and 12-15 units with dinner.       PAST MEDICAL & SURGICAL HISTORY  Diabetes  20 yrs    Prostate cancer  1999 s/p sx    Asthma  20 yr    COPD (chronic obstructive pulmonary disease)  20 yrs no 02 rx    Dyslipidemia    Shingles    S/P TURP  1999    History of surgery  back surgery        FAMILY HISTORY:  FAMILY HISTORY:      SOCIAL HISTORY:  []smoker  []Alcohol  []Drug    ALLERGIES:  No Known Allergies      MEDICATIONS:  MEDICATIONS  (STANDING):  albuterol/ipratropium for Nebulization 3 milliLiter(s) Nebulizer every 6 hours  atorvastatin 10 milliGRAM(s) Oral at bedtime  azithromycin  IVPB 500 milliGRAM(s) IV Intermittent every 24 hours  azithromycin  IVPB      budesonide 160 MICROgram(s)/formoterol 4.5 MICROgram(s) Inhaler 2 Puff(s) Inhalation two times a day  dextrose 40% Gel 15 Gram(s) Oral once  dextrose 5%. 1000 milliLiter(s) (50 mL/Hr) IV Continuous <Continuous>  dextrose 5%. 1000 milliLiter(s) (100 mL/Hr) IV Continuous <Continuous>  dextrose 50% Injectable 25 Gram(s) IV Push once  dextrose 50% Injectable 12.5 Gram(s) IV Push once  dextrose 50% Injectable 25 Gram(s) IV Push once  enoxaparin Injectable 40 milliGRAM(s) SubCutaneous at bedtime  gabapentin 100 milliGRAM(s) Oral three times a day  glucagon  Injectable 1 milliGRAM(s) IntraMuscular once  hydrocortisone 1% Cream 1 Application(s) Topical two times a day  influenza  Vaccine (HIGH DOSE) 0.7 milliLiter(s) IntraMuscular once  insulin glargine Injectable (LANTUS) 40 Unit(s) SubCutaneous at bedtime  insulin lispro (ADMELOG) corrective regimen sliding scale   SubCutaneous three times a day before meals  insulin lispro Injectable (ADMELOG) 12 Unit(s) SubCutaneous three times a day before meals  magnesium oxide 400 milliGRAM(s) Oral daily  methylPREDNISolone sodium succinate Injectable 40 milliGRAM(s) IV Push two times a day  montelukast 10 milliGRAM(s) Oral daily  pantoprazole    Tablet 40 milliGRAM(s) Oral before breakfast  senna 2 Tablet(s) Oral at bedtime  sodium chloride 0.9%. 1000 milliLiter(s) (70 mL/Hr) IV Continuous <Continuous>  valACYclovir 1000 milliGRAM(s) Oral three times a day    MEDICATIONS  (PRN):  ALBUTerol    90 MICROgram(s) HFA Inhaler 2 Puff(s) Inhalation every 6 hours PRN Shortness of Breath      HOME MEDICATIONS:  Home Medications:  Albuterol (Eqv-ProAir HFA) 90 mcg/inh inhalation aerosol:  (07 Nov 2021 15:18)  atorvastatin 10 mg oral tablet: 1 tab(s) orally once a day (07 Nov 2021 15:18)  Breo Ellipta 200 mcg-25 mcg/inh inhalation powder:  (07 Nov 2021 15:18)  Breztri Aerosphere inhalation aerosol:  (07 Nov 2021 15:18)  Incruse Ellipta 62.5 mcg/inh inhalation powder: 1 inhaler(s) inhaled once a day (07 Nov 2021 15:18)  Levemir 100 units/mL subcutaneous solution: 40 unit(s) subcutaneous once a day (at bedtime) (07 Nov 2021 15:18)  NovoLOG Mix 70/30 subcutaneous suspension: 25 unit(s) subcutaneous once a day (07 Nov 2021 15:18)  Singulair 10 mg oral tablet: 1 tab(s) orally once a day (07 Nov 2021 15:18)  valACYclovir 1 g oral tablet: orally 3 times a day (07 Nov 2021 15:18)      VITALS:   T(F): 96.9 (11-10 @ 05:05), Max: 99.6 (11-07 @ 16:15)  HR: 98 (11-10 @ 05:05) (97 - 124)  BP: 142/67 (11-10 @ 05:05) (117/58 - 164/78)  BP(mean): --  RR: 18 (11-10 @ 05:05) (18 - 22)  SpO2: 95% (11-10 @ 05:05) (95% - 100%)    I&O's Summary      REVIEW OF SYSTEMS:  CONSTITUTIONAL: No weakness, fevers or chills  RESPIRATORY: + cough, wheezing, no  hemoptysis; + shortness of breath  CARDIOVASCULAR: No chest pain or palpitations  GASTROINTESTINAL: No abdominal or epigastric pain. No nausea, vomiting, or hematemesis; No diarrhea or constipation.   NEUROLOGICAL:  No tremors, no Weakness or numbness      PHYSICAL EXAM:  GENERAL: Patient is awake , alert and oriented,  not in acute distress  EYES: No proptosis, no lid lag  NECK: No thyroid enlargement, no palpable nodules , no bruit  LUNGS: + wheezing, decrease air entry   CARDIOVASCULAR: S1/S2 present, RRR , no murmurs   ABD: Soft, non-tender, non-distended, +BS  EXT: No DANIS      LABS:                        11.9   12.64 )-----------( 328      ( 10 Nov 2021 07:43 )             36.9     11-10    138  |  105  |  23<H>  ----------------------------<  307<H>  4.6   |  20  |  1.0    Ca    8.6      10 Nov 2021 07:43          CARDIAC MARKERS ( 08 Nov 2021 15:48 )  x     / 0.03 ng/mL / x     / x     / x            POCT Blood Glucose.: 265 mg/dL (11-10-21 @ 07:42)  POCT Blood Glucose.: 148 mg/dL (11-09-21 @ 23:41)  POCT Blood Glucose.: 152 mg/dL (11-09-21 @ 21:03)  POCT Blood Glucose.: 280 mg/dL (11-09-21 @ 18:21)  POCT Blood Glucose.: 359 mg/dL (11-09-21 @ 17:15)  POCT Blood Glucose.: 449 mg/dL (11-09-21 @ 15:08)  POCT Blood Glucose.: 515 mg/dL (11-09-21 @ 11:31)  POCT Blood Glucose.: 379 mg/dL (11-09-21 @ 07:23)  POCT Blood Glucose.: 358 mg/dL (11-08-21 @ 21:17)  POCT Blood Glucose.: 318 mg/dL (11-08-21 @ 15:47)  POCT Blood Glucose.: 319 mg/dL (11-08-21 @ 11:42)  POCT Blood Glucose.: 284 mg/dL (11-08-21 @ 07:56)  POCT Blood Glucose.: 305 mg/dL (11-08-21 @ 06:22)  POCT Blood Glucose.: 484 mg/dL (11-08-21 @ 01:15)  POCT Blood Glucose.: 447 mg/dL (11-08-21 @ 00:31)  POCT Blood Glucose.: 323 mg/dL (11-07-21 @ 20:02)      A1C with Estimated Average Glucose Result: 9.4: Method: Immunoassay 
    HPI:  76 yr old male with hx of DM, GERD, Asthma  who presents for evaluation of shortness of breath and chest discomfort for past 2 days. Patient states he has been sob on exertion associated with cough for past 2 weeks, but last 2 days he notice sob with minimal activity. He also complains of left lower chest pain since yesterday, sharp, non radiating lasting for few seconds. He also complains of constipation last BM was on Thursday. He also mentions of feeling very weak and difficulty to keep balance. He denies any fever, chills, sob, chest pain, abd pain, no urinary or bowel issues      (07 Nov 2021 21:24)       I reviewed the radiology tests and hospital record including the ED chart.    I reviewed the other consultants comments that are available in the chart.    CC/ HPI Patient is a 76y old  Male who presents with a chief complaint of sob (08 Nov 2021 16:32)      PNEUMONIA;ELEVATED TROPONIN;HYPOMAGNESEMIA    ^SHINGLES/SOB    No pertinent family history in first degree relatives    Diabetes    Prostate cancer    Asthma    COPD (chronic obstructive pulmonary disease)    Dyslipidemia    Shingles    Pneumonia    S/P TURP    H/O radical prostatectomy    History of surgery    SHINGLES/SOB    Elevated troponin    Hypomagnesemia          FAMILY HISTORY:  No pertinent family history in first degree relatives        No Known Allergies      SOC: per HP          Home prescriptions  Albuterol (Eqv-ProAir HFA) 90 mcg/inh inhalation aerosol:   atorvastatin 10 mg oral tablet: 1 tab(s) orally once a day  Breo Ellipta 200 mcg-25 mcg/inh inhalation powder:   Breztri Aerosphere inhalation aerosol:   budesonide-formoterol 160 mcg-4.5 mcg/inh inhalation aerosol: 2 puff(s) inhaled 2 times a day   Incruse Ellipta 62.5 mcg/inh inhalation powder: 1 inhaler(s) inhaled once a day  ipratropium-albuterol 0.5 mg-2.5 mg/3 mL inhalation solution: 3 milliliter(s) inhaled every 6 hours, As Needed -for bronchospasm   Levemir 100 units/mL subcutaneous solution: 40 unit(s) subcutaneous once a day (at bedtime)  magnesium oxide 500 mg oral tablet: 1 tab(s) orally once a day   NovoLOG Mix 70/30 subcutaneous suspension: 25 unit(s) subcutaneous once a day  pantoprazole 40 mg oral delayed release tablet: 1 tab(s) orally once a day   Singulair 10 mg oral tablet: 1 tab(s) orally once a day  valACYclovir 1 g oral tablet: orally 3 times a day  Ventolin HFA 90 mcg/inh inhalation aerosol: 2 puff(s) inhaled every 6 hours      MEDICATIONS  (STANDING):  albuterol/ipratropium (CFC free) Inhaler. 1 Puff(s) Inhalation four times a day  albuterol/ipratropium for Nebulization 3 milliLiter(s) Nebulizer every 6 hours  atorvastatin 10 milliGRAM(s) Oral at bedtime  azithromycin  IVPB 500 milliGRAM(s) IV Intermittent every 24 hours  azithromycin  IVPB      budesonide 160 MICROgram(s)/formoterol 4.5 MICROgram(s) Inhaler 2 Puff(s) Inhalation two times a day  dextrose 40% Gel 15 Gram(s) Oral once  dextrose 5%. 1000 milliLiter(s) (50 mL/Hr) IV Continuous <Continuous>  dextrose 5%. 1000 milliLiter(s) (100 mL/Hr) IV Continuous <Continuous>  dextrose 50% Injectable 25 Gram(s) IV Push once  dextrose 50% Injectable 12.5 Gram(s) IV Push once  dextrose 50% Injectable 25 Gram(s) IV Push once  enoxaparin Injectable 40 milliGRAM(s) SubCutaneous at bedtime  glucagon  Injectable 1 milliGRAM(s) IntraMuscular once  influenza  Vaccine (HIGH DOSE) 0.7 milliLiter(s) IntraMuscular once  insulin glargine Injectable (LANTUS) 21 Unit(s) SubCutaneous at bedtime  insulin lispro (ADMELOG) corrective regimen sliding scale   SubCutaneous three times a day before meals  insulin lispro Injectable (ADMELOG) 6 Unit(s) SubCutaneous before breakfast  insulin lispro Injectable (ADMELOG) 6 Unit(s) SubCutaneous before lunch  insulin lispro Injectable (ADMELOG) 6 Unit(s) SubCutaneous before dinner  magnesium oxide 400 milliGRAM(s) Oral daily  methylPREDNISolone sodium succinate Injectable 60 milliGRAM(s) IV Push two times a day  montelukast 10 milliGRAM(s) Oral daily  pantoprazole    Tablet 40 milliGRAM(s) Oral before breakfast  senna 2 Tablet(s) Oral at bedtime  valACYclovir 1000 milliGRAM(s) Oral three times a day    MEDICATIONS  (PRN):  ALBUTerol    90 MICROgram(s) HFA Inhaler 2 Puff(s) Inhalation every 6 hours PRN Shortness of Breath          T(C): 35.9 (11-09-21 @ 04:30), Max: 35.9 (11-08-21 @ 13:44)  HR: 103 (11-09-21 @ 04:30) (103 - 110)  BP: 134/61 (11-09-21 @ 05:00) (131/62 - 153/76)  RR: 18 (11-09-21 @ 04:30) (18 - 20)  SpO2: 95% (11-08-21 @ 16:04) (95% - 98%)  ICU Vital Signs Last 24 Hrs  T(C): 35.9 (09 Nov 2021 04:30), Max: 35.9 (08 Nov 2021 13:44)  T(F): 96.7 (09 Nov 2021 04:30), Max: 96.7 (08 Nov 2021 13:44)  HR: 103 (09 Nov 2021 04:30) (103 - 110)  BP: 134/61 (09 Nov 2021 05:00) (131/62 - 153/76)    RR: 18 (09 Nov 2021 04:30) (18 - 20)  SpO2: 95% (08 Nov 2021 16:04) (95% - 98%)      I&O's Summary      I&O's Detail      Drug Dosing Weight  Height (cm): 170.2 (07 Nov 2021 14:23)  Weight (kg): 77.1 (07 Nov 2021 14:23)  BMI (kg/m2): 26.6 (07 Nov 2021 14:23)  BSA (m2): 1.89 (07 Nov 2021 14:23)    LABS:                          12.4   11.22 )-----------( 339      ( 08 Nov 2021 06:35 )             38.6       11-08    135  |  101  |  18  ----------------------------<  325<H>  4.6   |  20  |  0.9    Ca    8.5      08 Nov 2021 06:35  Mg     2.0     11-08    TPro  5.9<L>  /  Alb  3.2<L>  /  TBili  0.4  /  DBili  x   /  AST  15  /  ALT  17  /  AlkPhos  63  11-07      LIVER FUNCTIONS - ( 07 Nov 2021 15:02 )  Alb: 3.2 g/dL / Pro: 5.9 g/dL / ALK PHOS: 63 U/L / ALT: 17 U/L / AST: 15 U/L / GGT: x             CARDIAC MARKERS ( 08 Nov 2021 15:48 )  x     / 0.03 ng/mL / x     / x     / x      CARDIAC MARKERS ( 07 Nov 2021 15:02 )  x     / 0.06 ng/mL / x     / x     / x                Serum Pro-Brain Natriuretic Peptide: 299 pg/mL (11-07-21 @ 15:02)          Procalcitonin, Serum: 0.08 ng/mL (11-08-21 @ 06:35)      COVID-19 PCR: NotDetec (07 Nov 2021 14:30)          azithromycin  IVPB 500 milliGRAM(s) IV Intermittent every 24 hours  azithromycin  IVPB      valACYclovir 1000 milliGRAM(s) Oral three times a day                  RADIOLOGY:  I have personally reviewed all chest and other pertinent radiology films.      REVIEW OF SYSTEMS:     · CONSTITUTIONAL:   no fever   no chills.      · EYES:   no discharge,   no irritation,    no visual changes.    · ENMT:   Ears: no ear pain and no hearing problems.  Nose: no nasal congestion and no nasal drainage.      · CARDIOVASCULAR:   no chest pain,   no swelling  no palpitations      · RESPIRATORY:  +SOB,  +wheezing ,  +respiratory difficulty  no sputum production    · GASTROINTESTINAL:   no abdominal pain,    no nausea   no vomiting.    · GENITOURINARY:  no dysuria,   no frequency,       · MUSCULOSKELETAL:   no back pain,   no neck pain,   no weakness.    · SKIN:   no pruritis,   no rashes.          ALLERGIC/IMMUNOLOGIC:   No active allergic or immunologic issues    all other systems are negative      PHYSICAL EXAM:     · CONSTITUTIONAL:   not septic appearing,   well nourished,   NAD    · ENMT:   Airway patent,   Nasal mucosa clear.  Mouth with normal mucosa.   No thrush    · EYES:   Clear bilaterally,   pupils equal,   round and reactive to light.    · CARDIAC:   Normal rate,   regular rhythm.    Heart sounds S1, S2.   no thrills or bruits on palpitation  normal  cardiac impulse  No murmurs, no rubs or gallops on auscultation  no edema        CAROTID:   normal systolic impulse  no bruits    · RESPIRATORY:   + wheeze  normal chest expansion  not tachypneic,  no retractions or use of accessory muscles  palpation of chest is normal with no fremitus  percussion of chest demonstrates no hyperresonance or dullness    · GASTROINTESTINAL:  Abdomen soft,   non-tender,   no guarding,   + BS  liver spleen not palpable      · MUSCULOSKELETAL:   range of motion is not limited,  no clubbing, cyanosis  no petechiae    · NEUROLOGICAL:   Alert and oriented   no obvious focal deficits in cranial nerve areas  no motor or sensory deficits.      · SKIN:   Skin normal color for race,   warm,   dry and intact.       · PSYCHIATRIC:   Alert and oriented to person,   place, time/situation.   normal mood and affect.   no apparent risk to self or others.    · HEME LYMPH:   no splenomegaly.  No cervical  lymphadenopathy.  no inguinal lymphadenopathy    
HPI: 76 yr old male with hx of DM, GERD, Asthma  who presents for evaluation of shortness of breath and chest discomfort for past 2 days. Patient states he has been sob on exertion associated with cough for past 2 weeks, but last 2 days he notice sob with minimal activity. He also complains of left lower chest pain since yesterday, sharp, non radiating lasting for few seconds. He also complains of constipation last BM was on Thursday. He also mentions of feeling very weak and difficulty to keep balance. He denies any fever, chills, sob, chest pain, abd pain, no urinary or bowel issues   ptn  seen at  bed  side  oob  to  chair  aox3  fu  command  no  new  c/o     PTN  REFERRED TO ACUTE  REHAB  FOR  EVAL AND  TX   PAST MEDICAL & SURGICAL HISTORY:  Diabetes  20 yrs    Prostate cancer  1999 s/p sx    Asthma  20 yr    COPD (chronic obstructive pulmonary disease)  20 yrs no 02 rx    Dyslipidemia    Shingles    S/P TURP  1999    History of surgery  back surgery        Hospital Course:    TODAY'S SUBJECTIVE & REVIEW OF SYMPTOMS:     Constitutional WNL   Cardio WNL   Resp WNL   GI WNL  Heme WNL  Endo WNL  Skin WNL  MSK WNL  Neuro WNL  Cognitive WNL  Psych WNL      MEDICATIONS  (STANDING):  albuterol/ipratropium (CFC free) Inhaler. 1 Puff(s) Inhalation four times a day  albuterol/ipratropium for Nebulization 3 milliLiter(s) Nebulizer every 6 hours  atorvastatin 10 milliGRAM(s) Oral at bedtime  azithromycin  IVPB 500 milliGRAM(s) IV Intermittent every 24 hours  azithromycin  IVPB      budesonide 160 MICROgram(s)/formoterol 4.5 MICROgram(s) Inhaler 2 Puff(s) Inhalation two times a day  dextrose 40% Gel 15 Gram(s) Oral once  dextrose 5%. 1000 milliLiter(s) (50 mL/Hr) IV Continuous <Continuous>  dextrose 5%. 1000 milliLiter(s) (100 mL/Hr) IV Continuous <Continuous>  dextrose 50% Injectable 25 Gram(s) IV Push once  dextrose 50% Injectable 12.5 Gram(s) IV Push once  dextrose 50% Injectable 25 Gram(s) IV Push once  enoxaparin Injectable 40 milliGRAM(s) SubCutaneous at bedtime  glucagon  Injectable 1 milliGRAM(s) IntraMuscular once  influenza  Vaccine (HIGH DOSE) 0.7 milliLiter(s) IntraMuscular once  insulin glargine Injectable (LANTUS) 21 Unit(s) SubCutaneous at bedtime  insulin lispro (ADMELOG) corrective regimen sliding scale   SubCutaneous three times a day before meals  insulin lispro Injectable (ADMELOG) 6 Unit(s) SubCutaneous before breakfast  insulin lispro Injectable (ADMELOG) 6 Unit(s) SubCutaneous before lunch  insulin lispro Injectable (ADMELOG) 6 Unit(s) SubCutaneous before dinner  magnesium oxide 400 milliGRAM(s) Oral daily  methylPREDNISolone sodium succinate Injectable 60 milliGRAM(s) IV Push two times a day  montelukast 10 milliGRAM(s) Oral daily  pantoprazole    Tablet 40 milliGRAM(s) Oral before breakfast  senna 2 Tablet(s) Oral at bedtime  valACYclovir 1000 milliGRAM(s) Oral three times a day    MEDICATIONS  (PRN):  ALBUTerol    90 MICROgram(s) HFA Inhaler 2 Puff(s) Inhalation every 6 hours PRN Shortness of Breath      FAMILY HISTORY:      Allergies    No Known Allergies    Intolerances        SOCIAL HISTORY:    [  ] Etoh  [  ] Smoking  [  ] Substance abuse     Home Environment:  [  ] Home Alone  [x  ] Lives with Family  [  ] Home Health Aid    Dwelling:  [  ] Apartment  [  x] Private House  [  ] Adult Home  [  ] Skilled Nursing Facility      [  ] Short Term  [  ] Long Term  [ x ] Stairs       Elevator [  ]    FUNCTIONAL STATUS PTA: (Check all that apply)  Ambulation: [ x  ]Independent    [  ] Dependent     [  ] Non-Ambulatory  Assistive Device: [ x ] SA Cane  [  ]  Q Cane  [  ] Walker  [  ]  Wheelchair  ADL : [x  ] Independent  [  ]  Dependent       Vital Signs Last 24 Hrs  T(C): 35.9 (09 Nov 2021 04:30), Max: 35.9 (08 Nov 2021 13:44)  T(F): 96.7 (09 Nov 2021 04:30), Max: 96.7 (08 Nov 2021 13:44)  HR: 103 (09 Nov 2021 04:30) (103 - 110)  BP: 134/61 (09 Nov 2021 05:00) (131/62 - 153/76)  BP(mean): --  RR: 18 (09 Nov 2021 04:30) (18 - 20)  SpO2: 95% (09 Nov 2021 06:10) (95% - 98%)      PHYSICAL EXAM: Alert & Oriented X3  GENERAL: NAD, well-groomed, well-developed  HEAD:  Atraumatic, Normocephalic  EYES: EOMI, PERRLA, conjunctiva and sclera clear  NECK: Supple, No JVD, Normal thyroid  CHEST/LUNG: Clear to percussion bilaterally; No rales, rhonchi, wheezing, or rubs  HEART: Regular rate and rhythm; No murmurs, rubs, or gallops  ABDOMEN: Soft, Nontender, Nondistended; Bowel sounds present  EXTREMITIES:  2+ Peripheral Pulses, No clubbing, cyanosis, or edema    NERVOUS SYSTEM:  Cranial Nerves 2-12 intact [ x ] Abnormal  [  ]  ROM: WFL all extremities [ x ]  Abnormal [  ]  Motor Strength: WFL all extremities  [  x]  Abnormal [  ]  Sensation: intact to light touch [ x ] Abnormal [  ]  Reflexes: Symmetric [ x ]  Abnormal [  ]    FUNCTIONAL STATUS:  Bed Mobility: Independent [  ]  Supervision [ x ]  Needs Assistance [  ]  N/A [  ]  Transfers: Independent [  ]  Supervision [x  ]  Needs Assistance [  ]  N/A [  ]   Ambulation: Independent [  ]  Supervision [ x ]  Needs Assistance [  ]  N/A [  ]  ADL: Independent [  ] Requires Assistance [  ] N/A [x  ]  SEE PT/OT IE NOTES    LABS:                        12.4   11.22 )-----------( 339      ( 08 Nov 2021 06:35 )             38.6     11-08    135  |  101  |  18  ----------------------------<  325<H>  4.6   |  20  |  0.9    Ca    8.5      08 Nov 2021 06:35  Mg     2.0     11-08    TPro  5.9<L>  /  Alb  3.2<L>  /  TBili  0.4  /  DBili  x   /  AST  15  /  ALT  17  /  AlkPhos  63  11-07          RADIOLOGY & ADDITIONAL STUDIES:    Assesment:

## 2021-11-11 LAB
ALBUMIN SERPL ELPH-MCNC: 3.6 G/DL — SIGNIFICANT CHANGE UP (ref 3.5–5.2)
ALP SERPL-CCNC: 70 U/L — SIGNIFICANT CHANGE UP (ref 30–115)
ALT FLD-CCNC: 18 U/L — SIGNIFICANT CHANGE UP (ref 0–41)
ANION GAP SERPL CALC-SCNC: 12 MMOL/L — SIGNIFICANT CHANGE UP (ref 7–14)
AST SERPL-CCNC: 10 U/L — SIGNIFICANT CHANGE UP (ref 0–41)
BILIRUB SERPL-MCNC: 0.4 MG/DL — SIGNIFICANT CHANGE UP (ref 0.2–1.2)
BUN SERPL-MCNC: 21 MG/DL — HIGH (ref 10–20)
CALCIUM SERPL-MCNC: 9 MG/DL — SIGNIFICANT CHANGE UP (ref 8.5–10.1)
CHLORIDE SERPL-SCNC: 102 MMOL/L — SIGNIFICANT CHANGE UP (ref 98–110)
CO2 SERPL-SCNC: 22 MMOL/L — SIGNIFICANT CHANGE UP (ref 17–32)
CREAT SERPL-MCNC: 1 MG/DL — SIGNIFICANT CHANGE UP (ref 0.7–1.5)
GLUCOSE BLDC GLUCOMTR-MCNC: 120 MG/DL — HIGH (ref 70–99)
GLUCOSE BLDC GLUCOMTR-MCNC: 319 MG/DL — HIGH (ref 70–99)
GLUCOSE BLDC GLUCOMTR-MCNC: 339 MG/DL — HIGH (ref 70–99)
GLUCOSE BLDC GLUCOMTR-MCNC: 368 MG/DL — HIGH (ref 70–99)
GLUCOSE BLDC GLUCOMTR-MCNC: 75 MG/DL — SIGNIFICANT CHANGE UP (ref 70–99)
GLUCOSE SERPL-MCNC: 334 MG/DL — HIGH (ref 70–99)
HCT VFR BLD CALC: 38.3 % — LOW (ref 42–52)
HGB BLD-MCNC: 12.1 G/DL — LOW (ref 14–18)
MAGNESIUM SERPL-MCNC: 1.9 MG/DL — SIGNIFICANT CHANGE UP (ref 1.8–2.4)
MCHC RBC-ENTMCNC: 26.8 PG — LOW (ref 27–31)
MCHC RBC-ENTMCNC: 31.6 G/DL — LOW (ref 32–37)
MCV RBC AUTO: 84.7 FL — SIGNIFICANT CHANGE UP (ref 80–94)
NRBC # BLD: 0 /100 WBCS — SIGNIFICANT CHANGE UP (ref 0–0)
PLATELET # BLD AUTO: 314 K/UL — SIGNIFICANT CHANGE UP (ref 130–400)
POTASSIUM SERPL-MCNC: 5.1 MMOL/L — HIGH (ref 3.5–5)
POTASSIUM SERPL-SCNC: 5.1 MMOL/L — HIGH (ref 3.5–5)
PROT SERPL-MCNC: 6.1 G/DL — SIGNIFICANT CHANGE UP (ref 6–8)
RBC # BLD: 4.52 M/UL — LOW (ref 4.7–6.1)
RBC # FLD: 14.4 % — SIGNIFICANT CHANGE UP (ref 11.5–14.5)
SODIUM SERPL-SCNC: 136 MMOL/L — SIGNIFICANT CHANGE UP (ref 135–146)
WBC # BLD: 6.24 K/UL — SIGNIFICANT CHANGE UP (ref 4.8–10.8)
WBC # FLD AUTO: 6.24 K/UL — SIGNIFICANT CHANGE UP (ref 4.8–10.8)

## 2021-11-11 PROCEDURE — 99231 SBSQ HOSP IP/OBS SF/LOW 25: CPT

## 2021-11-11 PROCEDURE — 99233 SBSQ HOSP IP/OBS HIGH 50: CPT

## 2021-11-11 RX ORDER — FUROSEMIDE 40 MG
40 TABLET ORAL ONCE
Refills: 0 | Status: COMPLETED | OUTPATIENT
Start: 2021-11-11 | End: 2021-11-11

## 2021-11-11 RX ORDER — TIOTROPIUM BROMIDE 18 UG/1
1 CAPSULE ORAL; RESPIRATORY (INHALATION) DAILY
Refills: 0 | Status: DISCONTINUED | OUTPATIENT
Start: 2021-11-11 | End: 2021-11-12

## 2021-11-11 RX ORDER — INSULIN GLARGINE 100 [IU]/ML
50 INJECTION, SOLUTION SUBCUTANEOUS EVERY MORNING
Refills: 0 | Status: DISCONTINUED | OUTPATIENT
Start: 2021-11-11 | End: 2021-11-12

## 2021-11-11 RX ORDER — INSULIN LISPRO 100/ML
22 VIAL (ML) SUBCUTANEOUS
Refills: 0 | Status: DISCONTINUED | OUTPATIENT
Start: 2021-11-11 | End: 2021-11-12

## 2021-11-11 RX ADMIN — Medication 1 APPLICATION(S): at 06:04

## 2021-11-11 RX ADMIN — VALACYCLOVIR 1000 MILLIGRAM(S): 500 TABLET, FILM COATED ORAL at 15:17

## 2021-11-11 RX ADMIN — ENOXAPARIN SODIUM 40 MILLIGRAM(S): 100 INJECTION SUBCUTANEOUS at 20:45

## 2021-11-11 RX ADMIN — Medication 3 MILLILITER(S): at 02:57

## 2021-11-11 RX ADMIN — Medication 16 UNIT(S): at 08:21

## 2021-11-11 RX ADMIN — SODIUM CHLORIDE 70 MILLILITER(S): 9 INJECTION INTRAMUSCULAR; INTRAVENOUS; SUBCUTANEOUS at 15:23

## 2021-11-11 RX ADMIN — GABAPENTIN 300 MILLIGRAM(S): 400 CAPSULE ORAL at 20:45

## 2021-11-11 RX ADMIN — Medication 1 APPLICATION(S): at 20:46

## 2021-11-11 RX ADMIN — Medication 40 MILLIGRAM(S): at 17:37

## 2021-11-11 RX ADMIN — PANTOPRAZOLE SODIUM 40 MILLIGRAM(S): 20 TABLET, DELAYED RELEASE ORAL at 06:06

## 2021-11-11 RX ADMIN — Medication 40 MILLIGRAM(S): at 16:49

## 2021-11-11 RX ADMIN — Medication 8: at 17:21

## 2021-11-11 RX ADMIN — Medication 600 MILLIGRAM(S): at 18:04

## 2021-11-11 RX ADMIN — Medication 16 UNIT(S): at 12:05

## 2021-11-11 RX ADMIN — VALACYCLOVIR 1000 MILLIGRAM(S): 500 TABLET, FILM COATED ORAL at 06:05

## 2021-11-11 RX ADMIN — Medication 40 MILLIGRAM(S): at 06:05

## 2021-11-11 RX ADMIN — INSULIN GLARGINE 40 UNIT(S): 100 INJECTION, SOLUTION SUBCUTANEOUS at 08:25

## 2021-11-11 RX ADMIN — TIOTROPIUM BROMIDE 1 CAPSULE(S): 18 CAPSULE ORAL; RESPIRATORY (INHALATION) at 18:05

## 2021-11-11 RX ADMIN — GABAPENTIN 300 MILLIGRAM(S): 400 CAPSULE ORAL at 13:27

## 2021-11-11 RX ADMIN — BUDESONIDE AND FORMOTEROL FUMARATE DIHYDRATE 2 PUFF(S): 160; 4.5 AEROSOL RESPIRATORY (INHALATION) at 09:41

## 2021-11-11 RX ADMIN — BUDESONIDE AND FORMOTEROL FUMARATE DIHYDRATE 2 PUFF(S): 160; 4.5 AEROSOL RESPIRATORY (INHALATION) at 20:45

## 2021-11-11 RX ADMIN — SENNA PLUS 2 TABLET(S): 8.6 TABLET ORAL at 20:45

## 2021-11-11 RX ADMIN — ATORVASTATIN CALCIUM 10 MILLIGRAM(S): 80 TABLET, FILM COATED ORAL at 20:45

## 2021-11-11 RX ADMIN — Medication 3 MILLILITER(S): at 19:09

## 2021-11-11 RX ADMIN — Medication 22 UNIT(S): at 17:21

## 2021-11-11 RX ADMIN — MAGNESIUM OXIDE 400 MG ORAL TABLET 400 MILLIGRAM(S): 241.3 TABLET ORAL at 12:39

## 2021-11-11 RX ADMIN — Medication 1 APPLICATION(S): at 17:38

## 2021-11-11 RX ADMIN — Medication 10: at 12:06

## 2021-11-11 RX ADMIN — MONTELUKAST 10 MILLIGRAM(S): 4 TABLET, CHEWABLE ORAL at 12:39

## 2021-11-11 RX ADMIN — Medication 3 MILLILITER(S): at 13:23

## 2021-11-11 RX ADMIN — Medication 1 APPLICATION(S): at 15:15

## 2021-11-11 RX ADMIN — GABAPENTIN 300 MILLIGRAM(S): 400 CAPSULE ORAL at 06:04

## 2021-11-11 RX ADMIN — Medication 8: at 08:23

## 2021-11-11 RX ADMIN — Medication 3 MILLILITER(S): at 08:14

## 2021-11-12 ENCOUNTER — TRANSCRIPTION ENCOUNTER (OUTPATIENT)
Age: 76
End: 2021-11-12

## 2021-11-12 VITALS
TEMPERATURE: 98 F | DIASTOLIC BLOOD PRESSURE: 66 MMHG | HEART RATE: 110 BPM | RESPIRATION RATE: 18 BRPM | SYSTOLIC BLOOD PRESSURE: 146 MMHG

## 2021-11-12 LAB
ALBUMIN SERPL ELPH-MCNC: 3.4 G/DL — LOW (ref 3.5–5.2)
ALP SERPL-CCNC: 70 U/L — SIGNIFICANT CHANGE UP (ref 30–115)
ALT FLD-CCNC: 18 U/L — SIGNIFICANT CHANGE UP (ref 0–41)
ANION GAP SERPL CALC-SCNC: 14 MMOL/L — SIGNIFICANT CHANGE UP (ref 7–14)
AST SERPL-CCNC: 13 U/L — SIGNIFICANT CHANGE UP (ref 0–41)
BILIRUB SERPL-MCNC: 0.4 MG/DL — SIGNIFICANT CHANGE UP (ref 0.2–1.2)
BUN SERPL-MCNC: 26 MG/DL — HIGH (ref 10–20)
CALCIUM SERPL-MCNC: 9.2 MG/DL — SIGNIFICANT CHANGE UP (ref 8.5–10.1)
CHLORIDE SERPL-SCNC: 99 MMOL/L — SIGNIFICANT CHANGE UP (ref 98–110)
CO2 SERPL-SCNC: 23 MMOL/L — SIGNIFICANT CHANGE UP (ref 17–32)
CREAT SERPL-MCNC: 1 MG/DL — SIGNIFICANT CHANGE UP (ref 0.7–1.5)
GLUCOSE BLDC GLUCOMTR-MCNC: 314 MG/DL — HIGH (ref 70–99)
GLUCOSE BLDC GLUCOMTR-MCNC: 406 MG/DL — HIGH (ref 70–99)
GLUCOSE SERPL-MCNC: 353 MG/DL — HIGH (ref 70–99)
HCT VFR BLD CALC: 38.1 % — LOW (ref 42–52)
HGB BLD-MCNC: 12.4 G/DL — LOW (ref 14–18)
MAGNESIUM SERPL-MCNC: 1.8 MG/DL — SIGNIFICANT CHANGE UP (ref 1.8–2.4)
MCHC RBC-ENTMCNC: 27 PG — SIGNIFICANT CHANGE UP (ref 27–31)
MCHC RBC-ENTMCNC: 32.5 G/DL — SIGNIFICANT CHANGE UP (ref 32–37)
MCV RBC AUTO: 82.8 FL — SIGNIFICANT CHANGE UP (ref 80–94)
NRBC # BLD: 0 /100 WBCS — SIGNIFICANT CHANGE UP (ref 0–0)
PLATELET # BLD AUTO: 314 K/UL — SIGNIFICANT CHANGE UP (ref 130–400)
POTASSIUM SERPL-MCNC: 5 MMOL/L — SIGNIFICANT CHANGE UP (ref 3.5–5)
POTASSIUM SERPL-SCNC: 5 MMOL/L — SIGNIFICANT CHANGE UP (ref 3.5–5)
PROT SERPL-MCNC: 5.9 G/DL — LOW (ref 6–8)
RBC # BLD: 4.6 M/UL — LOW (ref 4.7–6.1)
RBC # FLD: 14.6 % — HIGH (ref 11.5–14.5)
SODIUM SERPL-SCNC: 136 MMOL/L — SIGNIFICANT CHANGE UP (ref 135–146)
WBC # BLD: 5.84 K/UL — SIGNIFICANT CHANGE UP (ref 4.8–10.8)
WBC # FLD AUTO: 5.84 K/UL — SIGNIFICANT CHANGE UP (ref 4.8–10.8)

## 2021-11-12 PROCEDURE — 99231 SBSQ HOSP IP/OBS SF/LOW 25: CPT

## 2021-11-12 PROCEDURE — 99233 SBSQ HOSP IP/OBS HIGH 50: CPT

## 2021-11-12 RX ORDER — VALACYCLOVIR 500 MG/1
0 TABLET, FILM COATED ORAL
Qty: 0 | Refills: 0 | DISCHARGE

## 2021-11-12 RX ORDER — GABAPENTIN 400 MG/1
1 CAPSULE ORAL
Qty: 90 | Refills: 0
Start: 2021-11-12

## 2021-11-12 RX ORDER — TIOTROPIUM BROMIDE 18 UG/1
1 CAPSULE ORAL; RESPIRATORY (INHALATION)
Qty: 1 | Refills: 0
Start: 2021-11-12

## 2021-11-12 RX ORDER — INSULIN LISPRO 100/ML
0 VIAL (ML) SUBCUTANEOUS
Qty: 0 | Refills: 0 | DISCHARGE
Start: 2021-11-12

## 2021-11-12 RX ORDER — SENNA PLUS 8.6 MG/1
2 TABLET ORAL
Qty: 20 | Refills: 0
Start: 2021-11-12 | End: 2021-11-21

## 2021-11-12 RX ORDER — INSULIN ASPART 100 [IU]/ML
25 INJECTION, SUSPENSION SUBCUTANEOUS
Qty: 0 | Refills: 0 | DISCHARGE

## 2021-11-12 RX ORDER — BUDESONIDE, GLYCOPYRROLATE, AND FORMOTEROL FUMARATE 160; 9; 4.8 UG/1; UG/1; UG/1
0 AEROSOL, METERED RESPIRATORY (INHALATION)
Qty: 0 | Refills: 0 | DISCHARGE

## 2021-11-12 RX ORDER — SENNA PLUS 8.6 MG/1
2 TABLET ORAL
Qty: 0 | Refills: 0 | DISCHARGE
Start: 2021-11-12

## 2021-11-12 RX ORDER — INSULIN DETEMIR 100/ML (3)
40 INSULIN PEN (ML) SUBCUTANEOUS
Qty: 0 | Refills: 0 | DISCHARGE

## 2021-11-12 RX ORDER — BUDESONIDE, GLYCOPYRROLATE, AND FORMOTEROL FUMARATE 160; 9; 4.8 UG/1; UG/1; UG/1
1 AEROSOL, METERED RESPIRATORY (INHALATION)
Qty: 1 | Refills: 0
Start: 2021-11-12 | End: 2021-12-11

## 2021-11-12 RX ORDER — HYDROCORTISONE 1 %
1 OINTMENT (GRAM) TOPICAL
Qty: 0 | Refills: 0 | DISCHARGE
Start: 2021-11-12

## 2021-11-12 RX ORDER — INSULIN LISPRO 100/ML
20 VIAL (ML) SUBCUTANEOUS
Qty: 0 | Refills: 0 | DISCHARGE
Start: 2021-11-12

## 2021-11-12 RX ORDER — UMECLIDINIUM 62.5 UG/1
1 AEROSOL, POWDER ORAL
Qty: 0 | Refills: 0 | DISCHARGE

## 2021-11-12 RX ORDER — ALBUTEROL 90 UG/1
0 AEROSOL, METERED ORAL
Qty: 0 | Refills: 0 | DISCHARGE

## 2021-11-12 RX ORDER — INSULIN LISPRO 100/ML
18 VIAL (ML) SUBCUTANEOUS
Qty: 0 | Refills: 0 | DISCHARGE
Start: 2021-11-12

## 2021-11-12 RX ADMIN — BUDESONIDE AND FORMOTEROL FUMARATE DIHYDRATE 2 PUFF(S): 160; 4.5 AEROSOL RESPIRATORY (INHALATION) at 08:39

## 2021-11-12 RX ADMIN — Medication 40 MILLIGRAM(S): at 06:27

## 2021-11-12 RX ADMIN — GABAPENTIN 300 MILLIGRAM(S): 400 CAPSULE ORAL at 13:14

## 2021-11-12 RX ADMIN — Medication 22 UNIT(S): at 11:30

## 2021-11-12 RX ADMIN — TIOTROPIUM BROMIDE 1 CAPSULE(S): 18 CAPSULE ORAL; RESPIRATORY (INHALATION) at 08:39

## 2021-11-12 RX ADMIN — PANTOPRAZOLE SODIUM 40 MILLIGRAM(S): 20 TABLET, DELAYED RELEASE ORAL at 06:27

## 2021-11-12 RX ADMIN — Medication 8: at 08:06

## 2021-11-12 RX ADMIN — Medication 600 MILLIGRAM(S): at 06:26

## 2021-11-12 RX ADMIN — Medication 3 MILLILITER(S): at 07:08

## 2021-11-12 RX ADMIN — Medication 3 MILLILITER(S): at 13:07

## 2021-11-12 RX ADMIN — Medication 1 APPLICATION(S): at 09:56

## 2021-11-12 RX ADMIN — Medication 12: at 11:30

## 2021-11-12 RX ADMIN — GABAPENTIN 300 MILLIGRAM(S): 400 CAPSULE ORAL at 06:26

## 2021-11-12 RX ADMIN — MAGNESIUM OXIDE 400 MG ORAL TABLET 400 MILLIGRAM(S): 241.3 TABLET ORAL at 11:31

## 2021-11-12 RX ADMIN — AZITHROMYCIN 255 MILLIGRAM(S): 500 TABLET, FILM COATED ORAL at 00:45

## 2021-11-12 RX ADMIN — Medication 22 UNIT(S): at 08:05

## 2021-11-12 RX ADMIN — MONTELUKAST 10 MILLIGRAM(S): 4 TABLET, CHEWABLE ORAL at 11:32

## 2021-11-12 RX ADMIN — Medication 1 APPLICATION(S): at 06:27

## 2021-11-12 RX ADMIN — INSULIN GLARGINE 50 UNIT(S): 100 INJECTION, SOLUTION SUBCUTANEOUS at 08:00

## 2021-11-12 NOTE — PROGRESS NOTE ADULT - ASSESSMENT
#poorly controlled type 2 DM / steroid induced hyperglycemia   - HBA1c 9.4% at home on Levemir 40 units at bedtime and novolog 20 units in am and 12-15 in pm , on chronic prednisone 5-10 mg daily   - remain on solumedrol 40 mg IV Q 12 h   - increase Lantus  to 50  units every 24 hours   - increase Admelog to 22 units TIDAC , hold if NPO   - agree sliding scale  2:50 > 150 if remain uncontrolled can be change to 3:50 > 150   - insulin dose need to be titrated down once steroid is tapered     
#poorly controlled type 2 DM / steroid induced hyperglycemia   - HBA1c 9.4% at home on Levemir 40 units at bedtime and novolog 20 units in am and 12-15 in pm , on chronic prednisone 5-10 mg daily   - remain on solumedrol 40 mg IV Q 12 h   - BG dropped to 75 at bedtime yesterday   - decrease Lantus back to 40 units daily in am and please do not hold the dose   - decrease  Admelog to 18  units TIDAC , hold if NPO   - Keep  sliding scale  2:50 > 150 if remain uncontrolled can be change to 3:50 > 150   - insulin dose need to be titrated down once steroid is tapered 
76 yr old male with hx of DM, GERD, Asthma presents for evaluation of shortness of breath and chest discomfort for past 2 days.      A/P   # Asthma Exacerbation / hx COVID pneumonia March residual interstitial infiltrates  - pt was consulted by pulmonary, recommendations noted   - DuoNeb, Symbicort   - start Spiriva   -Continue with IV solumedrol, antibiotic for now   - c/w montelukast   -F/U procalcitonin  - supplement oxygen, monitor pulse Ox   - wean off oxygen   - daily peak flow     # Neuropathic CP due to recent   Shingles  - pt had a zoster infection back in October ( as per wife)  - d/c Valacyclovir   - increase the dose of Neurontin to 300 mg TID     # Uncontrolled DM  - carb consistent diet   - monitor finger stick   - case d/w Endocrine attending today, will give one dose of Lantus now and change Lantus to AM, increase short acting Insulin to 16 units TID with meals   - will start steroid taper in 24 hours     # Constipation   - continue with lactulose and senna     # HLD  - c/w Lipitor     # DVT ppx  - on  Lovenox    - Ambulate as tolerated  - fall precaution  - PT eval    # Full Code    #Progress Note Handoff  Pending (specify): increase Neurontin to 300 mg TID, give one dose of Lantus 40 units, change Lantus to AM hours, increase short acting Insulin  Family discussion:  plan of care was discussed with patient  and wife in details, they agreed with a plan of care   Disposition:  home    
76 yr old male with hx of DM, GERD, Asthma presents for evaluation of shortness of breath and chest discomfort for past 2 days.    # Asthma Exacerbation   # hx COVID pneumonia March residual interstitial infiltrates  - hemodynamically stable  - maintain sats > 90%  - DuoNeb, Symbicort   -Continue with IV solumedrol 60mg q12, antibiotic, and azithromycin  - c/w montelukast   -F/U procalcitonin  - pulmonary consult - Dr. Annemarie Oates     # CP 2/2 Shingles of Left upper torso  - healing well    - wound care  - c/w valacyclovir   - no need for isolation     # Hypomagnesemia  - Mg repleted    # Constipation   - continue with lactulose and senna     # Diabetes Mellitus Type II   - monitor FS  - start sliding scale   - check A1C    # HLD  - c/w Lipitor     # DVT ppx  - start Lovenox    # DASH diet, CHO consistent    # Chronic Muscular Decondition  - Ambulate as tolerated  - fall precaution  - PT eval    # Full Code    #Progress Note Handoff  Pending (specify):  as above   Family discussion:  plan of care was discussed with patient   in details.  all questions were answered.  seems to understand, and in agreement  Disposition:  unknown    
76 yr old male with hx of DM, GERD, Asthma presents for evaluation of shortness of breath and chest discomfort for past 2 days.      A/P   # Asthma Exacerbation / hx COVID pneumonia March residual interstitial infiltrates  - pt was consulted by pulmonary, recommendations noted   - Anmol, Symbicort , started on Spiriva , will continue all inhalers on discharge   -  start po Prednisone 40 mg with taper and close follow up with pulmonary Dr Oates as an OP   - c/w montelukast   - pt is comfortable on RA  - started on  Mucinex       # Neuropathic CP due to recent   Shingles  - pt had a zoster infection back in October ( as per wife)  - off  Valacyclovir   - c/w  Neurontin to 300 mg TID , pain is resolving no higher dose of Neurontin     # Uncontrolled DM  - taper steroids  - carb consistent diet   - endocrinology is following, recommendations noted, will d/c on Lantus 40 units and Lispro 18 with meals     # Constipation   - continue with lactulose and senna     # HLD  - c/w Lipitor     # DVT ppx  - on  Lovenox    - Ambulate as tolerated  - fall precaution  - PT eval    # Full Code    #Progress Note Handoff  Pending (specify): start steroid taper with close pulmonary f/u after discharge   Family discussion:  plan of care was discussed with patient, he agreed with a plan of care   Disposition:  home    
76 yr old male with hx of DM, GERD, Asthma presents for evaluation of shortness of breath and chest discomfort for past 2 days.    # Asthma Exacerbation   # hx COVID pneumonia March residual interstitial infiltrates  - hemodynamically stable  - maintain sats > 90%  - DuoNeb, Symbicort   -Continue with IV solumedrol, antibiotic, and azithromycin  -wean off oxygen as tolerated  - c/w montelukast   -F/U procalcitonin  - pulmonary consult - Dr. Annemarie Oates     # CP 2/2 Shingles of Left upper torso  - healing well    - wound care  - c/w valacyclovir   - no need for isolation     # Hypomagnesemia  - Mg repleted    # Constipation   - continue with lactulose and senna     # Diabetes Mellitus Type II   - Will increase the lantus dose, pre-meal insulin, and ISS  -Monitor POC   -F/U A1C  -IVF  Endocrinology consult     # HLD  - c/w Lipitor     # DVT ppx  - start Lovenox    # DASH diet, CHO consistent    # Chronic Muscular Decondition  - Ambulate as tolerated  - fall precaution  - PT eval    # Full Code    #Progress Note Handoff  Pending (specify):  clinical improvement.  better control of POC  Family discussion:  plan of care was discussed with patient  and wife in details.  all questions were answered.  seems to understand, and in agreement  Disposition:  home    
76 yr old male with hx of DM, GERD, Asthma presents for evaluation of shortness of breath and chest discomfort for past 2 days.      A/P   # Asthma Exacerbation / hx COVID pneumonia March residual interstitial infiltrates  - pt was consulted by pulmonary, recommendations noted   - DuoNeb, Symbicort , started on Spiriva   -Continue with IV solumedrol, antibiotic , will start po Prednisone in 24 hours   - c/w montelukast   - supplement oxygen, monitor pulse Ox   - pt is comfortable on RA  - start Mucinex   - daily peak flow     # Neuropathic CP due to recent   Shingles  - pt had a zoster infection back in October ( as per wife)  - off  Valacyclovir   - c/w  Neurontin to 300 mg TID     # Uncontrolled DM  - carb consistent diet   - monitor finger stick   - case d/w Endocrine, increase  Lantus  to 50 units, increase short acting Insulin to 22  units TID with meals   - will start steroid taper in 24 hours     # Constipation   - continue with lactulose and senna     # HLD  - c/w Lipitor     # DVT ppx  - on  Lovenox    - Ambulate as tolerated  - fall precaution  - PT eval    # Full Code    #Progress Note Handoff  Pending (specify): increase Insulin, monitor finger stick, start Mucinex, will start po Prednisone in 24 hours, give one dose of Lasix, anticipate discharge in 24 hours    Family discussion:  plan of care was discussed with patient  and daughter  in details, they agreed with a plan of care   Disposition:  home

## 2021-11-12 NOTE — DISCHARGE NOTE PROVIDER - NSDCCPCAREPLAN_GEN_ALL_CORE_FT
PRINCIPAL DISCHARGE DIAGNOSIS  Diagnosis: Acute asthma exacerbation  Assessment and Plan of Treatment: continue all resparatort treatment as you were taking prior to admission, **P{lease REVIEW all respiratory medications with Pulmonologist Dr Diane Oates, Prednoisone taper ordered      SECONDARY DISCHARGE DIAGNOSES  Diagnosis: Neuropathic pain  Assessment and Plan of Treatment: due to prio shingles infection, continue Gabapentine    Diagnosis: Type 2 diabetes mellitus  Assessment and Plan of Treatment: uncontrolled dur to streoid Rx: Lantu and Lispro dosages adjusted     PRINCIPAL DISCHARGE DIAGNOSIS  Diagnosis: Acute asthma exacerbation  Assessment and Plan of Treatment: continue all resparatort treatment as you were taking prior to admission, **Please REVIEW all respiratory medications with Pulmonologist Dr Diane Oates, Prednoisone taper ordered      SECONDARY DISCHARGE DIAGNOSES  Diagnosis: Neuropathic pain  Assessment and Plan of Treatment: due to prio shingles infection, continue Gabapentine    Diagnosis: Type 2 diabetes mellitus  Assessment and Plan of Treatment: uncontrolled dur to streoid Rx: Lantu and Lispro dosages adjusted

## 2021-11-12 NOTE — DISCHARGE NOTE NURSING/CASE MANAGEMENT/SOCIAL WORK - PATIENT PORTAL LINK FT
You can access the FollowMyHealth Patient Portal offered by [facility] by registering at the following website: [email or web address]. By joining [employer]’s FollowMyHealth portal, you will also be able to view your health information using other applications (apps) compatible with our system.

## 2021-11-12 NOTE — PROVIDER CONTACT NOTE (CRITICAL VALUE NOTIFICATION) - SITUATION
Pt , no s/s hyperglycemia, lantus 50 u given in am 34u admelog admin according to sliding scale and standing order

## 2021-11-12 NOTE — DISCHARGE NOTE PROVIDER - HOSPITAL COURSE
76 yr old male with hx of DM, GERD, Asthma  who presents to ER on 11/7/2021 for evaluation of shortness of breath and chest discomfort for past 2 days. Patient states he has been sob on exertion associated with cough for past 2 weeks, but last 2 days he notice sob with minimal activity. He also complains of left lower chest pain since yesterday, sharp, non radiating lasting for few seconds.  Patient was admitted for Acute Asthma Exacerbation.    - patient has Hx of Diabetes and blood sugars were uncontrolled due to steroid Rx for the Asthma. Endocrinology was consulted and Insulin adjusted 76 yr old male with hx of DM, GERD, Asthma  who presents to ER on 11/7/2021 for evaluation of shortness of breath and chest discomfort for past 2 days. Patient states he has been sob on exertion associated with cough for past 2 weeks, but last 2 days he notice sob with minimal activity. He also complains of left lower chest pain , sharp, non radiating lasting for few seconds, was recently diagnosed and treated for herpes zoster ( has neuralgia now).  Patient was admitted for Acute Asthma Exacerbation, treated with IV steroids, consulted by pulmonary, clinically improved.    Patient has Hx of Diabetes, poorly controlled due to steroids, he was consulted by endocrinology, Insulin was readjusted few times, will start steroid taper on discharge and readjust Insulin.   Pt and family consulted about lifestyle modifications, medications compliance, OP follow ups and booster vaccination for Zoster.

## 2021-11-12 NOTE — PROGRESS NOTE ADULT - SUBJECTIVE AND OBJECTIVE BOX
Patient is a 76y old  Male who presents with a chief complaint of sob, admitted for Asthma exacerbation, consulted by pulmonary, treated with IV Solumedrol. Pt has a h/o recent herpes Zoster ( back in October) with crusted lesions, c/o chest wall pain. His finger stick is very high, endocrinology consulted, will increase  Lantus to 50 Units  and short acting Insulin to 22 units TID.   Today pt is c/o chest wall pain  and SOB at times, daughter at the bedside.       Vital Signs Last 24 Hrs  T(C): 35.6 (11 Nov 2021 13:41), Max: 36.6 (11 Nov 2021 05:02)  T(F): 96.1 (11 Nov 2021 13:41), Max: 97.8 (11 Nov 2021 05:02)  HR: 119 (11 Nov 2021 13:41) (100 - 119)  BP: 174/79 (11 Nov 2021 13:41) (136/63 - 174/97)  BP(mean): --  RR: 16 (11 Nov 2021 13:41) (16 - 18)  SpO2: 97% (11 Nov 2021 13:31) (95% - 97%)    PHYSICAL EXAM:  GENERAL: NAD, well-groomed, elderly male   HEAD:  Atraumatic, Normocephalic  EYES: EOMI, PERRLA, conjunctiva and sclera clear  ENMT: No tonsillar erythema, exudates, or enlargement; Moist mucous membranes, Good dentition, No lesions  NECK: Supple, No JVD, Normal thyroid  NERVOUS SYSTEM:  Alert & Oriented X3, Good concentration; Motor Strength 5/5 B/L upper and lower extremities; DTRs 2+ intact and symmetric  CHEST/LUNG: decreased BS at bases, fine inspiratory wheezing   HEART: Regular rate and rhythm; No murmurs, rubs, or gallops  ABDOMEN: Soft, Nontender, Nondistended; Bowel sounds present  EXTREMITIES:  2+ Peripheral Pulses, No clubbing, cyanosis, or edema  LYMPH: No lymphadenopathy noted  SKIN: extensive skin rash noted on left side of the chest wall ( crusted herpetic lesions) with areas of erythema       LABS:                                   12.1   6.24  )-----------( 314      ( 11 Nov 2021 06:56 )             38.3   11-11    136  |  102  |  21<H>  ----------------------------<  334<H>  5.1<H>   |  22  |  1.0    Ca    9.0      11 Nov 2021 06:56  Mg     1.9     11-11    TPro  6.1  /  Alb  3.6  /  TBili  0.4  /  DBili  x   /  AST  10  /  ALT  18  /  AlkPhos  70  11-11      RADIOLOGY & ADDITIONAL TESTS:    < from: CT Angio Chest PE Protocol w/ IV Cont (11.07.21 @ 18:15) >  IMPRESSION:  No filling defects in the main pulmonary artery or segmental branches to suggest pulmonary embolus.    New posterior right lower lobe small peripheral opacity probably representing focal scarring or atelectasis. Follow-up noncontrast chest CT may be obtained in 3 months.    < end of copied text >  < from: Xray Chest 1 View-PORTABLE IMMEDIATE (11.07.21 @ 15:06) >  Impression:    No radiographic evidence of acute cardiopulmonary disease.          MEDICATIONS  (STANDING):  albuterol/ipratropium for Nebulization 3 milliLiter(s) Nebulizer every 6 hours  atorvastatin 10 milliGRAM(s) Oral at bedtime  azithromycin  IVPB 500 milliGRAM(s) IV Intermittent every 24 hours  azithromycin  IVPB      budesonide 160 MICROgram(s)/formoterol 4.5 MICROgram(s) Inhaler 2 Puff(s) Inhalation two times a day  clotrimazole 1% Cream 1 Application(s) Topical every 12 hours  dextrose 40% Gel 15 Gram(s) Oral once  dextrose 5%. 1000 milliLiter(s) (50 mL/Hr) IV Continuous <Continuous>  dextrose 5%. 1000 milliLiter(s) (100 mL/Hr) IV Continuous <Continuous>  dextrose 50% Injectable 25 Gram(s) IV Push once  dextrose 50% Injectable 12.5 Gram(s) IV Push once  dextrose 50% Injectable 25 Gram(s) IV Push once  enoxaparin Injectable 40 milliGRAM(s) SubCutaneous at bedtime  furosemide   Injectable 40 milliGRAM(s) IV Push once  gabapentin 300 milliGRAM(s) Oral three times a day  glucagon  Injectable 1 milliGRAM(s) IntraMuscular once  hydrocortisone 1% Cream 1 Application(s) Topical two times a day  influenza  Vaccine (HIGH DOSE) 0.7 milliLiter(s) IntraMuscular once  insulin glargine Injectable (LANTUS) 50 Unit(s) SubCutaneous every morning  insulin lispro (ADMELOG) corrective regimen sliding scale   SubCutaneous three times a day before meals  insulin lispro Injectable (ADMELOG) 22 Unit(s) SubCutaneous three times a day before meals  magnesium oxide 400 milliGRAM(s) Oral daily  methylPREDNISolone sodium succinate Injectable 40 milliGRAM(s) IV Push two times a day  montelukast 10 milliGRAM(s) Oral daily  pantoprazole    Tablet 40 milliGRAM(s) Oral before breakfast  senna 2 Tablet(s) Oral at bedtime  sodium chloride 0.9%. 1000 milliLiter(s) (70 mL/Hr) IV Continuous <Continuous>  tiotropium 18 MICROgram(s) Capsule 1 Capsule(s) Inhalation daily    MEDICATIONS  (PRN):  ALBUTerol    90 MICROgram(s) HFA Inhaler 2 Puff(s) Inhalation every 6 hours PRN Shortness of Breath        
CC.  SOB/Cough  HPI.  Patient reports Cough, SOB, and wheezing.  slightly improved since admission  Offers no other complaints              Constitutional: No fever, fatigue or weight loss.  Skin: No rash.  Eyes: No recent vision problems or eye pain.  ENT: No congestion, ear pain, or sore throat.  Endocrine: No thyroid problems.  Cardiovascular: No chest pain or palpation.  Respiratory: No  congestion   Gastrointestinal: No abdominal pain, nausea, vomiting, or diarrhea.  Genitourinary: No dysuria.  Musculoskeletal: No joint swelling.  Neurologic: No headache.      Vital Signs Last 24 Hrs  T(C): 35.9 (11-08-21 @ 13:44), Max: 37.4 (11-07-21 @ 19:29)  T(F): 96.7 (11-08-21 @ 13:44), Max: 99.3 (11-07-21 @ 19:29)  HR: 107 (11-08-21 @ 14:32) (101 - 120)  BP: 139/75 (11-08-21 @ 13:44) (135/74 - 164/78)  BP(mean): --  RR: 18 (11-08-21 @ 16:04) (18 - 20)  SpO2: 95% (11-08-21 @ 16:04) (95% - 98%)        PHYSICAL EXAM-  GENERAL: NAD,    HEAD:  Atraumatic, Normocephalic  EYES: EOMI, PERRLA, conjunctiva and sclera clear  NECK: Supple, No JVD, Normal thyroid  NERVOUS SYSTEM:  Alert & Oriented X3, Motor Strength 5/5 B/L upper and lower extremities; DTRs 2+ intact and symmetric  CHEST/LUNG: + wheezing with decrease air entry.  no retractions or accessory muscle usage  HEART: Regular rate and rhythm; No murmurs, rubs, or gallops  ABDOMEN: Soft, Nontender, Nondistended; Bowel sounds present  EXTREMITIES:   No clubbing, cyanosis, or edema  SKIN: No rashes or lesions                                  12.4   11.22 )-----------( 339      ( 08 Nov 2021 06:35 )             38.6     11-08    135  |  101  |  18  ----------------------------<  325<H>  4.6   |  20  |  0.9    Ca    8.5      08 Nov 2021 06:35  Mg     2.0     11-08    TPro  5.9<L>  /  Alb  3.2<L>  /  TBili  0.4  /  DBili  x   /  AST  15  /  ALT  17  /  AlkPhos  63  11-07    CARDIAC MARKERS ( 07 Nov 2021 15:02 )  x     / 0.06 ng/mL / x     / x     / x                      MEDICATIONS  (STANDING):  albuterol/ipratropium (CFC free) Inhaler. 1 Puff(s) Inhalation four times a day  albuterol/ipratropium for Nebulization 3 milliLiter(s) Nebulizer every 6 hours  atorvastatin 10 milliGRAM(s) Oral at bedtime  azithromycin  IVPB      budesonide 160 MICROgram(s)/formoterol 4.5 MICROgram(s) Inhaler 2 Puff(s) Inhalation two times a day  dextrose 40% Gel 15 Gram(s) Oral once  dextrose 5%. 1000 milliLiter(s) (50 mL/Hr) IV Continuous <Continuous>  dextrose 5%. 1000 milliLiter(s) (100 mL/Hr) IV Continuous <Continuous>  dextrose 50% Injectable 25 Gram(s) IV Push once  dextrose 50% Injectable 12.5 Gram(s) IV Push once  dextrose 50% Injectable 25 Gram(s) IV Push once  enoxaparin Injectable 40 milliGRAM(s) SubCutaneous at bedtime  glucagon  Injectable 1 milliGRAM(s) IntraMuscular once  influenza  Vaccine (HIGH DOSE) 0.7 milliLiter(s) IntraMuscular once  insulin glargine Injectable (LANTUS) 21 Unit(s) SubCutaneous at bedtime  insulin lispro (ADMELOG) corrective regimen sliding scale   SubCutaneous three times a day before meals  insulin lispro Injectable (ADMELOG) 6 Unit(s) SubCutaneous before breakfast  insulin lispro Injectable (ADMELOG) 6 Unit(s) SubCutaneous before lunch  insulin lispro Injectable (ADMELOG) 6 Unit(s) SubCutaneous before dinner  magnesium oxide 400 milliGRAM(s) Oral daily  methylPREDNISolone sodium succinate Injectable 60 milliGRAM(s) IV Push two times a day  montelukast 10 milliGRAM(s) Oral daily  pantoprazole    Tablet 40 milliGRAM(s) Oral before breakfast  senna 2 Tablet(s) Oral at bedtime  valACYclovir 1000 milliGRAM(s) Oral three times a day    MEDICATIONS  (PRN):  ALBUTerol    90 MICROgram(s) HFA Inhaler 2 Puff(s) Inhalation every 6 hours PRN Shortness of Breath        Imaging Personally Reviewed:     [x ] YES  [ ] NO    Consultant(s) Notes Reviewed:  [x ] YES  [ ] NO    Care Discussed with Consultants/Other Providers [x ] YES  [ ] No medical contraindication for discharge
CC.  SOB/Cough  HPI.  Patient reports Cough, SOB, and wheezing.  slightly improved since admission  Offers no other complaints              Constitutional: No fever, fatigue or weight loss.  Skin: No rash.  Eyes: No recent vision problems or eye pain.  ENT: No congestion, ear pain, or sore throat.  Endocrine: No thyroid problems.  Cardiovascular: No chest pain or palpation.  Respiratory: No  congestion   Gastrointestinal: No abdominal pain, nausea, vomiting, or diarrhea.  Genitourinary: No dysuria.  Musculoskeletal: No joint swelling.  Neurologic: No headache.      Vital Signs Last 24 Hrs  T(C): 36.2 (09 Nov 2021 13:31), Max: 36.2 (09 Nov 2021 13:31)  T(F): 97.1 (09 Nov 2021 13:31), Max: 97.1 (09 Nov 2021 13:31)  HR: 112 (09 Nov 2021 13:31) (103 - 112)  BP: 142/79 (09 Nov 2021 13:31) (131/62 - 153/76)  BP(mean): --  RR: 18 (09 Nov 2021 04:30) (18 - 18)  SpO2: 95% (09 Nov 2021 06:10) (95% - 95%)        PHYSICAL EXAM-  GENERAL: NAD,    HEAD:  Atraumatic, Normocephalic  EYES: EOMI, PERRLA, conjunctiva and sclera clear  NECK: Supple, No JVD, Normal thyroid  NERVOUS SYSTEM:  Alert & Oriented X3, Motor Strength 5/5 B/L upper and lower extremities; DTRs 2+ intact and symmetric  CHEST/LUNG: + wheezing with decrease air entry.  no retractions or accessory muscle usage  HEART: Regular rate and rhythm; No murmurs, rubs, or gallops  ABDOMEN: Soft, Nontender, Nondistended; Bowel sounds present  EXTREMITIES:   No clubbing, cyanosis, or edema  SKIN: No rashes or lesions       MEDICATIONS  (STANDING):  albuterol/ipratropium for Nebulization 3 milliLiter(s) Nebulizer every 6 hours  atorvastatin 10 milliGRAM(s) Oral at bedtime  azithromycin  IVPB 500 milliGRAM(s) IV Intermittent every 24 hours  azithromycin  IVPB      budesonide 160 MICROgram(s)/formoterol 4.5 MICROgram(s) Inhaler 2 Puff(s) Inhalation two times a day  dextrose 40% Gel 15 Gram(s) Oral once  dextrose 5%. 1000 milliLiter(s) (50 mL/Hr) IV Continuous <Continuous>  dextrose 5%. 1000 milliLiter(s) (100 mL/Hr) IV Continuous <Continuous>  dextrose 50% Injectable 25 Gram(s) IV Push once  dextrose 50% Injectable 12.5 Gram(s) IV Push once  dextrose 50% Injectable 25 Gram(s) IV Push once  enoxaparin Injectable 40 milliGRAM(s) SubCutaneous at bedtime  gabapentin 100 milliGRAM(s) Oral three times a day  glucagon  Injectable 1 milliGRAM(s) IntraMuscular once  hydrocortisone 1% Cream 1 Application(s) Topical two times a day  influenza  Vaccine (HIGH DOSE) 0.7 milliLiter(s) IntraMuscular once  insulin glargine Injectable (LANTUS) 40 Unit(s) SubCutaneous at bedtime  insulin lispro (ADMELOG) corrective regimen sliding scale   SubCutaneous three times a day before meals  insulin lispro Injectable (ADMELOG) 16 Unit(s) SubCutaneous three times a day before meals  magnesium oxide 400 milliGRAM(s) Oral daily  methylPREDNISolone sodium succinate Injectable 40 milliGRAM(s) IV Push two times a day  montelukast 10 milliGRAM(s) Oral daily  pantoprazole    Tablet 40 milliGRAM(s) Oral before breakfast  senna 2 Tablet(s) Oral at bedtime  sodium chloride 0.9%. 1000 milliLiter(s) (70 mL/Hr) IV Continuous <Continuous>  valACYclovir 1000 milliGRAM(s) Oral three times a day    MEDICATIONS  (PRN):  ALBUTerol    90 MICROgram(s) HFA Inhaler 2 Puff(s) Inhalation every 6 hours PRN Shortness of Breath               Imaging Personally Reviewed:     [x ] YES  [ ] NO    Consultant(s) Notes Reviewed:  [x ] YES  [ ] NO    Care Discussed with Consultants/Other Providers [x ] YES  [ ] No medical contraindication for discharge
Patient is a 76y old  Male who presents with a chief complaint of sob, admitted for Asthma exacerbation, consulted by pulmonary, treated with IV Solumedrol. Pt has a h/o recent herpes Zoster ( back in October) with crusted lesions, c/o chest wall pain. His finger stick is very high, endocrinology consulted, will increase  Lantus to 50 Units  and short acting Insulin to 22 units TID while in the hospital, will d/c on Lantus 40 mg in AM and Lispro 18 units with meals.   Today pt feels much better, asking about discharge.       Vital Signs Last 24 Hrs  T(C): 36.8 (12 Nov 2021 14:00), Max: 36.8 (12 Nov 2021 14:00)  T(F): 98.2 (12 Nov 2021 14:00), Max: 98.2 (12 Nov 2021 14:00)  HR: 110 (12 Nov 2021 14:00) (103 - 125)  BP: 146/66 (12 Nov 2021 14:00) (124/75 - 158/76)  BP(mean): --  RR: 18 (12 Nov 2021 14:00) (16 - 18)  SpO2: 93% (12 Nov 2021 08:36) (93% - 93%)    PHYSICAL EXAM:  GENERAL: NAD, well-groomed, elderly male   HEAD:  Atraumatic, Normocephalic  EYES: EOMI, PERRLA, conjunctiva and sclera clear  ENMT: No tonsillar erythema, exudates, or enlargement; Moist mucous membranes, Good dentition, No lesions  NECK: Supple, No JVD, Normal thyroid  NERVOUS SYSTEM:  Alert & Oriented X3, Good concentration; Motor Strength 5/5 B/L upper and lower extremities; DTRs 2+ intact and symmetric  CHEST/LUNG: decreased BS at bases, fine inspiratory wheezing   HEART: Regular rate and rhythm; No murmurs, rubs, or gallops  ABDOMEN: Soft, Nontender, Nondistended; Bowel sounds present  EXTREMITIES:  2+ Peripheral Pulses, No clubbing, cyanosis, or edema  LYMPH: No lymphadenopathy noted  SKIN: extensive skin rash noted on left side of the chest wall ( crusted herpetic lesions) with areas of erythema       LABS:                          12.4   5.84  )-----------( 314      ( 12 Nov 2021 07:58 )             38.1   11-12    136  |  99  |  26<H>  ----------------------------<  353<H>  5.0   |  23  |  1.0    Ca    9.2      12 Nov 2021 07:58  Mg     1.8     11-12    TPro  5.9<L>  /  Alb  3.4<L>  /  TBili  0.4  /  DBili  x   /  AST  13  /  ALT  18  /  AlkPhos  70  11-12        RADIOLOGY & ADDITIONAL TESTS:    < from: CT Angio Chest PE Protocol w/ IV Cont (11.07.21 @ 18:15) >  IMPRESSION:  No filling defects in the main pulmonary artery or segmental branches to suggest pulmonary embolus.    New posterior right lower lobe small peripheral opacity probably representing focal scarring or atelectasis. Follow-up noncontrast chest CT may be obtained in 3 months.    < end of copied text >  < from: Xray Chest 1 View-PORTABLE IMMEDIATE (11.07.21 @ 15:06) >  Impression:    No radiographic evidence of acute cardiopulmonary disease.          MEDICATIONS  (STANDING):  albuterol/ipratropium for Nebulization 3 milliLiter(s) Nebulizer every 6 hours  atorvastatin 10 milliGRAM(s) Oral at bedtime  azithromycin  IVPB 500 milliGRAM(s) IV Intermittent every 24 hours  azithromycin  IVPB      budesonide 160 MICROgram(s)/formoterol 4.5 MICROgram(s) Inhaler 2 Puff(s) Inhalation two times a day  clotrimazole 1% Cream 1 Application(s) Topical every 12 hours  dextrose 40% Gel 15 Gram(s) Oral once  dextrose 5%. 1000 milliLiter(s) (50 mL/Hr) IV Continuous <Continuous>  dextrose 5%. 1000 milliLiter(s) (100 mL/Hr) IV Continuous <Continuous>  dextrose 50% Injectable 25 Gram(s) IV Push once  dextrose 50% Injectable 12.5 Gram(s) IV Push once  dextrose 50% Injectable 25 Gram(s) IV Push once  enoxaparin Injectable 40 milliGRAM(s) SubCutaneous at bedtime  gabapentin 300 milliGRAM(s) Oral three times a day  glucagon  Injectable 1 milliGRAM(s) IntraMuscular once  guaiFENesin  milliGRAM(s) Oral every 12 hours  hydrocortisone 1% Cream 1 Application(s) Topical two times a day  influenza  Vaccine (HIGH DOSE) 0.7 milliLiter(s) IntraMuscular once  insulin glargine Injectable (LANTUS) 50 Unit(s) SubCutaneous every morning  insulin lispro (ADMELOG) corrective regimen sliding scale   SubCutaneous three times a day before meals  insulin lispro Injectable (ADMELOG) 22 Unit(s) SubCutaneous three times a day before meals  magnesium oxide 400 milliGRAM(s) Oral daily  methylPREDNISolone sodium succinate Injectable 40 milliGRAM(s) IV Push two times a day  montelukast 10 milliGRAM(s) Oral daily  pantoprazole    Tablet 40 milliGRAM(s) Oral before breakfast  senna 2 Tablet(s) Oral at bedtime  tiotropium 18 MICROgram(s) Capsule 1 Capsule(s) Inhalation daily    MEDICATIONS  (PRN):  ALBUTerol    90 MICROgram(s) HFA Inhaler 2 Puff(s) Inhalation every 6 hours PRN Shortness of Breath          
FS reviewed remain above target     CAPILLARY BLOOD GLUCOSE  197 (10 Nov 2021 21:15)      POCT Blood Glucose.: 368 mg/dL (11 Nov 2021 11:09)  POCT Blood Glucose.: 339 mg/dL (11 Nov 2021 07:25)  POCT Blood Glucose.: 197 mg/dL (10 Nov 2021 20:43)  POCT Blood Glucose.: 324 mg/dL (10 Nov 2021 16:47)      MEDICATIONS  (STANDING):  albuterol/ipratropium for Nebulization 3 milliLiter(s) Nebulizer every 6 hours  atorvastatin 10 milliGRAM(s) Oral at bedtime  azithromycin  IVPB 500 milliGRAM(s) IV Intermittent every 24 hours  azithromycin  IVPB      budesonide 160 MICROgram(s)/formoterol 4.5 MICROgram(s) Inhaler 2 Puff(s) Inhalation two times a day  clotrimazole 1% Cream 1 Application(s) Topical every 12 hours  dextrose 40% Gel 15 Gram(s) Oral once  dextrose 5%. 1000 milliLiter(s) (50 mL/Hr) IV Continuous <Continuous>  dextrose 5%. 1000 milliLiter(s) (100 mL/Hr) IV Continuous <Continuous>  dextrose 50% Injectable 25 Gram(s) IV Push once  dextrose 50% Injectable 12.5 Gram(s) IV Push once  dextrose 50% Injectable 25 Gram(s) IV Push once  enoxaparin Injectable 40 milliGRAM(s) SubCutaneous at bedtime  gabapentin 300 milliGRAM(s) Oral three times a day  glucagon  Injectable 1 milliGRAM(s) IntraMuscular once  hydrocortisone 1% Cream 1 Application(s) Topical two times a day  influenza  Vaccine (HIGH DOSE) 0.7 milliLiter(s) IntraMuscular once  insulin glargine Injectable (LANTUS) 40 Unit(s) SubCutaneous every morning  insulin lispro (ADMELOG) corrective regimen sliding scale   SubCutaneous three times a day before meals  insulin lispro Injectable (ADMELOG) 16 Unit(s) SubCutaneous three times a day before meals  magnesium oxide 400 milliGRAM(s) Oral daily  methylPREDNISolone sodium succinate Injectable 40 milliGRAM(s) IV Push two times a day  montelukast 10 milliGRAM(s) Oral daily  pantoprazole    Tablet 40 milliGRAM(s) Oral before breakfast  senna 2 Tablet(s) Oral at bedtime  sodium chloride 0.9%. 1000 milliLiter(s) (70 mL/Hr) IV Continuous <Continuous>  valACYclovir 1000 milliGRAM(s) Oral three times a day    MEDICATIONS  (PRN):  ALBUTerol    90 MICROgram(s) HFA Inhaler 2 Puff(s) Inhalation every 6 hours PRN Shortness of Breath  
Patient is a 76y old  Male who presents with a chief complaint of sob, admitted for Asthma exacerbation, consulted by pulmonary, treated with IV Solumedrol. Pt has a h/o recent herpes Zoster ( back in October) with crusted lesions, c/o chest wall pain. His finger stick is very high, endocrinology consulted, today will give one dose of Lantus 40 Units and change Lantus to AM and short acting Insulin to 16 units TID.   Today pt is c/o chest wall pain after recent zoster infection and SOB at times, wife at the bedside.       Vital Signs Last 24 Hrs  T(C): 36.1 (10 Nov 2021 05:05), Max: 36.2 (09 Nov 2021 13:31)  T(F): 96.9 (10 Nov 2021 05:05), Max: 97.1 (09 Nov 2021 13:31)  HR: 98 (10 Nov 2021 05:05) (97 - 112)  BP: 142/67 (10 Nov 2021 05:05) (117/58 - 142/79)  BP(mean): --  RR: 18 (10 Nov 2021 05:05) (18 - 18)  SpO2: 95% (10 Nov 2021 05:05) (95% - 95%)    PHYSICAL EXAM:  GENERAL: NAD, well-groomed, elderly male   HEAD:  Atraumatic, Normocephalic  EYES: EOMI, PERRLA, conjunctiva and sclera clear  ENMT: No tonsillar erythema, exudates, or enlargement; Moist mucous membranes, Good dentition, No lesions  NECK: Supple, No JVD, Normal thyroid  NERVOUS SYSTEM:  Alert & Oriented X3, Good concentration; Motor Strength 5/5 B/L upper and lower extremities; DTRs 2+ intact and symmetric  CHEST/LUNG: decreased BS at bases, fine inspiratory wheezing   HEART: Regular rate and rhythm; No murmurs, rubs, or gallops  ABDOMEN: Soft, Nontender, Nondistended; Bowel sounds present  EXTREMITIES:  2+ Peripheral Pulses, No clubbing, cyanosis, or edema  LYMPH: No lymphadenopathy noted  SKIN: extensive skin rash noted on left side of the chest wall ( crusted herpetic lesions) with areas of erythema       LABS:                        11.9   12.64 )-----------( 328      ( 10 Nov 2021 07:43 )             36.9     11-10    138  |  105  |  23<H>  ----------------------------<  307<H>  4.6   |  20  |  1.0    Ca    8.6      10 Nov 2021 07:43    RADIOLOGY & ADDITIONAL TESTS:    < from: CT Angio Chest PE Protocol w/ IV Cont (11.07.21 @ 18:15) >  IMPRESSION:  No filling defects in the main pulmonary artery or segmental branches to suggest pulmonary embolus.    New posterior right lower lobe small peripheral opacity probably representing focal scarring or atelectasis. Follow-up noncontrast chest CT may be obtained in 3 months.    < end of copied text >  < from: Xray Chest 1 View-PORTABLE IMMEDIATE (11.07.21 @ 15:06) >  Impression:    No radiographic evidence of acute cardiopulmonary disease.    < end of copied text >    MEDICATIONS  (STANDING):  albuterol/ipratropium for Nebulization 3 milliLiter(s) Nebulizer every 6 hours  atorvastatin 10 milliGRAM(s) Oral at bedtime  azithromycin  IVPB 500 milliGRAM(s) IV Intermittent every 24 hours  azithromycin  IVPB      budesonide 160 MICROgram(s)/formoterol 4.5 MICROgram(s) Inhaler 2 Puff(s) Inhalation two times a day  clotrimazole 1% Cream 1 Application(s) Topical every 12 hours  dextrose 40% Gel 15 Gram(s) Oral once  dextrose 5%. 1000 milliLiter(s) (50 mL/Hr) IV Continuous <Continuous>  dextrose 5%. 1000 milliLiter(s) (100 mL/Hr) IV Continuous <Continuous>  dextrose 50% Injectable 25 Gram(s) IV Push once  dextrose 50% Injectable 12.5 Gram(s) IV Push once  dextrose 50% Injectable 25 Gram(s) IV Push once  enoxaparin Injectable 40 milliGRAM(s) SubCutaneous at bedtime  gabapentin 300 milliGRAM(s) Oral three times a day  glucagon  Injectable 1 milliGRAM(s) IntraMuscular once  hydrocortisone 1% Cream 1 Application(s) Topical two times a day  influenza  Vaccine (HIGH DOSE) 0.7 milliLiter(s) IntraMuscular once  insulin glargine Injectable (LANTUS) 40 Unit(s) SubCutaneous every morning  insulin lispro (ADMELOG) corrective regimen sliding scale   SubCutaneous three times a day before meals  insulin lispro Injectable (ADMELOG) 16 Unit(s) SubCutaneous three times a day before meals  magnesium oxide 400 milliGRAM(s) Oral daily  methylPREDNISolone sodium succinate Injectable 40 milliGRAM(s) IV Push two times a day  montelukast 10 milliGRAM(s) Oral daily  pantoprazole    Tablet 40 milliGRAM(s) Oral before breakfast  senna 2 Tablet(s) Oral at bedtime  sodium chloride 0.9%. 1000 milliLiter(s) (70 mL/Hr) IV Continuous <Continuous>  valACYclovir 1000 milliGRAM(s) Oral three times a day    MEDICATIONS  (PRN):  ALBUTerol    90 MICROgram(s) HFA Inhaler 2 Puff(s) Inhalation every 6 hours PRN Shortness of Breath      
S: FS reviewed, dropped to 75 at bedtime   O: Vital Signs Last 24 Hrs  T(C): 36.5 (12 Nov 2021 05:00), Max: 36.5 (12 Nov 2021 03:26)  T(F): 97.7 (12 Nov 2021 05:00), Max: 97.7 (12 Nov 2021 03:26)  HR: 103 (12 Nov 2021 05:00) (103 - 125)  BP: 158/76 (12 Nov 2021 05:00) (124/75 - 174/79)  BP(mean): --  RR: 18 (12 Nov 2021 08:36) (16 - 18)  SpO2: 93% (12 Nov 2021 08:36) (93% - 97%)      MEDICATIONS  (STANDING):  albuterol/ipratropium for Nebulization 3 milliLiter(s) Nebulizer every 6 hours  atorvastatin 10 milliGRAM(s) Oral at bedtime  azithromycin  IVPB 500 milliGRAM(s) IV Intermittent every 24 hours  azithromycin  IVPB      budesonide 160 MICROgram(s)/formoterol 4.5 MICROgram(s) Inhaler 2 Puff(s) Inhalation two times a day  clotrimazole 1% Cream 1 Application(s) Topical every 12 hours  dextrose 40% Gel 15 Gram(s) Oral once  dextrose 5%. 1000 milliLiter(s) (50 mL/Hr) IV Continuous <Continuous>  dextrose 5%. 1000 milliLiter(s) (100 mL/Hr) IV Continuous <Continuous>  dextrose 50% Injectable 25 Gram(s) IV Push once  dextrose 50% Injectable 12.5 Gram(s) IV Push once  dextrose 50% Injectable 25 Gram(s) IV Push once  enoxaparin Injectable 40 milliGRAM(s) SubCutaneous at bedtime  gabapentin 300 milliGRAM(s) Oral three times a day  glucagon  Injectable 1 milliGRAM(s) IntraMuscular once  guaiFENesin  milliGRAM(s) Oral every 12 hours  hydrocortisone 1% Cream 1 Application(s) Topical two times a day  influenza  Vaccine (HIGH DOSE) 0.7 milliLiter(s) IntraMuscular once  insulin glargine Injectable (LANTUS) 50 Unit(s) SubCutaneous every morning  insulin lispro (ADMELOG) corrective regimen sliding scale   SubCutaneous three times a day before meals  insulin lispro Injectable (ADMELOG) 22 Unit(s) SubCutaneous three times a day before meals  magnesium oxide 400 milliGRAM(s) Oral daily  methylPREDNISolone sodium succinate Injectable 40 milliGRAM(s) IV Push two times a day  montelukast 10 milliGRAM(s) Oral daily  pantoprazole    Tablet 40 milliGRAM(s) Oral before breakfast  senna 2 Tablet(s) Oral at bedtime  tiotropium 18 MICROgram(s) Capsule 1 Capsule(s) Inhalation daily    MEDICATIONS  (PRN):  ALBUTerol    90 MICROgram(s) HFA Inhaler 2 Puff(s) Inhalation every 6 hours PRN Shortness of Breath

## 2021-11-12 NOTE — DISCHARGE NOTE PROVIDER - NSDCFUADDINST_GEN_ALL_CORE_FT
** Review all respiratory medication: inhalers with Dr Diane Oates. Bring all his medications to your next appointment    Continue to use Cane to ambulate as needed

## 2021-11-12 NOTE — PROGRESS NOTE ADULT - TIME BILLING
direct pt's care, communication with daughter, medical team and endocrinology attending
direct pt's care, communication with medical team, pt's counseling
direct pt's care, communication with wife, medical team and endocrinology attending

## 2021-11-12 NOTE — DISCHARGE NOTE PROVIDER - NSDCMRMEDTOKEN_GEN_ALL_CORE_FT
Albuterol (Eqv-ProAir HFA) 90 mcg/inh inhalation aerosol:   atorvastatin 10 mg oral tablet: 1 tab(s) orally once a day  Breo Ellipta 200 mcg-25 mcg/inh inhalation powder:   Breztri Aerosphere inhalation aerosol: continue as you were taking prior to admission  budesonide-formoterol 160 mcg-4.5 mcg/inh inhalation aerosol: 2 puff(s) inhaled 2 times a day   clotrimazole 1% topical cream: 1 application topically every 12 hours  gabapentin 300 mg oral capsule: 1 cap(s) orally 3 times a day  hydrocortisone 1% topical cream: 1 application topically 2 times a day  Incruse Ellipta 62.5 mcg/inh inhalation powder: 1 inhaler(s) inhaled once a day  insulin lispro 100 units/mL injectable solution: 18 unit(s) injectable 3 times a day before meals  insulin lispro 100 units/mL injectable solution:  injectable   ipratropium-albuterol 0.5 mg-2.5 mg/3 mL inhalation solution: 3 milliliter(s) inhaled every 6 hours, As Needed -for bronchospasm   Levemir 100 units/mL subcutaneous solution: 40 unit(s) subcutaneous once a day in the AM  magnesium oxide 500 mg oral tablet: 1 tab(s) orally once a day   pantoprazole 40 mg oral delayed release tablet: 1 tab(s) orally once a day   predniSONE 10 mg oral tablet: 4 tab(s) orally once a day x 3 days, then 3 tabs daily x 3 days,  then 2 tabs daily x 3 days then 1 tab daily x 3 days  senna oral tablet: 2 tab(s) orally once a day  Singulair 10 mg oral tablet: 1 tab(s) orally once a day  tiotropium 18 mcg inhalation capsule: 1 cap(s) inhaled once a day  Ventolin HFA 90 mcg/inh inhalation aerosol: 2 puff(s) inhaled every 6 hours   atorvastatin 10 mg oral tablet: 1 tab(s) orally once a day  Breo Ellipta 200 mcg-25 mcg/inh inhalation powder:   Breztri Aerosphere inhalation aerosol: 1 puff(s) inhaled 2 times a day   clotrimazole 1% topical cream: 1 application topically every 12 hours  gabapentin 300 mg oral capsule: 1 cap(s) orally 3 times a day  guaiFENesin 600 mg oral tablet, extended release: 1 tab(s) orally every 12 hours, As Needed -for cough   hydrocortisone 1% topical cream: 1 application topically 2 times a day  insulin lispro 100 units/mL injectable solution: 18 unit(s) injectable 3 times a day before meals  insulin lispro 100 units/mL injectable solution:  injectable   ipratropium-albuterol 0.5 mg-2.5 mg/3 mL inhalation solution: 3 milliliter(s) inhaled every 6 hours, As Needed -for bronchospasm   Levemir 100 units/mL subcutaneous solution: 40 unit(s) subcutaneous once a day in the AM  magnesium oxide 500 mg oral tablet: 1 tab(s) orally once a day   pantoprazole 40 mg oral delayed release tablet: 1 tab(s) orally once a day   predniSONE 10 mg oral tablet: 4 tab(s) orally once a day x 3 days, then 3 tabs daily x 3 days,  then 2 tabs daily x 3 days then 1 tab daily x 3 days  senna oral tablet: 2 tab(s) orally once a day, As Needed -for constipation   Singulair 10 mg oral tablet: 1 tab(s) orally once a day  tiotropium 18 mcg inhalation capsule: 1 cap(s) inhaled once a day  Ventolin HFA 90 mcg/inh inhalation aerosol: 2 puff(s) inhaled every 6 hours

## 2021-11-12 NOTE — DISCHARGE NOTE PROVIDER - CARE PROVIDER_API CALL
Diane Oates (NP; RN)  NP in Family Health  [address] [place] [address]  Phone: [phone]  Fax: [phone]  Follow Up Time: 1 week

## 2021-11-17 ENCOUNTER — EMERGENCY (EMERGENCY)
Facility: HOSPITAL | Age: 76
LOS: 0 days | Discharge: HOME | End: 2021-11-17
Attending: STUDENT IN AN ORGANIZED HEALTH CARE EDUCATION/TRAINING PROGRAM | Admitting: STUDENT IN AN ORGANIZED HEALTH CARE EDUCATION/TRAINING PROGRAM
Payer: MEDICARE

## 2021-11-17 VITALS
TEMPERATURE: 99 F | OXYGEN SATURATION: 96 % | HEART RATE: 111 BPM | SYSTOLIC BLOOD PRESSURE: 179 MMHG | DIASTOLIC BLOOD PRESSURE: 91 MMHG | RESPIRATION RATE: 18 BRPM | HEIGHT: 67 IN

## 2021-11-17 VITALS — SYSTOLIC BLOOD PRESSURE: 184 MMHG | HEART RATE: 99 BPM | DIASTOLIC BLOOD PRESSURE: 84 MMHG

## 2021-11-17 DIAGNOSIS — R07.89 OTHER CHEST PAIN: ICD-10-CM

## 2021-11-17 DIAGNOSIS — Z90.79 ACQUIRED ABSENCE OF OTHER GENITAL ORGAN(S): Chronic | ICD-10-CM

## 2021-11-17 DIAGNOSIS — M54.9 DORSALGIA, UNSPECIFIED: ICD-10-CM

## 2021-11-17 DIAGNOSIS — Z98.890 OTHER SPECIFIED POSTPROCEDURAL STATES: Chronic | ICD-10-CM

## 2021-11-17 DIAGNOSIS — B02.29 OTHER POSTHERPETIC NERVOUS SYSTEM INVOLVEMENT: ICD-10-CM

## 2021-11-17 DIAGNOSIS — E11.9 TYPE 2 DIABETES MELLITUS WITHOUT COMPLICATIONS: ICD-10-CM

## 2021-11-17 DIAGNOSIS — J45.909 UNSPECIFIED ASTHMA, UNCOMPLICATED: ICD-10-CM

## 2021-11-17 DIAGNOSIS — K21.9 GASTRO-ESOPHAGEAL REFLUX DISEASE WITHOUT ESOPHAGITIS: ICD-10-CM

## 2021-11-17 DIAGNOSIS — B02.9 ZOSTER WITHOUT COMPLICATIONS: ICD-10-CM

## 2021-11-17 PROCEDURE — 99283 EMERGENCY DEPT VISIT LOW MDM: CPT

## 2021-11-17 RX ORDER — GABAPENTIN 400 MG/1
1 CAPSULE ORAL
Qty: 10 | Refills: 0
Start: 2021-11-17 | End: 2021-11-26

## 2021-11-17 RX ORDER — GABAPENTIN 400 MG/1
600 CAPSULE ORAL ONCE
Refills: 0 | Status: COMPLETED | OUTPATIENT
Start: 2021-11-17 | End: 2021-11-17

## 2021-11-17 RX ADMIN — GABAPENTIN 600 MILLIGRAM(S): 400 CAPSULE ORAL at 18:58

## 2021-11-17 NOTE — ED PROVIDER NOTE - PROGRESS NOTE DETAILS
FF: discussed with pt we will increase the AM dose of gabapentin to 600mg, then take 300mg in the afternoon, and 300mg at night. advised pt of return precautions discussed at bedside. agreeable to dc. FF: pt given 600mg of gabapentin during ED visit. pt denies pain at this time. discussed with pt we will increase the AM dose of gabapentin to 600mg, then take 300mg in the afternoon, and 300mg at night which he was already dc with on 11/12/2021. advised pt of return precautions discussed at bedside. agreeable to dc. f/u with neuro

## 2021-11-17 NOTE — ED PROVIDER NOTE - PHYSICAL EXAMINATION
Physical Exam    Vital Signs: I have reviewed the initial vital signs.  Constitutional: well-nourished, appears stated age, no acute distress  Eyes: Conjunctiva pink, Sclera clear  Cardiovascular: regular rate, regular rhythm, well-perfused extremities, radial pulses equal and 2+ b/l.   Respiratory: unlabored respiratory effort, clear to auscultation bilaterally no wheezing, rales and rhonchi. pt is speaking full sentences. no accessory muscle use. no reproducible chest wall tenderness or crepitus.   Gastrointestinal: soft, non-tender, nondistended abdomen, no pulsatile mass, normal bowl sounds, no rebound, no guarding, no organomegaly.   Musculoskeletal: supple neck, no lower extremity edema, no calf tenderness, no midline tenderness, no palpable spinal step offs  Integumentary: warm, dry, no rash. (+) healing herpes zoster rash, flat erythematous scabbed over to the left lower ribs beneath the nipple line extending across the left mdaxillary region to the left back at the upper thoracic back, without drainage.   Neurologic: awake, alert, cranial nerves II-XII grossly intact, extremities’ motor and sensory functions grossly intact.   Psychiatric: appropriate mood, appropriate affect

## 2021-11-17 NOTE — ED PROVIDER NOTE - NS ED ROS FT
CONST: No fever, chills or bodyaches  EYES: No pain, redness, drainage or visual changes.  ENT: No ear pain or discharge, nasal discharge or congestion. No sore throat  CARD: No chest pain, palpitations  RESP: No SOB, cough, hemoptysis. No hx of asthma or COPD  GI: No abdominal pain, N/V/D  : No urinary symptoms  MS: No joint pain, back pain or extremity pain/injury  SKIN: No rashes. (+) pain at the site where shingles are  NEURO: No headache, dizziness, paresthesias or LOC

## 2021-11-17 NOTE — ED PROVIDER NOTE - OBJECTIVE STATEMENT
75 y/o male with a PMH of DM, GERD, Asthma, recent shingles (about 12 days ago which was treated now has neuralgia on gabapentin) who presents to ED for evaluation of worsening pain at the site of the shingles. pt was seen by pcp last night who advised pt visit the ED. pt was recently admitted from 11/7/21-11/12/21 for sob and chest pain. pt had cta of the chest negative for PE. for evaluation of shortness of breath and chest discomfort for past 2 days. Patient states he has been sob on exertion associated with cough for past 2 weeks, but last 2 days he notice sob with minimal activity. He also complains of left lower chest pain , sharp, non radiating lasting for few seconds, was recently diagnosed and treated for herpes zoster ( has neuralgia now).  Patient was admitted for Acute Asthma Exacerbation, treated with IV steroids, consulted by pulmonary, clinically improved.    Patient has Hx of Diabetes, poorly controlled due to steroids, he was consulted by endocrinology, Insulin was readjusted few times, will start steroid taper on discharge and readjust Insulin.   Pt and family consulted about lifestyle modifications, medications compliance, OP follow ups and booster vaccination for Zoster. 77 y/o male with a PMH of DM, GERD, Asthma, recent shingles (about 12 days ago which was treated now has neuralgia on gabapentin) who presents to ED for evaluation of worsening pain at the site of the shingles. pt was seen by pcp last night who advised pt visit the ED. pt was recently admitted from 11/7/21-11/12/21 for sob and chest pain. pt had cta of the chest negative for PE. pt is complaining of left chest pain and left back pain at the site of the shingles, and this feels like the same pain from the shingles he has it just has not completely resolved. pt denies drainage from the site of shingles, sob, neck pain, jaw pain, headache, dizziness, numbness, tingling, leg pain, leg swelling, recent trauma, recent surgeries, hx of cancer, or use of hormones. 77 y/o male with a PMH of DM, GERD, Asthma, recent shingles (about 12 days ago which was treated now has neuralgia on gabapentin) who presents to ED for evaluation of worsening pain at the site of the shingles. pt was seen by pcp last night who advised pt visit the ED. pt was recently admitted from 11/7/21-11/12/21 for sob and chest pain. pt had cta of the chest negative for PE. pt is complaining of left chest pain and left back pain at the site of the shingles, and this feels like the same pain from the shingles he has it just has not completely resolved. pt denies drainage from the site of shingles, sob, neck pain, jaw pain, headache, dizziness, numbness, tingling, leg pain, leg swelling, recent travel, new rash, recent trauma, recent surgeries, hx of cancer, or use of hormones.

## 2021-11-17 NOTE — ED PROVIDER NOTE - PATIENT PORTAL LINK FT
You can access the FollowMyHealth Patient Portal offered by [facility] by registering at the following website: [email or web address]. By joining [employer]’s FollowMyHealth portal, you will also be able to view your health information using other applications (apps) compatible with our system.

## 2021-11-17 NOTE — ED PROVIDER NOTE - CLINICAL SUMMARY MEDICAL DECISION MAKING FREE TEXT BOX
76-year-old male past medical history of herpes zoster, diabetes GERD asthma presents today with 12 days of right-sided pain where his shingles were.  Patient was recently admitted for shortness of breath and chest pain and had a CTA which was negative.  Patient right now reports of left-sided chest pain rating to the back not crossing midline and it feels similar to the pain of shingles that he had before.  Patient denies any infection noticed shortness of breath no neck pain no jaw pain well appearing, NAD, non toxic. NCAT PERRLA EOMI neck supple non tender normal wob cta bl rrr abdomen s nt nd no rebound no guarding [id number] neuro non focal not crossing midline from left-sided chest to back will discharge with increased prescription for gabapentin  Skin exam shows zoster rash

## 2021-11-17 NOTE — ED PROVIDER NOTE - NSFOLLOWUPCLINICS_GEN_ALL_ED_FT
[facility]  Neurology  [address] [place] [address], [facility] [address]  [place] [address]  Phone: [phone]  Fax:   Follow Up Time: 1-3 Days

## 2021-11-17 NOTE — ED PROVIDER NOTE - NSFOLLOWUPINSTRUCTIONS_ED_ALL_ED_FT
PATIENT PRESCRIBED 600MG OF GABAPENTIN FOR THE MORNING ONLY. THEN HE CAN CONTINUE TO TAKE 300MG OF GABAPENTIN IN THE AFTERNOON, AND 300MG OF GABAPENTIN AT NIGHT TIME.    Postherpetic Neuralgia    Postherpetic neuralgia (PHN) is nerve pain that occurs after a shingles infection. Shingles is a painful rash that appears on one side of the body, usually on your trunk or face. Shingles is caused by the varicella-zoster virus. This is the same virus that causes chickenpox. In people who have had chickenpox, the virus can resurface years later and cause shingles.    You may have PHN if you continue to have pain for 3 months after your shingles rash has gone away. PHN appears in the same area where you had the shingles rash. For most people, PHN goes away within 1 year.     Getting a vaccination for shingles can prevent PHN. This vaccine is recommended for people older than 50. It may prevent shingles and may also lower your risk of PHN if you do get shingles.    CAUSES  PHN is caused by damage to your nerves from the varicella-zoster virus. This damage makes your nerves overly sensitive.     RISK FACTORS  Aging is the biggest risk factor for developing PHN. Most people who get PHN are older than 60. Other risk factors include:    Having very bad pain before your shingles rash starts.  Having a very bad rash.  Having shingles in the nerve that supplies your face and eye (trigeminal nerve).    SIGNS AND SYMPTOMS  Pain is the main symptom of PHN. The pain is often very bad and may be described as stabbing, burning, or feeling like an electric shock. The pain may come and go or may be there all the time. Pain may be triggered by light touches on the skin or changes in temperature. You may have itching along with the pain.    DIAGNOSIS  Your health care provider may diagnose PHN based on your symptoms and your history of shingles. Lab studies and other diagnostic tests are usually not needed.    TREATMENT  There is no cure for PHN. Treatment for PHN will focus on pain relief. Over-the-counter pain relievers do not usually relieve PHN pain. You may need to work with a pain specialist. Treatment may include:    Antidepressant medicines to help with pain and improve sleep.  Antiseizure medicines to relieve nerve pain.  Strong pain relievers (opioids).  A numbing patch worn on the skin (lidocaine patch).    HOME CARE INSTRUCTIONS  It may take a long time to recover from PHN. Work closely with your health care provider, and have a good support system at home.     Take all medicines as directed by your health care provider.  Wear loose, comfortable clothing.  Cover sensitive areas with a dressing to reduce friction from clothing rubbing on the area.  If cold does not make your pain worse, try applying a cool compress or cooling gel pack to the area.  Talk to your health care provider if you feel depressed or desperate. Living with long-term pain can be depressing.     SEEK MEDICAL CARE IF:  Your medicine is not helping.  You are struggling to manage your pain at home.    ADDITIONAL NOTES AND INSTRUCTIONS    Please follow up with your Primary MD in 24-48 hr.  Seek immediate medical care for any new/worsening signs or symptoms.

## 2021-11-18 DIAGNOSIS — E83.42 HYPOMAGNESEMIA: ICD-10-CM

## 2021-11-18 DIAGNOSIS — K59.00 CONSTIPATION, UNSPECIFIED: ICD-10-CM

## 2021-11-18 DIAGNOSIS — J45.909 UNSPECIFIED ASTHMA, UNCOMPLICATED: ICD-10-CM

## 2021-11-18 DIAGNOSIS — B02.8 ZOSTER WITH OTHER COMPLICATIONS: ICD-10-CM

## 2021-11-18 DIAGNOSIS — Z86.16 PERSONAL HISTORY OF COVID-19: ICD-10-CM

## 2021-11-18 DIAGNOSIS — Z79.4 LONG TERM (CURRENT) USE OF INSULIN: ICD-10-CM

## 2021-11-18 DIAGNOSIS — E11.65 TYPE 2 DIABETES MELLITUS WITH HYPERGLYCEMIA: ICD-10-CM

## 2021-11-18 DIAGNOSIS — J45.901 UNSPECIFIED ASTHMA WITH (ACUTE) EXACERBATION: ICD-10-CM

## 2021-11-18 DIAGNOSIS — K21.9 GASTRO-ESOPHAGEAL REFLUX DISEASE WITHOUT ESOPHAGITIS: ICD-10-CM

## 2021-11-18 DIAGNOSIS — I10 ESSENTIAL (PRIMARY) HYPERTENSION: ICD-10-CM

## 2021-11-18 DIAGNOSIS — E78.5 HYPERLIPIDEMIA, UNSPECIFIED: ICD-10-CM

## 2021-11-18 PROBLEM — B02.9 ZOSTER WITHOUT COMPLICATIONS: Chronic | Status: ACTIVE | Noted: 2021-11-07

## 2021-11-29 ENCOUNTER — INPATIENT (INPATIENT)
Facility: HOSPITAL | Age: 76
LOS: 9 days | Discharge: NURSING, REHAB OR LONG-TERM CARE | End: 2021-12-09
Attending: HOSPITALIST | Admitting: HOSPITALIST
Payer: MEDICARE

## 2021-11-29 VITALS
RESPIRATION RATE: 18 BRPM | DIASTOLIC BLOOD PRESSURE: 67 MMHG | SYSTOLIC BLOOD PRESSURE: 136 MMHG | HEART RATE: 85 BPM | HEIGHT: 67 IN | TEMPERATURE: 98 F | WEIGHT: 188.05 LBS

## 2021-11-29 DIAGNOSIS — Z90.79 ACQUIRED ABSENCE OF OTHER GENITAL ORGAN(S): Chronic | ICD-10-CM

## 2021-11-29 DIAGNOSIS — Z98.890 OTHER SPECIFIED POSTPROCEDURAL STATES: Chronic | ICD-10-CM

## 2021-11-29 LAB
ANION GAP SERPL CALC-SCNC: 14 MMOL/L — SIGNIFICANT CHANGE UP (ref 7–14)
BUN SERPL-MCNC: 15 MG/DL — SIGNIFICANT CHANGE UP (ref 10–20)
CALCIUM SERPL-MCNC: 9.2 MG/DL — SIGNIFICANT CHANGE UP (ref 8.5–10.1)
CHLORIDE SERPL-SCNC: 100 MMOL/L — SIGNIFICANT CHANGE UP (ref 98–110)
CO2 SERPL-SCNC: 22 MMOL/L — SIGNIFICANT CHANGE UP (ref 17–32)
CREAT SERPL-MCNC: 1 MG/DL — SIGNIFICANT CHANGE UP (ref 0.7–1.5)
GLUCOSE BLDC GLUCOMTR-MCNC: 209 MG/DL — HIGH (ref 70–99)
GLUCOSE BLDC GLUCOMTR-MCNC: 219 MG/DL — HIGH (ref 70–99)
GLUCOSE SERPL-MCNC: 215 MG/DL — HIGH (ref 70–99)
HCT VFR BLD CALC: 42.8 % — SIGNIFICANT CHANGE UP (ref 42–52)
HGB BLD-MCNC: 13.5 G/DL — LOW (ref 14–18)
MCHC RBC-ENTMCNC: 27.8 PG — SIGNIFICANT CHANGE UP (ref 27–31)
MCHC RBC-ENTMCNC: 31.5 G/DL — LOW (ref 32–37)
MCV RBC AUTO: 88.2 FL — SIGNIFICANT CHANGE UP (ref 80–94)
NRBC # BLD: 0 /100 WBCS — SIGNIFICANT CHANGE UP (ref 0–0)
PLATELET # BLD AUTO: 267 K/UL — SIGNIFICANT CHANGE UP (ref 130–400)
POTASSIUM SERPL-MCNC: 5.2 MMOL/L — HIGH (ref 3.5–5)
POTASSIUM SERPL-SCNC: 5.2 MMOL/L — HIGH (ref 3.5–5)
RBC # BLD: 4.85 M/UL — SIGNIFICANT CHANGE UP (ref 4.7–6.1)
RBC # FLD: 16.3 % — HIGH (ref 11.5–14.5)
SARS-COV-2 RNA SPEC QL NAA+PROBE: SIGNIFICANT CHANGE UP
SODIUM SERPL-SCNC: 136 MMOL/L — SIGNIFICANT CHANGE UP (ref 135–146)
TROPONIN T SERPL-MCNC: 0.08 NG/ML — CRITICAL HIGH
WBC # BLD: 13.2 K/UL — HIGH (ref 4.8–10.8)
WBC # FLD AUTO: 13.2 K/UL — HIGH (ref 4.8–10.8)

## 2021-11-29 PROCEDURE — 93010 ELECTROCARDIOGRAM REPORT: CPT

## 2021-11-29 PROCEDURE — 71045 X-RAY EXAM CHEST 1 VIEW: CPT | Mod: 26

## 2021-11-29 PROCEDURE — 99285 EMERGENCY DEPT VISIT HI MDM: CPT

## 2021-11-29 RX ORDER — PANTOPRAZOLE SODIUM 20 MG/1
40 TABLET, DELAYED RELEASE ORAL
Refills: 0 | Status: DISCONTINUED | OUTPATIENT
Start: 2021-11-29 | End: 2021-12-09

## 2021-11-29 RX ORDER — ONDANSETRON 8 MG/1
4 TABLET, FILM COATED ORAL EVERY 8 HOURS
Refills: 0 | Status: DISCONTINUED | OUTPATIENT
Start: 2021-11-29 | End: 2021-12-09

## 2021-11-29 RX ORDER — DEXTROSE 50 % IN WATER 50 %
15 SYRINGE (ML) INTRAVENOUS ONCE
Refills: 0 | Status: DISCONTINUED | OUTPATIENT
Start: 2021-11-29 | End: 2021-12-06

## 2021-11-29 RX ORDER — INFLUENZA VIRUS VACCINE 15; 15; 15; 15 UG/.5ML; UG/.5ML; UG/.5ML; UG/.5ML
0.7 SUSPENSION INTRAMUSCULAR ONCE
Refills: 0 | Status: COMPLETED | OUTPATIENT
Start: 2021-11-29 | End: 2021-12-09

## 2021-11-29 RX ORDER — SODIUM CHLORIDE 9 MG/ML
1000 INJECTION, SOLUTION INTRAVENOUS
Refills: 0 | Status: DISCONTINUED | OUTPATIENT
Start: 2021-11-29 | End: 2021-12-06

## 2021-11-29 RX ORDER — INSULIN GLARGINE 100 [IU]/ML
40 INJECTION, SOLUTION SUBCUTANEOUS AT BEDTIME
Refills: 0 | Status: DISCONTINUED | OUTPATIENT
Start: 2021-11-29 | End: 2021-12-03

## 2021-11-29 RX ORDER — LIDOCAINE 4 G/100G
1 CREAM TOPICAL ONCE
Refills: 0 | Status: COMPLETED | OUTPATIENT
Start: 2021-11-29 | End: 2021-11-29

## 2021-11-29 RX ORDER — FLUCONAZOLE 150 MG/1
100 TABLET ORAL DAILY
Refills: 0 | Status: DISCONTINUED | OUTPATIENT
Start: 2021-11-29 | End: 2021-12-03

## 2021-11-29 RX ORDER — INSULIN LISPRO 100/ML
VIAL (ML) SUBCUTANEOUS AT BEDTIME
Refills: 0 | Status: DISCONTINUED | OUTPATIENT
Start: 2021-11-29 | End: 2021-12-03

## 2021-11-29 RX ORDER — GLUCAGON INJECTION, SOLUTION 0.5 MG/.1ML
1 INJECTION, SOLUTION SUBCUTANEOUS ONCE
Refills: 0 | Status: DISCONTINUED | OUTPATIENT
Start: 2021-11-29 | End: 2021-12-06

## 2021-11-29 RX ORDER — HEPARIN SODIUM 5000 [USP'U]/ML
5000 INJECTION INTRAVENOUS; SUBCUTANEOUS EVERY 12 HOURS
Refills: 0 | Status: DISCONTINUED | OUTPATIENT
Start: 2021-11-29 | End: 2021-12-09

## 2021-11-29 RX ORDER — LANOLIN ALCOHOL/MO/W.PET/CERES
3 CREAM (GRAM) TOPICAL AT BEDTIME
Refills: 0 | Status: DISCONTINUED | OUTPATIENT
Start: 2021-11-29 | End: 2021-12-09

## 2021-11-29 RX ORDER — DEXTROSE 50 % IN WATER 50 %
12.5 SYRINGE (ML) INTRAVENOUS ONCE
Refills: 0 | Status: DISCONTINUED | OUTPATIENT
Start: 2021-11-29 | End: 2021-12-06

## 2021-11-29 RX ORDER — AMITRIPTYLINE HCL 25 MG
10 TABLET ORAL AT BEDTIME
Refills: 0 | Status: DISCONTINUED | OUTPATIENT
Start: 2021-11-29 | End: 2021-12-03

## 2021-11-29 RX ORDER — SENNA PLUS 8.6 MG/1
2 TABLET ORAL AT BEDTIME
Refills: 0 | Status: DISCONTINUED | OUTPATIENT
Start: 2021-11-29 | End: 2021-12-09

## 2021-11-29 RX ORDER — OXYCODONE AND ACETAMINOPHEN 5; 325 MG/1; MG/1
1 TABLET ORAL EVERY 6 HOURS
Refills: 0 | Status: DISCONTINUED | OUTPATIENT
Start: 2021-11-29 | End: 2021-12-03

## 2021-11-29 RX ORDER — ATORVASTATIN CALCIUM 80 MG/1
10 TABLET, FILM COATED ORAL AT BEDTIME
Refills: 0 | Status: DISCONTINUED | OUTPATIENT
Start: 2021-11-29 | End: 2021-12-02

## 2021-11-29 RX ORDER — MORPHINE SULFATE 50 MG/1
4 CAPSULE, EXTENDED RELEASE ORAL ONCE
Refills: 0 | Status: DISCONTINUED | OUTPATIENT
Start: 2021-11-29 | End: 2021-11-29

## 2021-11-29 RX ORDER — DEXTROSE 50 % IN WATER 50 %
25 SYRINGE (ML) INTRAVENOUS ONCE
Refills: 0 | Status: DISCONTINUED | OUTPATIENT
Start: 2021-11-29 | End: 2021-12-03

## 2021-11-29 RX ORDER — DEXTROSE 50 % IN WATER 50 %
25 SYRINGE (ML) INTRAVENOUS ONCE
Refills: 0 | Status: DISCONTINUED | OUTPATIENT
Start: 2021-11-29 | End: 2021-12-06

## 2021-11-29 RX ORDER — POLYETHYLENE GLYCOL 3350 17 G/17G
17 POWDER, FOR SOLUTION ORAL DAILY
Refills: 0 | Status: DISCONTINUED | OUTPATIENT
Start: 2021-11-29 | End: 2021-12-09

## 2021-11-29 RX ORDER — CHLORHEXIDINE GLUCONATE 213 G/1000ML
1 SOLUTION TOPICAL
Refills: 0 | Status: DISCONTINUED | OUTPATIENT
Start: 2021-11-29 | End: 2021-12-09

## 2021-11-29 RX ORDER — GABAPENTIN 400 MG/1
300 CAPSULE ORAL THREE TIMES A DAY
Refills: 0 | Status: DISCONTINUED | OUTPATIENT
Start: 2021-11-29 | End: 2021-12-03

## 2021-11-29 RX ORDER — ASPIRIN/CALCIUM CARB/MAGNESIUM 324 MG
325 TABLET ORAL ONCE
Refills: 0 | Status: COMPLETED | OUTPATIENT
Start: 2021-11-29 | End: 2021-11-29

## 2021-11-29 RX ORDER — MONTELUKAST 4 MG/1
1 TABLET, CHEWABLE ORAL
Qty: 0 | Refills: 0 | DISCHARGE

## 2021-11-29 RX ORDER — ALBUTEROL 90 UG/1
2 AEROSOL, METERED ORAL EVERY 6 HOURS
Refills: 0 | Status: DISCONTINUED | OUTPATIENT
Start: 2021-11-29 | End: 2021-12-09

## 2021-11-29 RX ORDER — BUDESONIDE AND FORMOTEROL FUMARATE DIHYDRATE 160; 4.5 UG/1; UG/1
2 AEROSOL RESPIRATORY (INHALATION)
Refills: 0 | Status: DISCONTINUED | OUTPATIENT
Start: 2021-11-29 | End: 2021-12-09

## 2021-11-29 RX ORDER — TIOTROPIUM BROMIDE 18 UG/1
1 CAPSULE ORAL; RESPIRATORY (INHALATION) DAILY
Refills: 0 | Status: DISCONTINUED | OUTPATIENT
Start: 2021-11-29 | End: 2021-12-09

## 2021-11-29 RX ORDER — BUDESONIDE AND FORMOTEROL FUMARATE DIHYDRATE 160; 4.5 UG/1; UG/1
2 AEROSOL RESPIRATORY (INHALATION)
Refills: 0 | Status: DISCONTINUED | OUTPATIENT
Start: 2021-11-29 | End: 2021-11-29

## 2021-11-29 RX ORDER — INSULIN LISPRO 100/ML
VIAL (ML) SUBCUTANEOUS
Refills: 0 | Status: DISCONTINUED | OUTPATIENT
Start: 2021-11-29 | End: 2021-12-03

## 2021-11-29 RX ORDER — INSULIN LISPRO 100/ML
20 VIAL (ML) SUBCUTANEOUS
Refills: 0 | Status: DISCONTINUED | OUTPATIENT
Start: 2021-11-29 | End: 2021-12-03

## 2021-11-29 RX ADMIN — Medication 10 MILLIGRAM(S): at 21:56

## 2021-11-29 RX ADMIN — GABAPENTIN 300 MILLIGRAM(S): 400 CAPSULE ORAL at 21:56

## 2021-11-29 RX ADMIN — Medication 75 MILLIGRAM(S): at 21:55

## 2021-11-29 RX ADMIN — ATORVASTATIN CALCIUM 10 MILLIGRAM(S): 80 TABLET, FILM COATED ORAL at 21:56

## 2021-11-29 RX ADMIN — MORPHINE SULFATE 4 MILLIGRAM(S): 50 CAPSULE, EXTENDED RELEASE ORAL at 15:35

## 2021-11-29 RX ADMIN — OXYCODONE AND ACETAMINOPHEN 1 TABLET(S): 5; 325 TABLET ORAL at 22:36

## 2021-11-29 RX ADMIN — OXYCODONE AND ACETAMINOPHEN 1 TABLET(S): 5; 325 TABLET ORAL at 21:56

## 2021-11-29 RX ADMIN — Medication 325 MILLIGRAM(S): at 17:34

## 2021-11-29 RX ADMIN — INSULIN GLARGINE 40 UNIT(S): 100 INJECTION, SOLUTION SUBCUTANEOUS at 22:01

## 2021-11-29 RX ADMIN — LIDOCAINE 1 PATCH: 4 CREAM TOPICAL at 15:45

## 2021-11-29 RX ADMIN — LIDOCAINE 1 PATCH: 4 CREAM TOPICAL at 20:41

## 2021-11-29 RX ADMIN — MORPHINE SULFATE 4 MILLIGRAM(S): 50 CAPSULE, EXTENDED RELEASE ORAL at 18:04

## 2021-11-29 NOTE — ED PROVIDER NOTE - CLINICAL SUMMARY MEDICAL DECISION MAKING FREE TEXT BOX
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 we will admit for cardiac evaluation and pain control of neuralgia.

## 2021-11-29 NOTE — H&P ADULT - NSHPLABSRESULTS_GEN_ALL_CORE
13.5   13.20 )-----------( 267      ( 29 Nov 2021 14:46 )             42.8       11-29    136  |  100  |  15  ----------------------------<  215<H>  5.2<H>   |  22  |  1.0    Ca    9.2      29 Nov 2021 14:46              CARDIAC MARKERS ( 29 Nov 2021 14:50 )  x     / 0.08 ng/mL / x     / x     / x            Xray Chest 1 View-PORTABLE IMMEDIATE (Xray Chest 1 View-PORTABLE IMMEDIATE .) (11.29.21 @ 15:09)   Impression:  Prominence of the interstitial markings and left basilar opacity. 13.5   13.20 )-----------( 267      ( 29 Nov 2021 14:46 )             42.8       11-29    136  |  100  |  15  ----------------------------<  215<H>  5.2<H>   |  22  |  1.0    Ca    9.2      29 Nov 2021 14:46      CARDIAC MARKERS ( 29 Nov 2021 14:50 )  x     / 0.08 ng/mL / x     / x     / x      2nd set Trop 0.05    Xray Chest 1 View-PORTABLE IMMEDIATE (Xray Chest 1 View-PORTABLE IMMEDIATE .) (11.29.21 @ 15:09)   Impression:  Prominence of the interstitial markings and left basilar opacity.

## 2021-11-29 NOTE — ED PROVIDER NOTE - PHYSICAL EXAMINATION
CONSTITUTIONAL: Well-developed; well-nourished; in no acute distress, nontoxic appearing  SKIN: hyperpigmented lesions in dermatomal distribution on left chest, with erythema, crusted lesions evident  HEAD: Normocephalic; atraumatic.  EYES: PERRL, 3 mm bilateral, no nystagmus, EOM intact; conjunctiva and sclera clear.  ENT: MMM, no nasal congestion  NECK: Supple; non tender.  ROM intact.  CARD: S1, S2 normal, no murmur  RESP: No wheezes, rales or rhonchi. Good air movement bilaterally  ABD: soft; non-distended; non-tender. No Rebound, No guarding  EXT: Normal ROM. No cyanosis or edema. Dp Pulses intact.   NEURO: awake, alert, following commands, oriented, grossly unremarkable. No Focal deficits. GCS 15.   PSYCH: Cooperative, appropriate.

## 2021-11-29 NOTE — H&P ADULT - NSHPPHYSICALEXAM_GEN_ALL_CORE
VITAL SIGNS: Vital Signs Last 24 Hrs  T(C): 36.8 (29 Nov 2021 14:39), Max: 36.8 (29 Nov 2021 14:39)  T(F): 98.2 (29 Nov 2021 14:39), Max: 98.2 (29 Nov 2021 14:39)  HR: 85 (29 Nov 2021 14:39) (85 - 85)  BP: 136/67 (29 Nov 2021 14:39) (136/67 - 136/67)  RR: 18 (29 Nov 2021 14:39) (18 - 18)    GENERAL: NAD, lying in bed  HEAD:  Atraumatic, Normocephalic  EYES: Conjunctiva and sclera clear  ENT: +Thrush  NECK: Supple  CHEST/LUNG: Clear to auscultation bilaterally. Unlabored respirations  HEART: Regular rate and rhythm  ABDOMEN: Soft, Nontender, Nondistended  EXTREMITIES:  No calf tenderness   NERVOUS SYSTEM:  Alert & Oriented X3  MSK: FROM all 4 extremities, full and equal strength  SKIN: +Herpetic rash noted on left side of the chest wall

## 2021-11-29 NOTE — H&P ADULT - NSHPSOCIALHISTORY_GEN_ALL_CORE
Tobacco use: Exsmoker, last cigarette while he was in 20s    EtOH use: Denies   Illicit drug use: Denies   Marital Status:  Tobacco use: Ex-smoker, last cigarette while he was in 20s    EtOH use: Denies   Illicit drug use: Denies   Marital Status:

## 2021-11-29 NOTE — ED PROVIDER NOTE - NS ED ROS FT
Constitutional:  no fevers, no chills, no malaise  Eyes:  No visual changes  ENMT: No neck pain or stiffness, no nasal congestion, no ear pain, no throat pain  Cardiac:  + CP  Respiratory:  No cough or sob  GI:  No nausea, vomiting, diarrhea or abdominal pain.  :  No dysuria, frequency or burning.  MS:  No back pain, no joint pain.  Neuro:  No headache, no dizziness, no change in mental status  Skin:  +rash  Except as documented in the HPI,  all other systems are negative

## 2021-11-29 NOTE — H&P ADULT - ATTENDING COMMENTS
Patient presents with severe left sided chest wall healing shingles rash (crusted over)   Severe Post Herpetic Neuralgia of the Left Chest Wall  - Continue with Pregabalin, Gabapentin and Percocet   - Added Amitriptyline 10mg po daily  - Adding Lidocaine Patch once daily   - Adding Morphine 4mg IV q6/prn for severe pain     Troponemia  - Trend is down 0.08 to 0.05  - EKG no acute changes   - Pain is in chest wall - atypical for cardiac pain   - d/c telemetry  - check TTE on floor   - watch clinically and f/u w/ 12 Lead EKG if acute CP  - tele in ER no events     Oral Candidiasis   - Pt on chronic home prednisone 20mg po daily  - Will call family to find out why pt doesn't know  - will need to ina this prednisone slowly as with shingles and dm - this should not be chronic medication       HOSPITAL MEDICATIONS  (STANDING):  ALBUTerol    90 MICROgram(s) HFA Inhaler 2 Puff(s) Inhalation every 6 hours  amitriptyline 10 milliGRAM(s) Oral at bedtime  atorvastatin 10 milliGRAM(s) Oral at bedtime  budesonide 160 MICROgram(s)/formoterol 4.5 MICROgram(s) Inhaler 2 Puff(s) Inhalation two times a day  chlorhexidine 4% Liquid 1 Application(s) Topical <User Schedule>  dextrose 40% Gel 15 Gram(s) Oral once  dextrose 5%. 1000 milliLiter(s) (50 mL/Hr) IV Continuous <Continuous>  dextrose 5%. 1000 milliLiter(s) (100 mL/Hr) IV Continuous <Continuous>  dextrose 50% Injectable 25 Gram(s) IV Push once  dextrose 50% Injectable 12.5 Gram(s) IV Push once  dextrose 50% Injectable 25 Gram(s) IV Push once  fluconAZOLE   Tablet 100 milliGRAM(s) Oral daily  gabapentin 300 milliGRAM(s) Oral three times a day  glucagon  Injectable 1 milliGRAM(s) IntraMuscular once  heparin   Injectable 5000 Unit(s) SubCutaneous every 12 hours  influenza  Vaccine (HIGH DOSE) 0.7 milliLiter(s) IntraMuscular once  insulin glargine Injectable (LANTUS) 40 Unit(s) SubCutaneous at bedtime  insulin lispro (ADMELOG) corrective regimen sliding scale   SubCutaneous three times a day before meals  insulin lispro (ADMELOG) corrective regimen sliding scale   SubCutaneous at bedtime  insulin lispro Injectable (ADMELOG) 20 Unit(s) SubCutaneous three times a day before meals  pantoprazole    Tablet 40 milliGRAM(s) Oral before breakfast  predniSONE   Tablet 20 milliGRAM(s) Oral daily  pregabalin 75 milliGRAM(s) Oral three times a day  tiotropium 18 MICROgram(s) Capsule 1 Capsule(s) Inhalation daily    MEDICATIONS  (PRN):  aluminum hydroxide/magnesium hydroxide/simethicone Suspension 30 milliLiter(s) Oral every 4 hours PRN Dyspepsia  melatonin 3 milliGRAM(s) Oral at bedtime PRN Insomnia  ondansetron Injectable 4 milliGRAM(s) IV Push every 8 hours PRN Nausea and/or Vomiting  oxycodone    5 mG/acetaminophen 325 mG 1 Tablet(s) Oral every 6 hours PRN Moderate Pain (4 - 6)  polyethylene glycol 3350 17 Gram(s) Oral daily PRN Constipation  senna 2 Tablet(s) Oral at bedtime PRN Constipation Patient presents with severe left sided chest wall healing shingles rash (crusted over)   Severe Post Herpetic Neuralgia of the Left Chest Wall  - Continue with Pregabalin, Gabapentin and Percocet   - Added Amitriptyline 10mg po daily  - Adding Lidocaine Patch once daily   - Adding Morphine 4mg IV q6/prn for severe pain     Troponemia  - Trend is down 0.08 to 0.05  - EKG no acute changes   - Pain is in chest wall - atypical for cardiac pain   - d/c telemetry  - check TTE on floor   - watch clinically and f/u w/ 12 Lead EKG if acute CP  - tele in ER no events     Oral Candidiasis   - Pt on chronic home prednisone 20mg po daily  - Will call family to find out why pt doesn't know  - will need to ina this prednisone slowly as with shingles and dm - this should not be chronic medication   - nystatin swish and spit q6h  - fluconazole 200mg po x1     Fever / Possible PNA   - rule out Sepsis  - check blood cxs x 1   - check UA as well   - no abx for now  - possibly from shingles vs bacteremia - need r/o acute infection   - CXR Left Basilar Opacity = PER RADIOLOGY WILL NEED CT CHEST IN 3 MONTHS AS OUT PATIENT      HOSPITAL MEDICATIONS  (STANDING):  ALBUTerol    90 MICROgram(s) HFA Inhaler 2 Puff(s) Inhalation every 6 hours  amitriptyline 10 milliGRAM(s) Oral at bedtime  atorvastatin 10 milliGRAM(s) Oral at bedtime  budesonide 160 MICROgram(s)/formoterol 4.5 MICROgram(s) Inhaler 2 Puff(s) Inhalation two times a day  chlorhexidine 4% Liquid 1 Application(s) Topical <User Schedule>  dextrose 40% Gel 15 Gram(s) Oral once  dextrose 5%. 1000 milliLiter(s) (50 mL/Hr) IV Continuous <Continuous>  dextrose 5%. 1000 milliLiter(s) (100 mL/Hr) IV Continuous <Continuous>  dextrose 50% Injectable 25 Gram(s) IV Push once  dextrose 50% Injectable 12.5 Gram(s) IV Push once  dextrose 50% Injectable 25 Gram(s) IV Push once  fluconAZOLE   Tablet 100 milliGRAM(s) Oral daily  gabapentin 300 milliGRAM(s) Oral three times a day  glucagon  Injectable 1 milliGRAM(s) IntraMuscular once  heparin   Injectable 5000 Unit(s) SubCutaneous every 12 hours  influenza  Vaccine (HIGH DOSE) 0.7 milliLiter(s) IntraMuscular once  insulin glargine Injectable (LANTUS) 40 Unit(s) SubCutaneous at bedtime  insulin lispro (ADMELOG) corrective regimen sliding scale   SubCutaneous three times a day before meals  insulin lispro (ADMELOG) corrective regimen sliding scale   SubCutaneous at bedtime  insulin lispro Injectable (ADMELOG) 20 Unit(s) SubCutaneous three times a day before meals  pantoprazole    Tablet 40 milliGRAM(s) Oral before breakfast  predniSONE   Tablet 20 milliGRAM(s) Oral daily  pregabalin 75 milliGRAM(s) Oral three times a day  tiotropium 18 MICROgram(s) Capsule 1 Capsule(s) Inhalation daily    MEDICATIONS  (PRN):  aluminum hydroxide/magnesium hydroxide/simethicone Suspension 30 milliLiter(s) Oral every 4 hours PRN Dyspepsia  melatonin 3 milliGRAM(s) Oral at bedtime PRN Insomnia  ondansetron Injectable 4 milliGRAM(s) IV Push every 8 hours PRN Nausea and/or Vomiting  oxycodone    5 mG/acetaminophen 325 mG 1 Tablet(s) Oral every 6 hours PRN Moderate Pain (4 - 6)  polyethylene glycol 3350 17 Gram(s) Oral daily PRN Constipation  senna 2 Tablet(s) Oral at bedtime PRN Constipation

## 2021-11-29 NOTE — H&P ADULT - ASSESSMENT
Patient is a 76 year old Male with a past medical history of Asthma, COPD, DM, HLD, Prostate CA S/P TURP, Shingles presented to the ER with a chief complaint of Left sided Chest pain. Patient will be admitted to medicine.     # Left sided Chest pain secondary to Uncontrolled Post Herpetic Neuralgia   # Elevated Troponin likely chronic troponemia   - Will admit to soft tele for cardiac arrhythmias  - Troponin 0.08  - Three sets of Cardiac Enzymes to rule out MI  - Continue with Pregabalin, Gabapentin and Percocet   - Will add Amitriptyline 10mg     # Diabetes  - Monitor Fingersticks  - Diabetic carbohydrate consistent diet   - Continue with home Insulin regimen     # Asthma   # COPD   - Continue with Symbicort and other Inhalers     # Oral Thrush - Likely secondary to Chronic steroid use   - PO Fluconazole     # Constipation   - Continue with Senna   - Will add PRN Miralax     - DVT PPX (Heparin)  - GI PPX (Protonix)  - CHG 4% Bath QD and PRN  - Discussed the above case and plan with the Attending  Patient is a 76 year old Male with a past medical history of Asthma, COPD, DM, HLD, Prostate CA S/P TURP, Shingles presented to the ER with a chief complaint of Left sided Chest pain. Patient will be admitted to medicine.     # Left sided Chest pain secondary to Uncontrolled Post Herpetic Neuralgia /  # Elevated Troponin likely chronic troponemia   - Will admit to soft tele for cardiac arrhythmias  - Troponin 0.08  - Three sets of Cardiac Enzymes to rule out MI  - Continue with Pregabalin, Gabapentin and Percocet   - Will add Amitriptyline 10mg     # Diabetes  - Monitor Fingersticks  - Diabetic carbohydrate consistent diet   - Continue with home Insulin regimen     # Asthma   # COPD   - Continue with Symbicort and other Inhalers     # Oral Thrush - Likely secondary to Chronic steroid use   - PO Fluconazole     # Constipation   - Continue with Senna   - Will add PRN Miralax     - DVT PPX (Heparin)  - GI PPX (Protonix)  - CHG 4% Bath QD and PRN  - Discussed the above case and plan with the Attending

## 2021-11-29 NOTE — ED PROVIDER NOTE - OBJECTIVE STATEMENT
left sided chest pain described as constant severe burning occurred post zoster infection which has resolved but pain has been has not improved despite percocet and gabapentin. [facility], went to dr. soto office and was referred here for management.

## 2021-11-29 NOTE — H&P ADULT - HISTORY OF PRESENT ILLNESS
Patient is a 76 year old Male with a past medical history of Asthma, COPD, DM, HLD, Prostate CA S/P TURP, Shingles presented to the ER with a chief complaint of Left sided Chest pain. Patient had Shingles earlier this month. Patient describes Constant, stabbing left sided chest pain that radiates to his back, 9/10, tried percocet / gabapentin / Pregabalin without much improvement. As per wife, last night patient had a fever of 102.5 and wife tried white vinegar with 2 Tylenol tablets, but patient was in excruciating pain. Patient admits to fever, chills, chest pain, constipation, rash on tongue and palpitations (only when patient takes Percocet). Patient denies nausea, vomiting, visual changes, auditory changes, shortness of breath, abdominal pain, diarrhea, dysuria.

## 2021-11-29 NOTE — ED ADULT NURSE NOTE - NSPATIENTFLAG_GEN_A_ER
FDA mandated Tysabri reauthorization questionnaire completed online. Patient is JCV Negative, Index = 0.19. Last test date January 2021, next test date due now, patient is overdue for repeat testing.   Has completed 77 Tysabri infusions with 0 courses of s
Purple DH (Discharge Huddle; Vulnerable Patient)

## 2021-11-29 NOTE — ED ADULT NURSE NOTE - NSIMPLEMENTINTERV_GEN_ALL_ED
Implemented All Universal Safety Interventions:  [place] to call system. Call bell, personal items and telephone within reach. Instruct patient to call for assistance. Room bathroom lighting operational. Non-slip footwear when patient is off stretcher. Physically safe environment: no spills, clutter or unnecessary equipment. Stretcher in lowest position, wheels locked, appropriate side rails in place. Implemented All Fall with Harm Risk Interventions:  [place] to call system. Call bell, personal items and telephone within reach. Instruct patient to call for assistance. Room bathroom lighting operational. Non-slip footwear when patient is off stretcher. Physically safe environment: no spills, clutter or unnecessary equipment. Stretcher in lowest position, wheels locked, appropriate side rails in place. Provide visual cue, wrist band, yellow gown, etc. Monitor gait and stability. Monitor for mental status changes and reorient to person, place, and time. Review medications for side effects contributing to fall risk. Reinforce activity limits and safety measures with patient and family. Provide visual clues: red socks.

## 2021-11-29 NOTE — ED ADULT TRIAGE NOTE - DIRECT TO ROOM CARE INITIATED:
Anesthesia Post Evaluation    Patient: Michael Lena    29-Nov-2021 14:40

## 2021-11-30 LAB
A1C WITH ESTIMATED AVERAGE GLUCOSE RESULT: 8.2 % — HIGH (ref 4–5.6)
ALBUMIN SERPL ELPH-MCNC: 3.1 G/DL — LOW (ref 3.5–5.2)
ALP SERPL-CCNC: 56 U/L — SIGNIFICANT CHANGE UP (ref 30–115)
ALT FLD-CCNC: 17 U/L — SIGNIFICANT CHANGE UP (ref 0–41)
ANION GAP SERPL CALC-SCNC: 13 MMOL/L — SIGNIFICANT CHANGE UP (ref 7–14)
AST SERPL-CCNC: 19 U/L — SIGNIFICANT CHANGE UP (ref 0–41)
BILIRUB SERPL-MCNC: 0.5 MG/DL — SIGNIFICANT CHANGE UP (ref 0.2–1.2)
BUN SERPL-MCNC: 15 MG/DL — SIGNIFICANT CHANGE UP (ref 10–20)
CALCIUM SERPL-MCNC: 8.5 MG/DL — SIGNIFICANT CHANGE UP (ref 8.5–10.1)
CHLORIDE SERPL-SCNC: 103 MMOL/L — SIGNIFICANT CHANGE UP (ref 98–110)
CHOLEST SERPL-MCNC: 167 MG/DL — SIGNIFICANT CHANGE UP
CK MB CFR SERPL CALC: 4.7 NG/ML — SIGNIFICANT CHANGE UP (ref 0.6–6.3)
CK MB CFR SERPL CALC: 5 NG/ML — SIGNIFICANT CHANGE UP (ref 0.6–6.3)
CK SERPL-CCNC: 51 U/L — SIGNIFICANT CHANGE UP (ref 0–225)
CO2 SERPL-SCNC: 22 MMOL/L — SIGNIFICANT CHANGE UP (ref 17–32)
CREAT SERPL-MCNC: 0.9 MG/DL — SIGNIFICANT CHANGE UP (ref 0.7–1.5)
ESTIMATED AVERAGE GLUCOSE: 189 MG/DL — HIGH (ref 68–114)
GLUCOSE BLDC GLUCOMTR-MCNC: 112 MG/DL — HIGH (ref 70–99)
GLUCOSE BLDC GLUCOMTR-MCNC: 117 MG/DL — HIGH (ref 70–99)
GLUCOSE BLDC GLUCOMTR-MCNC: 248 MG/DL — HIGH (ref 70–99)
GLUCOSE BLDC GLUCOMTR-MCNC: 285 MG/DL — HIGH (ref 70–99)
GLUCOSE SERPL-MCNC: 123 MG/DL — HIGH (ref 70–99)
HCT VFR BLD CALC: 42.8 % — SIGNIFICANT CHANGE UP (ref 42–52)
HDLC SERPL-MCNC: 24 MG/DL — LOW
HGB BLD-MCNC: 13.4 G/DL — LOW (ref 14–18)
LIPID PNL WITH DIRECT LDL SERPL: 111 MG/DL — HIGH
MCHC RBC-ENTMCNC: 27.6 PG — SIGNIFICANT CHANGE UP (ref 27–31)
MCHC RBC-ENTMCNC: 31.3 G/DL — LOW (ref 32–37)
MCV RBC AUTO: 88.1 FL — SIGNIFICANT CHANGE UP (ref 80–94)
NON HDL CHOLESTEROL: 143 MG/DL — HIGH
NRBC # BLD: 0 /100 WBCS — SIGNIFICANT CHANGE UP (ref 0–0)
PLATELET # BLD AUTO: 317 K/UL — SIGNIFICANT CHANGE UP (ref 130–400)
POTASSIUM SERPL-MCNC: 4.3 MMOL/L — SIGNIFICANT CHANGE UP (ref 3.5–5)
POTASSIUM SERPL-SCNC: 4.3 MMOL/L — SIGNIFICANT CHANGE UP (ref 3.5–5)
PROT SERPL-MCNC: 5.8 G/DL — LOW (ref 6–8)
RBC # BLD: 4.86 M/UL — SIGNIFICANT CHANGE UP (ref 4.7–6.1)
RBC # FLD: 16 % — HIGH (ref 11.5–14.5)
SODIUM SERPL-SCNC: 138 MMOL/L — SIGNIFICANT CHANGE UP (ref 135–146)
TRIGL SERPL-MCNC: 190 MG/DL — HIGH
TROPONIN T SERPL-MCNC: 0.05 NG/ML — CRITICAL HIGH
TROPONIN T SERPL-MCNC: 0.05 NG/ML — CRITICAL HIGH
WBC # BLD: 11.89 K/UL — HIGH (ref 4.8–10.8)
WBC # FLD AUTO: 11.89 K/UL — HIGH (ref 4.8–10.8)

## 2021-11-30 PROCEDURE — 99222 1ST HOSP IP/OBS MODERATE 55: CPT

## 2021-11-30 PROCEDURE — 93010 ELECTROCARDIOGRAM REPORT: CPT

## 2021-11-30 RX ORDER — MORPHINE SULFATE 50 MG/1
4 CAPSULE, EXTENDED RELEASE ORAL EVERY 6 HOURS
Refills: 0 | Status: DISCONTINUED | OUTPATIENT
Start: 2021-11-30 | End: 2021-12-03

## 2021-11-30 RX ORDER — LIDOCAINE 4 G/100G
1 CREAM TOPICAL EVERY 24 HOURS
Refills: 0 | Status: COMPLETED | OUTPATIENT
Start: 2021-11-30 | End: 2021-12-06

## 2021-11-30 RX ORDER — NYSTATIN 500MM UNIT
500000 POWDER (EA) MISCELLANEOUS EVERY 6 HOURS
Refills: 0 | Status: COMPLETED | OUTPATIENT
Start: 2021-11-30 | End: 2021-12-07

## 2021-11-30 RX ADMIN — TIOTROPIUM BROMIDE 1 CAPSULE(S): 18 CAPSULE ORAL; RESPIRATORY (INHALATION) at 09:12

## 2021-11-30 RX ADMIN — HEPARIN SODIUM 5000 UNIT(S): 5000 INJECTION INTRAVENOUS; SUBCUTANEOUS at 18:33

## 2021-11-30 RX ADMIN — ALBUTEROL 2 PUFF(S): 90 AEROSOL, METERED ORAL at 09:11

## 2021-11-30 RX ADMIN — LIDOCAINE 1 PATCH: 4 CREAM TOPICAL at 04:41

## 2021-11-30 RX ADMIN — Medication 500000 UNIT(S): at 22:12

## 2021-11-30 RX ADMIN — OXYCODONE AND ACETAMINOPHEN 1 TABLET(S): 5; 325 TABLET ORAL at 05:56

## 2021-11-30 RX ADMIN — Medication 3 MILLIGRAM(S): at 22:00

## 2021-11-30 RX ADMIN — Medication 500000 UNIT(S): at 18:33

## 2021-11-30 RX ADMIN — GABAPENTIN 300 MILLIGRAM(S): 400 CAPSULE ORAL at 15:36

## 2021-11-30 RX ADMIN — Medication 75 MILLIGRAM(S): at 14:44

## 2021-11-30 RX ADMIN — PANTOPRAZOLE SODIUM 40 MILLIGRAM(S): 20 TABLET, DELAYED RELEASE ORAL at 05:56

## 2021-11-30 RX ADMIN — Medication 20 UNIT(S): at 12:33

## 2021-11-30 RX ADMIN — Medication 75 MILLIGRAM(S): at 21:59

## 2021-11-30 RX ADMIN — BUDESONIDE AND FORMOTEROL FUMARATE DIHYDRATE 2 PUFF(S): 160; 4.5 AEROSOL RESPIRATORY (INHALATION) at 19:26

## 2021-11-30 RX ADMIN — FLUCONAZOLE 100 MILLIGRAM(S): 150 TABLET ORAL at 11:24

## 2021-11-30 RX ADMIN — Medication 75 MILLIGRAM(S): at 05:31

## 2021-11-30 RX ADMIN — Medication 20 MILLIGRAM(S): at 05:31

## 2021-11-30 RX ADMIN — LIDOCAINE 1 PATCH: 4 CREAM TOPICAL at 15:35

## 2021-11-30 RX ADMIN — Medication 500000 UNIT(S): at 15:34

## 2021-11-30 RX ADMIN — Medication 3: at 12:33

## 2021-11-30 RX ADMIN — INSULIN GLARGINE 40 UNIT(S): 100 INJECTION, SOLUTION SUBCUTANEOUS at 22:00

## 2021-11-30 RX ADMIN — GABAPENTIN 300 MILLIGRAM(S): 400 CAPSULE ORAL at 21:58

## 2021-11-30 RX ADMIN — ALBUTEROL 2 PUFF(S): 90 AEROSOL, METERED ORAL at 19:24

## 2021-11-30 RX ADMIN — GABAPENTIN 300 MILLIGRAM(S): 400 CAPSULE ORAL at 05:29

## 2021-11-30 RX ADMIN — ATORVASTATIN CALCIUM 10 MILLIGRAM(S): 80 TABLET, FILM COATED ORAL at 21:58

## 2021-11-30 RX ADMIN — OXYCODONE AND ACETAMINOPHEN 1 TABLET(S): 5; 325 TABLET ORAL at 05:31

## 2021-11-30 RX ADMIN — HEPARIN SODIUM 5000 UNIT(S): 5000 INJECTION INTRAVENOUS; SUBCUTANEOUS at 05:29

## 2021-11-30 RX ADMIN — BUDESONIDE AND FORMOTEROL FUMARATE DIHYDRATE 2 PUFF(S): 160; 4.5 AEROSOL RESPIRATORY (INHALATION) at 08:59

## 2021-11-30 RX ADMIN — Medication 10 MILLIGRAM(S): at 21:57

## 2021-11-30 RX ADMIN — OXYCODONE AND ACETAMINOPHEN 1 TABLET(S): 5; 325 TABLET ORAL at 14:49

## 2021-12-01 LAB
ANION GAP SERPL CALC-SCNC: 13 MMOL/L — SIGNIFICANT CHANGE UP (ref 7–14)
BUN SERPL-MCNC: 15 MG/DL — SIGNIFICANT CHANGE UP (ref 10–20)
CALCIUM SERPL-MCNC: 8.8 MG/DL — SIGNIFICANT CHANGE UP (ref 8.5–10.1)
CHLORIDE SERPL-SCNC: 103 MMOL/L — SIGNIFICANT CHANGE UP (ref 98–110)
CO2 SERPL-SCNC: 23 MMOL/L — SIGNIFICANT CHANGE UP (ref 17–32)
CREAT SERPL-MCNC: 1 MG/DL — SIGNIFICANT CHANGE UP (ref 0.7–1.5)
GLUCOSE BLDC GLUCOMTR-MCNC: 151 MG/DL — HIGH (ref 70–99)
GLUCOSE BLDC GLUCOMTR-MCNC: 218 MG/DL — HIGH (ref 70–99)
GLUCOSE BLDC GLUCOMTR-MCNC: 301 MG/DL — HIGH (ref 70–99)
GLUCOSE BLDC GLUCOMTR-MCNC: 69 MG/DL — LOW (ref 70–99)
GLUCOSE SERPL-MCNC: 220 MG/DL — HIGH (ref 70–99)
HCT VFR BLD CALC: 36 % — LOW (ref 42–52)
HGB BLD-MCNC: 11.4 G/DL — LOW (ref 14–18)
MCHC RBC-ENTMCNC: 27.5 PG — SIGNIFICANT CHANGE UP (ref 27–31)
MCHC RBC-ENTMCNC: 31.7 G/DL — LOW (ref 32–37)
MCV RBC AUTO: 86.7 FL — SIGNIFICANT CHANGE UP (ref 80–94)
NRBC # BLD: 0 /100 WBCS — SIGNIFICANT CHANGE UP (ref 0–0)
PLATELET # BLD AUTO: 305 K/UL — SIGNIFICANT CHANGE UP (ref 130–400)
POTASSIUM SERPL-MCNC: 4.1 MMOL/L — SIGNIFICANT CHANGE UP (ref 3.5–5)
POTASSIUM SERPL-SCNC: 4.1 MMOL/L — SIGNIFICANT CHANGE UP (ref 3.5–5)
RBC # BLD: 4.15 M/UL — LOW (ref 4.7–6.1)
RBC # FLD: 15.7 % — HIGH (ref 11.5–14.5)
SODIUM SERPL-SCNC: 139 MMOL/L — SIGNIFICANT CHANGE UP (ref 135–146)
WBC # BLD: 9.1 K/UL — SIGNIFICANT CHANGE UP (ref 4.8–10.8)
WBC # FLD AUTO: 9.1 K/UL — SIGNIFICANT CHANGE UP (ref 4.8–10.8)

## 2021-12-01 PROCEDURE — 99233 SBSQ HOSP IP/OBS HIGH 50: CPT

## 2021-12-01 PROCEDURE — 70450 CT HEAD/BRAIN W/O DYE: CPT | Mod: 26

## 2021-12-01 RX ADMIN — TIOTROPIUM BROMIDE 1 CAPSULE(S): 18 CAPSULE ORAL; RESPIRATORY (INHALATION) at 08:08

## 2021-12-01 RX ADMIN — Medication 500000 UNIT(S): at 16:56

## 2021-12-01 RX ADMIN — Medication 75 MILLIGRAM(S): at 14:38

## 2021-12-01 RX ADMIN — Medication 10 MILLIGRAM(S): at 21:56

## 2021-12-01 RX ADMIN — FLUCONAZOLE 100 MILLIGRAM(S): 150 TABLET ORAL at 11:24

## 2021-12-01 RX ADMIN — HEPARIN SODIUM 5000 UNIT(S): 5000 INJECTION INTRAVENOUS; SUBCUTANEOUS at 06:28

## 2021-12-01 RX ADMIN — OXYCODONE AND ACETAMINOPHEN 1 TABLET(S): 5; 325 TABLET ORAL at 12:16

## 2021-12-01 RX ADMIN — PANTOPRAZOLE SODIUM 40 MILLIGRAM(S): 20 TABLET, DELAYED RELEASE ORAL at 06:30

## 2021-12-01 RX ADMIN — Medication 500000 UNIT(S): at 06:27

## 2021-12-01 RX ADMIN — BUDESONIDE AND FORMOTEROL FUMARATE DIHYDRATE 2 PUFF(S): 160; 4.5 AEROSOL RESPIRATORY (INHALATION) at 08:08

## 2021-12-01 RX ADMIN — Medication 2: at 08:18

## 2021-12-01 RX ADMIN — OXYCODONE AND ACETAMINOPHEN 1 TABLET(S): 5; 325 TABLET ORAL at 09:52

## 2021-12-01 RX ADMIN — Medication 75 MILLIGRAM(S): at 06:27

## 2021-12-01 RX ADMIN — LIDOCAINE 1 PATCH: 4 CREAM TOPICAL at 14:35

## 2021-12-01 RX ADMIN — Medication 4: at 16:55

## 2021-12-01 RX ADMIN — MORPHINE SULFATE 4 MILLIGRAM(S): 50 CAPSULE, EXTENDED RELEASE ORAL at 21:59

## 2021-12-01 RX ADMIN — ATORVASTATIN CALCIUM 10 MILLIGRAM(S): 80 TABLET, FILM COATED ORAL at 21:53

## 2021-12-01 RX ADMIN — Medication 20 MILLIGRAM(S): at 06:28

## 2021-12-01 RX ADMIN — GABAPENTIN 300 MILLIGRAM(S): 400 CAPSULE ORAL at 14:29

## 2021-12-01 RX ADMIN — Medication 20 UNIT(S): at 16:56

## 2021-12-01 RX ADMIN — Medication 75 MILLIGRAM(S): at 21:56

## 2021-12-01 RX ADMIN — Medication 500000 UNIT(S): at 11:24

## 2021-12-01 RX ADMIN — GABAPENTIN 300 MILLIGRAM(S): 400 CAPSULE ORAL at 21:53

## 2021-12-01 RX ADMIN — HEPARIN SODIUM 5000 UNIT(S): 5000 INJECTION INTRAVENOUS; SUBCUTANEOUS at 16:56

## 2021-12-01 RX ADMIN — INSULIN GLARGINE 40 UNIT(S): 100 INJECTION, SOLUTION SUBCUTANEOUS at 22:01

## 2021-12-01 RX ADMIN — MORPHINE SULFATE 4 MILLIGRAM(S): 50 CAPSULE, EXTENDED RELEASE ORAL at 20:18

## 2021-12-01 RX ADMIN — Medication 500000 UNIT(S): at 21:57

## 2021-12-01 RX ADMIN — Medication 20 UNIT(S): at 08:18

## 2021-12-01 RX ADMIN — GABAPENTIN 300 MILLIGRAM(S): 400 CAPSULE ORAL at 06:28

## 2021-12-01 RX ADMIN — ALBUTEROL 2 PUFF(S): 90 AEROSOL, METERED ORAL at 02:39

## 2021-12-01 NOTE — PHYSICAL THERAPY INITIAL EVALUATION ADULT - PHYSICAL ASSIST/NONPHYSICAL ASSIST: SUPINE/SIT, REHAB EVAL
No bruits; no thyromegaly or nodules hand placement and technique for safety/verbal cues/1 person assist

## 2021-12-01 NOTE — PROGRESS NOTE ADULT - ASSESSMENT
Patient is a 76 year old Male with a past medical history of Asthma, COPD, DM, HLD, Prostate CA S/P TURP, Shingles presented to the ER with a chief complaint of Left sided Chest pain.    Blurred vision, imbalance, confusion:  -Has been present for ~1 month however was never investigated per wife  -Will get head CT  -Q8h neuro checks    # Left sided Chest pain secondary to Uncontrolled Post Herpetic Neuralgia /  # Elevated Troponin likely chronic troponemia   - Troponin 0.08  - Continue with Pregabalin, Gabapentin and Percocet   - Added Amitriptyline 10mg     # Diabetes  - Monitor Fingersticks  - Diabetic carbohydrate consistent diet   - Continue with home Insulin regimen     # Asthma   # COPD   - Continue with Symbicort and other Inhalers     # Oral Thrush - Likely secondary to Chronic steroid use   - PO Fluconazole     # Constipation   - Continue with Senna   - Will add PRN Miralax

## 2021-12-01 NOTE — CONSULT NOTE ADULT - SUBJECTIVE AND OBJECTIVE BOX
DEL ALVARADO  76y, Male  Allergy: No Known Allergies      CHIEF COMPLAINT: Chest pain (29 Nov 2021 17:38)      HPI:  Patient is a 76 year old Male with a past medical history of Asthma, COPD, DM, HLD, Prostate CA S/P TURP, Shingles presented to the ER with a chief complaint of Left sided Chest pain. Patient had Shingles earlier this month. Patient describes Constant, stabbing left sided chest pain that radiates to his back, 9/10, tried percocet / gabapentin / Pregabalin without much improvement. As per wife, last night patient had a fever of 102.5 and wife tried white vinegar with 2 Tylenol tablets, but patient was in excruciating pain. Patient admits to fever, chills, chest pain, constipation, rash on tongue and palpitations (only when patient takes Percocet). Patient denies nausea, vomiting, visual changes, auditory changes, shortness of breath, abdominal pain, diarrhea, dysuria.  (29 Nov 2021 17:38)    FAMILY HISTORY:  Family history of asthma    Family history of diabetes mellitus (DM)      PAST MEDICAL & SURGICAL HISTORY:  Diabetes  20 yrs    Prostate cancer  1999 s/p sx    Asthma  20 yr    COPD (chronic obstructive pulmonary disease)  20 yrs no 02 rx    Dyslipidemia    Shingles    S/P TURP  1999    History of surgery  back surgery        SOCIAL HISTORY  Social History:  Tobacco use: Ex-smoker, last cigarette while he was in 20s    EtOH use: Denies   Illicit drug use: Denies   Marital Status:  (29 Nov 2021 17:38)        ROS  General: Denies fevers, chills, nightsweats, weight loss  HEENT: Denies headache, rhinorrhea, sore throat, eye pain  CV: Denies CP, palpitations  PULM: Denies SOB, cough  GI: Denies abdominal pain, diarrhea  : Denies dysuria, hematuria  MSK: Denies arthralgias  SKIN: Denies rash   NEURO: Denies paresthesias, weakness  PSYCH: Denies depression    VITALS:  T(F): 97.8, Max: 98.7 (11-30-21 @ 18:07)  HR: 104  BP: 131/67  RR: 18Vital Signs Last 24 Hrs  T(C): 36.6 (01 Dec 2021 05:00), Max: 37.1 (30 Nov 2021 18:07)  T(F): 97.8 (01 Dec 2021 05:00), Max: 98.7 (30 Nov 2021 18:07)  HR: 104 (01 Dec 2021 05:00) (98 - 108)  BP: 131/67 (01 Dec 2021 05:00) (122/60 - 140/71)  BP(mean): --  RR: 18 (01 Dec 2021 05:00) (18 - 18)  SpO2: --    PHYSICAL EXAM:  Gen: NAD, resting in bed  HEENT: Normocephalic, atraumatic  Neck: supple, no lymphadenopathy  CV: Regular rate & regular rhythm  Lungs: decreased BS at bases, no fremitus  Abdomen: Soft, BS present  Ext: Warm, well perfused  Neuro: non focal, awake  Skin: no rash, no erythema    TESTS & MEASUREMENTS:                        13.4   11.89 )-----------( 317      ( 30 Nov 2021 09:05 )             42.8     11-30    138  |  103  |  15  ----------------------------<  123<H>  4.3   |  22  |  0.9    Ca    8.5      30 Nov 2021 08:30    TPro  5.8<L>  /  Alb  3.1<L>  /  TBili  0.5  /  DBili  x   /  AST  19  /  ALT  17  /  AlkPhos  56  11-30    eGFR if Non African American: 82 mL/min/1.73M2 (11-30-21 @ 08:30)  eGFR if African American: 95 mL/min/1.73M2 (11-30-21 @ 08:30)    LIVER FUNCTIONS - ( 30 Nov 2021 08:30 )  Alb: 3.1 g/dL / Pro: 5.8 g/dL / ALK PHOS: 56 U/L / ALT: 17 U/L / AST: 19 U/L / GGT: x                     INFECTIOUS DISEASES TESTING  Legionella Antigen, Urine: Negative (05-31-21 @ 08:43)  MRSA PCR Result.: Negative (05-31-21 @ 07:20)      RADIOLOGY & ADDITIONAL TESTS:  I have personally reviewed the last Chest xray  CXR      CT      CARDIOLOGY TESTING  12 Lead ECG:   Ventricular Rate 108 BPM    Atrial Rate 108 BPM    P-R Interval 198 ms    QRS Duration 82 ms    Q-T Interval 336 ms    QTC Calculation(Bazett) 450 ms    P Axis 63 degrees    R Axis 21 degrees    T Axis 41 degrees    Diagnosis Line *** Poor data quality, interpretation may be adversely affected  Sinus tachycardia  Otherwise normal ECG    Confirmed by ELINA MARCOS MD ([id number]) on 11/30/2021 6:48:25 PM (11-30-21 @ 11:01)  12 Lead ECG:   Ventricular Rate 99 BPM    Atrial Rate 99 BPM    P-R Interval 196 ms    QRS Duration 70 ms    Q-T Interval 338 ms    QTC Calculation(Bazett) 433 ms    P Axis 56 degrees    R Axis 16 degrees    T Axis 39 degrees    Diagnosis Line Normal sinus rhythm  Normal ECG    Confirmed by ELINA MARCOS MD ([id number]) on 11/30/2021 11:47:14 AM (11-29-21 @ 16:05)      MEDICATIONS  ALBUTerol    90 MICROgram(s) HFA Inhaler 2  amitriptyline 10  atorvastatin 10  budesonide 160 MICROgram(s)/formoterol 4.5 MICROgram(s) Inhaler 2  chlorhexidine 4% Liquid 1  dextrose 40% Gel 15  dextrose 5%. 1000  dextrose 5%. 1000  dextrose 50% Injectable 25  dextrose 50% Injectable 12.5  dextrose 50% Injectable 25  fluconAZOLE   Tablet 100  gabapentin 300  glucagon  Injectable 1  heparin   Injectable 5000  influenza  Vaccine (HIGH DOSE) 0.7  insulin glargine Injectable (LANTUS) 40  insulin lispro (ADMELOG) corrective regimen sliding scale   insulin lispro (ADMELOG) corrective regimen sliding scale   insulin lispro Injectable (ADMELOG) 20  lidocaine   4% Patch 1  nystatin    Suspension 857507  pantoprazole    Tablet 40  predniSONE   Tablet 20  pregabalin 75  tiotropium 18 MICROgram(s) Capsule 1      ANTIBIOTICS:  fluconAZOLE   Tablet 100 milliGRAM(s) Oral daily  nystatin    Suspension 909282 Unit(s) Oral every 6 hours      All available historical data has been reviewed

## 2021-12-01 NOTE — PHYSICAL THERAPY INITIAL EVALUATION ADULT - MANUAL MUSCLE TESTING RESULTS, REHAB EVAL
Both upper extremites/Both lower extremities muscle strength grossly graded 3 to 3+/5 with some atrophy noted (B) hands

## 2021-12-01 NOTE — PHYSICAL THERAPY INITIAL EVALUATION ADULT - GAIT DEVIATIONS NOTED, PT EVAL
stooped posture, dec heel strike/pushoff , dec SOPHIA/decreased marlena/decreased step length/decreased weight-shifting ability

## 2021-12-01 NOTE — PHYSICAL THERAPY INITIAL EVALUATION ADULT - PERTINENT HX OF CURRENT PROBLEM, REHAB EVAL
75 y/o male admitted with diagnosis of Elevated troponin, Post-herpetic neuralgia with (L) chest pain at home, recent history of shingles

## 2021-12-01 NOTE — PHYSICAL THERAPY INITIAL EVALUATION ADULT - ADDITIONAL COMMENTS
Per patient lives with family in a private home with 14 steps outside with bilateral rails, then no further steps inside; has a cane but "does not use it a lot"

## 2021-12-01 NOTE — PHYSICAL THERAPY INITIAL EVALUATION ADULT - GENERAL OBSERVATIONS, REHAB EVAL
08:50-09:13 Chart reviewed. Pt encountered semireclined in bed, may be seen by Physical Therapist as confirmed with Nurse. Patient denied pain at rest and ready to walk now; +tele; filomena

## 2021-12-01 NOTE — CONSULT NOTE ADULT - ASSESSMENT
ASSESSMENT  76y M admitted with ELEVATED TROPONIN; POST HERPETIC NEURALGIA    HPI:  Patient is a 76 year old Male with a past medical history of Asthma, COPD, DM, HLD, Prostate CA S/P TURP, Shingles presented to the ER with a chief complaint of Left sided Chest pain. Patient had Shingles earlier this month. Patient describes Constant, stabbing left sided chest pain that radiates to his back, 9/10, tried percocet / gabapentin / Pregabalin without much improvement. As per wife, last night patient had a fever of 102.5 and wife tried white vinegar with 2 Tylenol tablets, but patient was in excruciating pain. Patient admits to fever, chills, chest pain, constipation, rash on tongue and palpitations (only when patient takes Percocet). Patient denies nausea, vomiting, visual changes, auditory changes, shortness of breath, abdominal pain, diarrhea, dysuria.    PROBLEMS  Pt with chest pain, hx DM & hx shingles.  Has increased troponin 0.05x2 & fever  R/O MI  Lft's are normal (doubt silent cholecystitis in a diabetic)    Thrush    New problem with additional W/U   acute illness with systemic symptoms    On fluconAZOLE   Tablet 100 milliGRAM(s) Oral daily  nystatin    Suspension 004814 Unit(s) Oral every 6 hours    PLAN  Continue antifungal  - Cardiology consult  - Pain control  - Blood cultures x2 if fever recurs

## 2021-12-01 NOTE — PROGRESS NOTE ADULT - SUBJECTIVE AND OBJECTIVE BOX
Patient is a 76 year old Male with a past medical history of Asthma, COPD, DM, HLD, Prostate CA S/P TURP, Shingles presented to the ER with a chief complaint of Left sided Chest pain.    Today:  Seen at bedside with wife.  Apparently primary concern is not chest pain but blurred vision, loss of balance, and confusion for 1 month.          REVIEW OF SYSTEMS:  Blurred vision, confusion, gait disturbance      MEDICATIONS  (STANDING):  ALBUTerol    90 MICROgram(s) HFA Inhaler 2 Puff(s) Inhalation every 6 hours  amitriptyline 10 milliGRAM(s) Oral at bedtime  atorvastatin 10 milliGRAM(s) Oral at bedtime  budesonide 160 MICROgram(s)/formoterol 4.5 MICROgram(s) Inhaler 2 Puff(s) Inhalation two times a day  chlorhexidine 4% Liquid 1 Application(s) Topical <User Schedule>  dextrose 40% Gel 15 Gram(s) Oral once  dextrose 5%. 1000 milliLiter(s) (50 mL/Hr) IV Continuous <Continuous>  dextrose 5%. 1000 milliLiter(s) (100 mL/Hr) IV Continuous <Continuous>  dextrose 50% Injectable 25 Gram(s) IV Push once  dextrose 50% Injectable 12.5 Gram(s) IV Push once  dextrose 50% Injectable 25 Gram(s) IV Push once  fluconAZOLE   Tablet 100 milliGRAM(s) Oral daily  gabapentin 300 milliGRAM(s) Oral three times a day  glucagon  Injectable 1 milliGRAM(s) IntraMuscular once  heparin   Injectable 5000 Unit(s) SubCutaneous every 12 hours  influenza  Vaccine (HIGH DOSE) 0.7 milliLiter(s) IntraMuscular once  insulin glargine Injectable (LANTUS) 40 Unit(s) SubCutaneous at bedtime  insulin lispro (ADMELOG) corrective regimen sliding scale   SubCutaneous three times a day before meals  insulin lispro (ADMELOG) corrective regimen sliding scale   SubCutaneous at bedtime  insulin lispro Injectable (ADMELOG) 20 Unit(s) SubCutaneous three times a day before meals  lidocaine   4% Patch 1 Patch Transdermal every 24 hours  nystatin    Suspension 493950 Unit(s) Oral every 6 hours  pantoprazole    Tablet 40 milliGRAM(s) Oral before breakfast  predniSONE   Tablet 20 milliGRAM(s) Oral daily  pregabalin 75 milliGRAM(s) Oral three times a day  tiotropium 18 MICROgram(s) Capsule 1 Capsule(s) Inhalation daily    MEDICATIONS  (PRN):  aluminum hydroxide/magnesium hydroxide/simethicone Suspension 30 milliLiter(s) Oral every 4 hours PRN Dyspepsia  melatonin 3 milliGRAM(s) Oral at bedtime PRN Insomnia  morphine  - Injectable 4 milliGRAM(s) IV Push every 6 hours PRN Severe Pain (7 - 10)  ondansetron Injectable 4 milliGRAM(s) IV Push every 8 hours PRN Nausea and/or Vomiting  oxycodone    5 mG/acetaminophen 325 mG 1 Tablet(s) Oral every 6 hours PRN Moderate Pain (4 - 6)  polyethylene glycol 3350 17 Gram(s) Oral daily PRN Constipation  senna 2 Tablet(s) Oral at bedtime PRN Constipation      Allergies  No Known Allergies        FAMILY HISTORY:  Family history of asthma  Family history of diabetes mellitus (DM)        Vital Signs Last 24 Hrs  T(C): 36.4 (01 Dec 2021 13:30), Max: 37.1 (30 Nov 2021 18:07)  T(F): 97.6 (01 Dec 2021 13:30), Max: 98.7 (30 Nov 2021 18:07)  HR: 1 (01 Dec 2021 13:30) (1 - 108)  BP: 136/73 (01 Dec 2021 13:30) (122/60 - 136/73)  RR: 17 (01 Dec 2021 13:30) (17 - 18)      PHYSICAL EXAM:  GENERAL: NAD, lying in bed  HEAD:  Atraumatic, Normocephalic  EYES: Conjunctiva and sclera clear  ENT: +Thrush  NECK: Supple  CHEST/LUNG: Clear to auscultation bilaterally. Unlabored respirations  HEART: Regular rate and rhythm  ABDOMEN: Soft, Nontender, Nondistended  EXTREMITIES:  No calf tenderness   NERVOUS SYSTEM:  Alert & Oriented X3, Strength 5/5 throughout, CN 2-12 intact  MSK: FROM all 4 extremities, full and equal strength  SKIN: +Herpetic rash noted on left side of the chest wall      LABS:                        11.4   9.10  )-----------( 305      ( 01 Dec 2021 07:18 )             36.0     12-01    139  |  103  |  15  ----------------------------<  220<H>  4.1   |  23  |  1.0    Ca    8.8      01 Dec 2021 07:18    TPro  5.8<L>  /  Alb  3.1<L>  /  TBili  0.5  /  DBili  x   /  AST  19  /  ALT  17  /  AlkPhos  56  11-30

## 2021-12-02 LAB
ALBUMIN SERPL ELPH-MCNC: 3.2 G/DL — LOW (ref 3.5–5.2)
ALP SERPL-CCNC: 55 U/L — SIGNIFICANT CHANGE UP (ref 30–115)
ALT FLD-CCNC: 15 U/L — SIGNIFICANT CHANGE UP (ref 0–41)
ANION GAP SERPL CALC-SCNC: 11 MMOL/L — SIGNIFICANT CHANGE UP (ref 7–14)
AST SERPL-CCNC: 11 U/L — SIGNIFICANT CHANGE UP (ref 0–41)
BASOPHILS # BLD AUTO: 0.05 K/UL — SIGNIFICANT CHANGE UP (ref 0–0.2)
BASOPHILS NFR BLD AUTO: 0.5 % — SIGNIFICANT CHANGE UP (ref 0–1)
BILIRUB SERPL-MCNC: 0.3 MG/DL — SIGNIFICANT CHANGE UP (ref 0.2–1.2)
BUN SERPL-MCNC: 14 MG/DL — SIGNIFICANT CHANGE UP (ref 10–20)
CALCIUM SERPL-MCNC: 8.6 MG/DL — SIGNIFICANT CHANGE UP (ref 8.5–10.1)
CHLORIDE SERPL-SCNC: 102 MMOL/L — SIGNIFICANT CHANGE UP (ref 98–110)
CO2 SERPL-SCNC: 25 MMOL/L — SIGNIFICANT CHANGE UP (ref 17–32)
CREAT SERPL-MCNC: 0.9 MG/DL — SIGNIFICANT CHANGE UP (ref 0.7–1.5)
EOSINOPHIL # BLD AUTO: 0.14 K/UL — SIGNIFICANT CHANGE UP (ref 0–0.7)
EOSINOPHIL NFR BLD AUTO: 1.5 % — SIGNIFICANT CHANGE UP (ref 0–8)
GLUCOSE BLDC GLUCOMTR-MCNC: 129 MG/DL — HIGH (ref 70–99)
GLUCOSE BLDC GLUCOMTR-MCNC: 144 MG/DL — HIGH (ref 70–99)
GLUCOSE BLDC GLUCOMTR-MCNC: 478 MG/DL — CRITICAL HIGH (ref 70–99)
GLUCOSE SERPL-MCNC: 151 MG/DL — HIGH (ref 70–99)
HCT VFR BLD CALC: 35.2 % — LOW (ref 42–52)
HGB BLD-MCNC: 11.3 G/DL — LOW (ref 14–18)
IMM GRANULOCYTES NFR BLD AUTO: 0.9 % — HIGH (ref 0.1–0.3)
LYMPHOCYTES # BLD AUTO: 2.98 K/UL — SIGNIFICANT CHANGE UP (ref 1.2–3.4)
LYMPHOCYTES # BLD AUTO: 32.6 % — SIGNIFICANT CHANGE UP (ref 20.5–51.1)
MCHC RBC-ENTMCNC: 27.6 PG — SIGNIFICANT CHANGE UP (ref 27–31)
MCHC RBC-ENTMCNC: 32.1 G/DL — SIGNIFICANT CHANGE UP (ref 32–37)
MCV RBC AUTO: 86.1 FL — SIGNIFICANT CHANGE UP (ref 80–94)
MONOCYTES # BLD AUTO: 0.9 K/UL — HIGH (ref 0.1–0.6)
MONOCYTES NFR BLD AUTO: 9.8 % — HIGH (ref 1.7–9.3)
NEUTROPHILS # BLD AUTO: 4.99 K/UL — SIGNIFICANT CHANGE UP (ref 1.4–6.5)
NEUTROPHILS NFR BLD AUTO: 54.7 % — SIGNIFICANT CHANGE UP (ref 42.2–75.2)
NRBC # BLD: 0 /100 WBCS — SIGNIFICANT CHANGE UP (ref 0–0)
PLATELET # BLD AUTO: 327 K/UL — SIGNIFICANT CHANGE UP (ref 130–400)
POTASSIUM SERPL-MCNC: 4 MMOL/L — SIGNIFICANT CHANGE UP (ref 3.5–5)
POTASSIUM SERPL-SCNC: 4 MMOL/L — SIGNIFICANT CHANGE UP (ref 3.5–5)
PROT SERPL-MCNC: 5.9 G/DL — LOW (ref 6–8)
RBC # BLD: 4.09 M/UL — LOW (ref 4.7–6.1)
RBC # FLD: 15.5 % — HIGH (ref 11.5–14.5)
SODIUM SERPL-SCNC: 138 MMOL/L — SIGNIFICANT CHANGE UP (ref 135–146)
TSH SERPL-MCNC: 1.78 UIU/ML — SIGNIFICANT CHANGE UP (ref 0.27–4.2)
WBC # BLD: 9.14 K/UL — SIGNIFICANT CHANGE UP (ref 4.8–10.8)
WBC # FLD AUTO: 9.14 K/UL — SIGNIFICANT CHANGE UP (ref 4.8–10.8)

## 2021-12-02 PROCEDURE — 70553 MRI BRAIN STEM W/O & W/DYE: CPT | Mod: 26

## 2021-12-02 PROCEDURE — 99233 SBSQ HOSP IP/OBS HIGH 50: CPT

## 2021-12-02 PROCEDURE — 70496 CT ANGIOGRAPHY HEAD: CPT | Mod: 26

## 2021-12-02 PROCEDURE — 99222 1ST HOSP IP/OBS MODERATE 55: CPT

## 2021-12-02 RX ORDER — CLOPIDOGREL BISULFATE 75 MG/1
300 TABLET, FILM COATED ORAL ONCE
Refills: 0 | Status: COMPLETED | OUTPATIENT
Start: 2021-12-02 | End: 2021-12-02

## 2021-12-02 RX ORDER — ASPIRIN/CALCIUM CARB/MAGNESIUM 324 MG
81 TABLET ORAL DAILY
Refills: 0 | Status: DISCONTINUED | OUTPATIENT
Start: 2021-12-02 | End: 2021-12-09

## 2021-12-02 RX ORDER — ATORVASTATIN CALCIUM 80 MG/1
80 TABLET, FILM COATED ORAL AT BEDTIME
Refills: 0 | Status: DISCONTINUED | OUTPATIENT
Start: 2021-12-02 | End: 2021-12-09

## 2021-12-02 RX ORDER — CLOPIDOGREL BISULFATE 75 MG/1
75 TABLET, FILM COATED ORAL DAILY
Refills: 0 | Status: DISCONTINUED | OUTPATIENT
Start: 2021-12-03 | End: 2021-12-09

## 2021-12-02 RX ADMIN — CLOPIDOGREL BISULFATE 300 MILLIGRAM(S): 75 TABLET, FILM COATED ORAL at 18:09

## 2021-12-02 RX ADMIN — OXYCODONE AND ACETAMINOPHEN 1 TABLET(S): 5; 325 TABLET ORAL at 08:34

## 2021-12-02 RX ADMIN — GABAPENTIN 300 MILLIGRAM(S): 400 CAPSULE ORAL at 06:30

## 2021-12-02 RX ADMIN — BUDESONIDE AND FORMOTEROL FUMARATE DIHYDRATE 2 PUFF(S): 160; 4.5 AEROSOL RESPIRATORY (INHALATION) at 21:00

## 2021-12-02 RX ADMIN — Medication 75 MILLIGRAM(S): at 06:48

## 2021-12-02 RX ADMIN — ALBUTEROL 2 PUFF(S): 90 AEROSOL, METERED ORAL at 19:15

## 2021-12-02 RX ADMIN — ATORVASTATIN CALCIUM 80 MILLIGRAM(S): 80 TABLET, FILM COATED ORAL at 21:57

## 2021-12-02 RX ADMIN — Medication 75 MILLIGRAM(S): at 21:57

## 2021-12-02 RX ADMIN — Medication 500000 UNIT(S): at 18:09

## 2021-12-02 RX ADMIN — ALBUTEROL 2 PUFF(S): 90 AEROSOL, METERED ORAL at 07:42

## 2021-12-02 RX ADMIN — Medication 20 MILLIGRAM(S): at 06:30

## 2021-12-02 RX ADMIN — OXYCODONE AND ACETAMINOPHEN 1 TABLET(S): 5; 325 TABLET ORAL at 17:19

## 2021-12-02 RX ADMIN — Medication 20 UNIT(S): at 16:46

## 2021-12-02 RX ADMIN — Medication 81 MILLIGRAM(S): at 21:58

## 2021-12-02 RX ADMIN — HEPARIN SODIUM 5000 UNIT(S): 5000 INJECTION INTRAVENOUS; SUBCUTANEOUS at 06:31

## 2021-12-02 RX ADMIN — OXYCODONE AND ACETAMINOPHEN 1 TABLET(S): 5; 325 TABLET ORAL at 09:51

## 2021-12-02 RX ADMIN — Medication 10 MILLIGRAM(S): at 21:57

## 2021-12-02 RX ADMIN — MORPHINE SULFATE 4 MILLIGRAM(S): 50 CAPSULE, EXTENDED RELEASE ORAL at 04:01

## 2021-12-02 RX ADMIN — Medication 500000 UNIT(S): at 06:31

## 2021-12-02 RX ADMIN — HEPARIN SODIUM 5000 UNIT(S): 5000 INJECTION INTRAVENOUS; SUBCUTANEOUS at 17:02

## 2021-12-02 RX ADMIN — FLUCONAZOLE 100 MILLIGRAM(S): 150 TABLET ORAL at 18:09

## 2021-12-02 RX ADMIN — GABAPENTIN 300 MILLIGRAM(S): 400 CAPSULE ORAL at 21:57

## 2021-12-02 RX ADMIN — PANTOPRAZOLE SODIUM 40 MILLIGRAM(S): 20 TABLET, DELAYED RELEASE ORAL at 06:30

## 2021-12-02 RX ADMIN — Medication 6: at 16:46

## 2021-12-02 RX ADMIN — OXYCODONE AND ACETAMINOPHEN 1 TABLET(S): 5; 325 TABLET ORAL at 18:09

## 2021-12-02 NOTE — CONSULT NOTE ADULT - ASSESSMENT
A/P:     1. CVA  - MRI reviewed  - neuro fu   - neuro checks q 1 hr   - speech eval   - asa plavix   - 2 d echo   - dvt ppx

## 2021-12-02 NOTE — CHART NOTE - NSCHARTNOTEFT_GEN_A_CORE
Called by radiologist- MRI brain shows punctate acute left parietal infarct.  Discussed with neurologist Dr. Stokes who recommends upgrade to ICU level of care for q1h neuro checks.    Ischemic CVA-left parietal lobe infarct  Neurochecks q1hrs.  TTE unremarkable, no need for bubble study at this time as per neuro  CTA head/neck ordered  STAT CT head if change in exam or mental status  Started Lipitor 80 mg   Plavix load, then start 75 mg  ASA daily  Neurology consulted, official consult pending  NIH scale  PT/OT Evaluation.  Speech & swallow evaluation

## 2021-12-02 NOTE — CONSULT NOTE ADULT - SUBJECTIVE AND OBJECTIVE BOX
Patient is a 76 year old Male with a past medical history of Asthma, COPD, DM, HLD, Prostate CA S/P TURP, Shingles presented to the ER with a chief complaint of Left sided Chest pain, blurred vision, confusion.      REVIEW OF SYSTEMS  General:	awke  Skin/Breast: no rash 	  Ophthalmologic: blurry vision 	  ENMT:	no thrush   Respiratory and Thorax: no sob 	  Cardiovascular:	no chest pain  Gastrointestinal:	no diarrhea   Genitourinary:	no dysuria  Musculoskeletal:	 no weakness  Neurological:	dyarythia         PHYSICAL EXAM:  GENERAL: NAD, well-groomed, well-developed  HEAD:  Atraumatic, Normocephalic  EYES: EOMI, PERRLA, conjunctiva and sclera clear  NECK: Supple, No JVD, Normal thyroid  NERVOUS SYSTEM:  Alert & Oriented X3, Good concentration; Motor Strength 5/5 B/L upper and lower extremities; DTRs 2+ intact and symmetric  CHEST/LUNG: Clear to percussion bilaterally; No rales, rhonchi, wheezing, or rubs  HEART: Regular rate and rhythm; No murmurs, rubs, or gallops  ABDOMEN: Soft, Nontender, Nondistended; Bowel sounds present  EXTREMITIES:  2+ Peripheral Pulses, No clubbing, cyanosis, or edema  SKIN: chest rash     labs  12-02    138  |  102  |  14  ----------------------------<  151<H>  4.0   |  25  |  0.9    Ca    8.6      02 Dec 2021 06:35    TPro  5.9<L>  /  Alb  3.2<L>  /  TBili  0.3  /  DBili  x   /  AST  11  /  ALT  15  /  AlkPhos  55  12-02                          11.3   9.14  )-----------( 327      ( 02 Dec 2021 06:35 )             35.2

## 2021-12-02 NOTE — PROGRESS NOTE ADULT - SUBJECTIVE AND OBJECTIVE BOX
Patient is seen and examined at the bed side, is afebrile.      REVIEW OF SYSTEMS: All other review systems are negative      ALLERGIES: No Known Allergies      ICU Vital Signs Last 24 Hrs  T(C): 36.4 (02 Dec 2021 20:21), Max: 36.6 (02 Dec 2021 14:29)  T(F): 97.5 (02 Dec 2021 20:21), Max: 97.8 (02 Dec 2021 14:29)  HR: 103 (02 Dec 2021 20:21) (93 - 117)  BP: 124/74 (02 Dec 2021 20:21) (124/74 - 157/74)  BP(mean): --  ABP: --  ABP(mean): --  RR: 23 (02 Dec 2021 20:21) (18 - 23)  SpO2: 98% (02 Dec 2021 20:21) (98% - 98%)      PHYSICAL EXAM:  GENERAL: Not in distress   CHEST/LUNG: Not using accessory muscles  HEART: s1 and s2 present  ABDOMEN:  Nontender and  Nondistended  EXTREMITIES: No pedal  edema  CNS: Awake and Alert      LABS:                        11.3   9.14  )-----------( 327      ( 02 Dec 2021 06:35 )             35.2       12-02    138  |  102  |  14  ----------------------------<  151<H>  4.0   |  25  |  0.9    Ca    8.6      02 Dec 2021 06:35    TPro  5.9<L>  /  Alb  3.2<L>  /  TBili  0.3  /  DBili  x   /  AST  11  /  ALT  15  /  AlkPhos  55  12-02      CAPILLARY BLOOD GLUCOSE  POCT Blood Glucose.: 129 mg/dL (02 Dec 2021 21:25)  POCT Blood Glucose.: 478 mg/dL (02 Dec 2021 16:23)  POCT Blood Glucose.: 144 mg/dL (02 Dec 2021 07:58)  POCT Blood Glucose.: 151 mg/dL (01 Dec 2021 22:00)        MEDICATIONS  (STANDING):  ALBUTerol    90 MICROgram(s) HFA Inhaler 2 Puff(s) Inhalation every 6 hours  amitriptyline 10 milliGRAM(s) Oral at bedtime  aspirin  chewable 81 milliGRAM(s) Enteral Tube daily  atorvastatin 80 milliGRAM(s) Oral at bedtime  budesonide 160 MICROgram(s)/formoterol 4.5 MICROgram(s) Inhaler 2 Puff(s) Inhalation two times a day  chlorhexidine 4% Liquid 1 Application(s) Topical <User Schedule>  fluconAZOLE   Tablet 100 milliGRAM(s) Oral daily  gabapentin 300 milliGRAM(s) Oral three times a day  glucagon  Injectable 1 milliGRAM(s) IntraMuscular once  heparin   Injectable 5000 Unit(s) SubCutaneous every 12 hours  influenza  Vaccine (HIGH DOSE) 0.7 milliLiter(s) IntraMuscular once  insulin glargine Injectable (LANTUS) 40 Unit(s) SubCutaneous at bedtime  insulin lispro (ADMELOG) corrective regimen sliding scale   SubCutaneous three times a day before meals  insulin lispro (ADMELOG) corrective regimen sliding scale   SubCutaneous at bedtime  insulin lispro Injectable (ADMELOG) 20 Unit(s) SubCutaneous three times a day before meals  lidocaine   4% Patch 1 Patch Transdermal every 24 hours  nystatin    Suspension 086215 Unit(s) Oral every 6 hours  pantoprazole    Tablet 40 milliGRAM(s) Oral before breakfast  predniSONE   Tablet 20 milliGRAM(s) Oral daily  pregabalin 75 milliGRAM(s) Oral three times a day  tiotropium 18 MICROgram(s) Capsule 1 Capsule(s) Inhalation daily      RADIOLOGY & ADDITIONAL TESTS:    < from: CT Angio Head w/ IV Cont (12.02.21 @ 19:36) >  No large vessel occlusion, aneurysm, vascular malformation.    moderate atherosclerotic stenosis of the bilateral clinoid/supraclinoid ICAs.    < from: MR Head w/wo IV Cont (12.02.21 @ 13:24) >Punctate acute infarct in the high left parietal lobe.    Redemonstrated centrally calcified meningioma along the high right parietal convexity. No significant peritumoral edema.    Mild chronic microvascular ischemic changes.      < from: CT Head No Cont (12.01.21 @ 16:36) >    1.  No CT evidence of acute intracranial pathology.    2.  Right high frontal calcified meningioma measuring 1.8 cm.      MICROBIOLOGY DATA:    COVID-19 PCR . (11.29.21 @ 14:46)   COVID-19 PCR: NotDetec:           Patient is seen and examined at the bed side, is afebrile. He mentioned feeling better, the oral thrush is improving.      REVIEW OF SYSTEMS: All other review systems are negative      ALLERGIES: No Known Allergies      ICU Vital Signs Last 24 Hrs  T(C): 36.4 (02 Dec 2021 20:21), Max: 36.6 (02 Dec 2021 14:29)  T(F): 97.5 (02 Dec 2021 20:21), Max: 97.8 (02 Dec 2021 14:29)  HR: 103 (02 Dec 2021 20:21) (93 - 117)  BP: 124/74 (02 Dec 2021 20:21) (124/74 - 157/74)  BP(mean): --  ABP: --  ABP(mean): --  RR: 23 (02 Dec 2021 20:21) (18 - 23)  SpO2: 98% (02 Dec 2021 20:21) (98% - 98%)      PHYSICAL EXAM:  GENERAL: Not in distress   CHEST/LUNG: Not using accessory muscles  HEART: s1 and s2 present  ABDOMEN:  Nontender and  Nondistended  EXTREMITIES: No pedal  edema  CNS: Awake and Alert      LABS:                        11.3   9.14  )-----------( 327      ( 02 Dec 2021 06:35 )             35.2       12-02    138  |  102  |  14  ----------------------------<  151<H>  4.0   |  25  |  0.9    Ca    8.6      02 Dec 2021 06:35    TPro  5.9<L>  /  Alb  3.2<L>  /  TBili  0.3  /  DBili  x   /  AST  11  /  ALT  15  /  AlkPhos  55  12-02      CAPILLARY BLOOD GLUCOSE  POCT Blood Glucose.: 129 mg/dL (02 Dec 2021 21:25)  POCT Blood Glucose.: 478 mg/dL (02 Dec 2021 16:23)  POCT Blood Glucose.: 144 mg/dL (02 Dec 2021 07:58)  POCT Blood Glucose.: 151 mg/dL (01 Dec 2021 22:00)        MEDICATIONS  (STANDING):  ALBUTerol    90 MICROgram(s) HFA Inhaler 2 Puff(s) Inhalation every 6 hours  amitriptyline 10 milliGRAM(s) Oral at bedtime  aspirin  chewable 81 milliGRAM(s) Enteral Tube daily  atorvastatin 80 milliGRAM(s) Oral at bedtime  budesonide 160 MICROgram(s)/formoterol 4.5 MICROgram(s) Inhaler 2 Puff(s) Inhalation two times a day  chlorhexidine 4% Liquid 1 Application(s) Topical <User Schedule>  fluconAZOLE   Tablet 100 milliGRAM(s) Oral daily  gabapentin 300 milliGRAM(s) Oral three times a day  glucagon  Injectable 1 milliGRAM(s) IntraMuscular once  heparin   Injectable 5000 Unit(s) SubCutaneous every 12 hours  influenza  Vaccine (HIGH DOSE) 0.7 milliLiter(s) IntraMuscular once  insulin glargine Injectable (LANTUS) 40 Unit(s) SubCutaneous at bedtime  insulin lispro (ADMELOG) corrective regimen sliding scale   SubCutaneous three times a day before meals  insulin lispro (ADMELOG) corrective regimen sliding scale   SubCutaneous at bedtime  insulin lispro Injectable (ADMELOG) 20 Unit(s) SubCutaneous three times a day before meals  lidocaine   4% Patch 1 Patch Transdermal every 24 hours  nystatin    Suspension 519423 Unit(s) Oral every 6 hours  pantoprazole    Tablet 40 milliGRAM(s) Oral before breakfast  predniSONE   Tablet 20 milliGRAM(s) Oral daily  pregabalin 75 milliGRAM(s) Oral three times a day  tiotropium 18 MICROgram(s) Capsule 1 Capsule(s) Inhalation daily      RADIOLOGY & ADDITIONAL TESTS:    < from: CT Angio Head w/ IV Cont (12.02.21 @ 19:36) >  No large vessel occlusion, aneurysm, vascular malformation.    moderate atherosclerotic stenosis of the bilateral clinoid/supraclinoid ICAs.    < from: MR Head w/wo IV Cont (12.02.21 @ 13:24) >Punctate acute infarct in the high left parietal lobe.    Redemonstrated centrally calcified meningioma along the high right parietal convexity. No significant peritumoral edema.    Mild chronic microvascular ischemic changes.      < from: CT Head No Cont (12.01.21 @ 16:36) >    1.  No CT evidence of acute intracranial pathology.    2.  Right high frontal calcified meningioma measuring 1.8 cm.      MICROBIOLOGY DATA:    COVID-19 PCR . (11.29.21 @ 14:46)   COVID-19 PCR: NotDetec:

## 2021-12-02 NOTE — PROGRESS NOTE ADULT - ASSESSMENT
Patient is a 76 year old Male with a past medical history of Asthma, COPD, DM, HLD, Prostate CA S/P TURP, Shingles presented to the ER with a chief complaint of Left sided Chest pain, blurred vision, confusion.    Blurred vision, imbalance, confusion:  -Has been present for ~1 month however was never investigated per wife  -Head CT unremarkable  -Will get MRI brain w/wo contrast  -Sent B12, RPR, TSH  -Q8h neuro checks  -Neurology consult    # Left sided Chest pain secondary to Uncontrolled Post Herpetic neuralgia  # Elevated Troponin likely chronic troponemia   - Troponin 0.08  - Continue with Pregabalin, Gabapentin and Percocet   - Added Amitriptyline 10mg     # Diabetes  - Monitor Fingersticks  - Diabetic carbohydrate consistent diet   - Continue with home Insulin regimen     # Asthma   # COPD   - Continue with Symbicort and other Inhalers     # Oral Thrush - Likely secondary to Chronic steroid use   - PO Fluconazole     # Constipation   - Continue with Senna   - Will add PRN Miralax

## 2021-12-02 NOTE — CONSULT NOTE ADULT - SUBJECTIVE AND OBJECTIVE BOX
HPI: 76 year old Male with a past medical history of Asthma, COPD, DM, HLD, Prostate CA S/P TURP, Shingles presented to the ER with a chief complaint of Left sided Chest pain. Patient had Shingles earlier this month. Patient describes Constant, stabbing left sided chest pain that radiates to his back, 9/10, tried percocet / gabapentin / Pregabalin without much improvement. As per wife, last night patient had a fever of 102.5 and wife tried white vinegar with 2 Tylenol tablets, but patient was in excruciating pain. Patient admits to fever, chills, chest pain, constipation, rash on tongue and palpitations (only when patient takes Percocet). Patient denies nausea, vomiting, visual changes, auditory changes, shortness of breath, abdominal pain, diarrhea, dysuria.  ptn seen at bed  side aox3  nad fu  command .speaking  broken  english   PTN  REFERRED TO ACUTE  REHAB  FOR  EVAL AND  TX   PAST MEDICAL & SURGICAL HISTORY:  Diabetes  20 yrs    Prostate cancer  1999 s/p sx    Asthma  20 yr    COPD (chronic obstructive pulmonary disease)  20 yrs no 02 rx    Dyslipidemia    Shingles    S/P TURP  1999    History of surgery  back surgery        Hospital Course:    TODAY'S SUBJECTIVE & REVIEW OF SYMPTOMS:     Constitutional WNL   Cardio WNL   Resp WNL   GI WNL  Heme WNL  Endo WNL  Skin WNL  MSK WNL  Neuro WNL  Cognitive WNL  Psych WNL      MEDICATIONS  (STANDING):  ALBUTerol    90 MICROgram(s) HFA Inhaler 2 Puff(s) Inhalation every 6 hours  amitriptyline 10 milliGRAM(s) Oral at bedtime  aspirin  chewable 81 milliGRAM(s) Enteral Tube daily  atorvastatin 80 milliGRAM(s) Oral at bedtime  budesonide 160 MICROgram(s)/formoterol 4.5 MICROgram(s) Inhaler 2 Puff(s) Inhalation two times a day  chlorhexidine 4% Liquid 1 Application(s) Topical <User Schedule>  dextrose 40% Gel 15 Gram(s) Oral once  dextrose 5%. 1000 milliLiter(s) (50 mL/Hr) IV Continuous <Continuous>  dextrose 5%. 1000 milliLiter(s) (100 mL/Hr) IV Continuous <Continuous>  dextrose 50% Injectable 12.5 Gram(s) IV Push once  dextrose 50% Injectable 25 Gram(s) IV Push once  dextrose 50% Injectable 25 Gram(s) IV Push once  fluconAZOLE   Tablet 100 milliGRAM(s) Oral daily  gabapentin 300 milliGRAM(s) Oral three times a day  glucagon  Injectable 1 milliGRAM(s) IntraMuscular once  heparin   Injectable 5000 Unit(s) SubCutaneous every 12 hours  influenza  Vaccine (HIGH DOSE) 0.7 milliLiter(s) IntraMuscular once  insulin glargine Injectable (LANTUS) 40 Unit(s) SubCutaneous at bedtime  insulin lispro (ADMELOG) corrective regimen sliding scale   SubCutaneous three times a day before meals  insulin lispro (ADMELOG) corrective regimen sliding scale   SubCutaneous at bedtime  insulin lispro Injectable (ADMELOG) 20 Unit(s) SubCutaneous three times a day before meals  lidocaine   4% Patch 1 Patch Transdermal every 24 hours  nystatin    Suspension 474915 Unit(s) Oral every 6 hours  pantoprazole    Tablet 40 milliGRAM(s) Oral before breakfast  predniSONE   Tablet 20 milliGRAM(s) Oral daily  pregabalin 75 milliGRAM(s) Oral three times a day  tiotropium 18 MICROgram(s) Capsule 1 Capsule(s) Inhalation daily    MEDICATIONS  (PRN):  aluminum hydroxide/magnesium hydroxide/simethicone Suspension 30 milliLiter(s) Oral every 4 hours PRN Dyspepsia  LORazepam   Injectable 1 milliGRAM(s) IV Push once PRN anxiety for MRI  melatonin 3 milliGRAM(s) Oral at bedtime PRN Insomnia  morphine  - Injectable 4 milliGRAM(s) IV Push every 6 hours PRN Severe Pain (7 - 10)  ondansetron Injectable 4 milliGRAM(s) IV Push every 8 hours PRN Nausea and/or Vomiting  oxycodone    5 mG/acetaminophen 325 mG 1 Tablet(s) Oral every 6 hours PRN Moderate Pain (4 - 6)  polyethylene glycol 3350 17 Gram(s) Oral daily PRN Constipation  senna 2 Tablet(s) Oral at bedtime PRN Constipation      FAMILY HISTORY:  Family history of asthma    Family history of diabetes mellitus (DM)        Allergies    No Known Allergies    Intolerances        SOCIAL HISTORY:    [  ] Etoh  [  ] Smoking  [  ] Substance abuse     Home Environment:  [  ] Home Alone  [x  ] Lives with Family  [  ] Home Health Aid    Dwelling:  [  ] Apartment  [  x] Private House  [  ] Adult Home  [  ] Skilled Nursing Facility      [  ] Short Term  [  ] Long Term  [  x] Stairs       Elevator [  ]    FUNCTIONAL STATUS PTA: (Check all that apply)  Ambulation: [  x ]Independent    [  ] Dependent     [  ] Non-Ambulatory  Assistive Device: [  ] SA Cane  [  ]  Q Cane  [  ] Walker  [  ]  Wheelchair  ADL : [ x ] Independent  [  ]  Dependent       Vital Signs Last 24 Hrs  T(C): 36.6 (02 Dec 2021 14:29), Max: 36.6 (02 Dec 2021 14:29)  T(F): 97.8 (02 Dec 2021 14:29), Max: 97.8 (02 Dec 2021 14:29)  HR: 114 (02 Dec 2021 14:29) (93 - 114)  BP: 157/74 (02 Dec 2021 14:29) (147/70 - 157/74)  BP(mean): --  RR: 18 (02 Dec 2021 14:29) (17 - 18)  SpO2: --      PHYSICAL EXAM: Alert & Oriented X 2  GENERAL: NAD, well-groomed, well-developed  HEAD:  Atraumatic, Normocephalic  EYES: EOMI, PERRLA, conjunctiva and sclera clear  NECK: Supple, No JVD, Normal thyroid  CHEST/LUNG: Clear to percussion bilaterally; No rales, rhonchi, wheezing, or rubs  HEART: Regular rate and rhythm; No murmurs, rubs, or gallops  ABDOMEN: Soft, Nontender, Nondistended; Bowel sounds present  EXTREMITIES:  2+ Peripheral Pulses, No clubbing, cyanosis, or edema    NERVOUS SYSTEM:  Cranial Nerves 2-12 intact [x  ] Abnormal  [  ]  ROM: WFL all extremities [x  ]  Abnormal [  ]  Motor Strength: WFL all extremities  [x  ]  Abnormal [  ]  Sensation: intact to light touch [  x] Abnormal [  ]  Reflexes: Symmetric [  x]  Abnormal [  ]    FUNCTIONAL STATUS:  Bed Mobility: Independent [  ]  Supervision [ x ]  Needs Assistance [  ]  N/A [  ]  Transfers: Independent [  ]  Supervision [ x ]  Needs Assistance [  ]  N/A [  ]   Ambulation: Independent [  ]  Supervision [x  ]  Needs Assistance [  ]  N/A [  ]  ADL: Independent [ x ] Requires Assistance [  ] N/A [  ]  SEE PT/OT IE NOTES    LABS:                        11.3   9.14  )-----------( 327      ( 02 Dec 2021 06:35 )             35.2     12-02    138  |  102  |  14  ----------------------------<  151<H>  4.0   |  25  |  0.9    Ca    8.6      02 Dec 2021 06:35    TPro  5.9<L>  /  Alb  3.2<L>  /  TBili  0.3  /  DBili  x   /  AST  11  /  ALT  15  /  AlkPhos  55  12-02          RADIOLOGY & ADDITIONAL STUDIES:< from: MR Head w/wo IV Cont (12.02.21 @ 13:24) >  Punctate acute infarct in the high left parietal lobe.    Redemonstrated centrally calcified meningioma along the high right parietal convexity. No significant peritumoral edema.    Mild chronic microvascular ischemic changes.      < end of copied text >      Assesment:

## 2021-12-02 NOTE — PROGRESS NOTE ADULT - SUBJECTIVE AND OBJECTIVE BOX
Patient is a 76 year old Male with a past medical history of Asthma, COPD, DM, HLD, Prostate CA S/P TURP, Shingles presented to the ER with a chief complaint of Left sided Chest pain, blurred vision, confusion.    Today:  Seen at bedside, no new complaints.          REVIEW OF SYSTEMS:  No new complaints      MEDICATIONS  (STANDING):  ALBUTerol    90 MICROgram(s) HFA Inhaler 2 Puff(s) Inhalation every 6 hours  amitriptyline 10 milliGRAM(s) Oral at bedtime  atorvastatin 10 milliGRAM(s) Oral at bedtime  budesonide 160 MICROgram(s)/formoterol 4.5 MICROgram(s) Inhaler 2 Puff(s) Inhalation two times a day  chlorhexidine 4% Liquid 1 Application(s) Topical <User Schedule>  dextrose 40% Gel 15 Gram(s) Oral once  dextrose 5%. 1000 milliLiter(s) (50 mL/Hr) IV Continuous <Continuous>  dextrose 5%. 1000 milliLiter(s) (100 mL/Hr) IV Continuous <Continuous>  dextrose 50% Injectable 25 Gram(s) IV Push once  dextrose 50% Injectable 12.5 Gram(s) IV Push once  dextrose 50% Injectable 25 Gram(s) IV Push once  fluconAZOLE   Tablet 100 milliGRAM(s) Oral daily  gabapentin 300 milliGRAM(s) Oral three times a day  glucagon  Injectable 1 milliGRAM(s) IntraMuscular once  heparin   Injectable 5000 Unit(s) SubCutaneous every 12 hours  influenza  Vaccine (HIGH DOSE) 0.7 milliLiter(s) IntraMuscular once  insulin glargine Injectable (LANTUS) 40 Unit(s) SubCutaneous at bedtime  insulin lispro (ADMELOG) corrective regimen sliding scale   SubCutaneous three times a day before meals  insulin lispro (ADMELOG) corrective regimen sliding scale   SubCutaneous at bedtime  insulin lispro Injectable (ADMELOG) 20 Unit(s) SubCutaneous three times a day before meals  lidocaine   4% Patch 1 Patch Transdermal every 24 hours  nystatin    Suspension 902595 Unit(s) Oral every 6 hours  pantoprazole    Tablet 40 milliGRAM(s) Oral before breakfast  predniSONE   Tablet 20 milliGRAM(s) Oral daily  pregabalin 75 milliGRAM(s) Oral three times a day  tiotropium 18 MICROgram(s) Capsule 1 Capsule(s) Inhalation daily    MEDICATIONS  (PRN):  aluminum hydroxide/magnesium hydroxide/simethicone Suspension 30 milliLiter(s) Oral every 4 hours PRN Dyspepsia  LORazepam   Injectable 1 milliGRAM(s) IV Push once PRN anxiety for MRI  melatonin 3 milliGRAM(s) Oral at bedtime PRN Insomnia  morphine  - Injectable 4 milliGRAM(s) IV Push every 6 hours PRN Severe Pain (7 - 10)  ondansetron Injectable 4 milliGRAM(s) IV Push every 8 hours PRN Nausea and/or Vomiting  oxycodone    5 mG/acetaminophen 325 mG 1 Tablet(s) Oral every 6 hours PRN Moderate Pain (4 - 6)  polyethylene glycol 3350 17 Gram(s) Oral daily PRN Constipation  senna 2 Tablet(s) Oral at bedtime PRN Constipation      Allergies  No Known Allergies        FAMILY HISTORY:  Family history of asthma  Family history of diabetes mellitus (DM)        Vital Signs Last 24 Hrs  T(C): 36.1 (02 Dec 2021 04:59), Max: 36.4 (01 Dec 2021 13:30)  T(F): 97 (02 Dec 2021 04:59), Max: 97.6 (01 Dec 2021 13:30)  HR: 93 (02 Dec 2021 04:59) (93 - 105)  BP: 151/70 (02 Dec 2021 04:59) (136/73 - 151/70)  RR: 18 (02 Dec 2021 04:59) (17 - 18)      PHYSICAL EXAM:  GENERAL: NAD, lying in bed  HEAD:  Atraumatic, Normocephalic  EYES: Conjunctiva and sclera clear  ENT: +Thrush  NECK: Supple  CHEST/LUNG: Clear to auscultation bilaterally. Unlabored respirations  HEART: Regular rate and rhythm  ABDOMEN: Soft, Nontender, Nondistended  EXTREMITIES:  No calf tenderness   NERVOUS SYSTEM:  Alert & Oriented X3, Strength 5/5 throughout, CN 2-12 intact  MSK: FROM all 4 extremities, full and equal strength  SKIN: +Herpetic rash noted on left side of the chest wall      LABS:                        11.3   9.14  )-----------( 327      ( 02 Dec 2021 06:35 )             35.2     12-02    138  |  102  |  14  ----------------------------<  151<H>  4.0   |  25  |  0.9    Ca    8.6      02 Dec 2021 06:35    TPro  5.9<L>  /  Alb  3.2<L>  /  TBili  0.3  /  DBili  x   /  AST  11  /  ALT  15  /  AlkPhos  55  12-02

## 2021-12-02 NOTE — PROGRESS NOTE ADULT - ASSESSMENT
Patient is a 76 year old Male with a past medical history of Asthma, COPD, DM, HLD, Prostate CA S/P TURP, Shingles presented to the ER with a chief complaint of Left sided Chest pain. Patient had Shingles earlier this month. Patient describes Constant, stabbing left sided chest pain that radiates to his back, 9/10, tried percocet / gabapentin / Pregabalin without much improvement. As per wife, last night patient had a fever of 102.5 and wife tried white vinegar with 2 Tylenol tablets, but patient was in excruciating pain. Patient admits to fever, chills, chest pain, constipation, rash on tongue and palpitations (only when patient takes Percocet). Patient denies nausea, vomiting, visual changes, auditory changes, shortness of breath, abdominal pain, diarrhea, dysuria.    PROBLEMS  # chest pain, hx DM & hx shingles.  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 Follow up Syphilis screen   2. Continue antifungal   3. Management of left parietal lobe as pe Neurology  4. HIV test    Attending Attestation:    Spent more than 45 minutes on total encounter, more than 50 % of the visit was spent counseling and/or coordinating care by the Attending physician.   Patient is a 76 year old Male with a past medical history of Asthma, COPD, DM, HLD, Prostate CA S/P TURP, Shingles presented to the ER with a chief complaint of Left sided Chest pain. Patient had Shingles earlier this month. Patient describes Constant, stabbing left sided chest pain that radiates to his back, 9/10, tried percocet / gabapentin / Pregabalin without much improvement. As per wife, last night patient had a fever of 102.5 and wife tried white vinegar with 2 Tylenol tablets, but patient was in excruciating pain. Patient admits to fever, chills, chest pain, constipation, rash on tongue and palpitations (only when patient takes Percocet). Patient denies nausea, vomiting, visual changes, auditory changes, shortness of breath, abdominal pain, diarrhea, dysuria.    PROBLEMS  # chest pain, hx DM & hx shingles.  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 Follow up Syphilis screen   2. Continue Fluconazole 200 mg daily and nystatin suspension for oral candidiasis   3. Management of left parietal lobe infarct  as per Neurology  4. HIV test  5. Continue Gabapentin for herpetic pain    d/w ICU team    Attending Attestation:    Spent more than 45 minutes on total encounter, more than 50 % of the visit was spent counseling and/or coordinating care by the Attending physician.

## 2021-12-02 NOTE — CONSULT NOTE ADULT - ASSESSMENT
IMPRESSION: Rehab of 75 y/o  m  r/o  cva      PRECAUTIONS: [  ] Cardiac  [  ] Respiratory  [  ] Seizures [  ] Contact Isolation  [  ] Droplet Isolation  [ FALL ] Other    Weight Bearing Status:     RECOMMENDATION:    Out of Bed to Chair     DVT/Decubiti Prophylaxis    REHAB PLAN:     [  x ] Bedside P/T 3-5 times a week   [   ]   Bedside O/T  2-3 times a week             [   ] No Rehab Therapy Indicated                   [   ]  Speech Therapy   Conditioning/ROM                                    ADL  Bed Mobility                                               Conditioning/ROM  Transfers                                                     Bed Mobility  Sitting /Standing Balance                         Transfers                                        Gait Training                                               Sitting/Standing Balance  Stair Training [   ]Applicable                    Home equipment Eval                                                                        Splinting  [   ] Only      GOALS:   ADL   [  x ]   Independent                    Transfers  [  x ] Independent                          Ambulation  [ x  ] Independent     [ x   ] With device                            [ x  ]  CG                                                         [ x  ]  CG                                                                  [  x] CG                            [    ] Min A                                                   [   ] Min A                                                              [   ] Min  A          DISCHARGE PLAN:   [   ]  Good candidate for Intensive Rehabilitation/Hospital based-[facility]A [facility]                                             Will tolerate 3hrs Intensive Rehab Daily                                       [    ]  Short Term Rehab in Skilled Nursing Facility                                       [  xx  ]  Home with Outpatient or VN services neurology  fu  and  home care  with  rehab  out  ptn                                           [    ]  Possible Candidate for Intensive Hospital based Rehab

## 2021-12-03 DIAGNOSIS — R07.9 CHEST PAIN, UNSPECIFIED: ICD-10-CM

## 2021-12-03 DIAGNOSIS — I63.9 CEREBRAL INFARCTION, UNSPECIFIED: ICD-10-CM

## 2021-12-03 LAB
A1C WITH ESTIMATED AVERAGE GLUCOSE RESULT: 8.2 % — HIGH (ref 4–5.6)
ALBUMIN SERPL ELPH-MCNC: 3.4 G/DL — LOW (ref 3.5–5.2)
ALP SERPL-CCNC: 58 U/L — SIGNIFICANT CHANGE UP (ref 30–115)
ALT FLD-CCNC: 18 U/L — SIGNIFICANT CHANGE UP (ref 0–41)
ANION GAP SERPL CALC-SCNC: 13 MMOL/L — SIGNIFICANT CHANGE UP (ref 7–14)
AST SERPL-CCNC: 19 U/L — SIGNIFICANT CHANGE UP (ref 0–41)
BASOPHILS # BLD AUTO: 0.06 K/UL — SIGNIFICANT CHANGE UP (ref 0–0.2)
BASOPHILS NFR BLD AUTO: 0.6 % — SIGNIFICANT CHANGE UP (ref 0–1)
BILIRUB SERPL-MCNC: 0.3 MG/DL — SIGNIFICANT CHANGE UP (ref 0.2–1.2)
BUN SERPL-MCNC: 16 MG/DL — SIGNIFICANT CHANGE UP (ref 10–20)
CALCIUM SERPL-MCNC: 8.9 MG/DL — SIGNIFICANT CHANGE UP (ref 8.5–10.1)
CHLORIDE SERPL-SCNC: 104 MMOL/L — SIGNIFICANT CHANGE UP (ref 98–110)
CHOLEST SERPL-MCNC: 164 MG/DL — SIGNIFICANT CHANGE UP
CO2 SERPL-SCNC: 25 MMOL/L — SIGNIFICANT CHANGE UP (ref 17–32)
CREAT SERPL-MCNC: 1 MG/DL — SIGNIFICANT CHANGE UP (ref 0.7–1.5)
EOSINOPHIL # BLD AUTO: 0.08 K/UL — SIGNIFICANT CHANGE UP (ref 0–0.7)
EOSINOPHIL NFR BLD AUTO: 0.7 % — SIGNIFICANT CHANGE UP (ref 0–8)
ESTIMATED AVERAGE GLUCOSE: 189 MG/DL — HIGH (ref 68–114)
GLUCOSE BLDC GLUCOMTR-MCNC: 125 MG/DL — HIGH (ref 70–99)
GLUCOSE BLDC GLUCOMTR-MCNC: 142 MG/DL — HIGH (ref 70–99)
GLUCOSE BLDC GLUCOMTR-MCNC: 161 MG/DL — HIGH (ref 70–99)
GLUCOSE BLDC GLUCOMTR-MCNC: 264 MG/DL — HIGH (ref 70–99)
GLUCOSE BLDC GLUCOMTR-MCNC: 33 MG/DL — CRITICAL LOW (ref 70–99)
GLUCOSE BLDC GLUCOMTR-MCNC: 35 MG/DL — CRITICAL LOW (ref 70–99)
GLUCOSE BLDC GLUCOMTR-MCNC: 98 MG/DL — SIGNIFICANT CHANGE UP (ref 70–99)
GLUCOSE SERPL-MCNC: 122 MG/DL — HIGH (ref 70–99)
HCT VFR BLD CALC: 36.2 % — LOW (ref 42–52)
HDLC SERPL-MCNC: 29 MG/DL — LOW
HGB BLD-MCNC: 11.4 G/DL — LOW (ref 14–18)
IMM GRANULOCYTES NFR BLD AUTO: 0.7 % — HIGH (ref 0.1–0.3)
LIPID PNL WITH DIRECT LDL SERPL: 110 MG/DL — HIGH
LYMPHOCYTES # BLD AUTO: 3.23 K/UL — SIGNIFICANT CHANGE UP (ref 1.2–3.4)
LYMPHOCYTES # BLD AUTO: 30.1 % — SIGNIFICANT CHANGE UP (ref 20.5–51.1)
MCHC RBC-ENTMCNC: 27.2 PG — SIGNIFICANT CHANGE UP (ref 27–31)
MCHC RBC-ENTMCNC: 31.5 G/DL — LOW (ref 32–37)
MCV RBC AUTO: 86.4 FL — SIGNIFICANT CHANGE UP (ref 80–94)
MONOCYTES # BLD AUTO: 0.92 K/UL — HIGH (ref 0.1–0.6)
MONOCYTES NFR BLD AUTO: 8.6 % — SIGNIFICANT CHANGE UP (ref 1.7–9.3)
NEUTROPHILS # BLD AUTO: 6.36 K/UL — SIGNIFICANT CHANGE UP (ref 1.4–6.5)
NEUTROPHILS NFR BLD AUTO: 59.3 % — SIGNIFICANT CHANGE UP (ref 42.2–75.2)
NON HDL CHOLESTEROL: 135 MG/DL — HIGH
NRBC # BLD: 0 /100 WBCS — SIGNIFICANT CHANGE UP (ref 0–0)
PLATELET # BLD AUTO: 399 K/UL — SIGNIFICANT CHANGE UP (ref 130–400)
POTASSIUM SERPL-MCNC: 4 MMOL/L — SIGNIFICANT CHANGE UP (ref 3.5–5)
POTASSIUM SERPL-SCNC: 4 MMOL/L — SIGNIFICANT CHANGE UP (ref 3.5–5)
PROT SERPL-MCNC: 6.1 G/DL — SIGNIFICANT CHANGE UP (ref 6–8)
RBC # BLD: 4.19 M/UL — LOW (ref 4.7–6.1)
RBC # FLD: 15.8 % — HIGH (ref 11.5–14.5)
SODIUM SERPL-SCNC: 142 MMOL/L — SIGNIFICANT CHANGE UP (ref 135–146)
TRIGL SERPL-MCNC: 121 MG/DL — SIGNIFICANT CHANGE UP
VIT B12 SERPL-MCNC: 433 PG/ML — SIGNIFICANT CHANGE UP (ref 232–1245)
WBC # BLD: 10.72 K/UL — SIGNIFICANT CHANGE UP (ref 4.8–10.8)
WBC # FLD AUTO: 10.72 K/UL — SIGNIFICANT CHANGE UP (ref 4.8–10.8)

## 2021-12-03 PROCEDURE — 70450 CT HEAD/BRAIN W/O DYE: CPT | Mod: 26

## 2021-12-03 PROCEDURE — 99222 1ST HOSP IP/OBS MODERATE 55: CPT

## 2021-12-03 PROCEDURE — 99232 SBSQ HOSP IP/OBS MODERATE 35: CPT | Mod: GC

## 2021-12-03 RX ORDER — HALOPERIDOL DECANOATE 100 MG/ML
5 INJECTION INTRAMUSCULAR ONCE
Refills: 0 | Status: COMPLETED | OUTPATIENT
Start: 2021-12-03 | End: 2021-12-03

## 2021-12-03 RX ORDER — FLUCONAZOLE 150 MG/1
200 TABLET ORAL DAILY
Refills: 0 | Status: DISCONTINUED | OUTPATIENT
Start: 2021-12-03 | End: 2021-12-09

## 2021-12-03 RX ORDER — DEXMEDETOMIDINE HYDROCHLORIDE IN 0.9% SODIUM CHLORIDE 4 UG/ML
0.05 INJECTION INTRAVENOUS
Qty: 400 | Refills: 0 | Status: DISCONTINUED | OUTPATIENT
Start: 2021-12-03 | End: 2021-12-07

## 2021-12-03 RX ORDER — DEXTROSE 50 % IN WATER 50 %
50 SYRINGE (ML) INTRAVENOUS ONCE
Refills: 0 | Status: COMPLETED | OUTPATIENT
Start: 2021-12-03 | End: 2021-12-03

## 2021-12-03 RX ORDER — CARBAMAZEPINE 200 MG
200 TABLET ORAL
Refills: 0 | Status: DISCONTINUED | OUTPATIENT
Start: 2021-12-03 | End: 2021-12-09

## 2021-12-03 RX ORDER — HALOPERIDOL DECANOATE 100 MG/ML
2 INJECTION INTRAMUSCULAR ONCE
Refills: 0 | Status: DISCONTINUED | OUTPATIENT
Start: 2021-12-03 | End: 2021-12-03

## 2021-12-03 RX ORDER — DEXTROSE 50 % IN WATER 50 %
25 SYRINGE (ML) INTRAVENOUS ONCE
Refills: 0 | Status: COMPLETED | OUTPATIENT
Start: 2021-12-03 | End: 2021-12-03

## 2021-12-03 RX ADMIN — Medication 500000 UNIT(S): at 05:33

## 2021-12-03 RX ADMIN — LIDOCAINE 1 PATCH: 4 CREAM TOPICAL at 19:00

## 2021-12-03 RX ADMIN — Medication 75 MILLIGRAM(S): at 05:33

## 2021-12-03 RX ADMIN — Medication 1 MILLIGRAM(S): at 01:28

## 2021-12-03 RX ADMIN — ALBUTEROL 2 PUFF(S): 90 AEROSOL, METERED ORAL at 08:22

## 2021-12-03 RX ADMIN — MORPHINE SULFATE 4 MILLIGRAM(S): 50 CAPSULE, EXTENDED RELEASE ORAL at 01:52

## 2021-12-03 RX ADMIN — Medication 20 UNIT(S): at 11:53

## 2021-12-03 RX ADMIN — Medication 1 MILLIGRAM(S): at 03:08

## 2021-12-03 RX ADMIN — CHLORHEXIDINE GLUCONATE 1 APPLICATION(S): 213 SOLUTION TOPICAL at 05:34

## 2021-12-03 RX ADMIN — GABAPENTIN 300 MILLIGRAM(S): 400 CAPSULE ORAL at 05:33

## 2021-12-03 RX ADMIN — DEXMEDETOMIDINE HYDROCHLORIDE IN 0.9% SODIUM CHLORIDE 0.9 MICROGRAM(S)/KG/HR: 4 INJECTION INTRAVENOUS at 09:53

## 2021-12-03 RX ADMIN — HEPARIN SODIUM 5000 UNIT(S): 5000 INJECTION INTRAVENOUS; SUBCUTANEOUS at 05:33

## 2021-12-03 RX ADMIN — Medication 500000 UNIT(S): at 00:41

## 2021-12-03 RX ADMIN — HALOPERIDOL DECANOATE 5 MILLIGRAM(S): 100 INJECTION INTRAMUSCULAR at 10:00

## 2021-12-03 RX ADMIN — Medication 20 MILLIGRAM(S): at 05:33

## 2021-12-03 RX ADMIN — MORPHINE SULFATE 4 MILLIGRAM(S): 50 CAPSULE, EXTENDED RELEASE ORAL at 01:22

## 2021-12-03 RX ADMIN — Medication 50 MILLILITER(S): at 18:26

## 2021-12-03 RX ADMIN — Medication 3: at 11:54

## 2021-12-03 RX ADMIN — ALBUTEROL 2 PUFF(S): 90 AEROSOL, METERED ORAL at 19:10

## 2021-12-03 RX ADMIN — HEPARIN SODIUM 5000 UNIT(S): 5000 INJECTION INTRAVENOUS; SUBCUTANEOUS at 18:05

## 2021-12-03 RX ADMIN — PANTOPRAZOLE SODIUM 40 MILLIGRAM(S): 20 TABLET, DELAYED RELEASE ORAL at 06:08

## 2021-12-03 RX ADMIN — LIDOCAINE 1 PATCH: 4 CREAM TOPICAL at 15:07

## 2021-12-03 NOTE — PROGRESS NOTE ADULT - SUBJECTIVE AND OBJECTIVE BOX
DEL ALVARADO     76y     Male    MRN-[id number]                                                           CC:Patient is a 76y old  Male who presents with a chief complaint of Chest pain (03 Dec 2021 11:23)      HPI:  Patient is a 76 year old Male with a past medical history of Asthma, COPD, DM, HLD, Prostate CA S/P TURP, Shingles presented to the ER with a chief complaint of Left sided Chest pain. Patient had Shingles earlier this month. Patient describes Constant, stabbing left sided chest pain that radiates to his back, 9/10, tried percocet / gabapentin / Pregabalin without much improvement. As per wife, last night patient had a fever of 102.5 and wife tried white vinegar with 2 Tylenol tablets, but patient was in excruciating pain. Patient admits to fever, chills, chest pain, constipation, rash on tongue and palpitations (only when patient takes Percocet). Patient denies nausea, vomiting, visual changes, auditory changes, shortness of breath, abdominal pain, diarrhea, dysuria.  (29 Nov 2021 17:38)    Neuro: patient now with likely acute delirium since night. Now requiring chemical sedation with haldol and precedex. CTH unchanged.    ROS:  Constitutional, Neurological, Psychiatric, Eyes, ENT, Cardiovascular, Respiratory, Gastrointestinal, Genitourinary, Musculoskeletal, Integumentary, Endocrine and Heme/Lymph are otherwise negative. Except for    Social History: No smoking, No drinking, No drug use    FAMILY HISTORY:  Family history of asthma    Family history of diabetes mellitus (DM)        HEALTH ISSUES - PROBLEM Dx:  Stroke    Chest pain            Vital Signs Last 24 Hrs  T(C): 35.6 (03 Dec 2021 11:00), Max: 36.6 (02 Dec 2021 14:29)  T(F): 96 (03 Dec 2021 11:00), Max: 97.8 (02 Dec 2021 14:29)  HR: 96 (03 Dec 2021 06:00) (90 - 117)  BP: 171/79 (03 Dec 2021 06:00) (124/74 - 171/79)  BP(mean): 114 (03 Dec 2021 06:00) (90 - 114)  RR: 22 (03 Dec 2021 11:00) (17 - 23)  SpO2: 97% (03 Dec 2021 09:00) (96% - 98%)    Neuro:  MENTAL STATUS: alert, agitated, disoriented, speaking [place] but not answering questions or following commands, combative  LANG/SPEECH: unable to assess  CRANIAL NERVES: face symmetric, rest of CN exam deferred due to agitation  MOTOR: spontaneously moves all extremities  REFLEXES: unable to assess  SENSORY: unable to assess  COORD: unable to assess  GAIT: unable to assess      Allergies    No Known Allergies       Home Medications:  atorvastatin 10 mg oral tablet: 1 tab(s) orally once a day (29 Nov 2021 18:22)  Breo Ellipta 200 mcg-25 mcg/inh inhalation powder:  (29 Nov 2021 18:22)  Incruse Ellipta 62.5 mcg/inh inhalation powder: 1 puff(s) inhaled every 24 hours (29 Nov 2021 18:22)  insulin lispro 100 units/mL injectable solution: 20 unit(s) injectable 3 times a day (29 Nov 2021 18:22)  Levemir 100 units/mL subcutaneous solution: 40 unit(s) subcutaneous once a day in the AM (29 Nov 2021 18:22)  Percocet 5/325 oral tablet: 1 tab(s) orally every 6 hours, As Needed (29 Nov 2021 18:22)  predniSONE 20 mg oral tablet: 1 tab(s) orally once a day (29 Nov 2021 18:22)  pregabalin 75 mg oral capsule: 1 cap(s) orally 3 times a day (29 Nov 2021 18:22)  Symbicort 160 mcg-4.5 mcg/inh inhalation aerosol: 2 puff(s) inhaled 2 times a day (29 Nov 2021 18:22)      MEDICATIONS  (STANDING):  ALBUTerol    90 MICROgram(s) HFA Inhaler 2 Puff(s) Inhalation every 6 hours  aspirin  chewable 81 milliGRAM(s) Enteral Tube daily  atorvastatin 80 milliGRAM(s) Oral at bedtime  budesonide 160 MICROgram(s)/formoterol 4.5 MICROgram(s) Inhaler 2 Puff(s) Inhalation two times a day  carBAMazepine 200 milliGRAM(s) Oral two times a day  chlorhexidine 4% Liquid 1 Application(s) Topical <User Schedule>  clopidogrel Tablet 75 milliGRAM(s) Oral daily  dexMEDEtomidine Infusion 0.05 MICROgram(s)/kG/Hr (0.9 mL/Hr) IV Continuous <Continuous>  dextrose 40% Gel 15 Gram(s) Oral once  dextrose 5%. 1000 milliLiter(s) (50 mL/Hr) IV Continuous <Continuous>  dextrose 5%. 1000 milliLiter(s) (100 mL/Hr) IV Continuous <Continuous>  dextrose 50% Injectable 25 Gram(s) IV Push once  dextrose 50% Injectable 12.5 Gram(s) IV Push once  dextrose 50% Injectable 25 Gram(s) IV Push once  fluconAZOLE   Tablet 200 milliGRAM(s) Oral daily  glucagon  Injectable 1 milliGRAM(s) IntraMuscular once  heparin   Injectable 5000 Unit(s) SubCutaneous every 12 hours  influenza  Vaccine (HIGH DOSE) 0.7 milliLiter(s) IntraMuscular once  insulin glargine Injectable (LANTUS) 40 Unit(s) SubCutaneous at bedtime  insulin lispro (ADMELOG) corrective regimen sliding scale   SubCutaneous three times a day before meals  insulin lispro (ADMELOG) corrective regimen sliding scale   SubCutaneous at bedtime  insulin lispro Injectable (ADMELOG) 20 Unit(s) SubCutaneous three times a day before meals  lidocaine   4% Patch 1 Patch Transdermal every 24 hours  nystatin    Suspension 325766 Unit(s) Oral every 6 hours  pantoprazole    Tablet 40 milliGRAM(s) Oral before breakfast  predniSONE   Tablet 20 milliGRAM(s) Oral daily  tiotropium 18 MICROgram(s) Capsule 1 Capsule(s) Inhalation daily    MEDICATIONS  (PRN):  aluminum hydroxide/magnesium hydroxide/simethicone Suspension 30 milliLiter(s) Oral every 4 hours PRN Dyspepsia  melatonin 3 milliGRAM(s) Oral at bedtime PRN Insomnia  ondansetron Injectable 4 milliGRAM(s) IV Push every 8 hours PRN Nausea and/or Vomiting  polyethylene glycol 3350 17 Gram(s) Oral daily PRN Constipation  senna 2 Tablet(s) Oral at bedtime PRN Constipation      LABS:                        11.4   10.72 )-----------( 399      ( 03 Dec 2021 05:24 )             36.2     12-03    142  |  104  |  16  ----------------------------<  122<H>  4.0   |  25  |  1.0    Ca    8.9      03 Dec 2021 05:24    TPro  6.1  /  Alb  3.4<L>  /  TBili  0.3  /  DBili  x   /  AST  19  /  ALT  18  /  AlkPhos  58  12-03    < from: MR Head w/wo IV Cont (12.02.21 @ 13:24) >    IMPRESSION:    Punctate acute infarct in the high left parietal lobe.    Redemonstrated centrally calcified meningioma along the high right parietal convexity. No significant peritumoral edema.    Mild chronic microvascular ischemic changes.    < end of copied text >  < from: CT Angio Head w/ IV Cont (12.02.21 @ 19:36) >  IMPRESSION:    No large vessel occlusion, aneurysm, vascular malformation.    moderate atherosclerotic stenosis of the bilateral clinoid/supraclinoid ICAs.    < end of copied text >  < from: CT Head No Cont (12.03.21 @ 04:27) >    Impression:      Markedly motion degraded exam without definite intracranial hemorrhage or midline shift.    < end of copied text >

## 2021-12-03 NOTE — CONSULT NOTE ADULT - SUBJECTIVE AND OBJECTIVE BOX
Patient is a 76y old  Male who presents with a chief complaint of Chest pain (02 Dec 2021 21:33)      HPI:  Patient is a 76 year old Male with a past medical history of Asthma, COPD, DM, HLD, Prostate CA S/P TURP, Shingles presented to the ER with a chief complaint of Left sided Chest pain. Patient had Shingles earlier this month. Patient describes Constant, stabbing left sided chest pain that radiates to his back, 9/10, tried percocet / gabapentin / Pregabalin without much improvement. As per wife, last night patient had a fever of 102.5 and wife tried white vinegar with 2 Tylenol tablets, but patient was in excruciating pain. Patient admits to fever, chills, chest pain, constipation, rash on tongue and palpitations (only when patient takes Percocet). Patient denies nausea, vomiting, visual changes, auditory changes, shortness of breath, abdominal pain, diarrhea, dysuria.  (29 Nov 2021 17:38)  s/p MRI showed left parietal stroke     PAST MEDICAL & SURGICAL HISTORY:  Diabetes  20 yrs    Prostate cancer  1999 s/p sx    Asthma  20 yr    COPD (chronic obstructive pulmonary disease)  20 yrs no 02 rx    Dyslipidemia    Shingles    S/P TURP  1999    History of surgery  back surgery      Allergies    No Known Allergies    Intolerances      Family history : no cardiovscular family history   Home Medications:  atorvastatin 10 mg oral tablet: 1 tab(s) orally once a day (29 Nov 2021 18:22)  Breo Ellipta 200 mcg-25 mcg/inh inhalation powder:  (29 Nov 2021 18:22)  Incruse Ellipta 62.5 mcg/inh inhalation powder: 1 puff(s) inhaled every 24 hours (29 Nov 2021 18:22)  insulin lispro 100 units/mL injectable solution: 20 unit(s) injectable 3 times a day (29 Nov 2021 18:22)  Levemir 100 units/mL subcutaneous solution: 40 unit(s) subcutaneous once a day in the AM (29 Nov 2021 18:22)  Percocet 5/325 oral tablet: 1 tab(s) orally every 6 hours, As Needed (29 Nov 2021 18:22)  predniSONE 20 mg oral tablet: 1 tab(s) orally once a day (29 Nov 2021 18:22)  pregabalin 75 mg oral capsule: 1 cap(s) orally 3 times a day (29 Nov 2021 18:22)  Symbicort 160 mcg-4.5 mcg/inh inhalation aerosol: 2 puff(s) inhaled 2 times a day (29 Nov 2021 18:22)    Occupation:  Alochol: Denied  Smoking: Denied  Drug Use: Denied  Marital Status:         ROS: as in HPI; All other systems reviewed are negative    ICU Vital Signs Last 24 Hrs  T(C): 36.4 (03 Dec 2021 07:00), Max: 36.6 (02 Dec 2021 14:29)  T(F): 97.6 (03 Dec 2021 07:00), Max: 97.8 (02 Dec 2021 14:29)  HR: 96 (03 Dec 2021 06:00) (90 - 117)  BP: 171/79 (03 Dec 2021 06:00) (124/74 - 171/79)  BP(mean): 114 (03 Dec 2021 06:00) (90 - 114)  ABP: --  ABP(mean): --  RR: 20 (03 Dec 2021 07:00) (17 - 23)  SpO2: 97% (03 Dec 2021 06:00) (96% - 98%)        Physical Examination:    General: ill looking     HEENT: Pupils equal, reactive to light.  Symmetric.    PULM: Clear to auscultation bilaterally, no significant sputum production    CVS: Regular rate and rhythm, no murmurs, rubs, or gallops    ABD: Soft, nondistended, nontender, normoactive bowel sounds, no masses    EXT: No edema, nontender, no clubbing     SKIN: Warm and well perfused, no rashes noted.    Neurology :  facial droop right lower ext weakness     Musculoskeletal : move all extremity     Lymphatic system: no Palpable node               I&O's Detail    02 Dec 2021 07:01  -  03 Dec 2021 07:00  --------------------------------------------------------  IN:  Total IN: 0 mL    OUT:    Voided (mL): 250 mL  Total OUT: 250 mL    Total NET: -250 mL            LABS:                        11.4   10.72 )-----------( 399      ( 03 Dec 2021 05:24 )             36.2     02 Dec 2021 06:35    138    |  102    |  14     ----------------------------<  151    4.0     |  25     |  0.9      Ca    8.6        02 Dec 2021 06:35            CAPILLARY BLOOD GLUCOSE      POCT Blood Glucose.: 142 mg/dL (03 Dec 2021 07:08)        Culture        MEDICATIONS  (STANDING):  ALBUTerol    90 MICROgram(s) HFA Inhaler 2 Puff(s) Inhalation every 6 hours  amitriptyline 10 milliGRAM(s) Oral at bedtime  aspirin  chewable 81 milliGRAM(s) Enteral Tube daily  atorvastatin 80 milliGRAM(s) Oral at bedtime  budesonide 160 MICROgram(s)/formoterol 4.5 MICROgram(s) Inhaler 2 Puff(s) Inhalation two times a day  chlorhexidine 4% Liquid 1 Application(s) Topical <User Schedule>  clopidogrel Tablet 75 milliGRAM(s) Oral daily  dextrose 40% Gel 15 Gram(s) Oral once  dextrose 5%. 1000 milliLiter(s) (50 mL/Hr) IV Continuous <Continuous>  dextrose 5%. 1000 milliLiter(s) (100 mL/Hr) IV Continuous <Continuous>  dextrose 50% Injectable 25 Gram(s) IV Push once  dextrose 50% Injectable 12.5 Gram(s) IV Push once  dextrose 50% Injectable 25 Gram(s) IV Push once  fluconAZOLE   Tablet 200 milliGRAM(s) Oral daily  gabapentin 300 milliGRAM(s) Oral three times a day  glucagon  Injectable 1 milliGRAM(s) IntraMuscular once  heparin   Injectable 5000 Unit(s) SubCutaneous every 12 hours  influenza  Vaccine (HIGH DOSE) 0.7 milliLiter(s) IntraMuscular once  insulin glargine Injectable (LANTUS) 40 Unit(s) SubCutaneous at bedtime  insulin lispro (ADMELOG) corrective regimen sliding scale   SubCutaneous three times a day before meals  insulin lispro (ADMELOG) corrective regimen sliding scale   SubCutaneous at bedtime  insulin lispro Injectable (ADMELOG) 20 Unit(s) SubCutaneous three times a day before meals  lidocaine   4% Patch 1 Patch Transdermal every 24 hours  nystatin    Suspension 768693 Unit(s) Oral every 6 hours  pantoprazole    Tablet 40 milliGRAM(s) Oral before breakfast  predniSONE   Tablet 20 milliGRAM(s) Oral daily  pregabalin 75 milliGRAM(s) Oral three times a day  tiotropium 18 MICROgram(s) Capsule 1 Capsule(s) Inhalation daily    MEDICATIONS  (PRN):  aluminum hydroxide/magnesium hydroxide/simethicone Suspension 30 milliLiter(s) Oral every 4 hours PRN Dyspepsia  melatonin 3 milliGRAM(s) Oral at bedtime PRN Insomnia  ondansetron Injectable 4 milliGRAM(s) IV Push every 8 hours PRN Nausea and/or Vomiting  oxycodone    5 mG/acetaminophen 325 mG 1 Tablet(s) Oral every 6 hours PRN Moderate Pain (4 - 6)  polyethylene glycol 3350 17 Gram(s) Oral daily PRN Constipation  senna 2 Tablet(s) Oral at bedtime PRN Constipation        RADIOLOGY: ***   < from: MR Head w/wo IV Cont (12.02.21 @ 13:24) >  Punctate acute infarct in the high left parietal lobe.    Redemonstrated centrally calcified meningioma along the high right parietal convexity. No significant peritumoral edema.    Mild chronic microvascular ischemic changes.    < end of copied text >    CXR:  TLC:  OG:  ET tube:        CAM ICU:  ECHO:       Patient is a 76y old  Male who presents with a chief complaint of Chest pain (02 Dec 2021 21:33)      HPI:  Patient is a 76 year old Male with a past medical history of Asthma, COPD, DM, HLD, Prostate CA S/P TURP, Shingles presented to the ER with a chief complaint of Left sided Chest pain. Patient had Shingles earlier this month. Patient describes Constant, stabbing left sided chest pain that radiates to his back, 9/10, tried percocet / gabapentin / Pregabalin without much improvement. As per wife, last night patient had a fever of 102.5 and wife tried white vinegar with 2 Tylenol tablets, but patient was in excruciating pain. Patient admits to fever, chills, chest pain, constipation, rash on tongue and palpitations (only when patient takes Percocet). Patient denies nausea, vomiting, visual changes, auditory changes, shortness of breath, abdominal pain, diarrhea, dysuria.  (29 Nov 2021 17:38)  s/p MRI showed left parietal stroke     PAST MEDICAL & SURGICAL HISTORY:  Diabetes  20 yrs    Prostate cancer  1999 s/p sx    Asthma  20 yr    COPD (chronic obstructive pulmonary disease)  20 yrs no 02 rx    Dyslipidemia    Shingles    S/P TURP  1999    History of surgery  back surgery      Allergies    No Known Allergies    Intolerances      Family history : no cardiovscular family history   Home Medications:  atorvastatin 10 mg oral tablet: 1 tab(s) orally once a day (29 Nov 2021 18:22)  Breo Ellipta 200 mcg-25 mcg/inh inhalation powder:  (29 Nov 2021 18:22)  Incruse Ellipta 62.5 mcg/inh inhalation powder: 1 puff(s) inhaled every 24 hours (29 Nov 2021 18:22)  insulin lispro 100 units/mL injectable solution: 20 unit(s) injectable 3 times a day (29 Nov 2021 18:22)  Levemir 100 units/mL subcutaneous solution: 40 unit(s) subcutaneous once a day in the AM (29 Nov 2021 18:22)  Percocet 5/325 oral tablet: 1 tab(s) orally every 6 hours, As Needed (29 Nov 2021 18:22)  predniSONE 20 mg oral tablet: 1 tab(s) orally once a day (29 Nov 2021 18:22)  pregabalin 75 mg oral capsule: 1 cap(s) orally 3 times a day (29 Nov 2021 18:22)  Symbicort 160 mcg-4.5 mcg/inh inhalation aerosol: 2 puff(s) inhaled 2 times a day (29 Nov 2021 18:22)    Occupation:  Alochol: Denied  Smoking: Denied  Drug Use: Denied  Marital Status:         ROS: as in HPI; All other systems reviewed are negative    ICU Vital Signs Last 24 Hrs  T(C): 36.4 (03 Dec 2021 07:00), Max: 36.6 (02 Dec 2021 14:29)  T(F): 97.6 (03 Dec 2021 07:00), Max: 97.8 (02 Dec 2021 14:29)  HR: 96 (03 Dec 2021 06:00) (90 - 117)  BP: 171/79 (03 Dec 2021 06:00) (124/74 - 171/79)  BP(mean): 114 (03 Dec 2021 06:00) (90 - 114)  ABP: --  ABP(mean): --  RR: 20 (03 Dec 2021 07:00) (17 - 23)  SpO2: 97% (03 Dec 2021 06:00) (96% - 98%)        Physical Examination:    General: ill looking alter mental status     HEENT: Pupils equal, reactive to light.  Symmetric.    PULM: Clear to auscultation bilaterally, no significant sputum production    CVS: Regular rate and rhythm, no murmurs, rubs, or gallops    ABD: Soft, nondistended, nontender, normoactive bowel sounds, no masses    EXT: No edema, nontender, no clubbing     SKIN: Warm and well perfused, no rashes noted.    Neurology :  facial droop right lower ext weakness     Musculoskeletal : move all extremity     Lymphatic system: no Palpable node               I&O's Detail    02 Dec 2021 07:01  -  03 Dec 2021 07:00  --------------------------------------------------------  IN:  Total IN: 0 mL    OUT:    Voided (mL): 250 mL  Total OUT: 250 mL    Total NET: -250 mL            LABS:                        11.4   10.72 )-----------( 399      ( 03 Dec 2021 05:24 )             36.2     02 Dec 2021 06:35    138    |  102    |  14     ----------------------------<  151    4.0     |  25     |  0.9      Ca    8.6        02 Dec 2021 06:35            CAPILLARY BLOOD GLUCOSE      POCT Blood Glucose.: 142 mg/dL (03 Dec 2021 07:08)        Culture        MEDICATIONS  (STANDING):  ALBUTerol    90 MICROgram(s) HFA Inhaler 2 Puff(s) Inhalation every 6 hours  amitriptyline 10 milliGRAM(s) Oral at bedtime  aspirin  chewable 81 milliGRAM(s) Enteral Tube daily  atorvastatin 80 milliGRAM(s) Oral at bedtime  budesonide 160 MICROgram(s)/formoterol 4.5 MICROgram(s) Inhaler 2 Puff(s) Inhalation two times a day  chlorhexidine 4% Liquid 1 Application(s) Topical <User Schedule>  clopidogrel Tablet 75 milliGRAM(s) Oral daily  dextrose 40% Gel 15 Gram(s) Oral once  dextrose 5%. 1000 milliLiter(s) (50 mL/Hr) IV Continuous <Continuous>  dextrose 5%. 1000 milliLiter(s) (100 mL/Hr) IV Continuous <Continuous>  dextrose 50% Injectable 25 Gram(s) IV Push once  dextrose 50% Injectable 12.5 Gram(s) IV Push once  dextrose 50% Injectable 25 Gram(s) IV Push once  fluconAZOLE   Tablet 200 milliGRAM(s) Oral daily  gabapentin 300 milliGRAM(s) Oral three times a day  glucagon  Injectable 1 milliGRAM(s) IntraMuscular once  heparin   Injectable 5000 Unit(s) SubCutaneous every 12 hours  influenza  Vaccine (HIGH DOSE) 0.7 milliLiter(s) IntraMuscular once  insulin glargine Injectable (LANTUS) 40 Unit(s) SubCutaneous at bedtime  insulin lispro (ADMELOG) corrective regimen sliding scale   SubCutaneous three times a day before meals  insulin lispro (ADMELOG) corrective regimen sliding scale   SubCutaneous at bedtime  insulin lispro Injectable (ADMELOG) 20 Unit(s) SubCutaneous three times a day before meals  lidocaine   4% Patch 1 Patch Transdermal every 24 hours  nystatin    Suspension 492702 Unit(s) Oral every 6 hours  pantoprazole    Tablet 40 milliGRAM(s) Oral before breakfast  predniSONE   Tablet 20 milliGRAM(s) Oral daily  pregabalin 75 milliGRAM(s) Oral three times a day  tiotropium 18 MICROgram(s) Capsule 1 Capsule(s) Inhalation daily    MEDICATIONS  (PRN):  aluminum hydroxide/magnesium hydroxide/simethicone Suspension 30 milliLiter(s) Oral every 4 hours PRN Dyspepsia  melatonin 3 milliGRAM(s) Oral at bedtime PRN Insomnia  ondansetron Injectable 4 milliGRAM(s) IV Push every 8 hours PRN Nausea and/or Vomiting  oxycodone    5 mG/acetaminophen 325 mG 1 Tablet(s) Oral every 6 hours PRN Moderate Pain (4 - 6)  polyethylene glycol 3350 17 Gram(s) Oral daily PRN Constipation  senna 2 Tablet(s) Oral at bedtime PRN Constipation        RADIOLOGY: ***   < from: MR Head w/wo IV Cont (12.02.21 @ 13:24) >  Punctate acute infarct in the high left parietal lobe.    Redemonstrated centrally calcified meningioma along the high right parietal convexity. No significant peritumoral edema.    Mild chronic microvascular ischemic changes.    < end of copied text >    CXR:  TLC:  OG:  ET tube:        CAM ICU:  ECHO:

## 2021-12-03 NOTE — CONSULT NOTE ADULT - ASSESSMENT
IMPRESSION:  CVA   DM       PLAN:    CNS: neuro check Q 1 hrs   follow neurology recommendation   neuro consult now   ct scan stat if any changes in neurological exam     HEENT: oral care     PULMONARY: elevated head 45 degree  keep pox > 92 %     CARDIOVASCULAR: echo   cardiology consult chest pain upon admisison   asa, statin     GI: GI prophylaxis.  speech and swallow     RENAL: follow lytes is andos     INFECTIOUS DISEASE: follow cx   procal     HEMATOLOGICAL:  DVT prophylaxis.    ENDOCRINE:  Follow up FS. on lantus     icu monitoring as per neurology   follow neurology recommendation        CRITICAL CARE TIME SPENT: ***     IMPRESSION:  CVA   DM       PLAN:    CNS: neuro check Q 1 hrs   follow neurology recommendation   neuro consult now   ct scan stat if any changes in neurological exam     HEENT: oral care     PULMONARY: elevated head 45 degree  keep pox > 92 %   consult frederick Oates patient pulmonologist   CARDIOVASCULAR: echo   cardiology consult chest pain upon admisison   asa, statin     GI: GI prophylaxis.  speech and swallow     RENAL: follow lytes is andos     INFECTIOUS DISEASE: follow cx   procal     HEMATOLOGICAL:  DVT prophylaxis.    ENDOCRINE:  Follow up FS. on lantus     icu monitoring as per neurology   follow neurology recommendation  pulmonary consult dr Oates         CRITICAL CARE TIME SPENT: ***     IMPRESSION:  CVA ,   encephalitis ??  DM       PLAN:    CNS: neuro check Q 1 hrs   follow neurology recommendation   neuro consult now follow if need LP to r/o encephalitis had zoster before   ct scan stat if any changes in neurological exam     HEENT: oral care     PULMONARY: elevated head 45 degree  keep pox > 92 %   consult frederick Oates patient pulmonologist   CARDIOVASCULAR: echo   cardiology consult chest pain upon admisison   asa, statin     GI: GI prophylaxis.  speech and swallow     RENAL: follow lytes is andos     INFECTIOUS DISEASE: follow cx   procal     HEMATOLOGICAL:  DVT prophylaxis.    ENDOCRINE:  Follow up FS. on lantus     icu monitoring as per neurology   follow neurology recommendation  pulmonary consult dr Oates         CRITICAL CARE TIME SPENT: ***

## 2021-12-03 NOTE — PROGRESS NOTE ADULT - SUBJECTIVE AND OBJECTIVE BOX
DEL ALVARADO  76y, Male  Allergy: No Known Allergies      LOS  4d    CHIEF COMPLAINT: Chest pain (03 Dec 2021 11:23)      INTERVAL EVENTS/HPI  - No acute events overnight  - T(F): , Max: 97.8 (12-02-21 @ 14:29)  - remains agitated   - WBC Count: 10.72 (12-03-21 @ 05:24)  WBC Count: 9.14 (12-02-21 @ 06:35)     - Creatinine, Serum: 1.0 (12-03-21 @ 05:24)  Creatinine, Serum: 0.9 (12-02-21 @ 06:35)       ROS  unable to obtain history secondary to patient's mental status and/or sedation      VITALS:  T(F): 96, Max: 97.8 (12-02-21 @ 14:29)  HR: 96  BP: 171/79  RR: 22Vital Signs Last 24 Hrs  T(C): 35.6 (03 Dec 2021 11:00), Max: 36.6 (02 Dec 2021 14:29)  T(F): 96 (03 Dec 2021 11:00), Max: 97.8 (02 Dec 2021 14:29)  HR: 96 (03 Dec 2021 06:00) (90 - 117)  BP: 171/79 (03 Dec 2021 06:00) (124/74 - 171/79)  BP(mean): 114 (03 Dec 2021 06:00) (90 - 114)  RR: 22 (03 Dec 2021 11:00) (17 - 23)  SpO2: 97% (03 Dec 2021 09:00) (96% - 98%)    PHYSICAL EXAM:  Gen: NAD, resting in bed  HEENT: Normocephalic, atraumatic  Neck: supple, no lymphadenopathy  CV: Regular rate & regular rhythm  Lungs: decreased BS at bases, no fremitus  Abdomen: Soft, BS present  Ext: Warm, well perfused  Neuro: non focal, awake  Skin: no rash, no erythema  Lines: no phlebitis    FH: Non-contributory  Social Hx: Non-contributory    TESTS & MEASUREMENTS:                        11.4   10.72 )-----------( 399      ( 03 Dec 2021 05:24 )             36.2     12-03    142  |  104  |  16  ----------------------------<  122<H>  4.0   |  25  |  1.0    Ca    8.9      03 Dec 2021 05:24    TPro  6.1  /  Alb  3.4<L>  /  TBili  0.3  /  DBili  x   /  AST  19  /  ALT  18  /  AlkPhos  58  12-03    eGFR if Non African American: 72 mL/min/1.73M2 (12-03-21 @ 05:24)  eGFR if : 84 mL/min/1.73M2 (12-03-21 @ 05:24)    LIVER FUNCTIONS - ( 03 Dec 2021 05:24 )  Alb: 3.4 g/dL / Pro: 6.1 g/dL / ALK PHOS: 58 U/L / ALT: 18 U/L / AST: 19 U/L / GGT: x                     INFECTIOUS DISEASES TESTING  COVID-19 PCR: NotDetec (11-29-21 @ 14:46)  Procalcitonin, Serum: 0.08 (11-08-21 @ 06:35)  COVID-19 PCR: NotDetec (11-07-21 @ 14:30)  Legionella Antigen, Urine: Negative (05-31-21 @ 08:43)  MRSA PCR Result.: Negative (05-31-21 @ 07:20)  Procalcitonin, Serum: 0.13 (05-28-21 @ 15:35)  Procalcitonin, Serum: 0.04 (03-17-21 @ 03:10)  Rapid RVP Result: Detected (03-16-21 @ 22:54)      INFLAMMATORY MARKERS      RADIOLOGY & ADDITIONAL TESTS:  I have personally reviewed the last available Chest xray  CXR      CT      CARDIOLOGY TESTING  12 Lead ECG:   Ventricular Rate 108 BPM    Atrial Rate 108 BPM    P-R Interval 198 ms    QRS Duration 82 ms    Q-T Interval 336 ms    QTC Calculation(Bazett) 450 ms    P Axis 63 degrees    R Axis 21 degrees    T Axis 41 degrees    Diagnosis Line *** Poor data quality, interpretation may be adversely affected  Sinus tachycardia  Otherwise normal ECG    Confirmed by ELINA MARCOS MD ([id number]) on 11/30/2021 6:48:25 PM (11-30-21 @ 11:01)  12 Lead ECG:   Ventricular Rate 99 BPM    Atrial Rate 99 BPM    P-R Interval 196 ms    QRS Duration 70 ms    Q-T Interval 338 ms    QTC Calculation(Bazett) 433 ms    P Axis 56 degrees    R Axis 16 degrees    T Axis 39 degrees    Diagnosis Line Normal sinus rhythm  Normal ECG    Confirmed by ELINA MARCOS MD [id number]) on 11/30/2021 11:47:14 AM (11-29-21 @ 16:05)      MEDICATIONS  ALBUTerol    90 MICROgram(s) HFA Inhaler 2 Inhalation every 6 hours  aspirin  chewable 81 Enteral Tube daily  atorvastatin 80 Oral at bedtime  budesonide 160 MICROgram(s)/formoterol 4.5 MICROgram(s) Inhaler 2 Inhalation two times a day  carBAMazepine 200 Oral two times a day  chlorhexidine 4% Liquid 1 Topical <User Schedule>  clopidogrel Tablet 75 Oral daily  dexMEDEtomidine Infusion 0.05 IV Continuous <Continuous>  dextrose 40% Gel 15 Oral once  dextrose 5%. 1000 IV Continuous <Continuous>  dextrose 5%. 1000 IV Continuous <Continuous>  dextrose 50% Injectable 25 IV Push once  dextrose 50% Injectable 12.5 IV Push once  dextrose 50% Injectable 25 IV Push once  fluconAZOLE   Tablet 200 Oral daily  glucagon  Injectable 1 IntraMuscular once  heparin   Injectable 5000 SubCutaneous every 12 hours  influenza  Vaccine (HIGH DOSE) 0.7 IntraMuscular once  insulin glargine Injectable (LANTUS) 40 SubCutaneous at bedtime  insulin lispro (ADMELOG) corrective regimen sliding scale  SubCutaneous three times a day before meals  lidocaine   4% Patch 1 Transdermal every 24 hours  nystatin    Suspension 597302 Oral every 6 hours  pantoprazole    Tablet 40 Oral before breakfast  predniSONE   Tablet 20 Oral daily  tiotropium 18 MICROgram(s) Capsule 1 Inhalation daily      WEIGHT  Weight (kg): 72.2 (11-30-21 @ 18:07)  Creatinine, Serum: 1.0 mg/dL (12-03-21 @ 05:24)      ANTIBIOTICS:  fluconAZOLE   Tablet 200 milliGRAM(s) Oral daily  nystatin    Suspension 034618 Unit(s) Oral every 6 hours      All available historical records have been reviewed

## 2021-12-03 NOTE — PROGRESS NOTE ADULT - ATTENDING COMMENTS
ICU Vital Signs Last 24 Hrs  T(C): 35.6 (03 Dec 2021 11:00), Max: 36.6 (02 Dec 2021 14:29)  T(F): 96 (03 Dec 2021 11:00), Max: 97.8 (02 Dec 2021 14:29)  HR: 96 (03 Dec 2021 06:00) (90 - 117)  BP: 171/79 (03 Dec 2021 06:00) (124/74 - 171/79)  BP(mean): 114 (03 Dec 2021 06:00) (90 - 114)  ABP: --  ABP(mean): --  RR: 22 (03 Dec 2021 11:00) (17 - 23)  SpO2: 97% (03 Dec 2021 09:00) (96% - 98%)    agitated, confused even in [place] per family  aggressive towards staff  [id number]  ctabl no wheeze   sofntnt+bs  nocce  moving all extremities     #metabolic encephalopathy - possible medication side effect  will dc elavil, gabapentin, lyrica, percocet  carbemazipine rec by neuro  started on precedex drip  watch for desat - karri has RUTH - NIV if able to tolerate  if doesn't improve may need LP - recent zoster     #acute CVA  < from: MR Head w/wo IV Cont (12.02.21 @ 13:24) >    Punctate acute infarct in the high left parietal lobe.    Redemonstrated centrally calcified meningioma along the high right parietal convexity. No significant peritumoral edema.    Mild chronic microvascular ischemic changes.    < end of copied text >    unlikely to be cause of all of mental status change  cont antiplt/statin    #zoster - post herpetic neuralgia  - carbempazipine rec by neuro     discussed with icu team and family - wife and daughter   all qs answered
Agitated delerium secondary to polypharmacy.  Recommend discontinuing amitriptyline, gabapentin, and pregabalin and instead give low dose carbamazepine 200 mg bid.  Questionable small punctate focus of restricted diffusion in left parietal region of uncertain clinical significance.  Routine stroke risk factor reduction is warranted.

## 2021-12-03 NOTE — PROGRESS NOTE ADULT - ASSESSMENT
IMPRESSION:  left parietal punctuate ischemic infarct     PLAN:    CNS: neuro check Q 1 hrs , c/w aspirin and plavix for 14 days, then stop the plavix. follow neurology recommendations, f/u speech and swallow, PT assessment    HEENT: oral care     PULMONARY: head elevation 45 degrees    CARDIOVASCULAR: echo to r/o any embolus or thrombi, aspirin + plavix and statins     GI: GI prophylaxis.  speech and swallow eval    RENAL: f/u input and output     INFECTIOUS DISEASE: follow syphilis screen     HEMATOLOGICAL:  DVT prophylaxis    ENDOCRINE:  Follow up FS. keep if < 180     ICU monitoring as per neurology   follow neurology recommendation  PT/rehab   full code

## 2021-12-03 NOTE — CONSULT NOTE ADULT - SUBJECTIVE AND OBJECTIVE BOX
Patient is a 75 yo male who presents with a chief complaint of chest pain (03 Dec 2021 13:49)    HPI:  Patient is a 76 year old male presenting to ED on Nov 29 with an initial compliant of chest pain after recently being treated for Shingles.  Pain was described as constant, stabbing left sided chest pain that radiates to his back, 9/10.  According to the patient's wife he also had a fever to 102.5 the night prior to his admission.  Home mediacl therapy included percocet / gabapentin / Pregabalin without much improvement.  Later during his admission, his complaints were recognized to include vision disturbances and alteration in mentation and oral thrush.  He was also recognized to have elevated troponin levels.  A neurology evaluation recommended CNS imaging which revealed a possible new acute infract.  The patient was transferred to the ICU for neurology monitoring.  His altered and combative MS required him being placed on a Precedex drip.  Pulmonary consultation requested.  He is presently sedated on Precedex on room air with a saturation of 95%.  He is snoring, but does not appear to be having apneic events    Past medical history notable for asthma, COPD, DM, HLD, Prostate CA S/P TURP   Patient denies nausea, vomiting, visual changes, auditory changes, shortness of breath, abdominal pain, diarrhea, dysuria.  (29 Nov 2021 17:38)    PAST MEDICAL & SURGICAL HISTORY:  Diabetes  Prostate cancer 1999 s/p sx  Asthma   COPD   Dyslipidemia  Shingles  S/P TURP 1999  History of surgery back surgery    Smoking History:  Ex smoker    Review of Systems:  Chest pain, fever, thrush, alteration in MS  Denies nausea, vomiting, visual changes, auditory changes, shortness of breath, abdominal pain, diarrhea, dysuria    Allergies:  No Known Allergies    MEDICATIONS  (STANDING):  ·	ALBUTerol    90 MICROgram(s) HFA Inhaler 2 Puff(s) Inhalation every 6 hours  aspirin  chewable 81 milliGRAM(s) Enteral Tube daily  atorvastatin 80 milliGRAM(s) Oral at bedtime  ·	budesonide 160 MICROgram(s)/formoterol 4.5 MICROgram(s) Inhaler 2 Puff(s) Inhalation two times a day  carBAMazepine 200 milliGRAM(s) Oral two times a day  chlorhexidine 4% Liquid 1 Application(s) Topical <User Schedule>  clopidogrel Tablet 75 milliGRAM(s) Oral daily  dexMEDEtomidine Infusion 0.05 MICROgram(s)/kG/Hr (0.9 mL/Hr) IV Continuous <Continuous>  fluconAZOLE   Tablet 200 milliGRAM(s) Oral daily  glucagon  Injectable 1 milliGRAM(s) IntraMuscular once  heparin   Injectable 5000 Unit(s) SubCutaneous every 12 hours  influenza  Vaccine (HIGH DOSE) 0.7 milliLiter(s) IntraMuscular once  insulin glargine Injectable (LANTUS) 40 Unit(s) SubCutaneous at bedtime  insulin lispro (ADMELOG) corrective regimen sliding scale   SubCutaneous three times a day before meals  lidocaine   4% Patch 1 Patch Transdermal every 24 hours  nystatin    Suspension 046726 Unit(s) Oral every 6 hours  pantoprazole    Tablet 40 milliGRAM(s) Oral before breakfast  ·	predniSONE   Tablet 20 milliGRAM(s) Oral daily  ·	tiotropium 18 MICROgram(s) Capsule 1 Capsule(s) Inhalation daily    MEDICATIONS  (PRN):  aluminum hydroxide/magnesium hydroxide/simethicone Suspension 30 milliLiter(s) Oral every 4 hours PRN Dyspepsia  melatonin 3 milliGRAM(s) Oral at bedtime PRN Insomnia  ondansetron Injectable 4 milliGRAM(s) IV Push every 8 hours PRN Nausea and/or Vomiting  polyethylene glycol 3350 17 Gram(s) Oral daily PRN Constipation  senna 2 Tablet(s) Oral at bedtime PRN Constipation    PHYSICAL EXAM  Vital Signs Last 24 Hrs  T(C): 36.3 (03 Dec 2021 15:00), Max: 36.4 (02 Dec 2021 20:21)  T(F): 97.4 (03 Dec 2021 15:00), Max: 97.6 (03 Dec 2021 07:00)  HR: 73 (03 Dec 2021 14:25) (73 - 130)  BP: 100/58 (03 Dec 2021 14:25) (99/56 - 171/79)  BP(mean): 74 (03 Dec 2021 14:25) (74 - 114)  RR: 16 (03 Dec 2021 15:00) (16 - 40)  SpO2: 100% (03 Dec 2021 15:00) (96% - 100%) on room air    I&O's Detail  02 Dec 2021 07:01  -  03 Dec 2021 07:00  --------------------------------------------------------  IN: Total IN: 0 mL  OUT:  Voided (mL): 250 mL  Total OUT: 250 mL  Total NET: -250 mL    03 Dec 2021 07:01  -  03 Dec 2021 17:02  --------------------------------------------------------  IN:  Dexmedetomidine: 104 mL  Total IN: 104 mL  OUT:  Voided (mL): 175 mL  Total OUT: 175 mL  Total NET: -71 mL      PHYSICAL EXAM:  Somnolent  Neck supple, no LN  S1 and S2 no murmur  Lungs are clear without wheeze, rhonchi or rales  Abdomen is soft and non tender  There is no edema  Neurologically non focal      LABS:                        11.4   10.72 )-----------( 399      ( 03 Dec 2021 05:24 )             36.2                                               12-03    142  |  104  |  16  ----------------------------<  122<H>  4.0   |  25  |  1.0    Ca    8.9      03 Dec 2021 05:24    TPro  6.1  /  Alb  3.4<L>  /  TBili  0.3  /  DBili  x   /  AST  19  /  ALT  18  /  AlkPhos  58  12-03    Thyroid Stimulating Hormone, Serum: 1.78 uIU/mL (12.02.21 @ 06:35)   Troponin T, Serum: 0.05: Critical value:previously called ng/mL (11.30.21 @ 10:59)   CKMB Units: 5.0 ng/mL (11.30.21 @ 10:59)   Creatine Kinase, Serum: 51 U/L (11.30.21 @ 10:59)   Troponin T, Serum: 0.08: TYPE:(C=Critical, N=Notification, A=Abnormal) C (11.29.21)  COVID-19 PCR: NotDetec:            RADIOGRAPHS:  < from: CT Head No Cont (12.03.21 @ 04:27) >  Markedly motion degraded exam without definite intracranial hemorrhage or midline shift.    < from: CT Angio Head w/ IV Cont (12.02.21 @ 19:36) >  No large vessel occlusion, aneurysm, vascular malformation.  Moderate atherosclerotic stenosis of the bilateral clinoid/supraclinoid ICAs.    < from: MR Head w/wo IV Cont (12.02.21 @ 13:24) >  Punctate acute infarct in the high left parietal lobe.  Redemonstrated centrally calcified meningioma along the high right parietal convexity. No significant peritumoral edema.  Mild chronic microvascular ischemic changes.    < from: CT Head No Cont (12.01.21 @ 16:36) >  1.  No CT evidence of acute intracranial pathology.  2.  Right high frontal calcified meningioma measuring 1.8 cm.    < from: Xray Chest 1 View-PORTABLE IMMEDIATE (Xray Chest 1 View-PORTABLE IMMEDIATE .) (11.29.21 @ 15:09) >  The heart size is within normal limits.  Dilatation and calcification of the thoracic aorta.  Mild prominence of the interstitial markings and left basilar opacity.  Impression:  Prominence of the interstitial markings and left basilar opacity.    < from: CT Angio Chest PE Protocol w/ IV Cont (11.07.21 @ 18:15) >  No filling defects in the main pulmonary artery or segmental branches to suggest pulmonary embolus.  New posterior right lower lobe small peripheral opacity probably representing focal scarring or atelectasis. Follow-up noncontrast chest CT may be obtained in 3 months.

## 2021-12-03 NOTE — PROGRESS NOTE ADULT - SUBJECTIVE AND OBJECTIVE BOX
Patient is a 76y old  Male who presents with a chief complaint of Chest pain (03 Dec 2021 07:56)      OVERNIGHT EVENTS: became more confused and agitated. CT head was done STAT and it did not show any worsening findings. patient became more combative in the morning. he was given haldol and started on precedex drip    SUBJECTIVE / INTERVAL HPI: Patient seen and examined at bedside.     VITAL SIGNS:  Vital Signs Last 24 Hrs  T(C): 36.2 (03 Dec 2021 09:00), Max: 36.6 (02 Dec 2021 14:29)  T(F): 97.2 (03 Dec 2021 09:00), Max: 97.8 (02 Dec 2021 14:29)  HR: 96 (03 Dec 2021 06:00) (90 - 117)  BP: 171/79 (03 Dec 2021 06:00) (124/74 - 171/79)  BP(mean): 114 (03 Dec 2021 06:00) (90 - 114)  RR: 20 (03 Dec 2021 09:00) (17 - 23)  SpO2: 97% (03 Dec 2021 09:00) (96% - 98%)    PHYSICAL EXAM:    General: WDWN  HEENT: NC/AT; PERRL, clear conjunctiva  Neck: supple  Cardiovascular: +S1/S2; RRR  Respiratory: no wheezing   Gastrointestinal: soft, NT/ND; +BSx4  Extremities: WWP; 2+ peripheral pulses; no edema   Neurological: AAOx3; no focal deficits    MEDICATIONS:  MEDICATIONS  (STANDING):  ALBUTerol    90 MICROgram(s) HFA Inhaler 2 Puff(s) Inhalation every 6 hours  amitriptyline 10 milliGRAM(s) Oral at bedtime  aspirin  chewable 81 milliGRAM(s) Enteral Tube daily  atorvastatin 80 milliGRAM(s) Oral at bedtime  budesonide 160 MICROgram(s)/formoterol 4.5 MICROgram(s) Inhaler 2 Puff(s) Inhalation two times a day  chlorhexidine 4% Liquid 1 Application(s) Topical <User Schedule>  clopidogrel Tablet 75 milliGRAM(s) Oral daily  dexMEDEtomidine Infusion 0.05 MICROgram(s)/kG/Hr (0.9 mL/Hr) IV Continuous <Continuous>  dextrose 40% Gel 15 Gram(s) Oral once  dextrose 5%. 1000 milliLiter(s) (50 mL/Hr) IV Continuous <Continuous>  dextrose 5%. 1000 milliLiter(s) (100 mL/Hr) IV Continuous <Continuous>  dextrose 50% Injectable 25 Gram(s) IV Push once  dextrose 50% Injectable 12.5 Gram(s) IV Push once  dextrose 50% Injectable 25 Gram(s) IV Push once  fluconAZOLE   Tablet 200 milliGRAM(s) Oral daily  gabapentin 300 milliGRAM(s) Oral three times a day  glucagon  Injectable 1 milliGRAM(s) IntraMuscular once  heparin   Injectable 5000 Unit(s) SubCutaneous every 12 hours  influenza  Vaccine (HIGH DOSE) 0.7 milliLiter(s) IntraMuscular once  insulin glargine Injectable (LANTUS) 40 Unit(s) SubCutaneous at bedtime  insulin lispro (ADMELOG) corrective regimen sliding scale   SubCutaneous three times a day before meals  insulin lispro (ADMELOG) corrective regimen sliding scale   SubCutaneous at bedtime  insulin lispro Injectable (ADMELOG) 20 Unit(s) SubCutaneous three times a day before meals  lidocaine   4% Patch 1 Patch Transdermal every 24 hours  nystatin    Suspension 661780 Unit(s) Oral every 6 hours  pantoprazole    Tablet 40 milliGRAM(s) Oral before breakfast  predniSONE   Tablet 20 milliGRAM(s) Oral daily  pregabalin 75 milliGRAM(s) Oral three times a day  tiotropium 18 MICROgram(s) Capsule 1 Capsule(s) Inhalation daily    MEDICATIONS  (PRN):  aluminum hydroxide/magnesium hydroxide/simethicone Suspension 30 milliLiter(s) Oral every 4 hours PRN Dyspepsia  melatonin 3 milliGRAM(s) Oral at bedtime PRN Insomnia  ondansetron Injectable 4 milliGRAM(s) IV Push every 8 hours PRN Nausea and/or Vomiting  oxycodone    5 mG/acetaminophen 325 mG 1 Tablet(s) Oral every 6 hours PRN Moderate Pain (4 - 6)  polyethylene glycol 3350 17 Gram(s) Oral daily PRN Constipation  senna 2 Tablet(s) Oral at bedtime PRN Constipation      ALLERGIES:  Allergies    No Known Allergies    Intolerances        LABS:                        11.4   10.72 )-----------( 399      ( 03 Dec 2021 05:24 )             36.2     12-03    142  |  104  |  16  ----------------------------<  122<H>  4.0   |  25  |  1.0    Ca    8.9      03 Dec 2021 05:24    TPro  6.1  /  Alb  3.4<L>  /  TBili  0.3  /  DBili  x   /  AST  19  /  ALT  18  /  AlkPhos  58  12-03        CAPILLARY BLOOD GLUCOSE      POCT Blood Glucose.: 142 mg/dL (03 Dec 2021 07:08)      RADIOLOGY & ADDITIONAL TESTS: Reviewed.    MR brain : Punctate acute infarct in the high left parietal lobe.    Redemonstrated centrally calcified meningioma along the high right parietal convexity. No significant peritumoral edema.    Mild chronic microvascular ischemic changes.

## 2021-12-03 NOTE — CHART NOTE - NSCHARTNOTEFT_GEN_A_CORE
[facility] [id number]      Notified by RN for patient  DEL ALVARADO  76y Male with change in mental status.  Pt seen at bedside with RN and  phone.  Pt speaking predominantly in native language not answering questions.  Prior exams patient was interactive and oriented in English.  Patient via  line was not oriented and speaking confused speech.  Pt will attempt to answer questions and can give partial answers.    T(C): 36.1 (12-03-21 @ 04:00), Max: 36.6 (12-02-21 @ 14:29)  HR: 95 (12-03-21 @ 05:00) (90 - 117)  BP: 151/72 (12-03-21 @ 05:00) (124/74 - 157/74)  RR: 20 (12-03-21 @ 05:00) (17 - 23)  SpO2: 98% (12-03-21 @ 05:00) (96% - 98%)      PHYSICAL EXAM:    NERVOUS SYSTEM:  Alert & Oriented X3, patient attempting to get out of bed  No abnormal facies.   the patient is oriented to person and struggles with time reference.  Memory not intact  Nondysarthric.    PERRL.  No ptosis/weakness of eyelid closure. No facial asymmetry.    Motor examination:   Normal tone, bulk and range of motion.  No tenderness, twitching, tremors or involuntary movements.  Formal Muscle Strength Testing: (MRC grade R/L) 5/5 UE; 5/5 LE.  No observable drift.  Sensory examination:   non-compliant with exam- gross sensation intact  PULMONARY: Clear to percussion bilaterally;   CARDIOVASCULAR: Regular rate and rhythm;  SKIN: warm and dry    Assesment/Plan:    CVA / change in mental status     - as per neurology recommendation for change in mental status -> stat CTH ( patient given Ativan for agitation and CT)  - CTH results noted no acute findings  - Continue ATorvastatin / ASA / Plavix   - Continue Neuro checks q 1  - Neurology consult pending   - RN aware and will monitor                    Discussed with Provider: DR Castillo [facility] [id number]      Notified by RN for patient  DEL ALVARADO  76y Male with change in mental status.  Pt seen at bedside with RN and  phone.  Pt speaking predominantly in native language not answering questions.  Prior exams patient was interactive and oriented in English.  Patient via  line was not oriented and speaking confused speech.  Pt will attempt to answer questions and can give partial answers. Pt prior sxs did not have complete resolution - not a tPA candidate. was NIHSS 1.    T(C): 36.1 (12-03-21 @ 04:00), Max: 36.6 (12-02-21 @ 14:29)  HR: 95 (12-03-21 @ 05:00) (90 - 117)  BP: 151/72 (12-03-21 @ 05:00) (124/74 - 157/74)  RR: 20 (12-03-21 @ 05:00) (17 - 23)  SpO2: 98% (12-03-21 @ 05:00) (96% - 98%)      PHYSICAL EXAM:    NERVOUS SYSTEM:  Alert & Oriented X3, patient attempting to get out of bed  No abnormal facies.   the patient is oriented to person and struggles with time reference.  Memory not intact  Nondysarthric.    PERRL.  No ptosis/weakness of eyelid closure. No facial asymmetry.    Motor examination:   Normal tone, bulk and range of motion.  No tenderness, twitching, tremors or involuntary movements.  Formal Muscle Strength Testing: (MRC grade R/L) 5/5 UE; 5/5 LE.  No observable drift.  Sensory examination:   non-compliant with exam- gross sensation intact  PULMONARY: Clear to percussion bilaterally;   CARDIOVASCULAR: Regular rate and rhythm;  SKIN: warm and dry    Assesment/Plan:    CVA / change in mental status     - as per neurology recommendation for change in mental status -> stat CTH ( patient given Ativan for agitation and CT)  - CTH results noted no acute findings  - Continue ATorvastatin / ASA / Plavix   - Continue Neuro checks q 1  - Neurology consult pending   - RN aware and will monitor                    Discussed with Provider: DR Castillo

## 2021-12-03 NOTE — PROGRESS NOTE ADULT - ASSESSMENT
questionable recent infarct, doubt that it explains acute delirium. Suspect medication adverse effect.   shingles      Recommend:  - suspect medication adverse effect: dc percocet, lyrica, gabapentin, amitriptyline  - can begin carbamazepine 200 mg PO q12h for pain and mood stability when able to tolerate PO  - prn haldol, precedex if agitated  - if med changes do not lead to improvement in mental status within 24-48 hrs, consider CSF studies including zoster Patient is a 76 year old Male with a past medical history of Asthma, COPD, DM, HLD, Prostate CA S/P TURP, Jazlyn presented to the ER with a chief complaint of Left sided Chest pain, blurred vision, confusion. Found to have acute left high parietal infarct on MRI. Overnight patient had change in mental status, agitated, delirious, no longer speaking English. Upon exam patient combative requiring chemical sedation. Repeat CTH overnight unchanged.    acute delirium  questionable recent infarct, doubt that it explains acute delirium. Suspect medication adverse effect.   shingles      Recommend:  - suspect medication adverse effect: dc percocet, lyrica, gabapentin, amitriptyline  - can begin carbamazepine 200 mg PO q12h for pain and mood stability when able to tolerate PO  - prn haldol, precedex if agitated  - if med changes do not lead to improvement in mental status within 24-48 hrs, consider CSF studies including zoster    examined with Dr. Stokes

## 2021-12-03 NOTE — PROGRESS NOTE ADULT - ASSESSMENT
Patient is a 76 year old Male with a past medical history of Asthma, COPD, DM, HLD, Prostate CA S/P TURP, Shingles presented to the ER with a chief complaint of Left sided Chest pain. Patient had Shingles earlier this month. Patient describes Constant, stabbing left sided chest pain that radiates to his back, 9/10, tried percocet / gabapentin / Pregabalin without much improvement. As per wife, last night patient had a fever of 102.5 and wife tried white vinegar with 2 Tylenol tablets, but patient was in excruciating pain. Patient admits to fever, chills, chest pain, constipation, rash on tongue and palpitations (only when patient takes Percocet). Patient denies nausea, vomiting, visual changes, auditory changes, shortness of breath, abdominal pain, diarrhea, dysuria.    PROBLEMS  # chest pain, hx DM & hx shingles.  Has increased troponin 0.05x2 & fever  R/O MI  # Oral Candidiasis   # Punctate acute infarct in the high left parietal lobe  # Delirium     Recomemndations  - continue fluconazole 200 mg daily for oral candidiasis   - screen for HIV for completeness  - Follow up Syphilis screen   - neurology recommendations noted possible medication adverse effect -- but may need LP if mental status not improving     Please call or message on [employer] Teams if with any questions.  Spectra [phone]

## 2021-12-03 NOTE — CONSULT NOTE ADULT - ASSESSMENT
IMPRESSION:  76 year old male presenting to ED on Nov 29 with an initial compliant of chest pain after recently being treated for Shingles with fever reported to 102.5 1 day prior to admission.  Home medical therapy included percocet / gabapentin / Pregabalin without much improvement.    Later during his admission, his complaints were recognized to include vision disturbances and alteration in mentation and oral thrush.    He was also recognized to have elevated troponin levels.    CNS imaging which revealed a possible new acute infract.    The patient was transferred to the ICU for neurology monitoring.    His altered and combative MS required him being placed on a Precedex drip.    He is presently sedated on Precedex on room air with a saturation of 95%.    He is snoring, but does not appear to be having apneic events  There do not appear to be any acute pulmonary issues presently on prednisone, Symbicort, Spiriva and albuterol    PLAN:  Continue to monitor O2 sat, presently adequate on room air at 95%  Aspiration precautions keep HOB > 35 degrees  Continue maintenance asthma and COPD medications if able to be delivered (Symbicort Spiriva and prn albuterol)  From a pulmonary perspective Prednisone can be discontinued  GI and DVT prophylaxis recommended  Monitor neuro status, neurology follow up, possible LP planned  ID recommendations noted for HIV and syphilis testing  Testing for RUTH can be conducted as an outpatient    Please call with any questions  [phone]

## 2021-12-04 LAB
ALBUMIN SERPL ELPH-MCNC: 3.4 G/DL — LOW (ref 3.5–5.2)
ALP SERPL-CCNC: 57 U/L — SIGNIFICANT CHANGE UP (ref 30–115)
ALT FLD-CCNC: 21 U/L — SIGNIFICANT CHANGE UP (ref 0–41)
ANION GAP SERPL CALC-SCNC: 15 MMOL/L — HIGH (ref 7–14)
AST SERPL-CCNC: 23 U/L — SIGNIFICANT CHANGE UP (ref 0–41)
BASOPHILS # BLD AUTO: 0.07 K/UL — SIGNIFICANT CHANGE UP (ref 0–0.2)
BASOPHILS NFR BLD AUTO: 0.5 % — SIGNIFICANT CHANGE UP (ref 0–1)
BILIRUB SERPL-MCNC: 0.4 MG/DL — SIGNIFICANT CHANGE UP (ref 0.2–1.2)
BUN SERPL-MCNC: 20 MG/DL — SIGNIFICANT CHANGE UP (ref 10–20)
CALCIUM SERPL-MCNC: 8.9 MG/DL — SIGNIFICANT CHANGE UP (ref 8.5–10.1)
CHLORIDE SERPL-SCNC: 105 MMOL/L — SIGNIFICANT CHANGE UP (ref 98–110)
CO2 SERPL-SCNC: 23 MMOL/L — SIGNIFICANT CHANGE UP (ref 17–32)
CREAT SERPL-MCNC: 0.9 MG/DL — SIGNIFICANT CHANGE UP (ref 0.7–1.5)
EOSINOPHIL # BLD AUTO: 0.07 K/UL — SIGNIFICANT CHANGE UP (ref 0–0.7)
EOSINOPHIL NFR BLD AUTO: 0.5 % — SIGNIFICANT CHANGE UP (ref 0–8)
GLUCOSE BLDC GLUCOMTR-MCNC: 148 MG/DL — HIGH (ref 70–99)
GLUCOSE BLDC GLUCOMTR-MCNC: 153 MG/DL — HIGH (ref 70–99)
GLUCOSE BLDC GLUCOMTR-MCNC: 161 MG/DL — HIGH (ref 70–99)
GLUCOSE BLDC GLUCOMTR-MCNC: 175 MG/DL — HIGH (ref 70–99)
GLUCOSE SERPL-MCNC: 138 MG/DL — HIGH (ref 70–99)
HCT VFR BLD CALC: 39.5 % — LOW (ref 42–52)
HGB BLD-MCNC: 12.3 G/DL — LOW (ref 14–18)
IMM GRANULOCYTES NFR BLD AUTO: 0.7 % — HIGH (ref 0.1–0.3)
LYMPHOCYTES # BLD AUTO: 2.85 K/UL — SIGNIFICANT CHANGE UP (ref 1.2–3.4)
LYMPHOCYTES # BLD AUTO: 22 % — SIGNIFICANT CHANGE UP (ref 20.5–51.1)
MCHC RBC-ENTMCNC: 27.2 PG — SIGNIFICANT CHANGE UP (ref 27–31)
MCHC RBC-ENTMCNC: 31.1 G/DL — LOW (ref 32–37)
MCV RBC AUTO: 87.4 FL — SIGNIFICANT CHANGE UP (ref 80–94)
MONOCYTES # BLD AUTO: 1.27 K/UL — HIGH (ref 0.1–0.6)
MONOCYTES NFR BLD AUTO: 9.8 % — HIGH (ref 1.7–9.3)
NEUTROPHILS # BLD AUTO: 8.6 K/UL — HIGH (ref 1.4–6.5)
NEUTROPHILS NFR BLD AUTO: 66.5 % — SIGNIFICANT CHANGE UP (ref 42.2–75.2)
NRBC # BLD: 0 /100 WBCS — SIGNIFICANT CHANGE UP (ref 0–0)
PLATELET # BLD AUTO: 416 K/UL — HIGH (ref 130–400)
POTASSIUM SERPL-MCNC: 4.6 MMOL/L — SIGNIFICANT CHANGE UP (ref 3.5–5)
POTASSIUM SERPL-SCNC: 4.6 MMOL/L — SIGNIFICANT CHANGE UP (ref 3.5–5)
PROT SERPL-MCNC: 6.2 G/DL — SIGNIFICANT CHANGE UP (ref 6–8)
RBC # BLD: 4.52 M/UL — LOW (ref 4.7–6.1)
RBC # FLD: 15.8 % — HIGH (ref 11.5–14.5)
RPR SERPL-ACNC: SIGNIFICANT CHANGE UP
SODIUM SERPL-SCNC: 143 MMOL/L — SIGNIFICANT CHANGE UP (ref 135–146)
T PALLIDUM AB TITR SER: POSITIVE
T PALLIDUM IGG SER QL IF: SIGNIFICANT CHANGE UP
WBC # BLD: 12.95 K/UL — HIGH (ref 4.8–10.8)
WBC # FLD AUTO: 12.95 K/UL — HIGH (ref 4.8–10.8)

## 2021-12-04 PROCEDURE — 99232 SBSQ HOSP IP/OBS MODERATE 35: CPT

## 2021-12-04 PROCEDURE — 99233 SBSQ HOSP IP/OBS HIGH 50: CPT

## 2021-12-04 RX ORDER — HALOPERIDOL DECANOATE 100 MG/ML
5 INJECTION INTRAMUSCULAR ONCE
Refills: 0 | Status: COMPLETED | OUTPATIENT
Start: 2021-12-04 | End: 2021-12-04

## 2021-12-04 RX ORDER — IPRATROPIUM/ALBUTEROL SULFATE 18-103MCG
3 AEROSOL WITH ADAPTER (GRAM) INHALATION EVERY 6 HOURS
Refills: 0 | Status: DISCONTINUED | OUTPATIENT
Start: 2021-12-04 | End: 2021-12-09

## 2021-12-04 RX ADMIN — Medication 200 MILLIGRAM(S): at 17:31

## 2021-12-04 RX ADMIN — CLOPIDOGREL BISULFATE 75 MILLIGRAM(S): 75 TABLET, FILM COATED ORAL at 13:41

## 2021-12-04 RX ADMIN — ALBUTEROL 2 PUFF(S): 90 AEROSOL, METERED ORAL at 07:46

## 2021-12-04 RX ADMIN — Medication 500000 UNIT(S): at 17:31

## 2021-12-04 RX ADMIN — LIDOCAINE 1 PATCH: 4 CREAM TOPICAL at 20:39

## 2021-12-04 RX ADMIN — DEXMEDETOMIDINE HYDROCHLORIDE IN 0.9% SODIUM CHLORIDE 0.9 MICROGRAM(S)/KG/HR: 4 INJECTION INTRAVENOUS at 07:56

## 2021-12-04 RX ADMIN — Medication 81 MILLIGRAM(S): at 13:41

## 2021-12-04 RX ADMIN — DEXMEDETOMIDINE HYDROCHLORIDE IN 0.9% SODIUM CHLORIDE 0.9 MICROGRAM(S)/KG/HR: 4 INJECTION INTRAVENOUS at 20:39

## 2021-12-04 RX ADMIN — DEXMEDETOMIDINE HYDROCHLORIDE IN 0.9% SODIUM CHLORIDE 0.9 MICROGRAM(S)/KG/HR: 4 INJECTION INTRAVENOUS at 23:31

## 2021-12-04 RX ADMIN — BUDESONIDE AND FORMOTEROL FUMARATE DIHYDRATE 2 PUFF(S): 160; 4.5 AEROSOL RESPIRATORY (INHALATION) at 20:38

## 2021-12-04 RX ADMIN — DEXMEDETOMIDINE HYDROCHLORIDE IN 0.9% SODIUM CHLORIDE 0.9 MICROGRAM(S)/KG/HR: 4 INJECTION INTRAVENOUS at 09:00

## 2021-12-04 RX ADMIN — FLUCONAZOLE 200 MILLIGRAM(S): 150 TABLET ORAL at 13:40

## 2021-12-04 RX ADMIN — LIDOCAINE 1 PATCH: 4 CREAM TOPICAL at 03:18

## 2021-12-04 RX ADMIN — HEPARIN SODIUM 5000 UNIT(S): 5000 INJECTION INTRAVENOUS; SUBCUTANEOUS at 06:16

## 2021-12-04 RX ADMIN — Medication 500000 UNIT(S): at 13:39

## 2021-12-04 RX ADMIN — LIDOCAINE 1 PATCH: 4 CREAM TOPICAL at 13:42

## 2021-12-04 RX ADMIN — DEXMEDETOMIDINE HYDROCHLORIDE IN 0.9% SODIUM CHLORIDE 0.9 MICROGRAM(S)/KG/HR: 4 INJECTION INTRAVENOUS at 17:30

## 2021-12-04 RX ADMIN — CHLORHEXIDINE GLUCONATE 1 APPLICATION(S): 213 SOLUTION TOPICAL at 06:17

## 2021-12-04 RX ADMIN — HEPARIN SODIUM 5000 UNIT(S): 5000 INJECTION INTRAVENOUS; SUBCUTANEOUS at 17:32

## 2021-12-04 RX ADMIN — ATORVASTATIN CALCIUM 80 MILLIGRAM(S): 80 TABLET, FILM COATED ORAL at 21:41

## 2021-12-04 NOTE — PROGRESS NOTE ADULT - SUBJECTIVE AND OBJECTIVE BOX
T H I S    I S    A N    I N C O M P L E T E     N O T E    JENNY, DEL  76y, Male  Allergy: contrast media (iodine-based) (Anaphylaxis)      LAST 24-Hr EVENTS:    VITALS:  T(F): 96.5 (12-04-21 @ 07:01), Max: 97.6 (12-03-21 @ 19:00)  HR: 72 (12-04-21 @ 12:50)  BP: 143/63 (12-04-21 @ 12:50) (97/54 - 172/72)  RR: 29 (12-04-21 @ 12:50)  SpO2: 98% (12-04-21 @ 09:42)    PHYSICAL EXAM:    GENERAL: confused  NECK: Supple, No JVD  CHEST/LUNG: CTA b/l  HEART: Regular rate and rhythm  ABDOMEN: Soft, Nontender, Nondistended  EXTREMITIES:  No clubbing, edema absent        TESTS & MEASUREMENTS:    12-02-21 @ 07:01  -  12-03-21 @ 07:00  --------------------------------------------------------  IN: 0 mL / OUT: 250 mL / NET: -250 mL    12-03-21 @ 07:01  -  12-04-21 @ 07:00  --------------------------------------------------------  IN: 197 mL / OUT: 425 mL / NET: -228 mL    12-04-21 @ 07:01  -  12-04-21 @ 14:16  --------------------------------------------------------  IN: 160 mL / OUT: 50 mL / NET: 110 mL                            12.3   12.95 )-----------( 416      ( 04 Dec 2021 05:41 )             39.5       12-04    143  |  105  |  20  ----------------------------<  138<H>  4.6   |  23  |  0.9    Ca    8.9      04 Dec 2021 05:41    TPro  6.2  /  Alb  3.4<L>  /  TBili  0.4  /  DBili  x   /  AST  23  /  ALT  21  /  AlkPhos  57  12-04    LIVER FUNCTIONS - ( 04 Dec 2021 05:41 )  Alb: 3.4 g/dL / Pro: 6.2 g/dL / ALK PHOS: 57 U/L / ALT: 21 U/L / AST: 23 U/L / GGT: x                 COVID-19 PCR: NotDetec (11-29-21 @ 14:46)            ABG & VENT SETTINGS: (when applicable)        RADIOLOGY & ADDITIONAL TESTS:  < from: CT Head No Cont (12.03.21 @ 04:27) >  Impression:      Markedly motion degraded exam without definite intracranial hemorrhage or midline shift.    < end of copied text >      MEDICATIONS:  MEDICATIONS  (STANDING):  ALBUTerol    90 MICROgram(s) HFA Inhaler 2 Puff(s) Inhalation every 6 hours  aspirin  chewable 81 milliGRAM(s) Enteral Tube daily  atorvastatin 80 milliGRAM(s) Oral at bedtime  budesonide 160 MICROgram(s)/formoterol 4.5 MICROgram(s) Inhaler 2 Puff(s) Inhalation two times a day  carBAMazepine 200 milliGRAM(s) Oral two times a day  chlorhexidine 4% Liquid 1 Application(s) Topical <User Schedule>  clopidogrel Tablet 75 milliGRAM(s) Oral daily  dexMEDEtomidine Infusion 0.05 MICROgram(s)/kG/Hr (0.9 mL/Hr) IV Continuous <Continuous>  dextrose 40% Gel 15 Gram(s) Oral once  dextrose 5%. 1000 milliLiter(s) (50 mL/Hr) IV Continuous <Continuous>  dextrose 5%. 1000 milliLiter(s) (100 mL/Hr) IV Continuous <Continuous>  dextrose 50% Injectable 12.5 Gram(s) IV Push once  dextrose 50% Injectable 25 Gram(s) IV Push once  fluconAZOLE   Tablet 200 milliGRAM(s) Oral daily  glucagon  Injectable 1 milliGRAM(s) IntraMuscular once  heparin   Injectable 5000 Unit(s) SubCutaneous every 12 hours  influenza  Vaccine (HIGH DOSE) 0.7 milliLiter(s) IntraMuscular once  lidocaine   4% Patch 1 Patch Transdermal every 24 hours  nystatin    Suspension 309291 Unit(s) Oral every 6 hours  pantoprazole    Tablet 40 milliGRAM(s) Oral before breakfast  predniSONE   Tablet 20 milliGRAM(s) Oral daily  tiotropium 18 MICROgram(s) Capsule 1 Capsule(s) Inhalation daily    MEDICATIONS  (PRN):  aluminum hydroxide/magnesium hydroxide/simethicone Suspension 30 milliLiter(s) Oral every 4 hours PRN Dyspepsia  melatonin 3 milliGRAM(s) Oral at bedtime PRN Insomnia  ondansetron Injectable 4 milliGRAM(s) IV Push every 8 hours PRN Nausea and/or Vomiting  polyethylene glycol 3350 17 Gram(s) Oral daily PRN Constipation  senna 2 Tablet(s) Oral at bedtime PRN Constipation

## 2021-12-04 NOTE — PROGRESS NOTE ADULT - SUBJECTIVE AND OBJECTIVE BOX
DEL ALVARADO     76y     Male    MRN-[id number]                                                           CC:Patient is a 76y old  Male who presents with a chief complaint of Chest pain (03 Dec 2021 11:23)      HPI:  Patient is a 76 year old Male with a past medical history of Asthma, COPD, DM, HLD, Prostate CA S/P TURP, Shingles presented to the ER with a chief complaint of Left sided Chest pain. Patient had Shingles earlier this month. Patient describes Constant, stabbing left sided chest pain that radiates to his back, 9/10, tried percocet / gabapentin / Pregabalin without much improvement. As per wife, last night patient had a fever of 102.5 and wife tried white vinegar with 2 Tylenol tablets, but patient was in excruciating pain. Patient admits to fever, chills, chest pain, constipation, rash on tongue and palpitations (only when patient takes Percocet). Patient denies nausea, vomiting, visual changes, auditory changes, shortness of breath, abdominal pain, diarrhea, dysuria.  (29 Nov 2021 17:38)    Neuro: patient now with likely acute delirium since night. Now requiring chemical sedation with haldol and precedex. CTH unchanged.    ROS:  Constitutional, Neurological, Psychiatric, Eyes, ENT, Cardiovascular, Respiratory, Gastrointestinal, Genitourinary, Musculoskeletal, Integumentary, Endocrine and Heme/Lymph are otherwise negative. Except for    Social History: No smoking, No drinking, No drug use    FAMILY HISTORY:  Family history of asthma    Family history of diabetes mellitus (DM)        HEALTH ISSUES - PROBLEM Dx:  Stroke    Chest pain          ICU Vital Signs Last 24 Hrs  T(C): 35.8 (04 Dec 2021 07:01), Max: 36.4 (03 Dec 2021 19:00)  T(F): 96.5 (04 Dec 2021 07:01), Max: 97.6 (03 Dec 2021 19:00)  HR: 78 (04 Dec 2021 09:02) (65 - 130)  BP: 115/65 (04 Dec 2021 09:02) (97/54 - 172/72)  BP(mean): 84 (04 Dec 2021 09:02) (73 - 109)  ABP: --  ABP(mean): --  RR: 22 (04 Dec 2021 09:02) (16 - 35)  SpO2: 99% (04 Dec 2021 09:02) (93% - 100%)      Neuro:  MENTAL STATUS: alert, mildly agitated, disoriented, speaking Maltese but not answering questions or following commands, less combative than yesterday.  LANG/SPEECH: unable to assess  CRANIAL NERVES: face symmetric, rest of CN exam deferred due to agitation  MOTOR: spontaneously moves all extremities  REFLEXES: unable to assess  SENSORY: unable to assess  COORD: unable to assess  GAIT: unable to assess      Allergies    No Known Allergies       Home Medications:  atorvastatin 10 mg oral tablet: 1 tab(s) orally once a day (29 Nov 2021 18:22)  Breo Ellipta 200 mcg-25 mcg/inh inhalation powder:  (29 Nov 2021 18:22)  Incruse Ellipta 62.5 mcg/inh inhalation powder: 1 puff(s) inhaled every 24 hours (29 Nov 2021 18:22)  insulin lispro 100 units/mL injectable solution: 20 unit(s) injectable 3 times a day (29 Nov 2021 18:22)  Levemir 100 units/mL subcutaneous solution: 40 unit(s) subcutaneous once a day in the AM (29 Nov 2021 18:22)  Percocet 5/325 oral tablet: 1 tab(s) orally every 6 hours, As Needed (29 Nov 2021 18:22)  predniSONE 20 mg oral tablet: 1 tab(s) orally once a day (29 Nov 2021 18:22)  pregabalin 75 mg oral capsule: 1 cap(s) orally 3 times a day (29 Nov 2021 18:22)  Symbicort 160 mcg-4.5 mcg/inh inhalation aerosol: 2 puff(s) inhaled 2 times a day (29 Nov 2021 18:22)      MEDICATIONS  (STANDING):  ALBUTerol    90 MICROgram(s) HFA Inhaler 2 Puff(s) Inhalation every 6 hours  aspirin  chewable 81 milliGRAM(s) Enteral Tube daily  atorvastatin 80 milliGRAM(s) Oral at bedtime  budesonide 160 MICROgram(s)/formoterol 4.5 MICROgram(s) Inhaler 2 Puff(s) Inhalation two times a day  carBAMazepine 200 milliGRAM(s) Oral two times a day  chlorhexidine 4% Liquid 1 Application(s) Topical <User Schedule>  clopidogrel Tablet 75 milliGRAM(s) Oral daily  dexMEDEtomidine Infusion 0.05 MICROgram(s)/kG/Hr (0.9 mL/Hr) IV Continuous <Continuous>  dextrose 40% Gel 15 Gram(s) Oral once  dextrose 5%. 1000 milliLiter(s) (50 mL/Hr) IV Continuous <Continuous>  dextrose 5%. 1000 milliLiter(s) (100 mL/Hr) IV Continuous <Continuous>  dextrose 50% Injectable 12.5 Gram(s) IV Push once  dextrose 50% Injectable 25 Gram(s) IV Push once  fluconAZOLE   Tablet 200 milliGRAM(s) Oral daily  glucagon  Injectable 1 milliGRAM(s) IntraMuscular once  heparin   Injectable 5000 Unit(s) SubCutaneous every 12 hours  influenza  Vaccine (HIGH DOSE) 0.7 milliLiter(s) IntraMuscular once  lidocaine   4% Patch 1 Patch Transdermal every 24 hours  nystatin    Suspension 031245 Unit(s) Oral every 6 hours  pantoprazole    Tablet 40 milliGRAM(s) Oral before breakfast  predniSONE   Tablet 20 milliGRAM(s) Oral daily  tiotropium 18 MICROgram(s) Capsule 1 Capsule(s) Inhalation daily    LABS:             Comprehensive Metabolic Panel in AM (12.04.21 @ 05:41)    Sodium, Serum: 143 mmol/L    Potassium, Serum: 4.6 mmol/L    Chloride, Serum: 105 mmol/L    Carbon Dioxide, Serum: 23 mmol/L    Anion Gap, Serum: 15 mmol/L    Blood Urea Nitrogen, Serum: 20 mg/dL    Creatinine, Serum: 0.9 mg/dL    Glucose, Serum: 138 mg/dL    Calcium, Total Serum: 8.9 mg/dL    Protein Total, Serum: 6.2 g/dL    Albumin, Serum: 3.4 g/dL    Bilirubin Total, Serum: 0.4 mg/dL    Alkaline Phosphatase, Serum: 57 U/L    Aspartate Aminotransferase (AST/SGOT): 23 U/L    Alanine Aminotransferase (ALT/SGPT): 21 U/L    eGFR if Non : 82: Interpretative comment      MRI brain:  IMPRESSION:    Punctate acute infarct in the high left parietal lobe.    Redemonstrated centrally calcified meningioma along the high right parietal convexity. No significant peritumoral edema.    Mild chronic microvascular ischemic changes.    < end of copied text >  < from: CT Angio Head w/ IV Cont (12.02.21 @ 19:36) >  IMPRESSION:    No large vessel occlusion, aneurysm, vascular malformation.    moderate atherosclerotic stenosis of the bilateral clinoid/supraclinoid ICAs.    < end of copied text >  < from: CT Head No Cont (12.03.21 @ 04:27) >    Impression:      Markedly motion degraded exam without definite intracranial hemorrhage or midline shift.    < end of copied text >

## 2021-12-04 NOTE — PROGRESS NOTE ADULT - ASSESSMENT
DEL ALVARADO 76y Male  MRN#: [id number]   CODE STATUS:FC      SUBJECTIVE  Patient is a 76y old Male who presents with a chief complaint of Chest pain (04 Dec 2021 10:07)  Currently admitted to medicine with the primary diagnosis of Elevated troponin  Today is hospital day 5d, and this morning, RN reports pt was agitated overnight and started on precedex    OBJECTIVE  PAST MEDICAL & SURGICAL HISTORY  Diabetes  20 yrs    Prostate cancer  1999 s/p sx    Asthma  20 yr    COPD (chronic obstructive pulmonary disease)  20 yrs no 02 rx    Dyslipidemia    Shingles    S/P TURP  1999    History of surgery  back surgery      ALLERGIES:  No Known Allergies    MEDICATIONS:  STANDING MEDICATIONS  ALBUTerol    90 MICROgram(s) HFA Inhaler 2 Puff(s) Inhalation every 6 hours  aspirin  chewable 81 milliGRAM(s) Enteral Tube daily  atorvastatin 80 milliGRAM(s) Oral at bedtime  budesonide 160 MICROgram(s)/formoterol 4.5 MICROgram(s) Inhaler 2 Puff(s) Inhalation two times a day  carBAMazepine 200 milliGRAM(s) Oral two times a day  chlorhexidine 4% Liquid 1 Application(s) Topical <User Schedule>  clopidogrel Tablet 75 milliGRAM(s) Oral daily  dexMEDEtomidine Infusion 0.05 MICROgram(s)/kG/Hr IV Continuous <Continuous>  dextrose 40% Gel 15 Gram(s) Oral once  dextrose 5%. 1000 milliLiter(s) IV Continuous <Continuous>  dextrose 5%. 1000 milliLiter(s) IV Continuous <Continuous>  dextrose 50% Injectable 12.5 Gram(s) IV Push once  dextrose 50% Injectable 25 Gram(s) IV Push once  fluconAZOLE   Tablet 200 milliGRAM(s) Oral daily  glucagon  Injectable 1 milliGRAM(s) IntraMuscular once  heparin   Injectable 5000 Unit(s) SubCutaneous every 12 hours  influenza  Vaccine (HIGH DOSE) 0.7 milliLiter(s) IntraMuscular once  lidocaine   4% Patch 1 Patch Transdermal every 24 hours  nystatin    Suspension 638538 Unit(s) Oral every 6 hours  pantoprazole    Tablet 40 milliGRAM(s) Oral before breakfast  predniSONE   Tablet 20 milliGRAM(s) Oral daily  tiotropium 18 MICROgram(s) Capsule 1 Capsule(s) Inhalation daily    PRN MEDICATIONS  aluminum hydroxide/magnesium hydroxide/simethicone Suspension 30 milliLiter(s) Oral every 4 hours PRN  melatonin 3 milliGRAM(s) Oral at bedtime PRN  ondansetron Injectable 4 milliGRAM(s) IV Push every 8 hours PRN  polyethylene glycol 3350 17 Gram(s) Oral daily PRN  senna 2 Tablet(s) Oral at bedtime PRN      VITAL SIGNS: Last 24 Hours  T(C): 35.8 (04 Dec 2021 07:01), Max: 36.4 (03 Dec 2021 19:00)  T(F): 96.5 (04 Dec 2021 07:01), Max: 97.6 (03 Dec 2021 19:00)  HR: 76 (04 Dec 2021 09:42) (65 - 82)  BP: 105/60 (04 Dec 2021 09:42) (97/54 - 172/72)  BP(mean): 77 (04 Dec 2021 09:42) (73 - 109)  RR: 25 (04 Dec 2021 09:42) (16 - 35)  SpO2: 98% (04 Dec 2021 09:42) (93% - 100%)    LABS:                        12.3   12.95 )-----------( 416      ( 04 Dec 2021 05:41 )             39.5     12-04    143  |  105  |  20  ----------------------------<  138<H>  4.6   |  23  |  0.9    Ca    8.9      04 Dec 2021 05:41    TPro  6.2  /  Alb  3.4<L>  /  TBili  0.4  /  DBili  x   /  AST  23  /  ALT  21  /  AlkPhos  57  12-04                  RADIOLOGY:      PHYSICAL EXAM:    GENERAL: responds to verbal stimuli, not following commands O*0  HEENT:  No JVD  PULMONARY: Clear to auscultation bilaterally;  CARDIOVASCULAR: Regular rate and rhythm;  GASTROINTESTINAL: Soft, Nontender, Nondistended  MUSCULOSKELETAL: No clubbing, cyanosis,  NEUROLOGY: non-focal  SKIN: No rashes or lesions    ASSESSMENT & PLAN    76 year old Male with a past medical history of Asthma, COPD, DM, HLD, Prostate CA S/P TURP, Shingles presented to the ER with a chief complaint of Left sided Chest pain, blurred vision, confusion.    # Blurred vision, imbalance, confusion due to left parietal punctuate ischemic infarct   # Altered mental status likely secondary to medications   # Hospital Acquired delirium   -Has been present for ~1 month however was never investigated per wife  MR Head w/wo IV Cont (12.02.21 @ 13:24) >  Punctate acute infarct in the high left parietal lobe.  Redemonstrated centrally calcified meningioma along the high right parietal convexity. No significant peritumoral edema.  Mild chronic microvascular ischemic changes.  - neurology recommendations noted   - haldol PRN  - hold precedes and reassess mental status, if no improvement will need LP.    # Left sided Chest pain secondary to Uncontrolled Post Herpetic Neuralgia;  dc percocet, lyrica, gabapentin, amitriptyline  # Elevated Troponin likely chronic troponin no EKG changes trend for now   # Diabetes: insulin protocol per CC  # Asthma/COPD Continue with Symbicort and other Inhalers   # Oral Thrush - Likely secondary to Chronic steroid use c/w PO Fluconazole   # Constipation, likely 2/2 meds, avoid opioids, bowel regimen   # Prostate CA S/P TURP. out patient f/u     #Progress Note Handoff  Pending (specify): monitor mental status. LP if no improvements, AVOID CNS DEPRESSANTs  Disposition: MICU

## 2021-12-04 NOTE — PROGRESS NOTE ADULT - ASSESSMENT
Patient is a 76 year old Male with a past medical history of Asthma, COPD, DM, HLD, Prostate CA S/P TURP, Jazlyn presented to the ER with a chief complaint of Left sided Chest pain, blurred vision, confusion. Found to have acute left high parietal infarct on MRI. Overnight patient had change in mental status, agitated, delirious, no longer speaking English. Upon exam patient combative requiring chemical sedation. Repeat CTH overnight unchanged.    acute delirium  questionable recent infarct, doubt that it explains acute delirium. Suspect medication adverse effect.   shingles      Recommend:  - suspect medication adverse effect: dc percocet, lyrica, gabapentin, amitriptyline  - due to persistent confusion recommend vEEG today (discussed with housestaff)  - can begin carbamazepine 200 mg PO q12h for pain and mood stability when able to tolerate PO  - prn haldol, precedex if agitated  - if med changes do not lead to improvement in mental status within 24-48 hrs, consider CSF studies including zoster

## 2021-12-04 NOTE — OCCUPATIONAL THERAPY INITIAL EVALUATION ADULT - SPECIFY REASON(S)
As discussed with TAMELA Chandra, pt medically unstable at the time. Will follow up when appropriate.

## 2021-12-05 LAB
ALBUMIN SERPL ELPH-MCNC: 3.4 G/DL — LOW (ref 3.5–5.2)
ALP SERPL-CCNC: 63 U/L — SIGNIFICANT CHANGE UP (ref 30–115)
ALT FLD-CCNC: 20 U/L — SIGNIFICANT CHANGE UP (ref 0–41)
ANION GAP SERPL CALC-SCNC: 17 MMOL/L — HIGH (ref 7–14)
AST SERPL-CCNC: 20 U/L — SIGNIFICANT CHANGE UP (ref 0–41)
BASOPHILS # BLD AUTO: 0.08 K/UL — SIGNIFICANT CHANGE UP (ref 0–0.2)
BASOPHILS NFR BLD AUTO: 0.5 % — SIGNIFICANT CHANGE UP (ref 0–1)
BILIRUB SERPL-MCNC: 0.7 MG/DL — SIGNIFICANT CHANGE UP (ref 0.2–1.2)
BUN SERPL-MCNC: 21 MG/DL — HIGH (ref 10–20)
CALCIUM SERPL-MCNC: 8.7 MG/DL — SIGNIFICANT CHANGE UP (ref 8.5–10.1)
CHLORIDE SERPL-SCNC: 101 MMOL/L — SIGNIFICANT CHANGE UP (ref 98–110)
CO2 SERPL-SCNC: 18 MMOL/L — SIGNIFICANT CHANGE UP (ref 17–32)
CREAT SERPL-MCNC: 0.8 MG/DL — SIGNIFICANT CHANGE UP (ref 0.7–1.5)
EOSINOPHIL # BLD AUTO: 0.12 K/UL — SIGNIFICANT CHANGE UP (ref 0–0.7)
EOSINOPHIL NFR BLD AUTO: 0.7 % — SIGNIFICANT CHANGE UP (ref 0–8)
GLUCOSE BLDC GLUCOMTR-MCNC: 172 MG/DL — HIGH (ref 70–99)
GLUCOSE BLDC GLUCOMTR-MCNC: 178 MG/DL — HIGH (ref 70–99)
GLUCOSE BLDC GLUCOMTR-MCNC: 265 MG/DL — HIGH (ref 70–99)
GLUCOSE SERPL-MCNC: 167 MG/DL — HIGH (ref 70–99)
HCT VFR BLD CALC: 37.9 % — LOW (ref 42–52)
HGB BLD-MCNC: 11.9 G/DL — LOW (ref 14–18)
HIV 1+2 AB+HIV1 P24 AG SERPL QL IA: SIGNIFICANT CHANGE UP
IMM GRANULOCYTES NFR BLD AUTO: 0.9 % — HIGH (ref 0.1–0.3)
LYMPHOCYTES # BLD AUTO: 24 % — SIGNIFICANT CHANGE UP (ref 20.5–51.1)
LYMPHOCYTES # BLD AUTO: 3.93 K/UL — HIGH (ref 1.2–3.4)
MAGNESIUM SERPL-MCNC: 1.6 MG/DL — LOW (ref 1.8–2.4)
MAGNESIUM SERPL-MCNC: 2.7 MG/DL — HIGH (ref 1.8–2.4)
MCHC RBC-ENTMCNC: 27.7 PG — SIGNIFICANT CHANGE UP (ref 27–31)
MCHC RBC-ENTMCNC: 31.4 G/DL — LOW (ref 32–37)
MCV RBC AUTO: 88.1 FL — SIGNIFICANT CHANGE UP (ref 80–94)
MONOCYTES # BLD AUTO: 1.86 K/UL — HIGH (ref 0.1–0.6)
MONOCYTES NFR BLD AUTO: 11.4 % — HIGH (ref 1.7–9.3)
NEUTROPHILS # BLD AUTO: 10.23 K/UL — HIGH (ref 1.4–6.5)
NEUTROPHILS NFR BLD AUTO: 62.5 % — SIGNIFICANT CHANGE UP (ref 42.2–75.2)
NRBC # BLD: 0 /100 WBCS — SIGNIFICANT CHANGE UP (ref 0–0)
PLATELET # BLD AUTO: 386 K/UL — SIGNIFICANT CHANGE UP (ref 130–400)
POTASSIUM SERPL-MCNC: 4.5 MMOL/L — SIGNIFICANT CHANGE UP (ref 3.5–5)
POTASSIUM SERPL-SCNC: 4.5 MMOL/L — SIGNIFICANT CHANGE UP (ref 3.5–5)
PROT SERPL-MCNC: 6.1 G/DL — SIGNIFICANT CHANGE UP (ref 6–8)
RBC # BLD: 4.3 M/UL — LOW (ref 4.7–6.1)
RBC # FLD: 15.6 % — HIGH (ref 11.5–14.5)
SODIUM SERPL-SCNC: 136 MMOL/L — SIGNIFICANT CHANGE UP (ref 135–146)
TROPONIN T SERPL-MCNC: 0.04 NG/ML — CRITICAL HIGH
WBC # BLD: 16.37 K/UL — HIGH (ref 4.8–10.8)
WBC # FLD AUTO: 16.37 K/UL — HIGH (ref 4.8–10.8)

## 2021-12-05 PROCEDURE — 71045 X-RAY EXAM CHEST 1 VIEW: CPT | Mod: 26

## 2021-12-05 PROCEDURE — 95720 EEG PHY/QHP EA INCR W/VEEG: CPT

## 2021-12-05 PROCEDURE — 99233 SBSQ HOSP IP/OBS HIGH 50: CPT

## 2021-12-05 PROCEDURE — 99232 SBSQ HOSP IP/OBS MODERATE 35: CPT

## 2021-12-05 RX ORDER — ACETAMINOPHEN 500 MG
650 TABLET ORAL EVERY 6 HOURS
Refills: 0 | Status: DISCONTINUED | OUTPATIENT
Start: 2021-12-05 | End: 2021-12-09

## 2021-12-05 RX ORDER — GABAPENTIN 400 MG/1
300 CAPSULE ORAL ONCE
Refills: 0 | Status: COMPLETED | OUTPATIENT
Start: 2021-12-05 | End: 2021-12-05

## 2021-12-05 RX ORDER — MAGNESIUM SULFATE 500 MG/ML
2 VIAL (ML) INJECTION EVERY 4 HOURS
Refills: 0 | Status: COMPLETED | OUTPATIENT
Start: 2021-12-05 | End: 2021-12-05

## 2021-12-05 RX ADMIN — Medication 50 GRAM(S): at 12:30

## 2021-12-05 RX ADMIN — GABAPENTIN 300 MILLIGRAM(S): 400 CAPSULE ORAL at 16:29

## 2021-12-05 RX ADMIN — HEPARIN SODIUM 5000 UNIT(S): 5000 INJECTION INTRAVENOUS; SUBCUTANEOUS at 05:43

## 2021-12-05 RX ADMIN — Medication 650 MILLIGRAM(S): at 19:54

## 2021-12-05 RX ADMIN — Medication 3 MILLILITER(S): at 00:00

## 2021-12-05 RX ADMIN — Medication 500000 UNIT(S): at 00:46

## 2021-12-05 RX ADMIN — PANTOPRAZOLE SODIUM 40 MILLIGRAM(S): 20 TABLET, DELAYED RELEASE ORAL at 05:45

## 2021-12-05 RX ADMIN — ALBUTEROL 2 PUFF(S): 90 AEROSOL, METERED ORAL at 19:25

## 2021-12-05 RX ADMIN — Medication 200 MILLIGRAM(S): at 17:46

## 2021-12-05 RX ADMIN — Medication 81 MILLIGRAM(S): at 12:31

## 2021-12-05 RX ADMIN — DEXMEDETOMIDINE HYDROCHLORIDE IN 0.9% SODIUM CHLORIDE 0.9 MICROGRAM(S)/KG/HR: 4 INJECTION INTRAVENOUS at 07:25

## 2021-12-05 RX ADMIN — Medication 20 MILLIGRAM(S): at 05:43

## 2021-12-05 RX ADMIN — LIDOCAINE 1 PATCH: 4 CREAM TOPICAL at 12:30

## 2021-12-05 RX ADMIN — Medication 200 MILLIGRAM(S): at 05:43

## 2021-12-05 RX ADMIN — CLOPIDOGREL BISULFATE 75 MILLIGRAM(S): 75 TABLET, FILM COATED ORAL at 12:31

## 2021-12-05 RX ADMIN — ATORVASTATIN CALCIUM 80 MILLIGRAM(S): 80 TABLET, FILM COATED ORAL at 22:16

## 2021-12-05 RX ADMIN — Medication 50 GRAM(S): at 17:45

## 2021-12-05 RX ADMIN — Medication 650 MILLIGRAM(S): at 01:32

## 2021-12-05 RX ADMIN — LIDOCAINE 1 PATCH: 4 CREAM TOPICAL at 19:22

## 2021-12-05 RX ADMIN — TIOTROPIUM BROMIDE 1 CAPSULE(S): 18 CAPSULE ORAL; RESPIRATORY (INHALATION) at 10:42

## 2021-12-05 RX ADMIN — Medication 500000 UNIT(S): at 17:46

## 2021-12-05 RX ADMIN — ALBUTEROL 2 PUFF(S): 90 AEROSOL, METERED ORAL at 07:19

## 2021-12-05 RX ADMIN — Medication 650 MILLIGRAM(S): at 10:40

## 2021-12-05 RX ADMIN — Medication 650 MILLIGRAM(S): at 19:24

## 2021-12-05 RX ADMIN — BUDESONIDE AND FORMOTEROL FUMARATE DIHYDRATE 2 PUFF(S): 160; 4.5 AEROSOL RESPIRATORY (INHALATION) at 10:42

## 2021-12-05 RX ADMIN — FLUCONAZOLE 200 MILLIGRAM(S): 150 TABLET ORAL at 12:31

## 2021-12-05 RX ADMIN — ALBUTEROL 2 PUFF(S): 90 AEROSOL, METERED ORAL at 15:41

## 2021-12-05 RX ADMIN — BUDESONIDE AND FORMOTEROL FUMARATE DIHYDRATE 2 PUFF(S): 160; 4.5 AEROSOL RESPIRATORY (INHALATION) at 19:25

## 2021-12-05 RX ADMIN — Medication 500000 UNIT(S): at 05:42

## 2021-12-05 RX ADMIN — CHLORHEXIDINE GLUCONATE 1 APPLICATION(S): 213 SOLUTION TOPICAL at 05:45

## 2021-12-05 RX ADMIN — DEXMEDETOMIDINE HYDROCHLORIDE IN 0.9% SODIUM CHLORIDE 0.9 MICROGRAM(S)/KG/HR: 4 INJECTION INTRAVENOUS at 03:47

## 2021-12-05 RX ADMIN — LIDOCAINE 1 PATCH: 4 CREAM TOPICAL at 00:46

## 2021-12-05 RX ADMIN — HEPARIN SODIUM 5000 UNIT(S): 5000 INJECTION INTRAVENOUS; SUBCUTANEOUS at 17:46

## 2021-12-05 RX ADMIN — Medication 500000 UNIT(S): at 12:32

## 2021-12-05 RX ADMIN — Medication 650 MILLIGRAM(S): at 01:02

## 2021-12-05 RX ADMIN — Medication 650 MILLIGRAM(S): at 11:40

## 2021-12-05 NOTE — PROGRESS NOTE ADULT - SUBJECTIVE AND OBJECTIVE BOX
COVERING FOR DR HOLLIDAY        HPI:  Patient is a 76 year old Male with a past medical history of Asthma, COPD, DM, HLD, Prostate CA S/P TURP, Shingles presented to the ER with a chief complaint of Left sided Chest pain. Patient had Shingles earlier this month. Patient describes Constant, stabbing left sided chest pain that radiates to his back, 9/10, tried percocet / gabapentin / Pregabalin without much improvement. As per wife, last night patient had a fever of 102.5 and wife tried white vinegar with 2 Tylenol tablets, but patient was in excruciating pain. Patient admits to fever, chills, chest pain, constipation, rash on tongue and palpitations (only when patient takes Percocet). Patient denies nausea, vomiting, visual changes, auditory changes, shortness of breath, abdominal pain, diarrhea, dysuria.  (29 Nov 2021 17:38)      Pt evaluated on rounds.  I reviewed the radiology tests and hospital record.    I reviewed previous notes on this patient.    Interval Events: No overnight events.            REVIEW OF SYSTEMS:   see HPI      OBJECTIVE:  ICU Vital Signs Last 24 Hrs  T(C): 35.7 (05 Dec 2021 03:00), Max: 37 (04 Dec 2021 23:01)  T(F): 96.3 (05 Dec 2021 03:00), Max: 98.6 (04 Dec 2021 23:01)  HR: 74 (05 Dec 2021 02:55) (72 - 83)  BP: 131/63 (05 Dec 2021 02:55) (100/65 - 157/80)  BP(mean): 90 (05 Dec 2021 02:55) (75 - 105)    RR: 28 (05 Dec 2021 03:00) (20 - 46)  SpO2: 100% (05 Dec 2021 02:55) (95% - 100%)        12-03 @ 07:01  -  12-04 @ 07:00  --------------------------------------------------------  IN: 197 mL / OUT: 425 mL / NET: -228 mL    12-04 @ 07:01  -  12-05 @ 05:05  --------------------------------------------------------  IN: 522 mL / OUT: 175 mL / NET: 347 mL      CAPILLARY BLOOD GLUCOSE      POCT Blood Glucose.: 153 mg/dL (04 Dec 2021 22:01)        PHYSICAL EXAM:     · CONSTITUTIONAL:   ill appearing,   NAD    · ENMT:   Airway patent,   Nasal mucosa clear.  Mouth with normal mucosa.   No thrush    · EYES:   Clear bilaterally,   pupils equal,   round and reactive to light.    · CARDIAC:   Normal rate,   regular rhythm.    Heart sounds S1, S2.   No murmurs, no rubs or gallops on auscultation  no edema        CAROTID:   normal systolic impulse  no bruits    · RESPIRATORY:   rales  normal chest expansion  no retractions or use of accessory muscles  palpation of chest is normal with no fremitus  percussion of chest demonstrates no hyperresonance or dullness    · GASTROINTESTINAL:  Abdomen soft,   non-tender,   + BS  liver/spleen not palpable    · MUSCULOSKELETAL:   no clubbing, cyanosis      · SKIN:   Skin normal color for race,   warm, dry   No evidence of rash.        · HEME LYMPH:   no splenomegaly.  No cervical  lymphadenopathy.  no inguinal lymphadenopathy    HOSPITAL MEDICATIONS:  MEDICATIONS  (STANDING):  ALBUTerol    90 MICROgram(s) HFA Inhaler 2 Puff(s) Inhalation every 6 hours  aspirin  chewable 81 milliGRAM(s) Enteral Tube daily  atorvastatin 80 milliGRAM(s) Oral at bedtime  budesonide 160 MICROgram(s)/formoterol 4.5 MICROgram(s) Inhaler 2 Puff(s) Inhalation two times a day  carBAMazepine 200 milliGRAM(s) Oral two times a day  chlorhexidine 4% Liquid 1 Application(s) Topical <User Schedule>  clopidogrel Tablet 75 milliGRAM(s) Oral daily  dexMEDEtomidine Infusion 0.05 MICROgram(s)/kG/Hr (0.9 mL/Hr) IV Continuous <Continuous>  dextrose 40% Gel 15 Gram(s) Oral once  dextrose 5%. 1000 milliLiter(s) (50 mL/Hr) IV Continuous <Continuous>  dextrose 5%. 1000 milliLiter(s) (100 mL/Hr) IV Continuous <Continuous>  dextrose 50% Injectable 25 Gram(s) IV Push once  dextrose 50% Injectable 12.5 Gram(s) IV Push once  fluconAZOLE   Tablet 200 milliGRAM(s) Oral daily  glucagon  Injectable 1 milliGRAM(s) IntraMuscular once  heparin   Injectable 5000 Unit(s) SubCutaneous every 12 hours  influenza  Vaccine (HIGH DOSE) 0.7 milliLiter(s) IntraMuscular once  lidocaine   4% Patch 1 Patch Transdermal every 24 hours  nystatin    Suspension 836676 Unit(s) Oral every 6 hours  pantoprazole    Tablet 40 milliGRAM(s) Oral before breakfast  predniSONE   Tablet 20 milliGRAM(s) Oral daily  tiotropium 18 MICROgram(s) Capsule 1 Capsule(s) Inhalation daily    MEDICATIONS  (PRN):  acetaminophen     Tablet .. 650 milliGRAM(s) Oral every 6 hours PRN Temp greater or equal to 38C (100.4F), Mild Pain (1 - 3)  albuterol/ipratropium for Nebulization 3 milliLiter(s) Nebulizer every 6 hours PRN Shortness of Breath and/or Wheezing  aluminum hydroxide/magnesium hydroxide/simethicone Suspension 30 milliLiter(s) Oral every 4 hours PRN Dyspepsia  melatonin 3 milliGRAM(s) Oral at bedtime PRN Insomnia  ondansetron Injectable 4 milliGRAM(s) IV Push every 8 hours PRN Nausea and/or Vomiting  polyethylene glycol 3350 17 Gram(s) Oral daily PRN Constipation  senna 2 Tablet(s) Oral at bedtime PRN Constipation        LABS:                        12.3   12.95 )-----------( 416      ( 04 Dec 2021 05:41 )             39.5     12-04    143  |  105  |  20  ----------------------------<  138<H>  4.6   |  23  |  0.9    Ca    8.9      04 Dec 2021 05:41    TPro  6.2  /  Alb  3.4<L>  /  TBili  0.4  /  DBili  x   /  AST  23  /  ALT  21  /  AlkPhos  57  12-04                  COVID-19 PCR: NotDetec (29 Nov 2021 14:46)  COVID-19 PCR: NotDetec (07 Nov 2021 14:30)            RADIOLOGY: Today I personally interpreted the latest CXR and other pertinent films.    no ptx, no inftr. no free air.  CT scan        .crti    .crit2

## 2021-12-05 NOTE — PROGRESS NOTE ADULT - ASSESSMENT
Patient is a 76 year old Male with a past medical history of Asthma, COPD, DM, HLD, Prostate CA S/P TURP, Shingles presented to the ER with a chief complaint of Left sided Chest pain, blurred vision, confusion. Found to have acute left high parietal infarct on MRI. Overnight patient had change in mental status, agitated, delirious, no longer speaking English. Upon exam patient combative requiring chemical sedation. Repeat CTH overnight unchanged.    acute delirium  questionable recent infarct, doubt that it explains acute delirium. Suspect medication adverse effect.   shingles  No seizure activity on eeg.    Recommend:  - suspect medication adverse effect: keep off percocet, lyrica, gabapentin, amitriptyline  - continue carbamazepine 200 mg PO q12h for pain and mood stability.  If needed may increase to 300 mg bid, however family preferred management of pain with lidocaine patch (I don't think that will be enough and typically is   is put on for 12 hours at a time and removed)  - f/u with pain management  - Due to possible punctate acute infarct seen on brain MRI would continue maximal medical therapy to decrease risk of future stroke.  - cardiac echo, 30-day event monitory or loop recorder, and stroke clinic f/u in 2 weeks.  - continue dual antiplatelet therapy and increase lipitor if tolerated to 80 mg/day for now.

## 2021-12-05 NOTE — PROGRESS NOTE ADULT - SUBJECTIVE AND OBJECTIVE BOX
Patient is seen and examined at the bed side, is afebrile. The WBC count is trending up.    REVIEW OF SYSTEMS: All other review systems are negative      ALLERGIES: No Known Allergies      ICU Vital Signs Last 24 Hrs  T(C): 36.2 (05 Dec 2021 09:01), Max: 37 (04 Dec 2021 23:01)  T(F): 97.2 (05 Dec 2021 09:01), Max: 98.6 (04 Dec 2021 23:01)  HR: 106 (05 Dec 2021 18:01) (66 - 106)  BP: 137/61 (05 Dec 2021 18:01) (105/52 - 149/63)  BP(mean): 88 (05 Dec 2021 18:01) (75 - 98)  ABP: --  ABP(mean): --  RR: 20 (05 Dec 2021 18:01) (19 - 46)  SpO2: 96% (05 Dec 2021 18:01) (96% - 100%)      PHYSICAL EXAM:  GENERAL: Not in distress   CHEST/LUNG: Not using accessory muscles  HEART: s1 and s2 present  ABDOMEN:  Nontender and  Nondistended  EXTREMITIES: No pedal  edema  CNS: Awake and Alert      LABS:                        11.9   16.37 )-----------( 386      ( 05 Dec 2021 07:11 )             37.9                           11.3   9.14  )-----------( 327      ( 02 Dec 2021 06:35 )             35.2       12-05    136  |  101  |  21<H>  ----------------------------<  167<H>  4.5   |  18  |  0.8    Ca    8.7      05 Dec 2021 07:11  Mg     2.7     12-05    TPro  6.1  /  Alb  3.4<L>  /  TBili  0.7  /  DBili  x   /  AST  20  /  ALT  20  /  AlkPhos  63  12-05    12-02    138  |  102  |  14  ----------------------------<  151<H>  4.0   |  25  |  0.9    Ca    8.6      02 Dec 2021 06:35    TPro  5.9<L>  /  Alb  3.2<L>  /  TBili  0.3  /  DBili  x   /  AST  11  /  ALT  15  /  AlkPhos  55  12-02      CAPILLARY BLOOD GLUCOSE  POCT Blood Glucose.: 129 mg/dL (02 Dec 2021 21:25)  POCT Blood Glucose.: 478 mg/dL (02 Dec 2021 16:23)  POCT Blood Glucose.: 144 mg/dL (02 Dec 2021 07:58)  POCT Blood Glucose.: 151 mg/dL (01 Dec 2021 22:00)        MEDICATIONS  (STANDING):    ALBUTerol    90 MICROgram(s) HFA Inhaler 2 Puff(s) Inhalation every 6 hours  aspirin  chewable 81 milliGRAM(s) Enteral Tube daily  atorvastatin 80 milliGRAM(s) Oral at bedtime  budesonide 160 MICROgram(s)/formoterol 4.5 MICROgram(s) Inhaler 2 Puff(s) Inhalation two times a day  carBAMazepine 200 milliGRAM(s) Oral two times a day  chlorhexidine 4% Liquid 1 Application(s) Topical <User Schedule>  clopidogrel Tablet 75 milliGRAM(s) Oral daily  dexMEDEtomidine Infusion 0.05 MICROgram(s)/kG/Hr (0.9 mL/Hr) IV Continuous <Continuous>  fluconAZOLE   Tablet 200 milliGRAM(s) Oral daily  glucagon  Injectable 1 milliGRAM(s) IntraMuscular once  heparin   Injectable 5000 Unit(s) SubCutaneous every 12 hours  influenza  Vaccine (HIGH DOSE) 0.7 milliLiter(s) IntraMuscular once  lidocaine   4% Patch 1 Patch Transdermal every 24 hours  nystatin    Suspension 198328 Unit(s) Oral every 6 hours  pantoprazole    Tablet 40 milliGRAM(s) Oral before breakfast  tiotropium 18 MICROgram(s) Capsule 1 Capsule(s) Inhalation daily      RADIOLOGY & ADDITIONAL TESTS:    < from: CT Angio Head w/ IV Cont (12.02.21 @ 19:36) >  No large vessel occlusion, aneurysm, vascular malformation.    moderate atherosclerotic stenosis of the bilateral clinoid/supraclinoid ICAs.    < from: MR Head w/wo IV Cont (12.02.21 @ 13:24) >Punctate acute infarct in the high left parietal lobe.    Redemonstrated centrally calcified meningioma along the high right parietal convexity. No significant peritumoral edema.    Mild chronic microvascular ischemic changes.      < from: CT Head No Cont (12.01.21 @ 16:36) >    1.  No CT evidence of acute intracranial pathology.    2.  Right high frontal calcified meningioma measuring 1.8 cm.      MICROBIOLOGY DATA:    FTA Syphilis Confirmatory (12.02.21 @ 06:35)   FTA Syphilis Confirmatory: Nonreact:   Syphilis Screen (12.02.21 @ 06:35)   Treponema Pallidum Antibody Interpretation: Positive    COVID-19 PCR . (11.29.21 @ 14:46)   COVID-19 PCR: NotDetec:           Patient is seen and examined at the bed side, is afebrile. The WBC count is trending up.      REVIEW OF SYSTEMS: All other review systems are negative      ALLERGIES: No Known Allergies      ICU Vital Signs Last 24 Hrs  T(C): 36.2 (05 Dec 2021 09:01), Max: 37 (04 Dec 2021 23:01)  T(F): 97.2 (05 Dec 2021 09:01), Max: 98.6 (04 Dec 2021 23:01)  HR: 106 (05 Dec 2021 18:01) (66 - 106)  BP: 137/61 (05 Dec 2021 18:01) (105/52 - 149/63)  BP(mean): 88 (05 Dec 2021 18:01) (75 - 98)  ABP: --  ABP(mean): --  RR: 20 (05 Dec 2021 18:01) (19 - 46)  SpO2: 96% (05 Dec 2021 18:01) (96% - 100%)      PHYSICAL EXAM:  GENERAL: Not in distress   CHEST/LUNG: Not using accessory muscles  HEART: s1 and s2 present  ABDOMEN:  Nontender and  Nondistended  EXTREMITIES: No pedal  edema  CNS: Awake and Alert      LABS:                        11.9   16.37 )-----------( 386      ( 05 Dec 2021 07:11 )             37.9                           11.3   9.14  )-----------( 327      ( 02 Dec 2021 06:35 )             35.2       12-05    136  |  101  |  21<H>  ----------------------------<  167<H>  4.5   |  18  |  0.8    Ca    8.7      05 Dec 2021 07:11  Mg     2.7     12-05    TPro  6.1  /  Alb  3.4<L>  /  TBili  0.7  /  DBili  x   /  AST  20  /  ALT  20  /  AlkPhos  63  12-05    12-02    138  |  102  |  14  ----------------------------<  151<H>  4.0   |  25  |  0.9    Ca    8.6      02 Dec 2021 06:35    TPro  5.9<L>  /  Alb  3.2<L>  /  TBili  0.3  /  DBili  x   /  AST  11  /  ALT  15  /  AlkPhos  55  12-02      CAPILLARY BLOOD GLUCOSE  POCT Blood Glucose.: 129 mg/dL (02 Dec 2021 21:25)  POCT Blood Glucose.: 478 mg/dL (02 Dec 2021 16:23)  POCT Blood Glucose.: 144 mg/dL (02 Dec 2021 07:58)  POCT Blood Glucose.: 151 mg/dL (01 Dec 2021 22:00)        MEDICATIONS  (STANDING):    ALBUTerol    90 MICROgram(s) HFA Inhaler 2 Puff(s) Inhalation every 6 hours  aspirin  chewable 81 milliGRAM(s) Enteral Tube daily  atorvastatin 80 milliGRAM(s) Oral at bedtime  budesonide 160 MICROgram(s)/formoterol 4.5 MICROgram(s) Inhaler 2 Puff(s) Inhalation two times a day  carBAMazepine 200 milliGRAM(s) Oral two times a day  chlorhexidine 4% Liquid 1 Application(s) Topical <User Schedule>  clopidogrel Tablet 75 milliGRAM(s) Oral daily  dexMEDEtomidine Infusion 0.05 MICROgram(s)/kG/Hr (0.9 mL/Hr) IV Continuous <Continuous>  fluconAZOLE   Tablet 200 milliGRAM(s) Oral daily  glucagon  Injectable 1 milliGRAM(s) IntraMuscular once  heparin   Injectable 5000 Unit(s) SubCutaneous every 12 hours  influenza  Vaccine (HIGH DOSE) 0.7 milliLiter(s) IntraMuscular once  lidocaine   4% Patch 1 Patch Transdermal every 24 hours  nystatin    Suspension 071845 Unit(s) Oral every 6 hours  pantoprazole    Tablet 40 milliGRAM(s) Oral before breakfast  tiotropium 18 MICROgram(s) Capsule 1 Capsule(s) Inhalation daily      RADIOLOGY & ADDITIONAL TESTS:    < from: CT Angio Head w/ IV Cont (12.02.21 @ 19:36) >  No large vessel occlusion, aneurysm, vascular malformation.    moderate atherosclerotic stenosis of the bilateral clinoid/supraclinoid ICAs.    < from: MR Head w/wo IV Cont (12.02.21 @ 13:24) >Punctate acute infarct in the high left parietal lobe.    Redemonstrated centrally calcified meningioma along the high right parietal convexity. No significant peritumoral edema.    Mild chronic microvascular ischemic changes.      < from: CT Head No Cont (12.01.21 @ 16:36) >    1.  No CT evidence of acute intracranial pathology.    2.  Right high frontal calcified meningioma measuring 1.8 cm.      MICROBIOLOGY DATA:    FTA Syphilis Confirmatory (12.02.21 @ 06:35)   FTA Syphilis Confirmatory: Nonreact:   Syphilis Screen (12.02.21 @ 06:35)   Treponema Pallidum Antibody Interpretation: Positive    COVID-19 PCR . (11.29.21 @ 14:46)   COVID-19 PCR: NotDetec:           Patient is seen and examined at the bed side, is afebrile.  he mentioned feeling better. The WBC count is trending up.      REVIEW OF SYSTEMS: All other review systems are negative      ALLERGIES: No Known Allergies      ICU Vital Signs Last 24 Hrs  T(C): 36.2 (05 Dec 2021 09:01), Max: 37 (04 Dec 2021 23:01)  T(F): 97.2 (05 Dec 2021 09:01), Max: 98.6 (04 Dec 2021 23:01)  HR: 106 (05 Dec 2021 18:01) (66 - 106)  BP: 137/61 (05 Dec 2021 18:01) (105/52 - 149/63)  BP(mean): 88 (05 Dec 2021 18:01) (75 - 98)  ABP: --  ABP(mean): --  RR: 20 (05 Dec 2021 18:01) (19 - 46)  SpO2: 96% (05 Dec 2021 18:01) (96% - 100%)      PHYSICAL EXAM:  GENERAL: Not in distress   CHEST/LUNG: Not using accessory muscles  HEART: s1 and s2 present  ABDOMEN:  Nontender and  Nondistended  EXTREMITIES: No pedal  edema  CNS: Awake and Alert      LABS:                        11.9   16.37 )-----------( 386      ( 05 Dec 2021 07:11 )             37.9                           11.3   9.14  )-----------( 327      ( 02 Dec 2021 06:35 )             35.2       12-05    136  |  101  |  21<H>  ----------------------------<  167<H>  4.5   |  18  |  0.8    Ca    8.7      05 Dec 2021 07:11  Mg     2.7     12-05    TPro  6.1  /  Alb  3.4<L>  /  TBili  0.7  /  DBili  x   /  AST  20  /  ALT  20  /  AlkPhos  63  12-05    12-02    138  |  102  |  14  ----------------------------<  151<H>  4.0   |  25  |  0.9    Ca    8.6      02 Dec 2021 06:35    TPro  5.9<L>  /  Alb  3.2<L>  /  TBili  0.3  /  DBili  x   /  AST  11  /  ALT  15  /  AlkPhos  55  12-02      CAPILLARY BLOOD GLUCOSE  POCT Blood Glucose.: 129 mg/dL (02 Dec 2021 21:25)  POCT Blood Glucose.: 478 mg/dL (02 Dec 2021 16:23)  POCT Blood Glucose.: 144 mg/dL (02 Dec 2021 07:58)  POCT Blood Glucose.: 151 mg/dL (01 Dec 2021 22:00)        MEDICATIONS  (STANDING):    ALBUTerol    90 MICROgram(s) HFA Inhaler 2 Puff(s) Inhalation every 6 hours  aspirin  chewable 81 milliGRAM(s) Enteral Tube daily  atorvastatin 80 milliGRAM(s) Oral at bedtime  budesonide 160 MICROgram(s)/formoterol 4.5 MICROgram(s) Inhaler 2 Puff(s) Inhalation two times a day  carBAMazepine 200 milliGRAM(s) Oral two times a day  chlorhexidine 4% Liquid 1 Application(s) Topical <User Schedule>  clopidogrel Tablet 75 milliGRAM(s) Oral daily  dexMEDEtomidine Infusion 0.05 MICROgram(s)/kG/Hr (0.9 mL/Hr) IV Continuous <Continuous>  fluconAZOLE   Tablet 200 milliGRAM(s) Oral daily  glucagon  Injectable 1 milliGRAM(s) IntraMuscular once  heparin   Injectable 5000 Unit(s) SubCutaneous every 12 hours  influenza  Vaccine (HIGH DOSE) 0.7 milliLiter(s) IntraMuscular once  lidocaine   4% Patch 1 Patch Transdermal every 24 hours  nystatin    Suspension 433221 Unit(s) Oral every 6 hours  pantoprazole    Tablet 40 milliGRAM(s) Oral before breakfast  tiotropium 18 MICROgram(s) Capsule 1 Capsule(s) Inhalation daily      RADIOLOGY & ADDITIONAL TESTS:    < from: CT Angio Head w/ IV Cont (12.02.21 @ 19:36) >  No large vessel occlusion, aneurysm, vascular malformation.    moderate atherosclerotic stenosis of the bilateral clinoid/supraclinoid ICAs.    < from: MR Head w/wo IV Cont (12.02.21 @ 13:24) >Punctate acute infarct in the high left parietal lobe.    Redemonstrated centrally calcified meningioma along the high right parietal convexity. No significant peritumoral edema.    Mild chronic microvascular ischemic changes.      < from: CT Head No Cont (12.01.21 @ 16:36) >    1.  No CT evidence of acute intracranial pathology.    2.  Right high frontal calcified meningioma measuring 1.8 cm.      MICROBIOLOGY DATA:    FTA Syphilis Confirmatory (12.02.21 @ 06:35)   FTA Syphilis Confirmatory: Nonreact:   Syphilis Screen (12.02.21 @ 06:35)   Treponema Pallidum Antibody Interpretation: Positive    COVID-19 PCR . (11.29.21 @ 14:46)   COVID-19 PCR: NotDetec:

## 2021-12-05 NOTE — SWALLOW BEDSIDE ASSESSMENT ADULT - COMMENTS
Patient was given sedative medication, precedex and was discontinued this AM, RN reported improved arousal but possible he may improve more as the day goes on. MRI:  Punctate acute infarct in the high left parietal lobe. Redemonstrated centrally calcified meningioma along the high right parietal convexity. No significant peritumoral edema. Mild chronic microvascular ischemic changes.

## 2021-12-05 NOTE — PROGRESS NOTE ADULT - ASSESSMENT
Continue O2 to keep Sao2>92%.    Aspiration precautions keep HOB > 35 degrees  Continue maintenance asthma and COPD medications    Prednisone can be discontinued  GI and DVT prophylaxis recommended  Monitor neuro status, neurology follow up, d/c all unnecessary medications  follow ID recommendations

## 2021-12-05 NOTE — SWALLOW BEDSIDE ASSESSMENT ADULT - SWALLOW EVAL: RECOMMENDED DIET
NPO with alternate means of nutrition/hydration consider repeat RN screening if patient arousal improves.

## 2021-12-05 NOTE — PROGRESS NOTE ADULT - SUBJECTIVE AND OBJECTIVE BOX
DEL ALVARADO     76y     Male    MRN-[id number]                                                           CC:Patient is a 76y old  Male who presents with a chief complaint of Chest pain (03 Dec 2021 11:23)      HPI:  Patient is a 76 year old Male with a past medical history of Asthma, COPD, DM, HLD, Prostate CA S/P TURP, Shingles presented to the ER with a chief complaint of Left sided Chest pain. Patient had Shingles earlier this month. Patient describes Constant, stabbing left sided chest pain that radiates to his back, 9/10, tried percocet / gabapentin / Pregabalin without much improvement. As per wife, last night patient had a fever of 102.5 and wife tried white vinegar with 2 Tylenol tablets, but patient was in excruciating pain. Patient admits to fever, chills, chest pain, constipation, rash on tongue and palpitations (only when patient takes Percocet). Patient denies nausea, vomiting, visual changes, auditory changes, shortness of breath, abdominal pain, diarrhea, dysuria.  (29 Nov 2021 17:38)    Neuro: patient now with likely acute delirium since night. Now requiring chemical sedation with haldol and precedex. CTH unchanged.    ROS:  Constitutional, Neurological, Psychiatric, Eyes, ENT, Cardiovascular, Respiratory, Gastrointestinal, Genitourinary, Musculoskeletal, Integumentary, Endocrine and Heme/Lymph are otherwise negative. Except for    Social History: No smoking, No drinking, No drug use    FAMILY HISTORY:  Family history of asthma    Family history of diabetes mellitus (DM)        HEALTH ISSUES - PROBLEM Dx:  Stroke    Chest pain          ICU Vital Signs Last 24 Hrs  T(C): 36.8 (05 Dec 2021 23:01), Max: 36.8 (05 Dec 2021 23:01)  T(F): 98.2 (05 Dec 2021 23:01), Max: 98.2 (05 Dec 2021 23:01)  HR: 121 (05 Dec 2021 20:00) (66 - 121)  BP: 131/61 (05 Dec 2021 20:00) (105/52 - 149/63)  BP(mean): 88 (05 Dec 2021 20:00) (75 - 94)  ABP: --  ABP(mean): --  RR: 24 (05 Dec 2021 23:01) (19 - 31)  SpO2: 99% (05 Dec 2021 20:00) (96% - 100%)      Neuro:  MENTAL STATUS: alert, speaking English again.  No longer agitated.  CRANIAL NERVES: face symmetric, EOM's full, no nystagmus.  MOTOR: spontaneously moves all extremities, no drift, full strength arms and legs.  REFLEXES: symmetric.  SENSORY: intact LT, PP  COORD: no ataxia  GAIT: not assessed      Allergies    No Known Allergies       Home Medications:  atorvastatin 10 mg oral tablet: 1 tab(s) orally once a day (29 Nov 2021 18:22)  Breo Ellipta 200 mcg-25 mcg/inh inhalation powder:  (29 Nov 2021 18:22)  Incruse Ellipta 62.5 mcg/inh inhalation powder: 1 puff(s) inhaled every 24 hours (29 Nov 2021 18:22)  insulin lispro 100 units/mL injectable solution: 20 unit(s) injectable 3 times a day (29 Nov 2021 18:22)  Levemir 100 units/mL subcutaneous solution: 40 unit(s) subcutaneous once a day in the AM (29 Nov 2021 18:22)  Percocet 5/325 oral tablet: 1 tab(s) orally every 6 hours, As Needed (29 Nov 2021 18:22)  predniSONE 20 mg oral tablet: 1 tab(s) orally once a day (29 Nov 2021 18:22)  pregabalin 75 mg oral capsule: 1 cap(s) orally 3 times a day (29 Nov 2021 18:22)  Symbicort 160 mcg-4.5 mcg/inh inhalation aerosol: 2 puff(s) inhaled 2 times a day (29 Nov 2021 18:22)      MEDICATIONS  (STANDING):  ALBUTerol    90 MICROgram(s) HFA Inhaler 2 Puff(s) Inhalation every 6 hours  aspirin  chewable 81 milliGRAM(s) Enteral Tube daily  atorvastatin 80 milliGRAM(s) Oral at bedtime  budesonide 160 MICROgram(s)/formoterol 4.5 MICROgram(s) Inhaler 2 Puff(s) Inhalation two times a day  carBAMazepine 200 milliGRAM(s) Oral two times a day  chlorhexidine 4% Liquid 1 Application(s) Topical <User Schedule>  clopidogrel Tablet 75 milliGRAM(s) Oral daily  dexMEDEtomidine Infusion 0.05 MICROgram(s)/kG/Hr (0.9 mL/Hr) IV Continuous <Continuous>  dextrose 40% Gel 15 Gram(s) Oral once  dextrose 5%. 1000 milliLiter(s) (50 mL/Hr) IV Continuous <Continuous>  dextrose 5%. 1000 milliLiter(s) (100 mL/Hr) IV Continuous <Continuous>  dextrose 50% Injectable 12.5 Gram(s) IV Push once  dextrose 50% Injectable 25 Gram(s) IV Push once  fluconAZOLE   Tablet 200 milliGRAM(s) Oral daily  glucagon  Injectable 1 milliGRAM(s) IntraMuscular once  heparin   Injectable 5000 Unit(s) SubCutaneous every 12 hours  influenza  Vaccine (HIGH DOSE) 0.7 milliLiter(s) IntraMuscular once  lidocaine   4% Patch 1 Patch Transdermal every 24 hours  nystatin    Suspension 128705 Unit(s) Oral every 6 hours  pantoprazole    Tablet 40 milliGRAM(s) Oral before breakfast  predniSONE   Tablet 20 milliGRAM(s) Oral daily  tiotropium 18 MICROgram(s) Capsule 1 Capsule(s) Inhalation daily    LABS:             Comprehensive Metabolic Panel in AM (12.04.21 @ 05:41)    Sodium, Serum: 143 mmol/L    Potassium, Serum: 4.6 mmol/L    Chloride, Serum: 105 mmol/L    Carbon Dioxide, Serum: 23 mmol/L    Anion Gap, Serum: 15 mmol/L    Blood Urea Nitrogen, Serum: 20 mg/dL    Creatinine, Serum: 0.9 mg/dL    Glucose, Serum: 138 mg/dL    Calcium, Total Serum: 8.9 mg/dL    Protein Total, Serum: 6.2 g/dL    Albumin, Serum: 3.4 g/dL    Bilirubin Total, Serum: 0.4 mg/dL    Alkaline Phosphatase, Serum: 57 U/L    Aspartate Aminotransferase (AST/SGOT): 23 U/L    Alanine Aminotransferase (ALT/SGPT): 21 U/L    eGFR if Non : 82: Interpretative comment      MRI brain:  IMPRESSION:    Punctate acute infarct in the high left parietal lobe.    Redemonstrated centrally calcified meningioma along the high right parietal convexity. No significant peritumoral edema.    Mild chronic microvascular ischemic changes.    < end of copied text >  < from: CT Angio Head w/ IV Cont (12.02.21 @ 19:36) >  IMPRESSION:    No large vessel occlusion, aneurysm, vascular malformation.    moderate atherosclerotic stenosis of the bilateral clinoid/supraclinoid ICAs.    < end of copied text >  < from: CT Head No Cont (12.03.21 @ 04:27) >    Impression:      Markedly motion degraded exam without definite intracranial hemorrhage or midline shift.    < end of copied text >      < from: EEG w/ Video Set Up Patient Education Min 8 Channels (12.04.21 @ 14:15) >  Abnormal due to the presence of generalized slowing as above.      Clinical Correlation  Findings consistent with diffuse electrocerebral dysfunction secondary to nonspecific etiology.    < end of copied text >

## 2021-12-05 NOTE — PROGRESS NOTE ADULT - ASSESSMENT
Patient is a 76 year old Male with a past medical history of Asthma, COPD, DM, HLD, Prostate CA S/P TURP, Shingles presented to the ER with a chief complaint of Left sided Chest pain. Patient had Shingles earlier this month. Patient describes Constant, stabbing left sided chest pain that radiates to his back, 9/10, tried percocet / gabapentin / Pregabalin without much improvement. As per wife, last night patient had a fever of 102.5 and wife tried white vinegar with 2 Tylenol tablets, but patient was in excruciating pain. Patient admits to fever, chills, chest pain, constipation, rash on tongue and palpitations (only when patient takes Percocet). Patient denies nausea, vomiting, visual changes, auditory changes, shortness of breath, abdominal pain, diarrhea, dysuria.    PROBLEMS  # chest pain, hx DM & hx shingles.  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 Monitor WBC count   2. Continue Fluconazole 200 mg daily and nystatin suspension for oral candidiasis   3. Management of left parietal lobe infarct  as per Neurology  4. HIV test  5. Continue Gabapentin for herpetic pain    d/w ICU team    Attending Attestation:    Spent more than 45 minutes on total encounter, more than 50 % of the visit was spent counseling and/or coordinating care by the Attending physician. Patient is a 76 year old Male with a past medical history of Asthma, COPD, DM, HLD, Prostate CA S/P TURP, Shingles presented to the ER with a chief complaint of Left sided Chest pain. Patient had Shingles earlier this month. Patient describes Constant, stabbing left sided chest pain that radiates to his back, 9/10, tried percocet / gabapentin / Pregabalin without much improvement. As per wife, last night patient had a fever of 102.5 and wife tried white vinegar with 2 Tylenol tablets, but patient was in excruciating pain. Patient admits to fever, chills, chest pain, constipation, rash on tongue and palpitations (only when patient takes Percocet). Patient denies nausea, vomiting, visual changes, auditory changes, shortness of breath, abdominal pain, diarrhea, dysuria.    PROBLEMS  # chest pain, hx DM & hx shingles.  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 Monitor WBC count   2. Continue Fluconazole 200 mg daily and nystatin suspension for oral candidiasis   3. Management of left parietal lobe infarct  as per Neurology  4. Continue Gabapentin for herpetic pain    d/w ICU team    Attending Attestation:    Spent more than 45 minutes on total encounter, more than 50 % of the visit was spent counseling and/or coordinating care by the Attending physician. Patient is a 76 year old Male with a past medical history of Asthma, COPD, DM, HLD, Prostate CA S/P TURP, Shingles presented to the ER with a chief complaint of Left sided Chest pain. Patient had Shingles earlier this month. Patient describes Constant, stabbing left sided chest pain that radiates to his back, 9/10, tried percocet / gabapentin / Pregabalin without much improvement. As per wife, last night patient had a fever of 102.5 and wife tried white vinegar with 2 Tylenol tablets, but patient was in excruciating pain. Patient admits to fever, chills, chest pain, constipation, rash on tongue and palpitations (only when patient takes Percocet). Patient denies nausea, vomiting, visual changes, auditory changes, shortness of breath, abdominal pain, diarrhea, dysuria.    PROBLEMS  # chest pain, hx DM & hx shingles.  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 Monitor WBC count, is elevated  2. Continue Fluconazole 200 mg daily and nystatin suspension for oral candidiasis   3. Management of left parietal lobe infarct  as per Neurology  4. Continue Gabapentin for herpetic pain  5. OOB to chair     d/w ICU team    Attending Attestation:    Spent more than 35 minutes on total encounter, more than 50 % of the visit was spent counseling and/or coordinating care by the Attending physician.

## 2021-12-05 NOTE — PROGRESS NOTE ADULT - ASSESSMENT
DEL ALVARADO 76y Male  MRN#: [id number]   CODE STATUS:full code      SUBJECTIVE  Patient is a 76y old Male who presents with a chief complaint of Chest pain (05 Dec 2021 05:05)  Currently admitted to medicine with the primary diagnosis of Elevated troponin  Today is hospital day 6d, and this morning RN reports  overnight events.     OBJECTIVE  PAST MEDICAL & SURGICAL HISTORY  Diabetes  20 yrs    Prostate cancer  1999 s/p sx    Asthma  20 yr    COPD (chronic obstructive pulmonary disease)  20 yrs no 02 rx    Dyslipidemia    Shingles    S/P TURP  1999    History of surgery  back surgery      ALLERGIES:  contrast media (iodine-based) (Anaphylaxis)    MEDICATIONS:  STANDING MEDICATIONS  ALBUTerol    90 MICROgram(s) HFA Inhaler 2 Puff(s) Inhalation every 6 hours  aspirin  chewable 81 milliGRAM(s) Enteral Tube daily  atorvastatin 80 milliGRAM(s) Oral at bedtime  budesonide 160 MICROgram(s)/formoterol 4.5 MICROgram(s) Inhaler 2 Puff(s) Inhalation two times a day  carBAMazepine 200 milliGRAM(s) Oral two times a day  chlorhexidine 4% Liquid 1 Application(s) Topical <User Schedule>  clopidogrel Tablet 75 milliGRAM(s) Oral daily  dexMEDEtomidine Infusion 0.05 MICROgram(s)/kG/Hr IV Continuous <Continuous>  dextrose 40% Gel 15 Gram(s) Oral once  dextrose 5%. 1000 milliLiter(s) IV Continuous <Continuous>  dextrose 5%. 1000 milliLiter(s) IV Continuous <Continuous>  dextrose 50% Injectable 12.5 Gram(s) IV Push once  dextrose 50% Injectable 25 Gram(s) IV Push once  fluconAZOLE   Tablet 200 milliGRAM(s) Oral daily  glucagon  Injectable 1 milliGRAM(s) IntraMuscular once  heparin   Injectable 5000 Unit(s) SubCutaneous every 12 hours  influenza  Vaccine (HIGH DOSE) 0.7 milliLiter(s) IntraMuscular once  lidocaine   4% Patch 1 Patch Transdermal every 24 hours  nystatin    Suspension 985299 Unit(s) Oral every 6 hours  pantoprazole    Tablet 40 milliGRAM(s) Oral before breakfast  predniSONE   Tablet 20 milliGRAM(s) Oral daily  tiotropium 18 MICROgram(s) Capsule 1 Capsule(s) Inhalation daily    PRN MEDICATIONS  acetaminophen     Tablet .. 650 milliGRAM(s) Oral every 6 hours PRN  albuterol/ipratropium for Nebulization 3 milliLiter(s) Nebulizer every 6 hours PRN  aluminum hydroxide/magnesium hydroxide/simethicone Suspension 30 milliLiter(s) Oral every 4 hours PRN  melatonin 3 milliGRAM(s) Oral at bedtime PRN  ondansetron Injectable 4 milliGRAM(s) IV Push every 8 hours PRN  polyethylene glycol 3350 17 Gram(s) Oral daily PRN  senna 2 Tablet(s) Oral at bedtime PRN      VITAL SIGNS: Last 24 Hours  T(C): 35.7 (05 Dec 2021 03:00), Max: 37 (04 Dec 2021 23:01)  T(F): 96.3 (05 Dec 2021 03:00), Max: 98.6 (04 Dec 2021 23:01)  HR: 69 (05 Dec 2021 05:48) (69 - 83)  BP: 149/63 (05 Dec 2021 05:48) (100/65 - 157/80)  BP(mean): 94 (05 Dec 2021 05:48) (75 - 105)  RR: 20 (05 Dec 2021 05:48) (20 - 46)  SpO2: 100% (05 Dec 2021 05:48) (95% - 100%)    LABS:                        12.3   12.95 )-----------( 416      ( 04 Dec 2021 05:41 )             39.5     12-04    143  |  105  |  20  ----------------------------<  138<H>  4.6   |  23  |  0.9    Ca    8.9      04 Dec 2021 05:41    TPro  6.2  /  Alb  3.4<L>  /  TBili  0.4  /  DBili  x   /  AST  23  /  ALT  21  /  AlkPhos  57  12-04                  RADIOLOGY:      PHYSICAL EXAM:  GENERAL: responds to verbal stimuli, not following commands O*0  HEENT:  No JVD  PULMONARY: Clear to auscultation bilaterally;  CARDIOVASCULAR: Regular rate and rhythm;  GASTROINTESTINAL: Soft, Nontender, Nondistended  MUSCULOSKELETAL: No clubbing, cyanosis,  NEUROLOGY: non-focal  SKIN: No rashes or lesions    ASSESSMENT & PLAN    76 year old Male with a past medical history of Asthma, COPD, DM, HLD, Prostate CA S/P TURP, Shingles presented to the ER with a chief complaint of Left sided Chest pain, blurred vision, confusion.    # Blurred vision, imbalance, confusion due to left parietal punctuate ischemic infarct   # Altered mental status likely secondary to medications   # Hospital Acquired delirium   -Has been present for ~1 month however was never investigated per wife  MR Head w/wo IV Cont (12.02.21 @ 13:24) >  Punctate acute infarct in the high left parietal lobe.  Redemonstrated centrally calcified meningioma along the high right parietal convexity. No significant peritumoral edema.  Mild chronic microvascular ischemic changes.  - neurology recommendations noted   - haldol PRN  - hold precedes and reassess mental status, if no improvement will need LP.    # Left sided Chest pain secondary to Uncontrolled Post Herpetic Neuralgia;  dc percocet, lyrica, gabapentin, amitriptyline  # Elevated Troponin likely chronic troponin no EKG changes trend for now   # Diabetes: insulin protocol per [facility]  # Asthma/COPD Continue with Symbicort and other Inhalers   # Oral Thrush - Likely secondary to Chronic steroid use c/w PO Fluconazole   # Constipation, likely 2/2 meds, avoid opioids, bowel regimen   # Prostate CA S/P TURP. out patient f/u     #Progress Note Handoff  Pending (specify): monitor mental status. LP if no improvements, AVOID CNS DEPRESSANTs  Disposition: MICU DEL ALVARADO 76y Male  MRN#: [id number]   CODE STATUS:full code      SUBJECTIVE  Patient is a 76y old Male who presents with a chief complaint of Chest pain (05 Dec 2021 05:05)  Currently admitted to medicine with the primary diagnosis of Elevated troponin  Today is hospital day 6d, and this morning RN reports  overnight events.     OBJECTIVE  PAST MEDICAL & SURGICAL HISTORY  Diabetes  20 yrs    Prostate cancer  1999 s/p sx    Asthma  20 yr    COPD (chronic obstructive pulmonary disease)  20 yrs no 02 rx    Dyslipidemia    Shingles    S/P TURP  1999    History of surgery  back surgery      ALLERGIES:  contrast media (iodine-based) (Anaphylaxis)    MEDICATIONS:  STANDING MEDICATIONS  ALBUTerol    90 MICROgram(s) HFA Inhaler 2 Puff(s) Inhalation every 6 hours  aspirin  chewable 81 milliGRAM(s) Enteral Tube daily  atorvastatin 80 milliGRAM(s) Oral at bedtime  budesonide 160 MICROgram(s)/formoterol 4.5 MICROgram(s) Inhaler 2 Puff(s) Inhalation two times a day  carBAMazepine 200 milliGRAM(s) Oral two times a day  chlorhexidine 4% Liquid 1 Application(s) Topical <User Schedule>  clopidogrel Tablet 75 milliGRAM(s) Oral daily  dexMEDEtomidine Infusion 0.05 MICROgram(s)/kG/Hr IV Continuous <Continuous>  dextrose 40% Gel 15 Gram(s) Oral once  dextrose 5%. 1000 milliLiter(s) IV Continuous <Continuous>  dextrose 5%. 1000 milliLiter(s) IV Continuous <Continuous>  dextrose 50% Injectable 12.5 Gram(s) IV Push once  dextrose 50% Injectable 25 Gram(s) IV Push once  fluconAZOLE   Tablet 200 milliGRAM(s) Oral daily  glucagon  Injectable 1 milliGRAM(s) IntraMuscular once  heparin   Injectable 5000 Unit(s) SubCutaneous every 12 hours  influenza  Vaccine (HIGH DOSE) 0.7 milliLiter(s) IntraMuscular once  lidocaine   4% Patch 1 Patch Transdermal every 24 hours  nystatin    Suspension 440128 Unit(s) Oral every 6 hours  pantoprazole    Tablet 40 milliGRAM(s) Oral before breakfast  predniSONE   Tablet 20 milliGRAM(s) Oral daily  tiotropium 18 MICROgram(s) Capsule 1 Capsule(s) Inhalation daily    PRN MEDICATIONS  acetaminophen     Tablet .. 650 milliGRAM(s) Oral every 6 hours PRN  albuterol/ipratropium for Nebulization 3 milliLiter(s) Nebulizer every 6 hours PRN  aluminum hydroxide/magnesium hydroxide/simethicone Suspension 30 milliLiter(s) Oral every 4 hours PRN  melatonin 3 milliGRAM(s) Oral at bedtime PRN  ondansetron Injectable 4 milliGRAM(s) IV Push every 8 hours PRN  polyethylene glycol 3350 17 Gram(s) Oral daily PRN  senna 2 Tablet(s) Oral at bedtime PRN      VITAL SIGNS: Last 24 Hours  T(C): 35.7 (05 Dec 2021 03:00), Max: 37 (04 Dec 2021 23:01)  T(F): 96.3 (05 Dec 2021 03:00), Max: 98.6 (04 Dec 2021 23:01)  HR: 69 (05 Dec 2021 05:48) (69 - 83)  BP: 149/63 (05 Dec 2021 05:48) (100/65 - 157/80)  BP(mean): 94 (05 Dec 2021 05:48) (75 - 105)  RR: 20 (05 Dec 2021 05:48) (20 - 46)  SpO2: 100% (05 Dec 2021 05:48) (95% - 100%)    LABS:                        12.3   12.95 )-----------( 416      ( 04 Dec 2021 05:41 )             39.5     12-04    143  |  105  |  20  ----------------------------<  138<H>  4.6   |  23  |  0.9    Ca    8.9      04 Dec 2021 05:41    TPro  6.2  /  Alb  3.4<L>  /  TBili  0.4  /  DBili  x   /  AST  23  /  ALT  21  /  AlkPhos  57  12-04                  RADIOLOGY:      PHYSICAL EXAM:  GENERAL: responds to verbal stimuli, not following commands O*0  HEENT:  No JVD  PULMONARY: Clear to auscultation bilaterally;  CARDIOVASCULAR: Regular rate and rhythm;  GASTROINTESTINAL: Soft, Nontender, Nondistended  MUSCULOSKELETAL: No clubbing, cyanosis,  NEUROLOGY: non-focal  SKIN: No rashes or lesions    ASSESSMENT & PLAN    76 year old Male with a past medical history of Asthma, COPD, DM, HLD, Prostate CA S/P TURP, Shingles presented to the ER with a chief complaint of Left sided Chest pain, blurred vision, confusion.    # acute left parietal punctuate ischemic infarct   # Altered mental status likely secondary to medications, improving   # Hospital Acquired delirium   -Has been present for ~1 month however was never investigated per wife  MR Head w/wo IV Cont (12.02.21 @ 13:24) >  Punctate acute infarct in the high left parietal lobe.  Redemonstrated centrally calcified meningioma along the high right parietal convexity. No significant peritumoral edema.  Mild chronic microvascular ischemic changes.  - neurology recommendations noted   - haldol PRN  - mental status improved today, c/w holding cns depressants   - hold precedes and reassess mental status, if no improvement will need LP.    # Left sided Chest pain secondary to Uncontrolled Post Herpetic Neuralgia;  dc percocet, lyrica, gabapentin, amitriptyline  # Elevated Troponin likely chronic troponin no EKG changes trend for now   # Diabetes: insulin protocol per [facility]  # Asthma/COPD Continue with Symbicort and other Inhalers   # Oral Thrush - Likely secondary to Chronic steroid use c/w PO Fluconazole   # Constipation, likely 2/2 meds, avoid opioids, bowel regimen   # Prostate CA S/P TURP. out patient f/u     #Progress Note Handoff  Pending (specify): monitor mental status. AVOID CNS DEPRESSANTs  Disposition: MICU DEL ALVARADO 76y Male  MRN#: [id number]   CODE STATUS:full code      SUBJECTIVE  Patient is a 76y old Male who presents with a chief complaint of Chest pain (05 Dec 2021 05:05)  Currently admitted to medicine with the primary diagnosis of Elevated troponin  Today is hospital day 6d, and this morning RN reports  overnight events.     OBJECTIVE  PAST MEDICAL & SURGICAL HISTORY  Diabetes  20 yrs    Prostate cancer  1999 s/p sx    Asthma  20 yr    COPD (chronic obstructive pulmonary disease)  20 yrs no 02 rx    Dyslipidemia    Shingles    S/P TURP  1999    History of surgery  back surgery      ALLERGIES:  contrast media (iodine-based) (Anaphylaxis)    MEDICATIONS:  STANDING MEDICATIONS  ALBUTerol    90 MICROgram(s) HFA Inhaler 2 Puff(s) Inhalation every 6 hours  aspirin  chewable 81 milliGRAM(s) Enteral Tube daily  atorvastatin 80 milliGRAM(s) Oral at bedtime  budesonide 160 MICROgram(s)/formoterol 4.5 MICROgram(s) Inhaler 2 Puff(s) Inhalation two times a day  carBAMazepine 200 milliGRAM(s) Oral two times a day  chlorhexidine 4% Liquid 1 Application(s) Topical <User Schedule>  clopidogrel Tablet 75 milliGRAM(s) Oral daily  dexMEDEtomidine Infusion 0.05 MICROgram(s)/kG/Hr IV Continuous <Continuous>  dextrose 40% Gel 15 Gram(s) Oral once  dextrose 5%. 1000 milliLiter(s) IV Continuous <Continuous>  dextrose 5%. 1000 milliLiter(s) IV Continuous <Continuous>  dextrose 50% Injectable 12.5 Gram(s) IV Push once  dextrose 50% Injectable 25 Gram(s) IV Push once  fluconAZOLE   Tablet 200 milliGRAM(s) Oral daily  glucagon  Injectable 1 milliGRAM(s) IntraMuscular once  heparin   Injectable 5000 Unit(s) SubCutaneous every 12 hours  influenza  Vaccine (HIGH DOSE) 0.7 milliLiter(s) IntraMuscular once  lidocaine   4% Patch 1 Patch Transdermal every 24 hours  nystatin    Suspension 934690 Unit(s) Oral every 6 hours  pantoprazole    Tablet 40 milliGRAM(s) Oral before breakfast  predniSONE   Tablet 20 milliGRAM(s) Oral daily  tiotropium 18 MICROgram(s) Capsule 1 Capsule(s) Inhalation daily    PRN MEDICATIONS  acetaminophen     Tablet .. 650 milliGRAM(s) Oral every 6 hours PRN  albuterol/ipratropium for Nebulization 3 milliLiter(s) Nebulizer every 6 hours PRN  aluminum hydroxide/magnesium hydroxide/simethicone Suspension 30 milliLiter(s) Oral every 4 hours PRN  melatonin 3 milliGRAM(s) Oral at bedtime PRN  ondansetron Injectable 4 milliGRAM(s) IV Push every 8 hours PRN  polyethylene glycol 3350 17 Gram(s) Oral daily PRN  senna 2 Tablet(s) Oral at bedtime PRN      VITAL SIGNS: Last 24 Hours  T(C): 35.7 (05 Dec 2021 03:00), Max: 37 (04 Dec 2021 23:01)  T(F): 96.3 (05 Dec 2021 03:00), Max: 98.6 (04 Dec 2021 23:01)  HR: 69 (05 Dec 2021 05:48) (69 - 83)  BP: 149/63 (05 Dec 2021 05:48) (100/65 - 157/80)  BP(mean): 94 (05 Dec 2021 05:48) (75 - 105)  RR: 20 (05 Dec 2021 05:48) (20 - 46)  SpO2: 100% (05 Dec 2021 05:48) (95% - 100%)    LABS:                        12.3   12.95 )-----------( 416      ( 04 Dec 2021 05:41 )             39.5     12-04    143  |  105  |  20  ----------------------------<  138<H>  4.6   |  23  |  0.9    Ca    8.9      04 Dec 2021 05:41    TPro  6.2  /  Alb  3.4<L>  /  TBili  0.4  /  DBili  x   /  AST  23  /  ALT  21  /  AlkPhos  57  12-04                  RADIOLOGY:      PHYSICAL EXAM:  GENERAL: responds to verbal stimuli, not following commands O*0  HEENT:  No JVD  PULMONARY: Clear to auscultation bilaterally;  CARDIOVASCULAR: Regular rate and rhythm;  GASTROINTESTINAL: Soft, Nontender, Nondistended  MUSCULOSKELETAL: No clubbing, cyanosis,  NEUROLOGY: non-focal  SKIN: No rashes or lesions    ASSESSMENT & PLAN    76 year old Male with a past medical history of Asthma, COPD, DM, HLD, Prostate CA S/P TURP, Shingles presented to the ER with a chief complaint of Left sided Chest pain, blurred vision, confusion.    # acute left parietal punctuate ischemic infarct   # Altered mental status likely secondary to medications, improving   # Hospital Acquired delirium   -Has been present for ~1 month however was never investigated per wife  MR Head w/wo IV Cont (12.02.21 @ 13:24) >  Punctate acute infarct in the high left parietal lobe.  Redemonstrated centrally calcified meningioma along the high right parietal convexity. No significant peritumoral edema.  Mild chronic microvascular ischemic changes.  - neurology recommendations noted   - haldol PRN  - mental status improved today, c/w holding cns depressants   - hold precedes and reassess mental status, if no improvement will need LP.    # Left sided Chest pain secondary to Uncontrolled Post Herpetic Neuralgia;  dc percocet, lyrica, gabapentin, amitriptyline  # Leukocytosis, no other evidence of sepsis, order cultures, CXR , trend fever curve, start Abx if indicated   # Elevated Troponin likely chronic troponin no EKG changes trend for now   # Diabetes: insulin protocol per [facility]  # Asthma/COPD Continue with Symbicort and other Inhalers   # Oral Thrush - Likely secondary to Chronic steroid use c/w PO Fluconazole   # Constipation, likely 2/2 meds, avoid opioids, bowel regimen   # Prostate CA S/P TURP. out patient f/u     #Progress Note Handoff  Pending (specify): monitor mental status. AVOID CNS DEPRESSANTs  Disposition: MICU

## 2021-12-05 NOTE — SWALLOW BEDSIDE ASSESSMENT ADULT - ORAL PREPARATORY PHASE
waved SLP off at times for trials. that is too much he stated/Refuses to accept bolus into oral cavity/Decreased mastication ability

## 2021-12-06 LAB
ALBUMIN SERPL ELPH-MCNC: 3.7 G/DL — SIGNIFICANT CHANGE UP (ref 3.5–5.2)
ALP SERPL-CCNC: 71 U/L — SIGNIFICANT CHANGE UP (ref 30–115)
ALT FLD-CCNC: 19 U/L — SIGNIFICANT CHANGE UP (ref 0–41)
ANION GAP SERPL CALC-SCNC: 20 MMOL/L — HIGH (ref 7–14)
AST SERPL-CCNC: 12 U/L — SIGNIFICANT CHANGE UP (ref 0–41)
BASOPHILS # BLD AUTO: 0.13 K/UL — SIGNIFICANT CHANGE UP (ref 0–0.2)
BASOPHILS NFR BLD AUTO: 0.9 % — SIGNIFICANT CHANGE UP (ref 0–1)
BILIRUB SERPL-MCNC: 0.7 MG/DL — SIGNIFICANT CHANGE UP (ref 0.2–1.2)
BUN SERPL-MCNC: 19 MG/DL — SIGNIFICANT CHANGE UP (ref 10–20)
CALCIUM SERPL-MCNC: 9.1 MG/DL — SIGNIFICANT CHANGE UP (ref 8.5–10.1)
CHLORIDE SERPL-SCNC: 102 MMOL/L — SIGNIFICANT CHANGE UP (ref 98–110)
CO2 SERPL-SCNC: 17 MMOL/L — SIGNIFICANT CHANGE UP (ref 17–32)
CREAT SERPL-MCNC: 1 MG/DL — SIGNIFICANT CHANGE UP (ref 0.7–1.5)
EOSINOPHIL # BLD AUTO: 0.1 K/UL — SIGNIFICANT CHANGE UP (ref 0–0.7)
EOSINOPHIL NFR BLD AUTO: 0.7 % — SIGNIFICANT CHANGE UP (ref 0–8)
GLUCOSE BLDC GLUCOMTR-MCNC: 233 MG/DL — HIGH (ref 70–99)
GLUCOSE BLDC GLUCOMTR-MCNC: 291 MG/DL — HIGH (ref 70–99)
GLUCOSE BLDC GLUCOMTR-MCNC: 293 MG/DL — HIGH (ref 70–99)
GLUCOSE SERPL-MCNC: 203 MG/DL — HIGH (ref 70–99)
HCT VFR BLD CALC: 42.5 % — SIGNIFICANT CHANGE UP (ref 42–52)
HGB BLD-MCNC: 13.4 G/DL — LOW (ref 14–18)
IMM GRANULOCYTES NFR BLD AUTO: 2.1 % — HIGH (ref 0.1–0.3)
LYMPHOCYTES # BLD AUTO: 23.7 % — SIGNIFICANT CHANGE UP (ref 20.5–51.1)
LYMPHOCYTES # BLD AUTO: 3.59 K/UL — HIGH (ref 1.2–3.4)
MAGNESIUM SERPL-MCNC: 2 MG/DL — SIGNIFICANT CHANGE UP (ref 1.8–2.4)
MCHC RBC-ENTMCNC: 27.3 PG — SIGNIFICANT CHANGE UP (ref 27–31)
MCHC RBC-ENTMCNC: 31.5 G/DL — LOW (ref 32–37)
MCV RBC AUTO: 86.6 FL — SIGNIFICANT CHANGE UP (ref 80–94)
MONOCYTES # BLD AUTO: 1.34 K/UL — HIGH (ref 0.1–0.6)
MONOCYTES NFR BLD AUTO: 8.8 % — SIGNIFICANT CHANGE UP (ref 1.7–9.3)
NEUTROPHILS # BLD AUTO: 9.68 K/UL — HIGH (ref 1.4–6.5)
NEUTROPHILS NFR BLD AUTO: 63.8 % — SIGNIFICANT CHANGE UP (ref 42.2–75.2)
NRBC # BLD: 0 /100 WBCS — SIGNIFICANT CHANGE UP (ref 0–0)
PLATELET # BLD AUTO: 558 K/UL — HIGH (ref 130–400)
POTASSIUM SERPL-MCNC: 4.3 MMOL/L — SIGNIFICANT CHANGE UP (ref 3.5–5)
POTASSIUM SERPL-SCNC: 4.3 MMOL/L — SIGNIFICANT CHANGE UP (ref 3.5–5)
PROT SERPL-MCNC: 6.7 G/DL — SIGNIFICANT CHANGE UP (ref 6–8)
RBC # BLD: 4.91 M/UL — SIGNIFICANT CHANGE UP (ref 4.7–6.1)
RBC # FLD: 15.9 % — HIGH (ref 11.5–14.5)
SODIUM SERPL-SCNC: 139 MMOL/L — SIGNIFICANT CHANGE UP (ref 135–146)
WBC # BLD: 15.16 K/UL — HIGH (ref 4.8–10.8)
WBC # FLD AUTO: 15.16 K/UL — HIGH (ref 4.8–10.8)

## 2021-12-06 PROCEDURE — 99233 SBSQ HOSP IP/OBS HIGH 50: CPT

## 2021-12-06 PROCEDURE — 71045 X-RAY EXAM CHEST 1 VIEW: CPT | Mod: 26

## 2021-12-06 RX ORDER — SODIUM CHLORIDE 9 MG/ML
1000 INJECTION, SOLUTION INTRAVENOUS
Refills: 0 | Status: DISCONTINUED | OUTPATIENT
Start: 2021-12-06 | End: 2021-12-07

## 2021-12-06 RX ORDER — DEXTROSE 50 % IN WATER 50 %
25 SYRINGE (ML) INTRAVENOUS ONCE
Refills: 0 | Status: DISCONTINUED | OUTPATIENT
Start: 2021-12-06 | End: 2021-12-07

## 2021-12-06 RX ORDER — INSULIN LISPRO 100/ML
VIAL (ML) SUBCUTANEOUS
Refills: 0 | Status: DISCONTINUED | OUTPATIENT
Start: 2021-12-06 | End: 2021-12-07

## 2021-12-06 RX ORDER — GLUCAGON INJECTION, SOLUTION 0.5 MG/.1ML
1 INJECTION, SOLUTION SUBCUTANEOUS ONCE
Refills: 0 | Status: DISCONTINUED | OUTPATIENT
Start: 2021-12-06 | End: 2021-12-07

## 2021-12-06 RX ORDER — INSULIN LISPRO 100/ML
6 VIAL (ML) SUBCUTANEOUS ONCE
Refills: 0 | Status: COMPLETED | OUTPATIENT
Start: 2021-12-06 | End: 2021-12-06

## 2021-12-06 RX ORDER — DEXTROSE 50 % IN WATER 50 %
15 SYRINGE (ML) INTRAVENOUS ONCE
Refills: 0 | Status: DISCONTINUED | OUTPATIENT
Start: 2021-12-06 | End: 2021-12-07

## 2021-12-06 RX ORDER — INSULIN GLARGINE 100 [IU]/ML
18 INJECTION, SOLUTION SUBCUTANEOUS AT BEDTIME
Refills: 0 | Status: DISCONTINUED | OUTPATIENT
Start: 2021-12-06 | End: 2021-12-07

## 2021-12-06 RX ORDER — INSULIN LISPRO 100/ML
6 VIAL (ML) SUBCUTANEOUS
Refills: 0 | Status: DISCONTINUED | OUTPATIENT
Start: 2021-12-06 | End: 2021-12-07

## 2021-12-06 RX ORDER — DEXTROSE 50 % IN WATER 50 %
12.5 SYRINGE (ML) INTRAVENOUS ONCE
Refills: 0 | Status: DISCONTINUED | OUTPATIENT
Start: 2021-12-06 | End: 2021-12-07

## 2021-12-06 RX ADMIN — Medication 6 UNIT(S): at 17:09

## 2021-12-06 RX ADMIN — ATORVASTATIN CALCIUM 80 MILLIGRAM(S): 80 TABLET, FILM COATED ORAL at 22:08

## 2021-12-06 RX ADMIN — POLYETHYLENE GLYCOL 3350 17 GRAM(S): 17 POWDER, FOR SOLUTION ORAL at 06:09

## 2021-12-06 RX ADMIN — Medication 200 MILLIGRAM(S): at 17:06

## 2021-12-06 RX ADMIN — Medication 500000 UNIT(S): at 05:54

## 2021-12-06 RX ADMIN — SENNA PLUS 2 TABLET(S): 8.6 TABLET ORAL at 06:09

## 2021-12-06 RX ADMIN — Medication 500000 UNIT(S): at 12:10

## 2021-12-06 RX ADMIN — ALBUTEROL 2 PUFF(S): 90 AEROSOL, METERED ORAL at 14:41

## 2021-12-06 RX ADMIN — Medication 500000 UNIT(S): at 00:36

## 2021-12-06 RX ADMIN — Medication 200 MILLIGRAM(S): at 05:54

## 2021-12-06 RX ADMIN — FLUCONAZOLE 200 MILLIGRAM(S): 150 TABLET ORAL at 12:09

## 2021-12-06 RX ADMIN — Medication 6 UNIT(S): at 12:20

## 2021-12-06 RX ADMIN — HEPARIN SODIUM 5000 UNIT(S): 5000 INJECTION INTRAVENOUS; SUBCUTANEOUS at 17:07

## 2021-12-06 RX ADMIN — INSULIN GLARGINE 18 UNIT(S): 100 INJECTION, SOLUTION SUBCUTANEOUS at 22:07

## 2021-12-06 RX ADMIN — BUDESONIDE AND FORMOTEROL FUMARATE DIHYDRATE 2 PUFF(S): 160; 4.5 AEROSOL RESPIRATORY (INHALATION) at 22:10

## 2021-12-06 RX ADMIN — Medication 650 MILLIGRAM(S): at 07:15

## 2021-12-06 RX ADMIN — Medication 500000 UNIT(S): at 17:07

## 2021-12-06 RX ADMIN — Medication 81 MILLIGRAM(S): at 12:09

## 2021-12-06 RX ADMIN — Medication 650 MILLIGRAM(S): at 15:45

## 2021-12-06 RX ADMIN — LIDOCAINE 1 PATCH: 4 CREAM TOPICAL at 00:35

## 2021-12-06 RX ADMIN — LIDOCAINE 1 PATCH: 4 CREAM TOPICAL at 22:58

## 2021-12-06 RX ADMIN — HEPARIN SODIUM 5000 UNIT(S): 5000 INJECTION INTRAVENOUS; SUBCUTANEOUS at 05:54

## 2021-12-06 RX ADMIN — Medication 3: at 17:09

## 2021-12-06 RX ADMIN — ALBUTEROL 2 PUFF(S): 90 AEROSOL, METERED ORAL at 07:52

## 2021-12-06 RX ADMIN — CHLORHEXIDINE GLUCONATE 1 APPLICATION(S): 213 SOLUTION TOPICAL at 05:54

## 2021-12-06 RX ADMIN — LIDOCAINE 1 PATCH: 4 CREAM TOPICAL at 14:32

## 2021-12-06 RX ADMIN — BUDESONIDE AND FORMOTEROL FUMARATE DIHYDRATE 2 PUFF(S): 160; 4.5 AEROSOL RESPIRATORY (INHALATION) at 09:35

## 2021-12-06 RX ADMIN — Medication 650 MILLIGRAM(S): at 06:25

## 2021-12-06 RX ADMIN — CLOPIDOGREL BISULFATE 75 MILLIGRAM(S): 75 TABLET, FILM COATED ORAL at 12:09

## 2021-12-06 RX ADMIN — PANTOPRAZOLE SODIUM 40 MILLIGRAM(S): 20 TABLET, DELAYED RELEASE ORAL at 06:00

## 2021-12-06 RX ADMIN — TIOTROPIUM BROMIDE 1 CAPSULE(S): 18 CAPSULE ORAL; RESPIRATORY (INHALATION) at 09:35

## 2021-12-06 NOTE — OCCUPATIONAL THERAPY INITIAL EVALUATION ADULT - PERTINENT HX OF CURRENT PROBLEM, REHAB EVAL
Admitted for Left sided Chest pain. Patient had Shingles earlier this month. Pt with noted acute delirium after admission. MRI Punctate acute infarct in the high left parietal lobe.

## 2021-12-06 NOTE — PROGRESS NOTE ADULT - ASSESSMENT
Patient is a 76 year old Male with a past medical history of  COPD, DM, HLD, Prostate CA S/P TURP, Shingles was admitted to the hospital one week ago for Left sided Chest pain in area of Shingles pt tried percocet / gabapentin / Pregabalin without much improvement. Pt developed confusion    PROBLEMS  # chest pain, hx DM & hx shingles.  Has increased troponin 0.05x2 & fever  R/O MI  # Oral Candidiasis   # Punctate acute infarct in the high left parietal lobe  # metabolic encephalopathy   76 year old Male with a past medical history of  COPD, DM, HLD, Prostate CA S/P TURP, Shingles was admitted to the hospital one week ago with chest pain in the area of shingles. Pt developed acute encephalopathy - MRI showed a small acute infarct in L parietal lobe  Pt also found with oral candidiasis and mental status is now improving    acute encephalopathy possibly secondary to meds / acute CVA / oral candidiasis      - aspirin, Plavix and Lipitor   - Fluconazole and Nystatin as per ID   - hold percocet and Lyrica   - pt fell in room today - PT consult   - check 2DECHO   - continue home meds   - DVT prophylaxis

## 2021-12-06 NOTE — PROGRESS NOTE ADULT - SUBJECTIVE AND OBJECTIVE BOX
Patient is seen and examined at the bed side, is afebrile.  The events noted regarding patient fell in the  room.       REVIEW OF SYSTEMS: All other review systems are negative      ALLERGIES: No Known Allergies      ICU Vital Signs Last 24 Hrs  T(C): 36.7 (06 Dec 2021 15:00), Max: 36.8 (05 Dec 2021 23:01)  T(F): 98 (06 Dec 2021 15:00), Max: 98.2 (05 Dec 2021 23:01)  HR: 105 (06 Dec 2021 15:00) (105 - 120)  BP: 162/77 (06 Dec 2021 15:00) (143/72 - 168/74)  BP(mean): 110 (06 Dec 2021 15:00) (96 - 110)  ABP: --  ABP(mean): --  RR: 18 (06 Dec 2021 15:00) (18 - 34)  SpO2: 98% (06 Dec 2021 15:00) (98% - 99%)        PHYSICAL EXAM:  GENERAL: Not in distress   CHEST/LUNG: Not using accessory muscles  HEART: s1 and s2 present  ABDOMEN:  Nontender and  Nondistended  EXTREMITIES: No pedal  edema  CNS: Awake and Alert      LABS:                        13.4   15.16 )-----------( 558      ( 06 Dec 2021 05:49 )             42.5                           11.9   16.37 )-----------( 386      ( 05 Dec 2021 07:11 )             37.9       12-06    139  |  102  |  19  ----------------------------<  203<H>  4.3   |  17  |  1.0    Ca    9.1      06 Dec 2021 05:49  Mg     2.0     12-06    TPro  6.7  /  Alb  3.7  /  TBili  0.7  /  DBili  x   /  AST  12  /  ALT  19  /  AlkPhos  71  12-06    12-05    136  |  101  |  21<H>  ----------------------------<  167<H>  4.5   |  18  |  0.8    Ca    8.7      05 Dec 2021 07:11  Mg     2.7     12-05    TPro  6.1  /  Alb  3.4<L>  /  TBili  0.7  /  DBili  x   /  AST  20  /  ALT  20  /  AlkPhos  63  12-05      CAPILLARY BLOOD GLUCOSE  POCT Blood Glucose.: 129 mg/dL (02 Dec 2021 21:25)  POCT Blood Glucose.: 478 mg/dL (02 Dec 2021 16:23)  POCT Blood Glucose.: 144 mg/dL (02 Dec 2021 07:58)  POCT Blood Glucose.: 151 mg/dL (01 Dec 2021 22:00)        MEDICATIONS  (STANDING):    ALBUTerol    90 MICROgram(s) HFA Inhaler 2 Puff(s) Inhalation every 6 hours  aspirin  chewable 81 milliGRAM(s) Enteral Tube daily  atorvastatin 80 milliGRAM(s) Oral at bedtime  budesonide 160 MICROgram(s)/formoterol 4.5 MICROgram(s) Inhaler 2 Puff(s) Inhalation two times a day  carBAMazepine 200 milliGRAM(s) Oral two times a day  chlorhexidine 4% Liquid 1 Application(s) Topical <User Schedule>  clopidogrel Tablet 75 milliGRAM(s) Oral daily  dexMEDEtomidine Infusion 0.05 MICROgram(s)/kG/Hr (0.9 mL/Hr) IV Continuous <Continuous>  fluconAZOLE   Tablet 200 milliGRAM(s) Oral daily  glucagon  Injectable 1 milliGRAM(s) IntraMuscular once  heparin   Injectable 5000 Unit(s) SubCutaneous every 12 hours  influenza  Vaccine (HIGH DOSE) 0.7 milliLiter(s) IntraMuscular once  insulin glargine Injectable (LANTUS) 18 Unit(s) SubCutaneous at bedtime  insulin lispro (ADMELOG) corrective regimen sliding scale   SubCutaneous three times a day before meals  insulin lispro Injectable (ADMELOG) 6 Unit(s) SubCutaneous three times a day before meals  nystatin    Suspension 590244 Unit(s) Oral every 6 hours  pantoprazole    Tablet 40 milliGRAM(s) Oral before breakfast  tiotropium 18 MICROgram(s) Capsule 1 Capsule(s) Inhalation daily      RADIOLOGY & ADDITIONAL TESTS:    12/6/21 : Xray Chest 1 View- PORTABLE-Urgent (Xray Chest 1 View- PORTABLE-Urgent .) (12.06.21 @ 10:45) No focal consolidation, pneumothorax or pleural effusion.  Stable cardiomediastinal silhouette. Unchanged osseous structures.    < from: CT Angio Head w/ IV Cont (12.02.21 @ 19:36) >No large vessel occlusion, aneurysm, vascular malformation.    moderate atherosclerotic stenosis of the bilateral clinoid/supraclinoid ICAs.    < from: MR Head w/wo IV Cont (12.02.21 @ 13:24) >Punctate acute infarct in the high left parietal lobe.    Redemonstrated centrally calcified meningioma along the high right parietal convexity. No significant peritumoral edema.    Mild chronic microvascular ischemic changes.      12/1/21 : CT Head No Cont (12.01.21 @ 16:36) >1.  No CT evidence of acute intracranial pathology.   2.  Right high frontal calcified meningioma measuring 1.8 cm.      MICROBIOLOGY DATA:    FTA Syphilis Confirmatory (12.02.21 @ 06:35)   FTA Syphilis Confirmatory: Nonreact:   Syphilis Screen (12.02.21 @ 06:35)   Treponema Pallidum Antibody Interpretation: Positive    COVID-19 PCR . (11.29.21 @ 14:46)   COVID-19 PCR: NotDetec:

## 2021-12-06 NOTE — PROGRESS NOTE ADULT - ASSESSMENT
76 year old Male with a past medical history of Asthma, COPD, DM, HLD, Prostate CA S/P TURP, Shingles presented to the ER with a chief complaint of Left sided Chest pain, blurred vision, confusion.    # acute left parietal punctuate ischemic infarct   # Altered mental status likely secondary to medications, resolved, back to baseline  # Hospital Acquired delirium   -Has been present for ~1 month however was never investigated per wife  MR Head w/wo IV Cont (12.02.21 @ 13:24) >  Punctate acute infarct in the high left parietal lobe.  Redemonstrated centrally calcified meningioma along the high right parietal convexity. No significant peritumoral edema.  Mild chronic microvascular ischemic changes.  - neurology recommendations noted   - haldol PRN    # Left sided Chest pain secondary to Uncontrolled Post Herpetic Neuralgia;  dc percocet, lyrica, gabapentin, amitriptyline  # Leukocytosis, no other evidence of sepsis, order cultures, CXR , trend fever curve, start Abx if indicated   # Elevated Troponin likely chronic troponin no EKG changes trend for now   # Diabetes: insulin protocol per [facility]  # Asthma/COPD Continue with Symbicort and other Inhalers   # Oral Thrush - Likely secondary to Chronic steroid use c/w PO Fluconazole   # Constipation, likely 2/2 meds, avoid opioids, bowel regimen   # Prostate CA S/P TURP. out patient f/u   # mechanical fall (12/6/21) when moving from chair to bed on his own. VS stable. no sign of injury. no LOC. no Head trauma. f/u orthostatic vitals and physiatry for dispo    # DVT ppx -  Heparin SQ  # GI ppx - PPI   # Diet - DASH/cc  # COVID test - Negative  Full code.

## 2021-12-06 NOTE — PROGRESS NOTE ADULT - ASSESSMENT
IMPRESSION:  76 year old male presenting to ED on Nov 29 with an initial compliant of chest pain after recently being treated for Shingles with fever reported to 102.5 1 day prior to admission with alteration on mental status.    Altered mentation, suspected due to home medications  Home medical therapy included percocet / gabapentin / Pregabalin without much improvement.    Now improved off those medications  CNS imaging revealed a possible new small acute infract.    No seizure activity on EEG   Incidental suspected RUTH based on witnessed loud snoring      PLAN:  Continue to monitor O2 sat, presently adequate on room air  Aspiration precautions keep HOB > 35 degrees  Continue maintenance asthma and COPD medications (Symbicort Spiriva and prn albuterol)  Pain management of post herpetic neuralgia as per neurology and primary care team  Neurology and ID follow up and recommendations noted  GI and DVT prophylaxis   Progressive mobility as tolerated  Testing for RUTH can be conducted as an outpatient with Dr Oates upon discharge    Please call with any questions  [phone]

## 2021-12-06 NOTE — OCCUPATIONAL THERAPY INITIAL EVALUATION ADULT - GENERAL OBSERVATIONS, REHAB EVAL
11:00-11:30 chart reviewed, ok to treat by Occupational Therapist as confirmed by RN Jeancarlos, Pt received supine in bed +tele in NAD. Spouse and son present. Pt in agreement with OT IE

## 2021-12-06 NOTE — PROGRESS NOTE ADULT - SUBJECTIVE AND OBJECTIVE BOX
Interval history and ROS:    Seen and evaluated  D/W ICU RN  Much more awake and alert since last seen  C/o left sided chest pain at site of recent diagnosis of shingles  No respiratory compromise reported    MEDICATIONS:  acetaminophen     Tablet .. 650 milliGRAM(s) Oral every 6 hours PRN  ·	ALBUTerol    90 MICROgram(s) HFA Inhaler 2 Puff(s) Inhalation every 6 hours  albuterol/ipratropium for Nebulization 3 milliLiter(s) Nebulizer every 6 hours PRN  aluminum hydroxide/magnesium hydroxide/simethicone Suspension 30 milliLiter(s) Oral every 4 hours PRN  aspirin  chewable 81 milliGRAM(s) Enteral Tube daily  atorvastatin 80 milliGRAM(s) Oral at bedtime  ·	SYMBICORT budesonide 160 MICROgram(s)/formoterol 4.5 MICROgram(s) Inhaler 2 Puff(s) Inhalation two times a day  carBAMazepine 200 milliGRAM(s) Oral two times a day  chlorhexidine 4% Liquid 1 Application(s) Topical <User Schedule>  clopidogrel Tablet 75 milliGRAM(s) Oral daily  fluconAZOLE   Tablet 200 milliGRAM(s) Oral daily  glucagon  Injectable 1 milliGRAM(s) IntraMuscular once  heparin   Injectable 5000 Unit(s) SubCutaneous every 12 hours  influenza  Vaccine (HIGH DOSE) 0.7 milliLiter(s) IntraMuscular once  lidocaine   4% Patch 1 Patch Transdermal every 24 hours  melatonin 3 milliGRAM(s) Oral at bedtime PRN  nystatin    Suspension 680791 Unit(s) Oral every 6 hours  ondansetron Injectable 4 milliGRAM(s) IV Push every 8 hours PRN  pantoprazole    Tablet 40 milliGRAM(s) Oral before breakfast  polyethylene glycol 3350 17 Gram(s) Oral daily PRN  senna 2 Tablet(s) Oral at bedtime PRN  ·	tiotropium 18 MICROgram(s) Capsule 1 Capsule(s) Inhalation daily    VITAL SIGNS:  Vital Signs Last 24 Hrs  T(C): 36.8 (05 Dec 2021 23:01), Max: 36.8 (05 Dec 2021 23:01)  T(F): 98.2 (05 Dec 2021 23:01), Max: 98.2 (05 Dec 2021 23:01)  HR: 115 (06 Dec 2021 05:00) (66 - 121)  BP: 168/74 (06 Dec 2021 05:00) (105/52 - 168/74)  BP(mean): 106 (06 Dec 2021 05:00) (75 - 106)  RR: 28 (06 Dec 2021 05:00) (19 - 31)  SpO2: 99% (06 Dec 2021 05:00) (96% - 99%) on RA    Awake and alert   Neck supple, no LN  S1 and S2 no murmur  Lungs are clear without wheeze, rhonchi or rales  Abdomen is soft and non tender  There is no edema  Neurologically non focal      LABS:                        11.9   16.37 )-----------( 386      ( 05 Dec 2021 07:11 )             37.9     12-05    136  |  101  |  21<H>  ----------------------------<  167<H>  4.5   |  18  |  0.8    Ca    8.7      05 Dec 2021 07:11  Mg     2.7     12-05    TPro  6.1  /  Alb  3.4<L>  /  TBili  0.7  /  DBili  x   /  AST  20  /  ALT  20  /  AlkPhos  63  12-05      RADIOLOGY & ADDITIONAL TESTS:   < from: Xray Chest 1 View-PORTABLE IMMEDIATE (Xray Chest 1 View-PORTABLE IMMEDIATE .) (12.05.21 @ 13:56) >  No radiographic evidence of acute cardiopulmonary disease.

## 2021-12-06 NOTE — CHART NOTE - NSCHARTNOTEFT_GEN_A_CORE
Was called by RN to evaluate the patient because he was sitting on the chair and tried to get up and fell on the floor on his back.   no loc, no head or any other trauma, unclear if bladder incontinence because pt was on condom cath that came off and wet the floor.  Pt is alert and oriented x3, has no complaints. VS stable. Was called by RN to evaluate the patient because he was sitting on the chair and tried to get up and fell on the floor on his back.   no loc, no head or any other trauma, unclear if bladder incontinence because pt was on condom cath that came off and wet the floor.  Pt is alert and oriented x3, has no complaints. VS stable.  see progress note for the plan Was called by RN to evaluate the patient because he was sitting on the chair and tried to get up and fell on the floor on his back.   no loc, no head or any other trauma, unclear if bladder incontinence because pt was on condom cath that came off and wet the floor.  Pt is alert and oriented x3, has no complaints. VS stable.  see progress note for the plan.  Family notified SON chery coughlin at 8:40am.

## 2021-12-06 NOTE — OCCUPATIONAL THERAPY INITIAL EVALUATION ADULT - STANDING BALANCE: STATIC
Pre-treatment instructions:   Do not shave your legs for 24 hours before your appointment  - Do not apply creams or lotions to your legs the night prior or morning of treatment  - Bring your graded compression stockings to apply after treatment  - Eat a light meal or snack 1Â½ to 2 hours before treatment    What to expect during treatment:  - Small amount of pain/burning from needle sticks and injection  - Treatment time 15-30 minutes  - 15-minute wait in clinic after initial session to ensure no allergic reaction    After your treatment:  - Wear your compression stocking(s) continuously for 48 hours. You may remove tape and bandages and shower normally after 12-24 hours. Continue to wear your stockings during the daytime hours for 2 weeks. The longer the elastic stockings are tolerated, the lower the risk of trapped blood causing phlebitis. - You may take a warm shower but do not take hot baths/showers for one week and do not sit in a hot tub or spa for 2 weeks as excessive heat can cause vein dilation.  - You will be able to maintain normal activities. Walk 10-15 minutes at least three times daily, the more the better. However, avoid prolonged standing.    - Treated areas may be temporarily tender, slightly red and swollen. You may apply ice directly to the area for relief. In the event of a scab, do not pick or remove, but you may apply a topical antibiotic (Neosporin). - Depending on the size of the veins treated, they may become hard, lumpy and tender to the touch - this may last several weeks to months. Warm compress, wearing your compression socks and ibuprofen may ease the discomfort. - Avoid direct sun exposure to the treated area without SPF greater than 30 in treated areas. UV (ultraviolet) tends to increase the risk of hyperpigmentation of the area. - **The key is to be faithful wearing your compression stocking. This will optimize your treatment results. ** without support/fair minus

## 2021-12-06 NOTE — PROGRESS NOTE ADULT - SUBJECTIVE AND OBJECTIVE BOX
Patient is a 76y old  Male who presents with a chief complaint of Chest pain (06 Dec 2021 11:39)      T(F): 98 (12-06-21 @ 07:01), Max: 98.2 (12-05-21 @ 23:01)  HR: 120 (12-06-21 @ 13:19)  BP: 144/68 (12-06-21 @ 13:19)  RR: 20 (12-06-21 @ 07:45)  SpO2: 98% (12-06-21 @ 07:45) (96% - 99%)    PHYSICAL EXAM:  GENERAL: NAD  HEAD:  Atraumatic, Normocephalic  EYES: EOMI, PERRLA, conjunctiva and sclera clear  NERVOUS SYSTEM:  Alert & Oriented X3, no focal deficits   CHEST/LUNG: Clear to percussion bilaterally; No rales, rhonchi, wheezing, or rubs  HEART: Regular rate and rhythm; No murmurs, rubs, or gallops  ABDOMEN: Soft, Nontender, Nondistended; Bowel sounds present  EXTREMITIES:  2+ Peripheral Pulses, No clubbing, cyanosis, or edema    LABS  12-06    139  |  102  |  19  ----------------------------<  203<H>  4.3   |  17  |  1.0    Ca    9.1      06 Dec 2021 05:49  Mg     2.0     12-06    TPro  6.7  /  Alb  3.7  /  TBili  0.7  /  DBili  x   /  AST  12  /  ALT  19  /  AlkPhos  71  12-06                          13.4   15.16 )-----------( 558      ( 06 Dec 2021 05:49 )             42.5         CARDIAC ENZYMES      Troponin T, Serum: 0.04 ng/mL (12-05-21 @ 07:11)    < from: 12 Lead ECG (11.30.21 @ 11:01) >  Sinus tachycardia    < end of copied text >      RADIOLOGY  < from: MR Head w/wo IV Cont (12.02.21 @ 13:24) >  IMPRESSION:    Punctate acute infarct in the high left parietal lobe.    Redemonstrated centrally calcified meningioma along the high right parietal convexity. No significant peritumoral edema.    Mild chronic microvascular ischemic changes.    < end of copied text >    MEDICATIONS  (STANDING):  ALBUTerol    90 MICROgram(s) HFA Inhaler 2 Puff(s) Inhalation every 6 hours  aspirin  chewable 81 milliGRAM(s) Enteral Tube daily  atorvastatin 80 milliGRAM(s) Oral at bedtime  budesonide 160 MICROgram(s)/formoterol 4.5 MICROgram(s) Inhaler 2 Puff(s) Inhalation two times a day  carBAMazepine 200 milliGRAM(s) Oral two times a day  chlorhexidine 4% Liquid 1 Application(s) Topical <User Schedule>  clopidogrel Tablet 75 milliGRAM(s) Oral daily  fluconAZOLE   Tablet 200 milliGRAM(s) Oral daily  glucagon  Injectable 1 milliGRAM(s) IntraMuscular once  heparin   Injectable 5000 Unit(s) SubCutaneous every 12 hours  influenza  Vaccine (HIGH DOSE) 0.7 milliLiter(s) IntraMuscular once  insulin glargine Injectable (LANTUS) 18 Unit(s) SubCutaneous at bedtime  insulin lispro (ADMELOG) corrective regimen sliding scale   SubCutaneous three times a day before meals  insulin lispro Injectable (ADMELOG) 6 Unit(s) SubCutaneous three times a day before meals  nystatin    Suspension 921660 Unit(s) Oral every 6 hours  pantoprazole    Tablet 40 milliGRAM(s) Oral before breakfast  tiotropium 18 MICROgram(s) Capsule 1 Capsule(s) Inhalation daily    MEDICATIONS  (PRN):  acetaminophen     Tablet .. 650 milliGRAM(s) Oral every 6 hours PRN Temp greater or equal to 38C (100.4F), Mild Pain (1 - 3)  albuterol/ipratropium for Nebulization 3 milliLiter(s) Nebulizer every 6 hours PRN Shortness of Breath and/or Wheezing  aluminum hydroxide/magnesium hydroxide/simethicone Suspension 30 milliLiter(s) Oral every 4 hours PRN Dyspepsia  melatonin 3 milliGRAM(s) Oral at bedtime PRN Insomnia  ondansetron Injectable 4 milliGRAM(s) IV Push every 8 hours PRN Nausea and/or Vomiting  polyethylene glycol 3350 17 Gram(s) Oral daily PRN Constipation  senna 2 Tablet(s) Oral at bedtime PRN Constipation      Patient is awake and alert in bed, still complaining of pain in L chest area of shingles       T(F): 98 (12-06-21 @ 07:01), Max: 98.2 (12-05-21 @ 23:01)  HR: 120 (12-06-21 @ 13:19)  BP: 144/68 (12-06-21 @ 13:19)  RR: 20 (12-06-21 @ 07:45)  SpO2: 98% (12-06-21 @ 07:45) (96% - 99%)    PHYSICAL EXAM:  GENERAL: NAD  HEAD:  Atraumatic, Normocephalic  NERVOUS SYSTEM:  Alert & Oriented X3, no focal deficits   CHEST/LUNG: Clear to percussion bilaterally; No rales, rhonchi, wheezing, or rubs  HEART: Regular rate and rhythm; No murmurs, rubs, or gallops  ABDOMEN: Soft, Nontender, Nondistended; Bowel sounds present  EXTREMITIES:  2+ Peripheral Pulses, No clubbing, cyanosis, or edema    LABS  12-06    139  |  102  |  19  ----------------------------<  203<H>  4.3   |  17  |  1.0    Ca    9.1      06 Dec 2021 05:49  Mg     2.0     12-06    TPro  6.7  /  Alb  3.7  /  TBili  0.7  /  DBili  x   /  AST  12  /  ALT  19  /  AlkPhos  71  12-06                          13.4   15.16 )-----------( 558      ( 06 Dec 2021 05:49 )             42.5       CARDIAC ENZYMES    Troponin T, Serum: 0.04 ng/mL (12-05-21 @ 07:11)    < from: 12 Lead ECG (11.30.21 @ 11:01) >  Sinus tachycardia    < end of copied text >      RADIOLOGY  < from: MR Head w/wo IV Cont (12.02.21 @ 13:24) >  IMPRESSION:    Punctate acute infarct in the high left parietal lobe.    Redemonstrated centrally calcified meningioma along the high right parietal convexity. No significant peritumoral edema.    Mild chronic microvascular ischemic changes.    < end of copied text >    MEDICATIONS  (STANDING):  ALBUTerol    90 MICROgram(s) HFA Inhaler 2 Puff(s) Inhalation every 6 hours  aspirin  chewable 81 milliGRAM(s) Enteral Tube daily  atorvastatin 80 milliGRAM(s) Oral at bedtime  budesonide 160 MICROgram(s)/formoterol 4.5 MICROgram(s) Inhaler 2 Puff(s) Inhalation two times a day  carBAMazepine 200 milliGRAM(s) Oral two times a day  chlorhexidine 4% Liquid 1 Application(s) Topical <User Schedule>  clopidogrel Tablet 75 milliGRAM(s) Oral daily  fluconAZOLE   Tablet 200 milliGRAM(s) Oral daily  glucagon  Injectable 1 milliGRAM(s) IntraMuscular once  heparin   Injectable 5000 Unit(s) SubCutaneous every 12 hours  influenza  Vaccine (HIGH DOSE) 0.7 milliLiter(s) IntraMuscular once  insulin glargine Injectable (LANTUS) 18 Unit(s) SubCutaneous at bedtime  insulin lispro (ADMELOG) corrective regimen sliding scale   SubCutaneous three times a day before meals  insulin lispro Injectable (ADMELOG) 6 Unit(s) SubCutaneous three times a day before meals  nystatin    Suspension 571585 Unit(s) Oral every 6 hours  pantoprazole    Tablet 40 milliGRAM(s) Oral before breakfast  tiotropium 18 MICROgram(s) Capsule 1 Capsule(s) Inhalation daily    MEDICATIONS  (PRN):  acetaminophen     Tablet .. 650 milliGRAM(s) Oral every 6 hours PRN Temp greater or equal to 38C (100.4F), Mild Pain (1 - 3)  albuterol/ipratropium for Nebulization 3 milliLiter(s) Nebulizer every 6 hours PRN Shortness of Breath and/or Wheezing  aluminum hydroxide/magnesium hydroxide/simethicone Suspension 30 milliLiter(s) Oral every 4 hours PRN Dyspepsia  melatonin 3 milliGRAM(s) Oral at bedtime PRN Insomnia  ondansetron Injectable 4 milliGRAM(s) IV Push every 8 hours PRN Nausea and/or Vomiting  polyethylene glycol 3350 17 Gram(s) Oral daily PRN Constipation  senna 2 Tablet(s) Oral at bedtime PRN Constipation

## 2021-12-06 NOTE — PROGRESS NOTE ADULT - SUBJECTIVE AND OBJECTIVE BOX
Hospital Day:  7d    Subjective: Patient is a 76y old  Male who presents with a chief complaint of Chest pain (06 Dec 2021 06:23)    Pt seen and evaluated at bedside.   Complaints:  Over the night Events: pt had a mechanical fall in the am from chair to bed     Past Medical Hx:   Diabetes    Prostate cancer    Asthma    COPD (chronic obstructive pulmonary disease)    Dyslipidemia    Shingles      Past Sx:  S/P TURP    H/O radical prostatectomy    History of surgery      Allergies:  contrast media (iodine-based) (Anaphylaxis)    Current Meds:   Standng Meds:  ALBUTerol    90 MICROgram(s) HFA Inhaler 2 Puff(s) Inhalation every 6 hours  aspirin  chewable 81 milliGRAM(s) Enteral Tube daily  atorvastatin 80 milliGRAM(s) Oral at bedtime  budesonide 160 MICROgram(s)/formoterol 4.5 MICROgram(s) Inhaler 2 Puff(s) Inhalation two times a day  carBAMazepine 200 milliGRAM(s) Oral two times a day  chlorhexidine 4% Liquid 1 Application(s) Topical <User Schedule>  clopidogrel Tablet 75 milliGRAM(s) Oral daily  dexMEDEtomidine Infusion 0.05 MICROgram(s)/kG/Hr (0.9 mL/Hr) IV Continuous <Continuous>  dextrose 40% Gel 15 Gram(s) Oral once  dextrose 5%. 1000 milliLiter(s) (50 mL/Hr) IV Continuous <Continuous>  dextrose 5%. 1000 milliLiter(s) (100 mL/Hr) IV Continuous <Continuous>  dextrose 50% Injectable 25 Gram(s) IV Push once  dextrose 50% Injectable 12.5 Gram(s) IV Push once  fluconAZOLE   Tablet 200 milliGRAM(s) Oral daily  glucagon  Injectable 1 milliGRAM(s) IntraMuscular once  heparin   Injectable 5000 Unit(s) SubCutaneous every 12 hours  influenza  Vaccine (HIGH DOSE) 0.7 milliLiter(s) IntraMuscular once  lidocaine   4% Patch 1 Patch Transdermal every 24 hours  nystatin    Suspension 426312 Unit(s) Oral every 6 hours  pantoprazole    Tablet 40 milliGRAM(s) Oral before breakfast  tiotropium 18 MICROgram(s) Capsule 1 Capsule(s) Inhalation daily    PRN Meds:  acetaminophen     Tablet .. 650 milliGRAM(s) Oral every 6 hours PRN Temp greater or equal to 38C (100.4F), Mild Pain (1 - 3)  albuterol/ipratropium for Nebulization 3 milliLiter(s) Nebulizer every 6 hours PRN Shortness of Breath and/or Wheezing  aluminum hydroxide/magnesium hydroxide/simethicone Suspension 30 milliLiter(s) Oral every 4 hours PRN Dyspepsia  melatonin 3 milliGRAM(s) Oral at bedtime PRN Insomnia  ondansetron Injectable 4 milliGRAM(s) IV Push every 8 hours PRN Nausea and/or Vomiting  polyethylene glycol 3350 17 Gram(s) Oral daily PRN Constipation  senna 2 Tablet(s) Oral at bedtime PRN Constipation      Vital Signs:   T(F): 98 (12-06-21 @ 07:01), Max: 98.2 (12-05-21 @ 23:01)  HR: 112 (12-06-21 @ 07:45) (72 - 121)  BP: 160/72 (12-06-21 @ 07:45) (105/52 - 168/74)  RR: 20 (12-06-21 @ 07:45) (18 - 34)  SpO2: 98% (12-06-21 @ 07:45) (96% - 99%)    Physical Exam:   GENERAL: NAD, Resting in bed  HEENT: NCAT  CHEST/LUNG: Clear to auscultation bilaterally; No wheezing or rubs.   HEART: Regular rate and rhythm; No murmurs, rubs, or gallops  ABDOMEN: Bowel sounds present; Soft, Nontender, Nondistended.   EXTREMITIES:  No clubbing, cyanosis, or edema  NERVOUS SYSTEM:  Alert & Oriented X3    FLUID BALANCE    12-04-21 @ 07:01  -  12-05-21 @ 07:00  --------------------------------------------------------  IN: 697 mL / OUT: 300 mL / NET: 397 mL    12-05-21 @ 07:01  -  12-06-21 @ 07:00  --------------------------------------------------------  IN: 380 mL / OUT: 1170 mL / NET: -790 mL        Labs:                         13.4   15.16 )-----------( 558      ( 06 Dec 2021 05:49 )             42.5     Neutophil% 63.8, Lymphocyte% 23.7, Monocyte% 8.8, Bands% 2.1 12-06-21 @ 05:49    06 Dec 2021 05:49    139    |  102    |  19     ----------------------------<  203    4.3     |  17     |  1.0      Ca    9.1        06 Dec 2021 05:49  Mg     2.0       06 Dec 2021 05:49    TPro  6.7    /  Alb  3.7    /  TBili  0.7    /  DBili  x      /  AST  12     /  ALT  19     /  AlkPhos  71     06 Dec 2021 05:49    Troponin 0.04, CKMB --, CK -- 12-05-21 @ 07:11    Radiology:  Hospital Day:  7d    Subjective: Patient is a 76y old  Male who presents with a chief complaint of Chest pain (06 Dec 2021 06:23)    Pt seen and evaluated at bedside.   Complaints: none   Over the night Events: pt had a mechanical fall in the am from chair to bed. no LOC or head trauma. VS stable.     Past Medical Hx:   Diabetes    Prostate cancer    Asthma    COPD (chronic obstructive pulmonary disease)    Dyslipidemia    Shingles      Past Sx:  S/P TURP    H/O radical prostatectomy    History of surgery      Allergies:  contrast media (iodine-based) (Anaphylaxis)    Current Meds:   Standng Meds:  ALBUTerol    90 MICROgram(s) HFA Inhaler 2 Puff(s) Inhalation every 6 hours  aspirin  chewable 81 milliGRAM(s) Enteral Tube daily  atorvastatin 80 milliGRAM(s) Oral at bedtime  budesonide 160 MICROgram(s)/formoterol 4.5 MICROgram(s) Inhaler 2 Puff(s) Inhalation two times a day  carBAMazepine 200 milliGRAM(s) Oral two times a day  chlorhexidine 4% Liquid 1 Application(s) Topical <User Schedule>  clopidogrel Tablet 75 milliGRAM(s) Oral daily  dexMEDEtomidine Infusion 0.05 MICROgram(s)/kG/Hr (0.9 mL/Hr) IV Continuous <Continuous>  dextrose 40% Gel 15 Gram(s) Oral once  dextrose 5%. 1000 milliLiter(s) (50 mL/Hr) IV Continuous <Continuous>  dextrose 5%. 1000 milliLiter(s) (100 mL/Hr) IV Continuous <Continuous>  dextrose 50% Injectable 25 Gram(s) IV Push once  dextrose 50% Injectable 12.5 Gram(s) IV Push once  fluconAZOLE   Tablet 200 milliGRAM(s) Oral daily  glucagon  Injectable 1 milliGRAM(s) IntraMuscular once  heparin   Injectable 5000 Unit(s) SubCutaneous every 12 hours  influenza  Vaccine (HIGH DOSE) 0.7 milliLiter(s) IntraMuscular once  lidocaine   4% Patch 1 Patch Transdermal every 24 hours  nystatin    Suspension 046491 Unit(s) Oral every 6 hours  pantoprazole    Tablet 40 milliGRAM(s) Oral before breakfast  tiotropium 18 MICROgram(s) Capsule 1 Capsule(s) Inhalation daily    PRN Meds:  acetaminophen     Tablet .. 650 milliGRAM(s) Oral every 6 hours PRN Temp greater or equal to 38C (100.4F), Mild Pain (1 - 3)  albuterol/ipratropium for Nebulization 3 milliLiter(s) Nebulizer every 6 hours PRN Shortness of Breath and/or Wheezing  aluminum hydroxide/magnesium hydroxide/simethicone Suspension 30 milliLiter(s) Oral every 4 hours PRN Dyspepsia  melatonin 3 milliGRAM(s) Oral at bedtime PRN Insomnia  ondansetron Injectable 4 milliGRAM(s) IV Push every 8 hours PRN Nausea and/or Vomiting  polyethylene glycol 3350 17 Gram(s) Oral daily PRN Constipation  senna 2 Tablet(s) Oral at bedtime PRN Constipation      Vital Signs:   T(F): 98 (12-06-21 @ 07:01), Max: 98.2 (12-05-21 @ 23:01)  HR: 112 (12-06-21 @ 07:45) (72 - 121)  BP: 160/72 (12-06-21 @ 07:45) (105/52 - 168/74)  RR: 20 (12-06-21 @ 07:45) (18 - 34)  SpO2: 98% (12-06-21 @ 07:45) (96% - 99%)    Physical Exam:   GENERAL: NAD, Resting in bed  HEENT: NCAT  CHEST/LUNG: Clear to auscultation bilaterally; No wheezing or rubs.   HEART: Regular rate and rhythm; No murmurs, rubs, or gallops  ABDOMEN: Bowel sounds present; Soft, Nontender, Nondistended.   EXTREMITIES:  No clubbing, cyanosis, or edema  NERVOUS SYSTEM:  Alert & Oriented X3    FLUID BALANCE    12-04-21 @ 07:01  -  12-05-21 @ 07:00  --------------------------------------------------------  IN: 697 mL / OUT: 300 mL / NET: 397 mL    12-05-21 @ 07:01  -  12-06-21 @ 07:00  --------------------------------------------------------  IN: 380 mL / OUT: 1170 mL / NET: -790 mL        Labs:                         13.4   15.16 )-----------( 558      ( 06 Dec 2021 05:49 )             42.5     Neutophil% 63.8, Lymphocyte% 23.7, Monocyte% 8.8, Bands% 2.1 12-06-21 @ 05:49    06 Dec 2021 05:49    139    |  102    |  19     ----------------------------<  203    4.3     |  17     |  1.0      Ca    9.1        06 Dec 2021 05:49  Mg     2.0       06 Dec 2021 05:49    TPro  6.7    /  Alb  3.7    /  TBili  0.7    /  DBili  x      /  AST  12     /  ALT  19     /  AlkPhos  71     06 Dec 2021 05:49    Troponin 0.04, CKMB --, CK -- 12-05-21 @ 07:11    Radiology:

## 2021-12-06 NOTE — PROGRESS NOTE ADULT - ASSESSMENT
Patient is a 76 year old Male with a past medical history of Asthma, COPD, DM, HLD, Prostate CA S/P TURP, Shingles presented to the ER with a chief complaint of Left sided Chest pain. Patient had Shingles earlier this month. Patient describes Constant, stabbing left sided chest pain that radiates to his back, 9/10, tried percocet / gabapentin / Pregabalin without much improvement. As per wife, last night patient had a fever of 102.5 and wife tried white vinegar with 2 Tylenol tablets, but patient was in excruciating pain. Patient admits to fever, chills, chest pain, constipation, rash on tongue and palpitations (only when patient takes Percocet). Patient denies nausea, vomiting, visual changes, auditory changes, shortness of breath, abdominal pain, diarrhea, dysuria.    PROBLEMS  # chest pain, hx DM & hx shingles.  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 Fall precaution , PT  2. Continue Gabapentin for herpetic pain  3. Monitor WBC count, is elevated  4. Continue Fluconazole 200 mg daily and nystatin suspension for oral candidiasis until 12/12/21  5. Management of left parietal lobe infarct  as per Neurology    d/w ICU team    Attending Attestation:    Spent more than 35 minutes on total encounter, more than 50 % of the visit was spent counseling and/or coordinating care by the Attending physician.

## 2021-12-07 LAB
ALBUMIN SERPL ELPH-MCNC: 3.5 G/DL — SIGNIFICANT CHANGE UP (ref 3.5–5.2)
ALP SERPL-CCNC: 68 U/L — SIGNIFICANT CHANGE UP (ref 30–115)
ALT FLD-CCNC: 12 U/L — SIGNIFICANT CHANGE UP (ref 0–41)
ANION GAP SERPL CALC-SCNC: 16 MMOL/L — HIGH (ref 7–14)
AST SERPL-CCNC: 10 U/L — SIGNIFICANT CHANGE UP (ref 0–41)
BASOPHILS # BLD AUTO: 0.12 K/UL — SIGNIFICANT CHANGE UP (ref 0–0.2)
BASOPHILS NFR BLD AUTO: 1 % — SIGNIFICANT CHANGE UP (ref 0–1)
BILIRUB SERPL-MCNC: 0.6 MG/DL — SIGNIFICANT CHANGE UP (ref 0.2–1.2)
BUN SERPL-MCNC: 14 MG/DL — SIGNIFICANT CHANGE UP (ref 10–20)
CALCIUM SERPL-MCNC: 8.9 MG/DL — SIGNIFICANT CHANGE UP (ref 8.5–10.1)
CHLORIDE SERPL-SCNC: 104 MMOL/L — SIGNIFICANT CHANGE UP (ref 98–110)
CO2 SERPL-SCNC: 20 MMOL/L — SIGNIFICANT CHANGE UP (ref 17–32)
CREAT SERPL-MCNC: 0.9 MG/DL — SIGNIFICANT CHANGE UP (ref 0.7–1.5)
EOSINOPHIL # BLD AUTO: 0.16 K/UL — SIGNIFICANT CHANGE UP (ref 0–0.7)
EOSINOPHIL NFR BLD AUTO: 1.3 % — SIGNIFICANT CHANGE UP (ref 0–8)
GLUCOSE BLDC GLUCOMTR-MCNC: 129 MG/DL — HIGH (ref 70–99)
GLUCOSE BLDC GLUCOMTR-MCNC: 160 MG/DL — HIGH (ref 70–99)
GLUCOSE BLDC GLUCOMTR-MCNC: 208 MG/DL — HIGH (ref 70–99)
GLUCOSE BLDC GLUCOMTR-MCNC: 250 MG/DL — HIGH (ref 70–99)
GLUCOSE BLDC GLUCOMTR-MCNC: 270 MG/DL — HIGH (ref 70–99)
GLUCOSE SERPL-MCNC: 230 MG/DL — HIGH (ref 70–99)
HCT VFR BLD CALC: 41.1 % — LOW (ref 42–52)
HGB BLD-MCNC: 13.3 G/DL — LOW (ref 14–18)
IMM GRANULOCYTES NFR BLD AUTO: 2.2 % — HIGH (ref 0.1–0.3)
LYMPHOCYTES # BLD AUTO: 28.8 % — SIGNIFICANT CHANGE UP (ref 20.5–51.1)
LYMPHOCYTES # BLD AUTO: 3.61 K/UL — HIGH (ref 1.2–3.4)
MAGNESIUM SERPL-MCNC: 1.6 MG/DL — LOW (ref 1.8–2.4)
MCHC RBC-ENTMCNC: 27.7 PG — SIGNIFICANT CHANGE UP (ref 27–31)
MCHC RBC-ENTMCNC: 32.4 G/DL — SIGNIFICANT CHANGE UP (ref 32–37)
MCV RBC AUTO: 85.4 FL — SIGNIFICANT CHANGE UP (ref 80–94)
MONOCYTES # BLD AUTO: 1.18 K/UL — HIGH (ref 0.1–0.6)
MONOCYTES NFR BLD AUTO: 9.4 % — HIGH (ref 1.7–9.3)
NEUTROPHILS # BLD AUTO: 7.18 K/UL — HIGH (ref 1.4–6.5)
NEUTROPHILS NFR BLD AUTO: 57.3 % — SIGNIFICANT CHANGE UP (ref 42.2–75.2)
NRBC # BLD: 0 /100 WBCS — SIGNIFICANT CHANGE UP (ref 0–0)
PLATELET # BLD AUTO: 489 K/UL — HIGH (ref 130–400)
POTASSIUM SERPL-MCNC: 4.2 MMOL/L — SIGNIFICANT CHANGE UP (ref 3.5–5)
POTASSIUM SERPL-SCNC: 4.2 MMOL/L — SIGNIFICANT CHANGE UP (ref 3.5–5)
PROT SERPL-MCNC: 6.3 G/DL — SIGNIFICANT CHANGE UP (ref 6–8)
RBC # BLD: 4.81 M/UL — SIGNIFICANT CHANGE UP (ref 4.7–6.1)
RBC # FLD: 15.9 % — HIGH (ref 11.5–14.5)
SODIUM SERPL-SCNC: 140 MMOL/L — SIGNIFICANT CHANGE UP (ref 135–146)
WBC # BLD: 12.53 K/UL — HIGH (ref 4.8–10.8)
WBC # FLD AUTO: 12.53 K/UL — HIGH (ref 4.8–10.8)

## 2021-12-07 PROCEDURE — 74018 RADEX ABDOMEN 1 VIEW: CPT | Mod: 26

## 2021-12-07 PROCEDURE — 71045 X-RAY EXAM CHEST 1 VIEW: CPT | Mod: 26

## 2021-12-07 PROCEDURE — 99233 SBSQ HOSP IP/OBS HIGH 50: CPT

## 2021-12-07 PROCEDURE — 93307 TTE W/O DOPPLER COMPLETE: CPT | Mod: 26

## 2021-12-07 RX ORDER — DEXTROSE 50 % IN WATER 50 %
12.5 SYRINGE (ML) INTRAVENOUS ONCE
Refills: 0 | Status: DISCONTINUED | OUTPATIENT
Start: 2021-12-07 | End: 2021-12-09

## 2021-12-07 RX ORDER — INSULIN LISPRO 100/ML
9 VIAL (ML) SUBCUTANEOUS
Refills: 0 | Status: DISCONTINUED | OUTPATIENT
Start: 2021-12-07 | End: 2021-12-09

## 2021-12-07 RX ORDER — LACTULOSE 10 G/15ML
10 SOLUTION ORAL
Refills: 0 | Status: DISCONTINUED | OUTPATIENT
Start: 2021-12-07 | End: 2021-12-09

## 2021-12-07 RX ORDER — INSULIN LISPRO 100/ML
VIAL (ML) SUBCUTANEOUS
Refills: 0 | Status: DISCONTINUED | OUTPATIENT
Start: 2021-12-07 | End: 2021-12-09

## 2021-12-07 RX ORDER — MAGNESIUM SULFATE 500 MG/ML
2 VIAL (ML) INJECTION ONCE
Refills: 0 | Status: COMPLETED | OUTPATIENT
Start: 2021-12-07 | End: 2021-12-07

## 2021-12-07 RX ORDER — SODIUM CHLORIDE 9 MG/ML
1000 INJECTION, SOLUTION INTRAVENOUS
Refills: 0 | Status: DISCONTINUED | OUTPATIENT
Start: 2021-12-07 | End: 2021-12-09

## 2021-12-07 RX ORDER — DEXTROSE 50 % IN WATER 50 %
25 SYRINGE (ML) INTRAVENOUS ONCE
Refills: 0 | Status: DISCONTINUED | OUTPATIENT
Start: 2021-12-07 | End: 2021-12-09

## 2021-12-07 RX ORDER — LIDOCAINE 4 G/100G
1 CREAM TOPICAL EVERY 24 HOURS
Refills: 0 | Status: DISCONTINUED | OUTPATIENT
Start: 2021-12-07 | End: 2021-12-09

## 2021-12-07 RX ORDER — INSULIN GLARGINE 100 [IU]/ML
20 INJECTION, SOLUTION SUBCUTANEOUS AT BEDTIME
Refills: 0 | Status: DISCONTINUED | OUTPATIENT
Start: 2021-12-07 | End: 2021-12-09

## 2021-12-07 RX ORDER — DEXTROSE 50 % IN WATER 50 %
15 SYRINGE (ML) INTRAVENOUS ONCE
Refills: 0 | Status: DISCONTINUED | OUTPATIENT
Start: 2021-12-07 | End: 2021-12-09

## 2021-12-07 RX ORDER — GLUCAGON INJECTION, SOLUTION 0.5 MG/.1ML
1 INJECTION, SOLUTION SUBCUTANEOUS ONCE
Refills: 0 | Status: DISCONTINUED | OUTPATIENT
Start: 2021-12-07 | End: 2021-12-09

## 2021-12-07 RX ORDER — MAGNESIUM SULFATE 500 MG/ML
1 VIAL (ML) INJECTION ONCE
Refills: 0 | Status: COMPLETED | OUTPATIENT
Start: 2021-12-07 | End: 2021-12-07

## 2021-12-07 RX ADMIN — LACTULOSE 10 GRAM(S): 10 SOLUTION ORAL at 11:25

## 2021-12-07 RX ADMIN — PANTOPRAZOLE SODIUM 40 MILLIGRAM(S): 20 TABLET, DELAYED RELEASE ORAL at 06:12

## 2021-12-07 RX ADMIN — Medication 6 UNIT(S): at 07:57

## 2021-12-07 RX ADMIN — Medication 6 UNIT(S): at 12:17

## 2021-12-07 RX ADMIN — FLUCONAZOLE 200 MILLIGRAM(S): 150 TABLET ORAL at 11:27

## 2021-12-07 RX ADMIN — Medication 500000 UNIT(S): at 11:28

## 2021-12-07 RX ADMIN — Medication 1 ENEMA: at 14:28

## 2021-12-07 RX ADMIN — Medication 25 GRAM(S): at 11:26

## 2021-12-07 RX ADMIN — LIDOCAINE 1 PATCH: 4 CREAM TOPICAL at 01:57

## 2021-12-07 RX ADMIN — Medication 2: at 12:15

## 2021-12-07 RX ADMIN — TIOTROPIUM BROMIDE 1 CAPSULE(S): 18 CAPSULE ORAL; RESPIRATORY (INHALATION) at 08:00

## 2021-12-07 RX ADMIN — Medication 81 MILLIGRAM(S): at 11:27

## 2021-12-07 RX ADMIN — Medication 9 UNIT(S): at 16:47

## 2021-12-07 RX ADMIN — Medication 100 GRAM(S): at 11:37

## 2021-12-07 RX ADMIN — Medication 2: at 16:47

## 2021-12-07 RX ADMIN — ALBUTEROL 2 PUFF(S): 90 AEROSOL, METERED ORAL at 21:36

## 2021-12-07 RX ADMIN — Medication 200 MILLIGRAM(S): at 06:14

## 2021-12-07 RX ADMIN — CHLORHEXIDINE GLUCONATE 1 APPLICATION(S): 213 SOLUTION TOPICAL at 06:11

## 2021-12-07 RX ADMIN — HEPARIN SODIUM 5000 UNIT(S): 5000 INJECTION INTRAVENOUS; SUBCUTANEOUS at 17:51

## 2021-12-07 RX ADMIN — ALBUTEROL 2 PUFF(S): 90 AEROSOL, METERED ORAL at 15:06

## 2021-12-07 RX ADMIN — BUDESONIDE AND FORMOTEROL FUMARATE DIHYDRATE 2 PUFF(S): 160; 4.5 AEROSOL RESPIRATORY (INHALATION) at 07:59

## 2021-12-07 RX ADMIN — Medication 200 MILLIGRAM(S): at 17:51

## 2021-12-07 RX ADMIN — Medication 500000 UNIT(S): at 06:11

## 2021-12-07 RX ADMIN — HEPARIN SODIUM 5000 UNIT(S): 5000 INJECTION INTRAVENOUS; SUBCUTANEOUS at 06:13

## 2021-12-07 RX ADMIN — Medication 500000 UNIT(S): at 01:57

## 2021-12-07 RX ADMIN — ALBUTEROL 2 PUFF(S): 90 AEROSOL, METERED ORAL at 07:46

## 2021-12-07 RX ADMIN — Medication 3: at 07:58

## 2021-12-07 RX ADMIN — CLOPIDOGREL BISULFATE 75 MILLIGRAM(S): 75 TABLET, FILM COATED ORAL at 11:27

## 2021-12-07 RX ADMIN — Medication 650 MILLIGRAM(S): at 17:50

## 2021-12-07 NOTE — PROGRESS NOTE ADULT - ASSESSMENT
Patient is a 76 year old Male with a past medical history of Asthma, COPD, DM, HLD, Prostate CA S/P TURP, Shingles presented to the ER with a chief complaint of Left sided Chest pain. Patient had Shingles earlier this month. Patient describes Constant, stabbing left sided chest pain that radiates to his back, 9/10, tried percocet / gabapentin / Pregabalin without much improvement. As per wife, last night patient had a fever of 102.5 and wife tried white vinegar with 2 Tylenol tablets, but patient was in excruciating pain. Patient admits to fever, chills, chest pain, constipation, rash on tongue and palpitations (only when patient takes Percocet). Patient denies nausea, vomiting, visual changes, auditory changes, shortness of breath, abdominal pain, diarrhea, dysuria.    PROBLEMS  # chest pain, hx DM & hx shingles.  Has increased troponin 0.05x2 & fever  R/O MI  # Oral Candidiasis   # Punctate acute infarct in the high left parietal lobe  # metabolic encephalopathy    Recommendations  - Continue Fluconazole 200 mg daily and nystatin suspension for oral candidiasis until 12/12/21  - gabapentin for herpetic pain   - Management of left parietal lobe infarct  as per Neurology    Please call or message on [employer] Teams if with any questions.  Spectra [phone]

## 2021-12-07 NOTE — CONSULT NOTE ADULT - CONSULT REQUESTED DATE/TIME
03-Dec-2021 16:51
07-Dec-2021 09:25
03-Dec-2021 07:57
02-Dec-2021 15:53
02-Dec-2021 20:33
01-Dec-2021 08:18

## 2021-12-07 NOTE — PROGRESS NOTE ADULT - SUBJECTIVE AND OBJECTIVE BOX
Patient is a 76y old  Male who presents with a chief complaint of Chest pain (07 Dec 2021 10:04)      T(F): 96.9 (12-07-21 @ 15:10), Max: 98.6 (12-07-21 @ 07:03)  HR: 120 (12-07-21 @ 15:10)  BP: 165/86 (12-07-21 @ 15:10)  RR: 20 (12-07-21 @ 15:10)  SpO2: 99% (12-06-21 @ 23:08) (99% - 100%)    PHYSICAL EXAM:  GENERAL: NAD  HEAD:  Atraumatic, Normocephalic  EYES: EOMI, PERRLA, conjunctiva and sclera clear  NERVOUS SYSTEM:  Alert & Oriented X3, no focal deficits   CHEST/LUNG: Clear to percussion bilaterally; No rales, rhonchi, wheezing, or rubs  HEART: Regular rate and rhythm; No murmurs, rubs, or gallops  ABDOMEN: Soft, Nontender, Nondistended; Bowel sounds present  EXTREMITIES:  2+ Peripheral Pulses, No clubbing, cyanosis, or edema    LABS  12-07    140  |  104  |  14  ----------------------------<  230<H>  4.2   |  20  |  0.9    Ca    8.9      07 Dec 2021 05:20  Mg     1.6     12-07    TPro  6.3  /  Alb  3.5  /  TBili  0.6  /  DBili  x   /  AST  10  /  ALT  12  /  AlkPhos  68  12-07                          13.3   12.53 )-----------( 489      ( 07 Dec 2021 05:20 )             41.1     < from: TTE Echo Complete w/ Contrast w/o Doppler (12.07.21 @ 14:34) >  Summary:   1. Left ventricular ejection fraction, by visual estimation, is 55 to 60%.   2. Normal left atrial size.   3. Mild mitral annular calcification.   4. Sclerotic aortic valve with normal opening.   5. There is mild aortic root calcification.    < end of copied text >    CARDIAC ENZYMES    Troponin T, Serum: 0.04 ng/mL (12-05-21 @ 07:11)    RADIOLOGY  < from: MR Head w/wo IV Cont (12.02.21 @ 13:24) >  IMPRESSION:    Punctate acute infarct in the high left parietal lobe.    Redemonstrated centrally calcified meningioma along the high right parietal convexity. No significant peritumoral edema.    Mild chronic microvascular ischemic changes.    < end of copied text >    MEDICATIONS  (STANDING):  ALBUTerol    90 MICROgram(s) HFA Inhaler 2 Puff(s) Inhalation every 6 hours  aspirin  chewable 81 milliGRAM(s) Enteral Tube daily  atorvastatin 80 milliGRAM(s) Oral at bedtime  budesonide 160 MICROgram(s)/formoterol 4.5 MICROgram(s) Inhaler 2 Puff(s) Inhalation two times a day  carBAMazepine 200 milliGRAM(s) Oral two times a day  chlorhexidine 4% Liquid 1 Application(s) Topical <User Schedule>  clopidogrel Tablet 75 milliGRAM(s) Oral daily  fluconAZOLE   Tablet 200 milliGRAM(s) Oral daily  heparin   Injectable 5000 Unit(s) SubCutaneous every 12 hours  influenza  Vaccine (HIGH DOSE) 0.7 milliLiter(s) IntraMuscular once  insulin glargine Injectable (LANTUS) 20 Unit(s) SubCutaneous at bedtime  insulin lispro (ADMELOG) corrective regimen sliding scale   SubCutaneous three times a day before meals  insulin lispro Injectable (ADMELOG) 9 Unit(s) SubCutaneous three times a day before meals  lactulose Syrup 10 Gram(s) Oral two times a day  lidocaine   4% Patch 1 Patch Transdermal every 24 hours  pantoprazole    Tablet 40 milliGRAM(s) Oral before breakfast  tiotropium 18 MICROgram(s) Capsule 1 Capsule(s) Inhalation daily    MEDICATIONS  (PRN):  acetaminophen     Tablet .. 650 milliGRAM(s) Oral every 6 hours PRN Temp greater or equal to 38C (100.4F), Mild Pain (1 - 3)  albuterol/ipratropium for Nebulization 3 milliLiter(s) Nebulizer every 6 hours PRN Shortness of Breath and/or Wheezing  aluminum hydroxide/magnesium hydroxide/simethicone Suspension 30 milliLiter(s) Oral every 4 hours PRN Dyspepsia  bisacodyl Suppository 10 milliGRAM(s) Rectal daily PRN Constipation  melatonin 3 milliGRAM(s) Oral at bedtime PRN Insomnia  ondansetron Injectable 4 milliGRAM(s) IV Push every 8 hours PRN Nausea and/or Vomiting  polyethylene glycol 3350 17 Gram(s) Oral daily PRN Constipation  senna 2 Tablet(s) Oral at bedtime PRN Constipation

## 2021-12-07 NOTE — PHARMACOTHERAPY INTERVENTION NOTE - COMMENTS
Currently on glargine 18 units qhs with insulin lispro 6 units TIDAC. Pt last glucose has been 203-270 range....     Pt home regimen is glargine 40 units qhs with insulin lispro 20 units TIDAC. Rec considering to go up on glargine and lispro

## 2021-12-07 NOTE — CONSULT NOTE ADULT - SUBJECTIVE AND OBJECTIVE BOX
HPI:  Patient is a 76 year old Male with a past medical history of Asthma, COPD, DM, HLD, Prostate CA S/P TURP, Shingles presented to the ER with a chief complaint of Left sided Chest pain. Patient had Shingles earlier this month. Patient describes Constant, stabbing left sided chest pain that radiates to his back, 9/10, tried percocet / gabapentin / Pregabalin without much improvement. As per wife, last night patient had a fever of 102.5 and wife tried white vinegar with 2 Tylenol tablets, but patient was in excruciating pain. Patient admits to fever, chills, chest pain, constipation, rash on tongue and palpitations (only when patient takes Percocet). Patient denies nausea, vomiting, visual changes, auditory changes, shortness of breath, abdominal pain, diarrhea, dysuria.     ptn seen at bed  side   nad  c/o  chest pain mostly due  to shingles ptn wife  is at bed  side      PTN  REFERRED TO ACUTE  REHAB  FOR  EVAL AND  TX   PAST MEDICAL & SURGICAL HISTORY:  Diabetes  20 yrs    Prostate cancer  1999 s/p sx    Asthma  20 yr    COPD (chronic obstructive pulmonary disease)  20 yrs no 02 rx    Dyslipidemia    Shingles    S/P TURP  1999    History of surgery  back surgery        Hospital Course:    TODAY'S SUBJECTIVE & REVIEW OF SYMPTOMS:     Constitutional WNL   Cardio WNL   Resp WNL   GI WNL  Heme WNL  Endo WNL  Skin WNL  MSK WNL  Neuro WNL  Cognitive WNL  Psych WNL      MEDICATIONS  (STANDING):  ALBUTerol    90 MICROgram(s) HFA Inhaler 2 Puff(s) Inhalation every 6 hours  aspirin  chewable 81 milliGRAM(s) Enteral Tube daily  atorvastatin 80 milliGRAM(s) Oral at bedtime  budesonide 160 MICROgram(s)/formoterol 4.5 MICROgram(s) Inhaler 2 Puff(s) Inhalation two times a day  carBAMazepine 200 milliGRAM(s) Oral two times a day  chlorhexidine 4% Liquid 1 Application(s) Topical <User Schedule>  clopidogrel Tablet 75 milliGRAM(s) Oral daily  dexMEDEtomidine Infusion 0.05 MICROgram(s)/kG/Hr (0.9 mL/Hr) IV Continuous <Continuous>  dextrose 40% Gel 15 Gram(s) Oral once  dextrose 5%. 1000 milliLiter(s) (50 mL/Hr) IV Continuous <Continuous>  dextrose 5%. 1000 milliLiter(s) (100 mL/Hr) IV Continuous <Continuous>  dextrose 50% Injectable 25 Gram(s) IV Push once  dextrose 50% Injectable 12.5 Gram(s) IV Push once  dextrose 50% Injectable 25 Gram(s) IV Push once  fluconAZOLE   Tablet 200 milliGRAM(s) Oral daily  glucagon  Injectable 1 milliGRAM(s) IntraMuscular once  heparin   Injectable 5000 Unit(s) SubCutaneous every 12 hours  influenza  Vaccine (HIGH DOSE) 0.7 milliLiter(s) IntraMuscular once  insulin glargine Injectable (LANTUS) 18 Unit(s) SubCutaneous at bedtime  insulin lispro (ADMELOG) corrective regimen sliding scale   SubCutaneous three times a day before meals  insulin lispro Injectable (ADMELOG) 6 Unit(s) SubCutaneous three times a day before meals  lactulose Syrup 10 Gram(s) Oral two times a day  magnesium sulfate  IVPB 2 Gram(s) IV Intermittent once  magnesium sulfate  IVPB 1 Gram(s) IV Intermittent once  nystatin    Suspension [id number] Unit(s) Oral every 6 hours  pantoprazole    Tablet 40 milliGRAM(s) Oral before breakfast  saline laxative (FLEET) Rectal Enema 1 Enema Rectal once  tiotropium 18 MICROgram(s) Capsule 1 Capsule(s) Inhalation daily    MEDICATIONS  (PRN):  acetaminophen     Tablet .. 650 milliGRAM(s) Oral every 6 hours PRN Temp greater or equal to 38C (100.4F), Mild Pain (1 - 3)  albuterol/ipratropium for Nebulization 3 milliLiter(s) Nebulizer every 6 hours PRN Shortness of Breath and/or Wheezing  aluminum hydroxide/magnesium hydroxide/simethicone Suspension 30 milliLiter(s) Oral every 4 hours PRN Dyspepsia  bisacodyl Suppository 10 milliGRAM(s) Rectal daily PRN Constipation  melatonin 3 milliGRAM(s) Oral at bedtime PRN Insomnia  ondansetron Injectable 4 milliGRAM(s) IV Push every 8 hours PRN Nausea and/or Vomiting  polyethylene glycol 3350 17 Gram(s) Oral daily PRN Constipation  senna 2 Tablet(s) Oral at bedtime PRN Constipation      FAMILY HISTORY:  Family history of asthma    Family history of diabetes mellitus (DM)        Allergies    contrast media (iodine-based) (Anaphylaxis)    Intolerances        SOCIAL HISTORY:    [  ] Etoh  [  ] Smoking  [  ] Substance abuse     Home Environment:  [  ] Home Alone  [ x ] Lives with Family  [  ] Home Health Aid    Dwelling:  [  ] Apartment  [ x ] Private House  [  ] Adult Home  [  ] Skilled Nursing Facility      [  ] Short Term  [  ] Long Term  [ x ] Stairs       Elevator [  ]    FUNCTIONAL STATUS PTA: (Check all that apply)  Ambulation: [ x  ]Independent    [  ] Dependent     [  ] Non-Ambulatory  Assistive Device: [  ] SA Cane  [  ]  Q Cane  [  ] Walker  [  ]  Wheelchair  ADL : [ x ] Independent  [  ]  Dependent       Vital Signs Last 24 Hrs  T(C): 37 (07 Dec 2021 07:03), Max: 37 (07 Dec 2021 07:03)  T(F): 98.6 (07 Dec 2021 07:03), Max: 98.6 (07 Dec 2021 07:03)  HR: 109 (07 Dec 2021 07:33) (104 - 120)  BP: 161/79 (07 Dec 2021 07:33) (143/72 - 172/79)  BP(mean): 114 (07 Dec 2021 07:33) (104 - 114)  RR: 18 (07 Dec 2021 07:03) (18 - 18)  SpO2: 99% (06 Dec 2021 23:08) (98% - 100%)      PHYSICAL EXAM: Alert & Oriented X3  GENERAL: NAD, well-groomed, well-developed  HEAD:  Atraumatic, Normocephalic  EYES: EOMI, PERRLA, conjunctiva and sclera clear  NECK: Supple, No JVD, Normal thyroid  CHEST/LUNG: Clear to percussion bilaterally; No rales, rhonchi, wheezing, or rubs  HEART: Regular rate and rhythm; No murmurs, rubs, or gallops  ABDOMEN: Soft, Nontender, Nondistended; Bowel sounds present  EXTREMITIES:  2+ Peripheral Pulses, No clubbing, cyanosis, or edema    NERVOUS SYSTEM:  Cranial Nerves 2-12 intact [  x] Abnormal  [  ]  ROM: WFL all extremities [  ]  Abnormal [x  ]  Motor Strength: WFL all extremities  [  ]  Abnormal [ x ]  Sensation: intact to light touch [  ] Abnormal [ x ]  Reflexes: Symmetric [  ]  Abnormal [ x ]    FUNCTIONAL STATUS:  Bed Mobility: Independent [  ]  Supervision [  ]  Needs Assistance [ x ]  N/A [  ]  Transfers: Independent [  ]  Supervision [  ]  Needs Assistance [x  ]  N/A [  ]   Ambulation: Independent [  ]  Supervision [  ]  Needs Assistance [x  ]  N/A [  ]  ADL: Independent [  ] Requires Assistance [  ] N/A [ x ]  SEE PT/OT IE NOTES    LABS:                        13.3   12.53 )-----------( 489      ( 07 Dec 2021 05:20 )             41.1     12-07    140  |  104  |  14  ----------------------------<  230<H>  4.2   |  20  |  0.9    Ca    8.9      07 Dec 2021 05:20  Mg     1.6     12-07    TPro  6.3  /  Alb  3.5  /  TBili  0.6  /  DBili  x   /  AST  10  /  ALT  12  /  AlkPhos  68  12-07          RADIOLOGY & ADDITIONAL STUDIES:< from: CT Head No Cont (12.01.21 @ 16:36) >  IMPRESSION:    1.  No CT evidence of acute intracranial pathology.    2.  Right high frontal calcified meningioma measuring 1.8 cm.      < end of copied text >      Assesment:

## 2021-12-07 NOTE — CHART NOTE - NSCHARTNOTEFT_GEN_A_CORE
MICU Transfer Note    Transfer from: MICU  Transfer to:  ( x ) Medicine Floor   (  ) Telemetry    (  ) RCU    (  ) Palliative    (  ) Stroke Unit          MEDICATIONS:  STANDING MEDICATIONS  ALBUTerol    90 MICROgram(s) HFA Inhaler 2 Puff(s) Inhalation every 6 hours  aspirin  chewable 81 milliGRAM(s) Enteral Tube daily  atorvastatin 80 milliGRAM(s) Oral at bedtime  budesonide 160 MICROgram(s)/formoterol 4.5 MICROgram(s) Inhaler 2 Puff(s) Inhalation two times a day  carBAMazepine 200 milliGRAM(s) Oral two times a day  chlorhexidine 4% Liquid 1 Application(s) Topical <User Schedule>  clopidogrel Tablet 75 milliGRAM(s) Oral daily  dexMEDEtomidine Infusion 0.05 MICROgram(s)/kG/Hr IV Continuous <Continuous>  dextrose 40% Gel 15 Gram(s) Oral once  dextrose 5%. 1000 milliLiter(s) IV Continuous <Continuous>  dextrose 5%. 1000 milliLiter(s) IV Continuous <Continuous>  dextrose 50% Injectable 25 Gram(s) IV Push once  dextrose 50% Injectable 12.5 Gram(s) IV Push once  dextrose 50% Injectable 25 Gram(s) IV Push once  fluconAZOLE   Tablet 200 milliGRAM(s) Oral daily  glucagon  Injectable 1 milliGRAM(s) IntraMuscular once  heparin   Injectable 5000 Unit(s) SubCutaneous every 12 hours  influenza  Vaccine (HIGH DOSE) 0.7 milliLiter(s) IntraMuscular once  insulin glargine Injectable (LANTUS) 18 Unit(s) SubCutaneous at bedtime  insulin lispro (ADMELOG) corrective regimen sliding scale   SubCutaneous three times a day before meals  insulin lispro Injectable (ADMELOG) 6 Unit(s) SubCutaneous three times a day before meals  lactulose Syrup 10 Gram(s) Oral two times a day  pantoprazole    Tablet 40 milliGRAM(s) Oral before breakfast  saline laxative (FLEET) Rectal Enema 1 Enema Rectal once  tiotropium 18 MICROgram(s) Capsule 1 Capsule(s) Inhalation daily    PRN MEDICATIONS  acetaminophen     Tablet .. 650 milliGRAM(s) Oral every 6 hours PRN  albuterol/ipratropium for Nebulization 3 milliLiter(s) Nebulizer every 6 hours PRN  aluminum hydroxide/magnesium hydroxide/simethicone Suspension 30 milliLiter(s) Oral every 4 hours PRN  bisacodyl Suppository 10 milliGRAM(s) Rectal daily PRN  melatonin 3 milliGRAM(s) Oral at bedtime PRN  ondansetron Injectable 4 milliGRAM(s) IV Push every 8 hours PRN  polyethylene glycol 3350 17 Gram(s) Oral daily PRN  senna 2 Tablet(s) Oral at bedtime PRN      VITAL SIGNS: Last 24 Hours  T(C): 37 (07 Dec 2021 07:03), Max: 37 (07 Dec 2021 07:03)  T(F): 98.6 (07 Dec 2021 07:03), Max: 98.6 (07 Dec 2021 07:03)  HR: 109 (07 Dec 2021 07:33) (104 - 120)  BP: 161/79 (07 Dec 2021 07:33) (143/72 - 172/79)  BP(mean): 114 (07 Dec 2021 07:33) (104 - 114)  RR: 14 (07 Dec 2021 09:45) (14 - 18)  SpO2: 99% (06 Dec 2021 23:08) (98% - 100%)    LABS:                        13.3   12.53 )-----------( 489      ( 07 Dec 2021 05:20 )             41.1     12-07    140  |  104  |  14  ----------------------------<  230<H>  4.2   |  20  |  0.9    Ca    8.9      07 Dec 2021 05:20  Mg     1.6     12-07    TPro  6.3  /  Alb  3.5  /  TBili  0.6  /  DBili  x   /  AST  10  /  ALT  12  /  AlkPhos  68  12-07    76 year old Male with a past medical history of Asthma, COPD, DM, HLD, Prostate CA S/P TURP, Shingles presented to the ER with a chief complaint of Left sided Chest pain 2/2 shingles, blurred vision 2/2 acute stroke, confusion 2/2 poly pharmacy.    # Acute left parietal punctuate ischemic infarct   # Altered mental status likely secondary to medications, resolved, back to baseline  # Hospital Acquired delirium   - blurred vision Has been present for ~1 month however was never investigated per wife  - MR Head w/wo IV Cont (12.02.21 @ 13:24) > Punctate acute infarct in the high left parietal lobe. Redemonstrated centrally calcified meningioma along the high right parietal convexity. No significant peritumoral edema. Mild chronic microvascular ischemic changes.  - neurology recommendations noted   - haldol PRN    # Left sided Chest pain secondary to Uncontrolled Post Herpetic Neuralgia;  d/c'd percocet, lyrica, gabapentin, amitriptyline. c/w lidocaine patch.  # Leukocytosis - improving: no other evidence of sepsis, order cultures, CXR , trend fever curve, start Abx if indicated   # Elevated Troponin likely chronic troponin no EKG changes  # Diabetes: basal- bolus coverage  # Asthma/COPD Continue with Symbicort and other Inhalers   # Oral Thrush - Likely secondary to Chronic steroid use c/w PO Fluconazole and nystatin until 12/12 asa per ID  # Constipation, likely 2/2 meds, avoid opioids, bowel regimen. f/u [facility].   # Prostate CA S/P TURP. out patient f/u   # mechanical fall (12/6/21) when moving from chair to bed on his own. VS stable. no sign of injury. no LOC. no Head trauma. orthostatic vitals negative.     # DVT ppx -  Heparin SQ  # GI ppx - PPI   # Diet - DASH/cc  # COVID test - Negative  Full code.   Dispo: SNF - pending approval

## 2021-12-07 NOTE — PROGRESS NOTE ADULT - TIME BILLING
Direct patient care.
I have personally seen and examined this patient.    I have reviewed all pertinent clinical information and reviewed all relevant imaging and diagnostic studies personally.   I counseled the patient about diagnostic testing and treatment plan. All questions were answered.   I discussed recommendations with the primary team.
Direct patient care.
I have personally seen and examined this patient.    I have reviewed all pertinent clinical information and reviewed all relevant imaging and diagnostic studies personally.   I counseled the patient about diagnostic testing and treatment plan. All questions were answered.   I discussed recommendations with the primary team.

## 2021-12-07 NOTE — SWALLOW BEDSIDE ASSESSMENT ADULT - SWALLOW EVAL: DIAGNOSIS
tolerating diet
mild- oral impairment signs of pharyngeal impairment vs. poor attention to task and arousal. discussed with RN

## 2021-12-07 NOTE — CONSULT NOTE ADULT - ASSESSMENT
IMPRESSION: Rehab of 75 y/o m rehab for r/o cva  gd  debility      PRECAUTIONS: [  ] Cardiac  [  ] Respiratory  [  ] Seizures [  ] Contact Isolation  [  ] Droplet Isolation  [ FALL ] Other    Weight Bearing Status:     RECOMMENDATION:    Out of Bed to Chair     DVT/Decubiti Prophylaxis    REHAB PLAN:     [  x ] Bedside P/T 3-5 times a week   [  x ]   Bedside O/T  2-3 times a week             [   ] No Rehab Therapy Indicated                   [   ]  Speech Therapy   Conditioning/ROM                                    ADL  Bed Mobility                                               Conditioning/ROM  Transfers                                                     Bed Mobility  Sitting /Standing Balance                         Transfers                                        Gait Training                                               Sitting/Standing Balance  Stair Training [   ]Applicable                    Home equipment Eval                                                                        Splinting  [   ] Only      GOALS:   ADL   [  x ]   Independent                    Transfers  [  x ] Independent                          Ambulation  [x   ] Independent     [  x  ] With device                            [x   ]  CG                                                         [ x  ]  CG                                                                  [ x  ] CG                            [    ] Min A                                                   [   ] Min A                                                              [   ] Min  A          DISCHARGE PLAN:   [   ]  Good candidate for Intensive Rehabilitation/Hospital based-[facility]A [facility]                                             Will tolerate 3hrs Intensive Rehab Daily                                       [ x   ]  Short Term Rehab in Skilled Nursing Facility                                       [    ]  Home with Outpatient or  services                                         [  xx  ]  Possible Candidate for Intensive Hospital based Rehab

## 2021-12-07 NOTE — PROGRESS NOTE ADULT - SUBJECTIVE AND OBJECTIVE BOX
DEL ALVARADO  76y, Male  Allergy: contrast media (iodine-based) (Anaphylaxis)      LOS  8d    CHIEF COMPLAINT: Chest pain (07 Dec 2021 09:25)      INTERVAL EVENTS/HPI  - No acute events overnight  - T(F): , Max: 98.6 (12-07-21 @ 07:03)  - continued left chset pain   - WBC Count: 12.53 (12-07-21 @ 05:20)  WBC Count: 15.16 (12-06-21 @ 05:49)     - Creatinine, Serum: 0.9 (12-07-21 @ 05:20)  Creatinine, Serum: 1.0 (12-06-21 @ 05:49)       ROS  General: Denies rigors, nightsweats  HEENT: Denies headache, rhinorrhea, sore throat, eye pain  CV: Denies CP, palpitations  PULM: Denies wheezing, hemoptysis  GI: Denies hematemesis, hematochezia, melena  : Denies discharge, hematuria  MSK: Denies arthralgias, myalgias  SKIN: Denies rash, lesions  NEURO: Denies paresthesias, weakness  PSYCH: Denies depression, anxiety    VITALS:  T(F): 98.6, Max: 98.6 (12-07-21 @ 07:03)  HR: 109  BP: 161/79  RR: 14Vital Signs Last 24 Hrs  T(C): 37 (07 Dec 2021 07:03), Max: 37 (07 Dec 2021 07:03)  T(F): 98.6 (07 Dec 2021 07:03), Max: 98.6 (07 Dec 2021 07:03)  HR: 109 (07 Dec 2021 07:33) (104 - 120)  BP: 161/79 (07 Dec 2021 07:33) (143/72 - 172/79)  BP(mean): 114 (07 Dec 2021 07:33) (104 - 114)  RR: 14 (07 Dec 2021 09:45) (14 - 18)  SpO2: 99% (06 Dec 2021 23:08) (98% - 100%)    PHYSICAL EXAM:  Gen: NAD, resting in bed  HEENT: Normocephalic, atraumatic  Neck: supple, no lymphadenopathy  CV: Regular rate & regular rhythm  Lungs: decreased BS at bases, no fremitus  Abdomen: Soft, BS present  Ext: Warm, well perfused  Neuro: non focal, awake  Skin: no rash, no erythema  Lines: no phlebitis    FH: Non-contributory  Social Hx: Non-contributory    TESTS & MEASUREMENTS:                        13.3   12.53 )-----------( 489      ( 07 Dec 2021 05:20 )             41.1     12-07    140  |  104  |  14  ----------------------------<  230<H>  4.2   |  20  |  0.9    Ca    8.9      07 Dec 2021 05:20  Mg     1.6     12-07    TPro  6.3  /  Alb  3.5  /  TBili  0.6  /  DBili  x   /  AST  10  /  ALT  12  /  AlkPhos  68  12-07    eGFR if Non African American: 82 mL/min/1.73M2 (12-07-21 @ 05:20)  eGFR if African American: 95 mL/min/1.73M2 (12-07-21 @ 05:20)    LIVER FUNCTIONS - ( 07 Dec 2021 05:20 )  Alb: 3.5 g/dL / Pro: 6.3 g/dL / ALK PHOS: 68 U/L / ALT: 12 U/L / AST: 10 U/L / GGT: x                     INFECTIOUS DISEASES TESTING  HIV-1/2 Combo Result: Nonreact (12-04-21 @ 05:41)  COVID-19 PCR: NotDetec (11-29-21 @ 14:46)  Procalcitonin, Serum: 0.08 (11-08-21 @ 06:35)  COVID-19 PCR: NotDetec (11-07-21 @ 14:30)  Legionella Antigen, Urine: Negative (05-31-21 @ 08:43)  MRSA PCR Result.: Negative (05-31-21 @ 07:20)  Procalcitonin, Serum: 0.13 (05-28-21 @ 15:35)  Procalcitonin, Serum: 0.04 (03-17-21 @ 03:10)  Rapid RVP Result: Detected (03-16-21 @ 22:54)      INFLAMMATORY MARKERS      RADIOLOGY & ADDITIONAL TESTS:  I have personally reviewed the last available Chest xray  CXR  Xray Chest 1 View- PORTABLE-Urgent:   EXAM:  XR CHEST PORTABLE URGENT 1V            PROCEDURE DATE:  12/06/2021            INTERPRETATION:  STUDY INDICATION: Crackles    TECHNIQUE:  Portable frontal view of the chest obtained.    COMPARISON: 12/5/2021.    FINDINGS/  IMPRESSION:    No focal consolidation, pneumothorax or pleural effusion.    Stable cardiomediastinal silhouette. Unchanged osseous structures.    --- End of Report ---              BARB MENDOZA MD; Attending Radiologist  This document has been electronically signed. Dec6 2021  1:15PM (12-06-21 @ 10:45)      CT      CARDIOLOGY TESTING  12 Lead ECG:   Ventricular Rate 108 BPM    Atrial Rate 108 BPM    P-R Interval 198 ms    QRS Duration 82 ms    Q-T Interval 336 ms    QTC Calculation(Bazett) 450 ms    P Axis 63 degrees    R Axis 21 degrees    T Axis 41 degrees    Diagnosis Line *** Poor data quality, interpretation may be adversely affected  Sinus tachycardia  Otherwise normal ECG    Confirmed by ELINA MARCOS MD ([id number]) on 11/30/2021 6:48:25 PM (11-30-21 @ 11:01)  12 Lead ECG:   Ventricular Rate 99 BPM    Atrial Rate 99 BPM    P-R Interval 196 ms    QRS Duration 70 ms    Q-T Interval 338 ms    QTC Calculation(Bazett) 433 ms    P Axis 56 degrees    R Axis 16 degrees    T Axis 39 degrees    Diagnosis Line Normal sinus rhythm  Normal ECG    Confirmed by ELINA MARCOS MD ([id number]) on 11/30/2021 11:47:14 AM (11-29-21 @ 16:05)      MEDICATIONS  ALBUTerol    90 MICROgram(s) HFA Inhaler 2 Inhalation every 6 hours  aspirin  chewable 81 Enteral Tube daily  atorvastatin 80 Oral at bedtime  budesonide 160 MICROgram(s)/formoterol 4.5 MICROgram(s) Inhaler 2 Inhalation two times a day  carBAMazepine 200 Oral two times a day  chlorhexidine 4% Liquid 1 Topical <User Schedule>  clopidogrel Tablet 75 Oral daily  dexMEDEtomidine Infusion 0.05 IV Continuous <Continuous>  dextrose 40% Gel 15 Oral once  dextrose 5%. 1000 IV Continuous <Continuous>  dextrose 5%. 1000 IV Continuous <Continuous>  dextrose 50% Injectable 25 IV Push once  dextrose 50% Injectable 12.5 IV Push once  dextrose 50% Injectable 25 IV Push once  fluconAZOLE   Tablet 200 Oral daily  glucagon  Injectable 1 IntraMuscular once  heparin   Injectable 5000 SubCutaneous every 12 hours  influenza  Vaccine (HIGH DOSE) 0.7 IntraMuscular once  insulin glargine Injectable (LANTUS) 18 SubCutaneous at bedtime  insulin lispro (ADMELOG) corrective regimen sliding scale  SubCutaneous three times a day before meals  insulin lispro Injectable (ADMELOG) 6 SubCutaneous three times a day before meals  lactulose Syrup 10 Oral two times a day  magnesium sulfate  IVPB 2 IV Intermittent once  magnesium sulfate  IVPB 1 IV Intermittent once  nystatin    Suspension 144948 Oral every 6 hours  pantoprazole    Tablet 40 Oral before breakfast  saline laxative (FLEET) Rectal Enema 1 Rectal once  tiotropium 18 MICROgram(s) Capsule 1 Inhalation daily      WEIGHT  Weight (kg): 72.2 (11-30-21 @ 18:07)  Creatinine, Serum: 0.9 mg/dL (12-07-21 @ 05:20)      ANTIBIOTICS:  fluconAZOLE   Tablet 200 milliGRAM(s) Oral daily  nystatin    Suspension 007900 Unit(s) Oral every 6 hours      All available historical records have been reviewed

## 2021-12-07 NOTE — SWALLOW BEDSIDE ASSESSMENT ADULT - SLP PERTINENT HISTORY OF CURRENT PROBLEM
Patient is a 76 year old Male with a past medical history of Asthma, COPD, DM, HLD, Prostate CA S/P TURP, Shingles presented to the ER with a chief complaint of Left sided Chest pain. Patient had Shingles earlier this month. Patient describes Constant, stabbing left sided chest pain that radiates to his back, 9/10, tried percocet / gabapentin / Pregabalin without much improvement. As per wife, last night patient had a fever of 102.5 and wife tried white vinegar with 2 Tylenol tablets, but patient was in excruciating pain. Patient admits to fever, chills, chest pain, constipation, rash on tongue and palpitations (only when patient takes Percocet). Patient denies nausea, vomiting, visual changes, auditory changes, shortness of breath, abdominal pain, diarrhea, dysuria.
76 year old Male with a past medical history of Asthma, COPD, DM, HLD, Prostate CA S/P TURP, Shingles presented to the ER with a chief complaint of Left sided Chest pain, blurred vision,  confusion.

## 2021-12-07 NOTE — PROGRESS NOTE ADULT - ASSESSMENT
76 year old Male with a past medical history of  COPD, DM, HLD, Prostate CA S/P TURP, Shingles was admitted to the hospital one week ago with chest pain in the area of shingles. Pt developed acute encephalopathy - MRI showed a small acute infarct in L parietal lobe  Pt also found with oral candidiasis and mental status is now improving    acute encephalopathy - resolved - secondary to meds / acute CVA / oral candidiasis      - aspirin, Plavix and Lipitor   - Fluconazole and Nystatin as per ID   - hold percocet and Lyrica   -  PT / Physiatry -> SNF   - DC when bed available

## 2021-12-07 NOTE — CONSULT NOTE ADULT - CONSULT REASON
fever
r/o   cva
CVA
Alteration in Mental Status  History of asthma/COPD  Possible CVA  Possible RUTH
r/o cva debility  gd sp  fall
CVA

## 2021-12-08 LAB
ALBUMIN SERPL ELPH-MCNC: 3.4 G/DL — LOW (ref 3.5–5.2)
ALP SERPL-CCNC: 69 U/L — SIGNIFICANT CHANGE UP (ref 30–115)
ALT FLD-CCNC: 18 U/L — SIGNIFICANT CHANGE UP (ref 0–41)
ANION GAP SERPL CALC-SCNC: 15 MMOL/L — HIGH (ref 7–14)
AST SERPL-CCNC: 17 U/L — SIGNIFICANT CHANGE UP (ref 0–41)
BASOPHILS # BLD AUTO: 0.14 K/UL — SIGNIFICANT CHANGE UP (ref 0–0.2)
BASOPHILS NFR BLD AUTO: 1.3 % — HIGH (ref 0–1)
BILIRUB SERPL-MCNC: 0.4 MG/DL — SIGNIFICANT CHANGE UP (ref 0.2–1.2)
BUN SERPL-MCNC: 9 MG/DL — LOW (ref 10–20)
CALCIUM SERPL-MCNC: 8.9 MG/DL — SIGNIFICANT CHANGE UP (ref 8.5–10.1)
CHLORIDE SERPL-SCNC: 105 MMOL/L — SIGNIFICANT CHANGE UP (ref 98–110)
CO2 SERPL-SCNC: 21 MMOL/L — SIGNIFICANT CHANGE UP (ref 17–32)
CREAT SERPL-MCNC: 0.9 MG/DL — SIGNIFICANT CHANGE UP (ref 0.7–1.5)
EOSINOPHIL # BLD AUTO: 0.25 K/UL — SIGNIFICANT CHANGE UP (ref 0–0.7)
EOSINOPHIL NFR BLD AUTO: 2.3 % — SIGNIFICANT CHANGE UP (ref 0–8)
GLUCOSE BLDC GLUCOMTR-MCNC: 113 MG/DL — HIGH (ref 70–99)
GLUCOSE BLDC GLUCOMTR-MCNC: 113 MG/DL — HIGH (ref 70–99)
GLUCOSE BLDC GLUCOMTR-MCNC: 123 MG/DL — HIGH (ref 70–99)
GLUCOSE BLDC GLUCOMTR-MCNC: 166 MG/DL — HIGH (ref 70–99)
GLUCOSE BLDC GLUCOMTR-MCNC: 168 MG/DL — HIGH (ref 70–99)
GLUCOSE SERPL-MCNC: 183 MG/DL — HIGH (ref 70–99)
HCT VFR BLD CALC: 42.4 % — SIGNIFICANT CHANGE UP (ref 42–52)
HGB BLD-MCNC: 13.4 G/DL — LOW (ref 14–18)
IMM GRANULOCYTES NFR BLD AUTO: 3.1 % — HIGH (ref 0.1–0.3)
LYMPHOCYTES # BLD AUTO: 3.61 K/UL — HIGH (ref 1.2–3.4)
LYMPHOCYTES # BLD AUTO: 33.6 % — SIGNIFICANT CHANGE UP (ref 20.5–51.1)
MAGNESIUM SERPL-MCNC: 1.8 MG/DL — SIGNIFICANT CHANGE UP (ref 1.8–2.4)
MCHC RBC-ENTMCNC: 27.6 PG — SIGNIFICANT CHANGE UP (ref 27–31)
MCHC RBC-ENTMCNC: 31.6 G/DL — LOW (ref 32–37)
MCV RBC AUTO: 87.2 FL — SIGNIFICANT CHANGE UP (ref 80–94)
MONOCYTES # BLD AUTO: 1.08 K/UL — HIGH (ref 0.1–0.6)
MONOCYTES NFR BLD AUTO: 10.1 % — HIGH (ref 1.7–9.3)
NEUTROPHILS # BLD AUTO: 5.32 K/UL — SIGNIFICANT CHANGE UP (ref 1.4–6.5)
NEUTROPHILS NFR BLD AUTO: 49.6 % — SIGNIFICANT CHANGE UP (ref 42.2–75.2)
NRBC # BLD: 0 /100 WBCS — SIGNIFICANT CHANGE UP (ref 0–0)
PLATELET # BLD AUTO: 485 K/UL — HIGH (ref 130–400)
POTASSIUM SERPL-MCNC: 4.6 MMOL/L — SIGNIFICANT CHANGE UP (ref 3.5–5)
POTASSIUM SERPL-SCNC: 4.6 MMOL/L — SIGNIFICANT CHANGE UP (ref 3.5–5)
PROT SERPL-MCNC: 6.3 G/DL — SIGNIFICANT CHANGE UP (ref 6–8)
RBC # BLD: 4.86 M/UL — SIGNIFICANT CHANGE UP (ref 4.7–6.1)
RBC # FLD: 16 % — HIGH (ref 11.5–14.5)
SARS-COV-2 RNA SPEC QL NAA+PROBE: SIGNIFICANT CHANGE UP
SODIUM SERPL-SCNC: 141 MMOL/L — SIGNIFICANT CHANGE UP (ref 135–146)
WBC # BLD: 10.73 K/UL — SIGNIFICANT CHANGE UP (ref 4.8–10.8)
WBC # FLD AUTO: 10.73 K/UL — SIGNIFICANT CHANGE UP (ref 4.8–10.8)

## 2021-12-08 PROCEDURE — 99232 SBSQ HOSP IP/OBS MODERATE 35: CPT

## 2021-12-08 RX ORDER — INSULIN GLARGINE 100 [IU]/ML
10 INJECTION, SOLUTION SUBCUTANEOUS AT BEDTIME
Refills: 0 | Status: DISCONTINUED | OUTPATIENT
Start: 2021-12-08 | End: 2021-12-09

## 2021-12-08 RX ADMIN — PANTOPRAZOLE SODIUM 40 MILLIGRAM(S): 20 TABLET, DELAYED RELEASE ORAL at 07:56

## 2021-12-08 RX ADMIN — Medication 1: at 11:14

## 2021-12-08 RX ADMIN — HEPARIN SODIUM 5000 UNIT(S): 5000 INJECTION INTRAVENOUS; SUBCUTANEOUS at 05:49

## 2021-12-08 RX ADMIN — HEPARIN SODIUM 5000 UNIT(S): 5000 INJECTION INTRAVENOUS; SUBCUTANEOUS at 17:22

## 2021-12-08 RX ADMIN — Medication 81 MILLIGRAM(S): at 11:31

## 2021-12-08 RX ADMIN — ALBUTEROL 2 PUFF(S): 90 AEROSOL, METERED ORAL at 19:16

## 2021-12-08 RX ADMIN — Medication 9 UNIT(S): at 07:56

## 2021-12-08 RX ADMIN — Medication 200 MILLIGRAM(S): at 05:47

## 2021-12-08 RX ADMIN — Medication 650 MILLIGRAM(S): at 19:53

## 2021-12-08 RX ADMIN — INSULIN GLARGINE 10 UNIT(S): 100 INJECTION, SOLUTION SUBCUTANEOUS at 23:13

## 2021-12-08 RX ADMIN — Medication 9 UNIT(S): at 11:15

## 2021-12-08 RX ADMIN — ALBUTEROL 2 PUFF(S): 90 AEROSOL, METERED ORAL at 01:57

## 2021-12-08 RX ADMIN — LIDOCAINE 1 PATCH: 4 CREAM TOPICAL at 11:24

## 2021-12-08 RX ADMIN — Medication 1: at 07:56

## 2021-12-08 RX ADMIN — FLUCONAZOLE 200 MILLIGRAM(S): 150 TABLET ORAL at 11:31

## 2021-12-08 RX ADMIN — LIDOCAINE 1 PATCH: 4 CREAM TOPICAL at 16:16

## 2021-12-08 RX ADMIN — ATORVASTATIN CALCIUM 80 MILLIGRAM(S): 80 TABLET, FILM COATED ORAL at 21:15

## 2021-12-08 RX ADMIN — Medication 200 MILLIGRAM(S): at 17:21

## 2021-12-08 RX ADMIN — CLOPIDOGREL BISULFATE 75 MILLIGRAM(S): 75 TABLET, FILM COATED ORAL at 11:31

## 2021-12-08 RX ADMIN — LACTULOSE 10 GRAM(S): 10 SOLUTION ORAL at 05:47

## 2021-12-08 RX ADMIN — Medication 9 UNIT(S): at 17:16

## 2021-12-08 RX ADMIN — ALBUTEROL 2 PUFF(S): 90 AEROSOL, METERED ORAL at 08:19

## 2021-12-08 RX ADMIN — BUDESONIDE AND FORMOTEROL FUMARATE DIHYDRATE 2 PUFF(S): 160; 4.5 AEROSOL RESPIRATORY (INHALATION) at 08:18

## 2021-12-08 NOTE — PROGRESS NOTE ADULT - SUBJECTIVE AND OBJECTIVE BOX
Patient reports feeling weak today       T(F): 97.9 (12-08-21 @ 12:02), Max: 97.9 (12-08-21 @ 12:02)  HR: 106 (12-08-21 @ 12:02)  BP: 149/72 (12-08-21 @ 12:02)  RR: 18 (12-08-21 @ 12:02)  SpO2: 96% (12-08-21 @ 12:02) (96% - 96%)    PHYSICAL EXAM:  GENERAL: NAD  HEAD:  Atraumatic, Normocephalic  NERVOUS SYSTEM:   no focal deficits   CHEST/LUNG: Clear to percussion bilaterally; No rales, rhonchi, wheezing, or rubs  HEART: Regular rate and rhythm; No murmurs, rubs, or gallops  ABDOMEN: Soft, Nontender, Nondistended; Bowel sounds present  EXTREMITIES:  2+ Peripheral Pulses, No clubbing, cyanosis, or edema    LABS  12-08    141  |  105  |  9<L>  ----------------------------<  183<H>  4.6   |  21  |  0.9    Ca    8.9      08 Dec 2021 06:50  Mg     1.8     12-08    TPro  6.3  /  Alb  3.4<L>  /  TBili  0.4  /  DBili  x   /  AST  17  /  ALT  18  /  AlkPhos  69  12-08                          13.4   10.73 )-----------( 485      ( 08 Dec 2021 06:50 )             42.4     RADIOLOGY  < from: Xray Chest 1 View- PORTABLE-Routine (Xray Chest 1 View- PORTABLE-Routine in AM.) (12.07.21 @ 06:33) >  Impression:    No radiographic evidence of acute cardiopulmonary disease.    < end of copied text >  < from: MR Head w/wo IV Cont (12.02.21 @ 13:24) >  IMPRESSION:    Punctate acute infarct in the high left parietal lobe.    Redemonstrated centrally calcified meningioma along the high right parietal convexity. No significant peritumoral edema.    Mild chronic microvascular ischemic changes.    < end of copied text >    MEDICATIONS  (STANDING):  ALBUTerol    90 MICROgram(s) HFA Inhaler 2 Puff(s) Inhalation every 6 hours  aspirin  chewable 81 milliGRAM(s) Enteral Tube daily  atorvastatin 80 milliGRAM(s) Oral at bedtime  budesonide 160 MICROgram(s)/formoterol 4.5 MICROgram(s) Inhaler 2 Puff(s) Inhalation two times a day  carBAMazepine 200 milliGRAM(s) Oral two times a day  chlorhexidine 4% Liquid 1 Application(s) Topical <User Schedule>  clopidogrel Tablet 75 milliGRAM(s) Oral daily  fluconAZOLE   Tablet 200 milliGRAM(s) Oral daily  heparin   Injectable 5000 Unit(s) SubCutaneous every 12 hours  insulin glargine Injectable (LANTUS) 20 Unit(s) SubCutaneous at bedtime  insulin lispro (ADMELOG) corrective regimen sliding scale   SubCutaneous three times a day before meals  insulin lispro Injectable (ADMELOG) 9 Unit(s) SubCutaneous three times a day before meals  lactulose Syrup 10 Gram(s) Oral two times a day  lidocaine   4% Patch 1 Patch Transdermal every 24 hours  pantoprazole    Tablet 40 milliGRAM(s) Oral before breakfast  tiotropium 18 MICROgram(s) Capsule 1 Capsule(s) Inhalation daily    MEDICATIONS  (PRN):  acetaminophen     Tablet .. 650 milliGRAM(s) Oral every 6 hours PRN Temp greater or equal to 38C (100.4F), Mild Pain (1 - 3)  albuterol/ipratropium for Nebulization 3 milliLiter(s) Nebulizer every 6 hours PRN Shortness of Breath and/or Wheezing  aluminum hydroxide/magnesium hydroxide/simethicone Suspension 30 milliLiter(s) Oral every 4 hours PRN Dyspepsia  bisacodyl Suppository 10 milliGRAM(s) Rectal daily PRN Constipation  melatonin 3 milliGRAM(s) Oral at bedtime PRN Insomnia  ondansetron Injectable 4 milliGRAM(s) IV Push every 8 hours PRN Nausea and/or Vomiting  polyethylene glycol 3350 17 Gram(s) Oral daily PRN Constipation  senna 2 Tablet(s) Oral at bedtime PRN Constipation

## 2021-12-08 NOTE — PROGRESS NOTE ADULT - ASSESSMENT
76 year old Male with a past medical history of  COPD, DM, HLD, Prostate CA S/P TURP, Shingles was admitted to the hospital one week ago with chest pain in the area of shingles. Pt developed acute encephalopathy - MRI showed a small acute infarct in L parietal lobe  Pt also found with oral candidiasis and mental status is now improving    acute encephalopathy - resolved - secondary to meds / acute CVA / oral candidiasis      - aspirin, Plavix and Lipitor   - Fluconazole and Nystatin as per ID   - hold percocet and Lyrica   -  PT / Physiatry -> SNF   - may DC when bed available   - outpt 30 day cardiac event recorder    - neuro follow up in 2 weeks

## 2021-12-09 ENCOUNTER — TRANSCRIPTION ENCOUNTER (OUTPATIENT)
Age: 76
End: 2021-12-09

## 2021-12-09 VITALS
SYSTOLIC BLOOD PRESSURE: 165 MMHG | HEART RATE: 103 BPM | DIASTOLIC BLOOD PRESSURE: 85 MMHG | TEMPERATURE: 97 F | RESPIRATION RATE: 16 BRPM

## 2021-12-09 LAB
GLUCOSE BLDC GLUCOMTR-MCNC: 136 MG/DL — HIGH (ref 70–99)
GLUCOSE BLDC GLUCOMTR-MCNC: 198 MG/DL — HIGH (ref 70–99)

## 2021-12-09 PROCEDURE — 99239 HOSP IP/OBS DSCHRG MGMT >30: CPT

## 2021-12-09 RX ORDER — FLUCONAZOLE 150 MG/1
1 TABLET ORAL
Qty: 0 | Refills: 0 | DISCHARGE
Start: 2021-12-09 | End: 2021-12-12

## 2021-12-09 RX ORDER — ATORVASTATIN CALCIUM 80 MG/1
1 TABLET, FILM COATED ORAL
Qty: 0 | Refills: 0 | DISCHARGE
Start: 2021-12-09

## 2021-12-09 RX ORDER — NYSTATIN 500MM UNIT
4 POWDER (EA) MISCELLANEOUS
Qty: 64 | Refills: 0
Start: 2021-12-09 | End: 2021-12-12

## 2021-12-09 RX ORDER — CLOPIDOGREL BISULFATE 75 MG/1
1 TABLET, FILM COATED ORAL
Qty: 0 | Refills: 0 | DISCHARGE
Start: 2021-12-09

## 2021-12-09 RX ORDER — ASPIRIN/CALCIUM CARB/MAGNESIUM 324 MG
1 TABLET ORAL
Qty: 0 | Refills: 0 | DISCHARGE
Start: 2021-12-09

## 2021-12-09 RX ORDER — ATORVASTATIN CALCIUM 80 MG/1
1 TABLET, FILM COATED ORAL
Qty: 0 | Refills: 0 | DISCHARGE

## 2021-12-09 RX ORDER — CARBAMAZEPINE 200 MG
1 TABLET ORAL
Qty: 0 | Refills: 0 | DISCHARGE
Start: 2021-12-09

## 2021-12-09 RX ADMIN — FLUCONAZOLE 200 MILLIGRAM(S): 150 TABLET ORAL at 11:07

## 2021-12-09 RX ADMIN — HEPARIN SODIUM 5000 UNIT(S): 5000 INJECTION INTRAVENOUS; SUBCUTANEOUS at 05:41

## 2021-12-09 RX ADMIN — Medication 81 MILLIGRAM(S): at 12:37

## 2021-12-09 RX ADMIN — Medication 9 UNIT(S): at 12:37

## 2021-12-09 RX ADMIN — Medication 1: at 12:36

## 2021-12-09 RX ADMIN — INFLUENZA VIRUS VACCINE 0.7 MILLILITER(S): 15; 15; 15; 15 SUSPENSION INTRAMUSCULAR at 13:02

## 2021-12-09 RX ADMIN — CLOPIDOGREL BISULFATE 75 MILLIGRAM(S): 75 TABLET, FILM COATED ORAL at 11:07

## 2021-12-09 RX ADMIN — Medication 650 MILLIGRAM(S): at 11:07

## 2021-12-09 RX ADMIN — Medication 200 MILLIGRAM(S): at 05:39

## 2021-12-09 NOTE — DISCHARGE NOTE PROVIDER - NSDCMRMEDTOKEN_GEN_ALL_CORE_FT
aspirin 81 mg oral tablet, chewable: 1 tab(s) orally once a day  atorvastatin 80 mg oral tablet: 1 tab(s) orally once a day (at bedtime)  Breo Ellipta 200 mcg-25 mcg/inh inhalation powder:   carBAMazepine 200 mg oral tablet: 1 tab(s) orally 2 times a day  clopidogrel 75 mg oral tablet: 1 tab(s) orally once a day  fluconazole 200 mg oral tablet: 1 tab(s) orally once a day  Incruse Ellipta 62.5 mcg/inh inhalation powder: 1 puff(s) inhaled every 24 hours  insulin lispro 100 units/mL injectable solution: 20 unit(s) injectable 3 times a day  ipratropium-albuterol 0.5 mg-2.5 mg/3 mL inhalation solution: 3 milliliter(s) inhaled every 6 hours, As Needed -for bronchospasm   Levemir 100 units/mL subcutaneous solution: 40 unit(s) subcutaneous once a day in the AM  nystatin 100,000 units/mL oral suspension: 4 milliliter(s) orally 4 times a day   pantoprazole 40 mg oral delayed release tablet: 1 tab(s) orally once a day   predniSONE 20 mg oral tablet: 1 tab(s) orally once a day  senna oral tablet: 2 tab(s) orally once a day, As Needed -for constipation   Symbicort 160 mcg-4.5 mcg/inh inhalation aerosol: 2 puff(s) inhaled 2 times a day  Ventolin HFA 90 mcg/inh inhalation aerosol: 2 puff(s) inhaled every 6 hours

## 2021-12-09 NOTE — PROGRESS NOTE ADULT - ASSESSMENT
76 year old Male with a past medical history of  COPD, DM, HLD, Prostate CA S/P TURP, Shingles was admitted to the hospital one week ago with chest pain in the area of shingles. Pt developed acute encephalopathy - MRI showed a small acute infarct in L parietal lobe  Pt also found with oral candidiasis and mental status is now improving    acute encephalopathy - resolved - secondary to meds / acute CVA / oral candidiasis      - aspirin, Plavix and Lipitor   - Fluconazole and Nystatin as per ID   - hold percocet and Lyrica   -  PT / Physiatry -> SNF   - may DC when bed available   - outpt 30 day cardiac event recorder    - neuro follow up in 2 weeks    - 34min spent on DC

## 2021-12-09 NOTE — DISCHARGE NOTE PROVIDER - NSDCCPCAREPLAN_GEN_ALL_CORE_FT
PRINCIPAL DISCHARGE DIAGNOSIS  Diagnosis: CVA (cerebrovascular accident)  Assessment and Plan of Treatment: discharge with aspirin, plavix, lipitor  follow up with stroke clinic in 2 weeks  follow up with cardiology for 30 day event monitor      SECONDARY DISCHARGE DIAGNOSES  Diagnosis: Postherpetic neuralgia  Assessment and Plan of Treatment: continue tegretol 200 mg bid    Diagnosis: Candidiasis, mouth  Assessment and Plan of Treatment: continue fluconazole, nystatin until 12/12/21

## 2021-12-09 NOTE — DISCHARGE NOTE NURSING/CASE MANAGEMENT/SOCIAL WORK - PATIENT PORTAL LINK FT
You can access the FollowMyHealth Patient Portal offered by [facility] by registering at the following website: [email or web address]. By joining [facility]’s FollowMyHealth portal, you will also be able to view your health information using other applications (apps) compatible with our system.

## 2021-12-09 NOTE — DISCHARGE NOTE PROVIDER - HOSPITAL COURSE
76 year old Male with a past medical history of  COPD, DM, HLD, Prostate CA S/P TURP, Shingles was admitted to the hospital one week ago with chest pain in the area of shingles. Pt developed acute encephalopathy - MRI showed a small acute infarct in L parietal lobe  Pt also found with oral candidiasis and mental status is now improving    acute encephalopathy - resolved - secondary to meds / acute CVA / oral candidiasis      - aspirin, Plavix and Lipitor   - Fluconazole and Nystatin until 12/12/21   - hold percocet and Lyrica   -  PT / Physiatry -> SNF   - outpt 30 day cardiac event recorder, follow up with cardiology   - neuro follow up in 2 weeks in stroke clinic    medically stable for dc

## 2021-12-09 NOTE — PROGRESS NOTE ADULT - PROVIDER SPECIALTY LIST ADULT
Hospitalist
Neurology
Pulmonology
Critical Care
Infectious Disease
Neurology
Neurology
Pulmonology
Pulmonology
Hospitalist
Pulmonology
Hospitalist
Infectious Disease

## 2021-12-09 NOTE — PROGRESS NOTE ADULT - SUBJECTIVE AND OBJECTIVE BOX
Patient is a 76y old  Male who presents with a chief complaint of Chest pain (08 Dec 2021 14:13)      T(F): 98.1 (12-09-21 @ 05:13), Max: 98.1 (12-09-21 @ 05:13)  HR: 64 (12-09-21 @ 05:13)  BP: 124/68 (12-09-21 @ 05:13)  RR: 18 (12-09-21 @ 05:13)  SpO2: 96% (12-08-21 @ 12:02) (96% - 96%)    PHYSICAL EXAM:  GENERAL: NAD  HEAD:  Atraumatic, Normocephalic  EYES: EOMI, PERRLA, conjunctiva and sclera clear  NERVOUS SYSTEM:  Alert & Oriented X3, no focal deficits   CHEST/LUNG: Clear to percussion bilaterally; No rales, rhonchi, wheezing, or rubs  HEART: Regular rate and rhythm; No murmurs, rubs, or gallops  ABDOMEN: Soft, Nontender, Nondistended; Bowel sounds present  EXTREMITIES:  2+ Peripheral Pulses, No clubbing, cyanosis, or edema    LABS  12-08    141  |  105  |  9<L>  ----------------------------<  183<H>  4.6   |  21  |  0.9    Ca    8.9      08 Dec 2021 06:50  Mg     1.8     12-08    TPro  6.3  /  Alb  3.4<L>  /  TBili  0.4  /  DBili  x   /  AST  17  /  ALT  18  /  AlkPhos  69  12-08                          13.4   10.73 )-----------( 485      ( 08 Dec 2021 06:50 )             42.4       RADIOLOGY  < from: MR Head w/wo IV Cont (12.02.21 @ 13:24) >  IMPRESSION:    Punctate acute infarct in the high left parietal lobe.    Redemonstrated centrally calcified meningioma along the high right parietal convexity. No significant peritumoral edema.    Mild chronic microvascular ischemic changes.    < end of copied text >    MEDICATIONS  (STANDING):  ALBUTerol    90 MICROgram(s) HFA Inhaler 2 Puff(s) Inhalation every 6 hours  aspirin  chewable 81 milliGRAM(s) Enteral Tube daily  atorvastatin 80 milliGRAM(s) Oral at bedtime  budesonide 160 MICROgram(s)/formoterol 4.5 MICROgram(s) Inhaler 2 Puff(s) Inhalation two times a day  carBAMazepine 200 milliGRAM(s) Oral two times a day  chlorhexidine 4% Liquid 1 Application(s) Topical <User Schedule>  clopidogrel Tablet 75 milliGRAM(s) Oral daily  fluconAZOLE   Tablet 200 milliGRAM(s) Oral daily  heparin   Injectable 5000 Unit(s) SubCutaneous every 12 hours  influenza  Vaccine (HIGH DOSE) 0.7 milliLiter(s) IntraMuscular once  insulin glargine Injectable (LANTUS) 20 Unit(s) SubCutaneous at bedtime  insulin glargine Injectable (LANTUS) 10 Unit(s) SubCutaneous at bedtime  insulin lispro (ADMELOG) corrective regimen sliding scale   SubCutaneous three times a day before meals  insulin lispro Injectable (ADMELOG) 9 Unit(s) SubCutaneous three times a day before meals  lactulose Syrup 10 Gram(s) Oral two times a day  lidocaine   4% Patch 1 Patch Transdermal every 24 hours  pantoprazole    Tablet 40 milliGRAM(s) Oral before breakfast  tiotropium 18 MICROgram(s) Capsule 1 Capsule(s) Inhalation daily    MEDICATIONS  (PRN):  acetaminophen     Tablet .. 650 milliGRAM(s) Oral every 6 hours PRN Temp greater or equal to 38C (100.4F), Mild Pain (1 - 3)  albuterol/ipratropium for Nebulization 3 milliLiter(s) Nebulizer every 6 hours PRN Shortness of Breath and/or Wheezing  aluminum hydroxide/magnesium hydroxide/simethicone Suspension 30 milliLiter(s) Oral every 4 hours PRN Dyspepsia  bisacodyl Suppository 10 milliGRAM(s) Rectal daily PRN Constipation  melatonin 3 milliGRAM(s) Oral at bedtime PRN Insomnia  ondansetron Injectable 4 milliGRAM(s) IV Push every 8 hours PRN Nausea and/or Vomiting  polyethylene glycol 3350 17 Gram(s) Oral daily PRN Constipation  senna 2 Tablet(s) Oral at bedtime PRN Constipation

## 2021-12-09 NOTE — DISCHARGE NOTE NURSING/CASE MANAGEMENT/SOCIAL WORK - NSDCPEFALRISK_GEN_ALL_CORE
For information on Fall & Injury Prevention, visit: [email or web address] OR  [email or web address] OR  https://www.cdc.gov/steadi/patient.html

## 2021-12-09 NOTE — DISCHARGE NOTE PROVIDER - CARE PROVIDER_API CALL
Daniel Stokes)  Neurology  [address] [place] [address], [address]  [place] [address]  Phone: [phone]  Fax: [phone]  Follow Up Time: 2 weeks    Diaen Oates (NP; RN)  NP in [address] [address]  Phone: [phone]  Fax: [phone]  Follow Up Time: 1 week

## 2021-12-09 NOTE — DISCHARGE NOTE PROVIDER - CARE PROVIDERS DIRECT ADDRESSES
[email or web address]

## 2021-12-09 NOTE — PROGRESS NOTE ADULT - REASON FOR ADMISSION
Chest pain
pain
Chest pain

## 2021-12-09 NOTE — DISCHARGE NOTE NURSING/CASE MANAGEMENT/SOCIAL WORK - NSDCVIVACCINE_GEN_ALL_CORE_FT
influenza, high-dose, quadrivalent; 09-Dec-2021 13:02; Nicky English (TAMELA); [employer]; [id number] (Exp. Date: 30-Jun-2022); IntraMuscular; Deltoid Right.; 0.7 milliLiter(s); VIS (VIS Published: 06-Aug-2021, VIS Presented: 09-Dec-2021);

## 2021-12-09 NOTE — DISCHARGE NOTE PROVIDER - PROVIDER TOKENS
PROVIDER:[TOKEN:[[id number]],FOLLOWUP:[2 weeks]],PROVIDER:[TOKEN:[[id number]],FOLLOWUP:[1 week]]

## 2021-12-09 NOTE — DISCHARGE NOTE PROVIDER - NSDCFUSCHEDAPPT_GEN_ALL_CORE_FT
DEL ALVARADO ; 12/22/2021 ; [facility] [address] ; 01/13/2022 ; [facility] [address] ; 02/03/2022 ; [facility] Neuro [address]

## 2021-12-16 DIAGNOSIS — E78.5 HYPERLIPIDEMIA, UNSPECIFIED: ICD-10-CM

## 2021-12-16 DIAGNOSIS — Z87.891 PERSONAL HISTORY OF NICOTINE DEPENDENCE: ICD-10-CM

## 2021-12-16 DIAGNOSIS — E11.9 TYPE 2 DIABETES MELLITUS WITHOUT COMPLICATIONS: ICD-10-CM

## 2021-12-16 DIAGNOSIS — T42.6X5A ADVERSE EFFECT OF OTHER ANTIEPILEPTIC AND SEDATIVE-HYPNOTIC DRUGS, INITIAL ENCOUNTER: ICD-10-CM

## 2021-12-16 DIAGNOSIS — F05 DELIRIUM DUE TO KNOWN PHYSIOLOGICAL CONDITION: ICD-10-CM

## 2021-12-16 DIAGNOSIS — B02.29 OTHER POSTHERPETIC NERVOUS SYSTEM INVOLVEMENT: ICD-10-CM

## 2021-12-16 DIAGNOSIS — R07.9 CHEST PAIN, UNSPECIFIED: ICD-10-CM

## 2021-12-16 DIAGNOSIS — I63.9 CEREBRAL INFARCTION, UNSPECIFIED: ICD-10-CM

## 2021-12-16 DIAGNOSIS — T40.2X5A ADVERSE EFFECT OF OTHER OPIOIDS, INITIAL ENCOUNTER: ICD-10-CM

## 2021-12-16 DIAGNOSIS — B37.0 CANDIDAL STOMATITIS: ICD-10-CM

## 2021-12-16 DIAGNOSIS — J44.9 CHRONIC OBSTRUCTIVE PULMONARY DISEASE, UNSPECIFIED: ICD-10-CM

## 2021-12-16 DIAGNOSIS — Z85.46 PERSONAL HISTORY OF MALIGNANT NEOPLASM OF PROSTATE: ICD-10-CM

## 2021-12-16 DIAGNOSIS — R29.701 NIHSS SCORE 1: ICD-10-CM

## 2021-12-16 DIAGNOSIS — G92.8 OTHER TOXIC ENCEPHALOPATHY: ICD-10-CM

## 2021-12-16 DIAGNOSIS — R10.13 EPIGASTRIC PAIN: ICD-10-CM

## 2021-12-22 ENCOUNTER — APPOINTMENT (OUTPATIENT)
Dept: GERIATRICS | Facility: CLINIC | Age: 76
End: 2021-12-22
Payer: MEDICARE

## 2021-12-22 ENCOUNTER — OUTPATIENT (OUTPATIENT)
Dept: OUTPATIENT SERVICES | Facility: HOSPITAL | Age: 76
LOS: 1 days | Discharge: HOME | End: 2021-12-22

## 2021-12-22 VITALS
WEIGHT: 160 LBS | HEIGHT: 70 IN | BODY MASS INDEX: 22.9 KG/M2 | RESPIRATION RATE: 24 BRPM | HEART RATE: 93 BPM | SYSTOLIC BLOOD PRESSURE: 142 MMHG | DIASTOLIC BLOOD PRESSURE: 84 MMHG

## 2021-12-22 DIAGNOSIS — B02.9 ZOSTER WITHOUT COMPLICATIONS: ICD-10-CM

## 2021-12-22 DIAGNOSIS — Z90.79 ACQUIRED ABSENCE OF OTHER GENITAL ORGAN(S): Chronic | ICD-10-CM

## 2021-12-22 DIAGNOSIS — K46.9 UNSPECIFIED ABDOMINAL HERNIA W/OUT OBSTRUCTION OR GANGRENE: ICD-10-CM

## 2021-12-22 DIAGNOSIS — K46.9 UNSPECIFIED ABDOMINAL HERNIA WITHOUT OBSTRUCTION OR GANGRENE: ICD-10-CM

## 2021-12-22 DIAGNOSIS — J45.909 UNSPECIFIED ASTHMA, UNCOMPLICATED: ICD-10-CM

## 2021-12-22 DIAGNOSIS — Z86.39 PERSONAL HISTORY OF OTHER ENDOCRINE, NUTRITIONAL AND METABOLIC DISEASE: ICD-10-CM

## 2021-12-22 DIAGNOSIS — I63.9 CEREBRAL INFARCTION, UNSPECIFIED: ICD-10-CM

## 2021-12-22 DIAGNOSIS — Z98.890 OTHER SPECIFIED POSTPROCEDURAL STATES: Chronic | ICD-10-CM

## 2021-12-22 DIAGNOSIS — E11.65 TYPE 2 DIABETES MELLITUS WITH HYPERGLYCEMIA: ICD-10-CM

## 2021-12-22 DIAGNOSIS — K21.9 GASTRO-ESOPHAGEAL REFLUX DISEASE WITHOUT ESOPHAGITIS: ICD-10-CM

## 2021-12-22 LAB — GLUCOSE BLDC GLUCOMTR-MCNC: 414 MG/DL — HIGH (ref 70–99)

## 2021-12-22 PROCEDURE — 99204 OFFICE O/P NEW MOD 45 MIN: CPT | Mod: GC

## 2021-12-23 ENCOUNTER — NON-APPOINTMENT (OUTPATIENT)
Age: 76
End: 2021-12-23

## 2022-01-01 NOTE — ED ADULT NURSE NOTE - NSICDXPASTSURGICALHX_GEN_ALL_CORE_FT
- Follow-up with your pediatrician within 48 hours of discharge.   Routine Home Care Instructions:  - Please call us for help if you feel sad, blue or overwhelmed for more than a few days after discharge    - Umbilical cord care:        - Please keep your baby's cord clean and dry (do not apply alcohol)        - Please keep your baby's diaper below the umbilical cord until it has fallen off (~10-14 days)        - Please do not submerge your baby in a bath until the cord has fallen off (sponge bath instead)    - Continue feeding your child at least every 3 hours. Wake baby to feed if needed.     Please contact your pediatrician and return to the hospital if you notice any of the following:   - Fever  (T > 100.4)  - Reduced amount of wet diapers (< 5-6 per day) or no wet diaper in 12 hours  - Increased fussiness, irritability, or crying inconsolably  - Lethargy (excessively sleepy, difficult to arouse)  - Breathing difficulties (noisy breathing, breathing fast, using belly and neck muscles to breath)  - Changes in the baby’s color (yellow, blue, pale, gray)  - Seizure or loss of consciousness Because the patient is the baby of a diabetic mother, the Accucheck protocol was followed.  Patient was found to have hypoglycemia at this time.  Patient received IV fluids containing dextrose which were weaned according to serial glucose levels.  Patient had two glucose levels >50 after being off IV fluids and is currently feeding well.    Blood glucose remained within normal limits throughout admission. PAST SURGICAL HISTORY:  History of surgery back surgery    S/P TURP 1999

## 2022-01-03 NOTE — HISTORY OF PRESENT ILLNESS
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 The patient was recently admitted for shingles and had a hospital course complicated by encephalopathy and acute stroke to the L parietal lobe. While in the waiting room the patient was found to be passed out. A fingerstick was taken which showed a blood glucose of >400. The patient was extensively counseled to go to the ED for further treatment but adamantly refused. He reported that he had a poor previous experience with the ED and did not wish to go back. He verbalized understanding that by forgoing more acute medical treatment he was putting himself at serious medical risk and was at risk for further medical complications and death.

## 2022-01-03 NOTE — PHYSICAL EXAM
[No Acute Distress] : in no acute distress [No Respiratory Distress] : no respiratory distress [Normal S1, S2] : normal S1 and S2 [Heart Rate And Rhythm] : heart rate was normal and rhythm regular [Edema] : edema was not present [Abdomen Tenderness] : non-tender [Abdomen Soft] : soft [No CVA Tenderness] : no CVA  tenderness [Sensation] : the sensory exam was normal to light touch and pinprick [Oriented To Time, Place, And Person] : oriented to person, place, and time [de-identified] : wheelchair bound

## 2022-01-03 NOTE — ASSESSMENT
[FreeTextEntry1] : Patient is a 75-year-old retired gentleman with a past medical history significant for asthma, chronic cough, L parietal lobe stroke, and diabetes along with prostate cancer status post prostatectomy nearly 20 years ago who now presents to the office for initial evaluation. \par \par #Diabetes\par - pt's FS was 414\par - Pt received extensive counseling on going to ED for treatment but pt refused\par - pt verbalized understanding of risks of forgoing medical treatment\par - Pt will go home and get a dose of Novolog\par - Will continue Novolog 3x/day for today\par - Will start Trulicity 0.75 once a week tomorrow\par - Will continue Lantus 40 units for now\par - Continue to monitor FS, goal is 130-140\par - Consider eventually removing Lantus\par \par #Asthma\par - Continue symbicort\par - Continue Albuterol as needed\par \par #Shingles\par - Pt was recently admitted for shingles\par - Episode currently resolving\par - Currently on Gabapentin 300 TID\par - Use Lidocaine patch as needed\par \par #L Parietal Lobe stroke\par - Diagnosed with L parietal lobe stroke in 12/21\par - Continue aspirin, plavix, and Lipitor 80\par \par \par Patient will be going home today and receiving dose of Novolog at home. Will call the office back with new FS. Will also  Trulicity tomorrow and d/c novolog. \par

## 2022-01-05 NOTE — PATIENT PROFILE ADULT - NSPROREFERSVCHOMEDIABETES_GEN_A_NUR
Nutrition Services     Nutrition trigger received for unsure weight loss (2 points) on Malnutrition Screening Tool (MST). Chart reviewed and no weight loss noted per records: 9/29/21 81.6 kg. Patient reports good appetite, consuming % x 2 meals. Continue cardiac 1500 ml fluid restriction diet.    Determined there are no nutrition needs at this time.      Nutrition services will sign off.   Please consult nutrition services if further intervention needed.     no

## 2022-01-12 ENCOUNTER — APPOINTMENT (OUTPATIENT)
Dept: GERIATRICS | Facility: HOME HEALTH | Age: 77
End: 2022-01-12
Payer: MEDICARE

## 2022-01-12 VITALS
SYSTOLIC BLOOD PRESSURE: 108 MMHG | HEIGHT: 67 IN | OXYGEN SATURATION: 98 % | BODY MASS INDEX: 24.01 KG/M2 | TEMPERATURE: 98.2 F | WEIGHT: 153 LBS | DIASTOLIC BLOOD PRESSURE: 58 MMHG | RESPIRATION RATE: 18 BRPM | HEART RATE: 98 BPM

## 2022-01-12 PROCEDURE — 99349 HOME/RES VST EST MOD MDM 40: CPT

## 2022-01-12 RX ORDER — BUDESONIDE AND FORMOTEROL FUMARATE DIHYDRATE 160; 4.5 UG/1; UG/1
160-4.5 AEROSOL RESPIRATORY (INHALATION)
Refills: 0 | Status: DISCONTINUED | COMMUNITY
Start: 2021-12-22 | End: 2022-01-12

## 2022-01-12 NOTE — ASSESSMENT
[FreeTextEntry1] : DM2\par - c/w Basal/Bolus insulin\par - c/w Trulicity 0.75 once a week\par - c/w Tradjenta \par - COH diet\par - A1C 8.2% 12/21\par \par Asthma\par - c/w LABA/ICS/LAMA\par - b2 PRN\par \par Hx of Shingles w/PHN\par - c/w Gabapentin 300 TID\par - c/w Lidocaine patch as needed\par \par Hx CVA minimal right sided weakness\par - on DAPT/Statin\par - outpatient FU with Neuro\par - outpatient FU with Cards for ILR\par - DME for ambulation\par - fall precautions\par - PT/OT c/s\par \par Hx Prostate CA s/p Prostatectomy\par - outpatient URO fu as scheduled

## 2022-01-12 NOTE — PHYSICAL EXAM
[Alert] : alert [No Acute Distress] : in no acute distress [Normal Outer Ear/Nose] : the ears and nose were normal in appearance [Normal Appearance] : the appearance of the neck was normal [Supple] : the neck was supple [No Respiratory Distress] : no respiratory distress [No Acc Muscle Use] : no accessory muscle use [Respiration, Rhythm And Depth] : normal respiratory rhythm and effort [Auscultation Breath Sounds / Voice Sounds] : lungs were clear to auscultation bilaterally [Normal S1, S2] : normal S1 and S2 [Heart Rate And Rhythm] : heart rate was normal and rhythm regular [Edema] : edema was not present [Abdomen Tenderness] : non-tender [Abdomen Soft] : soft [No Clubbing, Cyanosis] : no clubbing or cyanosis of the fingernails [Involuntary Movements] : no involuntary movements were seen [] : no rash [Skin Lesions] : no skin lesions [No Focal Deficits] : no focal deficits [Oriented To Time, Place, And Person] : oriented to person, place, and time [de-identified] : + scarring to anterior chest wall c/w shingles

## 2022-02-09 ENCOUNTER — APPOINTMENT (OUTPATIENT)
Dept: GERIATRICS | Facility: HOME HEALTH | Age: 77
End: 2022-02-09
Payer: MEDICARE

## 2022-02-09 VITALS
HEART RATE: 103 BPM | SYSTOLIC BLOOD PRESSURE: 128 MMHG | OXYGEN SATURATION: 97 % | TEMPERATURE: 97.6 F | DIASTOLIC BLOOD PRESSURE: 68 MMHG | RESPIRATION RATE: 16 BRPM

## 2022-02-09 PROCEDURE — 99348 HOME/RES VST EST LOW MDM 30: CPT

## 2022-02-09 NOTE — HISTORY OF PRESENT ILLNESS
[FreeTextEntry1] : Patient seen and examined at home.  Patient is homebound 2/2 generalized weakness/debility.  Patient with complaints of lesion to back, noticed appro 1-2 weeks ago.  it is right over the area of shingles.  Patient still with complaints of severe PHN, using Lidoderm and Gabapentin 300mg TID, unable to tolerate Percocet 2/2 confusion.  \par \par Also with complaints of intermittent hyperglycemia, medication regimen reviewed.  Asymptomatic.

## 2022-02-09 NOTE — ASSESSMENT
[FreeTextEntry1] : Lipoma\par - likely asymptomatic\par - advised GSx FU if want intervention (removal)\par \par Severe PHN\par - increase Gabapentin 600mg TID (slowly), if no improvement will consider TCA\par - c/w Lidoderm patches\par - Will add Capscian cream 0.025% QID, can increase if partial effect \par - Neuro FU 3/22\par \par DM2 w/hyperglycemia\par - c/w Basal/bolus as ordered as BS variable, will add SS\par - c/w Trulicity/Tradjenta\par - Following with Endo 3/22

## 2022-02-09 NOTE — PHYSICAL EXAM
[Alert] : alert [Normal Outer Ear/Nose] : the ears and nose were normal in appearance [Normal Appearance] : the appearance of the neck was normal [Supple] : the neck was supple [No Respiratory Distress] : no respiratory distress [No Acc Muscle Use] : no accessory muscle use [Respiration, Rhythm And Depth] : normal respiratory rhythm and effort [Auscultation Breath Sounds / Voice Sounds] : lungs were clear to auscultation bilaterally [Normal S1, S2] : normal S1 and S2 [Heart Rate And Rhythm] : heart rate was normal and rhythm regular [Edema] : edema was not present [Abdomen Tenderness] : non-tender [Abdomen Soft] : soft [No Clubbing, Cyanosis] : no clubbing or cyanosis of the fingernails [Involuntary Movements] : no involuntary movements were seen [] : no rash [No Focal Deficits] : no focal deficits [Oriented To Time, Place, And Person] : oriented to person, place, and time [de-identified] : + scarring to anterior/posterior chest wall c/w shingles lesions.  No active lesions but severe scarring.  No TTP noted.  Also approx 3x3 cm lipoma located to posterior chest wall inside Zoster scarring

## 2022-03-02 ENCOUNTER — APPOINTMENT (OUTPATIENT)
Dept: NEUROLOGY | Facility: CLINIC | Age: 77
End: 2022-03-02
Payer: MEDICARE

## 2022-03-02 VITALS
TEMPERATURE: 97.9 F | HEIGHT: 67 IN | HEART RATE: 109 BPM | DIASTOLIC BLOOD PRESSURE: 85 MMHG | OXYGEN SATURATION: 99 % | SYSTOLIC BLOOD PRESSURE: 138 MMHG | WEIGHT: 150 LBS | BODY MASS INDEX: 23.54 KG/M2

## 2022-03-02 DIAGNOSIS — M25.511 PAIN IN RIGHT SHOULDER: ICD-10-CM

## 2022-03-02 PROCEDURE — 99215 OFFICE O/P EST HI 40 MIN: CPT

## 2022-03-02 RX ORDER — CLOPIDOGREL 75 MG/1
75 TABLET, FILM COATED ORAL
Refills: 0 | Status: DISCONTINUED | COMMUNITY
Start: 2021-12-22 | End: 2022-03-02

## 2022-03-02 RX ORDER — ALBUTEROL SULFATE 90 UG/1
108 (90 BASE) INHALANT RESPIRATORY (INHALATION)
Refills: 0 | Status: DISCONTINUED | COMMUNITY
Start: 2021-12-22 | End: 2022-03-02

## 2022-03-02 NOTE — REASON FOR VISIT
[Post Hospitalization] : a post hospitalization visit [FreeTextEntry1] : post herpetic neuralgia; stroke

## 2022-03-02 NOTE — DISCUSSION/SUMMARY
[FreeTextEntry1] : Pt is a 77 yo M with PMHx of DMII, herpes zoster, COPD/asthma, hearing loss, prostate cancer s/p TURP, who presents for hospital f/u.\par \par # Ischemic stroke: left temporal and parietal lobe, L MCA territory, embolic in appearance. Possibly from L cav ICA stenosis vs cardioembolic. NIHSS 3. mRS 1. \par - Continue ASA 81mg daily, may d/c plavix.\par - Continue statin, \par - bilateral carotid doppler US (no neck imaging performed while hospitalized)\par - EP referral for ILR placement\par - Discussed risk factor modification: diabetes, HTN and HLD optimization\par - Right shoulder xray (frozen shoulder?)\par \par # Sensory peripheral neuropathy: likely 2/2 diabetes\par - Labs\par - DM optimization, last A1c 8.7\par \par # Post herpetic neuralgia: \par - Continue GBP 500mg TID\par - Start duloxetine 20mg QHS\par \par RTC in 3 mo

## 2022-03-02 NOTE — REVIEW OF SYSTEMS
[As Noted in HPI] : as noted in HPI [Loss Of Hearing] : hearing loss [Wheezing] : wheezing [Joint Stiffness] : joint stiffness [Negative] : Heme/Lymph

## 2022-03-02 NOTE — PHYSICAL EXAM
[General Appearance - Alert] : alert [General Appearance - In No Acute Distress] : in no acute distress [General Appearance - Well Nourished] : well nourished [General Appearance - Well Developed] : well developed [Oriented To Time, Place, And Person] : oriented to person, place, and time [Impaired Insight] : insight and judgment were intact [Affect] : the affect was normal [Person] : oriented to person [Place] : oriented to place [Time] : oriented to time [Fluency] : fluency intact [Comprehension] : comprehension intact [Cranial Nerves Optic (II)] : visual acuity intact bilaterally,  visual fields full to confrontation, pupils equal round and reactive to light [Cranial Nerves Oculomotor (III)] : extraocular motion intact [Cranial Nerves Trigeminal (V)] : facial sensation intact symmetrically [Cranial Nerves Facial (VII)] : face symmetrical [Cranial Nerves Glossopharyngeal (IX)] : tongue and palate midline [Cranial Nerves Accessory (XI - Cranial And Spinal)] : head turning and shoulder shrug symmetric [Cranial Nerves Hypoglossal (XII)] : there was no tongue deviation with protrusion [Finger Rub Not [place]] : finger rub was heard [Motor Tone] : muscle tone was normal in all four extremities [Motor Handedness Right-Handed] : the patient is right hand dominant [Coordination - Dysmetria Impaired Finger-to-Nose Bilateral] : not present [2+] : Biceps left 2+ [1+] : Ankle jerk left 1+ [Plantar Reflex Right Only] : normal on the right [Plantar Reflex Left Only] : normal on the left [___] : absent on the right [___] : absent on the left [FreeTextEntry6] : Unable to lift RUE past 45 degrees, 4+/5 felxion/extension at elbow and handgrip\par LUE 4+/5\par BLE 4+/5, 4/5 dorsiflexion b/l [FreeTextEntry7] : diminished light touch sensation in bilateral hands and feet up to wrists and ankles b/l. Diminished pin prick up to wrists b/l, and mid shin in RLE and ankle LLE. Absent vibration at bilateral toes, persent at ankles b/l.  [FreeTextEntry8] : Slightly wide based gait, normal stride [Sclera] : the sclera and conjunctiva were normal [PERRL With Normal Accommodation] : pupils were equal in size, round, reactive to light, with normal accommodation [Outer Ear] : the ears and nose were normal in appearance [Neck Appearance] : the appearance of the neck was normal [] : no respiratory distress [Respiration, Rhythm And Depth] : normal respiratory rhythm and effort [Heart Rate And Rhythm] : heart rate was normal and rhythm regular [Heart Sounds] : normal S1 and S2 [Edema] : there was no peripheral edema [Musculoskeletal - Swelling] : no joint swelling seen [Skin Turgor] : normal skin turgor

## 2022-03-02 NOTE — HISTORY OF PRESENT ILLNESS
[FreeTextEntry1] : Pt is a 75 yo M with PMHx of DMII, herpes zoster, COPD/asthma, hearing loss, prostate cancer s/p TURP, who presents for hospital f/u. Pt presented with severe left chest wall pain to [facility] in 12/2021. Noted shingles in the left T4 dermatome. He was treated with opiates and other pain medications. Developed acute delirium, pain medications were discontinued. He was started on CBZ, however this was not continued upon discharge. pt has been taking GBP 500mg TID. He continues to endorse severe pain in the left chest wall and back. He is also using lidocaine patches and topical ointment. Pt's wife notes that she has noticed he is doing better than when it first began. Pt also endorses difficulty raising his right shoulder, started while in the hospital. he also c/o bilateral hand and foot numbness. he uses a cane, has been doing so for several years. Endorses generalized weakness.\par While in the hospital, he had an MRI brain which showed two small infarcts, one in left mesial temporal lobe and one in high left parietal lobe (however the temporal lobe stroke was not reported). He had a CTA head, which showed severe bilateral, L>R cavernous ICA stenosis. He was started on DAPT, however pt's wife states that he has not been compliant with these medications. Denies previously having been on ATP or AC.

## 2022-03-02 NOTE — DATA REVIEWED
[de-identified] : IMPRESSION:\par \par Punctate acute infarct in the high left parietal lobe.\par \par Redemonstrated centrally calcified meningioma along the high right parietal convexity. No significant peritumoral edema.\par \par Mild chronic microvascular ischemic changes.\par *** ADDENDUM 12/02/2021  ***\par \par The meningioma measures approximately 1.6 x 1.6 x 1.3 cm (TV x AP x CC).\par \par CTA head: IMPRESSION:\par \par No large vessel occlusion, aneurysm, vascular malformation.\par \par moderate atherosclerotic stenosis of the bilateral clinoid/supraclinoid ICAs.\par \par TTE: Summary:\par  1. Left ventricular ejection fraction, by visual estimation, is 55 to 60%.\par  2. Normal left atrial size.\par  3. Mild mitral annular calcification.\par  4. Sclerotic aortic valve with normal opening.\par  5. There is mild aortic root calcification.\par

## 2022-03-03 ENCOUNTER — APPOINTMENT (OUTPATIENT)
Dept: ENDOCRINOLOGY | Facility: CLINIC | Age: 77
End: 2022-03-03

## 2022-03-03 ENCOUNTER — OUTPATIENT (OUTPATIENT)
Dept: OUTPATIENT SERVICES | Facility: HOSPITAL | Age: 77
LOS: 1 days | Discharge: HOME | End: 2022-03-03

## 2022-03-03 VITALS
TEMPERATURE: 98 F | SYSTOLIC BLOOD PRESSURE: 181 MMHG | BODY MASS INDEX: 25.11 KG/M2 | HEART RATE: 94 BPM | HEIGHT: 67 IN | WEIGHT: 160 LBS | DIASTOLIC BLOOD PRESSURE: 90 MMHG

## 2022-03-03 VITALS — SYSTOLIC BLOOD PRESSURE: 140 MMHG | DIASTOLIC BLOOD PRESSURE: 74 MMHG

## 2022-03-03 DIAGNOSIS — Z98.890 OTHER SPECIFIED POSTPROCEDURAL STATES: Chronic | ICD-10-CM

## 2022-03-03 DIAGNOSIS — Z90.79 ACQUIRED ABSENCE OF OTHER GENITAL ORGAN(S): Chronic | ICD-10-CM

## 2022-03-03 LAB
ESTIMATED AVERAGE GLUCOSE: 206 MG/DL
GLUCOSE BLDC GLUCOMTR-MCNC: 196 MG/DL — HIGH (ref 70–99)
HBA1C MFR BLD HPLC: 8.8 %

## 2022-03-03 RX ORDER — INSULIN DETEMIR 100 [IU]/ML
100 INJECTION, SOLUTION SUBCUTANEOUS
Refills: 0 | Status: DISCONTINUED | COMMUNITY
End: 2022-03-03

## 2022-03-03 RX ORDER — LINAGLIPTIN 5 MG/1
5 TABLET, FILM COATED ORAL
Refills: 0 | Status: DISCONTINUED | COMMUNITY
End: 2022-03-03

## 2022-03-03 NOTE — ASSESSMENT
[Diabetes Foot Care] : diabetes foot care [Carbohydrate Consistent Diet] : carbohydrate consistent diet [Importance of Diet and Exercise] : importance of diet and exercise to improve glycemic control, achieve weight loss and improve cardiovascular health [Hypoglycemia Management] : hypoglycemia management [Retinopathy Screening] : Patient was referred to ophthalmology for retinopathy screening [FreeTextEntry1] : try tresiba u-200, try for C.G.M, try adding pioglitazone, increase trulicity to 1.5. patient will continue on novolog flex pen before meals. TYPE 2 DIABETES - E 11.65, ON 4 INSULIN INJECTIONS DAILY. HE USES BLOOD GLUCOSE MEASUREMENTS TO ADJUST INSULIN DOSAGE ON A SLIDING SCALE WITH CARBOHYDRATE COUNTING.

## 2022-03-04 LAB
25(OH)D3 SERPL-MCNC: 20 NG/ML
ALBUMIN MFR SERPL ELPH: 56.3 %
ALBUMIN SERPL-MCNC: 3.8 G/DL
ALBUMIN/GLOB SERPL: 1.3 RATIO
ALPHA1 GLOB MFR SERPL ELPH: 4.9 %
ALPHA1 GLOB SERPL ELPH-MCNC: 0.3 G/DL
ALPHA2 GLOB MFR SERPL ELPH: 13.2 %
ALPHA2 GLOB SERPL ELPH-MCNC: 0.9 G/DL
B-GLOBULIN MFR SERPL ELPH: 13.3 %
B-GLOBULIN SERPL ELPH-MCNC: 0.9 G/DL
FOLATE SERPL-MCNC: 15.1 NG/ML
GAMMA GLOB FLD ELPH-MCNC: 0.8 G/DL
GAMMA GLOB MFR SERPL ELPH: 12.3 %
INTERPRETATION SERPL IEP-IMP: NORMAL
PROT SERPL-MCNC: 6.7 G/DL
PROT SERPL-MCNC: 6.7 G/DL
T4 SERPL-MCNC: 4.9 UG/DL
TSH SERPL-ACNC: 3.44 UIU/ML
VIT B12 SERPL-MCNC: 290 PG/ML

## 2022-03-09 LAB
COPPER SERPL-MCNC: 103 UG/DL
ZINC SERPL-MCNC: 58 UG/DL

## 2022-03-10 ENCOUNTER — OUTPATIENT (OUTPATIENT)
Dept: OUTPATIENT SERVICES | Facility: HOSPITAL | Age: 77
LOS: 1 days | Discharge: HOME | End: 2022-03-10
Payer: MEDICARE

## 2022-03-10 DIAGNOSIS — I63.9 CEREBRAL INFARCTION, UNSPECIFIED: ICD-10-CM

## 2022-03-10 DIAGNOSIS — M25.511 PAIN IN RIGHT SHOULDER: ICD-10-CM

## 2022-03-10 DIAGNOSIS — Z98.890 OTHER SPECIFIED POSTPROCEDURAL STATES: Chronic | ICD-10-CM

## 2022-03-10 DIAGNOSIS — Z90.79 ACQUIRED ABSENCE OF OTHER GENITAL ORGAN(S): Chronic | ICD-10-CM

## 2022-03-10 PROCEDURE — 93880 EXTRACRANIAL BILAT STUDY: CPT | Mod: 26

## 2022-03-10 PROCEDURE — 73030 X-RAY EXAM OF SHOULDER: CPT | Mod: 26,RT

## 2022-03-10 NOTE — PATIENT PROFILE ADULT - HAVE YOU EXPERIENCED VIOLENCE OR A TRAUMATIC EVENT?
Case Management Discharge Note      Final Note: Pt discharged home with home O2 provided by Ruchi Suarez RN    Provided Post Acute Provider List?: N/A  N/A Provider List Comment: Denies HH/SNF needs.  Provided Post Acute Provider Quality & Resource List?: N/A  N/A Quality & Resource List Comment: Denies HH/SNF needs.    Selected Continued Care - Discharged on 3/9/2022 Admission date: 3/3/2022 - Discharge disposition: Home or Self Care    Destination    No services have been selected for the patient.              Durable Medical Equipment Coordination complete.    Service Provider Selected Services Address Phone Fax Patient Preferred    [facility]  Durable Medical Equipment [address] [address] [phone] --          Dialysis/Infusion    No services have been selected for the patient.              Home Medical Care    No services have been selected for the patient.              Therapy    No services have been selected for the patient.              Community Resources    No services have been selected for the patient.              Community & DME    No services have been selected for the patient.                  Transportation Services  Private: Car    Final Discharge Disposition Code: 01 - home or self-care   no

## 2022-03-11 LAB
ALBUPE: 22.5 %
ALPHA1UPE: 29.5 %
ALPHA2UPE: 16.3 %
BETAUPE: 20.6 %
CREAT 24H UR-MCNC: NORMAL G/24 H
CREATININE UR (MAYO): 94 MG/DL
GAMMAUPE: 11.1 %
IGA 24H UR QL IFE: NORMAL
KAPPA LC 24H UR QL: NORMAL
PROT PATTERN 24H UR ELPH-IMP: NORMAL
PROT UR-MCNC: 16 MG/DL
PROT UR-MCNC: 16 MG/DL
SPECIMEN VOL 24H UR: NORMAL ML
VIT B6 SERPL-MCNC: 6.9 UG/L

## 2022-03-16 LAB — VIT B1 SERPL-MCNC: 164.3 NMOL/L

## 2022-03-20 ENCOUNTER — NON-APPOINTMENT (OUTPATIENT)
Age: 77
End: 2022-03-20

## 2022-03-20 LAB
ALBUMIN SERPL ELPH-MCNC: 3.4 G/DL
ALP BLD-CCNC: 58 U/L
ALT SERPL-CCNC: 11 U/L
ANION GAP SERPL CALC-SCNC: 12 MMOL/L
AST SERPL-CCNC: 9 U/L
BASOPHILS # BLD AUTO: 0.03 K/UL
BASOPHILS NFR BLD AUTO: 0.2 %
BILIRUB SERPL-MCNC: 0.3 MG/DL
BUN SERPL-MCNC: 17 MG/DL
CALCIUM SERPL-MCNC: 9.4 MG/DL
CHLORIDE SERPL-SCNC: 104 MMOL/L
CO2 SERPL-SCNC: 26 MMOL/L
CREAT SERPL-MCNC: 1.1 MG/DL
EGFR: 69 ML/MIN/1.73M2
EOSINOPHIL # BLD AUTO: 0.03 K/UL
EOSINOPHIL NFR BLD AUTO: 0.2 %
ESTIMATED AVERAGE GLUCOSE: 194 MG/DL
GLUCOSE SERPL-MCNC: 223 MG/DL
HBA1C MFR BLD HPLC: 8.4 %
HCT VFR BLD CALC: 37.3 %
HGB BLD-MCNC: 11.6 G/DL
IMM GRANULOCYTES NFR BLD AUTO: 0.7 %
LYMPHOCYTES # BLD AUTO: 3.05 K/UL
LYMPHOCYTES NFR BLD AUTO: 22.9 %
MAN DIFF?: NORMAL
MCHC RBC-ENTMCNC: 26.6 PG
MCHC RBC-ENTMCNC: 31.1 G/DL
MCV RBC AUTO: 85.6 FL
MONOCYTES # BLD AUTO: 0.95 K/UL
MONOCYTES NFR BLD AUTO: 7.1 %
NEUTROPHILS # BLD AUTO: 9.19 K/UL
NEUTROPHILS NFR BLD AUTO: 68.9 %
PLATELET # BLD AUTO: 324 K/UL
POTASSIUM SERPL-SCNC: 5 MMOL/L
PROT SERPL-MCNC: 5.7 G/DL
RBC # BLD: 4.36 M/UL
RBC # FLD: 14.3 %
SODIUM SERPL-SCNC: 142 MMOL/L
WBC # FLD AUTO: 13.34 K/UL

## 2022-03-23 ENCOUNTER — APPOINTMENT (OUTPATIENT)
Age: 77
End: 2022-03-23
Payer: MEDICARE

## 2022-03-23 VITALS
HEIGHT: 67 IN | BODY MASS INDEX: 25.43 KG/M2 | WEIGHT: 162 LBS | OXYGEN SATURATION: 95 % | DIASTOLIC BLOOD PRESSURE: 80 MMHG | HEART RATE: 103 BPM | RESPIRATION RATE: 14 BRPM | SYSTOLIC BLOOD PRESSURE: 126 MMHG

## 2022-03-23 PROCEDURE — 99203 OFFICE O/P NEW LOW 30 MIN: CPT

## 2022-03-23 NOTE — HISTORY OF PRESENT ILLNESS
[Severe Persistent] : severe persistent [Stable] : stable [Well Controlled] : Well controlled [Wheezing] : stable wheezing [Cough] : stable coughing [Chest Tightness Or Heavy Pressure] : stable chest tightness [Shortness Of Breath] : stable shortness of breath [Checks Regularly] : The patient checks ~his/her~ peak flow regularly [Good Control] : peak flow has been good [None] : None [Adherent] : the patient is adherent with ~his/her~ medication regimen [Goals--Doing Well] : the patient is doing well with ~his/her~ asthma goals [PFTs] : pulmonary function tests [Initial Evaluation] : an initial evaluation of [Excessive Daytime Sleepiness] : excessive daytime sleepiness [Snoring] : snoring [Unrefreshing Sleep] : unrefreshing sleep [Currently Experiencing] : The patient is currently experiencing symptoms.

## 2022-03-23 NOTE — ASSESSMENT
[FreeTextEntry1] : HO severe persistent asthma on chronic steroid therapy for years ( Used to see Dr. Marcelino Pope ) \par Possible RUTH

## 2022-04-03 ENCOUNTER — RX RENEWAL (OUTPATIENT)
Age: 77
End: 2022-04-03

## 2022-05-11 ENCOUNTER — APPOINTMENT (OUTPATIENT)
Dept: GERIATRICS | Facility: HOME HEALTH | Age: 77
End: 2022-05-11
Payer: MEDICARE

## 2022-05-11 ENCOUNTER — RX RENEWAL (OUTPATIENT)
Age: 77
End: 2022-05-11

## 2022-05-11 VITALS
TEMPERATURE: 97.6 F | OXYGEN SATURATION: 96 % | SYSTOLIC BLOOD PRESSURE: 112 MMHG | RESPIRATION RATE: 16 BRPM | DIASTOLIC BLOOD PRESSURE: 62 MMHG | HEART RATE: 104 BPM

## 2022-05-11 PROCEDURE — 99348 HOME/RES VST EST LOW MDM 30: CPT

## 2022-05-11 NOTE — PHYSICAL EXAM
[Alert] : alert [Sclera] : the sclera and conjunctiva were normal [Normal Outer Ear/Nose] : the ears and nose were normal in appearance [Normal Appearance] : the appearance of the neck was normal [Supple] : the neck was supple [No Respiratory Distress] : no respiratory distress [No Acc Muscle Use] : no accessory muscle use [Respiration, Rhythm And Depth] : normal respiratory rhythm and effort [Normal S1, S2] : normal S1 and S2 [Heart Rate And Rhythm] : heart rate was normal and rhythm regular [Edema] : edema was not present [Abdomen Tenderness] : non-tender [Abdomen Soft] : soft [No Clubbing, Cyanosis] : no clubbing or cyanosis of the fingernails [Involuntary Movements] : no involuntary movements were seen [] : no rash [No Focal Deficits] : no focal deficits [Oriented To Time, Place, And Person] : oriented to person, place, and time [de-identified] : b/l exp wheeze

## 2022-05-11 NOTE — HISTORY OF PRESENT ILLNESS
[FreeTextEntry1] : Patient seen and examined at home.  Patient is homebound 2/2 generalized weakness/debility.  Wife present during visit.  Reporting wheeze/cough x 1 week.  No fever.  + cough, green.  +COYLE.  No sick contacts.  No complaints of CP.  Following with multiple specialists.  No complaints of CP.  No abdominal or urinary complaints.  No recent falls.  No skin wounds.  Still with complaints of PHN, following with Neuro.

## 2022-05-11 NOTE — ASSESSMENT
[FreeTextEntry1] : DM2\par - meds adjusted by Endo, now on Tresiba/Novolog, along with Truilicity.  Refusing Pioglitazone 2/2 hypoglycemia\par - advised to call Endo for further adjustment\par \par Asthma, acute exacerbation\par - Prednisone 40mg PO QD x 5 days\par - c/w LABA/ICS\par - b2 via neb q4h RTC x 2 days then PRN there after\par - check CXR\par \par Hx of Shingles w/PHN\par - c/w Gabapentin 300 TID\par - c/w Lidocaine patch as needed\par - reports going to FU with Neuro next month\par \par Hx CVA minimal right sided weakness\par - on DAPT/Statin\par - outpatient FU with Neuro\par - outpatient FU with Cards for ILR\par - DME for ambulation\par - fall precautions\par - PT/OT completed\par \par Hx Prostate CA s/p Prostatectomy\par - outpatient URO fu as scheduled. \par

## 2022-05-15 ENCOUNTER — NON-APPOINTMENT (OUTPATIENT)
Age: 77
End: 2022-05-15

## 2022-05-31 ENCOUNTER — APPOINTMENT (OUTPATIENT)
Dept: NEUROLOGY | Facility: CLINIC | Age: 77
End: 2022-05-31

## 2022-06-01 ENCOUNTER — RX RENEWAL (OUTPATIENT)
Age: 77
End: 2022-06-01

## 2022-06-08 ENCOUNTER — APPOINTMENT (OUTPATIENT)
Dept: ENDOCRINOLOGY | Facility: CLINIC | Age: 77
End: 2022-06-08

## 2022-06-13 ENCOUNTER — APPOINTMENT (OUTPATIENT)
Age: 77
End: 2022-06-13

## 2022-07-13 ENCOUNTER — NON-APPOINTMENT (OUTPATIENT)
Age: 77
End: 2022-07-13

## 2022-07-22 ENCOUNTER — APPOINTMENT (OUTPATIENT)
Dept: NEUROLOGY | Facility: CLINIC | Age: 77
End: 2022-07-22

## 2022-07-22 VITALS
OXYGEN SATURATION: 99 % | DIASTOLIC BLOOD PRESSURE: 81 MMHG | WEIGHT: 160 LBS | HEIGHT: 67 IN | SYSTOLIC BLOOD PRESSURE: 129 MMHG | BODY MASS INDEX: 25.11 KG/M2 | TEMPERATURE: 97.6 F | HEART RATE: 96 BPM

## 2022-07-22 PROCEDURE — 99214 OFFICE O/P EST MOD 30 MIN: CPT

## 2022-07-22 NOTE — PHYSICAL EXAM
[General Appearance - Alert] : alert [General Appearance - In No Acute Distress] : in no acute distress [General Appearance - Well Nourished] : well nourished [General Appearance - Well Developed] : well developed [Oriented To Time, Place, And Person] : oriented to person, place, and time [Affect] : the affect was normal [Person] : oriented to person [Place] : oriented to place [Time] : oriented to time [Fluency] : fluency intact [Comprehension] : comprehension intact [Cranial Nerves Optic (II)] : visual acuity intact bilaterally,  visual fields full to confrontation, pupils equal round and reactive to light [Cranial Nerves Oculomotor (III)] : extraocular motion intact [Cranial Nerves Trigeminal (V)] : facial sensation intact symmetrically [Cranial Nerves Facial (VII)] : face symmetrical [Cranial Nerves Vestibulocochlear (VIII)] : hearing was intact bilaterally [Cranial Nerves Accessory (XI - Cranial And Spinal)] : head turning and shoulder shrug symmetric [Cranial Nerves Glossopharyngeal (IX)] : tongue and palate midline [Cranial Nerves Hypoglossal (XII)] : there was no tongue deviation with protrusion [Motor Tone] : muscle tone was normal in all four extremities [Motor Strength] : muscle strength was normal in all four extremities [Paresis Pronator Drift Right-Sided] : no pronator drift on the right [Paresis Pronator Drift Left-Sided] : no pronator drift on the left [Motor Strength Upper Extremities Bilaterally] : strength was normal in both upper extremities [Motor Strength Lower Extremities Bilaterally] : strength was normal in both lower extremities [Abnormal Walk] : normal gait [Coordination - Dysmetria Impaired Finger-to-Nose Bilateral] : not present [2+] : Patella left 2+ [FreeTextEntry5] : OD esoptropia [FreeTextEntry7] : diminished light touch in bLE up to knees [Sclera] : the sclera and conjunctiva were normal [PERRL With Normal Accommodation] : pupils were equal in size, round, reactive to light, with normal accommodation [Outer Ear] : the ears and nose were normal in appearance [Hearing Threshold Finger Rub Not Cross] : hearing was normal [Neck Appearance] : the appearance of the neck was normal [] : no respiratory distress [Respiration, Rhythm And Depth] : normal respiratory rhythm and effort [Heart Rate And Rhythm] : heart rate was normal and rhythm regular [Heart Sounds] : normal S1 and S2 [Arterial Pulses Carotid] : carotid pulses were normal with no bruits

## 2022-07-22 NOTE — DISCUSSION/SUMMARY
[FreeTextEntry1] : Pt is a 75 yo M with PMHx of DMII, herpes zoster, COPD/asthma, hearing loss, prostate cancer s/p TURP, who presents for hospital f/u.\par \par # Ischemic stroke: left temporal and parietal lobe, L MCA territory, embolic in appearance. Possibly from L cav ICA stenosis vs cardioembolic. NIHSS 3. mRS 1. CUS <50% stenosis b/l ICA. \par - Continue ASA 81mg daily, may d/c plavix.\par - Continue statin, \par - EP referral for ILR placement\par - Risk factor modification: diabetes, HTN and HLD optimization\par \par \par # Sensory peripheral neuropathy: 2/2 diabetes, last A1c 8.8\par - Optimize DM management\par - \par \par # Post herpetic neuralgia: pt has been taking GBP BID instead of TID 2/2 stomach upset\par - Increase GBp to 600mg BID\par - Capsaicin cream topical\par \par RTC in 4 mo.

## 2022-07-22 NOTE — HISTORY OF PRESENT ILLNESS
[FreeTextEntry1] : Interval History:\par Pt endorses persistent pain along herpes zoster dermatome. Ulcers heeling well. Also endorses numbness/tingling in BLE and random tingling all extremities. Denies new weakness, vision or speech changes. They have not followed up with EP yet for loop recorder placement. \par \par \par \par HPI: Pt is a 75 yo M with PMHx of DMII, herpes zoster, COPD/asthma, hearing loss, prostate cancer s/p TURP, who presents for hospital f/u. Pt presented with severe left chest wall pain to [facility] in 12/2021. Noted shingles in the left T4 dermatome. He was treated with opiates and other pain medications. Developed acute delirium, pain medications were discontinued. He was started on CBZ, however this was not continued upon discharge. pt has been taking GBP 500mg TID. He continues to endorse severe pain in the left chest wall and back. He is also using lidocaine patches and topical ointment. Pt's wife notes that she has noticed he is doing better than when it first began. Pt also endorses difficulty raising his right shoulder, started while in the hospital. he also c/o bilateral hand and foot numbness. he uses a cane, has been doing so for several years. Endorses generalized weakness.\par While in the hospital, he had an MRI brain which showed two small infarcts, one in left mesial temporal lobe and one in high left parietal lobe (however the temporal lobe stroke was not reported). He had a CTA head, which showed severe bilateral, L>R cavernous ICA stenosis. He was started on DAPT, however pt's wife states that he has not been compliant with these medications. Denies previously having been on ATP or AC.

## 2022-07-22 NOTE — DATA REVIEWED
[de-identified] : IMPRESSION:\par \par Punctate acute infarct in the high left parietal lobe.\par \par Redemonstrated centrally calcified meningioma along the high right parietal convexity. No significant peritumoral edema.\par \par Mild chronic microvascular ischemic changes.\par *** ADDENDUM 12/02/2021  ***\par \par The meningioma measures approximately 1.6 x 1.6 x 1.3 cm (TV x AP x CC).\par \par CTA head: IMPRESSION:\par \par No large vessel occlusion, aneurysm, vascular malformation.\par \par moderate atherosclerotic stenosis of the bilateral clinoid/supraclinoid ICAs.\par \par TTE: Summary:\par  1. Left ventricular ejection fraction, by visual estimation, is 55 to 60%.\par  2. Normal left atrial size.\par  3. Mild mitral annular calcification.\par  4. Sclerotic aortic valve with normal opening.\par  5. There is mild aortic root calcification.\par

## 2022-07-25 NOTE — PHYSICAL THERAPY INITIAL EVALUATION ADULT - IMPAIRMENTS CONTRIBUTING IMPAIRED BED MOBILITY, REHAB EVAL
The Service to Audiology order in workqueue [id number] requested on 7/24/2022 has been cancelled as, patient declined services. Ordering provider has been notified.    Please contact patient, if further communication is needed.   decreased endurance/impaired balance/impaired postural control/decreased strength

## 2022-07-28 ENCOUNTER — APPOINTMENT (OUTPATIENT)
Dept: GERIATRICS | Facility: HOME HEALTH | Age: 77
End: 2022-07-28

## 2022-07-28 VITALS
SYSTOLIC BLOOD PRESSURE: 118 MMHG | DIASTOLIC BLOOD PRESSURE: 70 MMHG | OXYGEN SATURATION: 96 % | RESPIRATION RATE: 18 BRPM | TEMPERATURE: 98 F | HEART RATE: 94 BPM

## 2022-07-28 DIAGNOSIS — L30.9 DERMATITIS, UNSPECIFIED: ICD-10-CM

## 2022-07-28 PROCEDURE — 99349 HOME/RES VST EST MOD MDM 40: CPT

## 2022-07-28 RX ORDER — FLUTICASONE FUROATE AND VILANTEROL TRIFENATATE 100; 25 UG/1; UG/1
100-25 POWDER RESPIRATORY (INHALATION)
Refills: 0 | Status: DISCONTINUED | COMMUNITY
End: 2022-07-28

## 2022-07-28 RX ORDER — DULAGLUTIDE 0.75 MG/.5ML
0.75 INJECTION, SOLUTION SUBCUTANEOUS
Qty: 2 | Refills: 6 | Status: DISCONTINUED | COMMUNITY
Start: 2022-04-03 | End: 2022-07-28

## 2022-07-28 RX ORDER — CAPSAICIN 0.03 G/100G
0.03 CREAM TOPICAL 4 TIMES DAILY
Qty: 1 | Refills: 4 | Status: DISCONTINUED | COMMUNITY
Start: 2022-02-09 | End: 2022-07-28

## 2022-07-28 NOTE — HISTORY OF PRESENT ILLNESS
[FreeTextEntry1] : Patient seen and examined at home.  Patient is homebound 2/2 generalized weakness/debility.  Wife present during visit.  All report patient at baseline.  No acute complaints.  No CP/SOB. No abdominal complaints with good appetite and regular BM's.  No urinary complaints. No recent falls.  No skin wounds.  Still w/complaints of severe PHN pain.  Given Capsaicin cream and patient reports unable to tolerate.  Gabapentin increased to 600mg BID.  Patient did not start taking yet.  Also with rash to anterior chest, Rx'd Ketoconazole without relief.  No complaints of pain to area, just pruritus.  Wife reports BS doing better, under better control.

## 2022-07-28 NOTE — ASSESSMENT
[FreeTextEntry1] : DM2\par - meds adjusted by Endo, now on Tresiba/Novolog, along with Truilicity. Refusing Pioglitazone 2/2 hypoglycemia\par - Reports BS better controlled now, following with Endo\par \par Asthma\par - at baseline\par - c/w LABA/ICS\par - b2 PRN\par \par Hx of Shingles w/PHN\par - c/w Gabapentin 600 TID, inc by Neuro\par - c/w Lidocaine patch as needed\par - Neuro FU as scheduled\par \par Hx CVA minimal right sided weakness\par - Hold Plavix and c/w ASA/Statin per Neuro\par - outpatient FU with Neuro\par - outpatient FU with Cards for ILR, referred by Neuro.  Unable to get now as patient going to [place], will get upon return\par - DME for ambulation\par - fall precautions\par - PT/OT completed\par \par Hx Prostate CA s/p Prostatectomy\par - outpatient URO fu as scheduled. \par \par Dermatitis\par - no relief ketoconzaole, will try Triamcinolone

## 2022-07-28 NOTE — PHYSICAL EXAM
[Alert] : alert [Sclera] : the sclera and conjunctiva were normal [Normal Outer Ear/Nose] : the ears and nose were normal in appearance [Normal Appearance] : the appearance of the neck was normal [Supple] : the neck was supple [No Respiratory Distress] : no respiratory distress [No Acc Muscle Use] : no accessory muscle use [Respiration, Rhythm And Depth] : normal respiratory rhythm and effort [Auscultation Breath Sounds / Voice Sounds] : lungs were clear to auscultation bilaterally [Normal S1, S2] : normal S1 and S2 [Heart Rate And Rhythm] : heart rate was normal and rhythm regular [Edema] : edema was not present [Abdomen Tenderness] : non-tender [Abdomen Soft] : soft [No Clubbing, Cyanosis] : no clubbing or cyanosis of the fingernails [Involuntary Movements] : no involuntary movements were seen [No Focal Deficits] : no focal deficits [Oriented To Time, Place, And Person] : oriented to person, place, and time [de-identified] : + erythematous patch to anterior chest.  + shingles scarring over left trunk

## 2022-09-25 ENCOUNTER — RX RENEWAL (OUTPATIENT)
Age: 77
End: 2022-09-25

## 2022-09-30 NOTE — DISCHARGE NOTE ADULT - NSFTFSERV2RD_GEN_ALL_CORE
Please call patient to reschedule colonoscopy.  
Spoke with patient   rescheduled colonoscopy to 5/9/2023 at 1130 am   New letters mailed to home.   
Nursing

## 2022-10-12 NOTE — PATIENT PROFILE ADULT - SURGICAL SITE INCISION
[FreeTextEntry1] : 80F presents for initial evaluation of DM2, hypothyroidism, osteoporosis.\par \par 1) DM2\par HbA1c 6.0% well controlled, at goal (reviewed that given age A1c could be higher but she is very happy with results of Ozempic and would like to stay on it).\par Reviewed correlating low glucose on Avinash with FS. Given glucometer and supplies prescribed.\par Continue Ozempic 0.5mg SQ weekly\par Initial weight loss now stable. Explained that she can maintain at current weight and does not need aggressive weight loss given age and overall doing well.\par continue strategies for carb consistent diet and physical activity. RD eval completed.\par Continue use of Avinash\par notify me if develops hypoglycemia\par BP stable, microalb neg 10/2021, not on ACEI/ARB. Prior hyperkalemia resolved without intervention. GFR 52\par Follow up ophtho, DM foot precautions\par \par 2) Hypothyroidism\par TFT at goal, continue levothyroxine 50 mcg po daily, take in AM on empty stomach\par \par 3) Thyroid nodules\par bilateral nodules, largest 1.3cm, \par LLP nodule benign on FNA, RMP nodule nondiagnostic.\par manage conservatively for now, repeat thyroid US 6 months initially recommended (she wished to wait additional 6 months). Now she is planned for CT neck tomorrow for laryngeal issue, will review CT findings and decide if additional US study is warranted.\par \par 4) Osteoporosis\par managed by rheumatology, Reclast last given 1/22/2020, last DXA 12/2021 osteopenia range: T score -1.5 at hip, normal BMD at spine). Monitor conservatively for now. Dairy in diet, vit D supplement.\par Plan for DXA 12/2023\par \par 5) Hyperlipidemia\par continue rosuvastatin 5mg\par \par follow up 6 months
no

## 2022-10-13 ENCOUNTER — RX RENEWAL (OUTPATIENT)
Age: 77
End: 2022-10-13

## 2022-10-26 NOTE — ED PROVIDER NOTE - EKG ADDITIONAL QUESTION - PERFORMED INDEPENDENT VISUALIZATION
RD consult to ensure adequate caloric intake. Consider GI consult given patient with complaints of globus sensation at the throat./Registered Dietitian RD consult to ensure adequate caloric intake. Consider GI consult given patient with complaints of globus sensation at the throat and chest/midsternum region./Registered Dietitian Yes

## 2022-11-07 NOTE — CONSULT NOTE ADULT - CONSULT REQUESTED DATE/TIME
Refill request    Medication:  traMADol (ULTRAM) 50 MG tablet    Sig: Take 0.5 tablets (25 mg) by mouth 2 times daily as needed for severe pain     Dispensed: 10 tablets  Refills: 0    Last clinic appointment: 8/25/22  Next clinic appointment: Not scheduled  Sold to patient on :  10/19/22 per pharmacy      Last Drug Screen Collected: 9/20/21  Controlled Substance Agreement signed: 9/20/21      Preferred pharmacy:      [id number] - [place], [place] - [address]    Linda Serrano RN      
22-Jan-2020 11:26

## 2022-11-15 NOTE — H&P ADULT - NSCORESITESY/N_GEN_A_CORE_RD
"Patient presents for Evusheld injections    Patient given copy of Evusheld \"fact sheet for patients\"   Verbal consent for EUA Evusheld injections obtained  Pt denies any symptoms of COVID and denies exposure to individual with COVID   Evusheld administered in 2 separate IM injections   Patients observed for ADR X 1 hour   Patient discharged without complaints           "
No

## 2022-11-23 ENCOUNTER — APPOINTMENT (OUTPATIENT)
Dept: GERIATRICS | Facility: HOME HEALTH | Age: 77
End: 2022-11-23

## 2022-11-23 VITALS
RESPIRATION RATE: 16 BRPM | DIASTOLIC BLOOD PRESSURE: 64 MMHG | OXYGEN SATURATION: 97 % | SYSTOLIC BLOOD PRESSURE: 140 MMHG | HEART RATE: 86 BPM | TEMPERATURE: 98.2 F

## 2022-11-23 PROCEDURE — 99349 HOME/RES VST EST MOD MDM 40: CPT

## 2022-11-23 NOTE — PHYSICAL EXAM
[Alert] : alert [Sclera] : the sclera and conjunctiva were normal [Normal Outer Ear/Nose] : the ears and nose were normal in appearance [Normal Appearance] : the appearance of the neck was normal [Supple] : the neck was supple [No Respiratory Distress] : no respiratory distress [No Acc Muscle Use] : no accessory muscle use [Respiration, Rhythm And Depth] : normal respiratory rhythm and effort [Auscultation Breath Sounds / Voice Sounds] : lungs were clear to auscultation bilaterally [Normal S1, S2] : normal S1 and S2 [Heart Rate And Rhythm] : heart rate was normal and rhythm regular [Edema] : edema was not present [Abdomen Tenderness] : non-tender [Abdomen Soft] : soft [No Clubbing, Cyanosis] : no clubbing or cyanosis of the fingernails [Involuntary Movements] : no involuntary movements were seen [No Focal Deficits] : no focal deficits [Oriented To Time, Place, And Person] : oriented to person, place, and time [de-identified] : + erythematous patch to anterior chest.  + shingles scarring over left trunk

## 2022-11-23 NOTE — ASSESSMENT
[FreeTextEntry1] : #DM2\par - meds adjusted by Endo, now on Tresiba/Novolog, along with Truilicity. Refusing Pioglitazone 2/2 hypoglycemia\par - Reports BS better controlled now, following with Endo\par - check A1C\par \par #Asthma\par - at baseline\par - c/w LABA/ICS\par - b2 PRN\par \par #Hx of Shingles w/PHN\par - c/w Gabapentin 600 BID\par - c/w Lidocaine patch as needed\par - unable to tolerate Percocet 2/2 unsteady gait, will try Tramadol 50mg PO BID PRN #60.  Will start with 1/2 tablet and increase as tolerated.   istop checked and verified, no Rx's for narcotics x 1 year.  Reference #: [id number]\par - Neuro FU as scheduled\par \par #Hx CVA minimal right sided weakness\par - c/w ASA/Statin per Neuro\par - outpatient FU with Neuro\par - outpatient FU with Cards for ILR, referred by Neuro. Wife reports will make appointment\par - DME for ambulation\par - fall precautions\par - PT/OT completed\par \par #Hx Prostate CA s/p Prostatectomy\par - outpatient URO fu as scheduled. \par \par Patient already received Flu Vaccine\par

## 2022-11-23 NOTE — HISTORY OF PRESENT ILLNESS
[FreeTextEntry1] : Patient seen and examined at home.  Patient is homebound 2/2 generalized weakness/debility.  Wife present during visit.  All report patient at baseline.  No acute complaints.  No CP/SOB. No abdominal complaints with good appetite and regular BM's.  No urinary complaints.  No recent falls.  No skin wounds.  Still w/severe PHN, no relief from Man 600mg BID.  Unable to tolerate Percocet.  Needs EPS fu for ILR per Neuro.  BS reported as stable, needs to check A1C

## 2022-12-04 ENCOUNTER — NON-APPOINTMENT (OUTPATIENT)
Age: 77
End: 2022-12-04

## 2022-12-04 LAB
ALBUMIN SERPL ELPH-MCNC: 3.8 G/DL
ALP BLD-CCNC: 85 U/L
ALT SERPL-CCNC: 16 U/L
ANION GAP SERPL CALC-SCNC: 13 MMOL/L
AST SERPL-CCNC: 15 U/L
BASOPHILS # BLD AUTO: 0.07 K/UL
BASOPHILS NFR BLD AUTO: 0.6 %
BILIRUB SERPL-MCNC: 0.5 MG/DL
BUN SERPL-MCNC: 18 MG/DL
CALCIUM SERPL-MCNC: 9.6 MG/DL
CHLORIDE SERPL-SCNC: 103 MMOL/L
CO2 SERPL-SCNC: 21 MMOL/L
CREAT SERPL-MCNC: 0.8 MG/DL
EGFR: 91 ML/MIN/1.73M2
EOSINOPHIL # BLD AUTO: 0.31 K/UL
EOSINOPHIL NFR BLD AUTO: 2.8 %
ESTIMATED AVERAGE GLUCOSE: 160 MG/DL
GLUCOSE SERPL-MCNC: 143 MG/DL
HBA1C MFR BLD HPLC: 7.2 %
HCT VFR BLD CALC: 37 %
HGB BLD-MCNC: 11.6 G/DL
IMM GRANULOCYTES NFR BLD AUTO: 0.3 %
LYMPHOCYTES # BLD AUTO: 4.14 K/UL
LYMPHOCYTES NFR BLD AUTO: 37.4 %
MAN DIFF?: NORMAL
MCHC RBC-ENTMCNC: 25.6 PG
MCHC RBC-ENTMCNC: 31.4 G/DL
MCV RBC AUTO: 81.7 FL
MONOCYTES # BLD AUTO: 1.02 K/UL
MONOCYTES NFR BLD AUTO: 9.2 %
NEUTROPHILS # BLD AUTO: 5.5 K/UL
NEUTROPHILS NFR BLD AUTO: 49.7 %
PLATELET # BLD AUTO: 365 K/UL
POTASSIUM SERPL-SCNC: 4.4 MMOL/L
PROT SERPL-MCNC: 7.5 G/DL
RBC # BLD: 4.53 M/UL
RBC # FLD: 14 %
SODIUM SERPL-SCNC: 137 MMOL/L
WBC # FLD AUTO: 11.07 K/UL

## 2022-12-20 ENCOUNTER — APPOINTMENT (OUTPATIENT)
Dept: GASTROENTEROLOGY | Facility: CLINIC | Age: 77
End: 2022-12-20

## 2022-12-20 DIAGNOSIS — Z83.3 FAMILY HISTORY OF DIABETES MELLITUS: ICD-10-CM

## 2022-12-20 DIAGNOSIS — Z78.9 OTHER SPECIFIED HEALTH STATUS: ICD-10-CM

## 2022-12-20 PROCEDURE — 99204 OFFICE O/P NEW MOD 45 MIN: CPT | Mod: 95

## 2022-12-20 RX ORDER — PREDNISONE 10 MG/1
10 TABLET ORAL
Qty: 30 | Refills: 2 | Status: DISCONTINUED | COMMUNITY
Start: 2022-03-23 | End: 2022-12-20

## 2022-12-20 NOTE — ASSESSMENT
[FreeTextEntry1] : 77 year old male patient average risk for CRC, hx of prostate cancer,DM, HLP, hx of shingles on tramadol and gabapentin, asthma,  presents for screening colonoscopy. \par Patient reports constipation, responsive to dulcolax. \par Denies any other GI complaints. \par \par Constipation\par will add trulance and senokot\par \par Average risk for CRC\par screening colonoscopy\par Risks and benefits discussed with patient.\par \par

## 2022-12-20 NOTE — PHYSICAL EXAM
[Alert] : alert [Sclera] : the sclera and conjunctiva were normal [Hearing Threshold Finger Rub Not Coal] : hearing was normal [No Respiratory Distress] : no respiratory distress [Normal Color / Pigmentation] : normal skin color and pigmentation [Oriented To Time, Place, And Person] : oriented to person, place, and time

## 2022-12-20 NOTE — HISTORY OF PRESENT ILLNESS
[Home] : at home, [unfilled] , at the time of the visit. [Medical Office: ([facility])___] : at the medical office located in  [Verbal consent obtained from patient] : the patient, [unfilled] [FreeTextEntry4] : Nish [FreeTextEntry1] : 77 year old male patient average risk for CRC, hx of prostate cancer,DM, HLP, hx of shingles on tramadol and gabapentin, asthma,  presents for screening colonoscopy. \par Patient reports constipation, responsive to dulcolax. \par Denies any other GI complaints.

## 2023-01-01 NOTE — ASU PATIENT PROFILE, ADULT - FALL HARM RISK CONCLUSION
Patient BIBEMS from home due to fever starting at 6pm last night with tmax of 102.6 rectal temp. Mother denies URI and GI symptoms however received 2mo. vaccines earlier in the day. Patient tolerating PO and having >3 wet diapers in 24hrs. Born at 38w no complications. Last Tylenol given at 2:30am. Fall Risk

## 2023-01-04 ENCOUNTER — APPOINTMENT (OUTPATIENT)
Dept: GERIATRICS | Facility: HOME HEALTH | Age: 78
End: 2023-01-04
Payer: MEDICARE

## 2023-01-04 VITALS
HEART RATE: 80 BPM | DIASTOLIC BLOOD PRESSURE: 62 MMHG | OXYGEN SATURATION: 99 % | TEMPERATURE: 97.6 F | RESPIRATION RATE: 18 BRPM | SYSTOLIC BLOOD PRESSURE: 118 MMHG

## 2023-01-04 PROCEDURE — 99348 HOME/RES VST EST LOW MDM 30: CPT

## 2023-01-04 NOTE — HISTORY OF PRESENT ILLNESS
[FreeTextEntry1] : Patient seen and examined at home.  Patient is homebound 2/2 generalized weakness/debility.  Paitent with complaints of constipation.  Reports sometimes has 4-5 days w/o BM's.  Patient followed with GI, started on Senna and trulance but patient unable to afford.  No abdominal pain.  No N/V.  Stool is hard formed.

## 2023-01-04 NOTE — PHYSICAL EXAM
[Alert] : alert [Sclera] : the sclera and conjunctiva were normal [Normal Outer Ear/Nose] : the ears and nose were normal in appearance [Normal Appearance] : the appearance of the neck was normal [Supple] : the neck was supple [No Respiratory Distress] : no respiratory distress [No Acc Muscle Use] : no accessory muscle use [Respiration, Rhythm And Depth] : normal respiratory rhythm and effort [Auscultation Breath Sounds / Voice Sounds] : lungs were clear to auscultation bilaterally [Normal S1, S2] : normal S1 and S2 [Heart Rate And Rhythm] : heart rate was normal and rhythm regular [Edema] : edema was not present [Abdomen Tenderness] : non-tender [Abdomen Soft] : soft [No Clubbing, Cyanosis] : no clubbing or cyanosis of the fingernails [Involuntary Movements] : no involuntary movements were seen [No Focal Deficits] : no focal deficits [Oriented To Time, Place, And Person] : oriented to person, place, and time

## 2023-01-04 NOTE — ASSESSMENT
[FreeTextEntry1] : #Chronic Constipation\par - c/w Colace and Senna\par - unable to afford Trulance, will add Lactulose 30ml QD PRN\par - increase PO fluids\par - outpatient GI FU as scheduled

## 2023-01-06 ENCOUNTER — RX RENEWAL (OUTPATIENT)
Age: 78
End: 2023-01-06

## 2023-01-18 NOTE — ASU PATIENT PROFILE, ADULT - TEACHING/LEARNING OTHER LEARNERS
What Type Of Note Output Would You Prefer (Optional)?: Bullet Format
Hpi Title: Evaluation of Skin Lesions
daughter

## 2023-01-19 ENCOUNTER — APPOINTMENT (OUTPATIENT)
Dept: ORTHOPEDIC SURGERY | Facility: CLINIC | Age: 78
End: 2023-01-19

## 2023-01-22 ENCOUNTER — EMERGENCY (EMERGENCY)
Facility: HOSPITAL | Age: 78
LOS: 0 days | Discharge: NURSING, REHAB OR LONG-TERM CARE | End: 2023-01-22
Admitting: EMERGENCY MEDICINE
Payer: MEDICARE

## 2023-01-22 DIAGNOSIS — J44.1 CHRONIC OBSTRUCTIVE PULMONARY DISEASE WITH (ACUTE) EXACERBATION: ICD-10-CM

## 2023-01-22 DIAGNOSIS — Z98.890 OTHER SPECIFIED POSTPROCEDURAL STATES: Chronic | ICD-10-CM

## 2023-01-22 DIAGNOSIS — R20.2 PARESTHESIA OF SKIN: ICD-10-CM

## 2023-01-22 DIAGNOSIS — R05.9 COUGH, UNSPECIFIED: ICD-10-CM

## 2023-01-22 DIAGNOSIS — Z90.79 ACQUIRED ABSENCE OF OTHER GENITAL ORGAN(S): Chronic | ICD-10-CM

## 2023-01-22 DIAGNOSIS — Z02.9 ENCOUNTER FOR ADMINISTRATIVE EXAMINATIONS, UNSPECIFIED: ICD-10-CM

## 2023-01-22 DIAGNOSIS — J18.9 PNEUMONIA, UNSPECIFIED ORGANISM: ICD-10-CM

## 2023-01-22 DIAGNOSIS — Z79.4 LONG TERM (CURRENT) USE OF INSULIN: ICD-10-CM

## 2023-01-22 DIAGNOSIS — R53.1 WEAKNESS: ICD-10-CM

## 2023-01-22 DIAGNOSIS — R50.9 FEVER, UNSPECIFIED: ICD-10-CM

## 2023-01-22 DIAGNOSIS — Z87.891 PERSONAL HISTORY OF NICOTINE DEPENDENCE: ICD-10-CM

## 2023-01-22 DIAGNOSIS — R20.0 ANESTHESIA OF SKIN: ICD-10-CM

## 2023-01-22 DIAGNOSIS — E11.9 TYPE 2 DIABETES MELLITUS WITHOUT COMPLICATIONS: ICD-10-CM

## 2023-01-22 DIAGNOSIS — Z85.46 PERSONAL HISTORY OF MALIGNANT NEOPLASM OF PROSTATE: ICD-10-CM

## 2023-01-22 DIAGNOSIS — J44.0 CHRONIC OBSTRUCTIVE PULMONARY DISEASE WITH (ACUTE) LOWER RESPIRATORY INFECTION: ICD-10-CM

## 2023-01-22 DIAGNOSIS — R09.02 HYPOXEMIA: ICD-10-CM

## 2023-01-22 PROCEDURE — L9981: CPT

## 2023-01-23 ENCOUNTER — NON-APPOINTMENT (OUTPATIENT)
Age: 78
End: 2023-01-23

## 2023-01-23 ENCOUNTER — APPOINTMENT (OUTPATIENT)
Dept: GASTROENTEROLOGY | Facility: CLINIC | Age: 78
End: 2023-01-23

## 2023-01-31 ENCOUNTER — APPOINTMENT (OUTPATIENT)
Dept: ELECTROPHYSIOLOGY | Facility: CLINIC | Age: 78
End: 2023-01-31
Payer: MEDICARE

## 2023-01-31 VITALS
DIASTOLIC BLOOD PRESSURE: 79 MMHG | HEIGHT: 67 IN | TEMPERATURE: 97 F | BODY MASS INDEX: 22.76 KG/M2 | RESPIRATION RATE: 16 BRPM | HEART RATE: 100 BPM | WEIGHT: 145 LBS | SYSTOLIC BLOOD PRESSURE: 137 MMHG

## 2023-01-31 PROCEDURE — 93000 ELECTROCARDIOGRAM COMPLETE: CPT

## 2023-01-31 PROCEDURE — 99204 OFFICE O/P NEW MOD 45 MIN: CPT | Mod: 57,25

## 2023-01-31 RX ORDER — INSULIN DEGLUDEC INJECTION 200 U/ML
200 INJECTION, SOLUTION SUBCUTANEOUS
Qty: 3 | Refills: 3 | Status: COMPLETED | COMMUNITY
Start: 2022-03-03 | End: 2023-01-31

## 2023-01-31 RX ORDER — STANDARDIZED SENNA CONCENTRATE 8.6 MG/1
8.6 TABLET ORAL
Qty: 60 | Refills: 6 | Status: COMPLETED | COMMUNITY
Start: 2022-12-20 | End: 2023-01-31

## 2023-01-31 RX ORDER — ATORVASTATIN CALCIUM 80 MG/1
80 TABLET, FILM COATED ORAL
Qty: 90 | Refills: 3 | Status: COMPLETED | COMMUNITY
Start: 2021-12-22 | End: 2023-01-31

## 2023-01-31 RX ORDER — FLUTICASONE FUROATE, UMECLIDINIUM BROMIDE AND VILANTEROL TRIFENATATE 200; 62.5; 25 UG/1; UG/1; UG/1
200-62.5-25 POWDER RESPIRATORY (INHALATION)
Qty: 1 | Refills: 5 | Status: COMPLETED | COMMUNITY
Start: 2022-03-23 | End: 2023-01-31

## 2023-01-31 RX ORDER — PEG-3350, SODIUM SULFATE, SODIUM CHLORIDE, POTASSIUM CHLORIDE, SODIUM ASCORBATE AND ASCORBIC ACID 7.5-2.691G
100 KIT ORAL
Qty: 1 | Refills: 0 | Status: COMPLETED | COMMUNITY
Start: 2022-12-20 | End: 2023-01-31

## 2023-01-31 RX ORDER — MONTELUKAST 10 MG/1
10 TABLET, FILM COATED ORAL DAILY
Refills: 0 | Status: ACTIVE | COMMUNITY
Start: 2021-12-22

## 2023-01-31 RX ORDER — LACTULOSE 10 G/15ML
20 SOLUTION ORAL DAILY
Qty: 30 | Refills: 3 | Status: COMPLETED | COMMUNITY
Start: 2023-01-04 | End: 2023-01-31

## 2023-02-17 NOTE — REASON FOR VISIT
Reason for office visit:  Follow-up persistent AF     Impression:   • Persistent, previously paroxymal, atrial fibrillation  • Denies any symptoms, reported dizziness but unclear if correlated with AF    • Diagnosed incidentally 10/28/21, ECG obtained because PCP felt irregular pulse upon palpating radial pulse. Denied symptoms at that time.     • Hospital admission 10/28/21-10/30/21 with  AF, given IV diltiazem and converted back to SR, anticoagulated with Eliquis  • Established with EP 11/8/21, discussed rate vs rhythm control with antiarrythmic medication and ablation  • 30 day cardiac monitor 11/8/21: PAF with RVR and 2 episodes of wide complex tachycardia most likely NSVT. Frequent PAC/PVCs. AF burden: 10%.  • Cardiac monitor 3/18/22: 95% AF burden, avg 92 bpm, <1% PAC, no SVT. 2% PVC, 2 episodes of NSVT (longest 11 beats, monomorphic). One patient triggered with AF  • 4/18/22: in AF with CVR. Repeat Mg was 1.5. Amiodarone initiated  • Scheduled for DCCV on 5/27/22, canceled due to tooth infection with tooth extracted 5/31/22.  No missed doses of Eliquis since June (after tooth extracted).  • S/P DCCV 7/20/22  ? 8/15/22 OV:  symptomatic improvement s/p DCCV  • High risk medication, on Amiodarone 200 mg daily   • Initiated on 4/18/22  • ECG 2/17/23: SB, QTc 434 ms  • 10/21/22 PFTs: DLCO58% of predicted (may not be accurate, only a few weeks after coronary artery bypass grafting)  • 9/20/22: TSH 3.800  • LFTs stable 7/10/22  • CXR WNL 7/15/22  • Eye exam 7/29/22  • CHADSVASc of 4  (Age, HTN, DM, HF)  • Will increase to 5 when he turns 74 on 9/5/2022  • Anticoagulated on Eliquis 5 mg BID  • NSVT  • Noted on cardiac monitor in 11/2021 and 3/2022  • Monomorphic and asymptomatic   • On toprol 100 mg BID  • Cardiomyopathy with HFrEF, likely tachycardia mediated  • EF improved in sinus rhythm, prior to CABG  • Followed by Cardiology   • NYHA Class I  • CAD s/p CABG 10/4/22  • Hypomagnesemia/hypokalemia  • On Mg oxide 2  today for follow-up of persistent atrial fibrillation, with likely tachycardia mediated cardiomyopathy, now with improved ejection fraction.  Currently managed on amiodarone 200 mg daily and Eliquis for anticoagulation.  Denies bleeding issues or concerns.  ECG today shows sinus bradycardia.    Doing quite well!  Does not feel he has had any atrial fibrillation episodes, but really did not have any symptoms when initially diagnosed.  Denies chest pain, shortness of breath, palpitations, skipped beats, dizziness, lightheadedness, and lower extremity swelling.  Walks for 30 minutes a day in the house.  A few days ago blood pressure 125/61 with heart rate of 70 bpm.  Unaccompanied at the visit today.     PAST MEDICAL HX:    High cholesterol                                              Essential (primary) hypertension                              Sleep apnea                                                   Diabetes mellitus (CMS/HCC)                                   Cotter's esophagus                                           Tongue cancer (CMS/HCC)                                         Comment: S/P resection 2006    Fatty liver                                                   Psoriasis                                                     Erectile dysfunction                                          AF (atrial fibrillation) (CMS/HCC)                            Solitary lung nodule                                          Sensorineural hearing loss                                     Allergies and Medications were reviewed     PHYSICAL EXAM:  Visit Vitals  /58 (BP Location: LUE - Left upper extremity, Patient Position: Sitting, Cuff Size: Regular)   Pulse (!) 57   Ht 6' 1\" (1.854 m)   Wt 101.7 kg (224 lb 3.2 oz)   SpO2 97%   BMI 29.58 kg/m²     GENERAL:  Cooperative, sitting comfortably, in no acute distress.  HEAD: Normocephalic, Non-traumatic  CARDIOVASCULAR:   Regular rate and rhythm, normal S1/S2, no  murmur.  RESPIRATORY:  Clear to ausculation bilaterally. No wheezes, rales or rhonchi.  EXTREMITIES: No edema bilateral lower extremities.  NEURO: No obvious motor or sensor deficits  PSYCHIATRIC:   Alert and oriented to person, place, and time.  INTEGUMENTARY:  Warm and dry.     I have personally reviewed and interpreted EKG below.  I have reviewed and summarized old records as per the impression section.     EKG:  SB, QTc 434 ms         [Initial Evaluation] : an initial evaluation [DM Type 2] : DM Type 2

## 2023-02-21 ENCOUNTER — INPATIENT (INPATIENT)
Facility: HOSPITAL | Age: 78
LOS: 1 days | Discharge: HOME HEALTH | DRG: 194 | End: 2023-02-23
Attending: FAMILY MEDICINE | Admitting: FAMILY MEDICINE
Payer: MEDICARE

## 2023-02-21 VITALS
TEMPERATURE: 97 F | HEART RATE: 104 BPM | WEIGHT: 143.08 LBS | OXYGEN SATURATION: 96 % | DIASTOLIC BLOOD PRESSURE: 66 MMHG | SYSTOLIC BLOOD PRESSURE: 131 MMHG | RESPIRATION RATE: 19 BRPM

## 2023-02-21 DIAGNOSIS — J18.9 PNEUMONIA, UNSPECIFIED ORGANISM: ICD-10-CM

## 2023-02-21 DIAGNOSIS — Z98.890 OTHER SPECIFIED POSTPROCEDURAL STATES: Chronic | ICD-10-CM

## 2023-02-21 DIAGNOSIS — Z90.79 ACQUIRED ABSENCE OF OTHER GENITAL ORGAN(S): Chronic | ICD-10-CM

## 2023-02-21 LAB
ALBUMIN SERPL ELPH-MCNC: 3.7 G/DL — SIGNIFICANT CHANGE UP (ref 3.5–5.2)
ALP SERPL-CCNC: 78 U/L — SIGNIFICANT CHANGE UP (ref 30–115)
ALT FLD-CCNC: 16 U/L — SIGNIFICANT CHANGE UP (ref 0–41)
ANION GAP SERPL CALC-SCNC: 11 MMOL/L — SIGNIFICANT CHANGE UP (ref 7–14)
APTT BLD: 32.5 SEC — SIGNIFICANT CHANGE UP (ref 27–39.2)
AST SERPL-CCNC: 16 U/L — SIGNIFICANT CHANGE UP (ref 0–41)
BASE EXCESS BLDV CALC-SCNC: -0.1 MMOL/L — SIGNIFICANT CHANGE UP (ref -2–3)
BASOPHILS # BLD AUTO: 0.06 K/UL — SIGNIFICANT CHANGE UP (ref 0–0.2)
BASOPHILS NFR BLD AUTO: 0.3 % — SIGNIFICANT CHANGE UP (ref 0–1)
BILIRUB SERPL-MCNC: 0.4 MG/DL — SIGNIFICANT CHANGE UP (ref 0.2–1.2)
BUN SERPL-MCNC: 18 MG/DL — SIGNIFICANT CHANGE UP (ref 10–20)
CA-I SERPL-SCNC: 1.16 MMOL/L — SIGNIFICANT CHANGE UP (ref 1.15–1.33)
CALCIUM SERPL-MCNC: 8.8 MG/DL — SIGNIFICANT CHANGE UP (ref 8.4–10.5)
CHLORIDE SERPL-SCNC: 102 MMOL/L — SIGNIFICANT CHANGE UP (ref 98–110)
CO2 SERPL-SCNC: 23 MMOL/L — SIGNIFICANT CHANGE UP (ref 17–32)
CREAT SERPL-MCNC: 1 MG/DL — SIGNIFICANT CHANGE UP (ref 0.7–1.5)
EGFR: 77 ML/MIN/1.73M2 — SIGNIFICANT CHANGE UP
EOSINOPHIL # BLD AUTO: 0.19 K/UL — SIGNIFICANT CHANGE UP (ref 0–0.7)
EOSINOPHIL NFR BLD AUTO: 1 % — SIGNIFICANT CHANGE UP (ref 0–8)
GAS PNL BLDV: 134 MMOL/L — LOW (ref 136–145)
GAS PNL BLDV: SIGNIFICANT CHANGE UP
GLUCOSE BLDC GLUCOMTR-MCNC: 218 MG/DL — HIGH (ref 70–99)
GLUCOSE SERPL-MCNC: 144 MG/DL — HIGH (ref 70–99)
HCO3 BLDV-SCNC: 26 MMOL/L — SIGNIFICANT CHANGE UP (ref 22–29)
HCT VFR BLD CALC: 39 % — LOW (ref 42–52)
HCT VFR BLDA CALC: 36 % — LOW (ref 39–51)
HGB BLD CALC-MCNC: 12.1 G/DL — LOW (ref 12.6–17.4)
HGB BLD-MCNC: 12.6 G/DL — LOW (ref 14–18)
IMM GRANULOCYTES NFR BLD AUTO: 0.4 % — HIGH (ref 0.1–0.3)
INR BLD: 1.08 RATIO — SIGNIFICANT CHANGE UP (ref 0.65–1.3)
LACTATE BLDV-MCNC: 1.4 MMOL/L — SIGNIFICANT CHANGE UP (ref 0.5–2)
LYMPHOCYTES # BLD AUTO: 18.6 % — LOW (ref 20.5–51.1)
LYMPHOCYTES # BLD AUTO: 3.7 K/UL — HIGH (ref 1.2–3.4)
MCHC RBC-ENTMCNC: 27 PG — SIGNIFICANT CHANGE UP (ref 27–31)
MCHC RBC-ENTMCNC: 32.3 G/DL — SIGNIFICANT CHANGE UP (ref 32–37)
MCV RBC AUTO: 83.5 FL — SIGNIFICANT CHANGE UP (ref 80–94)
MONOCYTES # BLD AUTO: 1.83 K/UL — HIGH (ref 0.1–0.6)
MONOCYTES NFR BLD AUTO: 9.2 % — SIGNIFICANT CHANGE UP (ref 1.7–9.3)
NEUTROPHILS # BLD AUTO: 14.02 K/UL — HIGH (ref 1.4–6.5)
NEUTROPHILS NFR BLD AUTO: 70.5 % — SIGNIFICANT CHANGE UP (ref 42.2–75.2)
NRBC # BLD: 0 /100 WBCS — SIGNIFICANT CHANGE UP (ref 0–0)
PCO2 BLDV: 47 MMHG — SIGNIFICANT CHANGE UP (ref 42–55)
PH BLDV: 7.35 — SIGNIFICANT CHANGE UP (ref 7.32–7.43)
PLATELET # BLD AUTO: 267 K/UL — SIGNIFICANT CHANGE UP (ref 130–400)
PO2 BLDV: 53 MMHG — SIGNIFICANT CHANGE UP
POTASSIUM BLDV-SCNC: 4.5 MMOL/L — SIGNIFICANT CHANGE UP (ref 3.5–5.1)
POTASSIUM SERPL-MCNC: 4.5 MMOL/L — SIGNIFICANT CHANGE UP (ref 3.5–5)
POTASSIUM SERPL-SCNC: 4.5 MMOL/L — SIGNIFICANT CHANGE UP (ref 3.5–5)
PROT SERPL-MCNC: 7.4 G/DL — SIGNIFICANT CHANGE UP (ref 6–8)
PROTHROM AB SERPL-ACNC: 12.3 SEC — SIGNIFICANT CHANGE UP (ref 9.95–12.87)
RBC # BLD: 4.67 M/UL — LOW (ref 4.7–6.1)
RBC # FLD: 15 % — HIGH (ref 11.5–14.5)
SAO2 % BLDV: 86.3 % — SIGNIFICANT CHANGE UP
SARS-COV-2 RNA SPEC QL NAA+PROBE: SIGNIFICANT CHANGE UP
SODIUM SERPL-SCNC: 136 MMOL/L — SIGNIFICANT CHANGE UP (ref 135–146)
WBC # BLD: 19.88 K/UL — HIGH (ref 4.8–10.8)
WBC # FLD AUTO: 19.88 K/UL — HIGH (ref 4.8–10.8)

## 2023-02-21 PROCEDURE — 93010 ELECTROCARDIOGRAM REPORT: CPT

## 2023-02-21 PROCEDURE — 0225U NFCT DS DNA&RNA 21 SARSCOV2: CPT

## 2023-02-21 PROCEDURE — 84145 PROCALCITONIN (PCT): CPT

## 2023-02-21 PROCEDURE — 99223 1ST HOSP IP/OBS HIGH 75: CPT

## 2023-02-21 PROCEDURE — 87641 MR-STAPH DNA AMP PROBE: CPT

## 2023-02-21 PROCEDURE — 85027 COMPLETE CBC AUTOMATED: CPT

## 2023-02-21 PROCEDURE — 36415 COLL VENOUS BLD VENIPUNCTURE: CPT

## 2023-02-21 PROCEDURE — 80053 COMPREHEN METABOLIC PANEL: CPT

## 2023-02-21 PROCEDURE — 87640 STAPH A DNA AMP PROBE: CPT

## 2023-02-21 PROCEDURE — 82962 GLUCOSE BLOOD TEST: CPT

## 2023-02-21 PROCEDURE — 94640 AIRWAY INHALATION TREATMENT: CPT

## 2023-02-21 PROCEDURE — 83036 HEMOGLOBIN GLYCOSYLATED A1C: CPT

## 2023-02-21 PROCEDURE — 97162 PT EVAL MOD COMPLEX 30 MIN: CPT | Mod: GP

## 2023-02-21 PROCEDURE — 71046 X-RAY EXAM CHEST 2 VIEWS: CPT | Mod: 26

## 2023-02-21 RX ORDER — TRAMADOL HYDROCHLORIDE 50 MG/1
50 TABLET ORAL EVERY 6 HOURS
Refills: 0 | Status: DISCONTINUED | OUTPATIENT
Start: 2023-02-21 | End: 2023-02-23

## 2023-02-21 RX ORDER — ATORVASTATIN CALCIUM 80 MG/1
80 TABLET, FILM COATED ORAL AT BEDTIME
Refills: 0 | Status: DISCONTINUED | OUTPATIENT
Start: 2023-02-21 | End: 2023-02-23

## 2023-02-21 RX ORDER — ALBUTEROL 90 UG/1
2 AEROSOL, METERED ORAL EVERY 4 HOURS
Refills: 0 | Status: DISCONTINUED | OUTPATIENT
Start: 2023-02-21 | End: 2023-02-23

## 2023-02-21 RX ORDER — GLUCAGON INJECTION, SOLUTION 0.5 MG/.1ML
1 INJECTION, SOLUTION SUBCUTANEOUS ONCE
Refills: 0 | Status: DISCONTINUED | OUTPATIENT
Start: 2023-02-21 | End: 2023-02-23

## 2023-02-21 RX ORDER — AZITHROMYCIN 500 MG/1
500 TABLET, FILM COATED ORAL EVERY 24 HOURS
Refills: 0 | Status: DISCONTINUED | OUTPATIENT
Start: 2023-02-22 | End: 2023-02-23

## 2023-02-21 RX ORDER — GABAPENTIN 400 MG/1
600 CAPSULE ORAL
Refills: 0 | Status: DISCONTINUED | OUTPATIENT
Start: 2023-02-21 | End: 2023-02-23

## 2023-02-21 RX ORDER — INSULIN LISPRO 100/ML
VIAL (ML) SUBCUTANEOUS
Refills: 0 | Status: DISCONTINUED | OUTPATIENT
Start: 2023-02-21 | End: 2023-02-22

## 2023-02-21 RX ORDER — DEXTROSE 50 % IN WATER 50 %
12.5 SYRINGE (ML) INTRAVENOUS ONCE
Refills: 0 | Status: DISCONTINUED | OUTPATIENT
Start: 2023-02-21 | End: 2023-02-23

## 2023-02-21 RX ORDER — SODIUM CHLORIDE 9 MG/ML
1000 INJECTION, SOLUTION INTRAVENOUS
Refills: 0 | Status: DISCONTINUED | OUTPATIENT
Start: 2023-02-21 | End: 2023-02-23

## 2023-02-21 RX ORDER — DEXTROSE 50 % IN WATER 50 %
25 SYRINGE (ML) INTRAVENOUS ONCE
Refills: 0 | Status: DISCONTINUED | OUTPATIENT
Start: 2023-02-21 | End: 2023-02-23

## 2023-02-21 RX ORDER — SENNA PLUS 8.6 MG/1
2 TABLET ORAL AT BEDTIME
Refills: 0 | Status: DISCONTINUED | OUTPATIENT
Start: 2023-02-21 | End: 2023-02-23

## 2023-02-21 RX ORDER — BUDESONIDE AND FORMOTEROL FUMARATE DIHYDRATE 160; 4.5 UG/1; UG/1
2 AEROSOL RESPIRATORY (INHALATION)
Refills: 0 | Status: DISCONTINUED | OUTPATIENT
Start: 2023-02-21 | End: 2023-02-23

## 2023-02-21 RX ORDER — ENOXAPARIN SODIUM 100 MG/ML
40 INJECTION SUBCUTANEOUS EVERY 24 HOURS
Refills: 0 | Status: DISCONTINUED | OUTPATIENT
Start: 2023-02-21 | End: 2023-02-23

## 2023-02-21 RX ORDER — ASPIRIN/CALCIUM CARB/MAGNESIUM 324 MG
81 TABLET ORAL DAILY
Refills: 0 | Status: DISCONTINUED | OUTPATIENT
Start: 2023-02-21 | End: 2023-02-23

## 2023-02-21 RX ORDER — DEXTROSE 50 % IN WATER 50 %
15 SYRINGE (ML) INTRAVENOUS ONCE
Refills: 0 | Status: DISCONTINUED | OUTPATIENT
Start: 2023-02-21 | End: 2023-02-23

## 2023-02-21 RX ORDER — CEFTRIAXONE 500 MG/1
1000 INJECTION, POWDER, FOR SOLUTION INTRAMUSCULAR; INTRAVENOUS EVERY 24 HOURS
Refills: 0 | Status: DISCONTINUED | OUTPATIENT
Start: 2023-02-21 | End: 2023-02-23

## 2023-02-21 RX ORDER — IPRATROPIUM/ALBUTEROL SULFATE 18-103MCG
3 AEROSOL WITH ADAPTER (GRAM) INHALATION EVERY 6 HOURS
Refills: 0 | Status: DISCONTINUED | OUTPATIENT
Start: 2023-02-21 | End: 2023-02-22

## 2023-02-21 RX ORDER — ONDANSETRON 8 MG/1
4 TABLET, FILM COATED ORAL EVERY 6 HOURS
Refills: 0 | Status: DISCONTINUED | OUTPATIENT
Start: 2023-02-21 | End: 2023-02-23

## 2023-02-21 RX ORDER — PANTOPRAZOLE SODIUM 20 MG/1
40 TABLET, DELAYED RELEASE ORAL
Refills: 0 | Status: DISCONTINUED | OUTPATIENT
Start: 2023-02-21 | End: 2023-02-23

## 2023-02-21 RX ADMIN — Medication 2: at 22:27

## 2023-02-21 RX ADMIN — BUDESONIDE AND FORMOTEROL FUMARATE DIHYDRATE 2 PUFF(S): 160; 4.5 AEROSOL RESPIRATORY (INHALATION) at 20:23

## 2023-02-21 RX ADMIN — ATORVASTATIN CALCIUM 80 MILLIGRAM(S): 80 TABLET, FILM COATED ORAL at 22:53

## 2023-02-21 RX ADMIN — CEFTRIAXONE 100 MILLIGRAM(S): 500 INJECTION, POWDER, FOR SOLUTION INTRAMUSCULAR; INTRAVENOUS at 19:52

## 2023-02-21 RX ADMIN — Medication 3 MILLILITER(S): at 19:52

## 2023-02-21 NOTE — ED ADULT NURSE NOTE - NSIMPLEMENTINTERV_GEN_ALL_ED
Implemented All Fall Risk Interventions:  [place] to call system. Call bell, personal items and telephone within reach. Instruct patient to call for assistance. Room bathroom lighting operational. Non-slip footwear when patient is off stretcher. Physically safe environment: no spills, clutter or unnecessary equipment. Stretcher in lowest position, wheels locked, appropriate side rails in place. Provide visual cue, wrist band, yellow gown, etc. Monitor gait and stability. Monitor for mental status changes and reorient to person, place, and time. Review medications for side effects contributing to fall risk. Reinforce activity limits and safety measures with patient and family.

## 2023-02-21 NOTE — ED PROVIDER NOTE - NS ED MD DISPO ISOLATION TYPES
09/11/20      Susan Sandra  [address]  [place] [address]      Please transfer her remotes over from [employer]'s network and schedule her for remotes every 3 months.    Sincerely,         Obinna Kaiser  [facility]  [address]  [facility] [address]  Phone: [phone]  Fax: [phone]    
None

## 2023-02-21 NOTE — ED PROVIDER NOTE - NS ED ATTENDING STATEMENT MOD
This was a shared visit with the CARLA. I reviewed and verified the documentation and independently performed the documented:

## 2023-02-21 NOTE — ED PROVIDER NOTE - CARE PLAN
Vitals capture started with the following parameters, Patient=Adult, Interval=5 min, Initial Pressure=180 mmHg, Deflation Rate=5 mmHg, Cuff placed on Left Arm 1 Principal Discharge DX:	Cough  Secondary Diagnosis:	Pneumonia

## 2023-02-21 NOTE — ED PROVIDER NOTE - CLINICAL SUMMARY MEDICAL DECISION MAKING FREE TEXT BOX
69-year-old male with past medical history diabetes, COPD not O2 dependent, hyperlipidemia, left parietal CVA presents to the ER for cough, production of green phlegm, and fever with Tmax of 104 according to his daughter.  He has been off prednisone for the past year.  His daughter also endorses decreased oral intake since this morning.  He lives alone.  No overt chest pain, shortness of breath.  Vitals noted, triage note reviewed, and agree with exam as documented above. Faint crackles b/l bases. Presentation consistent with community-acquired pneumonia. Patient was given Levaquin to cover for Pseudomonas, and admitted for further management.  Patient and family updated the bedside.

## 2023-02-21 NOTE — H&P ADULT - NSHPLABSRESULTS_GEN_ALL_CORE
LABS:  cret                        12.6   19.88 )-----------( 267      ( 21 Feb 2023 17:40 )             39.0     02-21    136  |  102  |  18  ----------------------------<  144<H>  4.5   |  23  |  1.0    Ca    8.8      21 Feb 2023 17:40    TPro  7.4  /  Alb  3.7  /  TBili  0.4  /  DBili  x   /  AST  16  /  ALT  16  /  AlkPhos  78  02-21    PT/INR - ( 21 Feb 2023 17:40 )   PT: 12.30 sec;   INR: 1.08 ratio         PTT - ( 21 Feb 2023 17:40 )  PTT:32.5 sec

## 2023-02-21 NOTE — H&P ADULT - NSHPPHYSICALEXAM_GEN_ALL_CORE
VITALS:   T(C): 36.3 (02-21-23 @ 18:30), Max: 36.3 (02-21-23 @ 16:49)  HR: 89 (02-21-23 @ 18:30) (89 - 104)  BP: 121/60 (02-21-23 @ 18:30) (121/60 - 131/66)  RR: 18 (02-21-23 @ 18:30) (18 - 19)  SpO2: 100% (02-21-23 @ 18:30) (96% - 100%)    GENERAL: NAD, lying in bed comfortably  HEAD:  Atraumatic, Normocephalic  EYES: EOMI, conjunctiva and sclera clear  ENT: Moist mucous membranes  NECK: Supple, No JVD  CHEST/LUNG: b/l crackles, Unlabored respirations  HEART: Regular rate and rhythm; No murmurs, rubs, or gallops  ABDOMEN: Bowel sounds present; Soft, Nontender, Nondistended.   EXTREMITIES:  No clubbing, cyanosis, or edema  NERVOUS SYSTEM:  Alert & Oriented X3, speech clear. No deficits   MSK: FROM all 4 extremities, full and equal strength  SKIN: No rashes or lesions

## 2023-02-21 NOTE — H&P ADULT - HISTORY OF PRESENT ILLNESS
77-year-old male presents to the ED for evaluation of fever and cough.  Patient is complaining of productive sputum for the past 3 days.  Patient denies chest pain or shortness of breath 77-year-old male w/PMHx of shingles, COPD, neuropathy presented  to the ED for evaluation of fever and cough.  Patient is complaining of productive sputum for the past 3 days with associated weakness, decreased PO. Unsure of Tmax.  Patient denies CP, SOB, hemoptysis, abd pain, n/v/d.

## 2023-02-21 NOTE — ED PROVIDER NOTE - NS ED ROS FT
Review of Systems:  	•	CONSTITUTIONAL - no fever, no diaphoresis, no chills  	•	SKIN - no rash  	•	HEMATOLOGIC - no bleeding, no bruising    	•	RESPIRATORY - no shortness of breath,  cough  	•	CARDIAC - no chest pain, no palpitations

## 2023-02-21 NOTE — ED PROVIDER NOTE - PHYSICAL EXAMINATION
--EXAM--  VITAL SIGNS: I have reviewed vs documented at present.  CONSTITUTIONAL: Well-developed; well-nourished; in no acute distress.   SKIN: Warm and dry, no acute rash.     NECK: Supple; non tender.  CARD: S1, S2, Regular rate and rhythm.   RESP: No wheezes, rales. scattered ronchi

## 2023-02-21 NOTE — H&P ADULT - ASSESSMENT
77-year-old male w/PMHx of shingles, COPD, neuropathy presented  to the ED for evaluation of fever and cough.    # PNA  - Cough  - WBC 19.88 77-year-old male w/PMHx of shingles, COPD, neuropathy presented  to the ED for evaluation of fever and cough.    # PNA  -Noticed on the CXR  -Will start patient on antibiotic pending sputum culture, blood culture, urine legionella, and urine strep  -Neb tx  -fever curve  -Monitor WBC    #COPD  -Continue with home inhalers     #DM2  -Stopped long acting insulin  -ISS  -Monitor POC     #Neuropathy  -Continue with home medications       #Progress Note Handoff  Pending (specify):  as above   Family discussion:  plan of care was discussed with patient and family in details.  all questions were answered.  seems to understand, and in agreement  Disposition:  PT  DVT proph lovenox

## 2023-02-21 NOTE — ED PROVIDER NOTE - CONSIDERATION OF ADMISSION OBSERVATION
Leukocytosis of 19, fever 104 and elderly male, appropriate for admission for iv abx- need coverage for pseudomonas Consideration of Admission/Observation

## 2023-02-21 NOTE — ED PROVIDER NOTE - OBJECTIVE STATEMENT
77-year-old male presents to the ED for evaluation of fever and cough.  Patient is complaining of productive sputum for the past 3 days.  Patient denies chest pain or shortness of breath

## 2023-02-22 LAB
A1C WITH ESTIMATED AVERAGE GLUCOSE RESULT: 7.5 % — HIGH (ref 4–5.6)
ALBUMIN SERPL ELPH-MCNC: 3.4 G/DL — LOW (ref 3.5–5.2)
ALP SERPL-CCNC: 76 U/L — SIGNIFICANT CHANGE UP (ref 30–115)
ALT FLD-CCNC: 14 U/L — SIGNIFICANT CHANGE UP (ref 0–41)
ANION GAP SERPL CALC-SCNC: 10 MMOL/L — SIGNIFICANT CHANGE UP (ref 7–14)
AST SERPL-CCNC: 14 U/L — SIGNIFICANT CHANGE UP (ref 0–41)
BILIRUB SERPL-MCNC: 0.4 MG/DL — SIGNIFICANT CHANGE UP (ref 0.2–1.2)
BUN SERPL-MCNC: 19 MG/DL — SIGNIFICANT CHANGE UP (ref 10–20)
CALCIUM SERPL-MCNC: 8.7 MG/DL — SIGNIFICANT CHANGE UP (ref 8.4–10.5)
CHLORIDE SERPL-SCNC: 100 MMOL/L — SIGNIFICANT CHANGE UP (ref 98–110)
CO2 SERPL-SCNC: 26 MMOL/L — SIGNIFICANT CHANGE UP (ref 17–32)
CREAT SERPL-MCNC: 0.9 MG/DL — SIGNIFICANT CHANGE UP (ref 0.7–1.5)
EGFR: 87 ML/MIN/1.73M2 — SIGNIFICANT CHANGE UP
ESTIMATED AVERAGE GLUCOSE: 169 MG/DL — HIGH (ref 68–114)
GLUCOSE BLDC GLUCOMTR-MCNC: 140 MG/DL — HIGH (ref 70–99)
GLUCOSE BLDC GLUCOMTR-MCNC: 140 MG/DL — HIGH (ref 70–99)
GLUCOSE BLDC GLUCOMTR-MCNC: 154 MG/DL — HIGH (ref 70–99)
GLUCOSE BLDC GLUCOMTR-MCNC: 298 MG/DL — HIGH (ref 70–99)
GLUCOSE BLDC GLUCOMTR-MCNC: 352 MG/DL — HIGH (ref 70–99)
GLUCOSE SERPL-MCNC: 160 MG/DL — HIGH (ref 70–99)
HCT VFR BLD CALC: 34.3 % — LOW (ref 42–52)
HGB BLD-MCNC: 11 G/DL — LOW (ref 14–18)
MCHC RBC-ENTMCNC: 26.8 PG — LOW (ref 27–31)
MCHC RBC-ENTMCNC: 32.1 G/DL — SIGNIFICANT CHANGE UP (ref 32–37)
MCV RBC AUTO: 83.7 FL — SIGNIFICANT CHANGE UP (ref 80–94)
MRSA PCR RESULT.: NEGATIVE — SIGNIFICANT CHANGE UP
NRBC # BLD: 0 /100 WBCS — SIGNIFICANT CHANGE UP (ref 0–0)
PLATELET # BLD AUTO: 241 K/UL — SIGNIFICANT CHANGE UP (ref 130–400)
POTASSIUM SERPL-MCNC: 4.9 MMOL/L — SIGNIFICANT CHANGE UP (ref 3.5–5)
POTASSIUM SERPL-SCNC: 4.9 MMOL/L — SIGNIFICANT CHANGE UP (ref 3.5–5)
PROCALCITONIN SERPL-MCNC: <0.02 NG/ML — SIGNIFICANT CHANGE UP (ref 0.02–0.1)
PROT SERPL-MCNC: 6.9 G/DL — SIGNIFICANT CHANGE UP (ref 6–8)
RAPID RVP RESULT: SIGNIFICANT CHANGE UP
RBC # BLD: 4.1 M/UL — LOW (ref 4.7–6.1)
RBC # FLD: 14.9 % — HIGH (ref 11.5–14.5)
SARS-COV-2 RNA SPEC QL NAA+PROBE: SIGNIFICANT CHANGE UP
SODIUM SERPL-SCNC: 136 MMOL/L — SIGNIFICANT CHANGE UP (ref 135–146)
WBC # BLD: 13.72 K/UL — HIGH (ref 4.8–10.8)
WBC # FLD AUTO: 13.72 K/UL — HIGH (ref 4.8–10.8)

## 2023-02-22 PROCEDURE — 99232 SBSQ HOSP IP/OBS MODERATE 35: CPT

## 2023-02-22 RX ORDER — SODIUM CHLORIDE 9 MG/ML
1000 INJECTION, SOLUTION INTRAVENOUS
Refills: 0 | Status: DISCONTINUED | OUTPATIENT
Start: 2023-02-22 | End: 2023-02-23

## 2023-02-22 RX ORDER — DEXTROSE 50 % IN WATER 50 %
15 SYRINGE (ML) INTRAVENOUS ONCE
Refills: 0 | Status: DISCONTINUED | OUTPATIENT
Start: 2023-02-22 | End: 2023-02-23

## 2023-02-22 RX ORDER — DEXTROSE 50 % IN WATER 50 %
25 SYRINGE (ML) INTRAVENOUS ONCE
Refills: 0 | Status: DISCONTINUED | OUTPATIENT
Start: 2023-02-22 | End: 2023-02-23

## 2023-02-22 RX ORDER — INSULIN LISPRO 100/ML
VIAL (ML) SUBCUTANEOUS AT BEDTIME
Refills: 0 | Status: DISCONTINUED | OUTPATIENT
Start: 2023-02-22 | End: 2023-02-23

## 2023-02-22 RX ORDER — POLYETHYLENE GLYCOL 3350 17 G/17G
17 POWDER, FOR SOLUTION ORAL ONCE
Refills: 0 | Status: COMPLETED | OUTPATIENT
Start: 2023-02-22 | End: 2023-02-23

## 2023-02-22 RX ORDER — INSULIN LISPRO 100/ML
5 VIAL (ML) SUBCUTANEOUS
Refills: 0 | Status: DISCONTINUED | OUTPATIENT
Start: 2023-02-22 | End: 2023-02-23

## 2023-02-22 RX ORDER — DEXTROSE 50 % IN WATER 50 %
12.5 SYRINGE (ML) INTRAVENOUS ONCE
Refills: 0 | Status: DISCONTINUED | OUTPATIENT
Start: 2023-02-22 | End: 2023-02-23

## 2023-02-22 RX ORDER — INSULIN LISPRO 100/ML
VIAL (ML) SUBCUTANEOUS
Refills: 0 | Status: DISCONTINUED | OUTPATIENT
Start: 2023-02-22 | End: 2023-02-23

## 2023-02-22 RX ORDER — LANOLIN ALCOHOL/MO/W.PET/CERES
3 CREAM (GRAM) TOPICAL AT BEDTIME
Refills: 0 | Status: DISCONTINUED | OUTPATIENT
Start: 2023-02-22 | End: 2023-02-23

## 2023-02-22 RX ORDER — GLUCAGON INJECTION, SOLUTION 0.5 MG/.1ML
1 INJECTION, SOLUTION SUBCUTANEOUS ONCE
Refills: 0 | Status: DISCONTINUED | OUTPATIENT
Start: 2023-02-22 | End: 2023-02-23

## 2023-02-22 RX ORDER — INSULIN GLARGINE 100 [IU]/ML
10 INJECTION, SOLUTION SUBCUTANEOUS EVERY MORNING
Refills: 0 | Status: DISCONTINUED | OUTPATIENT
Start: 2023-02-22 | End: 2023-02-23

## 2023-02-22 RX ADMIN — BUDESONIDE AND FORMOTEROL FUMARATE DIHYDRATE 2 PUFF(S): 160; 4.5 AEROSOL RESPIRATORY (INHALATION) at 16:05

## 2023-02-22 RX ADMIN — ATORVASTATIN CALCIUM 80 MILLIGRAM(S): 80 TABLET, FILM COATED ORAL at 21:42

## 2023-02-22 RX ADMIN — BUDESONIDE AND FORMOTEROL FUMARATE DIHYDRATE 2 PUFF(S): 160; 4.5 AEROSOL RESPIRATORY (INHALATION) at 21:42

## 2023-02-22 RX ADMIN — TRAMADOL HYDROCHLORIDE 50 MILLIGRAM(S): 50 TABLET ORAL at 05:30

## 2023-02-22 RX ADMIN — Medication 5 UNIT(S): at 17:02

## 2023-02-22 RX ADMIN — ENOXAPARIN SODIUM 40 MILLIGRAM(S): 100 INJECTION SUBCUTANEOUS at 20:08

## 2023-02-22 RX ADMIN — AZITHROMYCIN 255 MILLIGRAM(S): 500 TABLET, FILM COATED ORAL at 05:12

## 2023-02-22 RX ADMIN — GABAPENTIN 600 MILLIGRAM(S): 400 CAPSULE ORAL at 17:05

## 2023-02-22 RX ADMIN — TRAMADOL HYDROCHLORIDE 50 MILLIGRAM(S): 50 TABLET ORAL at 05:23

## 2023-02-22 RX ADMIN — Medication 3 MILLILITER(S): at 08:29

## 2023-02-22 RX ADMIN — Medication 5 UNIT(S): at 11:57

## 2023-02-22 RX ADMIN — PANTOPRAZOLE SODIUM 40 MILLIGRAM(S): 20 TABLET, DELAYED RELEASE ORAL at 05:11

## 2023-02-22 RX ADMIN — Medication 81 MILLIGRAM(S): at 11:57

## 2023-02-22 RX ADMIN — INSULIN GLARGINE 10 UNIT(S): 100 INJECTION, SOLUTION SUBCUTANEOUS at 11:43

## 2023-02-22 RX ADMIN — GABAPENTIN 600 MILLIGRAM(S): 400 CAPSULE ORAL at 05:12

## 2023-02-22 RX ADMIN — CEFTRIAXONE 100 MILLIGRAM(S): 500 INJECTION, POWDER, FOR SOLUTION INTRAMUSCULAR; INTRAVENOUS at 20:08

## 2023-02-22 RX ADMIN — Medication 3: at 11:57

## 2023-02-22 NOTE — PHYSICAL THERAPY INITIAL EVALUATION ADULT - PERTINENT HX OF CURRENT PROBLEM, REHAB EVAL
77-year-old male w/PMHx of shingles, COPD, neuropathy presented  to the ED for evaluation of fever and cough.  Patient is complaining of productive sputum for the past 3 days with associated weakness, decreased PO. Unsure of Tmax.  Patient denies CP, SOB, hemoptysis, abd pain, n/v/d.

## 2023-02-22 NOTE — PHYSICAL THERAPY INITIAL EVALUATION ADULT - GENERAL OBSERVATIONS, REHAB EVAL
9:55 -10:25 Chart reviewed. Order received.  Patient is ok to be  seen for PT,confirmed with RN. pt encountered  Semi page in the bed   denies pain, and agrees to participate in session, +  [place] NAD.

## 2023-02-22 NOTE — CHART NOTE - NSCHARTNOTEFT_GEN_A_CORE
Informed by RN that patient's FS is 352.   Patient is on home Insulin: Long actin 40 units qAm and 20 units qMeals   Patient has lost a lot of weight   We will give Lantus 10qAM and 5 units qMeals   Discussed with MD

## 2023-02-22 NOTE — PHYSICAL THERAPY INITIAL EVALUATION ADULT - ADDITIONAL COMMENTS
pt is 76 y/o M  , lives with Wife and Son  in  , information obtain from the pt him self  , has 14  steps to enter with LT  HR and 6 steps to the bedroom W/RT HR   , pt was independent w/ RW  with bed mobility , transfer , ambulation ,stair negotiation and basic ADLS PTA.

## 2023-02-22 NOTE — PROGRESS NOTE ADULT - SUBJECTIVE AND OBJECTIVE BOX
CC.  Cough  HPI.  Patient reports cough improving  offers no other complaints               Constitutional: No fever, fatigue or weight loss.  Skin: No rash.  Eyes: No recent vision problems or eye pain.  ENT: No congestion, ear pain, or sore throat.  Endocrine: No thyroid problems.  Cardiovascular: No chest pain or palpation.  Respiratory: No cough, shortness of breath, congestion, or wheezing.  Gastrointestinal: No abdominal pain, nausea, vomiting, or diarrhea.  Genitourinary: No dysuria.  Musculoskeletal: No joint swelling.  Neurologic: No headache.      Vital Signs Last 24 Hrs  T(C): 36 (02-22-23 @ 05:05), Max: 36.3 (02-21-23 @ 16:49)  T(F): 96.8 (02-22-23 @ 05:05), Max: 97.4 (02-21-23 @ 16:49)  HR: 79 (02-22-23 @ 05:05) (79 - 112)  BP: 176/72 (02-22-23 @ 05:05) (121/60 - 176/72)  BP(mean): --  RR: 18 (02-22-23 @ 05:05) (18 - 19)  SpO2: 96% (02-21-23 @ 21:00) (96% - 100%)        PHYSICAL EXAM-  GENERAL: NAD, well-groomed, well-developed  HEAD:  Atraumatic, Normocephalic  EYES: EOMI, PERRLA, conjunctiva and sclera clear  NECK: Supple, No JVD, Normal thyroid  NERVOUS SYSTEM:  Alert & Oriented X3, Motor Strength 5/5 B/L upper and lower extremities; DTRs 2+ intact and symmetric  CHEST/LUNG: Clear to percussion bilaterally; No rales, rhonchi, wheezing, or rubs  HEART: Regular rate and rhythm; No murmurs, rubs, or gallops  ABDOMEN: Soft, Nontender, Nondistended; Bowel sounds present  EXTREMITIES:  2+ Peripheral Pulses, No clubbing, cyanosis, or edema  SKIN: No rashes or lesions                                  11.0   13.72 )-----------( 241      ( 22 Feb 2023 07:50 )             34.3     02-22    136  |  100  |  19  ----------------------------<  160<H>  4.9   |  26  |  0.9    Ca    8.7      22 Feb 2023 07:50    TPro  6.9  /  Alb  3.4<L>  /  TBili  0.4  /  DBili  x   /  AST  14  /  ALT  14  /  AlkPhos  76  02-22            PT/INR - ( 21 Feb 2023 17:40 )   PT: 12.30 sec;   INR: 1.08 ratio         PTT - ( 21 Feb 2023 17:40 )  PTT:32.5 sec        MEDICATIONS  (STANDING):  albuterol/ipratropium for Nebulization 3 milliLiter(s) Nebulizer every 6 hours  aspirin  chewable 81 milliGRAM(s) Oral daily  atorvastatin 80 milliGRAM(s) Oral at bedtime  azithromycin  IVPB 500 milliGRAM(s) IV Intermittent every 24 hours  budesonide 160 MICROgram(s)/formoterol 4.5 MICROgram(s) Inhaler 2 Puff(s) Inhalation two times a day  cefTRIAXone   IVPB 1000 milliGRAM(s) IV Intermittent every 24 hours  dextrose 5%. 1000 milliLiter(s) (50 mL/Hr) IV Continuous <Continuous>  dextrose 5%. 1000 milliLiter(s) (100 mL/Hr) IV Continuous <Continuous>  dextrose 5%. 1000 milliLiter(s) (50 mL/Hr) IV Continuous <Continuous>  dextrose 5%. 1000 milliLiter(s) (100 mL/Hr) IV Continuous <Continuous>  dextrose 50% Injectable 25 Gram(s) IV Push once  dextrose 50% Injectable 12.5 Gram(s) IV Push once  dextrose 50% Injectable 25 Gram(s) IV Push once  dextrose 50% Injectable 25 Gram(s) IV Push once  dextrose 50% Injectable 12.5 Gram(s) IV Push once  dextrose 50% Injectable 25 Gram(s) IV Push once  enoxaparin Injectable 40 milliGRAM(s) SubCutaneous every 24 hours  gabapentin 600 milliGRAM(s) Oral two times a day  glucagon  Injectable 1 milliGRAM(s) IntraMuscular once  glucagon  Injectable 1 milliGRAM(s) IntraMuscular once  insulin glargine Injectable (LANTUS) 10 Unit(s) SubCutaneous every morning  insulin lispro (ADMELOG) corrective regimen sliding scale   SubCutaneous three times a day before meals  insulin lispro (ADMELOG) corrective regimen sliding scale   SubCutaneous at bedtime  insulin lispro (ADMELOG) corrective regimen sliding scale   SubCutaneous three times a day before meals  insulin lispro Injectable (ADMELOG) 5 Unit(s) SubCutaneous three times a day before meals  pantoprazole    Tablet 40 milliGRAM(s) Oral before breakfast    MEDICATIONS  (PRN):  albuterol    90 MICROgram(s) HFA Inhaler 2 Puff(s) Inhalation every 4 hours PRN Shortness of Breath and/or Wheezing  aluminum hydroxide/magnesium hydroxide/simethicone Suspension 30 milliLiter(s) Oral every 4 hours PRN Dyspepsia  dextrose Oral Gel 15 Gram(s) Oral once PRN Blood Glucose LESS THAN 70 milliGRAM(s)/deciliter  dextrose Oral Gel 15 Gram(s) Oral once PRN Blood Glucose LESS THAN 70 milliGRAM(s)/deciliter  ondansetron Injectable 4 milliGRAM(s) IV Push every 6 hours PRN Nausea  senna 2 Tablet(s) Oral at bedtime PRN Constipation  traMADol 50 milliGRAM(s) Oral every 6 hours PRN Severe Pain (7 - 10)      Imaging Personally Reviewed:     [x ] YES  [ ] NO    Consultant(s) Notes Reviewed:  [x ] YES  [ ] NO    Care Discussed with Consultants/Other Providers [x ] YES  [ ] No medical contraindication for discharge

## 2023-02-22 NOTE — PROGRESS NOTE ADULT - ASSESSMENT
77-year-old male w/PMHx of shingles, COPD, neuropathy presented  to the ED for evaluation of fever and cough.    # PNA  -Continue with current antibiotic pending sputum culture, blood culture, urine legionella, and urine strep  -home inhalers  -fever curve  -Monitor WBC  -Leukocytosis trending down    #COPD  -Continue with home inhalers     #DM2  -Lantus, and ISS  -Monitor POC     #Neuropathy  -Continue with home medications       #Progress Note Handoff  Pending (specify):  as above   Family discussion:  plan of care was discussed with patient and family in details.  all questions were answered.  seems to understand, and in agreement  Disposition:  PT  DVT proph lovenox

## 2023-02-23 ENCOUNTER — TRANSCRIPTION ENCOUNTER (OUTPATIENT)
Age: 78
End: 2023-02-23

## 2023-02-23 VITALS
SYSTOLIC BLOOD PRESSURE: 156 MMHG | HEART RATE: 95 BPM | DIASTOLIC BLOOD PRESSURE: 70 MMHG | TEMPERATURE: 98 F | RESPIRATION RATE: 18 BRPM

## 2023-02-23 LAB
A1C WITH ESTIMATED AVERAGE GLUCOSE RESULT: 7.7 % — HIGH (ref 4–5.6)
ALBUMIN SERPL ELPH-MCNC: 3.4 G/DL — LOW (ref 3.5–5.2)
ALP SERPL-CCNC: 81 U/L — SIGNIFICANT CHANGE UP (ref 30–115)
ALT FLD-CCNC: 13 U/L — SIGNIFICANT CHANGE UP (ref 0–41)
ANION GAP SERPL CALC-SCNC: 13 MMOL/L — SIGNIFICANT CHANGE UP (ref 7–14)
AST SERPL-CCNC: 14 U/L — SIGNIFICANT CHANGE UP (ref 0–41)
BILIRUB SERPL-MCNC: 0.5 MG/DL — SIGNIFICANT CHANGE UP (ref 0.2–1.2)
BUN SERPL-MCNC: 15 MG/DL — SIGNIFICANT CHANGE UP (ref 10–20)
CALCIUM SERPL-MCNC: 8.8 MG/DL — SIGNIFICANT CHANGE UP (ref 8.4–10.5)
CHLORIDE SERPL-SCNC: 99 MMOL/L — SIGNIFICANT CHANGE UP (ref 98–110)
CO2 SERPL-SCNC: 25 MMOL/L — SIGNIFICANT CHANGE UP (ref 17–32)
CREAT SERPL-MCNC: 1 MG/DL — SIGNIFICANT CHANGE UP (ref 0.7–1.5)
EGFR: 77 ML/MIN/1.73M2 — SIGNIFICANT CHANGE UP
ESTIMATED AVERAGE GLUCOSE: 174 MG/DL — HIGH (ref 68–114)
GLUCOSE BLDC GLUCOMTR-MCNC: 101 MG/DL — HIGH (ref 70–99)
GLUCOSE BLDC GLUCOMTR-MCNC: 350 MG/DL — HIGH (ref 70–99)
GLUCOSE SERPL-MCNC: 405 MG/DL — HIGH (ref 70–99)
HCT VFR BLD CALC: 35.9 % — LOW (ref 42–52)
HGB BLD-MCNC: 11.5 G/DL — LOW (ref 14–18)
MCHC RBC-ENTMCNC: 26.9 PG — LOW (ref 27–31)
MCHC RBC-ENTMCNC: 32 G/DL — SIGNIFICANT CHANGE UP (ref 32–37)
MCV RBC AUTO: 83.9 FL — SIGNIFICANT CHANGE UP (ref 80–94)
NRBC # BLD: 0 /100 WBCS — SIGNIFICANT CHANGE UP (ref 0–0)
PLATELET # BLD AUTO: 277 K/UL — SIGNIFICANT CHANGE UP (ref 130–400)
POTASSIUM SERPL-MCNC: 4.5 MMOL/L — SIGNIFICANT CHANGE UP (ref 3.5–5)
POTASSIUM SERPL-SCNC: 4.5 MMOL/L — SIGNIFICANT CHANGE UP (ref 3.5–5)
PROT SERPL-MCNC: 6.9 G/DL — SIGNIFICANT CHANGE UP (ref 6–8)
RBC # BLD: 4.28 M/UL — LOW (ref 4.7–6.1)
RBC # FLD: 14.6 % — HIGH (ref 11.5–14.5)
SODIUM SERPL-SCNC: 137 MMOL/L — SIGNIFICANT CHANGE UP (ref 135–146)
WBC # BLD: 9.43 K/UL — SIGNIFICANT CHANGE UP (ref 4.8–10.8)
WBC # FLD AUTO: 9.43 K/UL — SIGNIFICANT CHANGE UP (ref 4.8–10.8)

## 2023-02-23 PROCEDURE — 99239 HOSP IP/OBS DSCHRG MGMT >30: CPT

## 2023-02-23 RX ORDER — CEFPODOXIME PROXETIL 100 MG
1 TABLET ORAL
Qty: 14 | Refills: 0
Start: 2023-02-23 | End: 2023-03-01

## 2023-02-23 RX ORDER — TRAMADOL HYDROCHLORIDE 50 MG/1
1 TABLET ORAL
Qty: 0 | Refills: 0 | DISCHARGE
Start: 2023-02-23

## 2023-02-23 RX ORDER — GABAPENTIN 400 MG/1
1 CAPSULE ORAL
Qty: 0 | Refills: 0 | DISCHARGE
Start: 2023-02-23

## 2023-02-23 RX ORDER — AZITHROMYCIN 500 MG/1
1 TABLET, FILM COATED ORAL
Qty: 7 | Refills: 0
Start: 2023-02-23 | End: 2023-03-01

## 2023-02-23 RX ORDER — BUDESONIDE AND FORMOTEROL FUMARATE DIHYDRATE 160; 4.5 UG/1; UG/1
2 AEROSOL RESPIRATORY (INHALATION)
Qty: 0 | Refills: 0 | DISCHARGE

## 2023-02-23 RX ORDER — UMECLIDINIUM 62.5 UG/1
1 AEROSOL, POWDER ORAL
Qty: 0 | Refills: 0 | DISCHARGE

## 2023-02-23 RX ORDER — INSULIN DETEMIR 100/ML (3)
40 INSULIN PEN (ML) SUBCUTANEOUS
Qty: 0 | Refills: 0 | DISCHARGE

## 2023-02-23 RX ORDER — LANOLIN ALCOHOL/MO/W.PET/CERES
1 CREAM (GRAM) TOPICAL
Qty: 0 | Refills: 0 | DISCHARGE
Start: 2023-02-23

## 2023-02-23 RX ADMIN — POLYETHYLENE GLYCOL 3350 17 GRAM(S): 17 POWDER, FOR SOLUTION ORAL at 05:43

## 2023-02-23 RX ADMIN — Medication 81 MILLIGRAM(S): at 12:27

## 2023-02-23 RX ADMIN — Medication 4: at 08:22

## 2023-02-23 RX ADMIN — INSULIN GLARGINE 10 UNIT(S): 100 INJECTION, SOLUTION SUBCUTANEOUS at 09:11

## 2023-02-23 RX ADMIN — BUDESONIDE AND FORMOTEROL FUMARATE DIHYDRATE 2 PUFF(S): 160; 4.5 AEROSOL RESPIRATORY (INHALATION) at 08:25

## 2023-02-23 RX ADMIN — GABAPENTIN 600 MILLIGRAM(S): 400 CAPSULE ORAL at 05:43

## 2023-02-23 RX ADMIN — TRAMADOL HYDROCHLORIDE 50 MILLIGRAM(S): 50 TABLET ORAL at 05:48

## 2023-02-23 RX ADMIN — Medication 5 UNIT(S): at 08:21

## 2023-02-23 RX ADMIN — PANTOPRAZOLE SODIUM 40 MILLIGRAM(S): 20 TABLET, DELAYED RELEASE ORAL at 05:43

## 2023-02-23 RX ADMIN — AZITHROMYCIN 255 MILLIGRAM(S): 500 TABLET, FILM COATED ORAL at 05:43

## 2023-02-23 NOTE — DISCHARGE NOTE PROVIDER - CARE PROVIDER_API CALL
Aisha Mcgraw)  Geriatric Medicine; Internal Medicine  [address] [place] [address]  Phone: [phone]  Fax: [phone]  Follow Up Time: 1 week

## 2023-02-23 NOTE — DISCHARGE NOTE PROVIDER - NSDCMRMEDTOKEN_GEN_ALL_CORE_FT
aspirin 81 mg oral tablet, chewable: 1 tab(s) orally once a day  atorvastatin 80 mg oral tablet: 1 tab(s) orally once a day (at bedtime)  azithromycin 500 mg oral tablet: 1 tab(s) orally once a day   Breo Ellipta 200 mcg-25 mcg/inh inhalation powder:   carBAMazepine 200 mg oral tablet: 1 tab(s) orally 2 times a day  cefpodoxime 200 mg oral tablet: 1 tab(s) orally every 12 hours   gabapentin 600 mg oral tablet: 1 tab(s) orally 2 times a day  insulin lispro 100 units/mL injectable solution: 20 unit(s) injectable 3 times a day  Levemir 100 units/mL subcutaneous solution: 20 unit(s) subcutaneous once a day  melatonin 3 mg oral tablet: 1 tab(s) orally once a day (at bedtime), As needed, Insomnia  pantoprazole 40 mg oral delayed release tablet: 1 tab(s) orally once a day   senna oral tablet: 2 tab(s) orally once a day, As Needed -for constipation   traMADol 50 mg oral tablet: 1 tab(s) orally every 6 hours, As needed, Severe Pain (7 - 10)  Ventolin HFA 90 mcg/inh inhalation aerosol: 2 puff(s) inhaled every 6 hours

## 2023-02-23 NOTE — DISCHARGE NOTE NURSING/CASE MANAGEMENT/SOCIAL WORK - PATIENT PORTAL LINK FT
You can access the FollowMyHealth Patient Portal offered by [facility] by registering at the following website: [email or web address]. By joining [facility]’s FollowMyHealth portal, you will also be able to view your health information using other applications (apps) compatible with our system.

## 2023-02-23 NOTE — DISCHARGE NOTE PROVIDER - NSDCFUSCHEDAPPT_GEN_ALL_CORE_FT
[facility]  GERIATRICS Patient Tashi  Scheduled Appointment: 02/26/2023    Jairo Stroud  [facility] PreAdmits  Scheduled Appointment: 03/01/2023    Jairo Stroud  [facility] PreAdmits  Scheduled Appointment: 03/13/2023    Jairo Stroud  [facility]  ELECTROPH [address]   Scheduled Appointment: 03/13/2023    Concepcion Bhandari  [facility]  NEUROLOGY [address]  Scheduled Appointment: 03/16/2023    Jairo Stroud  [facility]  ELECTROPH [address]  Scheduled Appointment: 04/07/2023

## 2023-02-23 NOTE — DISCHARGE NOTE NURSING/CASE MANAGEMENT/SOCIAL WORK - NSDCVIVACCINE_GEN_ALL_CORE_FT
influenza, high-dose, quadrivalent; 09-Dec-2021 13:02; Nicky English (TAMELA); [employer]; [id number] (Exp. Date: 30-Jun-2022); IntraMuscular; Deltoid Right.; 0.7 milliLiter(s); VIS (VIS Published: 06-Aug-2021, VIS Presented: 09-Dec-2021);

## 2023-02-23 NOTE — DISCHARGE NOTE PROVIDER - NSDCCPCAREPLAN_GEN_ALL_CORE_FT
PRINCIPAL DISCHARGE DIAGNOSIS  Diagnosis: Cough  Assessment and Plan of Treatment: continue with current antibiotic, and outpatient follow up with your doctor in 2-3 days      SECONDARY DISCHARGE DIAGNOSES  Diagnosis: Pneumonia  Assessment and Plan of Treatment: continue with current antibiotic

## 2023-02-23 NOTE — DISCHARGE NOTE PROVIDER - HOSPITAL COURSE
77-year-old male w/PMHx of shingles, COPD, neuropathy presented  to the ED for evaluation of fever and cough.    # PNA  -Continue with current antibiotic.    -BC negative.    -home inhalers  -afebrile   -Leukocytosis resolved   #COPD  -Continue with home inhalers     #DM2  -continue with home medications    #Neuropathy  -Continue with home medications   Patient was seen and examined today  feels better  Offers no other complaints   Constitutional: No fever, fatigue or weight loss.  Skin: No rash.  Eyes: No recent vision problems or eye pain.  ENT: No congestion, ear pain, or sore throat.  Endocrine: No thyroid problems.  Cardiovascular: No chest pain or palpation.  Respiratory: No  shortness of breath, congestion, or wheezing.  Gastrointestinal: No abdominal pain, nausea, vomiting, or diarrhea.  Genitourinary: No dysuria.  Musculoskeletal: No joint swelling.  Neurologic: No headache.      Vital Signs Last 24 Hrs  T(C): 36.6 (02-23-23 @ 04:55), Max: 36.6 (02-23-23 @ 04:55)  T(F): 97.9 (02-23-23 @ 04:55), Max: 97.9 (02-23-23 @ 04:55)  HR: 95 (02-23-23 @ 04:55) (83 - 95)  BP: 156/70 (02-23-23 @ 04:55) (153/78 - 160/89)  BP(mean): --  RR: 18 (02-23-23 @ 04:55) (18 - 18)  SpO2: --  PHYSICAL EXAM-  GENERAL: NAD,   HEAD:  Atraumatic, Normocephalic  EYES: EOMI, PERRLA, conjunctiva and sclera clear  NECK: Supple, No JVD, Normal thyroid  NERVOUS SYSTEM:  Alert & Oriented X3, Moving all extremities  CHEST/LUNG: Clear to percussion bilaterally; No rales, rhonchi, wheezing, or rubs  HEART: Regular rate and rhythm; No murmurs, rubs, or gallops  ABDOMEN: Soft, Nontender, Nondistended; Bowel sounds present  EXTREMITIES: No clubbing, cyanosis, or edema  SKIN: No rashes or lesions  Hospital course, and discharge planning were discussed with patient, and wife in details.  all questions were answered.  seems to understand, and in agreement.  time 70 min

## 2023-02-26 ENCOUNTER — APPOINTMENT (OUTPATIENT)
Dept: GERIATRICS | Facility: HOME HEALTH | Age: 78
End: 2023-02-26
Payer: MEDICARE

## 2023-02-26 DIAGNOSIS — K59.09 OTHER CONSTIPATION: ICD-10-CM

## 2023-02-26 PROCEDURE — 99496 TRANSJ CARE MGMT HIGH F2F 7D: CPT

## 2023-02-28 VITALS
HEART RATE: 98 BPM | SYSTOLIC BLOOD PRESSURE: 130 MMHG | DIASTOLIC BLOOD PRESSURE: 80 MMHG | OXYGEN SATURATION: 98 % | TEMPERATURE: 98 F | RESPIRATION RATE: 17 BRPM

## 2023-02-28 DIAGNOSIS — Z79.4 LONG TERM (CURRENT) USE OF INSULIN: ICD-10-CM

## 2023-02-28 DIAGNOSIS — G47.00 INSOMNIA, UNSPECIFIED: ICD-10-CM

## 2023-02-28 DIAGNOSIS — J18.9 PNEUMONIA, UNSPECIFIED ORGANISM: ICD-10-CM

## 2023-02-28 DIAGNOSIS — J44.0 CHRONIC OBSTRUCTIVE PULMONARY DISEASE WITH (ACUTE) LOWER RESPIRATORY INFECTION: ICD-10-CM

## 2023-02-28 DIAGNOSIS — Z20.822 CONTACT WITH AND (SUSPECTED) EXPOSURE TO COVID-19: ICD-10-CM

## 2023-02-28 DIAGNOSIS — E78.5 HYPERLIPIDEMIA, UNSPECIFIED: ICD-10-CM

## 2023-02-28 DIAGNOSIS — E11.40 TYPE 2 DIABETES MELLITUS WITH DIABETIC NEUROPATHY, UNSPECIFIED: ICD-10-CM

## 2023-02-28 DIAGNOSIS — J45.909 UNSPECIFIED ASTHMA, UNCOMPLICATED: ICD-10-CM

## 2023-02-28 DIAGNOSIS — Z86.19 PERSONAL HISTORY OF OTHER INFECTIOUS AND PARASITIC DISEASES: ICD-10-CM

## 2023-02-28 DIAGNOSIS — Z79.82 LONG TERM (CURRENT) USE OF ASPIRIN: ICD-10-CM

## 2023-02-28 DIAGNOSIS — Z90.79 ACQUIRED ABSENCE OF OTHER GENITAL ORGAN(S): ICD-10-CM

## 2023-02-28 DIAGNOSIS — Z79.51 LONG TERM (CURRENT) USE OF INHALED STEROIDS: ICD-10-CM

## 2023-02-28 DIAGNOSIS — Z85.46 PERSONAL HISTORY OF MALIGNANT NEOPLASM OF PROSTATE: ICD-10-CM

## 2023-02-28 DIAGNOSIS — Z79.899 OTHER LONG TERM (CURRENT) DRUG THERAPY: ICD-10-CM

## 2023-02-28 DIAGNOSIS — Z91.041 RADIOGRAPHIC DYE ALLERGY STATUS: ICD-10-CM

## 2023-02-28 PROBLEM — K59.09 CHRONIC CONSTIPATION: Status: ACTIVE | Noted: 2022-12-20

## 2023-02-28 NOTE — REVIEW OF SYSTEMS
[Negative] : ENT [Chest Pain] : no chest pain [Palpitations] : no palpitations [Shortness Of Breath] : no shortness of breath [Wheezing] : no wheezing [Abdominal Pain] : no abdominal pain [Nausea] : no nausea [Vomiting] : no vomiting [Dysuria] : no dysuria [Joint Pain] : no joint pain [Skin Rash] : no skin rash [de-identified] : PHN, pain

## 2023-02-28 NOTE — ASSESSMENT
[FreeTextEntry1] : #DM2\par - levemir\par \par #Asthma\par - at baseline\par - c/w LABA/ICS\par - b2 PRN\par \par #Hx of Shingles w/PHN\par - c/w Gabapentin 600 BID\par - c/w Lidocaine patch as needed\par -  Tramadol 50mg PO prn\par - Neuro FU as scheduled\par \par \par #Hx Prostate CA s/p Prostatectomy\par - outpatient URO fu as scheduled. \par \par

## 2023-02-28 NOTE — REASON FOR VISIT
[Spouse] : spouse [Family Member] : family member [Post Hospitalization] : a post hospitalization visit

## 2023-02-28 NOTE — PHYSICAL EXAM
[No Acute Distress] : no acute distress [Normal Sclera/Conjunctiva] : normal sclera/conjunctiva [Normal Outer Ear/Nose] : the ears and nose were normal in appearance [No JVD] : no jugular venous distention [No Respiratory Distress] : no respiratory distress [Clear to Auscultation] : lungs were clear to auscultation bilaterally [Normal S1, S2] : normal S1 and S2 [Non Tender] : non-tender [Soft] : abdomen soft [No Joint Swelling] : no joint swelling seen [No Rash] : no rash [No Gross Sensory Deficits] : no gross sensory deficits [Oriented x3] : oriented to person, place, and time [de-identified] : bladder non-palpable

## 2023-02-28 NOTE — HISTORY OF PRESENT ILLNESS
[Patient] : patient [Spouse] : spouse [Family Member] : family member [FreeTextEntry1] : generalized weakness, debility, pain  [FreeTextEntry2] : \par Patient seen and examined at home. Patient is homebound 2/2 generalized weakness/debility. Wife and daughter  present during visit. Patient was recently hospitalized with PNA, was treated with oral antibiotics, now recovered. \par Patient and family report patient at baseline. No acute complaints. No CP/SOB. No abdominal complaints with good appetite and regular BM's. No urinary complaints. No recent falls. No skin wounds. Still w/severe PHN, no relief from Man 600mg BID. Requesting refill of Tramadol. \par

## 2023-03-11 ENCOUNTER — INPATIENT (INPATIENT)
Facility: HOSPITAL | Age: 78
LOS: 3 days | Discharge: HOME | DRG: 871 | End: 2023-03-15
Attending: INTERNAL MEDICINE | Admitting: INTERNAL MEDICINE
Payer: MEDICARE

## 2023-03-11 VITALS
HEART RATE: 93 BPM | RESPIRATION RATE: 18 BRPM | DIASTOLIC BLOOD PRESSURE: 74 MMHG | SYSTOLIC BLOOD PRESSURE: 162 MMHG | OXYGEN SATURATION: 97 % | WEIGHT: 149.91 LBS | TEMPERATURE: 98 F | HEIGHT: 67 IN

## 2023-03-11 DIAGNOSIS — J18.9 PNEUMONIA, UNSPECIFIED ORGANISM: ICD-10-CM

## 2023-03-11 DIAGNOSIS — Z91.041 RADIOGRAPHIC DYE ALLERGY STATUS: ICD-10-CM

## 2023-03-11 DIAGNOSIS — K21.9 GASTRO-ESOPHAGEAL REFLUX DISEASE WITHOUT ESOPHAGITIS: ICD-10-CM

## 2023-03-11 DIAGNOSIS — E78.5 HYPERLIPIDEMIA, UNSPECIFIED: ICD-10-CM

## 2023-03-11 DIAGNOSIS — Z98.890 OTHER SPECIFIED POSTPROCEDURAL STATES: Chronic | ICD-10-CM

## 2023-03-11 DIAGNOSIS — Z90.79 ACQUIRED ABSENCE OF OTHER GENITAL ORGAN(S): Chronic | ICD-10-CM
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
## 2023-03-11 DIAGNOSIS — J69.0 PNEUMONITIS DUE TO INHALATION OF FOOD AND VOMIT: ICD-10-CM

## 2023-03-11 DIAGNOSIS — E16.2 HYPOGLYCEMIA, UNSPECIFIED: ICD-10-CM
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
## 2023-03-11 DIAGNOSIS — Z85.46 PERSONAL HISTORY OF MALIGNANT NEOPLASM OF PROSTATE: ICD-10-CM

## 2023-03-11 DIAGNOSIS — Z87.891 PERSONAL HISTORY OF NICOTINE DEPENDENCE: ICD-10-CM

## 2023-03-11 DIAGNOSIS — J44.0 CHRONIC OBSTRUCTIVE PULMONARY DISEASE WITH (ACUTE) LOWER RESPIRATORY INFECTION: ICD-10-CM

## 2023-03-11 DIAGNOSIS — E11.65 TYPE 2 DIABETES MELLITUS WITH HYPERGLYCEMIA: ICD-10-CM

## 2023-03-11 DIAGNOSIS — Z20.822 CONTACT WITH AND (SUSPECTED) EXPOSURE TO COVID-19: ICD-10-CM

## 2023-03-11 DIAGNOSIS — T38.3X1A POISONING BY INSULIN AND ORAL HYPOGLYCEMIC [ANTIDIABETIC] DRUGS, ACCIDENTAL (UNINTENTIONAL), INITIAL ENCOUNTER: ICD-10-CM

## 2023-03-11 DIAGNOSIS — Z90.79 ACQUIRED ABSENCE OF OTHER GENITAL ORGAN(S): ICD-10-CM

## 2023-03-11 DIAGNOSIS — Z79.51 LONG TERM (CURRENT) USE OF INHALED STEROIDS: ICD-10-CM

## 2023-03-11 DIAGNOSIS — I24.8 OTHER FORMS OF ACUTE ISCHEMIC HEART DISEASE: ICD-10-CM

## 2023-03-11 DIAGNOSIS — Z79.82 LONG TERM (CURRENT) USE OF ASPIRIN: ICD-10-CM

## 2023-03-11 DIAGNOSIS — E11.649 TYPE 2 DIABETES MELLITUS WITH HYPOGLYCEMIA WITHOUT COMA: ICD-10-CM

## 2023-03-11 DIAGNOSIS — K59.00 CONSTIPATION, UNSPECIFIED: ICD-10-CM

## 2023-03-11 DIAGNOSIS — A41.9 SEPSIS, UNSPECIFIED ORGANISM: ICD-10-CM

## 2023-03-11 DIAGNOSIS — E11.40 TYPE 2 DIABETES MELLITUS WITH DIABETIC NEUROPATHY, UNSPECIFIED: ICD-10-CM

## 2023-03-11 DIAGNOSIS — Z79.4 LONG TERM (CURRENT) USE OF INSULIN: ICD-10-CM

## 2023-03-11 LAB
ALBUMIN SERPL ELPH-MCNC: 3.9 G/DL — SIGNIFICANT CHANGE UP (ref 3.5–5.2)
ALP SERPL-CCNC: 99 U/L — SIGNIFICANT CHANGE UP (ref 30–115)
ALT FLD-CCNC: 36 U/L — SIGNIFICANT CHANGE UP (ref 0–41)
ANION GAP SERPL CALC-SCNC: 13 MMOL/L — SIGNIFICANT CHANGE UP (ref 7–14)
APPEARANCE UR: CLEAR — SIGNIFICANT CHANGE UP
AST SERPL-CCNC: 32 U/L — SIGNIFICANT CHANGE UP (ref 0–41)
BASOPHILS # BLD AUTO: 0.04 K/UL — SIGNIFICANT CHANGE UP (ref 0–0.2)
BASOPHILS NFR BLD AUTO: 0.4 % — SIGNIFICANT CHANGE UP (ref 0–1)
BILIRUB SERPL-MCNC: 0.5 MG/DL — SIGNIFICANT CHANGE UP (ref 0.2–1.2)
BILIRUB UR-MCNC: NEGATIVE — SIGNIFICANT CHANGE UP
BUN SERPL-MCNC: 20 MG/DL — SIGNIFICANT CHANGE UP (ref 10–20)
CALCIUM SERPL-MCNC: 8.8 MG/DL — SIGNIFICANT CHANGE UP (ref 8.4–10.5)
CHLORIDE SERPL-SCNC: 105 MMOL/L — SIGNIFICANT CHANGE UP (ref 98–110)
CO2 SERPL-SCNC: 22 MMOL/L — SIGNIFICANT CHANGE UP (ref 17–32)
COLOR SPEC: SIGNIFICANT CHANGE UP
CREAT SERPL-MCNC: 0.9 MG/DL — SIGNIFICANT CHANGE UP (ref 0.7–1.5)
DIFF PNL FLD: NEGATIVE — SIGNIFICANT CHANGE UP
EGFR: 87 ML/MIN/1.73M2 — SIGNIFICANT CHANGE UP
EOSINOPHIL # BLD AUTO: 0.04 K/UL — SIGNIFICANT CHANGE UP (ref 0–0.7)
EOSINOPHIL NFR BLD AUTO: 0.4 % — SIGNIFICANT CHANGE UP (ref 0–8)
GLUCOSE SERPL-MCNC: 59 MG/DL — LOW (ref 70–99)
GLUCOSE UR QL: NEGATIVE — SIGNIFICANT CHANGE UP
HCT VFR BLD CALC: 44.5 % — SIGNIFICANT CHANGE UP (ref 42–52)
HGB BLD-MCNC: 14.1 G/DL — SIGNIFICANT CHANGE UP (ref 14–18)
IMM GRANULOCYTES NFR BLD AUTO: 0.7 % — HIGH (ref 0.1–0.3)
KETONES UR-MCNC: NEGATIVE — SIGNIFICANT CHANGE UP
LEUKOCYTE ESTERASE UR-ACNC: NEGATIVE — SIGNIFICANT CHANGE UP
LYMPHOCYTES # BLD AUTO: 2.17 K/UL — SIGNIFICANT CHANGE UP (ref 1.2–3.4)
LYMPHOCYTES # BLD AUTO: 21.1 % — SIGNIFICANT CHANGE UP (ref 20.5–51.1)
MCHC RBC-ENTMCNC: 27.5 PG — SIGNIFICANT CHANGE UP (ref 27–31)
MCHC RBC-ENTMCNC: 31.7 G/DL — LOW (ref 32–37)
MCV RBC AUTO: 86.9 FL — SIGNIFICANT CHANGE UP (ref 80–94)
MONOCYTES # BLD AUTO: 1.15 K/UL — HIGH (ref 0.1–0.6)
MONOCYTES NFR BLD AUTO: 11.2 % — HIGH (ref 1.7–9.3)
NEUTROPHILS # BLD AUTO: 6.81 K/UL — HIGH (ref 1.4–6.5)
NEUTROPHILS NFR BLD AUTO: 66.2 % — SIGNIFICANT CHANGE UP (ref 42.2–75.2)
NITRITE UR-MCNC: NEGATIVE — SIGNIFICANT CHANGE UP
NRBC # BLD: 0 /100 WBCS — SIGNIFICANT CHANGE UP (ref 0–0)
PH UR: 6 — SIGNIFICANT CHANGE UP (ref 5–8)
PLATELET # BLD AUTO: 295 K/UL — SIGNIFICANT CHANGE UP (ref 130–400)
POTASSIUM SERPL-MCNC: 5.1 MMOL/L — HIGH (ref 3.5–5)
POTASSIUM SERPL-SCNC: 5.1 MMOL/L — HIGH (ref 3.5–5)
PROT SERPL-MCNC: 7.2 G/DL — SIGNIFICANT CHANGE UP (ref 6–8)
PROT UR-MCNC: SIGNIFICANT CHANGE UP
RBC # BLD: 5.12 M/UL — SIGNIFICANT CHANGE UP (ref 4.7–6.1)
RBC # FLD: 14.9 % — HIGH (ref 11.5–14.5)
SARS-COV-2 RNA SPEC QL NAA+PROBE: SIGNIFICANT CHANGE UP
SODIUM SERPL-SCNC: 140 MMOL/L — SIGNIFICANT CHANGE UP (ref 135–146)
SP GR SPEC: 1.02 — SIGNIFICANT CHANGE UP (ref 1.01–1.03)
TROPONIN T SERPL-MCNC: 0.05 NG/ML — CRITICAL HIGH
UROBILINOGEN FLD QL: SIGNIFICANT CHANGE UP
WBC # BLD: 10.28 K/UL — SIGNIFICANT CHANGE UP (ref 4.8–10.8)
WBC # FLD AUTO: 10.28 K/UL — SIGNIFICANT CHANGE UP (ref 4.8–10.8)

## 2023-03-11 PROCEDURE — 81003 URINALYSIS AUTO W/O SCOPE: CPT

## 2023-03-11 PROCEDURE — 36415 COLL VENOUS BLD VENIPUNCTURE: CPT

## 2023-03-11 PROCEDURE — 80156 ASSAY CARBAMAZEPINE TOTAL: CPT

## 2023-03-11 PROCEDURE — 99285 EMERGENCY DEPT VISIT HI MDM: CPT

## 2023-03-11 PROCEDURE — 84206 ASSAY OF PROINSULIN: CPT

## 2023-03-11 PROCEDURE — 93010 ELECTROCARDIOGRAM REPORT: CPT

## 2023-03-11 PROCEDURE — 83525 ASSAY OF INSULIN: CPT

## 2023-03-11 PROCEDURE — 85027 COMPLETE CBC AUTOMATED: CPT

## 2023-03-11 PROCEDURE — 80053 COMPREHEN METABOLIC PANEL: CPT

## 2023-03-11 PROCEDURE — 71250 CT THORAX DX C-: CPT

## 2023-03-11 PROCEDURE — 71045 X-RAY EXAM CHEST 1 VIEW: CPT | Mod: 26

## 2023-03-11 PROCEDURE — 80202 ASSAY OF VANCOMYCIN: CPT

## 2023-03-11 PROCEDURE — 87040 BLOOD CULTURE FOR BACTERIA: CPT

## 2023-03-11 PROCEDURE — 83036 HEMOGLOBIN GLYCOSYLATED A1C: CPT

## 2023-03-11 PROCEDURE — 85025 COMPLETE CBC W/AUTO DIFF WBC: CPT

## 2023-03-11 PROCEDURE — 92610 EVALUATE SWALLOWING FUNCTION: CPT | Mod: GN

## 2023-03-11 PROCEDURE — 84484 ASSAY OF TROPONIN QUANT: CPT

## 2023-03-11 PROCEDURE — 83735 ASSAY OF MAGNESIUM: CPT

## 2023-03-11 PROCEDURE — 84145 PROCALCITONIN (PCT): CPT

## 2023-03-11 PROCEDURE — 82962 GLUCOSE BLOOD TEST: CPT

## 2023-03-11 PROCEDURE — 84681 ASSAY OF C-PEPTIDE: CPT

## 2023-03-11 PROCEDURE — 94640 AIRWAY INHALATION TREATMENT: CPT

## 2023-03-11 PROCEDURE — 99222 1ST HOSP IP/OBS MODERATE 55: CPT | Mod: GC

## 2023-03-11 RX ORDER — ACETAMINOPHEN 500 MG
975 TABLET ORAL ONCE
Refills: 0 | Status: COMPLETED | OUTPATIENT
Start: 2023-03-11 | End: 2023-03-11

## 2023-03-11 NOTE — ED PROVIDER NOTE - CCCP TRG CHIEF CMPLNT
Quality 226: Preventive Care And Screening: Tobacco Use: Screening And Cessation Intervention: Patient screened for tobacco use and is an ex/non-smoker hypoglycemia Detail Level: Detailed Quality 130: Documentation Of Current Medications In The Medical Record: Current Medications Documented Quality 110: Preventive Care And Screening: Influenza Immunization: Influenza Immunization Administered during Influenza season Quality 431: Preventive Care And Screening: Unhealthy Alcohol Use - Screening: Patient did not receive brief counseling if identified as an unhealthy alcohol user, reason not given

## 2023-03-11 NOTE — ED ADULT NURSE NOTE - IS THE PATIENT ABLE TO BE SCREENED?
Situation: Pt calling to report rash from neck/chin area down to chest.  Rash started about 1 month ago and patient has not been able to identify cause.  Patient states she is extremely itchy and \"if my hair touches it, I want to scream.\"  Patient states she mentioned this briefly to Brigitte at yesterday's appt.  Patient has tried icing, hydrocortisone creams and OTC benedryl with minimal relief. Patient denies use of new laundry soaps, hair products, new animals in the home, lotions.  Patient admits this could be related to stress.      Patient takes zyrtec daily.      Background: LOV with Brigitte Du 9/17/2020 (yesterday)    Assessment: Rash of unknown cause    Recommendation:  Pt wanted to discuss suggestions with Brigitte Du.     Brigitte do you have some recommended next steps?  Pharmacy verified.  Patient states, if necessary, she is available later today for a telephone/video visit.      Permission gives permission to leave detailed message on VM.       Yes

## 2023-03-11 NOTE — ED PROVIDER NOTE - PHYSICAL EXAMINATION
Physical Exam    Vital Signs: I have reviewed the initial vital signs.  Constitutional: well-nourished, appears stated age, no acute distress  Eyes: Conjunctiva pink, Sclera clear  Cardiovascular: S1 and S2, regular rate, regular rhythm, well-perfused extremities, radial pulses equal and 2+ b/l.   Respiratory: unlabored respiratory effort, clear to auscultation bilaterally no wheezing, rales and rhonchi. pt is speaking full sentences. no accessory muscle use.   Gastrointestinal: soft, non-tender, nondistended abdomen, no pulsatile mass, normal bowl sounds, no rebound, no guarding  Musculoskeletal: supple neck, no lower extremity edema, no calf tenderness  Integumentary: warm, dry, no rash  Neurologic: awake, alert, extremities’ motor and sensory functions grossly intact.    Psychiatric: appropriate mood, appropriate affect

## 2023-03-11 NOTE — H&P ADULT - NSHPPHYSICALEXAM_GEN_ALL_CORE
LOS:     VITALS:   T(C): 36.4 (03-11-23 @ 19:38), Max: 36.4 (03-11-23 @ 19:38)  HR: 93 (03-11-23 @ 19:38) (93 - 93)  BP: 162/74 (03-11-23 @ 19:38) (162/74 - 162/74)  RR: 18 (03-11-23 @ 19:38) (18 - 18)  SpO2: 97% (03-11-23 @ 19:38) (97% - 97%)    GENERAL: NAD, lying in bed comfortably  HEAD:  Atraumatic, Normocephalic  EYES: EOMI, PERRLA, conjunctiva and sclera clear  ENT: Moist mucous membranes  NECK: Supple, No JVD  CHEST/LUNG: Clear to auscultation bilaterally; No rales, rhonchi, wheezing, or rubs. Unlabored respirations  HEART: Regular rate and rhythm; No murmurs, rubs, or gallops  ABDOMEN: BSx4; Soft, nontender, nondistended  EXTREMITIES:  2+ Peripheral Pulses, brisk capillary refill. No clubbing, cyanosis, or edema  NERVOUS SYSTEM:  A&Ox3, no focal deficits   SKIN: No rashes or lesions LOS:     VITALS:   T(C): 36.4 (03-11-23 @ 19:38), Max: 36.4 (03-11-23 @ 19:38)  HR: 93 (03-11-23 @ 19:38) (93 - 93)  BP: 162/74 (03-11-23 @ 19:38) (162/74 - 162/74)  RR: 18 (03-11-23 @ 19:38) (18 - 18)  SpO2: 97% (03-11-23 @ 19:38) (97% - 97%)    GENERAL: Mild distress, shivering in bed , on 2 L NC O2 sat 97%  HEAD:  Atraumatic, Normocephalic  EYES: EOMI, PERRLA, conjunctiva and sclera clear  ENT: Moist mucous membranes  NECK: Supple  CHEST/LUNG: no wheezing or crackles noted   HEART: Sinus tach   ABDOMEN: soft, nontender   EXTREMITIES:  no edema

## 2023-03-11 NOTE — H&P ADULT - HISTORY OF PRESENT ILLNESS
76 y/o male with a PMH of DM on insulin, HLD, COPD, GERD, and recent PNA 02/21/22 finished course of azithromycin and cefpodoxime presents to the ED for evaluation of hypoglycemia. pt reports he last ate food at 11AM and took his insulin at the time. pt reports he then took a nap which he usually does around 3:30pm. pt family reports they went to go wake him up to eat and he was unresponsive with drool from his mouth. pt fingerstick was 31. EMS gave pt D50 on arrival. pt denies fever, chills, sore throat, chest pain, sob, back pain, abdominal pain, urinary symptoms, weakness, or headaches. Of note patient reports that he still has a productive cough.       Vital Signs  T(C): 36.4 (11 Mar 2023 19:38), Max: 36.4 (11 Mar 2023 19:38)  T(F): 97.6 (11 Mar 2023 19:38), Max: 97.6 (11 Mar 2023 19:38)  HR: 93 (11 Mar 2023 19:38) (93 - 93)  BP: 162/74 (11 Mar 2023 19:38) (162/74 - 162/74)  RR: 18 (11 Mar 2023 19:38) (18 - 18)  SpO2: 97% (11 Mar 2023 19:38) (97% - 97%) on room air                78 y/o male with a PMH of DM on insulin, HLD, COPD, GERD, and recent PNA 02/21/22 finished course of azithromycin and cefpodoxime presents to the ED for evaluation of hypoglycemia. pt reports he last ate food at 11AM and took his insulin at the time. pt reports he then took a nap which he usually does around 3:30pm. pt family reports they went to go wake him up to eat and he was unresponsive with drool from his mouth. pt fingerstick was 31. EMS gave pt D50 on arrival. Patient reporting chills, weakness  and cough productive of pink sputum.     Vital Signs  T(F): 97.6 (11 Mar 2023 19:38), Max: 101  HR: 93 (11 Mar 2023 19:38) (93 -128)  BP: 162/74 (11 Mar 2023 19:38) (162/74 - 162/74)  RR: 18 (11 Mar 2023 19:38) (18 - 18)  SpO2: 97% (11 Mar 2023 19:38) (97% - 97%) on room air

## 2023-03-11 NOTE — ED PROVIDER NOTE - OBJECTIVE STATEMENT
76 y/o male with a PMH of DM on insulin, HLD, COPD, GERD, and recent PNA 02/21/22 finished course of azithromycin and cefpodoxime presents to the ED for evaluation of hypoglycemia. pt reports he last ate food at 11AM and took his insulin at the time. pt reports he then took a nap which he usually does around 3:30pm. pt family reports they went to go wake him up to eat and he was unresponsive with drool from his mouth. pt fingerstick was 31. EMS gave pt D50 on arrival. pt reports he still has cough with yellow mucous. pt denies fever, chills, sore throat, chest pain, sob, back pain, abdominal pain, urinary symptoms, weakness, or headaches.

## 2023-03-11 NOTE — ED PROVIDER NOTE - COVID-19 RESULT
Quality 130: Documentation Of Current Medications In The Medical Record: Current Medications Documented Quality 431: Preventive Care And Screening: Unhealthy Alcohol Use - Screening: Patient screened for unhealthy alcohol use using a single question and scores less than 2 times per year Quality 226: Preventive Care And Screening: Tobacco Use: Screening And Cessation Intervention: Patient screened for tobacco use and is an ex/non-smoker Detail Level: Detailed NEGATIVE

## 2023-03-11 NOTE — H&P ADULT - NSHPLABSRESULTS_GEN_ALL_CORE
.  LABS:                         14.1   10.28 )-----------( 295      ( 11 Mar 2023 21:02 )             44.5     03-11    140  |  105  |  20  ----------------------------<  59<L>  5.1<H>   |  22  |  0.9    Ca    8.8      11 Mar 2023 21:02    TPro  7.2  /  Alb  3.9  /  TBili  0.5  /  DBili  x   /  AST  32  /  ALT  36  /  AlkPhos  99  03-11      Urinalysis Basic - ( 11 Mar 2023 23:04 )    Color: Light Yellow / Appearance: Clear / S.019 / pH: x  Gluc: x / Ketone: Negative  / Bili: Negative / Urobili: <2 mg/dL   Blood: x / Protein: Trace / Nitrite: Negative   Leuk Esterase: Negative / RBC: x / WBC x   Sq Epi: x / Non Sq Epi: x / Bacteria: x            RADIOLOGY, EKG & ADDITIONAL TESTS: Reviewed.

## 2023-03-11 NOTE — H&P ADULT - ATTENDING COMMENTS
HPI:  78 y/o male with a PMH of DM on insulin, HLD, COPD, GERD, and recent PNA 02/21/22 finished course of azithromycin and cefpodoxime presents to the ED for evaluation of hypoglycemia. pt reports he last ate food at 11AM and took his insulin at the time. pt reports he then took a nap which he usually does around 3:30pm. pt family reports they went to go wake him up to eat and he was unresponsive with drool from his mouth. pt fingerstick was 31. EMS gave pt D50 on arrival. Patient reporting chills, weakness  and cough productive of pink sputum.     Vital Signs  T(F): 97.6 (11 Mar 2023 19:38), Max: 101  HR: 93 (11 Mar 2023 19:38) (93 -128)  BP: 162/74 (11 Mar 2023 19:38) (162/74 - 162/74)  RR: 18 (11 Mar 2023 19:38) (18 - 18)  SpO2: 97% (11 Mar 2023 19:38) (97% - 97%) on room air      (11 Mar 2023 23:28)  REVIEW OF SYSTEMS: see cc/HPI   CONSTITUTIONAL: No weakness, fevers or chills, (+) unresponsive  EYES/ENT: No visual changes;  No vertigo or throat pain   NECK: No pain or stiffness  RESPIRATORY: No cough, wheezing, hemoptysis; No shortness of breath  CARDIOVASCULAR: No chest pain or palpitations  GASTROINTESTINAL: No abdominal or epigastric pain. No nausea, vomiting, or hematemesis; No diarrhea or constipation. No melena or hematochezia.  GENITOURINARY: No dysuria, frequency or hematuria  NEUROLOGICAL: No numbness or weakness  SKIN: No itching, rashes  MSK - no deformity, no fracture   Endo - see cc/HPI - hypoglycemia    Physical Exam:   General: WN/WD NAD  Neurology: A&Ox3, nonfocal, follows commands  Eyes: PERRLA/ EOMI  ENT/Neck: Neck supple, trachea midline, No JVD  Respiratory: CTA B/L, No wheezing, rales, rhonchi  CV: Normal rate regular rhythm, S1S2, no murmurs, rubs or gallops  Abdominal: Soft, NT, ND +BS,   Extremities: No edema, + peripheral pulses  Skin: No Rashes, Hematoma, Ecchymosis  Incisions:   Tubes:    A/p  Persistent hypoglycemia   DM type II - Rx w/ Insulin     Recent H/o PNA and still febrile   -check blood Cx and Ucx  -Vanco and cefepime for now  -repeat WBC in AM   -agree w/ RVP and urine for strep/ legionella Ag    Troponinemia   -repeat Trop and EKG in AM     COPD -stable   -O2 PRN   -Bronchodilator     Neuropathy   -c/w OP Rx     DVT prophylaxis     PATIENT SEEN by ATTENDING 03 /12/23

## 2023-03-11 NOTE — H&P ADULT - ASSESSMENT
78 y/o male with a PMH of DM on insulin, HLD, COPD, GERD, and recent PNA admitted for hypoglycemia.         #Hypoglycemia   #Hx of IDDM       #COPD  -Continue with home inhalers     #DM2  -Lantus, and ISS  -Monitor POC     #Neuropathy  -Continue with home medications            78 y/o male with a PMH of DM on insulin, HLD, COPD, GERD, and recent PNA admitted for hypoglycemia. Recently completed a 7 day course of abx for community acquired pneumonia. Patient endorsing chills. Febrile w/ T max 101 in ED.               #Hypoglycemia   #Hx of IDDM   -Glucose  Insulin  C-peptide  Beta-hydroxybutyrate (BHOB)  Proinsulin  Sulfonylurea and meglitinide screen  -glucose found to be 31 at home       #COPD  -Continue with home inhalers     #DM2  -Lantus, and ISS  -Monitor POC     #Neuropathy  -Continue with home medications            76 y/o male with a PMH of DM on insulin, HLD, COPD, GERD, and recent PNA admitted for hypoglycemia. Recently completed a 7 day course of abx for community acquired pneumonia. Patient endorsing chills. Febrile w/ T max 101 in ED.         #Hypoglycemia   #Hx of IDDM   -pt w/ recent hx of PNA  -SIRS present on admission   -r/o other causes of hypoglycemia   -Finger sticks q4 for now   -f/u Insulin, C-peptide,  Proinsulin  - not on sulfonylureas   -glucose found to be 31 at home   -holding all antidiabetics for now       #Hx of Recent PNA  -s/p completing course of antibiotics (cefpodoxime and azithro)  -continues to have productive cough, fevers, chills  -SIRS present on admission   -No consolidation noted on CXR (f/u official read)  -UA negative   -COVID 19 PCR negative  -f/u RVP panel, blood cultures, procal, MRSA swab   -F/u urine strep/legionella   -Empiric therapy with vanc and cefepime (recent abx use and hospitalization)  -wean O2 as tolerated       #Troponimia   -Sinus tach on EKG, no RUSSEL  -Trop noted to be around patients baseline   -f/u am trop       #COPD  -Continue with home inhalers       #Neuropathy  -Continue with home medications       DVT ppx: Lovenox  Diet: DASH/CC  Dispo: FLoor  Code: FULL              76 y/o male with a PMH of DM on insulin, HLD, COPD, GERD, and recent PNA admitted for hypoglycemia. Recently completed a 7 day course of abx for community acquired pneumonia. Patient endorsing chills. Febrile w/ T max 101 in ED.         #Hypoglycemia   #Hx of IDDM   -pt w/ recent hx of PNA  -SIRS present on admission   -r/o other causes of hypoglycemia   -Finger sticks q4 for now   -f/u Insulin, C-peptide,  Proinsulin  - not on sulfonylureas   -glucose found to be 31 at home   -holding all antidiabetics for now       #Hx of Recent PNA  -s/p completing course of antibiotics (cefpodoxime and azithro)  -continues to have productive cough, fevers, chills  -SIRS present on admission   -No consolidation noted on CXR (f/u official read)  -UA negative   -COVID 19 PCR negative  -f/u RVP panel, blood cultures, procal, MRSA swab   -F/u urine strep/legionella   -Empiric therapy with vanc and cefepime (recent abx use and hospitalization)  -wean O2 as tolerated       #Troponimia   -Sinus tach on EKG, no RUSSEL  -Trop noted to be around patients baseline   -f/u am trop       #COPD  -Continue with home inhalers       #Neuropathy  -Continue with home medications       DVT ppx: Lovenox  Diet: DASH/CC  Dispo: Tele   Code: FULL

## 2023-03-11 NOTE — ED ADULT NURSE REASSESSMENT NOTE - NS ED NURSE REASSESS COMMENT FT1
Pt stated he can feel his sugar dropping. notified CHRISTINE Wise fsbg 51. pt given two orange juice and a half peanut butter jelly sandwich.

## 2023-03-11 NOTE — ED PROVIDER NOTE - PROGRESS NOTE DETAILS
FF: pt sugar dropped 51, pt was given pb &j sandwich and soup and only increase to 75 FF: pt sugar dropped 51, pt was given pb &j sandwich it only increase to 75. pt then ate soup and it went up.

## 2023-03-11 NOTE — ED PROVIDER NOTE - CLINICAL SUMMARY MEDICAL DECISION MAKING FREE TEXT BOX
78 y/o male with a PMH of DM on insulin, HLD, COPD, GERD, and recent PNA 02/21/22 finished course of azithromycin and cefpodoxime presents to the ED for evaluation of hypoglycemia. pt reports he last ate food at 11AM and took his insulin at the time. pt reports he then took a nap which he usually does around 3:30pm. pt family reports they went to go wake him up to eat and he was unresponsive with drool from his mouth. pt fingerstick was 31. EMS gave pt D50 on arrival. Patient reporting chills, weakness  and cough productive of pink sputum.  FS dropped again in ED despite PO sugar.  ADMIT.

## 2023-03-11 NOTE — H&P ADULT - NSHPREVIEWOFSYSTEMS_GEN_ALL_CORE
REVIEW OF SYSTEMS:    CONSTITUTIONAL: + fevers, + chills, + weakness   EYES/ENT: No visual changes;  No vertigo or throat pain   NECK: No pain or stiffness  RESPIRATORY: + cough, + shortness of breath   CARDIOVASCULAR: No chest pain or palpitations  GASTROINTESTINAL: No abdominal or epigastric pain  GENITOURINARY: No dysuria, frequency or hematuria

## 2023-03-11 NOTE — ED ADULT TRIAGE NOTE - CHIEF COMPLAINT QUOTE
He's from home, he is a diabetic, the family took his sugar and it was 37, when robert we got there he was snoring respirations. We gave him D50, he's coming around now . He was hospitalized for PNA a week and half ago - EMS  Family reports patient was unresponsive, cold and sweating, sugar 32, UTO oral sugar

## 2023-03-12 LAB
ALBUMIN SERPL ELPH-MCNC: 3.5 G/DL — SIGNIFICANT CHANGE UP (ref 3.5–5.2)
ALP SERPL-CCNC: 93 U/L — SIGNIFICANT CHANGE UP (ref 30–115)
ALT FLD-CCNC: 30 U/L — SIGNIFICANT CHANGE UP (ref 0–41)
ANION GAP SERPL CALC-SCNC: 10 MMOL/L — SIGNIFICANT CHANGE UP (ref 7–14)
AST SERPL-CCNC: 21 U/L — SIGNIFICANT CHANGE UP (ref 0–41)
BASOPHILS # BLD AUTO: 0.04 K/UL — SIGNIFICANT CHANGE UP (ref 0–0.2)
BASOPHILS NFR BLD AUTO: 0.3 % — SIGNIFICANT CHANGE UP (ref 0–1)
BILIRUB SERPL-MCNC: 0.6 MG/DL — SIGNIFICANT CHANGE UP (ref 0.2–1.2)
BUN SERPL-MCNC: 22 MG/DL — HIGH (ref 10–20)
CALCIUM SERPL-MCNC: 8.2 MG/DL — LOW (ref 8.4–10.4)
CARBAMAZEPINE SERPL-MCNC: <2 UG/ML — LOW (ref 4–12)
CHLORIDE SERPL-SCNC: 101 MMOL/L — SIGNIFICANT CHANGE UP (ref 98–110)
CO2 SERPL-SCNC: 19 MMOL/L — SIGNIFICANT CHANGE UP (ref 17–32)
CREAT SERPL-MCNC: 0.9 MG/DL — SIGNIFICANT CHANGE UP (ref 0.7–1.5)
EGFR: 87 ML/MIN/1.73M2 — SIGNIFICANT CHANGE UP
EOSINOPHIL # BLD AUTO: 0.01 K/UL — SIGNIFICANT CHANGE UP (ref 0–0.7)
EOSINOPHIL NFR BLD AUTO: 0.1 % — SIGNIFICANT CHANGE UP (ref 0–8)
GLUCOSE BLDC GLUCOMTR-MCNC: 121 MG/DL — HIGH (ref 70–99)
GLUCOSE BLDC GLUCOMTR-MCNC: 159 MG/DL — HIGH (ref 70–99)
GLUCOSE BLDC GLUCOMTR-MCNC: 211 MG/DL — HIGH (ref 70–99)
GLUCOSE BLDC GLUCOMTR-MCNC: 213 MG/DL — HIGH (ref 70–99)
GLUCOSE BLDC GLUCOMTR-MCNC: 340 MG/DL — HIGH (ref 70–99)
GLUCOSE SERPL-MCNC: 251 MG/DL — HIGH (ref 70–99)
HCT VFR BLD CALC: 37.9 % — LOW (ref 42–52)
HGB BLD-MCNC: 12.2 G/DL — LOW (ref 14–18)
IMM GRANULOCYTES NFR BLD AUTO: 0.6 % — HIGH (ref 0.1–0.3)
LYMPHOCYTES # BLD AUTO: 20.3 % — LOW (ref 20.5–51.1)
LYMPHOCYTES # BLD AUTO: 3.19 K/UL — SIGNIFICANT CHANGE UP (ref 1.2–3.4)
MAGNESIUM SERPL-MCNC: 1.6 MG/DL — LOW (ref 1.8–2.4)
MCHC RBC-ENTMCNC: 27.3 PG — SIGNIFICANT CHANGE UP (ref 27–31)
MCHC RBC-ENTMCNC: 32.2 G/DL — SIGNIFICANT CHANGE UP (ref 32–37)
MCV RBC AUTO: 84.8 FL — SIGNIFICANT CHANGE UP (ref 80–94)
MONOCYTES # BLD AUTO: 1.29 K/UL — HIGH (ref 0.1–0.6)
MONOCYTES NFR BLD AUTO: 8.2 % — SIGNIFICANT CHANGE UP (ref 1.7–9.3)
NEUTROPHILS # BLD AUTO: 11.1 K/UL — HIGH (ref 1.4–6.5)
NEUTROPHILS NFR BLD AUTO: 70.5 % — SIGNIFICANT CHANGE UP (ref 42.2–75.2)
NRBC # BLD: 0 /100 WBCS — SIGNIFICANT CHANGE UP (ref 0–0)
PLATELET # BLD AUTO: 272 K/UL — SIGNIFICANT CHANGE UP (ref 130–400)
POTASSIUM SERPL-MCNC: 4.4 MMOL/L — SIGNIFICANT CHANGE UP (ref 3.5–5)
POTASSIUM SERPL-SCNC: 4.4 MMOL/L — SIGNIFICANT CHANGE UP (ref 3.5–5)
PROT SERPL-MCNC: 6.3 G/DL — SIGNIFICANT CHANGE UP (ref 6–8)
RBC # BLD: 4.47 M/UL — LOW (ref 4.7–6.1)
RBC # FLD: 14.9 % — HIGH (ref 11.5–14.5)
SODIUM SERPL-SCNC: 130 MMOL/L — LOW (ref 135–146)
TROPONIN T SERPL-MCNC: 0.04 NG/ML — CRITICAL HIGH
WBC # BLD: 15.72 K/UL — HIGH (ref 4.8–10.8)
WBC # FLD AUTO: 15.72 K/UL — HIGH (ref 4.8–10.8)

## 2023-03-12 PROCEDURE — 99232 SBSQ HOSP IP/OBS MODERATE 35: CPT

## 2023-03-12 PROCEDURE — 71250 CT THORAX DX C-: CPT | Mod: 26

## 2023-03-12 RX ORDER — DEXTROSE 50 % IN WATER 50 %
12.5 SYRINGE (ML) INTRAVENOUS ONCE
Refills: 0 | Status: DISCONTINUED | OUTPATIENT
Start: 2023-03-12 | End: 2023-03-15

## 2023-03-12 RX ORDER — ATORVASTATIN CALCIUM 80 MG/1
80 TABLET, FILM COATED ORAL AT BEDTIME
Refills: 0 | Status: DISCONTINUED | OUTPATIENT
Start: 2023-03-12 | End: 2023-03-15

## 2023-03-12 RX ORDER — INSULIN LISPRO 100/ML
8 VIAL (ML) SUBCUTANEOUS
Refills: 0 | Status: DISCONTINUED | OUTPATIENT
Start: 2023-03-12 | End: 2023-03-15

## 2023-03-12 RX ORDER — ACETAMINOPHEN 500 MG
650 TABLET ORAL EVERY 6 HOURS
Refills: 0 | Status: DISCONTINUED | OUTPATIENT
Start: 2023-03-12 | End: 2023-03-15

## 2023-03-12 RX ORDER — INSULIN LISPRO 100/ML
4 VIAL (ML) SUBCUTANEOUS ONCE
Refills: 0 | Status: DISCONTINUED | OUTPATIENT
Start: 2023-03-12 | End: 2023-03-12

## 2023-03-12 RX ORDER — GLUCAGON INJECTION, SOLUTION 0.5 MG/.1ML
1 INJECTION, SOLUTION SUBCUTANEOUS ONCE
Refills: 0 | Status: DISCONTINUED | OUTPATIENT
Start: 2023-03-12 | End: 2023-03-15

## 2023-03-12 RX ORDER — CEFEPIME 1 G/1
2000 INJECTION, POWDER, FOR SOLUTION INTRAMUSCULAR; INTRAVENOUS EVERY 8 HOURS
Refills: 0 | Status: DISCONTINUED | OUTPATIENT
Start: 2023-03-12 | End: 2023-03-14

## 2023-03-12 RX ORDER — ASPIRIN/CALCIUM CARB/MAGNESIUM 324 MG
81 TABLET ORAL DAILY
Refills: 0 | Status: DISCONTINUED | OUTPATIENT
Start: 2023-03-12 | End: 2023-03-15

## 2023-03-12 RX ORDER — PANTOPRAZOLE SODIUM 20 MG/1
40 TABLET, DELAYED RELEASE ORAL
Refills: 0 | Status: DISCONTINUED | OUTPATIENT
Start: 2023-03-12 | End: 2023-03-15

## 2023-03-12 RX ORDER — SODIUM CHLORIDE 9 MG/ML
1000 INJECTION, SOLUTION INTRAVENOUS
Refills: 0 | Status: DISCONTINUED | OUTPATIENT
Start: 2023-03-12 | End: 2023-03-15

## 2023-03-12 RX ORDER — INSULIN GLARGINE 100 [IU]/ML
20 INJECTION, SOLUTION SUBCUTANEOUS AT BEDTIME
Refills: 0 | Status: DISCONTINUED | OUTPATIENT
Start: 2023-03-12 | End: 2023-03-15

## 2023-03-12 RX ORDER — INSULIN LISPRO 100/ML
VIAL (ML) SUBCUTANEOUS
Refills: 0 | Status: DISCONTINUED | OUTPATIENT
Start: 2023-03-12 | End: 2023-03-15

## 2023-03-12 RX ORDER — CARBAMAZEPINE 200 MG
200 TABLET ORAL
Refills: 0 | Status: DISCONTINUED | OUTPATIENT
Start: 2023-03-12 | End: 2023-03-15

## 2023-03-12 RX ORDER — DEXTROSE 50 % IN WATER 50 %
25 SYRINGE (ML) INTRAVENOUS ONCE
Refills: 0 | Status: DISCONTINUED | OUTPATIENT
Start: 2023-03-12 | End: 2023-03-15

## 2023-03-12 RX ORDER — POLYETHYLENE GLYCOL 3350 17 G/17G
17 POWDER, FOR SOLUTION ORAL DAILY
Refills: 0 | Status: DISCONTINUED | OUTPATIENT
Start: 2023-03-12 | End: 2023-03-15

## 2023-03-12 RX ORDER — INSULIN LISPRO 100/ML
4 VIAL (ML) SUBCUTANEOUS ONCE
Refills: 0 | Status: COMPLETED | OUTPATIENT
Start: 2023-03-12 | End: 2023-03-12

## 2023-03-12 RX ORDER — SODIUM CHLORIDE 9 MG/ML
1000 INJECTION, SOLUTION INTRAVENOUS
Refills: 0 | Status: DISCONTINUED | OUTPATIENT
Start: 2023-03-12 | End: 2023-03-13

## 2023-03-12 RX ORDER — DEXTROSE 50 % IN WATER 50 %
15 SYRINGE (ML) INTRAVENOUS ONCE
Refills: 0 | Status: DISCONTINUED | OUTPATIENT
Start: 2023-03-12 | End: 2023-03-15

## 2023-03-12 RX ORDER — IPRATROPIUM/ALBUTEROL SULFATE 18-103MCG
3 AEROSOL WITH ADAPTER (GRAM) INHALATION ONCE
Refills: 0 | Status: COMPLETED | OUTPATIENT
Start: 2023-03-12 | End: 2023-03-12

## 2023-03-12 RX ORDER — SENNA PLUS 8.6 MG/1
2 TABLET ORAL AT BEDTIME
Refills: 0 | Status: DISCONTINUED | OUTPATIENT
Start: 2023-03-12 | End: 2023-03-15

## 2023-03-12 RX ORDER — SODIUM CHLORIDE 9 MG/ML
1000 INJECTION, SOLUTION INTRAVENOUS ONCE
Refills: 0 | Status: COMPLETED | OUTPATIENT
Start: 2023-03-12 | End: 2023-03-12

## 2023-03-12 RX ORDER — ALBUTEROL 90 UG/1
2 AEROSOL, METERED ORAL EVERY 6 HOURS
Refills: 0 | Status: DISCONTINUED | OUTPATIENT
Start: 2023-03-12 | End: 2023-03-15

## 2023-03-12 RX ORDER — GABAPENTIN 400 MG/1
600 CAPSULE ORAL
Refills: 0 | Status: DISCONTINUED | OUTPATIENT
Start: 2023-03-12 | End: 2023-03-15

## 2023-03-12 RX ORDER — ENOXAPARIN SODIUM 100 MG/ML
40 INJECTION SUBCUTANEOUS EVERY 24 HOURS
Refills: 0 | Status: DISCONTINUED | OUTPATIENT
Start: 2023-03-12 | End: 2023-03-15

## 2023-03-12 RX ORDER — VANCOMYCIN HCL 1 G
1250 VIAL (EA) INTRAVENOUS EVERY 12 HOURS
Refills: 0 | Status: DISCONTINUED | OUTPATIENT
Start: 2023-03-12 | End: 2023-03-13

## 2023-03-12 RX ADMIN — POLYETHYLENE GLYCOL 3350 17 GRAM(S): 17 POWDER, FOR SOLUTION ORAL at 13:45

## 2023-03-12 RX ADMIN — SODIUM CHLORIDE 333.33 MILLILITER(S): 9 INJECTION, SOLUTION INTRAVENOUS at 12:19

## 2023-03-12 RX ADMIN — Medication 3 MILLILITER(S): at 00:50

## 2023-03-12 RX ADMIN — Medication 166.67 MILLIGRAM(S): at 04:07

## 2023-03-12 RX ADMIN — Medication 200 MILLIGRAM(S): at 12:15

## 2023-03-12 RX ADMIN — Medication 975 MILLIGRAM(S): at 00:51

## 2023-03-12 RX ADMIN — CEFEPIME 100 MILLIGRAM(S): 1 INJECTION, POWDER, FOR SOLUTION INTRAMUSCULAR; INTRAVENOUS at 21:57

## 2023-03-12 RX ADMIN — INSULIN GLARGINE 20 UNIT(S): 100 INJECTION, SOLUTION SUBCUTANEOUS at 21:51

## 2023-03-12 RX ADMIN — GABAPENTIN 600 MILLIGRAM(S): 400 CAPSULE ORAL at 17:39

## 2023-03-12 RX ADMIN — ENOXAPARIN SODIUM 40 MILLIGRAM(S): 100 INJECTION SUBCUTANEOUS at 06:35

## 2023-03-12 RX ADMIN — Medication 166.67 MILLIGRAM(S): at 17:38

## 2023-03-12 RX ADMIN — Medication 8 UNIT(S): at 16:47

## 2023-03-12 RX ADMIN — Medication 600 MILLIGRAM(S): at 17:39

## 2023-03-12 RX ADMIN — Medication 2: at 16:47

## 2023-03-12 RX ADMIN — Medication 81 MILLIGRAM(S): at 13:45

## 2023-03-12 RX ADMIN — Medication 975 MILLIGRAM(S): at 00:10

## 2023-03-12 RX ADMIN — Medication 4 UNIT(S): at 14:05

## 2023-03-12 RX ADMIN — Medication 200 MILLIGRAM(S): at 17:39

## 2023-03-12 RX ADMIN — CEFEPIME 100 MILLIGRAM(S): 1 INJECTION, POWDER, FOR SOLUTION INTRAMUSCULAR; INTRAVENOUS at 01:50

## 2023-03-12 RX ADMIN — PANTOPRAZOLE SODIUM 40 MILLIGRAM(S): 20 TABLET, DELAYED RELEASE ORAL at 06:36

## 2023-03-12 RX ADMIN — ATORVASTATIN CALCIUM 80 MILLIGRAM(S): 80 TABLET, FILM COATED ORAL at 21:50

## 2023-03-12 RX ADMIN — GABAPENTIN 600 MILLIGRAM(S): 400 CAPSULE ORAL at 06:36

## 2023-03-12 RX ADMIN — CEFEPIME 100 MILLIGRAM(S): 1 INJECTION, POWDER, FOR SOLUTION INTRAMUSCULAR; INTRAVENOUS at 12:16

## 2023-03-12 RX ADMIN — SENNA PLUS 2 TABLET(S): 8.6 TABLET ORAL at 21:56

## 2023-03-12 NOTE — PROGRESS NOTE ADULT - SUBJECTIVE AND OBJECTIVE BOX
DEL ALVARADO  77y  [facility]-N ED Hold 030 A      Patient is a 77y old  Male who presents with a chief complaint of Hypoglycemia (11 Mar 2023 23:28)      INTERVAL HPI/OVERNIGHT EVENTS:        REVIEW OF SYSTEMS:        FAMILY HISTORY:  Family history of asthma    Family history of diabetes mellitus (DM)      T(C): 36.8 (03-12-23 @ 07:38), Max: 39 (03-12-23 @ 00:52)  HR: 88 (03-12-23 @ 07:38) (88 - 127)  BP: 127/58 (03-12-23 @ 07:38) (127/58 - 178/79)  RR: 20 (03-12-23 @ 07:38) (18 - 25)  SpO2: 97% (03-12-23 @ 07:38) (95% - 98%)  Wt(kg): --Vital Signs Last 24 Hrs  T(C): 36.8 (12 Mar 2023 07:38), Max: 39 (12 Mar 2023 00:52)  T(F): 98.2 (12 Mar 2023 07:38), Max: 102.2 (12 Mar 2023 00:52)  HR: 88 (12 Mar 2023 07:38) (88 - 127)  BP: 127/58 (12 Mar 2023 07:38) (127/58 - 178/79)  BP(mean): --  RR: 20 (12 Mar 2023 07:38) (18 - 25)  SpO2: 97% (12 Mar 2023 07:38) (95% - 98%)    Parameters below as of 12 Mar 2023 07:38  Patient On (Oxygen Delivery Method): room air        PHYSICAL EXAM:  GENERAL: NAD, well-groomed, well-developed  HEAD:  Atraumatic, Normocephalic  EYES: EOMI, PERRLA, conjunctiva and sclera clear  ENMT: No tonsillar erythema, exudates, or enlargement; Moist mucous membranes, Good dentition, No lesions  NECK: Supple, No JVD, Normal thyroid  NERVOUS SYSTEM:  Alert & Oriented X3, Good concentration; Motor Strength 5/5 B/L upper and lower extremities; DTRs 2+ intact and symmetric  PULM: Clear to auscultation bilaterally  CARDIAC: Regular rate and rhythm; No murmurs, rubs, or gallops  GI: Soft, Nontender, Nondistended; Bowel sounds present  EXTREMITIES:  2+ Peripheral Pulses, No clubbing, cyanosis, or edema  LYMPH: No lymphadenopathy noted  SKIN: No rashes or lesions    Consultant(s) Notes Reviewed:  [x ] YES  [ ] NO  Care Discussed with Consultants/Other Providers [ x] YES  [ ] NO    LABS:                            12.2   15.72 )-----------( 272      ( 12 Mar 2023 07:00 )             37.9   03-12    130<L>  |  101  |  22<H>  ----------------------------<  251<H>  4.4   |  19  |  0.9    Ca    8.2<L>      12 Mar 2023 07:00  Mg     1.6     03-12    TPro  6.3  /  Alb  3.5  /  TBili  0.6  /  DBili  x   /  AST  21  /  ALT  30  /  AlkPhos  93  03-12            acetaminophen     Tablet .. 650 milliGRAM(s) Oral every 6 hours PRN  albuterol    90 MICROgram(s) HFA Inhaler 2 Puff(s) Inhalation every 6 hours PRN  aspirin  chewable 81 milliGRAM(s) Oral daily  atorvastatin 80 milliGRAM(s) Oral at bedtime  carBAMazepine 200 milliGRAM(s) Oral two times a day  cefepime   IVPB 2000 milliGRAM(s) IV Intermittent every 8 hours  enoxaparin Injectable 40 milliGRAM(s) SubCutaneous every 24 hours  gabapentin 600 milliGRAM(s) Oral two times a day  pantoprazole    Tablet 40 milliGRAM(s) Oral before breakfast  senna 2 Tablet(s) Oral at bedtime  vancomycin  IVPB 1250 milliGRAM(s) IV Intermittent every 12 hours      78 y/o male with a PMH of DM on insulin, HLD, COPD, GERD, and recent PNA admitted for hypoglycemia. Recently completed a 7 day course of abx for community acquired pneumonia. Patient endorsing chills. Febrile w/ T max 101 in ED.       1. Sepsis secondary to Persistent Community acquire pneumonia complicated by demand ischemia (trop detectable)   - Telemetry          -ECG: sinus tachy           - CXR: reported nl    - Started on vancomycin and cefepime   * Finished a course of cefpodoxime and azithromycin   - Blood cx:pending   - Bolus followed by LR at 100ml/hr   - Mucinex bid   - Cyclic cardiac enzymes detectable but around baseline   - CT chest to evaluate for any structural problems:pending     2. Hypoglycemia . Hx of IDDM   - Total insulin:pending                 - C-peptide:pending                - Proinsulin:pending   - not on sulfonylureas   -glucose found to be 31 at home   -holding all antidiabetics for now       3. COPD  -Continue with home inhalers       4. Neuropathy  -Continue with home medications       DVT ppx: Lovenox  Diet: DASH/CC  Dispo: Tele   Code: FULL         Case Discussed with House Staff   39  minutes spent on total encounter; more than 50% of the visit was spent counseling and/or coordinating care by the attending physician.   Spectra [phone]     DEL ALVARADO  77y  [facility] ED Hold 030 A      Patient is a 77y old  Male who presents with a chief complaint of Hypoglycemia (11 Mar 2023 23:28)      INTERVAL HPI/OVERNIGHT EVENTS:    Patient feels better today. he has no complains. no overnight events  he's agreeable with the plan of care including ct chest, swallowing and sending a sputum cx  he has no new complains .denies chest pain and sob, not on oxygen             FAMILY HISTORY:  Family history of asthma    Family history of diabetes mellitus (DM)      T(C): 36.8 (03-12-23 @ 07:38), Max: 39 (03-12-23 @ 00:52)  HR: 88 (03-12-23 @ 07:38) (88 - 127)  BP: 127/58 (03-12-23 @ 07:38) (127/58 - 178/79)  RR: 20 (03-12-23 @ 07:38) (18 - 25)  SpO2: 97% (03-12-23 @ 07:38) (95% - 98%)  Wt(kg): --Vital Signs Last 24 Hrs  T(C): 36.8 (12 Mar 2023 07:38), Max: 39 (12 Mar 2023 00:52)  T(F): 98.2 (12 Mar 2023 07:38), Max: 102.2 (12 Mar 2023 00:52)  HR: 88 (12 Mar 2023 07:38) (88 - 127)  BP: 127/58 (12 Mar 2023 07:38) (127/58 - 178/79)  BP(mean): --  RR: 20 (12 Mar 2023 07:38) (18 - 25)  SpO2: 97% (12 Mar 2023 07:38) (95% - 98%)    Parameters below as of 12 Mar 2023 07:38  Patient On (Oxygen Delivery Method): room air        PHYSICAL EXAM:  GENERAL: NAD, well-groomed, well-developed  NERVOUS SYSTEM:  Alert & Oriented X3,  PULM: crackles noted   CARDIAC: Regular rate and rhythm;  GI: Soft, Nontender, Nondistended; Bowel sounds present  EXTREMITIES:  2+ Peripheral Pulses,    Consultant(s) Notes Reviewed:  [x ] YES  [ ] NO  Care Discussed with Consultants/Other Providers [ x] YES  [ ] NO    LABS:                            12.2   15.72 )-----------( 272      ( 12 Mar 2023 07:00 )             37.9   03-12    130<L>  |  101  |  22<H>  ----------------------------<  251<H>  4.4   |  19  |  0.9    Ca    8.2<L>      12 Mar 2023 07:00  Mg     1.6     03-12    TPro  6.3  /  Alb  3.5  /  TBili  0.6  /  DBili  x   /  AST  21  /  ALT  30  /  AlkPhos  93  03-12            acetaminophen     Tablet .. 650 milliGRAM(s) Oral every 6 hours PRN  albuterol    90 MICROgram(s) HFA Inhaler 2 Puff(s) Inhalation every 6 hours PRN  aspirin  chewable 81 milliGRAM(s) Oral daily  atorvastatin 80 milliGRAM(s) Oral at bedtime  carBAMazepine 200 milliGRAM(s) Oral two times a day  cefepime   IVPB 2000 milliGRAM(s) IV Intermittent every 8 hours  enoxaparin Injectable 40 milliGRAM(s) SubCutaneous every 24 hours  gabapentin 600 milliGRAM(s) Oral two times a day  pantoprazole    Tablet 40 milliGRAM(s) Oral before breakfast  senna 2 Tablet(s) Oral at bedtime  vancomycin  IVPB 1250 milliGRAM(s) IV Intermittent every 12 hours      78 y/o male with a PMH of DM on insulin, HLD, COPD, GERD, and recent PNA admitted for hypoglycemia. Recently completed a 7 day course of abx for community acquired pneumonia. Patient endorsing chills. Febrile w/ T max 101 in ED.       1. Sepsis secondary to Persistent Community acquire pneumonia complicated by demand ischemia (trop detectable)   - Telemetry          -ECG: sinus tachy           - CXR: reported nl    - Started on vancomycin and cefepime   * Finished a course of cefpodoxime and azithromycin   - Blood cx:pending        - Sputum cx:pending  - Bolus followed by LR at 100ml/hr   - Mucinex bid   - Cyclic cardiac enzymes detectable but around baseline   - CT chest to evaluate for any structural problems:pending   - Swallowing eval:pending     2. Hypoglycemia . Hx of IDDM   - Total insulin:pending                 - C-peptide:pending                - Proinsulin:pending   - not on sulfonylureas   -glucose found to be 31 at home   -holding all antidiabetics for now       3. COPD  -Continue with home inhalers       4. Neuropathy  -Continue with home medications     5. Constipation  - Cont Laxative       DVT ppx: Lovenox  Diet: DASH/CC  Dispo: Tele   Code: FULL

## 2023-03-13 ENCOUNTER — APPOINTMENT (OUTPATIENT)
Dept: ELECTROPHYSIOLOGY | Facility: HOSPITAL | Age: 78
End: 2023-03-13

## 2023-03-13 ENCOUNTER — TRANSCRIPTION ENCOUNTER (OUTPATIENT)
Age: 78
End: 2023-03-13

## 2023-03-13 LAB
A1C WITH ESTIMATED AVERAGE GLUCOSE RESULT: 7.8 % — HIGH (ref 4–5.6)
ALBUMIN SERPL ELPH-MCNC: 3.7 G/DL — SIGNIFICANT CHANGE UP (ref 3.5–5.2)
ALP SERPL-CCNC: 97 U/L — SIGNIFICANT CHANGE UP (ref 30–115)
ALT FLD-CCNC: 21 U/L — SIGNIFICANT CHANGE UP (ref 0–41)
ANION GAP SERPL CALC-SCNC: 14 MMOL/L — SIGNIFICANT CHANGE UP (ref 7–14)
AST SERPL-CCNC: 19 U/L — SIGNIFICANT CHANGE UP (ref 0–41)
BASOPHILS # BLD AUTO: 0.06 K/UL — SIGNIFICANT CHANGE UP (ref 0–0.2)
BASOPHILS NFR BLD AUTO: 0.6 % — SIGNIFICANT CHANGE UP (ref 0–1)
BILIRUB SERPL-MCNC: 0.2 MG/DL — SIGNIFICANT CHANGE UP (ref 0.2–1.2)
BUN SERPL-MCNC: 22 MG/DL — HIGH (ref 10–20)
C PEPTIDE SERPL-MCNC: 2.2 NG/ML — SIGNIFICANT CHANGE UP (ref 1.1–4.4)
CALCIUM SERPL-MCNC: 8.6 MG/DL — SIGNIFICANT CHANGE UP (ref 8.4–10.5)
CHLORIDE SERPL-SCNC: 108 MMOL/L — SIGNIFICANT CHANGE UP (ref 98–110)
CO2 SERPL-SCNC: 17 MMOL/L — SIGNIFICANT CHANGE UP (ref 17–32)
CREAT SERPL-MCNC: 0.8 MG/DL — SIGNIFICANT CHANGE UP (ref 0.7–1.5)
EGFR: 91 ML/MIN/1.73M2 — SIGNIFICANT CHANGE UP
EOSINOPHIL # BLD AUTO: 0.25 K/UL — SIGNIFICANT CHANGE UP (ref 0–0.7)
EOSINOPHIL NFR BLD AUTO: 2.4 % — SIGNIFICANT CHANGE UP (ref 0–8)
ESTIMATED AVERAGE GLUCOSE: 177 MG/DL — HIGH (ref 68–114)
GLUCOSE BLDC GLUCOMTR-MCNC: 104 MG/DL — HIGH (ref 70–99)
GLUCOSE BLDC GLUCOMTR-MCNC: 118 MG/DL — HIGH (ref 70–99)
GLUCOSE BLDC GLUCOMTR-MCNC: 183 MG/DL — HIGH (ref 70–99)
GLUCOSE BLDC GLUCOMTR-MCNC: 210 MG/DL — HIGH (ref 70–99)
GLUCOSE SERPL-MCNC: 98 MG/DL — SIGNIFICANT CHANGE UP (ref 70–99)
HCT VFR BLD CALC: 38.9 % — LOW (ref 42–52)
HGB BLD-MCNC: 12.3 G/DL — LOW (ref 14–18)
IMM GRANULOCYTES NFR BLD AUTO: 0.5 % — HIGH (ref 0.1–0.3)
INSULIN SERPL-MCNC: 4.2 UU/ML — SIGNIFICANT CHANGE UP (ref 2.6–24.9)
LYMPHOCYTES # BLD AUTO: 3.21 K/UL — SIGNIFICANT CHANGE UP (ref 1.2–3.4)
LYMPHOCYTES # BLD AUTO: 30.6 % — SIGNIFICANT CHANGE UP (ref 20.5–51.1)
MAGNESIUM SERPL-MCNC: 1.8 MG/DL — SIGNIFICANT CHANGE UP (ref 1.8–2.4)
MCHC RBC-ENTMCNC: 27.2 PG — SIGNIFICANT CHANGE UP (ref 27–31)
MCHC RBC-ENTMCNC: 31.6 G/DL — LOW (ref 32–37)
MCV RBC AUTO: 85.9 FL — SIGNIFICANT CHANGE UP (ref 80–94)
MONOCYTES # BLD AUTO: 1.07 K/UL — HIGH (ref 0.1–0.6)
MONOCYTES NFR BLD AUTO: 10.2 % — HIGH (ref 1.7–9.3)
NEUTROPHILS # BLD AUTO: 5.84 K/UL — SIGNIFICANT CHANGE UP (ref 1.4–6.5)
NEUTROPHILS NFR BLD AUTO: 55.7 % — SIGNIFICANT CHANGE UP (ref 42.2–75.2)
NRBC # BLD: 0 /100 WBCS — SIGNIFICANT CHANGE UP (ref 0–0)
PLATELET # BLD AUTO: 265 K/UL — SIGNIFICANT CHANGE UP (ref 130–400)
POTASSIUM SERPL-MCNC: 4.6 MMOL/L — SIGNIFICANT CHANGE UP (ref 3.5–5)
POTASSIUM SERPL-SCNC: 4.6 MMOL/L — SIGNIFICANT CHANGE UP (ref 3.5–5)
PROCALCITONIN SERPL-MCNC: <0.02 NG/ML — SIGNIFICANT CHANGE UP (ref 0.02–0.1)
PROT SERPL-MCNC: 6.7 G/DL — SIGNIFICANT CHANGE UP (ref 6–8)
RBC # BLD: 4.53 M/UL — LOW (ref 4.7–6.1)
RBC # FLD: 14.8 % — HIGH (ref 11.5–14.5)
SODIUM SERPL-SCNC: 139 MMOL/L — SIGNIFICANT CHANGE UP (ref 135–146)
TROPONIN T SERPL-MCNC: 0.05 NG/ML — CRITICAL HIGH
VANCOMYCIN TROUGH SERPL-MCNC: 17.5 UG/ML — HIGH (ref 5–10)
WBC # BLD: 10.48 K/UL — SIGNIFICANT CHANGE UP (ref 4.8–10.8)
WBC # FLD AUTO: 10.48 K/UL — SIGNIFICANT CHANGE UP (ref 4.8–10.8)

## 2023-03-13 PROCEDURE — 99233 SBSQ HOSP IP/OBS HIGH 50: CPT

## 2023-03-13 RX ORDER — VANCOMYCIN HCL 1 G
750 VIAL (EA) INTRAVENOUS EVERY 12 HOURS
Refills: 0 | Status: DISCONTINUED | OUTPATIENT
Start: 2023-03-13 | End: 2023-03-14

## 2023-03-13 RX ADMIN — GABAPENTIN 600 MILLIGRAM(S): 400 CAPSULE ORAL at 17:17

## 2023-03-13 RX ADMIN — Medication 81 MILLIGRAM(S): at 11:12

## 2023-03-13 RX ADMIN — CEFEPIME 100 MILLIGRAM(S): 1 INJECTION, POWDER, FOR SOLUTION INTRAMUSCULAR; INTRAVENOUS at 21:43

## 2023-03-13 RX ADMIN — Medication 200 MILLIGRAM(S): at 05:39

## 2023-03-13 RX ADMIN — SENNA PLUS 2 TABLET(S): 8.6 TABLET ORAL at 21:42

## 2023-03-13 RX ADMIN — PANTOPRAZOLE SODIUM 40 MILLIGRAM(S): 20 TABLET, DELAYED RELEASE ORAL at 07:09

## 2023-03-13 RX ADMIN — Medication 166.67 MILLIGRAM(S): at 17:18

## 2023-03-13 RX ADMIN — CEFEPIME 100 MILLIGRAM(S): 1 INJECTION, POWDER, FOR SOLUTION INTRAMUSCULAR; INTRAVENOUS at 05:35

## 2023-03-13 RX ADMIN — CEFEPIME 100 MILLIGRAM(S): 1 INJECTION, POWDER, FOR SOLUTION INTRAMUSCULAR; INTRAVENOUS at 15:28

## 2023-03-13 RX ADMIN — Medication 8 UNIT(S): at 17:18

## 2023-03-13 RX ADMIN — Medication 8 UNIT(S): at 07:41

## 2023-03-13 RX ADMIN — Medication 200 MILLIGRAM(S): at 17:17

## 2023-03-13 RX ADMIN — Medication 8 UNIT(S): at 12:09

## 2023-03-13 RX ADMIN — Medication 600 MILLIGRAM(S): at 17:18

## 2023-03-13 RX ADMIN — ATORVASTATIN CALCIUM 80 MILLIGRAM(S): 80 TABLET, FILM COATED ORAL at 21:43

## 2023-03-13 RX ADMIN — POLYETHYLENE GLYCOL 3350 17 GRAM(S): 17 POWDER, FOR SOLUTION ORAL at 11:16

## 2023-03-13 RX ADMIN — Medication 1: at 07:41

## 2023-03-13 RX ADMIN — Medication 166.67 MILLIGRAM(S): at 05:40

## 2023-03-13 RX ADMIN — Medication 600 MILLIGRAM(S): at 05:39

## 2023-03-13 RX ADMIN — INSULIN GLARGINE 20 UNIT(S): 100 INJECTION, SOLUTION SUBCUTANEOUS at 21:42

## 2023-03-13 RX ADMIN — ENOXAPARIN SODIUM 40 MILLIGRAM(S): 100 INJECTION SUBCUTANEOUS at 05:40

## 2023-03-13 RX ADMIN — GABAPENTIN 600 MILLIGRAM(S): 400 CAPSULE ORAL at 05:39

## 2023-03-13 NOTE — DISCHARGE NOTE PROVIDER - CARE PROVIDER_API CALL
Aisha Mcgraw)  Geriatric Medicine; Internal Medicine  [address] [place] [address]  Phone: [phone]  Fax: [phone]  Follow Up Time: 1-3 days    Annemarie Oates)  Internal Medicine  [address] [place] [address]  Phone: [phone]  Fax: [phone]  Follow Up Time: Routine

## 2023-03-13 NOTE — PHARMACOTHERAPY INTERVENTION NOTE - COMMENTS
As per policy, ordered a vancomycin trough for 3/13 at 4PM since patient would be due for a trough prior to the fourth dose of vancomycin.    Heike Garcia, PharmD, [facility]  Clinical Pharmacy Specialist, Infectious Diseases  Tele-Antimicrobial Stewardship Program (Tele-ASP)  Tele-ASP Phone: [phone]  
Patient has been started on vancomycin 1250 mg IV q12h. As per PrecisePK calculations, current vancomycin AUC/ISABELLE is predicted to be 501.11mg/L*h, as per population data; will confirm with trough before fourth dose., Serum creatinine is 0.9 mg/dL. Can continue vancomycin 1250 mg IV q12h for now.    Heike Garcia, PharmD, [facility]  Clinical Pharmacy Specialist, Infectious Diseases  Tele-Antimicrobial Stewardship Program (Tele-ASP)  Tele-ASP Phone: [phone]  
Recommended to adjust vancomycin dose from 1250 mg IV q12h to 750 mg IV q12h since the vancomycin level today, 3/13 was 17.5 mg/L. As per PrecisePK calculations, current vancomycin AUC/ISABELLE is 685.22 mg/L*h. Decreasing the dose is predicted to yield an AUC of 429.47 mg/L*h.    Heike Garcia, PharmD, [facility]  Clinical Pharmacy Specialist, Infectious Diseases  Tele-Antimicrobial Stewardship Program (Tele-ASP)  Tele-ASP Phone: [phone]

## 2023-03-13 NOTE — DISCHARGE NOTE PROVIDER - PROVIDER TOKENS
PROVIDER:[TOKEN:[[id number]],FOLLOWUP:[1-3 days]],PROVIDER:[TOKEN:[[id number]],FOLLOWUP:[Routine]]

## 2023-03-13 NOTE — DISCHARGE NOTE PROVIDER - ATTENDING DISCHARGE PHYSICAL EXAMINATION:
T(F): , Max: 98.1 (03-15-23 @ 05:49)  HR: 79 (03-15-23 @ 07:22) (75 - 85)  BP: 146/65 (03-15-23 @ 07:22)  RR: 18 (03-15-23 @ 07:22)  SpO2: 99% (03-15-23 @ 07:22)  General: No apparent distress  Cardiovascular: S1, S2  Gastrointestinal: Soft, Non-tender, Non-distended  Respiratory: Good air entry bilaterally  Musculoskeletal: Moves all extremities  Lymphatic: No edema  Neurologic: No gross motor deficit  Dermatologic: Skin dry                          12.0   9.32  )-----------( 250      ( 15 Mar 2023 05:57 )             37.1     03-15    138  |  108  |  21<H>  ----------------------------<  130<H>  4.3   |  19  |  0.9    Ca    8.6      15 Mar 2023 05:57  Mg     1.7     03-15    TPro  6.4  /  Alb  3.3<L>  /  TBili  0.3  /  DBili  x   /  AST  15  /  ALT  16  /  AlkPhos  92  03-15

## 2023-03-13 NOTE — SWALLOW BEDSIDE ASSESSMENT ADULT - COMMENTS
Known to ST service from previous admission, seen on 12/7/21 with recs for a regular diet with thin liquids.

## 2023-03-13 NOTE — DISCHARGE NOTE PROVIDER - CARE PROVIDERS DIRECT ADDRESSES
[email or web address]

## 2023-03-13 NOTE — PROGRESS NOTE ADULT - SUBJECTIVE AND OBJECTIVE BOX
Pt seen and examined, pt c/o worsining cough, with phlegm    T(F): , Max: 97.9 (03-13-23 @ 23:43)  HR: 87 (03-13-23 @ 23:43) (70 - 87)  BP: 147/66 (03-13-23 @ 23:43)  RR: 18 (03-13-23 @ 23:43)  SpO2: 98% (03-13-23 @ 23:43)  General: No apparent distress  Cardiovascular: S1, S2  Gastrointestinal: Soft, Non-tender, Non-distended  Respiratory: BL ronchi  Musculoskeletal: Moves all extremities  Lymphatic: No edema  Neurologic: hard of hearing  Dermatologic: Skin dry                          12.3   10.48 )-----------( 265      ( 13 Mar 2023 16:22 )             38.9     03-13    139  |  108  |  22<H>  ----------------------------<  98  4.6   |  17  |  0.8    Ca    8.6      13 Mar 2023 16:22  Mg     1.8     03-13    TPro  6.7  /  Alb  3.7  /  TBili  0.2  /  DBili  x   /  AST  19  /  ALT  21  /  AlkPhos  97  03-13      Culture - Blood (collected 12 Mar 2023 07:13)  Source: .Blood Blood-Peripheral  Preliminary Report (13 Mar 2023 18:01):    No growth to date.

## 2023-03-13 NOTE — DISCHARGE NOTE PROVIDER - NSDCCPTREATMENT_GEN_ALL_CORE_FT
PRINCIPAL PROCEDURE  Procedure: CT chest w con  Findings and Treatment: AIRWAYS, LUNGS AND PLEURA: There are opacities in the central bronchi   compatible with secretions. There is no pleural effusion. There is no   pneumothorax. Trachea diverticulum. Bibasilar linear   scarring/subsegmental atelectasis with moderate bronchiectasis.   Interlobular septal thickening. Numerous groundglass nodular opacities   within the right lung. Right lower lobe mucoid impaction and associated  atelectasis. Scattered areas of mucous plugging noted within the left   lung.  MEDIASTINUM: There are no enlarged mediastinal, hilar or axillary lymph   nodes. Small hiatal hernia.  HEART AND VESSELS: The heart is normal in size.  There is no pericardial   effusion.  PARTIALLY IMAGED UPPER ABDOMEN: Unremarkable  BONES AND SOFT TISSUES: There are degenerative changes of the spine.    Unchanged right anterior chest wall lipoma.  IMPRESSION:  Numerous groundglass and nodular opacities within the right lung   suspicious for infectious/inflammatory process..  Bibasilar moderate bronchiectasis. Right lower lobe mucoid impaction with   associated atelectasis.

## 2023-03-13 NOTE — DISCHARGE NOTE PROVIDER - HOSPITAL COURSE
78 y/o male with a PMH of DM on insulin, HLD, COPD, GERD, and recent PNA 02/21/22 finished course of azithromycin and cefpodoxime presents to the ED for evaluation of hypoglycemia. pt reports he last ate food at 11AM and took his insulin at the time. pt reports he then took a nap which he usually does around 3:30pm. pt family reports they went to go wake him up to eat and he was unresponsive with drool from his mouth. pt fingerstick was 31. EMS gave pt D50 on arrival. Patient reporting chills, weakness  and cough productive of pink sputum.     Hospital Course:    Pt monitored in house approx 48 hours. Hypoglycemia resolved shortly after admission and has been stable on insulin regimen. Pt noted to have RLL GGO suscpicious for aspirational pneumonitis vs pneumonia. Pt has been afebrile for over 24 hours and feels ready for discharge. Pt will complete antibiotic course with augmentin. Pt is stable for discharge with close internal medicine and endocrine follow up for further management of glycemic control.

## 2023-03-13 NOTE — DISCHARGE NOTE PROVIDER - NSDCFUSCHEDAPPT_GEN_ALL_CORE_FT
Concepcion Bhandari  [facility] Physician [facility]  NEUROLOGY [address]  Scheduled Appointment: 03/16/2023    Jairo Stroud  [facility] Physician [facility]  ELECTROPH [address]  Scheduled Appointment: 04/07/2023     Concepcion Bhandari  [facility] Physician [facility]  NEUROLOGY [address] [place] [address]  Scheduled Appointment: 03/16/2023

## 2023-03-13 NOTE — DISCHARGE NOTE PROVIDER - NSDCCPCAREPLAN_GEN_ALL_CORE_FT
PRINCIPAL DISCHARGE DIAGNOSIS  Diagnosis: Hypoglycemia  Assessment and Plan of Treatment: -Please continue your diabetic control regimen as prescribed. Please follow up with Dr. Mcgraw and consider seeing an endocrinologist for further management.  -PLease return to the ED if you experience similar symptoms prior to presentation      SECONDARY DISCHARGE DIAGNOSES  Diagnosis: Pneumonia, aspiration  Assessment and Plan of Treatment: -Please continue augmentin as prescribed  -Please return to the ED if you experience shortness of breath, severe fatigue, chills.

## 2023-03-13 NOTE — SWALLOW BEDSIDE ASSESSMENT ADULT - SLP PERTINENT HISTORY OF CURRENT PROBLEM
76 y/o male with a PMH of DM on insulin, HLD, COPD, GERD, and recent PNA admitted for hypoglycemia. Recently completed a 7 day course of abx for community acquired pneumonia. Here with sepsis secondary to Persistent Community acquire pneumonia complicated by demand ischemia (trop detectable). CT chest-->Numerous groundglass and nodular opacities within the right lung suspicious for infectious/inflammatory process. Bibasilar bronchiectasis. RLL mucoid impaction/ atelectasis.

## 2023-03-13 NOTE — DISCHARGE NOTE PROVIDER - NSDCMRMEDTOKEN_GEN_ALL_CORE_FT
amoxicillin-clavulanate 875 mg-125 mg oral tablet: 1 tab(s) orally 2 times a day   aspirin 81 mg oral tablet, chewable: 1 tab(s) orally once a day  atorvastatin 80 mg oral tablet: 1 tab(s) orally once a day (at bedtime)  Breo Ellipta 200 mcg-25 mcg/inh inhalation powder:   carBAMazepine 200 mg oral tablet: 1 tab(s) orally 2 times a day  gabapentin 600 mg oral tablet: 1 tab(s) orally 2 times a day  insulin lispro 100 units/mL injectable solution: 20 unit(s) injectable 3 times a day  Levemir 100 units/mL subcutaneous solution: 20 unit(s) subcutaneous once a day  melatonin 3 mg oral tablet: 1 tab(s) orally once a day (at bedtime), As needed, Insomnia  pantoprazole 40 mg oral delayed release tablet: 1 tab(s) orally once a day   senna oral tablet: 2 tab(s) orally once a day, As Needed -for constipation   traMADol 50 mg oral tablet: 1 tab(s) orally every 6 hours, As needed, Severe Pain (7 - 10)  Trulicity Pen 0.75 mg/0.5 mL subcutaneous solution: 0.75 milligram(s) subcutaneous once a week  Ventolin HFA 90 mcg/inh inhalation aerosol: 2 puff(s) inhaled every 6 hours   amoxicillin-clavulanate 875 mg-125 mg oral tablet: 1 tab(s) orally 2 times a day  aspirin 81 mg oral tablet, chewable: 1 tab(s) orally once a day  atorvastatin 80 mg oral tablet: 1 tab(s) orally once a day (at bedtime)  Breo Ellipta 200 mcg-25 mcg/inh inhalation powder:   carBAMazepine 200 mg oral tablet: 1 tab(s) orally 2 times a day  gabapentin 600 mg oral tablet: 1 tab(s) orally 2 times a day  insulin lispro 100 units/mL injectable solution: 20 unit(s) injectable 3 times a day  Levemir 100 units/mL subcutaneous solution: 20 unit(s) subcutaneous once a day  melatonin 3 mg oral tablet: 1 tab(s) orally once a day (at bedtime), As needed, Insomnia  pantoprazole 40 mg oral delayed release tablet: 1 tab(s) orally once a day   senna oral tablet: 2 tab(s) orally once a day, As Needed -for constipation   traMADol 50 mg oral tablet: 1 tab(s) orally every 6 hours, As needed, Severe Pain (7 - 10)  Trulicity Pen 0.75 mg/0.5 mL subcutaneous solution: 0.75 milligram(s) subcutaneous once a week  Ventolin HFA 90 mcg/inh inhalation aerosol: 2 puff(s) inhaled every 6 hours

## 2023-03-14 LAB
ALBUMIN SERPL ELPH-MCNC: 3.7 G/DL — SIGNIFICANT CHANGE UP (ref 3.5–5.2)
ALP SERPL-CCNC: 107 U/L — SIGNIFICANT CHANGE UP (ref 30–115)
ALT FLD-CCNC: 20 U/L — SIGNIFICANT CHANGE UP (ref 0–41)
ANION GAP SERPL CALC-SCNC: 12 MMOL/L — SIGNIFICANT CHANGE UP (ref 7–14)
AST SERPL-CCNC: 18 U/L — SIGNIFICANT CHANGE UP (ref 0–41)
BASOPHILS # BLD AUTO: 0.08 K/UL — SIGNIFICANT CHANGE UP (ref 0–0.2)
BASOPHILS NFR BLD AUTO: 0.8 % — SIGNIFICANT CHANGE UP (ref 0–1)
BILIRUB SERPL-MCNC: 0.3 MG/DL — SIGNIFICANT CHANGE UP (ref 0.2–1.2)
BUN SERPL-MCNC: 20 MG/DL — SIGNIFICANT CHANGE UP (ref 10–20)
CALCIUM SERPL-MCNC: 8.3 MG/DL — LOW (ref 8.4–10.5)
CHLORIDE SERPL-SCNC: 104 MMOL/L — SIGNIFICANT CHANGE UP (ref 98–110)
CO2 SERPL-SCNC: 16 MMOL/L — LOW (ref 17–32)
CREAT SERPL-MCNC: 1 MG/DL — SIGNIFICANT CHANGE UP (ref 0.7–1.5)
EGFR: 77 ML/MIN/1.73M2 — SIGNIFICANT CHANGE UP
EOSINOPHIL # BLD AUTO: 0.32 K/UL — SIGNIFICANT CHANGE UP (ref 0–0.7)
EOSINOPHIL NFR BLD AUTO: 3.2 % — SIGNIFICANT CHANGE UP (ref 0–8)
GLUCOSE BLDC GLUCOMTR-MCNC: 169 MG/DL — HIGH (ref 70–99)
GLUCOSE BLDC GLUCOMTR-MCNC: 184 MG/DL — HIGH (ref 70–99)
GLUCOSE BLDC GLUCOMTR-MCNC: 218 MG/DL — HIGH (ref 70–99)
GLUCOSE BLDC GLUCOMTR-MCNC: 259 MG/DL — HIGH (ref 70–99)
GLUCOSE SERPL-MCNC: 204 MG/DL — HIGH (ref 70–99)
HCT VFR BLD CALC: 41.7 % — LOW (ref 42–52)
HGB BLD-MCNC: 13.3 G/DL — LOW (ref 14–18)
IMM GRANULOCYTES NFR BLD AUTO: 0.4 % — HIGH (ref 0.1–0.3)
LYMPHOCYTES # BLD AUTO: 4.07 K/UL — HIGH (ref 1.2–3.4)
LYMPHOCYTES # BLD AUTO: 40.2 % — SIGNIFICANT CHANGE UP (ref 20.5–51.1)
MCHC RBC-ENTMCNC: 27.1 PG — SIGNIFICANT CHANGE UP (ref 27–31)
MCHC RBC-ENTMCNC: 31.9 G/DL — LOW (ref 32–37)
MCV RBC AUTO: 85.1 FL — SIGNIFICANT CHANGE UP (ref 80–94)
MONOCYTES # BLD AUTO: 1.09 K/UL — HIGH (ref 0.1–0.6)
MONOCYTES NFR BLD AUTO: 10.8 % — HIGH (ref 1.7–9.3)
NEUTROPHILS # BLD AUTO: 4.52 K/UL — SIGNIFICANT CHANGE UP (ref 1.4–6.5)
NEUTROPHILS NFR BLD AUTO: 44.6 % — SIGNIFICANT CHANGE UP (ref 42.2–75.2)
NRBC # BLD: 0 /100 WBCS — SIGNIFICANT CHANGE UP (ref 0–0)
PLATELET # BLD AUTO: 275 K/UL — SIGNIFICANT CHANGE UP (ref 130–400)
POTASSIUM SERPL-MCNC: 4.3 MMOL/L — SIGNIFICANT CHANGE UP (ref 3.5–5)
POTASSIUM SERPL-SCNC: 4.3 MMOL/L — SIGNIFICANT CHANGE UP (ref 3.5–5)
PROCALCITONIN SERPL-MCNC: 0.12 NG/ML — HIGH (ref 0.02–0.1)
PROT SERPL-MCNC: 7.2 G/DL — SIGNIFICANT CHANGE UP (ref 6–8)
RBC # BLD: 4.9 M/UL — SIGNIFICANT CHANGE UP (ref 4.7–6.1)
RBC # FLD: 14.8 % — HIGH (ref 11.5–14.5)
SODIUM SERPL-SCNC: 132 MMOL/L — LOW (ref 135–146)
TROPONIN T SERPL-MCNC: 0.05 NG/ML — CRITICAL HIGH
WBC # BLD: 10.12 K/UL — SIGNIFICANT CHANGE UP (ref 4.8–10.8)
WBC # FLD AUTO: 10.12 K/UL — SIGNIFICANT CHANGE UP (ref 4.8–10.8)

## 2023-03-14 PROCEDURE — 99232 SBSQ HOSP IP/OBS MODERATE 35: CPT

## 2023-03-14 RX ADMIN — SENNA PLUS 2 TABLET(S): 8.6 TABLET ORAL at 21:24

## 2023-03-14 RX ADMIN — Medication 600 MILLIGRAM(S): at 17:43

## 2023-03-14 RX ADMIN — PANTOPRAZOLE SODIUM 40 MILLIGRAM(S): 20 TABLET, DELAYED RELEASE ORAL at 05:59

## 2023-03-14 RX ADMIN — Medication 1 TABLET(S): at 18:07

## 2023-03-14 RX ADMIN — Medication 250 MILLIGRAM(S): at 07:23

## 2023-03-14 RX ADMIN — Medication 81 MILLIGRAM(S): at 13:31

## 2023-03-14 RX ADMIN — ATORVASTATIN CALCIUM 80 MILLIGRAM(S): 80 TABLET, FILM COATED ORAL at 21:24

## 2023-03-14 RX ADMIN — Medication 2: at 08:31

## 2023-03-14 RX ADMIN — CEFEPIME 100 MILLIGRAM(S): 1 INJECTION, POWDER, FOR SOLUTION INTRAMUSCULAR; INTRAVENOUS at 05:58

## 2023-03-14 RX ADMIN — Medication 600 MILLIGRAM(S): at 05:58

## 2023-03-14 RX ADMIN — INSULIN GLARGINE 20 UNIT(S): 100 INJECTION, SOLUTION SUBCUTANEOUS at 21:24

## 2023-03-14 RX ADMIN — Medication 8 UNIT(S): at 17:47

## 2023-03-14 RX ADMIN — Medication 1: at 13:29

## 2023-03-14 RX ADMIN — Medication 8 UNIT(S): at 13:30

## 2023-03-14 RX ADMIN — Medication 1: at 17:44

## 2023-03-14 RX ADMIN — Medication 200 MILLIGRAM(S): at 18:08

## 2023-03-14 RX ADMIN — Medication 200 MILLIGRAM(S): at 05:58

## 2023-03-14 RX ADMIN — ENOXAPARIN SODIUM 40 MILLIGRAM(S): 100 INJECTION SUBCUTANEOUS at 05:58

## 2023-03-14 RX ADMIN — GABAPENTIN 600 MILLIGRAM(S): 400 CAPSULE ORAL at 17:43

## 2023-03-14 RX ADMIN — Medication 8 UNIT(S): at 08:31

## 2023-03-14 RX ADMIN — GABAPENTIN 600 MILLIGRAM(S): 400 CAPSULE ORAL at 05:59

## 2023-03-14 RX ADMIN — CEFEPIME 100 MILLIGRAM(S): 1 INJECTION, POWDER, FOR SOLUTION INTRAMUSCULAR; INTRAVENOUS at 13:31

## 2023-03-14 NOTE — PROGRESS NOTE ADULT - ASSESSMENT
1. Community acquire pneumonia   - s/p vancomycin and cefepime   changed to Augmentin today. monitor leucoytosis   - on room air  follow labs.   family requesting one more day in hospital as still not back to baseline     2. Hypoglycemia . Hx of IDDM   - reports taking extra premeal dose without eating  talked to family will discharge on current regimen of insulin   monitor blood glucose       3. COPD  -Continue with home inhalers       4. Neuropathy  -Continue with home medications     5. Constipation  - Cont Laxative     follow up: discharge in AM if clinically doing better, follow labs. monitor glucose levels, continue the current regimen if insulin, high risk of hypoglycemia. iv changed ot augmentin today. follow speech. pending PT.   
  1. Sepsis secondary to Persistent Community acquire pneumonia   -  vancomycin and cefepime   - on room air    2. Hypoglycemia . Hx of IDDM   - reports taking extra premeal dose without eating      3. COPD  -Continue with home inhalers       4. Neuropathy  -Continue with home medications     5. Constipation  - Cont Laxative     anticipate for AM on PO abx

## 2023-03-14 NOTE — PROGRESS NOTE ADULT - SUBJECTIVE AND OBJECTIVE BOX
"I went to another hospital yesterday and I was covid neg" Pt seen and examined,   feeling much better, family at bedside. discussed in length     Vital Signs Last 24 Hrs  T(C): 36.5 (14 Mar 2023 15:54), Max: 36.7 (14 Mar 2023 08:15)  T(F): 97.7 (14 Mar 2023 15:54), Max: 98.1 (14 Mar 2023 08:15)  HR: 95 (14 Mar 2023 15:54) (76 - 95)  BP: 136/61 (14 Mar 2023 15:54) (134/64 - 167/77)  BP(mean): --  RR: 18 (14 Mar 2023 15:54) (17 - 18)  SpO2: 99% (14 Mar 2023 15:54) (97% - 99%)    Vital Signs Last 24 Hrs  T(C): 36.5 (14 Mar 2023 15:54), Max: 36.7 (14 Mar 2023 08:15)  T(F): 97.7 (14 Mar 2023 15:54), Max: 98.1 (14 Mar 2023 08:15)  HR: 95 (14 Mar 2023 15:54) (76 - 95)  BP: 136/61 (14 Mar 2023 15:54) (134/64 - 167/77)  BP(mean): --  RR: 18 (14 Mar 2023 15:54) (17 - 18)  SpO2: 99% (14 Mar 2023 15:54) (97% - 99%)    Parameters below as of 14 Mar 2023 15:54  Patient On (Oxygen Delivery Method): room air        Parameters below as of 14 Mar 2023 15:54  Patient On (Oxygen Delivery Method): room air      General: No apparent distress  Cardiovascular: S1, S2  Gastrointestinal: Soft, Non-tender, Non-distended  Respiratory: no wheezing   Musculoskeletal: Moves all extremities  Lymphatic: No edema  Neurologic: hard of hearing  Dermatologic: Skin dry                     Culture - Blood (collected 12 Mar 2023 07:13)  Source: .Blood Blood-Peripheral  Preliminary Report (13 Mar 2023 18:01):    No growth to date.

## 2023-03-15 ENCOUNTER — TRANSCRIPTION ENCOUNTER (OUTPATIENT)
Age: 78
End: 2023-03-15

## 2023-03-15 VITALS
HEART RATE: 79 BPM | SYSTOLIC BLOOD PRESSURE: 146 MMHG | RESPIRATION RATE: 18 BRPM | OXYGEN SATURATION: 99 % | TEMPERATURE: 98 F | DIASTOLIC BLOOD PRESSURE: 65 MMHG

## 2023-03-15 LAB
ALBUMIN SERPL ELPH-MCNC: 3.3 G/DL — LOW (ref 3.5–5.2)
ALP SERPL-CCNC: 92 U/L — SIGNIFICANT CHANGE UP (ref 30–115)
ALT FLD-CCNC: 16 U/L — SIGNIFICANT CHANGE UP (ref 0–41)
ANION GAP SERPL CALC-SCNC: 11 MMOL/L — SIGNIFICANT CHANGE UP (ref 7–14)
AST SERPL-CCNC: 15 U/L — SIGNIFICANT CHANGE UP (ref 0–41)
BILIRUB SERPL-MCNC: 0.3 MG/DL — SIGNIFICANT CHANGE UP (ref 0.2–1.2)
BUN SERPL-MCNC: 21 MG/DL — HIGH (ref 10–20)
CALCIUM SERPL-MCNC: 8.6 MG/DL — SIGNIFICANT CHANGE UP (ref 8.4–10.4)
CHLORIDE SERPL-SCNC: 108 MMOL/L — SIGNIFICANT CHANGE UP (ref 98–110)
CO2 SERPL-SCNC: 19 MMOL/L — SIGNIFICANT CHANGE UP (ref 17–32)
CREAT SERPL-MCNC: 0.9 MG/DL — SIGNIFICANT CHANGE UP (ref 0.7–1.5)
EGFR: 87 ML/MIN/1.73M2 — SIGNIFICANT CHANGE UP
GLUCOSE BLDC GLUCOMTR-MCNC: 107 MG/DL — HIGH (ref 70–99)
GLUCOSE SERPL-MCNC: 130 MG/DL — HIGH (ref 70–99)
HCT VFR BLD CALC: 37.1 % — LOW (ref 42–52)
HGB BLD-MCNC: 12 G/DL — LOW (ref 14–18)
MAGNESIUM SERPL-MCNC: 1.7 MG/DL — LOW (ref 1.8–2.4)
MCHC RBC-ENTMCNC: 27.2 PG — SIGNIFICANT CHANGE UP (ref 27–31)
MCHC RBC-ENTMCNC: 32.3 G/DL — SIGNIFICANT CHANGE UP (ref 32–37)
MCV RBC AUTO: 84.1 FL — SIGNIFICANT CHANGE UP (ref 80–94)
NRBC # BLD: 0 /100 WBCS — SIGNIFICANT CHANGE UP (ref 0–0)
PLATELET # BLD AUTO: 250 K/UL — SIGNIFICANT CHANGE UP (ref 130–400)
POTASSIUM SERPL-MCNC: 4.3 MMOL/L — SIGNIFICANT CHANGE UP (ref 3.5–5)
POTASSIUM SERPL-SCNC: 4.3 MMOL/L — SIGNIFICANT CHANGE UP (ref 3.5–5)
PROINSULIN SERPL-MCNC: 3 PMOL/L — SIGNIFICANT CHANGE UP (ref 0–10)
PROT SERPL-MCNC: 6.4 G/DL — SIGNIFICANT CHANGE UP (ref 6–8)
RBC # BLD: 4.41 M/UL — LOW (ref 4.7–6.1)
RBC # FLD: 14.7 % — HIGH (ref 11.5–14.5)
SODIUM SERPL-SCNC: 138 MMOL/L — SIGNIFICANT CHANGE UP (ref 135–146)
WBC # BLD: 9.32 K/UL — SIGNIFICANT CHANGE UP (ref 4.8–10.8)
WBC # FLD AUTO: 9.32 K/UL — SIGNIFICANT CHANGE UP (ref 4.8–10.8)

## 2023-03-15 PROCEDURE — 99239 HOSP IP/OBS DSCHRG MGMT >30: CPT

## 2023-03-15 RX ADMIN — Medication 600 MILLIGRAM(S): at 05:26

## 2023-03-15 RX ADMIN — Medication 81 MILLIGRAM(S): at 11:16

## 2023-03-15 RX ADMIN — Medication 1 TABLET(S): at 05:26

## 2023-03-15 RX ADMIN — ENOXAPARIN SODIUM 40 MILLIGRAM(S): 100 INJECTION SUBCUTANEOUS at 06:09

## 2023-03-15 RX ADMIN — Medication 200 MILLIGRAM(S): at 05:25

## 2023-03-15 RX ADMIN — GABAPENTIN 600 MILLIGRAM(S): 400 CAPSULE ORAL at 05:26

## 2023-03-15 RX ADMIN — PANTOPRAZOLE SODIUM 40 MILLIGRAM(S): 20 TABLET, DELAYED RELEASE ORAL at 05:26

## 2023-03-15 RX ADMIN — Medication 650 MILLIGRAM(S): at 01:27

## 2023-03-15 NOTE — DISCHARGE NOTE NURSING/CASE MANAGEMENT/SOCIAL WORK - NSDCPEFALRISK_GEN_ALL_CORE
For information on Fall & Injury Prevention, visit: [email or web address] OR  [email or web address] OR  https://www.cdc.gov/steadi/patient.html

## 2023-03-15 NOTE — DISCHARGE NOTE NURSING/CASE MANAGEMENT/SOCIAL WORK - PATIENT PORTAL LINK FT
You can access the FollowMyHealth Patient Portal offered by [facility] by registering at the following website: [email or web address]. By joining [facility]’s FollowMyHealth portal, you will also be able to view your health information using other applications (apps) compatible with our system.

## 2023-03-15 NOTE — DISCHARGE NOTE NURSING/CASE MANAGEMENT/SOCIAL WORK - NSDCVIVACCINE_GEN_ALL_CORE_FT
influenza, high-dose, quadrivalent; 09-Dec-2021 13:02; Nicky English (TAMELA); [employer]; [id number] (Exp. Date: 30-Jun-2022); IntraMuscular; Deltoid Right.; 0.7 milliLiter(s); VIS (VIS Published: 06-Aug-2021, VIS Presented: 09-Dec-2021);

## 2023-03-15 NOTE — PHYSICAL THERAPY INITIAL EVALUATION ADULT - SPECIFY REASON(S)
852 am Chart reviewed. PT evaluation on hold pending activity orders as appropriate. Will notify MD in unit using Teams. Will follow up.

## 2023-03-16 ENCOUNTER — APPOINTMENT (OUTPATIENT)
Dept: NEUROLOGY | Facility: CLINIC | Age: 78
End: 2023-03-16
Payer: MEDICARE

## 2023-03-16 VITALS
SYSTOLIC BLOOD PRESSURE: 155 MMHG | OXYGEN SATURATION: 99 % | WEIGHT: 150 LBS | BODY MASS INDEX: 23.54 KG/M2 | HEART RATE: 102 BPM | DIASTOLIC BLOOD PRESSURE: 76 MMHG | TEMPERATURE: 97.6 F | HEIGHT: 67 IN

## 2023-03-16 PROCEDURE — 99213 OFFICE O/P EST LOW 20 MIN: CPT

## 2023-03-16 NOTE — DISCUSSION/SUMMARY
[FreeTextEntry1] : Pt is a 75 yo M with PMHx of DMII, herpes zoster, COPD/asthma, hearing loss, prostate cancer s/p TURP, who presents for hospital f/u.\par \par # Post herpetic neuralgia: pt has been taking GBP BID instead of TID 2/2 stomach upset\par - Continue GBP 600mg TID\par - Restart duloxetine 20mg QHS. May titrate up as tolerated\par \par # Ischemic stroke: left temporal and parietal lobe, L MCA territory, embolic in appearance. Possibly from L cav ICA stenosis vs cardioembolic.\par - Continue ASA 81mg daily\par - Continue statin, goal LDL <70\par - Pt refusing ILR placement\par - Risk factor modification: diabetes, HTN and HLD optimization\par \par \par \par RTC in 6 mo.

## 2023-03-16 NOTE — PHYSICAL EXAM
[General Appearance - Alert] : alert [General Appearance - In No Acute Distress] : in no acute distress [General Appearance - Well Developed] : well developed [General Appearance - Well Nourished] : well nourished [Oriented To Time, Place, And Person] : oriented to person, place, and time [Affect] : the affect was normal [Person] : oriented to person [Place] : oriented to place [Time] : oriented to time [Fluency] : fluency intact [Comprehension] : comprehension intact [Cranial Nerves Optic (II)] : visual acuity intact bilaterally,  visual fields full to confrontation, pupils equal round and reactive to light [Cranial Nerves Oculomotor (III)] : extraocular motion intact [Cranial Nerves Trigeminal (V)] : facial sensation intact symmetrically [Cranial Nerves Facial (VII)] : face symmetrical [Cranial Nerves Vestibulocochlear (VIII)] : hearing was intact bilaterally [Cranial Nerves Glossopharyngeal (IX)] : tongue and palate midline [Cranial Nerves Accessory (XI - Cranial And Spinal)] : head turning and shoulder shrug symmetric [Motor Tone] : muscle tone was normal in all four extremities [Cranial Nerves Hypoglossal (XII)] : there was no tongue deviation with protrusion [Motor Strength] : muscle strength was normal in all four extremities [Paresis Pronator Drift Right-Sided] : no pronator drift on the right [Paresis Pronator Drift Left-Sided] : no pronator drift on the left [Sensation Tactile Decrease] : light touch was intact [Abnormal Walk] : normal gait [Coordination - Dysmetria Impaired Finger-to-Nose Bilateral] : not present [2+] : Patella left 2+ [Heart Sounds] : normal S1 and S2 [Arterial Pulses Carotid] : carotid pulses were normal with no bruits

## 2023-03-16 NOTE — DATA REVIEWED
[de-identified] : IMPRESSION:\par \par Punctate acute infarct in the high left parietal lobe.\par \par Redemonstrated centrally calcified meningioma along the high right parietal convexity. No significant peritumoral edema.\par \par Mild chronic microvascular ischemic changes.\par *** ADDENDUM 12/02/2021  ***\par \par The meningioma measures approximately 1.6 x 1.6 x 1.3 cm (TV x AP x CC).\par \par CTA head: IMPRESSION:\par \par No large vessel occlusion, aneurysm, vascular malformation.\par \par moderate atherosclerotic stenosis of the bilateral clinoid/supraclinoid ICAs.\par \par TTE: Summary:\par  1. Left ventricular ejection fraction, by visual estimation, is 55 to 60%.\par  2. Normal left atrial size.\par  3. Mild mitral annular calcification.\par  4. Sclerotic aortic valve with normal opening.\par  5. There is mild aortic root calcification.\par

## 2023-03-16 NOTE — HISTORY OF PRESENT ILLNESS
[FreeTextEntry1] : Interval History:\par Pt was recently hospitalized with PNA and hypoglycemia. Endorses persistent pain along left lower rib cage. Stopped taking duloxetine for uncertain reason. Has been taking GBP 600mg TID. Did not like the capsaicin cream. Refusing to get ILR placement. Denies new weakness, numbness, speech or vision difficulty. \par \par \par HPI: Pt is a 75 yo M with PMHx of DMII, herpes zoster, COPD/asthma, hearing loss, prostate cancer s/p TURP, who presents for hospital f/u. Pt presented with severe left chest wall pain to [facility] in 12/2021. Noted shingles in the left T4 dermatome. He was treated with opiates and other pain medications. Developed acute delirium, pain medications were discontinued. He was started on CBZ, however this was not continued upon discharge. pt has been taking GBP 500mg TID. He continues to endorse severe pain in the left chest wall and back. He is also using lidocaine patches and topical ointment. Pt's wife notes that she has noticed he is doing better than when it first began. Pt also endorses difficulty raising his right shoulder, started while in the hospital. he also c/o bilateral hand and foot numbness. he uses a cane, has been doing so for several years. Endorses generalized weakness.\par While in the hospital, he had an MRI brain which showed two small infarcts, one in left mesial temporal lobe and one in high left parietal lobe (however the temporal lobe stroke was not reported). He had a CTA head, which showed severe bilateral, L>R cavernous ICA stenosis. He was started on DAPT, however pt's wife states that he has not been compliant with these medications. Denies previously having been on ATP or AC. \par \par 07/2022: Pt endorses persistent pain along herpes zoster dermatome. Ulcers heeling well. Also endorses numbness/tingling in BLE and random tingling all extremities. Denies new weakness, vision or speech changes. They have not followed up with EP yet for loop recorder placement.

## 2023-03-17 LAB
CULTURE RESULTS: SIGNIFICANT CHANGE UP
SPECIMEN SOURCE: SIGNIFICANT CHANGE UP

## 2023-03-18 ENCOUNTER — RX RENEWAL (OUTPATIENT)
Age: 78
End: 2023-03-18

## 2023-03-18 NOTE — REASON FOR VISIT
[FreeTextEntry1] : Patient is a 77 year-old man with history of diabetes, herpes zoster, COPD, asthma, hearing loss, prostate cancer, s/p TERP, who was admitted to the hospital in December 2022 for possible stroke. CTA of the head showed severe bilateral left-to-right cavernous ICA stenosis. The MRI of the brain showed two small infarcts, one in the left hemisphere temporal lobe and one in the high left temporal lobe suggestive of embolic origin. Patient referred to EP for further evaluation of possible loop recorder implant to rule out cardiac embolic etiology of his brain lesions. \par \par EKG (01/31/2023) Sinus rhythm at 100 bpm, first degree AV block.\par

## 2023-03-18 NOTE — ASSESSMENT
[FreeTextEntry1] : Stroke \par - History of stroke with imaging suggestive of cardiac embolic event \par - Recommend loop recorder implant. Discussed risks and benefits with patient and his wife.\par - JAMIE to rule out PFO. \par \par 
oral

## 2023-03-18 NOTE — ADDENDUM
[FreeTextEntry1] : Lenore KERR assisted in documentation on 02/03/2023   acting as a scribe for Dr. Jairo Stroud.\par

## 2023-03-19 ENCOUNTER — RX RENEWAL (OUTPATIENT)
Age: 78
End: 2023-03-19

## 2023-03-21 ENCOUNTER — APPOINTMENT (OUTPATIENT)
Dept: GERIATRICS | Facility: HOME HEALTH | Age: 78
End: 2023-03-21
Payer: MEDICARE

## 2023-03-21 VITALS
TEMPERATURE: 97.8 F | HEART RATE: 79 BPM | RESPIRATION RATE: 17 BRPM | DIASTOLIC BLOOD PRESSURE: 60 MMHG | SYSTOLIC BLOOD PRESSURE: 120 MMHG | OXYGEN SATURATION: 97 %

## 2023-03-21 DIAGNOSIS — J18.9 PNEUMONIA, UNSPECIFIED ORGANISM: ICD-10-CM

## 2023-03-21 DIAGNOSIS — G47.33 OBSTRUCTIVE SLEEP APNEA (ADULT) (PEDIATRIC): ICD-10-CM

## 2023-03-21 DIAGNOSIS — R09.89 OTHER SPECIFIED SYMPTOMS AND SIGNS INVOLVING THE CIRCULATORY AND RESPIRATORY SYSTEMS: ICD-10-CM

## 2023-03-21 PROCEDURE — 99496 TRANSJ CARE MGMT HIGH F2F 7D: CPT

## 2023-03-21 NOTE — HISTORY OF PRESENT ILLNESS
[Patient] : patient [Spouse] : spouse [FreeTextEntry1] : generalized weakness, debility [FreeTextEntry2] : Patient seen and examined at home. Wife and daughter present during visit. Patient was recently hospitalized with hypoglycemia and PNA, was treated with IV abx in the hospital and sent home with Augmentin oral antibiotics, patient finished the coarse. Hypoglycemia resolved, patient will see endocrinology outpatient. \par Today patient  reports SOB with exertion, he states that he uses 2 pillows to sleep to help him breath better. Upon assessment crackles auscultated in both lung bases. HF vs PNA. \par  No other acute complaints. No CP. No abdominal complaints with good appetite and regular BM's. No urinary complaints. No recent falls. No skin wounds. Still w/severe PHN, on  Gabapentin. Requesting refill of Tramadol.

## 2023-03-21 NOTE — ASSESSMENT
[FreeTextEntry1] : PNA\par -finished augmentin\par -repeat CXR and labs\par \par Crackles in B/L lung bases\par -CXR\par -labs\par -consider starting lasix\par \par \par #DM2\par - levemir\par -f/u with endocrinology\par \par #Asthma\par - at baseline\par - c/w LABA/ICS\par -Albuterol\par \par #Hx of Shingles w/PHN\par - c/w Gabapentin 600 BID\par - c/w Lidocaine patch as needed\par - Tramadol 50mg PO prn\par - Neuro FU as scheduled\par \par \par #Hx Prostate CA s/p Prostatectomy\par - outpatient URO fu as scheduled. \par \par

## 2023-03-21 NOTE — PHYSICAL EXAM
[No Acute Distress] : no acute distress [Normal Sclera/Conjunctiva] : normal sclera/conjunctiva [Normal Outer Ear/Nose] : the ears and nose were normal in appearance [No JVD] : no jugular venous distention [Normal S1, S2] : normal S1 and S2 [Non Tender] : non-tender [Soft] : abdomen soft [No Joint Swelling] : no joint swelling seen [No Rash] : no rash [No Gross Sensory Deficits] : no gross sensory deficits [Oriented x3] : oriented to person, place, and time [de-identified] : crackles at bases  [de-identified] : bladder non-palpable

## 2023-03-21 NOTE — REVIEW OF SYSTEMS
[Dyspnea on Exertion] : dyspnea on exertion [Muscle Weakness] : muscle weakness [Negative] : ENT [Chest Pain] : no chest pain [Lower Ext Edema] : no lower extremity edema [Shortness Of Breath] : no shortness of breath [Wheezing] : no wheezing [Cough] : no cough [Abdominal Pain] : no abdominal pain [Nausea] : no nausea [Dysuria] : no dysuria [Skin Rash] : no skin rash [Headache] : no headache

## 2023-03-22 ENCOUNTER — OUTPATIENT (OUTPATIENT)
Dept: OUTPATIENT SERVICES | Facility: HOSPITAL | Age: 78
LOS: 1 days | End: 2023-03-22
Payer: MEDICARE

## 2023-03-22 ENCOUNTER — APPOINTMENT (OUTPATIENT)
Dept: ENDOCRINOLOGY | Facility: CLINIC | Age: 78
End: 2023-03-22

## 2023-03-22 ENCOUNTER — RX RENEWAL (OUTPATIENT)
Age: 78
End: 2023-03-22

## 2023-03-22 VITALS
WEIGHT: 148 LBS | BODY MASS INDEX: 23.18 KG/M2 | HEART RATE: 92 BPM | SYSTOLIC BLOOD PRESSURE: 151 MMHG | DIASTOLIC BLOOD PRESSURE: 85 MMHG

## 2023-03-22 DIAGNOSIS — Z98.890 OTHER SPECIFIED POSTPROCEDURAL STATES: Chronic | ICD-10-CM

## 2023-03-22 DIAGNOSIS — Z00.00 ENCOUNTER FOR GENERAL ADULT MEDICAL EXAMINATION WITHOUT ABNORMAL FINDINGS: ICD-10-CM

## 2023-03-22 DIAGNOSIS — Z90.79 ACQUIRED ABSENCE OF OTHER GENITAL ORGAN(S): Chronic | ICD-10-CM

## 2023-03-22 PROCEDURE — 99214 OFFICE O/P EST MOD 30 MIN: CPT

## 2023-03-22 NOTE — HISTORY OF PRESENT ILLNESS
[FreeTextEntry1] : unclear what type of diabetes, he has, not seen for more than 1 year, totally disinterested in his endocrine care.

## 2023-03-22 NOTE — ASSESSMENT
[FreeTextEntry1] : very non compliant with endocrine follow up, unclear what type of diabetes he has , needs c-peptide level, try for CGM, try adding orals if helpful.

## 2023-03-23 DIAGNOSIS — E11.65 TYPE 2 DIABETES MELLITUS WITH HYPERGLYCEMIA: ICD-10-CM

## 2023-04-04 LAB
25(OH)D3 SERPL-MCNC: 9 NG/ML
ALBUMIN SERPL ELPH-MCNC: 3.6 G/DL
ALP BLD-CCNC: 99 U/L
ALT SERPL-CCNC: 10 U/L
ANION GAP SERPL CALC-SCNC: 11 MMOL/L
AST SERPL-CCNC: 11 U/L
BASOPHILS # BLD AUTO: 0.08 K/UL
BASOPHILS NFR BLD AUTO: 0.9 %
BILIRUB SERPL-MCNC: <0.2 MG/DL
BUN SERPL-MCNC: 15 MG/DL
CALCIUM SERPL-MCNC: 9.3 MG/DL
CHLORIDE SERPL-SCNC: 104 MMOL/L
CHOLEST SERPL-MCNC: 144 MG/DL
CO2 SERPL-SCNC: 22 MMOL/L
CREAT SERPL-MCNC: 0.9 MG/DL
CRP SERPL-MCNC: <3 MG/L
EGFR: 87 ML/MIN/1.73M2
EOSINOPHIL # BLD AUTO: 0.33 K/UL
EOSINOPHIL NFR BLD AUTO: 3.5 %
ERYTHROCYTE [SEDIMENTATION RATE] IN BLOOD BY WESTERGREN METHOD: 20 MM/HR
ESTIMATED AVERAGE GLUCOSE: 183 MG/DL
FERRITIN SERPL-MCNC: 29 NG/ML
FOLATE SERPL-MCNC: 15.2 NG/ML
GLUCOSE SERPL-MCNC: 166 MG/DL
HBA1C MFR BLD HPLC: 8 %
HCT VFR BLD CALC: 36.8 %
HDLC SERPL-MCNC: 30 MG/DL
HGB BLD-MCNC: 11.4 G/DL
IMM GRANULOCYTES NFR BLD AUTO: 0.3 %
IRON SATN MFR SERPL: 13 %
IRON SERPL-MCNC: 35 UG/DL
LDLC SERPL CALC-MCNC: 71 MG/DL
LYMPHOCYTES # BLD AUTO: 3.49 K/UL
LYMPHOCYTES NFR BLD AUTO: 37.2 %
MAGNESIUM SERPL-MCNC: 1.9 MG/DL
MAN DIFF?: NORMAL
MCHC RBC-ENTMCNC: 27.1 PG
MCHC RBC-ENTMCNC: 31 G/DL
MCV RBC AUTO: 87.6 FL
MONOCYTES # BLD AUTO: 0.92 K/UL
MONOCYTES NFR BLD AUTO: 9.8 %
NEUTROPHILS # BLD AUTO: 4.52 K/UL
NEUTROPHILS NFR BLD AUTO: 48.3 %
NONHDLC SERPL-MCNC: 114 MG/DL
NT-PROBNP SERPL-MCNC: 265 PG/ML
PLATELET # BLD AUTO: 301 K/UL
POTASSIUM SERPL-SCNC: 5.3 MMOL/L
PROT SERPL-MCNC: 6.5 G/DL
RBC # BLD: 4.2 M/UL
RBC # FLD: 14.3 %
SODIUM SERPL-SCNC: 137 MMOL/L
T4 FREE SERPL-MCNC: 0.7 NG/DL
TIBC SERPL-MCNC: 268 UG/DL
TRIGL SERPL-MCNC: 214 MG/DL
TSH SERPL-ACNC: 5.24 UIU/ML
UIBC SERPL-MCNC: 233 UG/DL
VIT B12 SERPL-MCNC: 285 PG/ML
WBC # FLD AUTO: 9.37 K/UL

## 2023-04-05 ENCOUNTER — APPOINTMENT (OUTPATIENT)
Dept: NUTRITION | Facility: CLINIC | Age: 78
End: 2023-04-05

## 2023-04-07 ENCOUNTER — APPOINTMENT (OUTPATIENT)
Dept: ELECTROPHYSIOLOGY | Facility: CLINIC | Age: 78
End: 2023-04-07

## 2023-04-26 ENCOUNTER — OUTPATIENT (OUTPATIENT)
Dept: OUTPATIENT SERVICES | Facility: HOSPITAL | Age: 78
LOS: 1 days | End: 2023-04-26
Payer: MEDICARE

## 2023-04-26 ENCOUNTER — APPOINTMENT (OUTPATIENT)
Dept: ENDOCRINOLOGY | Facility: CLINIC | Age: 78
End: 2023-04-26
Payer: MEDICARE

## 2023-04-26 DIAGNOSIS — Z98.890 OTHER SPECIFIED POSTPROCEDURAL STATES: Chronic | ICD-10-CM

## 2023-04-26 DIAGNOSIS — Z00.00 ENCOUNTER FOR GENERAL ADULT MEDICAL EXAMINATION WITHOUT ABNORMAL FINDINGS: ICD-10-CM

## 2023-04-26 DIAGNOSIS — Z90.79 ACQUIRED ABSENCE OF OTHER GENITAL ORGAN(S): Chronic | ICD-10-CM

## 2023-04-26 PROCEDURE — ZZZZZ: CPT

## 2023-04-26 PROCEDURE — 99214 OFFICE O/P EST MOD 30 MIN: CPT | Mod: 95

## 2023-04-26 NOTE — ASSESSMENT
[Diabetes Foot Care] : diabetes foot care [Long Term Vascular Complications] : long term vascular complications of diabetes [Carbohydrate Consistent Diet] : carbohydrate consistent diet [Importance of Diet and Exercise] : importance of diet and exercise to improve glycemic control, achieve weight loss and improve cardiovascular health [Hypoglycemia Management] : hypoglycemia management [Retinopathy Screening] : Patient was referred to ophthalmology for retinopathy screening [FreeTextEntry1] : very difficult to manage diabetes, will readdress it, when he come back from [place], long acting and fast acting insulins refilled.

## 2023-04-26 NOTE — HISTORY OF PRESENT ILLNESS
[FreeTextEntry1] : most likely does have type 1 diabetes, patient never went for c-peptide level as advised, but had wrong blood test ordered., going to [place] for 6 months, and wants diabetic shoes.

## 2023-04-26 NOTE — REASON FOR VISIT
[Home] : at home, [unfilled] , at the time of the visit. [Medical Office: ([facility])___] : at the medical office located in  [Spouse] : spouse [Follow - Up] : a follow-up visit [DM Type 1] : DM Type 1

## 2023-04-27 ENCOUNTER — APPOINTMENT (OUTPATIENT)
Dept: GERIATRICS | Facility: HOME HEALTH | Age: 78
End: 2023-04-27
Payer: MEDICARE

## 2023-04-27 DIAGNOSIS — K21.9 GASTRO-ESOPHAGEAL REFLUX DISEASE W/OUT ESOPHAGITIS: ICD-10-CM

## 2023-04-27 PROCEDURE — 99349 HOME/RES VST EST MOD MDM 40: CPT

## 2023-04-27 RX ORDER — ASPIRIN 81 MG/1
81 TABLET ORAL
Qty: 100 | Refills: 2 | Status: ACTIVE | COMMUNITY
Start: 2021-12-22 | End: 1900-01-01

## 2023-04-28 DIAGNOSIS — E03.9 HYPOTHYROIDISM, UNSPECIFIED: ICD-10-CM

## 2023-04-28 DIAGNOSIS — E11.65 TYPE 2 DIABETES MELLITUS WITH HYPERGLYCEMIA: ICD-10-CM

## 2023-04-30 VITALS
SYSTOLIC BLOOD PRESSURE: 140 MMHG | RESPIRATION RATE: 18 BRPM | HEART RATE: 88 BPM | OXYGEN SATURATION: 98 % | TEMPERATURE: 98 F | DIASTOLIC BLOOD PRESSURE: 80 MMHG

## 2023-04-30 PROBLEM — K21.9 GERD (GASTROESOPHAGEAL REFLUX DISEASE): Status: ACTIVE | Noted: 2021-12-22

## 2023-04-30 NOTE — PHYSICAL EXAM
[No Acute Distress] : no acute distress [Normal Sclera/Conjunctiva] : normal sclera/conjunctiva [No JVD] : no jugular venous distention [Normal Outer Ear/Nose] : the ears and nose were normal in appearance [Clear to Auscultation] : lungs were clear to auscultation bilaterally [Normal S1, S2] : normal S1 and S2 [Normal Bowel Sounds] : normal bowel sounds [Non Tender] : non-tender [Soft] : abdomen soft [No Joint Swelling] : no joint swelling seen [No Rash] : no rash [No Gross Sensory Deficits] : no gross sensory deficits [Oriented x3] : oriented to person, place, and time [de-identified] : bladder non-palpable

## 2023-04-30 NOTE — REVIEW OF SYSTEMS
[Muscle Weakness] : muscle weakness [Negative] : ENT [Chest Pain] : no chest pain [Shortness Of Breath] : no shortness of breath [Abdominal Pain] : no abdominal pain [Nausea] : no nausea [Dysuria] : no dysuria [Constipation] : no constipation [Joint Pain] : no joint pain [Itching] : no itching [Skin Rash] : no skin rash [Headache] : no headache

## 2023-04-30 NOTE — ASSESSMENT
[FreeTextEntry1] : #DM2\par - insulin\par -januvia\par -f/u with endocrinology\par \par #Asthma\par - at baseline\par - c/w LABA/ICS\par -Albuterol\par \par #Hx of Shingles w/PHN\par - c/w Gabapentin 600 BID\par - c/w Lidocaine patch as needed\par - Tramadol 50mg PO prn\par - Neuro FU as scheduled\par \par #GERD\par -pantoprazole\par \par #Hx Prostate CA s/p Prostatectomy\par - outpatient URO fu as scheduled.

## 2023-04-30 NOTE — HISTORY OF PRESENT ILLNESS
[Patient] : patient [Spouse] : spouse [FreeTextEntry1] : generalized, weakness  [FreeTextEntry2] : 77y.o. M with pmh: DM, GERD, CAD. Patient seen at home, wife at his side. Patient is followed by endocrinology. Today patient has no complains, denies CP, SOB, N/V/D, regular BMs, good appetite. Patient is requesting refils of meds.

## 2023-05-03 NOTE — PATIENT PROFILE ADULT - INTERPRETATION SERVICES DECLINED
Patient/Caregiver requests family/friend to interpret.

## 2023-06-13 NOTE — PATIENT PROFILE ADULT. - ALCOHOL USE HISTORY SINGLE SELECT
Detail Level: Detailed Add Postop Global No-Charge Code (59856)?: yes Other Dressings: bacitracin Wound Evaluated By: Dr. Vipin Tidwell never

## 2023-06-28 NOTE — PRE-ANESTHESIA EVALUATION ADULT - NSANTHAGERD_ENT_A_CORE
Yes
[FreeTextEntry1] : \par \par I spent the time noted on the day of this patient encounter preparing for, providing and documenting the above service. I have  counseled and educated the patient on the differential, workup, disease course, and treatment/management plan. Education was provided to the patient during this encounter. All questions and concerns were answered and addressed in detail.\par \par Mila Gacria MD\par

## 2023-07-02 ENCOUNTER — RX RENEWAL (OUTPATIENT)
Age: 78
End: 2023-07-02

## 2023-07-11 ENCOUNTER — RX RENEWAL (OUTPATIENT)
Age: 78
End: 2023-07-11

## 2023-08-02 ENCOUNTER — RX RENEWAL (OUTPATIENT)
Age: 78
End: 2023-08-02

## 2023-09-13 ENCOUNTER — RX RENEWAL (OUTPATIENT)
Age: 78
End: 2023-09-13

## 2023-09-14 ENCOUNTER — RX RENEWAL (OUTPATIENT)
Age: 78
End: 2023-09-14

## 2023-09-15 ENCOUNTER — RX RENEWAL (OUTPATIENT)
Age: 78
End: 2023-09-15

## 2023-10-02 NOTE — PHYSICAL THERAPY INITIAL EVALUATION ADULT - TRANSFER SAFETY CONCERNS NOTED: SIT/STAND, REHAB EVAL
Products Recommended: 50 spf or higher daily Detail Level: Generalized General Sunscreen Counseling: I recommended a broad spectrum sunscreen with a SPF of 30 or higher.  I explained that SPF 30 sunscreens block approximately 97 percent of the sun's harmful rays.  Sunscreens should be applied at least 15 minutes prior to expected sun exposure and then every 2 hours after that as long as sun exposure continues. If swimming or exercising sunscreen should be reapplied every 45 minutes to an hour after getting wet or sweating.  One ounce, or the equivalent of a shot glass full of sunscreen, is adequate to protect the skin not covered by a bathing suit. I also recommended a lip balm with a sunscreen as well. Sun protective clothing can be used in lieu of sunscreen but must be worn the entire time you are exposed to the sun's rays. losing balance/decreased sequencing ability/decreased weight-shifting ability

## 2023-10-04 ENCOUNTER — RX RENEWAL (OUTPATIENT)
Age: 78
End: 2023-10-04

## 2023-10-04 DIAGNOSIS — J45.902 UNSPECIFIED ASTHMA WITH STATUS ASTHMATICUS: ICD-10-CM

## 2023-10-04 DIAGNOSIS — G62.9 POLYNEUROPATHY, UNSPECIFIED: ICD-10-CM

## 2023-10-10 PROBLEM — J45.902 SEVERE ASTHMA WITH STATUS ASTHMATICUS, UNSPECIFIED WHETHER PERSISTENT: Status: ACTIVE | Noted: 2021-12-22

## 2023-10-10 PROBLEM — G62.9 PERIPHERAL NEUROPATHY: Status: ACTIVE | Noted: 2022-03-02

## 2023-10-18 ENCOUNTER — APPOINTMENT (OUTPATIENT)
Dept: GERIATRICS | Facility: HOME HEALTH | Age: 78
End: 2023-10-18

## 2023-10-20 ENCOUNTER — RX RENEWAL (OUTPATIENT)
Age: 78
End: 2023-10-20

## 2023-10-24 ENCOUNTER — RX RENEWAL (OUTPATIENT)
Age: 78
End: 2023-10-24

## 2023-10-28 ENCOUNTER — APPOINTMENT (OUTPATIENT)
Dept: GERIATRICS | Facility: HOME HEALTH | Age: 78
End: 2023-10-28

## 2023-10-28 PROBLEM — B02.9 HERPES ZOSTER: Status: ACTIVE | Noted: 2021-12-22

## 2023-11-01 ENCOUNTER — APPOINTMENT (OUTPATIENT)
Dept: GERIATRICS | Facility: HOME HEALTH | Age: 78
End: 2023-11-01
Payer: MEDICARE

## 2023-11-01 ENCOUNTER — APPOINTMENT (OUTPATIENT)
Dept: GERIATRICS | Facility: HOME HEALTH | Age: 78
End: 2023-11-01

## 2023-11-01 VITALS
OXYGEN SATURATION: 97 % | TEMPERATURE: 97.2 F | DIASTOLIC BLOOD PRESSURE: 68 MMHG | RESPIRATION RATE: 16 BRPM | HEART RATE: 98 BPM | SYSTOLIC BLOOD PRESSURE: 122 MMHG

## 2023-11-01 DIAGNOSIS — B02.9 ZOSTER W/OUT COMPLICATIONS: ICD-10-CM

## 2023-11-01 PROCEDURE — 99349 HOME/RES VST EST MOD MDM 40: CPT

## 2023-11-13 ENCOUNTER — NON-APPOINTMENT (OUTPATIENT)
Age: 78
End: 2023-11-13

## 2023-11-13 LAB
ALBUMIN SERPL ELPH-MCNC: 4 G/DL
ALP BLD-CCNC: 92 U/L
ALT SERPL-CCNC: 19 U/L
ANION GAP SERPL CALC-SCNC: 10 MMOL/L
AST SERPL-CCNC: 17 U/L
BASOPHILS # BLD AUTO: 0.08 K/UL
BASOPHILS NFR BLD AUTO: 0.5 %
BILIRUB SERPL-MCNC: 0.4 MG/DL
BUN SERPL-MCNC: 21 MG/DL
CALCIUM SERPL-MCNC: 8.9 MG/DL
CHLORIDE SERPL-SCNC: 103 MMOL/L
CHOLEST SERPL-MCNC: 186 MG/DL
CO2 SERPL-SCNC: 27 MMOL/L
CREAT SERPL-MCNC: 0.9 MG/DL
EGFR: 87 ML/MIN/1.73M2
EOSINOPHIL # BLD AUTO: 0.07 K/UL
EOSINOPHIL NFR BLD AUTO: 0.5 %
ESTIMATED AVERAGE GLUCOSE: 209 MG/DL
FOLATE SERPL-MCNC: 8.2 NG/ML
GLUCOSE SERPL-MCNC: 187 MG/DL
HBA1C MFR BLD HPLC: 8.9 %
HCT VFR BLD CALC: 41.2 %
HDLC SERPL-MCNC: 48 MG/DL
HGB BLD-MCNC: 12.7 G/DL
IMM GRANULOCYTES NFR BLD AUTO: 0.5 %
LDLC SERPL CALC-MCNC: 117 MG/DL
LYMPHOCYTES # BLD AUTO: 4.54 K/UL
LYMPHOCYTES NFR BLD AUTO: 30.1 %
MAN DIFF?: NORMAL
MCHC RBC-ENTMCNC: 27.3 PG
MCHC RBC-ENTMCNC: 30.8 G/DL
MCV RBC AUTO: 88.4 FL
MONOCYTES # BLD AUTO: 1.24 K/UL
MONOCYTES NFR BLD AUTO: 8.2 %
NEUTROPHILS # BLD AUTO: 9.09 K/UL
NEUTROPHILS NFR BLD AUTO: 60.2 %
NONHDLC SERPL-MCNC: 138 MG/DL
PLATELET # BLD AUTO: 369 K/UL
POTASSIUM SERPL-SCNC: 4.2 MMOL/L
PROT SERPL-MCNC: 7 G/DL
RBC # BLD: 4.66 M/UL
RBC # FLD: 14.3 %
SODIUM SERPL-SCNC: 140 MMOL/L
T3 SERPL-MCNC: 74 NG/DL
T4 FREE SERPL-MCNC: 0.8 NG/DL
TRIGL SERPL-MCNC: 104 MG/DL
TSH SERPL-ACNC: 7.44 UIU/ML
VIT B12 SERPL-MCNC: 781 PG/ML
WBC # FLD AUTO: 15.1 K/UL

## 2023-11-16 RX ORDER — LEVOTHYROXINE SODIUM 0.03 MG/1
25 TABLET ORAL DAILY
Qty: 30 | Refills: 0 | Status: DISCONTINUED | COMMUNITY
Start: 2023-04-04 | End: 2023-11-16

## 2023-11-16 RX ORDER — INSULIN DEGLUDEC INJECTION 200 U/ML
200 INJECTION, SOLUTION SUBCUTANEOUS
Qty: 3 | Refills: 3 | Status: ACTIVE | COMMUNITY
Start: 2023-03-22

## 2023-11-16 RX ORDER — GLUCAGON 1 MG
1 KIT INJECTION
Qty: 1 | Refills: 1 | Status: DISCONTINUED | COMMUNITY
Start: 2023-03-21 | End: 2023-11-16

## 2023-11-16 RX ORDER — BUDESONIDE AND FORMOTEROL FUMARATE DIHYDRATE 160; 4.5 UG/1; UG/1
160-4.5 AEROSOL RESPIRATORY (INHALATION) TWICE DAILY
Qty: 30.6 | Refills: 3 | Status: DISCONTINUED | COMMUNITY
Start: 2022-07-28 | End: 2023-11-16

## 2023-11-16 RX ORDER — TRAMADOL HYDROCHLORIDE 50 MG/1
50 TABLET, COATED ORAL
Qty: 60 | Refills: 0 | Status: DISCONTINUED | COMMUNITY
Start: 2022-11-23 | End: 2023-11-16

## 2023-11-16 RX ORDER — LINACLOTIDE 290 UG/1
290 CAPSULE, GELATIN COATED ORAL
Qty: 30 | Refills: 6 | Status: DISCONTINUED | COMMUNITY
Start: 2023-01-17 | End: 2023-11-16

## 2023-11-16 RX ORDER — KETOCONAZOLE 20 MG/G
2 CREAM TOPICAL
Qty: 60 | Refills: 2 | Status: DISCONTINUED | COMMUNITY
Start: 2023-03-18 | End: 2023-11-16

## 2023-11-16 RX ORDER — PIOGLITAZONE HYDROCHLORIDE 30 MG/1
30 TABLET ORAL
Qty: 90 | Refills: 2 | Status: DISCONTINUED | COMMUNITY
Start: 2022-03-03 | End: 2023-11-16

## 2023-11-27 ENCOUNTER — RX RENEWAL (OUTPATIENT)
Age: 78
End: 2023-11-27

## 2023-11-30 ENCOUNTER — LABORATORY RESULT (OUTPATIENT)
Age: 78
End: 2023-11-30

## 2023-11-30 ENCOUNTER — APPOINTMENT (OUTPATIENT)
Dept: ENDOCRINOLOGY | Facility: CLINIC | Age: 78
End: 2023-11-30

## 2023-11-30 ENCOUNTER — OUTPATIENT (OUTPATIENT)
Dept: OUTPATIENT SERVICES | Facility: HOSPITAL | Age: 78
LOS: 1 days | End: 2023-11-30
Payer: MEDICARE

## 2023-11-30 ENCOUNTER — RX RENEWAL (OUTPATIENT)
Age: 78
End: 2023-11-30

## 2023-11-30 VITALS — SYSTOLIC BLOOD PRESSURE: 136 MMHG | DIASTOLIC BLOOD PRESSURE: 71 MMHG | HEART RATE: 102 BPM

## 2023-11-30 DIAGNOSIS — Z98.890 OTHER SPECIFIED POSTPROCEDURAL STATES: Chronic | ICD-10-CM

## 2023-11-30 DIAGNOSIS — E55.9 VITAMIN D DEFICIENCY, UNSPECIFIED: ICD-10-CM

## 2023-11-30 DIAGNOSIS — Z00.00 ENCOUNTER FOR GENERAL ADULT MEDICAL EXAMINATION WITHOUT ABNORMAL FINDINGS: ICD-10-CM

## 2023-11-30 DIAGNOSIS — Z90.79 ACQUIRED ABSENCE OF OTHER GENITAL ORGAN(S): Chronic | ICD-10-CM

## 2023-11-30 LAB
BASOPHILS # BLD AUTO: 0.08 K/UL
BASOPHILS NFR BLD AUTO: 0.7 %
EOSINOPHIL # BLD AUTO: 0.26 K/UL
EOSINOPHIL NFR BLD AUTO: 2.2 %
HCT VFR BLD CALC: 42.6 %
HGB BLD-MCNC: 13 G/DL
IMM GRANULOCYTES NFR BLD AUTO: 0.5 %
LYMPHOCYTES # BLD AUTO: 3.28 K/UL
LYMPHOCYTES NFR BLD AUTO: 27.5 %
MAN DIFF?: NORMAL
MCHC RBC-ENTMCNC: 27.4 PG
MCHC RBC-ENTMCNC: 30.5 G/DL
MCV RBC AUTO: 89.7 FL
MONOCYTES # BLD AUTO: 1 K/UL
MONOCYTES NFR BLD AUTO: 8.4 %
NEUTROPHILS # BLD AUTO: 7.24 K/UL
NEUTROPHILS NFR BLD AUTO: 60.7 %
PLATELET # BLD AUTO: 342 K/UL
RBC # BLD: 4.75 M/UL
RBC # FLD: 14.7 %
WBC # FLD AUTO: 11.92 K/UL

## 2023-11-30 PROCEDURE — 80053 COMPREHEN METABOLIC PANEL: CPT

## 2023-11-30 PROCEDURE — 85027 COMPLETE CBC AUTOMATED: CPT

## 2023-11-30 PROCEDURE — 99214 OFFICE O/P EST MOD 30 MIN: CPT

## 2023-11-30 PROCEDURE — 84439 ASSAY OF FREE THYROXINE: CPT

## 2023-11-30 PROCEDURE — 99212 OFFICE O/P EST SF 10 MIN: CPT

## 2023-11-30 PROCEDURE — 83036 HEMOGLOBIN GLYCOSYLATED A1C: CPT

## 2023-11-30 PROCEDURE — 82306 VITAMIN D 25 HYDROXY: CPT

## 2023-11-30 PROCEDURE — 82043 UR ALBUMIN QUANTITATIVE: CPT

## 2023-11-30 PROCEDURE — 84443 ASSAY THYROID STIM HORMONE: CPT

## 2023-11-30 PROCEDURE — 84681 ASSAY OF C-PEPTIDE: CPT

## 2023-11-30 RX ORDER — ERGOCALCIFEROL 1.25 MG/1
1.25 MG CAPSULE, LIQUID FILLED ORAL
Qty: 8 | Refills: 0 | Status: ACTIVE | COMMUNITY
Start: 2023-11-30 | End: 1900-01-01

## 2023-11-30 RX ORDER — ATORVASTATIN CALCIUM 80 MG/1
80 TABLET, FILM COATED ORAL DAILY
Qty: 90 | Refills: 3 | Status: ACTIVE | COMMUNITY
Start: 2023-11-16 | End: 1900-01-01

## 2023-12-01 LAB
25(OH)D3 SERPL-MCNC: 26 NG/ML
ALBUMIN SERPL ELPH-MCNC: 4.5 G/DL
ALP BLD-CCNC: 102 U/L
ALT SERPL-CCNC: 28 U/L
ANION GAP SERPL CALC-SCNC: 15 MMOL/L
AST SERPL-CCNC: 25 U/L
BILIRUB SERPL-MCNC: 0.4 MG/DL
BUN SERPL-MCNC: 17 MG/DL
C PEPTIDE SERPL-MCNC: 2.9 NG/ML
CALCIUM SERPL-MCNC: 9.9 MG/DL
CHLORIDE SERPL-SCNC: 102 MMOL/L
CO2 SERPL-SCNC: 24 MMOL/L
CREAT SERPL-MCNC: 1.1 MG/DL
CREAT SPEC-SCNC: 353 MG/DL
EGFR: 68 ML/MIN/1.73M2
ESTIMATED AVERAGE GLUCOSE: 206 MG/DL
GLUCOSE SERPL-MCNC: 232 MG/DL
HBA1C MFR BLD HPLC: 8.8 %
MICROALBUMIN 24H UR DL<=1MG/L-MCNC: 6.5 MG/DL
MICROALBUMIN/CREAT 24H UR-RTO: 18 MG/G
POTASSIUM SERPL-SCNC: 4.3 MMOL/L
PROT SERPL-MCNC: 7.9 G/DL
SODIUM SERPL-SCNC: 141 MMOL/L
TSH SERPL-ACNC: 9.59 UIU/ML

## 2023-12-06 ENCOUNTER — APPOINTMENT (OUTPATIENT)
Dept: GASTROENTEROLOGY | Facility: CLINIC | Age: 78
End: 2023-12-06
Payer: MEDICARE

## 2023-12-06 DIAGNOSIS — Z12.11 ENCOUNTER FOR SCREENING FOR MALIGNANT NEOPLASM OF COLON: ICD-10-CM

## 2023-12-06 DIAGNOSIS — Z86.39 PERSONAL HISTORY OF OTHER ENDOCRINE, NUTRITIONAL AND METABOLIC DISEASE: ICD-10-CM

## 2023-12-06 PROCEDURE — 99214 OFFICE O/P EST MOD 30 MIN: CPT | Mod: 95

## 2023-12-06 RX ORDER — SODIUM SULFATE, POTASSIUM SULFATE AND MAGNESIUM SULFATE 1.6; 3.13; 17.5 G/177ML; G/177ML; G/177ML
17.5-3.13-1.6 SOLUTION ORAL
Qty: 2 | Refills: 0 | Status: ACTIVE | COMMUNITY
Start: 2023-12-06 | End: 1900-01-01

## 2023-12-06 RX ORDER — GLUCAGON INJECTION, SOLUTION 1 MG/.2ML
1 INJECTION, SOLUTION SUBCUTANEOUS
Qty: 0.4 | Refills: 0 | Status: DISCONTINUED | COMMUNITY
Start: 2023-03-22 | End: 2023-12-06

## 2023-12-06 RX ORDER — ALBUTEROL SULFATE 2.5 MG/3ML
(2.5 MG/3ML) SOLUTION RESPIRATORY (INHALATION) 4 TIMES DAILY
Qty: 150 | Refills: 3 | Status: DISCONTINUED | COMMUNITY
Start: 2023-03-21 | End: 2023-12-06

## 2023-12-06 RX ORDER — LANCETS 21 GAUGE
EACH MISCELLANEOUS
Qty: 120 | Refills: 6 | Status: DISCONTINUED | COMMUNITY
Start: 2023-03-19 | End: 2023-12-06

## 2023-12-06 RX ORDER — BLOOD-GLUCOSE METER
70 EACH MISCELLANEOUS
Qty: 120 | Refills: 11 | Status: DISCONTINUED | COMMUNITY
Start: 2022-11-23 | End: 2023-12-06

## 2023-12-06 RX ORDER — LANCETS 30 GAUGE
EACH MISCELLANEOUS
Qty: 100 | Refills: 0 | Status: DISCONTINUED | COMMUNITY
Start: 2023-01-30 | End: 2023-12-06

## 2023-12-06 RX ORDER — PEN NEEDLE, DIABETIC 31 GX3/16"
31G X 8 MM NEEDLE, DISPOSABLE MISCELLANEOUS
Qty: 100 | Refills: 0 | Status: DISCONTINUED | COMMUNITY
Start: 2022-05-11 | End: 2023-12-06

## 2023-12-06 RX ORDER — PIOGLITAZONE HYDROCHLORIDE 30 MG/1
30 TABLET ORAL DAILY
Qty: 90 | Refills: 2 | Status: DISCONTINUED | COMMUNITY
Start: 2023-11-30 | End: 2023-12-06

## 2023-12-06 RX ORDER — INSULIN ASPART 100 [IU]/ML
100 INJECTION, SOLUTION INTRAVENOUS; SUBCUTANEOUS 3 TIMES DAILY
Qty: 45 | Refills: 0 | Status: DISCONTINUED | COMMUNITY
Start: 2023-04-26 | End: 2023-12-06

## 2023-12-06 RX ORDER — BLOOD-GLUCOSE METER
KIT MISCELLANEOUS 4 TIMES DAILY
Qty: 120 | Refills: 11 | Status: DISCONTINUED | COMMUNITY
Start: 2022-11-23 | End: 2023-12-06

## 2023-12-06 RX ORDER — PANTOPRAZOLE 40 MG/1
40 TABLET, DELAYED RELEASE ORAL DAILY
Qty: 90 | Refills: 3 | Status: DISCONTINUED | COMMUNITY
Start: 2021-12-22 | End: 2023-12-06

## 2023-12-06 RX ORDER — ALBUTEROL SULFATE 90 UG/1
108 (90 BASE) INHALANT RESPIRATORY (INHALATION)
Qty: 20.1 | Refills: 2 | Status: DISCONTINUED | COMMUNITY
Start: 2022-02-09 | End: 2023-12-06

## 2023-12-06 RX ORDER — BLOOD SUGAR DIAGNOSTIC
STRIP MISCELLANEOUS
Qty: 120 | Refills: 11 | Status: DISCONTINUED | COMMUNITY
Start: 2022-09-25 | End: 2023-12-06

## 2023-12-06 RX ORDER — PEN NEEDLE, DIABETIC 31 GX5/16"
31G X 8 MM NEEDLE, DISPOSABLE MISCELLANEOUS
Qty: 120 | Refills: 11 | Status: DISCONTINUED | COMMUNITY
Start: 2022-09-25 | End: 2023-12-06

## 2023-12-06 RX ORDER — PEN NEEDLE, DIABETIC 29 G X1/2"
31G X 5 MM NEEDLE, DISPOSABLE MISCELLANEOUS
Qty: 300 | Refills: 2 | Status: DISCONTINUED | COMMUNITY
Start: 2022-03-03 | End: 2023-12-06

## 2023-12-06 RX ORDER — ISOPROPYL ALCOHOL 70 ML/100ML
70 SWAB TOPICAL
Qty: 120 | Refills: 10 | Status: DISCONTINUED | COMMUNITY
Start: 2022-09-25 | End: 2023-12-06

## 2023-12-06 RX ORDER — ERGOCALCIFEROL 1.25 MG/1
1.25 MG CAPSULE, LIQUID FILLED ORAL
Qty: 1 | Refills: 0 | Status: DISCONTINUED | COMMUNITY
Start: 2023-04-04 | End: 2023-12-06

## 2023-12-06 RX ORDER — UMECLIDINIUM 62.5 UG/1
62.5 AEROSOL, POWDER ORAL DAILY
Qty: 3 | Refills: 3 | Status: DISCONTINUED | COMMUNITY
Start: 2021-12-22 | End: 2023-12-06

## 2023-12-06 NOTE — BRIEF OPERATIVE NOTE - NSICDXBRIEFPREOP_GEN_ALL_CORE_FT
From: Ameya English  To: Maximo Coy  Sent: 12/6/2023 8:00 AM CST  Subject: Work note    Good morning,     I was at urgent care yesterday and have bronchitis. I’m still not feeling well today. Can you write a work note for today? If you can just upload it to the [facility] that would work.     Thank you   PRE-OP DIAGNOSIS:  Mass of skin of right shoulder 09-Dec-2020 10:40:24  Jose Gaitan

## 2023-12-07 DIAGNOSIS — E55.9 VITAMIN D DEFICIENCY, UNSPECIFIED: ICD-10-CM

## 2023-12-07 DIAGNOSIS — E03.9 HYPOTHYROIDISM, UNSPECIFIED: ICD-10-CM

## 2023-12-07 DIAGNOSIS — E11.9 TYPE 2 DIABETES MELLITUS WITHOUT COMPLICATIONS: ICD-10-CM

## 2023-12-11 ENCOUNTER — OUTPATIENT (OUTPATIENT)
Dept: OUTPATIENT SERVICES | Facility: HOSPITAL | Age: 78
LOS: 1 days | End: 2023-12-11
Payer: MEDICARE

## 2023-12-11 ENCOUNTER — RESULT REVIEW (OUTPATIENT)
Age: 78
End: 2023-12-11

## 2023-12-11 DIAGNOSIS — Z98.890 OTHER SPECIFIED POSTPROCEDURAL STATES: Chronic | ICD-10-CM

## 2023-12-11 DIAGNOSIS — Z00.8 ENCOUNTER FOR OTHER GENERAL EXAMINATION: ICD-10-CM

## 2023-12-11 DIAGNOSIS — Z13.820 ENCOUNTER FOR SCREENING FOR OSTEOPOROSIS: ICD-10-CM

## 2023-12-11 DIAGNOSIS — Z90.79 ACQUIRED ABSENCE OF OTHER GENITAL ORGAN(S): Chronic | ICD-10-CM

## 2023-12-11 PROCEDURE — 77080 DXA BONE DENSITY AXIAL: CPT

## 2023-12-12 DIAGNOSIS — Z79.4 TYPE 2 DIABETES MELLITUS WITH HYPERGLYCEMIA: ICD-10-CM

## 2023-12-12 DIAGNOSIS — E11.65 TYPE 2 DIABETES MELLITUS WITH HYPERGLYCEMIA: ICD-10-CM

## 2023-12-12 DIAGNOSIS — Z13.820 ENCOUNTER FOR SCREENING FOR OSTEOPOROSIS: ICD-10-CM

## 2023-12-12 RX ORDER — PIOGLITAZONE HYDROCHLORIDE 30 MG/1
30 TABLET ORAL
Qty: 90 | Refills: 3 | Status: ACTIVE | COMMUNITY
Start: 2023-12-12 | End: 1900-01-01

## 2023-12-13 DIAGNOSIS — E03.5 MYXEDEMA COMA: ICD-10-CM

## 2023-12-13 DIAGNOSIS — E11.65 TYPE 2 DIABETES MELLITUS WITH HYPERGLYCEMIA: ICD-10-CM

## 2023-12-20 NOTE — ASU PATIENT PROFILE, ADULT - IS PATIENT PREGNANT?
This note was copied from a baby's chart.  BREASTFEEDING SERVICES NOTE:    Time spent with mother/baby: 10 minutes.    Weight loss 2.89%, output adequate. Mom reports that baby is feeding well overall. Baby has had some sleepy periods for mom. Reassured mom that some sleepiness is normal in the first 24-48 hours and we reviewed normal  feeding patterns in the first few days of life. Offered to assist with a feeding and mom agreed as she is getting ready to feed baby. Demonstrated waking techniques to mom and baby roused easily. Mom able to latch baby easily and baby has a strong suckle pattern with swallows noted. Mom has no pain with feeding. Mom is experienced with breastfeeding.     Encouraged mom to continue to feed baby on demand, at least 8-12 times every 24 hours and to keep tracking diapers and feedings. Also reviewed feeding patterns, basic milk production, engorgement and comfort measures, and nipple care. Gave lanolin and gel pads for nipple care. Mom has no other questions or concerns at this time. Encouraged her to call for help as needed.     Mom declined a follow up call at home post discharge. Gave her milk storage magnet that also has contact information for Breastfeeding Services should she need additional support once home. She is aware our office is open 7 days a week including holidays.     Patient or family to call for support as needed.    not applicable (Male)

## 2024-01-07 ENCOUNTER — RX RENEWAL (OUTPATIENT)
Age: 79
End: 2024-01-07

## 2024-01-10 ENCOUNTER — RX RENEWAL (OUTPATIENT)
Age: 79
End: 2024-01-10

## 2024-01-17 NOTE — ED ADULT NURSE NOTE - NSFALLRSKINDICTYPE_ED_ALL_ED
CC/Reason For referral: Incomplete . Requires D&C  Preferred Consultant(if applicable): OBGYN  Who admits for you (if needed): N/A  Do you have documents you would like to fax over? no.  Would you still like to speak to an ED attending? Yes, after pt is seen Need for Mobility Assisted Device

## 2024-01-18 NOTE — CHART NOTE - NSCHARTNOTEFT_GEN_A_CORE
auth for snf obtained - per case mangment dc to snf will be on 12/9 <-- Click to add NO significant Past Surgical History

## 2024-01-20 ENCOUNTER — RX RENEWAL (OUTPATIENT)
Age: 79
End: 2024-01-20

## 2024-01-20 RX ORDER — BLOOD-GLUCOSE METER
KIT MISCELLANEOUS
Qty: 120 | Refills: 11 | Status: ACTIVE | COMMUNITY
Start: 2024-01-20 | End: 1900-01-01

## 2024-01-28 ENCOUNTER — RX RENEWAL (OUTPATIENT)
Age: 79
End: 2024-01-28

## 2024-02-01 ENCOUNTER — RX RENEWAL (OUTPATIENT)
Age: 79
End: 2024-02-01

## 2024-02-05 ENCOUNTER — RX RENEWAL (OUTPATIENT)
Age: 79
End: 2024-02-05

## 2024-02-10 NOTE — PHYSICAL EXAM
[Alert] : alert [Sclera] : the sclera and conjunctiva were normal [Normal Outer Ear/Nose] : the ears and nose were normal in appearance [Normal Appearance] : the appearance of the neck was normal [Supple] : the neck was supple [No Acc Muscle Use] : no accessory muscle use [No Respiratory Distress] : no respiratory distress [Respiration, Rhythm And Depth] : normal respiratory rhythm and effort [Normal S1, S2] : normal S1 and S2 [Heart Rate And Rhythm] : heart rate was normal and rhythm regular [Edema] : edema was not present [Abdomen Tenderness] : non-tender [Abdomen Soft] : soft [No Clubbing, Cyanosis] : no clubbing or cyanosis of the fingernails [Involuntary Movements] : no involuntary movements were seen [No Focal Deficits] : no focal deficits [Oriented To Time, Place, And Person] : oriented to person, place, and time

## 2024-02-15 ENCOUNTER — APPOINTMENT (OUTPATIENT)
Dept: GERIATRICS | Facility: HOME HEALTH | Age: 79
End: 2024-02-15
Payer: MEDICARE

## 2024-02-15 VITALS
OXYGEN SATURATION: 98 % | HEART RATE: 104 BPM | SYSTOLIC BLOOD PRESSURE: 130 MMHG | DIASTOLIC BLOOD PRESSURE: 62 MMHG | RESPIRATION RATE: 18 BRPM | TEMPERATURE: 98.2 F

## 2024-02-15 PROCEDURE — 99349 HOME/RES VST EST MOD MDM 40: CPT

## 2024-02-15 RX ORDER — INSULIN ASPART 100 [IU]/ML
100 INJECTION, SOLUTION INTRAVENOUS; SUBCUTANEOUS
Qty: 15 | Refills: 5 | Status: ACTIVE | COMMUNITY
Start: 1900-01-01 | End: 1900-01-01

## 2024-02-15 NOTE — ASSESSMENT
[FreeTextEntry1] : #DM2 - workup revealed T2DM - meds adjusted by Endo, now on Tresiba, Januvia, Trulicity and Pioglitazone - Reports BS better controlled now, following with Endo - A1C 8.8% 11/23, recheck  #Asthma - at baseline - c/w LABA/ICS - b2 PRN  #Hx of Shingles w/PHN - c/w Gabapentin 600 TID - c/w Duloxetine per Neuro - c/w Lidocaine patch as needed - Neuro FU as scheduled  #Hypothyroid - started on Levo 25mcg by Endo as patient symptomatic - TFT per Endo  #Hx CVA minimal right sided weakness - c/w ASA/Statin per Neuro - outpatient FU with Neuro - outpatient FU with Cards for ILR, refusing at this time - DME for ambulation - fall precautions - PT/OT completed, refusing.    #Hx Prostate CA s/p Prostatectomy - outpatient URO fu as scheduled.

## 2024-02-15 NOTE — HISTORY OF PRESENT ILLNESS
[FreeTextEntry1] : Patient seen and examined at home.  Patient is homebound 2/2 generalized weakness/debility.  Wife present during visit.  All report patient at baseline.  No acute complaints.  No CP/SOB. No abdominal complaints with good appetite and regular BM's.  No urinary complaints.  No recent falls.  no skin wounds.  Patient followed with Endo, medications adjusted.  Also followed with GI for screening C-scope.  Following with Neuro end of month.  Currently being treated for Asthma Exacerbation by Pulm w/Pred 20mg.

## 2024-02-15 NOTE — ED PROVIDER NOTE - WR ORDER STATUS 1
Pt reports for rehab today for LBP and states she is feeling pretty good. Typically when she comes to rehab, she is in some sort of pain, but she states she is finally feeling better with rehab.     Rehab on rehab sheet - new exercises lunges with twist, Cameroonian squats.    Performed Resulted

## 2024-02-25 ENCOUNTER — NON-APPOINTMENT (OUTPATIENT)
Age: 79
End: 2024-02-25

## 2024-02-25 LAB
ALBUMIN SERPL ELPH-MCNC: 3.3 G/DL
ALP BLD-CCNC: 77 U/L
ALT SERPL-CCNC: 13 U/L
ANION GAP SERPL CALC-SCNC: 13 MMOL/L
AST SERPL-CCNC: 12 U/L
BASOPHILS # BLD AUTO: 0.05 K/UL
BASOPHILS NFR BLD AUTO: 0.4 %
BILIRUB SERPL-MCNC: 0.4 MG/DL
BUN SERPL-MCNC: 14 MG/DL
CALCIUM SERPL-MCNC: 8.3 MG/DL
CHLORIDE SERPL-SCNC: 102 MMOL/L
CO2 SERPL-SCNC: 23 MMOL/L
CREAT SERPL-MCNC: 0.9 MG/DL
EGFR: 87 ML/MIN/1.73M2
EOSINOPHIL # BLD AUTO: 0.27 K/UL
EOSINOPHIL NFR BLD AUTO: 2.4 %
ESTIMATED AVERAGE GLUCOSE: 226 MG/DL
GLUCOSE SERPL-MCNC: 94 MG/DL
HBA1C MFR BLD HPLC: 9.5 %
HCT VFR BLD CALC: 37.1 %
HGB BLD-MCNC: 11.4 G/DL
IMM GRANULOCYTES NFR BLD AUTO: 1 %
LYMPHOCYTES # BLD AUTO: 2.41 K/UL
LYMPHOCYTES NFR BLD AUTO: 21.5 %
MAN DIFF?: NORMAL
MCHC RBC-ENTMCNC: 25.7 PG
MCHC RBC-ENTMCNC: 30.7 G/DL
MCV RBC AUTO: 83.6 FL
MONOCYTES # BLD AUTO: 1.14 K/UL
MONOCYTES NFR BLD AUTO: 10.2 %
NEUTROPHILS # BLD AUTO: 7.22 K/UL
NEUTROPHILS NFR BLD AUTO: 64.5 %
PLATELET # BLD AUTO: 301 K/UL
PMV BLD AUTO: 0 /100 WBCS
POTASSIUM SERPL-SCNC: 5.1 MMOL/L
PROT SERPL-MCNC: 6.3 G/DL
RBC # BLD: 4.44 M/UL
RBC # FLD: 14.9 %
SODIUM SERPL-SCNC: 138 MMOL/L
TSH SERPL-ACNC: 4.08 UIU/ML
WBC # FLD AUTO: 11.2 K/UL

## 2024-02-27 ENCOUNTER — APPOINTMENT (OUTPATIENT)
Dept: NEUROLOGY | Facility: CLINIC | Age: 79
End: 2024-02-27
Payer: MEDICARE

## 2024-02-27 VITALS
WEIGHT: 156 LBS | BODY MASS INDEX: 24.48 KG/M2 | TEMPERATURE: 97.8 F | HEART RATE: 112 BPM | HEIGHT: 67 IN | OXYGEN SATURATION: 98 % | SYSTOLIC BLOOD PRESSURE: 125 MMHG | DIASTOLIC BLOOD PRESSURE: 72 MMHG

## 2024-02-27 PROCEDURE — 99215 OFFICE O/P EST HI 40 MIN: CPT

## 2024-02-27 RX ORDER — DULOXETINE HYDROCHLORIDE 40 MG/1
40 CAPSULE, DELAYED RELEASE PELLETS ORAL
Qty: 90 | Refills: 3 | Status: ACTIVE | COMMUNITY
Start: 2022-03-02 | End: 1900-01-01

## 2024-02-27 RX ORDER — FLUTICASONE FUROATE, UMECLIDINIUM BROMIDE AND VILANTEROL TRIFENATATE 200; 62.5; 25 UG/1; UG/1; UG/1
POWDER RESPIRATORY (INHALATION)
Refills: 0 | Status: ACTIVE | COMMUNITY

## 2024-02-27 RX ORDER — FLUNISOLIDE 80 UG/1
AEROSOL, METERED RESPIRATORY (INHALATION)
Refills: 0 | Status: ACTIVE | COMMUNITY

## 2024-02-27 RX ORDER — GABAPENTIN 400 MG/1
400 CAPSULE ORAL
Qty: 180 | Refills: 5 | Status: ACTIVE | COMMUNITY
Start: 2021-12-22 | End: 1900-01-01

## 2024-02-27 NOTE — HISTORY OF PRESENT ILLNESS
[FreeTextEntry1] : Interval History: Pt presents today with his wife for f/u. Continues to endorse pain in his left rib cage region. Complaint with gabapentin, duloxetine and topical cream. Endorses shortness of breath when ambulating, was on steroids for some time, which has resulted in increased A1c.    HPI: Pt is a 77 yo M with PMHx of DMII, herpes zoster, COPD/asthma, hearing loss, prostate cancer s/p TURP, who presents for hospital f/u. Pt presented with severe left chest wall pain to [facility] in 12/2021. Noted shingles in the left T4 dermatome. He was treated with opiates and other pain medications. Developed acute delirium, pain medications were discontinued. He was started on CBZ, however this was not continued upon discharge. pt has been taking GBP 500mg TID. He continues to endorse severe pain in the left chest wall and back. He is also using lidocaine patches and topical ointment. Pt's wife notes that she has noticed he is doing better than when it first began. Pt also endorses difficulty raising his right shoulder, started while in the hospital. he also c/o bilateral hand and foot numbness. he uses a cane, has been doing so for several years. Endorses generalized weakness. While in the hospital, he had an MRI brain which showed two small infarcts, one in left mesial temporal lobe and one in high left parietal lobe (however the temporal lobe stroke was not reported). He had a CTA head, which showed severe bilateral, L>R cavernous ICA stenosis. He was started on DAPT, however pt's wife states that he has not been compliant with these medications. Denies previously having been on ATP or AC.   07/2022: Pt endorses persistent pain along herpes zoster dermatome. Ulcers heeling well. Also endorses numbness/tingling in BLE and random tingling all extremities. Denies new weakness, vision or speech changes. They have not followed up with EP yet for loop recorder placement.   03/2023: Pt was recently hospitalized with PNA and hypoglycemia. Endorses persistent pain along left lower rib cage. Stopped taking duloxetine for uncertain reason. Has been taking GBP 600mg TID. Did not like the capsaicin cream. Refusing to get ILR placement. Denies new weakness, numbness, speech or vision difficulty.

## 2024-02-27 NOTE — DISCUSSION/SUMMARY
[FreeTextEntry1] : Pt is a 77 yo M with PMHx of DMII, herpes zoster, COPD/asthma, hearing loss, prostate cancer s/p TURP, who presents for f/u.  # Post herpetic neuralgia: pt has been taking GBP BID instead of TID 2/2 stomach upset - increase GBP to 800mg TID - Increase duloxetine to 40mg QHS   # Ischemic stroke: left temporal and parietal lobe, L MCA territory, embolic in appearance. Possibly from L cav ICA stenosis vs cardioembolic. - Continue ASA 81mg daily - Continue statin, goal LDL <70 - Pt refusing ILR placement - Discussed importance of risk factor optimization: especially diabetes, HTN and HLD    RTC in 6 mo [Goals and Counseling] : I have reviewed the goals of stroke risk factor modification. I counseled the patient on measures to reduce stroke risk, including the importance of medication compliance, risk factor control, exercise, healthy diet and avoidance of smoking. I reviewed stroke warning signs and symptoms and appropriate actions to take if such occur.

## 2024-02-27 NOTE — PHYSICAL EXAM
[General Appearance - Alert] : alert [General Appearance - In No Acute Distress] : in no acute distress [General Appearance - Well Nourished] : well nourished [General Appearance - Well Developed] : well developed [Affect] : the affect was normal [Oriented To Time, Place, And Person] : oriented to person, place, and time [Person] : oriented to person [Time] : oriented to time [Place] : oriented to place [Fluency] : fluency intact [Comprehension] : comprehension intact [Cranial Nerves Optic (II)] : visual acuity intact bilaterally,  visual fields full to confrontation, pupils equal round and reactive to light [Cranial Nerves Facial (VII)] : face symmetrical [Cranial Nerves Trigeminal (V)] : facial sensation intact symmetrically [Cranial Nerves Vestibulocochlear (VIII)] : hearing was intact bilaterally [EOM Intact] : extraocular movements intact [Cranial Nerves Hypoglossal (XII)] : there was no tongue deviation with protrusion [Motor Tone] : muscle tone was normal in all four extremities [Paresis Pronator Drift Right-Sided] : no pronator drift on the right [Motor Strength] : muscle strength was normal in all four extremities [Paresis Pronator Drift Left-Sided] : no pronator drift on the left [Sensation Tactile Decrease] : light touch was intact [Coordination - Dysmetria Impaired Finger-to-Nose Bilateral] : not present [2+] : Patella right 2+

## 2024-02-27 NOTE — REVIEW OF SYSTEMS
[Feeling Tired] : feeling tired [As Noted in HPI] : as noted in HPI [Loss Of Hearing] : hearing loss [Shortness Of Breath] : shortness of breath [SOB on Exertion] : shortness of breath during exertion [Negative] : Eyes

## 2024-03-12 NOTE — PATIENT PROFILE ADULT - NSASFUNCLEVELADLAMBULATE_GEN_A_NUR
Problem: Infection  Goal: Absence of Infection Signs and Symptoms  Outcome: Ongoing, Progressing     Problem: Adult Inpatient Plan of Care  Goal: Plan of Care Review  Outcome: Ongoing, Progressing  Goal: Patient-Specific Goal (Individualized)  Outcome: Ongoing, Progressing  Goal: Absence of Hospital-Acquired Illness or Injury  Outcome: Ongoing, Progressing  Goal: Optimal Comfort and Wellbeing  Outcome: Ongoing, Progressing  Goal: Readiness for Transition of Care  Outcome: Ongoing, Progressing      2 = assistive person

## 2024-04-04 ENCOUNTER — EMERGENCY (EMERGENCY)
Facility: HOSPITAL | Age: 79
LOS: 0 days | Discharge: LEFT BEFORE DISCHARGE | End: 2024-04-05
Attending: EMERGENCY MEDICINE
Payer: MEDICARE

## 2024-04-04 VITALS — RESPIRATION RATE: 16 BRPM | HEART RATE: 115 BPM | OXYGEN SATURATION: 96 %

## 2024-04-04 VITALS
DIASTOLIC BLOOD PRESSURE: 80 MMHG | SYSTOLIC BLOOD PRESSURE: 150 MMHG | HEART RATE: 105 BPM | RESPIRATION RATE: 16 BRPM | WEIGHT: 169.98 LBS | OXYGEN SATURATION: 99 % | TEMPERATURE: 98 F

## 2024-04-04 DIAGNOSIS — Z98.890 OTHER SPECIFIED POSTPROCEDURAL STATES: Chronic | ICD-10-CM

## 2024-04-04 DIAGNOSIS — Z91.041 RADIOGRAPHIC DYE ALLERGY STATUS: ICD-10-CM

## 2024-04-04 DIAGNOSIS — J44.9 CHRONIC OBSTRUCTIVE PULMONARY DISEASE, UNSPECIFIED: ICD-10-CM

## 2024-04-04 DIAGNOSIS — E78.5 HYPERLIPIDEMIA, UNSPECIFIED: ICD-10-CM

## 2024-04-04 DIAGNOSIS — Z90.79 ACQUIRED ABSENCE OF OTHER GENITAL ORGAN(S): Chronic | ICD-10-CM

## 2024-04-04 DIAGNOSIS — E11.649 TYPE 2 DIABETES MELLITUS WITH HYPOGLYCEMIA WITHOUT COMA: ICD-10-CM

## 2024-04-04 DIAGNOSIS — Z53.29 PROCEDURE AND TREATMENT NOT CARRIED OUT BECAUSE OF PATIENT'S DECISION FOR OTHER REASONS: ICD-10-CM

## 2024-04-04 DIAGNOSIS — R00.0 TACHYCARDIA, UNSPECIFIED: ICD-10-CM

## 2024-04-04 DIAGNOSIS — K21.9 GASTRO-ESOPHAGEAL REFLUX DISEASE WITHOUT ESOPHAGITIS: ICD-10-CM

## 2024-04-04 PROCEDURE — 82962 GLUCOSE BLOOD TEST: CPT

## 2024-04-04 PROCEDURE — 99283 EMERGENCY DEPT VISIT LOW MDM: CPT

## 2024-04-04 PROCEDURE — 99282 EMERGENCY DEPT VISIT SF MDM: CPT

## 2024-04-04 NOTE — ED ADULT TRIAGE NOTE - CHIEF COMPLAINT QUOTE
pt was found in car to be hypoglycemic of 50 pt was found in car to be hypoglycemic at 50. ems gave one amp of d50. fs in triage 97. pt takes insulin and di not eat after taking

## 2024-04-04 NOTE — ED PROVIDER NOTE - OBJECTIVE STATEMENT
Patient is a 78-year-old male past medical history of hyperlipidemia COPD, GERD, diabetes presenting for evaluation of hyperglycemia.  Patient states he injected his insulin and went to find somewhere to eat but the place was closed so he was driving home when he felt weak and called an ambulance.  He states he woke up in the ambulance and was told that he was given sugar and he needed to be evaluated.  He denies any fever, cough, sore throat, chills, nausea, vomiting, chest pain, shortness of breath, abdominal pain, urinary complaints.

## 2024-04-04 NOTE — ED PROVIDER NOTE - NSFOLLOWUPINSTRUCTIONS_ED_ALL_ED_FT
Follow up with your primary care doctor within 3-5 days. Do not take your insulin unless you know you will have quick access to food. Return to the ER immediately for any new or worsening dizziness, chest pain, shortness of breath, fatigue, abdominal pain, nausea, vomiting, weakness, numbness, tingling, or any new concerns.

## 2024-04-04 NOTE — ED PROVIDER NOTE - PROGRESS NOTE DETAILS
KA - Patient expressed desire to leave AMA. Continues to be tachycardic.   The patient wishes to leave against medical advice.  I have discussed the risks, benefits and alternatives (including the possibility of worsening of disease, pain, permanent disability, and/or death) with the patient and his/her family (if available).  The patient voices understanding of these risks, benefits, and alternatives and still wishes to sign out against medical advice.  The patient is awake, alert, oriented  x 3 and has demonstrated capacity to refuse/direct care.  I have advised the patient that they can and should return immediately should they develop any worse/different/additional symptoms, or if they change their mind and want to continue their care.

## 2024-04-04 NOTE — ED PROVIDER NOTE - PATIENT PORTAL LINK FT
You can access the FollowMyHealth Patient Portal offered by [facility] by registering at the following website: [email or web address]. By joining [facility]’s FollowMyHealth portal, you will also be able to view your health information using other applications (apps) compatible with our system.

## 2024-04-04 NOTE — ED PROVIDER NOTE - CLINICAL SUMMARY MEDICAL DECISION MAKING FREE TEXT BOX
78-year-old male with a past medical history of diabetes presents with resolved hypoglycemia.  States that he took his 10 units of insulin that he normally takes before dinner, went to drive to a restaurant but could not find a place easily and started to get dizzy so pulled over and states that he lost consciousness after he pulled over.  No damage to the car or crashes.  States that a bystander saw that he was unconscious and called 911.  Fingerstick was approximately 50 with EMS and given dextrose and mental status improved.  Has been normal here.  States he feels fine and has no complaints.  No chest pain, shortness of breath, dizziness or fatigue.  On exam nontoxic, vital signs noted, repeat heart rate initially 125-130.  Given food and after that approximately 115.  Given persistently tachycardic I advised him that we should do some further testing such as blood work and possible EKG but he does not want to stay further.  States that he feels fine. The patient expresses the desire to leave against medical advice. They are clinically sober, and have no injury that would affect their cognition. In addition, they appear to have intact insight, judgment, and reasoning and in my opinion have the capacity to make their own healthcare decisions. They presented with a chief complaint of hypoglycemia and I have explained my concern that based on their complaint in addition to my history, physical exam, and studies returned to date that this may represent Underlying metabolic or cardiac issue given persistent tachycardia. In addition, I explained that their work-up is currently incomplete and I would recommend Lab work and EKG to complete it. I also explained my concern that leaving at this time places them at risk for their condition worsening, critical illness, and death or permanent disability including Another episode of unconsciousness. I have also offered an alternative treatments options including  blood work. The patient explained in their own words all of my concerns. Given that they were unwilling to stay I Advised him to follow-up with his primary care doctor within the next couple of days to increase the probability of a good outcome. They were encouraged to seek care immediately if they would like to complete their work-up or if they have any new concerns. Outpatient follow-up was offered with pmd. Further, they were reminded that the emergency department is available 24 hours a day, 365 days a year. This conversation was witnessed by TAMELA Walls.

## 2024-04-04 NOTE — ED PROVIDER NOTE - PHYSICAL EXAMINATION
As Follows:  CONST: Well appearing in NAD  EYES: PERRL, EOMI, Sclera and conjunctiva clear.   CARD: No murmurs, rubs, or gallops; Tachycardic.   RESP: BS Equal B/L, No wheezes, rhonchi or rales. No distress or accessory breathing  GI: Soft, non-tender, non-distended.  MS: Normal ROM in all extremities. No midline Cervical/Thoracic/Lumbar spinal tenderness.  SKIN: Warm, dry, no acute rashes. MMM  NEURO: A&Ox3, No focal deficits. Strength and sensation intact. Steady gait

## 2024-04-04 NOTE — ED ADULT NURSE NOTE - OBJECTIVE STATEMENT
pt was found in car to be hypoglycemic at 50. ems gave one amp of d50. pt states he took insulin and has not eaten since 7am

## 2024-04-04 NOTE — ED ADULT NURSE NOTE - HOW OFTEN DO YOU HAVE A DRINK CONTAINING ALCOHOL?
Never
Detail Level: Detailed
Introduction Text (Please End With A Colon): Surgical removal deferred:

## 2024-04-04 NOTE — ED ADULT NURSE NOTE - CHIEF COMPLAINT QUOTE
pt was found in car to be hypoglycemic at 50. ems gave one amp of d50. fs in triage 97. pt takes insulin and di not eat after taking

## 2024-04-11 ENCOUNTER — RX RENEWAL (OUTPATIENT)
Age: 79
End: 2024-04-11

## 2024-04-11 DIAGNOSIS — E10.65 TYPE 1 DIABETES MELLITUS WITH HYPERGLYCEMIA: ICD-10-CM

## 2024-04-11 RX ORDER — SITAGLIPTIN 100 MG/1
100 TABLET, FILM COATED ORAL DAILY
Qty: 90 | Refills: 3 | Status: DISCONTINUED | COMMUNITY
Start: 2023-03-22 | End: 2024-04-11

## 2024-04-11 RX ORDER — DULAGLUTIDE 0.75 MG/.5ML
0.75 INJECTION, SOLUTION SUBCUTANEOUS
Qty: 5 | Refills: 0 | Status: ACTIVE | COMMUNITY
Start: 2023-12-12 | End: 1900-01-01

## 2024-04-24 NOTE — DISCHARGE NOTE PROVIDER - DISCHARGE DIET
Regular Diet - No restrictions/DASH Diet/Consistent Carbohydrate Diabetic Diets
Detail Level: Detailed
Size Of Lesion In Cm (Optional): 0

## 2024-04-26 ENCOUNTER — RX RENEWAL (OUTPATIENT)
Age: 79
End: 2024-04-26

## 2024-04-26 ENCOUNTER — OUTPATIENT (OUTPATIENT)
Dept: OUTPATIENT SERVICES | Facility: HOSPITAL | Age: 79
LOS: 1 days | Discharge: HOME | End: 2024-04-26
Payer: MEDICARE

## 2024-04-26 ENCOUNTER — TRANSCRIPTION ENCOUNTER (OUTPATIENT)
Age: 79
End: 2024-04-26

## 2024-04-26 VITALS
RESPIRATION RATE: 17 BRPM | SYSTOLIC BLOOD PRESSURE: 156 MMHG | OXYGEN SATURATION: 97 % | DIASTOLIC BLOOD PRESSURE: 73 MMHG

## 2024-04-26 VITALS
HEART RATE: 84 BPM | DIASTOLIC BLOOD PRESSURE: 85 MMHG | RESPIRATION RATE: 18 BRPM | HEIGHT: 67 IN | WEIGHT: 153 LBS | SYSTOLIC BLOOD PRESSURE: 145 MMHG | TEMPERATURE: 98 F

## 2024-04-26 DIAGNOSIS — Z12.11 ENCOUNTER FOR SCREENING FOR MALIGNANT NEOPLASM OF COLON: ICD-10-CM

## 2024-04-26 DIAGNOSIS — K59.09 OTHER CONSTIPATION: ICD-10-CM

## 2024-04-26 DIAGNOSIS — Z98.890 OTHER SPECIFIED POSTPROCEDURAL STATES: Chronic | ICD-10-CM

## 2024-04-26 DIAGNOSIS — Z90.79 ACQUIRED ABSENCE OF OTHER GENITAL ORGAN(S): Chronic | ICD-10-CM

## 2024-04-26 LAB
GLUCOSE BLDC GLUCOMTR-MCNC: 104 MG/DL — HIGH (ref 70–99)
GLUCOSE BLDC GLUCOMTR-MCNC: 129 MG/DL — HIGH (ref 70–99)

## 2024-04-26 PROCEDURE — 82962 GLUCOSE BLOOD TEST: CPT

## 2024-04-26 PROCEDURE — G0121 COLON CA SCRN NOT HI RSK IND: CPT

## 2024-04-26 RX ORDER — INSULIN ASPART 100 [IU]/ML
0 INJECTION, SOLUTION SUBCUTANEOUS
Refills: 0 | DISCHARGE

## 2024-04-26 RX ORDER — INSULIN DEGLUDEC 100 U/ML
0 INJECTION, SOLUTION SUBCUTANEOUS
Refills: 0 | DISCHARGE

## 2024-04-26 RX ORDER — DULAGLUTIDE 1.5 MG/.5ML
1.5 INJECTION, SOLUTION SUBCUTANEOUS
Qty: 6 | Refills: 2 | Status: ACTIVE | COMMUNITY
Start: 2021-12-22 | End: 1900-01-01

## 2024-04-26 RX ORDER — FLUTICASONE FUROATE AND VILANTEROL TRIFENATATE 100; 25 UG/1; UG/1
0 POWDER RESPIRATORY (INHALATION)
Qty: 0 | Refills: 0 | DISCHARGE

## 2024-04-26 RX ORDER — DULAGLUTIDE 4.5 MG/.5ML
0.75 INJECTION, SOLUTION SUBCUTANEOUS
Qty: 0 | Refills: 0 | DISCHARGE

## 2024-04-26 RX ORDER — INSULIN DETEMIR 100/ML (3)
20 INSULIN PEN (ML) SUBCUTANEOUS
Qty: 0 | Refills: 0 | DISCHARGE

## 2024-04-26 NOTE — H&P PST ADULT - HISTORY OF PRESENT ILLNESS
78 year old male patient average risk for CRC, DM, hx of prostate cancer,DM, HLP, hx of shingles on tramadol and gabapentin, asthma, presents for screening colonoscopy.    Patient reports chronic constipation, responsive to linzess 290.  No blood in the stool, nausea, vomiting, change in bowel habits, dysphagia or weight loss.   Scheduled for colonoscopy.

## 2024-04-26 NOTE — ASU PATIENT PROFILE, ADULT - MEDICATION HERBAL REMEDIES, PROFILE
Important Information for Provider: First Pregnancy resulted in a still birth at 38 weeks, 7/20/20.     Patient presents for New Prenatal Intake and Education. This is patient's 2nd pregnancy. Handouts should be given. Scheduled for New Prenatal with Dr. Ku on 5/12/21.       Prenatal OB Questionnaire  Patient supplied answers from flow sheet for:  Prenatal OB Questionnaire.  Past Medical History  Have you ever recieved care for your mental health? : No  Have you ever been in a major accident or suffered serious trauma?: No  Within the last year, has anyone hit, slapped, kicked or otherwise hurt you?: No  In the last year, has anyone forced you to have sex when you didn't want to?: No    Past Medical History 2   Have you ever received a blood transfusion?: No  Would you accept a blood transfusion if was medically recommended?: Yes  Does anyone in your home smoke?: No   Is your blood type Rh negative?: Unknown  Have you ever ?: No  Have you been hospitalized for a nonsurgical reason excluding normal delivery?: No  Have you ever had an abnormal pap smear?: No    Past Medical History (Continued)  Do you have a history of abnormalities of the uterus?: No  Did your mother take GIOVANNI or any other hormones when she was pregnant with you?: No  Do you have any other problems we have not asked about which you feel may be important to this pregnancy?: No        Allergies as of 4/8/2021:    Allergies as of 04/08/2021     (No Known Allergies)       Current medications are:  Current Outpatient Medications   Medication Sig Dispense Refill     Prenatal Vit-Fe Fumarate-FA (PRENATAL VITAMIN) 27-0.8 MG TABS            Early ultrasound screening tool:    Does patient have irregular periods?  No  Did patient use hormonal birth control in the three months prior to positive urine pregnancy test? No  Is the patient breastfeeding?  No  Is the patient 10 weeks or greater at time of education visit?  No    Luna Courtney LPN     no

## 2024-04-26 NOTE — ASU DISCHARGE PLAN (ADULT/PEDIATRIC) - CALL YOUR DOCTOR IF YOU HAVE ANY OF THE FOLLOWING:
Bleeding that does not stop/Swelling that gets worse/Pain not relieved by Medications/Fever greater than (need to indicate Fahrenheit or Celsius)/Numbness, tingling, color or temperature change to extremity/Nausea and vomiting that does not stop/Unable to urinate/Increased irritability or sluggishness

## 2024-04-26 NOTE — CHART NOTE - NSCHARTNOTEFT_GEN_A_CORE
PACU ANESTHESIA PACU ADMISSION NOTE      Procedure:  Post op diagnosis    ____ Intubated  TV:______       Rate: ______      FiO2: ______    x____ Patent Airway    ____ Full return of protective reflexes    ____ Full recovery from anesthesia / sedation to baseline status    Viitals:  see anesthesia record            Mental Status:  ___x_ Awake   _____ Alert   _____ Drowsy   _____ Sedated    Nausea/Vomiting: ____ Yes, See Post - Op Orders      _x___ No    Pain Scale (0-10): _____    Treatment: ____ None    _x___ See Post - Op/PCA Orders    Post - Operative Fluids:   ____ Oral   x____ See Post - Op Orders    Plan:         Discharge:   _x___Home       _____Floor         _____Critical Care    _____Other:_________________    Comments: uneventful perioperative course; no s/s of anesthesia complications noted; D/C home when criteria met

## 2024-04-26 NOTE — ASU DISCHARGE PLAN (ADULT/PEDIATRIC) - CARE PROVIDER_API CALL
Angela Miranda  Gastroenterology  [address] [place] [address]  Phone: [phone]  Fax: [phone]  Follow Up Time: 2 weeks

## 2024-04-26 NOTE — ASU PATIENT PROFILE, ADULT - FALL HARM RISK - UNIVERSAL INTERVENTIONS
Bed in lowest position, wheels locked, appropriate side rails in place/Call bell, personal items and telephone in reach/Instruct patient to call for assistance before getting out of bed or chair/Non-slip footwear when patient is out of bed/[place] to call system/Physically safe environment - no spills, clutter or unnecessary equipment/Purposeful Proactive Rounding/Room/bathroom lighting operational, light cord in reach

## 2024-04-26 NOTE — ASU DISCHARGE PLAN (ADULT/PEDIATRIC) - NS MD DC FALL RISK RISK
For information on Fall & Injury Prevention, visit: [email or web address] OR  [email or web address] OR  https://www.cdc.gov/steadi/patient.html

## 2024-04-29 ENCOUNTER — APPOINTMENT (OUTPATIENT)
Dept: NUTRITION | Facility: CLINIC | Age: 79
End: 2024-04-29

## 2024-04-29 ENCOUNTER — OUTPATIENT (OUTPATIENT)
Dept: OUTPATIENT SERVICES | Facility: HOSPITAL | Age: 79
LOS: 1 days | End: 2024-04-29

## 2024-04-29 DIAGNOSIS — Z90.79 ACQUIRED ABSENCE OF OTHER GENITAL ORGAN(S): Chronic | ICD-10-CM

## 2024-04-29 DIAGNOSIS — E11.65 TYPE 2 DIABETES MELLITUS WITH HYPERGLYCEMIA: ICD-10-CM

## 2024-04-29 DIAGNOSIS — Z98.890 OTHER SPECIFIED POSTPROCEDURAL STATES: Chronic | ICD-10-CM

## 2024-04-30 DIAGNOSIS — Z12.11 ENCOUNTER FOR SCREENING FOR MALIGNANT NEOPLASM OF COLON: ICD-10-CM

## 2024-04-30 DIAGNOSIS — Z91.041 RADIOGRAPHIC DYE ALLERGY STATUS: ICD-10-CM

## 2024-04-30 DIAGNOSIS — Z79.85 LONG-TERM (CURRENT) USE OF INJECTABLE NON-INSULIN ANTIDIABETIC DRUGS: ICD-10-CM

## 2024-04-30 DIAGNOSIS — K64.0 FIRST DEGREE HEMORRHOIDS: ICD-10-CM

## 2024-04-30 DIAGNOSIS — J45.909 UNSPECIFIED ASTHMA, UNCOMPLICATED: ICD-10-CM

## 2024-04-30 DIAGNOSIS — Z79.4 LONG TERM (CURRENT) USE OF INSULIN: ICD-10-CM

## 2024-04-30 DIAGNOSIS — Z85.46 PERSONAL HISTORY OF MALIGNANT NEOPLASM OF PROSTATE: ICD-10-CM

## 2024-04-30 DIAGNOSIS — K57.30 DIVERTICULOSIS OF LARGE INTESTINE WITHOUT PERFORATION OR ABSCESS WITHOUT BLEEDING: ICD-10-CM

## 2024-04-30 DIAGNOSIS — E78.5 HYPERLIPIDEMIA, UNSPECIFIED: ICD-10-CM

## 2024-04-30 DIAGNOSIS — Z79.82 LONG TERM (CURRENT) USE OF ASPIRIN: ICD-10-CM

## 2024-04-30 DIAGNOSIS — E11.9 TYPE 2 DIABETES MELLITUS WITHOUT COMPLICATIONS: ICD-10-CM

## 2024-04-30 DIAGNOSIS — J44.9 CHRONIC OBSTRUCTIVE PULMONARY DISEASE, UNSPECIFIED: ICD-10-CM

## 2024-05-07 NOTE — PHYSICAL EXAM
[Alert] : alert [Sclera] : the sclera and conjunctiva were normal [Normal Outer Ear/Nose] : the ears and nose were normal in appearance [Normal Appearance] : the appearance of the neck was normal [Supple] : the neck was supple [No Respiratory Distress] : no respiratory distress [No Acc Muscle Use] : no accessory muscle use [Respiration, Rhythm And Depth] : normal respiratory rhythm and effort [Normal S1, S2] : normal S1 and S2 [Heart Rate And Rhythm] : heart rate was normal and rhythm regular [Edema] : edema was not present [Abdomen Tenderness] : non-tender [Abdomen Soft] : soft [No Clubbing, Cyanosis] : no clubbing or cyanosis of the fingernails [Involuntary Movements] : no involuntary movements were seen [No Focal Deficits] : no focal deficits [Oriented To Time, Place, And Person] : oriented to person, place, and time

## 2024-05-09 ENCOUNTER — APPOINTMENT (OUTPATIENT)
Dept: GERIATRICS | Facility: HOME HEALTH | Age: 79
End: 2024-05-09

## 2024-05-09 ENCOUNTER — RX RENEWAL (OUTPATIENT)
Age: 79
End: 2024-05-09

## 2024-05-09 RX ORDER — LEVOTHYROXINE SODIUM 0.05 MG/1
50 TABLET ORAL DAILY
Qty: 90 | Refills: 3 | Status: ACTIVE | COMMUNITY
Start: 2023-11-30 | End: 1900-01-01

## 2024-05-10 NOTE — HISTORY OF PRESENT ILLNESS
[FreeTextEntry1] : Patient cancelled appointment.  Assessment is pre-visit research done and will remain in the event there is another visit.  The plan is preliminary, was not implemented nor discussed

## 2024-05-10 NOTE — ASSESSMENT
[FreeTextEntry1] : *****************************************Preliminary Visit Review************************************************************** Plan neither implemented nor discussed   #DM2 - meds adjusted by Endo, now on Tresiba, Januvia, Trulicity and Pioglitazone - Reports BS better controlled now, following with Endo - A1C 9.5% 2/24, recheck - Stared on continuous glucose monitor by Endo  #Asthma - at baseline - c/w LABA/ICS - b2 PRN - outpatient Pulm FU, Dr. Oates  #Hx of Shingles w/PHN - c/w Gabapentin, increased to 800 TID by Neuro - c/w Duloxetine per Neuro, increased to 40mg by neuro - c/w Lidocaine patch as needed - Neuro FU as scheduled  #Hypothyroid - c/w Levo 25mcg  - TSH 4.08 2/24  #Hx CVA minimal right sided weakness - c/w ASA/Statin per Neuro ( 11/23) - outpatient FU with Neuro - outpatient FU with Cards for ILR, refusing at this time - DME for ambulation - fall precautions - PT/OT completed?????????????????????  #Hx Prostate CA s/p Prostatectomy - outpatient URO fu as scheduled.

## 2024-05-17 ENCOUNTER — APPOINTMENT (OUTPATIENT)
Dept: NEUROLOGY | Facility: CLINIC | Age: 79
End: 2024-05-17

## 2024-06-19 ENCOUNTER — RX RENEWAL (OUTPATIENT)
Age: 79
End: 2024-06-19

## 2024-06-19 RX ORDER — LACTULOSE 10 G/10G
10 SOLUTION ORAL
Qty: 30 | Refills: 6 | Status: ACTIVE | COMMUNITY
Start: 2023-01-30 | End: 1900-01-01

## 2024-06-19 RX ORDER — DICLOFENAC SODIUM 20 MG/G
2 SOLUTION TOPICAL
Qty: 1 | Refills: 2 | Status: ACTIVE | COMMUNITY
Start: 2024-01-10 | End: 1900-01-01

## 2024-06-25 PROBLEM — J45.909 ASTHMA: Status: ACTIVE | Noted: 2021-12-22

## 2024-06-25 PROBLEM — M19.90 OSTEOARTHRITIS: Status: ACTIVE | Noted: 2024-01-10

## 2024-06-25 PROBLEM — B02.29 POST HERPETIC NEURALGIA: Status: ACTIVE | Noted: 2022-01-12

## 2024-06-25 PROBLEM — E03.9 HYPOTHYROIDISM: Status: ACTIVE | Noted: 2023-04-04

## 2024-06-25 PROBLEM — I63.9 STROKE/CEREBROVASCULAR ACCIDENT: Status: ACTIVE | Noted: 2021-12-22

## 2024-06-25 PROBLEM — E11.9 TYPE 2 DIABETES MELLITUS: Status: ACTIVE | Noted: 2023-04-30

## 2024-06-28 ENCOUNTER — APPOINTMENT (OUTPATIENT)
Dept: GERIATRICS | Facility: HOME HEALTH | Age: 79
End: 2024-06-28

## 2024-06-28 VITALS
RESPIRATION RATE: 16 BRPM | HEART RATE: 98 BPM | SYSTOLIC BLOOD PRESSURE: 120 MMHG | DIASTOLIC BLOOD PRESSURE: 62 MMHG | TEMPERATURE: 98.2 F | OXYGEN SATURATION: 96 %

## 2024-06-28 DIAGNOSIS — B02.29 OTHER POSTHERPETIC NERVOUS SYSTEM INVOLVEMENT: ICD-10-CM

## 2024-06-28 DIAGNOSIS — I63.9 CEREBRAL INFARCTION, UNSPECIFIED: ICD-10-CM

## 2024-06-28 DIAGNOSIS — E11.9 TYPE 2 DIABETES MELLITUS W/OUT COMPLICATIONS: ICD-10-CM

## 2024-06-28 DIAGNOSIS — J45.909 UNSPECIFIED ASTHMA, UNCOMPLICATED: ICD-10-CM

## 2024-06-28 DIAGNOSIS — E03.9 HYPOTHYROIDISM, UNSPECIFIED: ICD-10-CM

## 2024-06-28 DIAGNOSIS — M19.90 UNSPECIFIED OSTEOARTHRITIS, UNSPECIFIED SITE: ICD-10-CM

## 2024-06-28 PROCEDURE — 99349 HOME/RES VST EST MOD MDM 40: CPT

## 2024-07-10 ENCOUNTER — OUTPATIENT (OUTPATIENT)
Dept: OUTPATIENT SERVICES | Facility: HOSPITAL | Age: 79
LOS: 1 days | End: 2024-07-10

## 2024-07-10 ENCOUNTER — NON-APPOINTMENT (OUTPATIENT)
Age: 79
End: 2024-07-10

## 2024-07-10 DIAGNOSIS — Z90.79 ACQUIRED ABSENCE OF OTHER GENITAL ORGAN(S): Chronic | ICD-10-CM

## 2024-07-10 DIAGNOSIS — Z00.00 ENCOUNTER FOR GENERAL ADULT MEDICAL EXAMINATION WITHOUT ABNORMAL FINDINGS: ICD-10-CM

## 2024-07-10 DIAGNOSIS — Z98.890 OTHER SPECIFIED POSTPROCEDURAL STATES: Chronic | ICD-10-CM

## 2024-07-10 DIAGNOSIS — D64.9 ANEMIA, UNSPECIFIED: ICD-10-CM

## 2024-07-24 ENCOUNTER — RX RENEWAL (OUTPATIENT)
Age: 79
End: 2024-07-24

## 2024-07-31 ENCOUNTER — OUTPATIENT (OUTPATIENT)
Dept: OUTPATIENT SERVICES | Facility: HOSPITAL | Age: 79
LOS: 1 days | End: 2024-07-31
Payer: MEDICARE

## 2024-07-31 ENCOUNTER — APPOINTMENT (OUTPATIENT)
Dept: ENDOCRINOLOGY | Facility: CLINIC | Age: 79
End: 2024-07-31

## 2024-07-31 VITALS
HEART RATE: 99 BPM | BODY MASS INDEX: 24.01 KG/M2 | SYSTOLIC BLOOD PRESSURE: 153 MMHG | DIASTOLIC BLOOD PRESSURE: 65 MMHG | WEIGHT: 153 LBS | HEIGHT: 67 IN

## 2024-07-31 DIAGNOSIS — E11.65 TYPE 2 DIABETES MELLITUS WITH HYPERGLYCEMIA: ICD-10-CM

## 2024-07-31 DIAGNOSIS — Z79.4 TYPE 2 DIABETES MELLITUS WITH HYPERGLYCEMIA: ICD-10-CM

## 2024-07-31 DIAGNOSIS — E03.9 HYPOTHYROIDISM, UNSPECIFIED: ICD-10-CM

## 2024-07-31 DIAGNOSIS — Z00.00 ENCOUNTER FOR GENERAL ADULT MEDICAL EXAMINATION WITHOUT ABNORMAL FINDINGS: ICD-10-CM

## 2024-07-31 DIAGNOSIS — Z90.79 ACQUIRED ABSENCE OF OTHER GENITAL ORGAN(S): Chronic | ICD-10-CM

## 2024-07-31 DIAGNOSIS — Z98.890 OTHER SPECIFIED POSTPROCEDURAL STATES: Chronic | ICD-10-CM

## 2024-07-31 PROCEDURE — 99214 OFFICE O/P EST MOD 30 MIN: CPT

## 2024-07-31 RX ORDER — BLOOD SUGAR DIAGNOSTIC
STRIP MISCELLANEOUS
Qty: 300 | Refills: 2 | Status: ACTIVE | COMMUNITY
Start: 2024-07-31 | End: 1900-01-01

## 2024-07-31 NOTE — ASSESSMENT
[FreeTextEntry1] : insulin requiring type 2 diabetes, on 4 insulin injections daily, he uses C.G.M. to adjust insulin dosages on a scale with carbohydrate counting.

## 2024-07-31 NOTE — HISTORY OF PRESENT ILLNESS
[FreeTextEntry1] : patient is being prescribed oral glucocorticoids, not sure by whom, causing high glucose levels, he ran out of sensors because did not come regularly every 6 months.

## 2024-08-01 ENCOUNTER — APPOINTMENT (OUTPATIENT)
Dept: PODIATRY | Facility: CLINIC | Age: 79
End: 2024-08-01

## 2024-08-01 ENCOUNTER — OUTPATIENT (OUTPATIENT)
Dept: OUTPATIENT SERVICES | Facility: HOSPITAL | Age: 79
LOS: 1 days | End: 2024-08-01
Payer: MEDICARE

## 2024-08-01 DIAGNOSIS — Z90.79 ACQUIRED ABSENCE OF OTHER GENITAL ORGAN(S): Chronic | ICD-10-CM

## 2024-08-01 DIAGNOSIS — Z00.00 ENCOUNTER FOR GENERAL ADULT MEDICAL EXAMINATION WITHOUT ABNORMAL FINDINGS: ICD-10-CM

## 2024-08-01 DIAGNOSIS — Z98.890 OTHER SPECIFIED POSTPROCEDURAL STATES: Chronic | ICD-10-CM

## 2024-08-01 PROCEDURE — 99204 OFFICE O/P NEW MOD 45 MIN: CPT

## 2024-08-01 NOTE — PHYSICAL EXAM
[Ankle Swelling (On Exam)] : not present [Varicose Veins Of Lower Extremities] : not present [2+] : left foot dorsalis pedis 2+ [] : normal gait [Normal Foot/Ankle] : Both lower extremities were exposed and visualized. Standing exam demonstrates normal foot posture and alignment. Hindfoot exam shows no hindfoot valgus or varus [Vibration Dec.] : diminished vibratory sensation at the level of the toes [Diminished Throughout Right Foot] : diminished sensation with monofilament testing throughout right foot [Diminished Throughout Left Foot] : diminished sensation with monofilament testing throughout left foot [FreeTextEntry1] : 5/10 w/ SWMF

## 2024-08-01 NOTE — HISTORY OF PRESENT ILLNESS
[FreeTextEntry1] : 79 year old male presents to clinic for left great toe nail that has fully avulsed. Denies pain. No constitutional symptoms. No drainage of any kind.

## 2024-08-01 NOTE — ASSESSMENT
[FreeTextEntry1] : Toe nail avulsion - no signs of infection Trimmed great toe nail back to proximal nail fold to patients tolerance; Dressed with bacitracin and DSD; continue for next 2 days  At risk diabetic; Neuropathic; A1c 8.6% measured in 2024.   RTC 6 months  [Verbal] : verbal [Patient] : patient [Good - alert, interested, motivated] : Good - alert, interested, motivated

## 2024-08-07 DIAGNOSIS — E03.9 HYPOTHYROIDISM, UNSPECIFIED: ICD-10-CM

## 2024-08-07 DIAGNOSIS — E11.65 TYPE 2 DIABETES MELLITUS WITH HYPERGLYCEMIA: ICD-10-CM
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
## 2024-08-13 DIAGNOSIS — I63.9 CEREBRAL INFARCTION, UNSPECIFIED: ICD-10-CM

## 2024-08-13 DIAGNOSIS — S91.209A UNSPECIFIED OPEN WOUND OF UNSPECIFIED TOE(S) WITH DAMAGE TO NAIL, INITIAL ENCOUNTER: ICD-10-CM

## 2024-08-13 DIAGNOSIS — R26.2 DIFFICULTY IN WALKING, NOT ELSEWHERE CLASSIFIED: ICD-10-CM

## 2024-08-13 DIAGNOSIS — E11.9 TYPE 2 DIABETES MELLITUS WITHOUT COMPLICATIONS: ICD-10-CM

## 2024-08-13 DIAGNOSIS — E11.65 TYPE 2 DIABETES MELLITUS WITH HYPERGLYCEMIA: ICD-10-CM

## 2024-08-21 ENCOUNTER — NON-APPOINTMENT (OUTPATIENT)
Age: 79
End: 2024-08-21

## 2024-08-21 DIAGNOSIS — D50.8 OTHER IRON DEFICIENCY ANEMIAS: ICD-10-CM

## 2024-08-21 RX ORDER — CHLORHEXIDINE GLUCONATE 4 %
325 (65 FE) LIQUID (ML) TOPICAL DAILY
Qty: 90 | Refills: 3 | Status: ACTIVE | COMMUNITY
Start: 2024-08-21 | End: 1900-01-01

## 2024-09-02 NOTE — H&P PST ADULT - ATTEMPT TO OOB
Subjective:      Patient ID: Dale Lemus is a 51 y.o. male.    Vitals:  vitals were not taken for this visit.     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 60 g 3    diazePAM (VALIUM) 10 MG Tab TAKE 1 TABLET(10 MG) BY MOUTH TWICE DAILY AS NEEDED FOR DIZZINESS 60 tablet 3    meclizine (ANTIVERT) 25 mg tablet Take 1 tablet (25 mg total) by mouth 3 (three) times daily as needed. 50 tablet 2    mupirocin (BACTROBAN) 2 % ointment Apply topically 2 (two) times daily. 30 g 0    ondansetron (ZOFRAN-ODT) 8 MG TbDL DISSOLVE 1 TABLET ON THE TONGUE EVERY 12 HOURS AS NEEDED 30 tablet 3    potassium chloride (MICRO-K) 10 MEQ CpSR TAKE 1 CAPSULE BY MOUTH TWICE A   capsule 3    RINVOQ 30 mg Tb24 Take 30 tablets by mouth once daily.      tadalafiL (CIALIS) 20 MG Tab Take 1 tablet (20 mg total) by mouth once daily. 30 tablet 5    triamterene-hydrochlorothiazide 37.5-25 mg (DYAZIDE) 37.5-25 mg per capsule TAKE 1 CAPSULE BY MOUTH DAILY 90 capsule 3     No current facility-administered medications on file prior to visit.     Family History   Problem Relation Name Age of Onset    Cancer Mother          lung       Medications Ordered                [id number] [place], [place]  [facility] [address] [facility] [address]   [address] [address] [address]    Telephone: [phone]   Fax: [phone]   Hours: Not open 24 hours                         E-Prescribed (1 of 1)              sulfamethoxazole-trimethoprim 800-160mg (BACTRIM DS) 800-160 mg Tab    Sig: Take 1 tablet by mouth 2 (two) times daily. for 7 days       Start: 9/2/24     Quantity: 14 tablet Refills: 0                           Ohs Peq Odvv Intake    9/2/2024  9:49 AM CDT - Filed by Patient   What is your current physical address in the event of a medical emergency? [address] [place] [address]   Are you able to take your vital signs? No   Please attach any relevant images or files          50 yo male with c/o burning on urination and irritation and feeling like has to go he states he is recovering from covid. He took azo to help. He denies hematuria.         Constitution: Negative.   HENT: Negative.     Cardiovascular: Negative.    Eyes: Negative.    Respiratory: Negative.     Gastrointestinal: Negative.  Negative for bowel incontinence.   Endocrine: negative.   Genitourinary:  Positive for dysuria. Negative for flank pain, bladder incontinence and pelvic pain.   Musculoskeletal: Negative.  Negative for pain, abnormal ROM of joint and back pain.   Skin: Negative.    Allergic/Immunologic: Negative.    Neurological: Negative.    Hematologic/Lymphatic: Negative.    Psychiatric/Behavioral: Negative.          Objective:   The physical exam was conducted virtually.  LOCATION OF PATIENT home  Physical Exam   Constitutional: He is oriented to person, place, and time. He appears well-developed.   HENT:   Head: Normocephalic and atraumatic.   Ears:   Right Ear: Hearing, tympanic membrane and external ear normal.   Left Ear: Hearing, tympanic membrane and external ear normal.   Nose: Nose normal.   Mouth/Throat: Uvula is midline, oropharynx is clear and moist and mucous membranes are normal.   Eyes: Conjunctivae and EOM are  normal. Pupils are equal, round, and reactive to light.   Neck: Neck supple.   Cardiovascular: Normal rate.   Pulmonary/Chest: Effort normal and breath sounds normal.   Musculoskeletal: Normal range of motion.         General: Normal range of motion.   Neurological: He is alert and oriented to person, place, and time.   Skin: Skin is warm.   Psychiatric: His behavior is normal. Thought content normal.   Nursing note and vitals reviewed.      Assessment:     1. Dysuria        Plan:       Dysuria  -     sulfamethoxazole-trimethoprim 800-160mg (BACTRIM DS) 800-160 mg Tab; Take 1 tablet by mouth 2 (two) times daily. for 7 days  Dispense: 14 tablet; Refill: 0                     no

## 2024-09-13 ENCOUNTER — RX RENEWAL (OUTPATIENT)
Age: 79
End: 2024-09-13

## 2024-09-20 NOTE — REASON FOR VISIT
09/20/24 0547   Agitated Behavior Scale   Short Attention Span, Easy Distractibility, Inability to Concentrate 4   Impulsive, Impatient, Low Tolerance for Pain or Frustration 3   Uncooperative, Resistant to Care, Demanding 3   Violent and/or Threatening Violence Toward People or Property 1   Explosive and/or Unpredictable Anger 1   Rocking, Rubbing, Moaning, Other Self-Stimulating Behavior 1   Pulling at Tubes, Restraints, etc. 1   Wandering from Treatment Area 4   Restlessness, Pacing, Excessive Movement 4   Repetitive Behaviors, Motor and/or Verbal 3   Rapid, Loud or Excessive Talking 4   Sudden Changes of Mood 1   Easily Initiated - Excessive Crying and/or Laughter 4   Self-Abusiveness, Physical and/or Verbal 1   Agitated Behavior Scale Total Score  35     Patient was agitated and responding to internal stimuli having hallucinations of her dad and was unable to silence his voice. PRN Zyprexa 5 mg given PO.   [Follow-Up: _____] : a [unfilled] follow-up visit [Spouse] : spouse

## 2024-09-25 ENCOUNTER — APPOINTMENT (OUTPATIENT)
Dept: NEUROLOGY | Facility: CLINIC | Age: 79
End: 2024-09-25
Payer: MEDICARE

## 2024-09-25 DIAGNOSIS — B02.29 OTHER POSTHERPETIC NERVOUS SYSTEM INVOLVEMENT: ICD-10-CM

## 2024-09-25 DIAGNOSIS — I63.9 CEREBRAL INFARCTION, UNSPECIFIED: ICD-10-CM

## 2024-09-25 PROCEDURE — 99213 OFFICE O/P EST LOW 20 MIN: CPT

## 2024-09-25 RX ORDER — ARFORMOTEROL TARTRATE INHALATION SOLUTION 15 UG/2ML
15 SOLUTION RESPIRATORY (INHALATION)
Qty: 180 | Refills: 0 | Status: ACTIVE | COMMUNITY
Start: 2024-05-16

## 2024-09-25 RX ORDER — PEN NEEDLE, DIABETIC 29 G X1/2"
32G X 4 MM NEEDLE, DISPOSABLE MISCELLANEOUS
Qty: 100 | Refills: 0 | Status: ACTIVE | COMMUNITY
Start: 2024-09-11

## 2024-09-25 RX ORDER — BENRALIZUMAB 30 MG/ML
30 INJECTION, SOLUTION SUBCUTANEOUS
Qty: 1 | Refills: 0 | Status: ACTIVE | COMMUNITY
Start: 2024-09-09

## 2024-09-25 NOTE — HISTORY OF PRESENT ILLNESS
[FreeTextEntry1] : Interval History: Pt presents today with his wife for f/u. He continues to endorse severe pain along where he had his shingles outbreak. GBP, duloxetine, and the lidocaine patches help but he still has significant discomfort. No longer on steroids, HbA1c is slowly improving. Compliant with ASA/statin for stroke prevention.    HPI: Pt is a 77 yo M with PMHx of DMII, herpes zoster, COPD/asthma, hearing loss, prostate cancer s/p TURP, who presents for hospital f/u. Pt presented with severe left chest wall pain to [facility] in 12/2021. Noted shingles in the left T4 dermatome. He was treated with opiates and other pain medications. Developed acute delirium, pain medications were discontinued. He was started on CBZ, however this was not continued upon discharge. pt has been taking GBP 500mg TID. He continues to endorse severe pain in the left chest wall and back. He is also using lidocaine patches and topical ointment. Pt's wife notes that she has noticed he is doing better than when it first began. Pt also endorses difficulty raising his right shoulder, started while in the hospital. he also c/o bilateral hand and foot numbness. he uses a cane, has been doing so for several years. Endorses generalized weakness. While in the hospital, he had an MRI brain which showed two small infarcts, one in left mesial temporal lobe and one in high left parietal lobe (however the temporal lobe stroke was not reported). He had a CTA head, which showed severe bilateral, L>R cavernous ICA stenosis. He was started on DAPT, however pt's wife states that he has not been compliant with these medications. Denies previously having been on ATP or AC.   07/2022: Pt endorses persistent pain along herpes zoster dermatome. Ulcers heeling well. Also endorses numbness/tingling in BLE and random tingling all extremities. Denies new weakness, vision or speech changes. They have not followed up with EP yet for loop recorder placement.   03/2023: Pt was recently hospitalized with PNA and hypoglycemia. Endorses persistent pain along left lower rib cage. Stopped taking duloxetine for uncertain reason. Has been taking GBP 600mg TID. Did not like the capsaicin cream. Refusing to get ILR placement. Denies new weakness, numbness, speech or vision difficulty.   02/2024: Pt presents today with his wife for f/u. Continues to endorse pain in his left rib cage region. Complaint with gabapentin, duloxetine and topical cream. Endorses shortness of breath when ambulating, was on steroids for some time, which has resulted in increased A1c.

## 2024-09-25 NOTE — PHYSICAL EXAM
[General Appearance - Alert] : alert [General Appearance - In No Acute Distress] : in no acute distress [General Appearance - Well Nourished] : well nourished [General Appearance - Well Developed] : well developed [Oriented To Time, Place, And Person] : oriented to person, place, and time [Affect] : the affect was normal [Person] : oriented to person [Place] : oriented to place [Time] : oriented to time [Fluency] : fluency intact [Comprehension] : comprehension intact [Cranial Nerves Optic (II)] : visual acuity intact bilaterally,  visual fields full to confrontation, pupils equal round and reactive to light [Cranial Nerves Trigeminal (V)] : facial sensation intact symmetrically [Cranial Nerves Facial (VII)] : face symmetrical [Cranial Nerves Vestibulocochlear (VIII)] : hearing was intact bilaterally [Cranial Nerves Hypoglossal (XII)] : there was no tongue deviation with protrusion [EOM Intact] : extraocular movements intact [Motor Tone] : muscle tone was normal in all four extremities [Motor Strength] : muscle strength was normal in all four extremities [Paresis Pronator Drift Right-Sided] : no pronator drift on the right [Paresis Pronator Drift Left-Sided] : no pronator drift on the left [Sensation Tactile Decrease] : light touch was intact [Coordination - Dysmetria Impaired Finger-to-Nose Bilateral] : not present

## 2024-09-25 NOTE — DISCUSSION/SUMMARY
[FreeTextEntry1] : Pt is a 80 yo M with PMHx of DMII, herpes zoster, COPD/asthma, hearing loss, prostate cancer s/p TURP, who presents for f/u.  # Post herpetic neuralgia: continues to have significant pain despite increasing medications. - Continue GBP 800mg TID - Continue duloxetine 40mg QHS - Pain management referral   # Ischemic stroke: left temporal and parietal lobe, L MCA territory, embolic in appearance. Possibly from L cav ICA stenosis vs cardioembolic. - Continue ASA 81mg daily - Continue statin, goal LDL <70 - Pt refused ILR placement - Discussed importance of risk factor optimization: especially diabetes, HTN and HLD   RTC in 6 mo. [Goals and Counseling] : I have reviewed the goals of stroke risk factor modification. I counseled the patient on measures to reduce stroke risk, including the importance of medication compliance, risk factor control, exercise, healthy diet and avoidance of smoking. I reviewed stroke warning signs and symptoms and appropriate actions to take if such occur.

## 2024-09-26 ENCOUNTER — RX RENEWAL (OUTPATIENT)
Age: 79
End: 2024-09-26

## 2024-10-19 ENCOUNTER — NON-APPOINTMENT (OUTPATIENT)
Age: 79
End: 2024-10-19

## 2024-10-23 ENCOUNTER — RX RENEWAL (OUTPATIENT)
Age: 79
End: 2024-10-23

## 2024-10-25 ENCOUNTER — APPOINTMENT (OUTPATIENT)
Dept: GERIATRICS | Facility: HOME HEALTH | Age: 79
End: 2024-10-25
Payer: MEDICARE

## 2024-10-25 VITALS
HEART RATE: 76 BPM | TEMPERATURE: 97.2 F | DIASTOLIC BLOOD PRESSURE: 62 MMHG | RESPIRATION RATE: 18 BRPM | OXYGEN SATURATION: 98 % | SYSTOLIC BLOOD PRESSURE: 120 MMHG

## 2024-10-25 DIAGNOSIS — E03.9 HYPOTHYROIDISM, UNSPECIFIED: ICD-10-CM

## 2024-10-25 DIAGNOSIS — M19.90 UNSPECIFIED OSTEOARTHRITIS, UNSPECIFIED SITE: ICD-10-CM

## 2024-10-25 DIAGNOSIS — E11.9 TYPE 2 DIABETES MELLITUS W/OUT COMPLICATIONS: ICD-10-CM

## 2024-10-25 DIAGNOSIS — B02.9 ZOSTER W/OUT COMPLICATIONS: ICD-10-CM

## 2024-10-25 DIAGNOSIS — K21.9 GASTRO-ESOPHAGEAL REFLUX DISEASE W/OUT ESOPHAGITIS: ICD-10-CM

## 2024-10-25 DIAGNOSIS — J45.902 UNSPECIFIED ASTHMA WITH STATUS ASTHMATICUS: ICD-10-CM

## 2024-10-25 PROCEDURE — 99349 HOME/RES VST EST MOD MDM 40: CPT

## 2024-10-25 RX ORDER — FLUTICASONE PROPIONATE 50 UG/1
50 SPRAY, METERED NASAL TWICE DAILY
Qty: 3 | Refills: 3 | Status: ACTIVE | COMMUNITY
Start: 2024-10-25 | End: 1900-01-01

## 2024-10-30 ENCOUNTER — RX RENEWAL (OUTPATIENT)
Age: 79
End: 2024-10-30

## 2024-10-31 ENCOUNTER — APPOINTMENT (OUTPATIENT)
Dept: OTOLARYNGOLOGY | Facility: CLINIC | Age: 79
End: 2024-10-31
Payer: MEDICARE

## 2024-10-31 VITALS — WEIGHT: 150 LBS | BODY MASS INDEX: 23.54 KG/M2 | HEIGHT: 67 IN

## 2024-10-31 DIAGNOSIS — H90.3 SENSORINEURAL HEARING LOSS, BILATERAL: ICD-10-CM

## 2024-10-31 PROCEDURE — 92557 COMPREHENSIVE HEARING TEST: CPT

## 2024-10-31 PROCEDURE — 99203 OFFICE O/P NEW LOW 30 MIN: CPT

## 2024-10-31 PROCEDURE — 92550 TYMPANOMETRY & REFLEX THRESH: CPT | Mod: 52

## 2024-11-05 ENCOUNTER — OUTPATIENT (OUTPATIENT)
Dept: OUTPATIENT SERVICES | Facility: HOSPITAL | Age: 79
LOS: 1 days | End: 2024-11-05

## 2024-11-05 ENCOUNTER — APPOINTMENT (OUTPATIENT)
Dept: SPEECH THERAPY | Facility: CLINIC | Age: 79
End: 2024-11-05

## 2024-11-05 DIAGNOSIS — H90.3 SENSORINEURAL HEARING LOSS, BILATERAL: ICD-10-CM

## 2024-11-05 DIAGNOSIS — Z90.79 ACQUIRED ABSENCE OF OTHER GENITAL ORGAN(S): Chronic | ICD-10-CM

## 2024-11-05 DIAGNOSIS — Z98.890 OTHER SPECIFIED POSTPROCEDURAL STATES: Chronic | ICD-10-CM

## 2024-11-11 ENCOUNTER — NON-APPOINTMENT (OUTPATIENT)
Age: 79
End: 2024-11-11

## 2024-11-15 ENCOUNTER — NON-APPOINTMENT (OUTPATIENT)
Age: 79
End: 2024-11-15

## 2024-11-15 DIAGNOSIS — E87.5 HYPERKALEMIA: ICD-10-CM

## 2024-12-19 ENCOUNTER — NON-APPOINTMENT (OUTPATIENT)
Age: 79
End: 2024-12-19

## 2024-12-31 ENCOUNTER — RX RENEWAL (OUTPATIENT)
Age: 79
End: 2024-12-31

## 2025-01-03 NOTE — ED ADULT TRIAGE NOTE - CCCP TRG CHIEF CMPLNT
Copied from CRM #[id number]. Topic: MW Messaging - MW Patient Request  >> Shahid 3, 2025  2:02 PM Yael CUELLAR wrote:  kenneth called requesting to send a general message to clinician.   Verified issue is NOT regarding a symptom(s) requiring routine or emergent triage. Verified another message template type and CRM does not apply.    Selected 'Wrap Up CRM' and created new Telephone Encounter after clicking 'Convert to Clinical Call'. Selected appropriate Reason for Call.  Sent Pt message template and routed as routine priority per Clinician KB page to appropriate clinician pool. Readback full message.    -- DO NOT REPLY / DO NOT REPLY ALL --  -- This inbox is not monitored. If this was sent to the wrong provider or department, reroute message to P ECO Reroute pool. --  -- Message is from Engagement Center Operations (ECO) --    General Patient Message: patient would like to know when she started seeing pcp for her lower back pain .    Caller Information       Contact Date/Time Type Contact Phone/Fax    01/03/2025 02:01 PM CST Phone (Incoming) kenneth [phone]            Alternative phone number: na     Can a detailed message be left? Yes - LiveWell Message  and Yes - Voicemail   Patient has been advised the message will be addressed within 2-3 business days.                 asthma exacerbation

## 2025-01-15 ENCOUNTER — OUTPATIENT (OUTPATIENT)
Dept: OUTPATIENT SERVICES | Facility: HOSPITAL | Age: 80
LOS: 1 days | End: 2025-01-15
Payer: MEDICARE

## 2025-01-15 ENCOUNTER — APPOINTMENT (OUTPATIENT)
Dept: ENDOCRINOLOGY | Facility: CLINIC | Age: 80
End: 2025-01-15

## 2025-01-15 VITALS
DIASTOLIC BLOOD PRESSURE: 81 MMHG | HEART RATE: 80 BPM | WEIGHT: 155 LBS | SYSTOLIC BLOOD PRESSURE: 147 MMHG | BODY MASS INDEX: 24.28 KG/M2

## 2025-01-15 DIAGNOSIS — Z00.00 ENCOUNTER FOR GENERAL ADULT MEDICAL EXAMINATION WITHOUT ABNORMAL FINDINGS: ICD-10-CM

## 2025-01-15 DIAGNOSIS — Z90.79 ACQUIRED ABSENCE OF OTHER GENITAL ORGAN(S): Chronic | ICD-10-CM

## 2025-01-15 DIAGNOSIS — E11.65 TYPE 2 DIABETES MELLITUS WITH HYPERGLYCEMIA: ICD-10-CM

## 2025-01-15 DIAGNOSIS — E03.9 HYPOTHYROIDISM, UNSPECIFIED: ICD-10-CM

## 2025-01-15 DIAGNOSIS — Z98.890 OTHER SPECIFIED POSTPROCEDURAL STATES: Chronic | ICD-10-CM

## 2025-01-15 DIAGNOSIS — Z79.4 TYPE 2 DIABETES MELLITUS WITH HYPERGLYCEMIA: ICD-10-CM

## 2025-01-15 PROCEDURE — 99214 OFFICE O/P EST MOD 30 MIN: CPT

## 2025-01-15 RX ORDER — DULAGLUTIDE 1.5 MG/.5ML
1.5 INJECTION, SOLUTION SUBCUTANEOUS
Qty: 3 | Refills: 3 | Status: ACTIVE | COMMUNITY
Start: 2025-01-15 | End: 1900-01-01

## 2025-01-15 RX ORDER — NORTRIPTYLINE HYDROCHLORIDE 25 MG/1
25 CAPSULE ORAL
Qty: 90 | Refills: 3 | Status: ACTIVE | COMMUNITY
Start: 2025-01-15 | End: 1900-01-01

## 2025-01-16 ENCOUNTER — APPOINTMENT (OUTPATIENT)
Dept: OTOLARYNGOLOGY | Facility: CLINIC | Age: 80
End: 2025-01-16

## 2025-01-17 ENCOUNTER — APPOINTMENT (OUTPATIENT)
Dept: GERIATRICS | Facility: HOME HEALTH | Age: 80
End: 2025-01-17
Payer: MEDICARE

## 2025-01-17 VITALS
DIASTOLIC BLOOD PRESSURE: 58 MMHG | SYSTOLIC BLOOD PRESSURE: 118 MMHG | HEART RATE: 91 BPM | OXYGEN SATURATION: 97 % | RESPIRATION RATE: 16 BRPM | TEMPERATURE: 98.2 F

## 2025-01-17 DIAGNOSIS — J45.909 UNSPECIFIED ASTHMA, UNCOMPLICATED: ICD-10-CM

## 2025-01-17 DIAGNOSIS — D64.9 ANEMIA, UNSPECIFIED: ICD-10-CM

## 2025-01-17 DIAGNOSIS — I63.9 CEREBRAL INFARCTION, UNSPECIFIED: ICD-10-CM

## 2025-01-17 DIAGNOSIS — E11.9 TYPE 2 DIABETES MELLITUS W/OUT COMPLICATIONS: ICD-10-CM

## 2025-01-17 DIAGNOSIS — M19.90 UNSPECIFIED OSTEOARTHRITIS, UNSPECIFIED SITE: ICD-10-CM

## 2025-01-17 PROCEDURE — 99349 HOME/RES VST EST MOD MDM 40: CPT

## 2025-01-18 ENCOUNTER — RX RENEWAL (OUTPATIENT)
Age: 80
End: 2025-01-18

## 2025-01-21 DIAGNOSIS — E11.65 TYPE 2 DIABETES MELLITUS WITH HYPERGLYCEMIA: ICD-10-CM

## 2025-01-21 DIAGNOSIS — E03.9 HYPOTHYROIDISM, UNSPECIFIED: ICD-10-CM

## 2025-01-22 ENCOUNTER — RX RENEWAL (OUTPATIENT)
Age: 80
End: 2025-01-22

## 2025-02-06 ENCOUNTER — APPOINTMENT (OUTPATIENT)
Dept: PODIATRY | Facility: CLINIC | Age: 80
End: 2025-02-06

## 2025-02-15 NOTE — ED ADULT TRIAGE NOTE - AS HEIGHT TYPE
Rest, ice for next 2 days, heating pad after 2 days.  Tylenol/motrin for discomfort.   Follow up with family doctor for recheck in 1 week.  
stated

## 2025-02-18 ENCOUNTER — RX RENEWAL (OUTPATIENT)
Age: 80
End: 2025-02-18

## 2025-02-19 ENCOUNTER — RX RENEWAL (OUTPATIENT)
Age: 80
End: 2025-02-19

## 2025-02-21 ENCOUNTER — RX RENEWAL (OUTPATIENT)
Age: 80
End: 2025-02-21

## 2025-02-22 ENCOUNTER — RX RENEWAL (OUTPATIENT)
Age: 80
End: 2025-02-22

## 2025-03-04 ENCOUNTER — NON-APPOINTMENT (OUTPATIENT)
Age: 80
End: 2025-03-04

## 2025-03-05 ENCOUNTER — RX RENEWAL (OUTPATIENT)
Age: 80
End: 2025-03-05

## 2025-03-28 ENCOUNTER — APPOINTMENT (OUTPATIENT)
Dept: NEUROLOGY | Facility: CLINIC | Age: 80
End: 2025-03-28

## 2025-04-21 ENCOUNTER — NON-APPOINTMENT (OUTPATIENT)
Age: 80
End: 2025-04-21

## 2025-04-26 ENCOUNTER — APPOINTMENT (OUTPATIENT)
Dept: GERIATRICS | Facility: HOME HEALTH | Age: 80
End: 2025-04-26
Payer: MEDICARE

## 2025-04-26 VITALS
HEART RATE: 92 BPM | SYSTOLIC BLOOD PRESSURE: 112 MMHG | OXYGEN SATURATION: 98 % | RESPIRATION RATE: 18 BRPM | DIASTOLIC BLOOD PRESSURE: 62 MMHG | TEMPERATURE: 97.4 F

## 2025-04-26 DIAGNOSIS — I63.9 CEREBRAL INFARCTION, UNSPECIFIED: ICD-10-CM

## 2025-04-26 DIAGNOSIS — E11.9 TYPE 2 DIABETES MELLITUS W/OUT COMPLICATIONS: ICD-10-CM

## 2025-04-26 DIAGNOSIS — E03.9 HYPOTHYROIDISM, UNSPECIFIED: ICD-10-CM

## 2025-04-26 DIAGNOSIS — B02.29 OTHER POSTHERPETIC NERVOUS SYSTEM INVOLVEMENT: ICD-10-CM

## 2025-04-26 DIAGNOSIS — M19.90 UNSPECIFIED OSTEOARTHRITIS, UNSPECIFIED SITE: ICD-10-CM

## 2025-04-26 DIAGNOSIS — J45.909 UNSPECIFIED ASTHMA, UNCOMPLICATED: ICD-10-CM

## 2025-04-26 PROCEDURE — 99349 HOME/RES VST EST MOD MDM 40: CPT

## 2025-04-26 RX ORDER — PANTOPRAZOLE 40 MG/1
40 TABLET, DELAYED RELEASE ORAL DAILY
Qty: 90 | Refills: 3 | Status: ACTIVE | COMMUNITY
Start: 2025-04-26 | End: 1900-01-01

## 2025-05-07 ENCOUNTER — APPOINTMENT (OUTPATIENT)
Dept: ENDOCRINOLOGY | Facility: CLINIC | Age: 80
End: 2025-05-07

## 2025-05-07 ENCOUNTER — OUTPATIENT (OUTPATIENT)
Dept: OUTPATIENT SERVICES | Facility: HOSPITAL | Age: 80
LOS: 1 days | End: 2025-05-07
Payer: MEDICARE

## 2025-05-07 VITALS
HEIGHT: 67 IN | DIASTOLIC BLOOD PRESSURE: 72 MMHG | WEIGHT: 153 LBS | HEART RATE: 97 BPM | BODY MASS INDEX: 24.01 KG/M2 | SYSTOLIC BLOOD PRESSURE: 149 MMHG

## 2025-05-07 DIAGNOSIS — E11.65 TYPE 2 DIABETES MELLITUS WITH HYPERGLYCEMIA: ICD-10-CM

## 2025-05-07 DIAGNOSIS — E03.9 HYPOTHYROIDISM, UNSPECIFIED: ICD-10-CM

## 2025-05-07 DIAGNOSIS — Z00.00 ENCOUNTER FOR GENERAL ADULT MEDICAL EXAMINATION WITHOUT ABNORMAL FINDINGS: ICD-10-CM

## 2025-05-07 DIAGNOSIS — Z79.4 TYPE 2 DIABETES MELLITUS WITH HYPERGLYCEMIA: ICD-10-CM

## 2025-05-07 DIAGNOSIS — Z90.79 ACQUIRED ABSENCE OF OTHER GENITAL ORGAN(S): Chronic | ICD-10-CM

## 2025-05-07 PROCEDURE — 99214 OFFICE O/P EST MOD 30 MIN: CPT

## 2025-05-08 ENCOUNTER — RX RENEWAL (OUTPATIENT)
Age: 80
End: 2025-05-08

## 2025-05-13 DIAGNOSIS — E03.9 HYPOTHYROIDISM, UNSPECIFIED: ICD-10-CM

## 2025-05-13 DIAGNOSIS — E11.65 TYPE 2 DIABETES MELLITUS WITH HYPERGLYCEMIA: ICD-10-CM

## 2025-05-20 ENCOUNTER — RX RENEWAL (OUTPATIENT)
Age: 80
End: 2025-05-20

## 2025-05-20 RX ORDER — OMEPRAZOLE 20; 1680 MG/1700MG; MG/1700MG
20-1680 POWDER, FOR SUSPENSION ORAL
Qty: 30 | Refills: 5 | Status: ACTIVE | COMMUNITY
Start: 2025-05-20 | End: 1900-01-01

## 2025-07-10 NOTE — SWALLOW BEDSIDE ASSESSMENT ADULT - SWALLOW EVAL: ORAL MUSCULATURE
Copied from CRM #[id number].  >> Jul 10, 2025  9:56 AM Christelle BENNETT wrote:  --DO NOT REPLY - Sent from PACT - If sent to wrong pool, reroute to P ECO Reroute pool --    General Message: Delia from [facility] is returning call to Dianelys about Prior Authorization.    Callback #: [phone]   Can a detailed message be left? Yes - Voicemail   Caller has been advised this message will be addressed within:1 business day [high priority]    
unable to assess due to poor participation/comprehension

## 2025-07-28 NOTE — PHYSICAL THERAPY INITIAL EVALUATION ADULT - PERTINENT HX OF CURRENT PROBLEM, REHAB EVAL
[facility] Patient Discharge Instructions    Medications: No medication changes at this time.    Follow-Up:   EKG: You will be seen on Monday 8/4/25 at 1:20 pm for an EKG at Dr Rodriguez office.  Dr Rodriguez office appointment: You will be seen on Thursday 8/28/25 at 9:00 am for a follow-up evaluation at Dr. Rodriguez office.   If you need to reschedule your appointment please call the [facility] office at [phone].     Questions/concerns: It is not uncommon for patients to experience brief episodes of palpitations, but please call the [facility] office if you are having frequent symptoms.     Incision Care: Check bilateral groins daily. No soaking in a bathtub, hot tub, or pool for one week. You may shower.  If you find any redness, swelling, drainage, warmth, or have a fever greater than 100 degrees, notify the office immediately at [phone].     Activity: No lifting greater than 5 lbs for 7 days, and no strenuous exercise for 2 weeks.    Driving: No driving or operating heavy machinery for 48 hours. After 48 hours, you may drive if you feel up to it. DO NOT drive until you have stopped taking prescription pain medication.    [facility]:   [address] [place]  [address]  [facility]   [phone]     75 year old Male has medical history of COPD not on home oxygen, Asthma, DM, HLD presented with shortness of breath and coughing with greenish colored sputum. He was sent from

## 2025-07-29 NOTE — ASU PREOP CHECKLIST - ADVANCE DIRECTIVE ADDRESSED/READDRESSED
[FreeTextEntry1] :  Assessment and Plan: - Suspected Obstructive Sleep Apnea : Based on the patient's reported symptoms of snoring, daytime fatigue, and dry mouth upon waking, obstructive sleep apnea is suspected. The provider explains the pathophysiology of sleep apnea to the patient, including the relaxation of throat muscles leading to airway obstruction and potential health consequences such as high blood pressure, heart attacks, and strokes. - Arrange for a home sleep study through the sleep center on [facility]  - Sleep center will contact the patient to schedule the test and provide instructions  - Patient will use a take-home kit for the sleep study  - Follow-up appointment to be scheduled after sleep study results are available  - Discuss potential CPAP therapy if sleep apnea is confirmed  - Educate patient on the importance of weight loss in managing sleep apnea  - Consider retesting in the future if significant weight loss is achieved 
done